# Patient Record
Sex: MALE | Race: WHITE | NOT HISPANIC OR LATINO | Employment: OTHER | URBAN - METROPOLITAN AREA
[De-identification: names, ages, dates, MRNs, and addresses within clinical notes are randomized per-mention and may not be internally consistent; named-entity substitution may affect disease eponyms.]

---

## 2017-02-20 ENCOUNTER — LAB VISIT (OUTPATIENT)
Dept: LAB | Facility: HOSPITAL | Age: 72
End: 2017-02-20
Attending: FAMILY MEDICINE
Payer: MEDICARE

## 2017-02-20 ENCOUNTER — OFFICE VISIT (OUTPATIENT)
Dept: FAMILY MEDICINE | Facility: CLINIC | Age: 72
End: 2017-02-20
Payer: MEDICARE

## 2017-02-20 VITALS
RESPIRATION RATE: 18 BRPM | HEART RATE: 55 BPM | BODY MASS INDEX: 28.19 KG/M2 | WEIGHT: 225.5 LBS | DIASTOLIC BLOOD PRESSURE: 72 MMHG | TEMPERATURE: 98 F | SYSTOLIC BLOOD PRESSURE: 164 MMHG | OXYGEN SATURATION: 98 %

## 2017-02-20 DIAGNOSIS — E11.9 DIABETES MELLITUS WITHOUT COMPLICATION: ICD-10-CM

## 2017-02-20 DIAGNOSIS — I10 BENIGN ESSENTIAL HYPERTENSION: ICD-10-CM

## 2017-02-20 DIAGNOSIS — Z01.818 PRE-OP EXAM: Primary | ICD-10-CM

## 2017-02-20 LAB
ALBUMIN SERPL BCP-MCNC: 3.8 G/DL
ALP SERPL-CCNC: 76 U/L
ALT SERPL W/O P-5'-P-CCNC: 20 U/L
ANION GAP SERPL CALC-SCNC: 11 MMOL/L
AST SERPL-CCNC: 30 U/L
BILIRUB SERPL-MCNC: 0.8 MG/DL
BUN SERPL-MCNC: 13 MG/DL
CALCIUM SERPL-MCNC: 9.3 MG/DL
CHLORIDE SERPL-SCNC: 105 MMOL/L
CHOLEST/HDLC SERPL: 4.2 {RATIO}
CO2 SERPL-SCNC: 25 MMOL/L
CREAT SERPL-MCNC: 0.8 MG/DL
EST. GFR  (AFRICAN AMERICAN): >60 ML/MIN/1.73 M^2
EST. GFR  (NON AFRICAN AMERICAN): >60 ML/MIN/1.73 M^2
GLUCOSE SERPL-MCNC: 119 MG/DL
HDL/CHOLESTEROL RATIO: 24.1 %
HDLC SERPL-MCNC: 133 MG/DL
HDLC SERPL-MCNC: 32 MG/DL
LDLC SERPL CALC-MCNC: 80.8 MG/DL
NONHDLC SERPL-MCNC: 101 MG/DL
POTASSIUM SERPL-SCNC: 4.3 MMOL/L
PROT SERPL-MCNC: 6.8 G/DL
SODIUM SERPL-SCNC: 141 MMOL/L
TRIGL SERPL-MCNC: 101 MG/DL

## 2017-02-20 PROCEDURE — 99999 PR PBB SHADOW E&M-EST. PATIENT-LVL III: CPT | Mod: PBBFAC,,, | Performed by: PHYSICIAN ASSISTANT

## 2017-02-20 PROCEDURE — 36415 COLL VENOUS BLD VENIPUNCTURE: CPT | Mod: PO

## 2017-02-20 PROCEDURE — 80053 COMPREHEN METABOLIC PANEL: CPT

## 2017-02-20 PROCEDURE — 99213 OFFICE O/P EST LOW 20 MIN: CPT | Mod: S$GLB,,, | Performed by: PHYSICIAN ASSISTANT

## 2017-02-20 PROCEDURE — 80061 LIPID PANEL: CPT

## 2017-02-20 PROCEDURE — 83036 HEMOGLOBIN GLYCOSYLATED A1C: CPT

## 2017-02-20 PROCEDURE — 99213 OFFICE O/P EST LOW 20 MIN: CPT | Mod: PBBFAC,PO | Performed by: PHYSICIAN ASSISTANT

## 2017-02-20 RX ORDER — VALSARTAN AND HYDROCHLOROTHIAZIDE 160; 12.5 MG/1; MG/1
1 TABLET, FILM COATED ORAL DAILY
Qty: 30 TABLET | Refills: 0 | Status: SHIPPED | OUTPATIENT
Start: 2017-02-20 | End: 2017-03-22 | Stop reason: SDUPTHER

## 2017-02-20 RX ORDER — METFORMIN HYDROCHLORIDE 500 MG/1
500 TABLET ORAL 2 TIMES DAILY
Qty: 180 TABLET | Refills: 0 | Status: SHIPPED | OUTPATIENT
Start: 2017-02-20 | End: 2017-06-14 | Stop reason: SDUPTHER

## 2017-02-20 NOTE — MR AVS SNAPSHOT
[facility]  [address] [place] [address]  Phone: [phone]  Fax: [phone]                  Rosendo Christina   2017 7:00 AM   Office Visit    Description:  Male : 1945   Provider:  Bita Owens PA-C   Department:  [facility]           Reason for Visit     Pre-op Exam           Diagnoses this Visit        Comments    Pre-op exam    -  Primary     Diabetes mellitus without complication         Benign essential hypertension                To Do List           Future Appointments        Provider Department Dept Phone    3/3/2017 8:20 AM Domi Lackey MD [facility] [phone]      Goals (5 Years of Data)     None      Follow-Up and Disposition     Return if symptoms worsen or fail to improve.       These Medications        Disp Refills Start End    metformin (GLUCOPHAGE) 500 MG tablet 180 tablet 0 2017     Take 1 tablet (500 mg total) by mouth 2 (two) times daily. - Oral    Pharmacy: [facility] [id number] [address] [address] AT [facility] Ph #: [phone]       valsartan-hydrochlorothiazide (DIOVAN-HCT) 160-12.5 mg per tablet 30 tablet 0 2017    Take 1 tablet by mouth once daily. - Oral    Pharmacy: [facility] [id number] [place] [address] [address] AT [facility] Ph #: [phone]         [facility]     [facility] -  Assistance  Registered nurses in the [facility] provide clinical advisement, health education, appointment booking, and other advisory services.  Call for this free service at [phone].             Medications           Message regarding Medications     Verify the changes and/or additions to your medication regime listed below are the same as discussed with your clinician today.  If any of these changes or additions are incorrect, please notify your  healthcare provider.        START taking these NEW medications        Refills    valsartan-hydrochlorothiazide (DIOVAN-HCT) 160-12.5 mg per tablet 0    Sig: Take 1 tablet by mouth once daily.    Class: Normal    Route: Oral      CHANGE how you are taking these medications     Start Taking Instead of    metformin (GLUCOPHAGE) 500 MG tablet metformin (GLUCOPHAGE) 500 MG tablet    Dosage:  Take 1 tablet (500 mg total) by mouth 2 (two) times daily. Dosage:  TAKE 1 TABLET BY MOUTH TWICE DAILY    Reason for Change:  Reorder            Verify that the below list of medications is an accurate representation of the medications you are currently taking.  If none reported, the list may be blank. If incorrect, please contact your healthcare provider. Carry this list with you in case of emergency.           Current Medications     blood sugar diagnostic (CONTOUR TEST STRIPS) Strp 1 each by Misc.(Non-Drug; Combo Route) route 2 (two) times daily.    lancets Misc USE ONE LANCET TWO TIMES DAILY    magnesium 30 mg Tab Take by mouth.    metformin (GLUCOPHAGE) 500 MG tablet Take 1 tablet (500 mg total) by mouth 2 (two) times daily.    omega 3-dha-epa-fish oil (FISH OIL) 300-1,000 mg CpDR Take 1 capsule by mouth once daily.    valsartan-hydrochlorothiazide (DIOVAN-HCT) 160-12.5 mg per tablet Take 1 tablet by mouth once daily.           Clinical Reference Information           Your Vitals Were     BP Pulse Temp Resp Weight SpO2    164/72 55 98.2 °F (36.8 °C) (Oral) 18 102.3 kg (225 lb 8.5 oz) 98%    BMI                28.19 kg/m2          Blood Pressure          Most Recent Value    BP  (!)  164/72      Allergies as of 2/20/2017     No Known Allergies      Immunizations Administered on Date of Encounter - 2/20/2017     None      Orders Placed During Today's Visit     Future Labs/Procedures Expected by Expires    Comprehensive metabolic panel  2/20/2017 2/20/2018    Hemoglobin A1c  2/20/2017 2/20/2018    Lipid panel  2/20/2017 2/20/2018       [facility] Sign-Up     Activating your [facility] account is as easy as 1-2-3!     1) Visit [email or web address], select Sign Up Now, enter this activation code and your date of birth, then select Next.  [email or web address]  Expires: 4/6/2017  7:47 AM      2) Create a username and password to use when you visit [facility] in the future and select a security question in case you lose your password and select Next.    3) Enter your e-mail address and click Sign Up!    Additional Information  If you have questions, please e-mail [email or web address] or call [phone] to talk to our [facility] staff. Remember, [facility] is NOT to be used for urgent needs. For medical emergencies, dial 911.         Smoking Cessation     If you would like to quit smoking:   You may be eligible for free services if you are a [place] resident and started smoking cigarettes before September 1, 1988.  Call the Smoking Cessation Trust ([facility]) toll free at ([phone] or (090) [phone].   Call 9-098-QUIT-NOW if you do not meet the above criteria.            Language Assistance Services     ATTENTION: Language assistance services are available, free of charge. Please call [phone].      ATENCIÓN: Si habla español, tiene a geller disposición servicios gratuitos de asistencia lingüística. Llame al [phone].     CHÚ Ý: N?u b?n nói Ti?ng Vi?t, có các d?ch v? h? tr? ngôn ng? mi?n phí dành cho b?n. G?i s? [phone].         [facility] complies with applicable Federal civil rights laws and does not discriminate on the basis of race, color, national origin, age, disability, or sex.

## 2017-02-20 NOTE — PROGRESS NOTES
Subjective:       Patient ID: Rosendo Christina is a 71 y.o. male.    Chief Complaint: Pre-op Exam (right eye catarac removal)    HPI: 72 yo male with DM II presents for pre-op. Right cataract surgery with Dr. Mithcell scheduled for 3/6/17.     Review of Systems   Respiratory: Negative for shortness of breath.    Cardiovascular: Negative for chest pain.       Objective:      Physical Exam   Constitutional: He is oriented to person, place, and time. He appears well-developed. No distress.   HENT:   Head: Normocephalic and atraumatic.   Cardiovascular: Normal rate and regular rhythm.  Exam reveals no friction rub.    No murmur heard.  Pulmonary/Chest: Effort normal and breath sounds normal. No respiratory distress.   Neurological: He is alert and oriented to person, place, and time.   Skin: Skin is warm and dry. He is not diaphoretic.   Psychiatric: He has a normal mood and affect. His behavior is normal.   Vitals reviewed.      Assessment:       1. Pre-op exam    2. Diabetes mellitus without complication    3. Benign essential hypertension        Plan:         Rosendo was seen today for pre-op exam.    Diagnoses and all orders for this visit:    Pre-op exam  -   Unable to clear today due to BP. Will start medication and have pt return on 3/3 with PCP. He is to bring paperwork for clearance.    Diabetes mellitus without complication  -     metformin (GLUCOPHAGE) 500 MG tablet; Take 1 tablet (500 mg total) by mouth 2 (two) times daily.  -     Comprehensive metabolic panel; Future  -     Lipid panel; Future  -     Hemoglobin A1c; Future  -     Labs today, low carb low sugar diet    Benign essential hypertension  -     Start valsartan-hydrochlorothiazide (DIOVAN-HCT) 160-12.5 mg per tablet; Take 1 tablet by mouth once daily.  -     Advised low salt diet and exercise

## 2017-02-21 ENCOUNTER — TELEPHONE (OUTPATIENT)
Dept: FAMILY MEDICINE | Facility: CLINIC | Age: 72
End: 2017-02-21

## 2017-02-21 LAB
ESTIMATED AVG GLUCOSE: 143 MG/DL
HBA1C MFR BLD HPLC: 6.6 %

## 2017-02-21 NOTE — TELEPHONE ENCOUNTER
----- Message from Bita Owens PA-C sent at 2/21/2017  8:22 AM CST -----  Diabetes slightly worse, can increase metformin or really needs to cut back on sugar and carbs. All other labs look good.

## 2017-02-21 NOTE — TELEPHONE ENCOUNTER
Patient notified of results as noted below, patient verbalized understanding, patient stated he will closely watch what he eat.

## 2017-03-03 ENCOUNTER — OFFICE VISIT (OUTPATIENT)
Dept: FAMILY MEDICINE | Facility: CLINIC | Age: 72
End: 2017-03-03
Payer: MEDICARE

## 2017-03-03 VITALS
TEMPERATURE: 98 F | WEIGHT: 259.5 LBS | HEIGHT: 75 IN | BODY MASS INDEX: 32.27 KG/M2 | SYSTOLIC BLOOD PRESSURE: 122 MMHG | OXYGEN SATURATION: 96 % | RESPIRATION RATE: 12 BRPM | DIASTOLIC BLOOD PRESSURE: 70 MMHG | HEART RATE: 57 BPM

## 2017-03-03 DIAGNOSIS — H26.9 CATARACT, UNSPECIFIED CATARACT TYPE, UNSPECIFIED LATERALITY: ICD-10-CM

## 2017-03-03 DIAGNOSIS — I10 ESSENTIAL HYPERTENSION, BENIGN: Primary | ICD-10-CM

## 2017-03-03 DIAGNOSIS — E11.9 DIABETES MELLITUS WITHOUT COMPLICATION: ICD-10-CM

## 2017-03-03 PROCEDURE — 99214 OFFICE O/P EST MOD 30 MIN: CPT | Mod: PBBFAC,PO | Performed by: FAMILY MEDICINE

## 2017-03-03 PROCEDURE — 99214 OFFICE O/P EST MOD 30 MIN: CPT | Mod: S$PBB,,, | Performed by: FAMILY MEDICINE

## 2017-03-03 PROCEDURE — 99999 PR PBB SHADOW E&M-EST. PATIENT-LVL IV: CPT | Mod: PBBFAC,,, | Performed by: FAMILY MEDICINE

## 2017-03-03 RX ORDER — DUREZOL 0.5 MG/ML
EMULSION OPHTHALMIC
COMMUNITY
Start: 2017-02-27 | End: 2018-05-30

## 2017-03-03 RX ORDER — CIPROFLOXACIN HYDROCHLORIDE 3 MG/ML
SOLUTION/ DROPS OPHTHALMIC
COMMUNITY
Start: 2017-02-27 | End: 2018-05-30

## 2017-03-03 RX ORDER — BROMFENAC SODIUM 0.7 MG/ML
SOLUTION/ DROPS OPHTHALMIC
COMMUNITY
Start: 2017-03-02 | End: 2018-05-30

## 2017-03-03 NOTE — PROGRESS NOTES
Subjective:       Patient ID: Rosendo Christina is a 71 y.o. male.    Chief Complaint: Pre-op Exam (cataract preop left is first., Dr Baron)    HPI:  Here for follow up uncontrolled HTN and pre-op clearance for cataract surgery.  Patient tolerating Diovan-HCT.  No cardiac complaints.    Review of Systems   Constitutional: Negative for unexpected weight change.   Eyes: Negative for visual disturbance.   Respiratory: Negative for shortness of breath.    Cardiovascular: Negative for chest pain and leg swelling.   Neurological: Negative for dizziness and headaches.       Objective:      Physical Exam   Constitutional: He is oriented to person, place, and time. He appears well-developed and well-nourished.   HENT:   Head: Normocephalic and atraumatic.   Eyes: Conjunctivae are normal.   Bilateral cataracts   Neck: Normal range of motion. Neck supple. No thyromegaly present.   Cardiovascular: Normal rate, regular rhythm and normal heart sounds.    No murmur heard.  Pulmonary/Chest: Effort normal and breath sounds normal. No respiratory distress. He has no wheezes.   Abdominal: Soft. Bowel sounds are normal. He exhibits no mass. There is no tenderness.   Musculoskeletal: He exhibits no edema.   Neurological: He is alert and oriented to person, place, and time.   Skin: Skin is warm and dry.   Psychiatric: He has a normal mood and affect.         Assessment:       1. Essential hypertension, benign    2. Cataract, unspecified cataract type, unspecified laterality    3. Diabetes mellitus without complication        Plan:       Essential hypertension, benign  BP stable    Cataract, unspecified cataract type, unspecified laterality  Patient cleared for surgery    Diabetes mellitus without complication  -     Ambulatory referral to Podiatry        No Follow-up on file.

## 2017-03-07 ENCOUNTER — TELEPHONE (OUTPATIENT)
Dept: FAMILY MEDICINE | Facility: CLINIC | Age: 72
End: 2017-03-07

## 2017-03-07 NOTE — TELEPHONE ENCOUNTER
Pt scheduled to see Dr. Terrazas on 3/23/17 @ 3:15p. The patient also wanted Dr. Lackey to know his eye surgery went great.

## 2017-03-22 DIAGNOSIS — I10 BENIGN ESSENTIAL HYPERTENSION: ICD-10-CM

## 2017-03-22 RX ORDER — VALSARTAN AND HYDROCHLOROTHIAZIDE 160; 12.5 MG/1; MG/1
1 TABLET, FILM COATED ORAL DAILY
Qty: 90 TABLET | Refills: 2 | Status: SHIPPED | OUTPATIENT
Start: 2017-03-22 | End: 2018-02-16 | Stop reason: SDUPTHER

## 2017-03-22 NOTE — TELEPHONE ENCOUNTER
Medicare generation requesting 90 days to local pharmacy for valsartan-hydrochlorothiazide (DIOVAN-HCT) 160-12.5 mg per tablet    LOV: 3/2017 122/70  CMP 2/2017

## 2017-03-23 ENCOUNTER — OFFICE VISIT (OUTPATIENT)
Dept: PODIATRY | Facility: CLINIC | Age: 72
End: 2017-03-23
Payer: MEDICARE

## 2017-03-23 VITALS
WEIGHT: 259 LBS | HEIGHT: 75 IN | SYSTOLIC BLOOD PRESSURE: 124 MMHG | DIASTOLIC BLOOD PRESSURE: 70 MMHG | BODY MASS INDEX: 32.2 KG/M2

## 2017-03-23 DIAGNOSIS — M20.5X9 HALLUX LIMITUS, UNSPECIFIED LATERALITY: ICD-10-CM

## 2017-03-23 DIAGNOSIS — E11.9 COMPREHENSIVE DIABETIC FOOT EXAMINATION, TYPE 2 DM, ENCOUNTER FOR: Primary | ICD-10-CM

## 2017-03-23 DIAGNOSIS — L90.9 PLANTAR FAT PAD ATROPHY: ICD-10-CM

## 2017-03-23 DIAGNOSIS — G47.62 NOCTURNAL LEG CRAMPS: ICD-10-CM

## 2017-03-23 DIAGNOSIS — M20.42 HAMMER TOES OF BOTH FEET: ICD-10-CM

## 2017-03-23 DIAGNOSIS — M20.41 HAMMER TOES OF BOTH FEET: ICD-10-CM

## 2017-03-23 PROCEDURE — 99214 OFFICE O/P EST MOD 30 MIN: CPT | Mod: S$PBB,,, | Performed by: PODIATRIST

## 2017-03-23 PROCEDURE — 99213 OFFICE O/P EST LOW 20 MIN: CPT | Mod: PBBFAC,PO | Performed by: PODIATRIST

## 2017-03-23 PROCEDURE — 99999 PR PBB SHADOW E&M-EST. PATIENT-LVL III: CPT | Mod: PBBFAC,,, | Performed by: PODIATRIST

## 2017-03-23 NOTE — PROGRESS NOTES
Subjective:      Patient ID: Rosendo Christina is a 71 y.o. male.    Chief Complaint: Diabetes Mellitus (pcp Dr. Lackey 03/03/2017); Diabetic Foot Exam; and Nail Care    Rosendo is a 71 y.o. male who presents to the clinic for evaluation and treatment of high risk feet. Rosendo has a past medical history of Diabetes mellitus type II (7/2010); Nephrolithiasis; Obesity; Psoriasis; Skin cancer; and Squamous cell carcinoma. The patient has no major complaints with feet. Chief concern is how to care for feet as a diabetic.    This patient has documented high risk feet requiring routine maintenance secondary to diabetes mellitis and those secondary complications of diabetes, as mentioned..    PCP: Domi Lackey MD    Date Last Seen by PCP:   Chief Complaint   Patient presents with    Diabetes Mellitus     pcp Dr. Lackey 03/03/2017    Diabetic Foot Exam    Nail Care       Current shoe gear:  Tennis shoes     Hemoglobin A1C   Date Value Ref Range Status   02/20/2017 6.6 (H) 4.5 - 6.2 % Final     Comment:     According to ADA guidelines, hemoglobin A1C <7.0% represents  optimal control in non-pregnant diabetic patients.  Different  metrics may apply to specific populations.   Standards of Medical Care in Diabetes - 2016.  For the purpose of screening for the presence of diabetes:  <5.7%     Consistent with the absence of diabetes  5.7-6.4%  Consistent with increasing risk for diabetes   (prediabetes)  >or=6.5%  Consistent with diabetes  Currently no consensus exists for use of hemoglobin A1C  for diagnosis of diabetes for children.     08/26/2016 6.4 (H) 4.5 - 6.2 % Final     Comment:     According to ADA guidelines, hemoglobin A1C <7.0% represents  optimal control in non-pregnant diabetic patients.  Different  metrics may apply to specific populations.   Standards of Medical Care in Diabetes - 2016.  For the purpose of screening for the presence of diabetes:  <5.7%     Consistent with the absence of  diabetes  5.7-6.4%  Consistent with increasing risk for diabetes   (prediabetes)  >or=6.5%  Consistent with diabetes  Currently no consensus exists for use of hemoglobin A1C  for diagnosis of diabetes for children.     12/07/2015 6.0 4.5 - 6.2 % Final     Patient Active Problem List   Diagnosis    Type 2 diabetes mellitus    Psoriasis    Obesity     Current Outpatient Prescriptions on File Prior to Visit   Medication Sig Dispense Refill    ascorbic acid, vitamin C, (VITAMIN C) 100 MG tablet Take 100 mg by mouth once daily.      blood sugar diagnostic (CONTOUR TEST STRIPS) Strp 1 each by Misc.(Non-Drug; Combo Route) route 2 (two) times daily. 50 each 11    ciprofloxacin HCl (CILOXAN) 0.3 % ophthalmic solution       DUREZOL 0.05 % Drop ophthalmic solution       lancets Misc USE ONE LANCET TWO TIMES DAILY 100 each 11    magnesium 30 mg Tab Take by mouth.      metformin (GLUCOPHAGE) 500 MG tablet Take 1 tablet (500 mg total) by mouth 2 (two) times daily. 180 tablet 0    omega 3-dha-epa-fish oil (FISH OIL) 300-1,000 mg CpDR Take 1 capsule by mouth once daily. 30 capsule     PROLENSA 0.07 % Drop       valsartan-hydrochlorothiazide (DIOVAN-HCT) 160-12.5 mg per tablet Take 1 tablet by mouth once daily. 90 tablet 2     No current facility-administered medications on file prior to visit.      Review of patient's allergies indicates:  No Known Allergies  Past Surgical History:   Procedure Laterality Date    APPENDECTOMY      SKIN CANCER EXCISION      SKIN GRAFT      VASECTOMY      VASECTOMY       Family History   Problem Relation Age of Onset    Cancer Father      prostate    Diabetes Brother      Social History     Social History    Marital status:      Spouse name: N/A    Number of children: N/A    Years of education: N/A     Occupational History     [employer]     Social History Main Topics    Smoking status: Current Some Day Smoker    Smokeless tobacco: Never Used       "Comment: cigars    Alcohol use Yes      Comment: occassional    Drug use: No    Sexual activity: Yes     Partners: Female     Other Topics Concern    Not on file     Social History Narrative    No narrative on file        Review of Systems   Constitution: Negative for chills, fever and weakness.   Cardiovascular: Negative for claudication and leg swelling.   Respiratory: Negative for cough and shortness of breath.    Skin: Positive for dry skin and nail changes. Negative for itching and rash.   Musculoskeletal: Positive for joint pain and muscle cramps (nocturnal). Negative for falls, joint swelling and muscle weakness.   Gastrointestinal: Negative for diarrhea, nausea and vomiting.   Neurological: Positive for paresthesias. Negative for numbness and tremors.   Psychiatric/Behavioral: Negative for altered mental status and hallucinations.           Objective:       Vitals:    03/23/17 1530   BP: 124/70   Weight: 117.5 kg (259 lb)   Height: 6' 3" (1.905 m)   PainSc: 0-No pain       Physical Exam   Constitutional:   General: Pt. is well-developed, well-nourished, appears stated age, in no acute distress, alert and oriented x 3. No evidence of depression, anxiety, or agitation. Calm, cooperative, and communicative. Appropriate interactions and affect.       Cardiovascular:   Pulses:       Dorsalis pedis pulses are 1+ on the right side, and 1+ on the left side.        Posterior tibial pulses are 2+ on the right side, and 2+ on the left side.   There is decreased digital hair   Musculoskeletal:        Right ankle: He exhibits normal range of motion and no swelling. No tenderness. Achilles tendon exhibits no pain and no defect.        Left ankle: He exhibits normal range of motion and no swelling. No tenderness. Achilles tendon exhibits no pain and no defect.        Right foot: There is decreased range of motion. There is no tenderness.        Left foot: There is decreased range of motion. There is no tenderness. "   muscle strength is 5/5 in all groups bilaterally.    Decreased stride, station of gait.  apropulsive toe off.  Increased angle and base of gait.    Patient has hammertoes of digits 2-5 bilateral partially reducible without symptom today.    Decreased first MPJ range of motion both weightbearing and nonweightbearing, no crepitus observed the first MP joint, + dorsal flag sign. Mild  bunion deformity is observed .    Fat pad atrophy to heels and met heads bilateral   Neurological: No sensory deficit.   Hoschton-Handy 5.07 monofilament is intact bilateral feet. Sharp/dull sensation is also intact Bilateral feet.       Skin: Skin is warm, dry and intact. No abrasion, no ecchymosis, no lesion and no rash noted. He is not diaphoretic. No cyanosis or erythema. No pallor. Nails show no clubbing.   Toenails 1-5 bilaterally are elongated by 2-3 mm, thickened by 2-3 mm, discolored/yellowed, dystrophic, brittle with subungual debris.      Interdigital Spaces clean, dry and without evidence of break in skin integrity   Psychiatric: He has a normal mood and affect. His speech is normal.   Nursing note and vitals reviewed.            Assessment:       Encounter Diagnoses   Name Primary?    Comprehensive diabetic foot examination, type 2 DM, encounter for Yes    Nocturnal leg cramps     Hammer toes of both feet     Hallux limitus, unspecified laterality     Plantar fat pad atrophy          Plan:       Rosendo was seen today for diabetes mellitus, diabetic foot exam and nail care.    Diagnoses and all orders for this visit:    Comprehensive diabetic foot examination, type 2 DM, encounter for  -     DIABETIC SHOES FOR HOME USE    Nocturnal leg cramps    Hammer toes of both feet  -     DIABETIC SHOES FOR HOME USE    Hallux limitus, unspecified laterality  -     DIABETIC SHOES FOR HOME USE    Plantar fat pad atrophy  -     DIABETIC SHOES FOR HOME USE    I counseled the patient on his conditions, their implications and medical  management. Greater than 20 minutes spent on education about the diabetic foot, neuropathy, and prevention of limb loss.      - Shoe inspection. Diabetic Foot Education. Patient reminded of the importance of good nutrition and blood sugar control to help prevent podiatric complications of diabetes. Patient instructed on proper foot hygeine. We discussed wearing proper shoe gear, daily foot inspections, never walking without protective shoe gear.    Rx diabetic shoes for protection and support      Spoke in detail about causes of muscle spasms. Verbal and written instructions given. Recommended drinking plenty of fluids during the day to prevent dehydration, stretching, dietary changes, and b vitamin supplementation.      He will continue to monitor the areas daily, inspect his feet, wear protective shoe gear when ambulatory, moisturizer to maintain skin integrity and follow in this office in approximately 12-15 months, sooner p.r.n.

## 2017-03-23 NOTE — LETTER
March 25, 2017      Domi Lackey MD  [address]  [place] [address]           [facility]  [address]  [place] [address]  Phone: [phone]          Patient: Rosendo Christina   MR Number: [id number]   YOB: 1945   Date of Visit: 3/23/2017       Dear Dr. Domi Lackey:    Thank you for referring Rosendo Christina to me for evaluation. Attached you will find relevant portions of my assessment and plan of care.    If you have questions, please do not hesitate to call me. I look forward to following Rosendo Christina along with you.    Sincerely,    Kim Terrazas DPM    Enclosure  CC:  No Recipients    If you would like to receive this communication electronically, please contact [email or web address] or [phone] to request more information on [employer] Link access.    For providers and/or their staff who would like to refer a patient to [facility], please contact us through our one-stop-shop provider referral line, Evan Rajput, at [phone].    If you feel you have received this communication in error or would no longer like to receive these types of communications, please e-mail [email or web address]

## 2017-03-23 NOTE — MR AVS SNAPSHOT
[facility]  [address] [facility]  [place] [address]  Phone: [phone]                  Rosendo Christina   3/23/2017 3:15 PM   Office Visit    Description:  Male : 1945   Provider:  Kim Terrazas DPM   Department:  [facility]           Reason for Visit     Diabetes Mellitus     Diabetic Foot Exam     Nail Care           Diagnoses this Visit        Comments    Comprehensive diabetic foot examination, type 2 DM, encounter for    -  Primary     Nocturnal leg cramps         Hammer toes of both feet         Hallux limitus, unspecified laterality         Plantar fat pad atrophy                To Do List           Goals (5 Years of Data)     None      [facility]     [facility] On Call Nurse Care Line -  Assistance  Registered nurses in the [facility] On Call Center provide clinical advisement, health education, appointment booking, and other advisory services.  Call for this free service at [phone].             Medications           Message regarding Medications     Verify the changes and/or additions to your medication regime listed below are the same as discussed with your clinician today.  If any of these changes or additions are incorrect, please notify your healthcare provider.             Verify that the below list of medications is an accurate representation of the medications you are currently taking.  If none reported, the list may be blank. If incorrect, please contact your healthcare provider. Carry this list with you in case of emergency.           Current Medications     ascorbic acid, vitamin C, (VITAMIN C) 100 MG tablet Take 100 mg by mouth once daily.    blood sugar diagnostic (CONTOUR TEST STRIPS) Strp 1 each by Misc.(Non-Drug; Combo Route) route 2 (two) times daily.    ciprofloxacin HCl (CILOXAN) 0.3 % ophthalmic solution     DUREZOL 0.05 % Drop ophthalmic solution     lancets Misc USE ONE LANCET TWO TIMES DAILY    magnesium 30 mg Tab Take by mouth.     "metformin (GLUCOPHAGE) 500 MG tablet Take 1 tablet (500 mg total) by mouth 2 (two) times daily.    omega 3-dha-epa-fish oil (FISH OIL) 300-1,000 mg CpDR Take 1 capsule by mouth once daily.    PROLENSA 0.07 % Drop     valsartan-hydrochlorothiazide (DIOVAN-HCT) 160-12.5 mg per tablet Take 1 tablet by mouth once daily.           Clinical Reference Information           Your Vitals Were     BP Height Weight BMI       124/70 6' 3" (1.905 m) 117.5 kg (259 lb) 32.37 kg/m2       Blood Pressure          Most Recent Value    BP  124/70      Allergies as of 3/23/2017     No Known Allergies      Immunizations Administered on Date of Encounter - 3/23/2017     None      Instructions      Recommend over the counter medicine for nerve keily:  - Capsaicin cream to rub on at night  -  Absorbine Veterinary Liniment Gel Topical Analgesic Sore Muscle and Joint Pain Relief (found at pet store)  - Alpha lipoic acid 600 mg Cap; Take 1 capsule by mouth once daily for neuropathy or a B complex vitamin daily      Recommend lotions: eucerin, aquaphor, A&D ointment, gold bond for diabetics, sween    Shoe recommendations: (try 6pm.Zwittle, zappos.Zwittle , [employer], or shoesAzure Power for discounted prices) you can visit DSW shoes in [place] as well    Asics (GT 1000 or gel foundations), new balance, saucony (stabil c3),  Levy (transcend), vionic, propet (tennis shoe)    soft brand, clarks, crocs, aerosoles, naturalizers, SAS, ecco, lacho, maegan guillaume, rockports (dress shoes)    Vionic, volitiles, burkenstocks, fitflops, propet (sandals)    Nike comfort thong sandals, crocs (house shoes)    Nail Home remedy:  Vicks Vapor rub OR Listerine and apple cider vinegar in a spray bottle to nails    Occasional soaks for 15-20 mins in luke warm water with 1 cup of listerine and 1 cup of apple cider vinegar are ok You may add several drops of oil of oregano or tea tree oil as well      Diabetes: Inspecting Your Feet  Diabetes increases your chances of " developing foot problems. So inspect your feet every day. This helps you find small skin irritations before they become serious infections. If you have trouble seeing the bottoms of your feet, use a mirror or ask a family member or friend to help.     Pressure spots on the bottom of the foot are common areas where problems develop.   How to check your feet  Below are tips to help you look for foot problems. Try to check your feet at the same time each day, such as when you get out of bed in the morning:  · Check the top of each foot. The tops of toes, back of the heel, and outer edge of the foot can get a lot of rubbing from poor-fitting shoes.  · Check the bottom of each foot. Daily wear and tear often leads to problems at pressure spots.  · Check the toes and nails. Fungal infections often occur between toes. Toenail problems can also be a sign of fungal infections or lead to breaks in the skin.  · Check your shoes, too. Loose objects inside a shoe can injure the foot. Use your hand to feel inside your shoes for things like alessandra, loose stitching, or rough areas that could irritate your skin.  Warning signs  Look for any color changes in the foot. Redness with streaks can signal a severe infection, which needs immediate medical attention. Tell your doctor right away if you have any of these problems:  · Swelling, sometimes with color changes, may be a sign of poor blood flow or infection. Symptoms include tenderness and an increase in the size of your foot.  · Warm or hot areas on your feet may be signs of infection. A foot that is cold may not be getting enough blood.  · Sensations such as burning, tingling, or pins and needles can be signs of a problem. Also check for areas that may be numb.  · Hot spots are caused by friction or pressure. Look for hot spots in areas that get a lot of rubbing. Hot spots can turn into blisters, calluses, or sores.  · Cracks and sores are caused by dry or irritated skin. They  are a sign that the skin is breaking down, which can lead to infection.  · Toenail problems to watch for include nails growing into the skin (ingrown toenail) and causing redness or pain. Thick, yellow, or discolored nails can signal a fungal infection.  · Drainage and odor can develop from untreated sores and ulcers. Call your doctor right away if you notice white or yellow drainage, bleeding, or unpleasant odor.   © 5881-9033 [employer]. [address] [place] [address]. All rights reserved. This information is not intended as a substitute for professional medical care. Always follow your healthcare professional's instructions.          Leg Cramps  A muscle cramp or spasm is a strong contraction of the muscle fibers. It is also called a charley horse. This may occur in the foot, calf, or thigh at night when the legs are elevated. If the spasm is prolonged, it can become very painful.  This may be caused by sleeping in an uncomfortable position, muscle fatigue, poor muscle tone from lack of exercise and stretching, dehydration, electrolyte imbalance, diabetes, alcohol use, and certain medicine.  Home care  · Drink plenty of fluids during the day to prevent dehydration.  · Stretch your legs before bedtime.  · Eat a diet high in potassium. These foods include fresh fruit, such as bananas, oranges, cantaloupe, and honeydew melon. It also includes apple, prune, orange, grape and pineapple juices. Other foods high in potassium are white, red, and abdalal beans, baked potatoes, raw spinach, cod, flounder, halibut, salmon, and scallops.  · Talk with your healthcare provider about taking mineral and vitamin supplements that contain magnesium and vitamin B-12 if you are not already taking these. Other prescription medicines may also be used.  · Avoid stimulants such as caffeine, nicotine, decongestants.  How to relieve an acute leg cramp  · For mild pain, getting out of the bed and walking may help.  Some people find relief with heat and massage. You can apply heat with a warm shower, bath, or compress. Some people feel better with a cold packs. You can make an ice pack by filling a plastic bag that seals at the top with ice cubes and then wrapping it with a thin towel. Try both and use the method that feels best for 15 to 20 minutes at a time.  · For severe pain, stretching the muscle that is in spasm may quickly relieve the pain.  · When the spasm is in your foot, your toes may curl up or down. To stretch the muscle in spasm, bend your toes in the opposite direction. If the spasm pulls your toes up, bend them down. If the spasm pulls them down, bend them up.  · When the spasm is in your calf, bend the ankle so the foot points upward toward your knee.  · When the spasm is in your thigh, bend or straighten the knee and hip until you feel relief.  Follow-up care  Follow up with your healthcare provider, or as advised.  When to seek medical advice  Call your healthcare provider right away if any of these occur:  · Walking makes your pain worse and rest makes it better  · You develop weakness in the affected leg  · Pain or frequency of spasms increases and is not controlled by the above measures  Date Last Reviewed: 11/23/2015  © 3992-5052 [employer]. [address] [place] [address], [place] [address]. All rights reserved. This information is not intended as a substitute for professional medical care. Always follow your healthcare professional's instructions.             Smoking Cessation     If you would like to quit smoking:   You may be eligible for free services if you are a [place] resident and started smoking cigarettes before September 1, 1988.  Call the Smoking Cessation Trust (SCT) toll free at (812) 008-9416 or (625) 835-0507.   Call 1-800-QUIT-NOW if you do not meet the above criteria.            Language Assistance Services     ATTENTION: Language assistance services are available, free of  charge. Please call [phone].      ATENCIÓN: Si habla español, tiene a geller disposición servicios gratuitos de asistencia lingüística. Llame al [phone].     CHÚ Ý: N?u b?n nói Ti?ng Vi?t, có các d?ch v? h? tr? ngôn ng? mi?n phí dành cho b?n. G?i s? [phone].         [facility] complies with applicable Federal civil rights laws and does not discriminate on the basis of race, color, national origin, age, disability, or sex.

## 2017-03-23 NOTE — PATIENT INSTRUCTIONS
Recommend over the counter medicine for nerve keily:  - Capsaicin cream to rub on at night  -  Absorbine Veterinary Liniment Gel Topical Analgesic Sore Muscle and Joint Pain Relief (found at pet store)  - Alpha lipoic acid 600 mg Cap; Take 1 capsule by mouth once daily for neuropathy or a B complex vitamin daily      Recommend lotions: eucerin, aquaphor, A&D ointment, gold bond for diabetics, sween    Shoe recommendations: (try 6pm.HomeSphere, zappos.HomeSphere , nordstromrack.HomeSphere, or shoes.HomeSphere for discounted prices) you can visit DSW shoes in [place] as well    Asics (GT 1000 or gel foundations), new balance, saucony (stabil c3),  Levy (transcend), vionic, propet (tennis shoe)    soft brand, clarks, crocs, aerosoles, naturalizers, SAS, ecco, lacho, maegan guillaume, rockports (dress shoes)    Vionic, volitiles, burkenstocks, fitflops, propet (sandals)    Nike comfort thong sandals, crocs (house shoes)    Nail Home remedy:  Vicks Vapor rub OR Listerine and apple cider vinegar in a spray bottle to nails    Occasional soaks for 15-20 mins in luke warm water with 1 cup of listerine and 1 cup of apple cider vinegar are ok You may add several drops of oil of oregano or tea tree oil as well      Diabetes: Inspecting Your Feet  Diabetes increases your chances of developing foot problems. So inspect your feet every day. This helps you find small skin irritations before they become serious infections. If you have trouble seeing the bottoms of your feet, use a mirror or ask a family member or friend to help.     Pressure spots on the bottom of the foot are common areas where problems develop.   How to check your feet  Below are tips to help you look for foot problems. Try to check your feet at the same time each day, such as when you get out of bed in the morning:  · Check the top of each foot. The tops of toes, back of the heel, and outer edge of the foot can get a lot of rubbing from poor-fitting shoes.  · Check the bottom of each foot.  Daily wear and tear often leads to problems at pressure spots.  · Check the toes and nails. Fungal infections often occur between toes. Toenail problems can also be a sign of fungal infections or lead to breaks in the skin.  · Check your shoes, too. Loose objects inside a shoe can injure the foot. Use your hand to feel inside your shoes for things like alessandra, loose stitching, or rough areas that could irritate your skin.  Warning signs  Look for any color changes in the foot. Redness with streaks can signal a severe infection, which needs immediate medical attention. Tell your doctor right away if you have any of these problems:  · Swelling, sometimes with color changes, may be a sign of poor blood flow or infection. Symptoms include tenderness and an increase in the size of your foot.  · Warm or hot areas on your feet may be signs of infection. A foot that is cold may not be getting enough blood.  · Sensations such as burning, tingling, or pins and needles can be signs of a problem. Also check for areas that may be numb.  · Hot spots are caused by friction or pressure. Look for hot spots in areas that get a lot of rubbing. Hot spots can turn into blisters, calluses, or sores.  · Cracks and sores are caused by dry or irritated skin. They are a sign that the skin is breaking down, which can lead to infection.  · Toenail problems to watch for include nails growing into the skin (ingrown toenail) and causing redness or pain. Thick, yellow, or discolored nails can signal a fungal infection.  · Drainage and odor can develop from untreated sores and ulcers. Call your doctor right away if you notice white or yellow drainage, bleeding, or unpleasant odor.   © 7047-9067 [employer]. [address] [place] [address], [place] [address]. All rights reserved. This information is not intended as a substitute for professional medical care. Always follow your healthcare professional's instructions.          Leg Cramps  A  muscle cramp or spasm is a strong contraction of the muscle fibers. It is also called a charley horse. This may occur in the foot, calf, or thigh at night when the legs are elevated. If the spasm is prolonged, it can become very painful.  This may be caused by sleeping in an uncomfortable position, muscle fatigue, poor muscle tone from lack of exercise and stretching, dehydration, electrolyte imbalance, diabetes, alcohol use, and certain medicine.  Home care  · Drink plenty of fluids during the day to prevent dehydration.  · Stretch your legs before bedtime.  · Eat a diet high in potassium. These foods include fresh fruit, such as bananas, oranges, cantaloupe, and honeydew melon. It also includes apple, prune, orange, grape and pineapple juices. Other foods high in potassium are white, red, and abdalla beans, baked potatoes, raw spinach, cod, flounder, halibut, salmon, and scallops.  · Talk with your healthcare provider about taking mineral and vitamin supplements that contain magnesium and vitamin B-12 if you are not already taking these. Other prescription medicines may also be used.  · Avoid stimulants such as caffeine, nicotine, decongestants.  How to relieve an acute leg cramp  · For mild pain, getting out of the bed and walking may help. Some people find relief with heat and massage. You can apply heat with a warm shower, bath, or compress. Some people feel better with a cold packs. You can make an ice pack by filling a plastic bag that seals at the top with ice cubes and then wrapping it with a thin towel. Try both and use the method that feels best for 15 to 20 minutes at a time.  · For severe pain, stretching the muscle that is in spasm may quickly relieve the pain.  · When the spasm is in your foot, your toes may curl up or down. To stretch the muscle in spasm, bend your toes in the opposite direction. If the spasm pulls your toes up, bend them down. If the spasm pulls them down, bend them up.  · When the  spasm is in your calf, bend the ankle so the foot points upward toward your knee.  · When the spasm is in your thigh, bend or straighten the knee and hip until you feel relief.  Follow-up care  Follow up with your healthcare provider, or as advised.  When to seek medical advice  Call your healthcare provider right away if any of these occur:  · Walking makes your pain worse and rest makes it better  · You develop weakness in the affected leg  · Pain or frequency of spasms increases and is not controlled by the above measures  Date Last Reviewed: 11/23/2015  © 1210-8978 [employer]. [address] [place] [address]. All rights reserved. This information is not intended as a substitute for professional medical care. Always follow your healthcare professional's instructions.

## 2017-06-14 DIAGNOSIS — E11.9 DIABETES MELLITUS WITHOUT COMPLICATION: ICD-10-CM

## 2017-06-14 RX ORDER — METFORMIN HYDROCHLORIDE 500 MG/1
500 TABLET ORAL 2 TIMES DAILY
Qty: 180 TABLET | Refills: 0 | Status: SHIPPED | OUTPATIENT
Start: 2017-06-14 | End: 2017-10-17 | Stop reason: SDUPTHER

## 2017-06-14 NOTE — TELEPHONE ENCOUNTER
----- Message from Daily Gonzalez sent at 6/14/2017 12:43 PM CDT -----  Contact: self  Refill request: metformin (GLUCOPHAGE) 500 MG tablet    Pt states he don't receive text messages.    Contact Rosendo [phone]    Thanks

## 2017-10-13 DIAGNOSIS — E11.9 TYPE 2 DIABETES MELLITUS WITHOUT COMPLICATION: ICD-10-CM

## 2017-10-17 DIAGNOSIS — E11.9 DIABETES MELLITUS WITHOUT COMPLICATION: ICD-10-CM

## 2017-10-17 RX ORDER — METFORMIN HYDROCHLORIDE 500 MG/1
500 TABLET ORAL 2 TIMES DAILY
Qty: 180 TABLET | Refills: 0 | Status: SHIPPED | OUTPATIENT
Start: 2017-10-17 | End: 2018-01-19 | Stop reason: SDUPTHER

## 2018-01-10 ENCOUNTER — OFFICE VISIT (OUTPATIENT)
Dept: FAMILY MEDICINE | Facility: CLINIC | Age: 73
End: 2018-01-10
Payer: MEDICARE

## 2018-01-10 ENCOUNTER — LAB VISIT (OUTPATIENT)
Dept: LAB | Facility: HOSPITAL | Age: 73
End: 2018-01-10
Attending: FAMILY MEDICINE
Payer: MEDICARE

## 2018-01-10 VITALS
SYSTOLIC BLOOD PRESSURE: 138 MMHG | DIASTOLIC BLOOD PRESSURE: 76 MMHG | RESPIRATION RATE: 18 BRPM | HEIGHT: 75 IN | WEIGHT: 263.44 LBS | OXYGEN SATURATION: 95 % | TEMPERATURE: 99 F | HEART RATE: 64 BPM | BODY MASS INDEX: 32.75 KG/M2

## 2018-01-10 DIAGNOSIS — E11.9 TYPE 2 DIABETES MELLITUS WITHOUT COMPLICATION: ICD-10-CM

## 2018-01-10 DIAGNOSIS — Z12.5 SCREENING PSA (PROSTATE SPECIFIC ANTIGEN): ICD-10-CM

## 2018-01-10 DIAGNOSIS — R51.9 NEW ONSET HEADACHE: ICD-10-CM

## 2018-01-10 DIAGNOSIS — R05.9 COUGH: ICD-10-CM
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
## 2018-01-10 LAB
COMPLEXED PSA SERPL-MCNC: 4.1 NG/ML
ESTIMATED AVG GLUCOSE: 137 MG/DL
HBA1C MFR BLD HPLC: 6.4 %

## 2018-01-10 PROCEDURE — 83036 HEMOGLOBIN GLYCOSYLATED A1C: CPT

## 2018-01-10 PROCEDURE — 36415 COLL VENOUS BLD VENIPUNCTURE: CPT | Mod: PO

## 2018-01-10 PROCEDURE — 99214 OFFICE O/P EST MOD 30 MIN: CPT | Mod: S$PBB,,, | Performed by: PHYSICIAN ASSISTANT

## 2018-01-10 PROCEDURE — 99215 OFFICE O/P EST HI 40 MIN: CPT | Mod: PBBFAC,PO | Performed by: PHYSICIAN ASSISTANT

## 2018-01-10 PROCEDURE — 84153 ASSAY OF PSA TOTAL: CPT

## 2018-01-10 PROCEDURE — 99999 PR PBB SHADOW E&M-EST. PATIENT-LVL V: CPT | Mod: PBBFAC,,, | Performed by: PHYSICIAN ASSISTANT

## 2018-01-10 RX ORDER — BENZONATATE 200 MG/1
200 CAPSULE ORAL 3 TIMES DAILY PRN
Qty: 30 CAPSULE | Refills: 0 | Status: SHIPPED | OUTPATIENT
Start: 2018-01-10 | End: 2018-01-20

## 2018-01-10 NOTE — PROGRESS NOTES
Subjective:       Patient ID: Rosendo Christina is a 72 y.o. male.    Chief Complaint: Headache (had a fall 12/21/17 ); Cough; and Diabetes (will do Hemoglobing A1c )    Headache    This is a new problem. The current episode started 1 to 4 weeks ago. The problem occurs daily. The problem has been unchanged. The pain is located in the frontal region. The pain quality is not similar to prior headaches. The quality of the pain is described as throbbing. The pain is mild. Associated symptoms include coughing and a visual change. Pertinent negatives include no ear pain, fever, loss of balance, numbness, phonophobia, photophobia, rhinorrhea, swollen glands, tingling or weakness. Nothing aggravates the symptoms. He has tried NSAIDs for the symptoms. The treatment provided mild relief. There is no history of migraines in the family.   Cough   This is a new problem. The current episode started 1 to 4 weeks ago. The problem has been unchanged. The cough is non-productive (minimal mucous). Associated symptoms include headaches and postnasal drip. Pertinent negatives include no ear pain, fever, nasal congestion, rhinorrhea or wheezing. He has tried nothing for the symptoms. There is no history of asthma, bronchitis or COPD.   Fall   The accident occurred more than 1 week ago (3 weeks). The fall occurred while walking. He fell from a height of 1 to 2 ft. He landed on concrete. The point of impact was the head, face and right wrist. The pain is present in the head. The pain is mild. Associated symptoms include headaches and a visual change. Pertinent negatives include no fever, loss of consciousness, numbness or tingling. He has tried NSAID for the symptoms.     Review of Systems   Constitutional: Negative for fever.   HENT: Positive for postnasal drip. Negative for ear pain and rhinorrhea.    Eyes: Negative for photophobia and visual disturbance.   Respiratory: Positive for cough. Negative for wheezing.    Neurological:  Positive for headaches. Negative for tingling, loss of consciousness, facial asymmetry, speech difficulty, weakness, light-headedness, numbness and loss of balance.       Objective:      Physical Exam   Constitutional: He is oriented to person, place, and time. He appears well-developed and well-nourished. No distress.   HENT:   Head: Normocephalic and atraumatic.   Eyes: EOM are normal. Pupils are equal, round, and reactive to light.   Cardiovascular: Normal rate and regular rhythm.    Pulmonary/Chest: Effort normal and breath sounds normal.   Neurological: He is alert and oriented to person, place, and time. He displays a negative Romberg sign.   Tongue midline, facial expressions equal bilaterally, normal rapid hand movements, normal heel to shin, normal finger to nose   Skin: Skin is warm and dry. He is not diaphoretic.   Psychiatric: He has a normal mood and affect. His behavior is normal.   Vitals reviewed.      Assessment:       1. Fall, initial encounter    2. New onset headache    3. Cough    4. Screening PSA (prostate specific antigen)        Plan:         Rosendo was seen today for headache, cough and diabetes.    Diagnoses and all orders for this visit:    Fall, initial encounter  -     CT Head Without Contrast; Future    New onset headache  -     CT Head Without Contrast; Future    Cough  -     benzonatate (TESSALON) 200 MG capsule; Take 1 capsule (200 mg total) by mouth 3 (three) times daily as needed for Cough.    Screening PSA (prostate specific antigen)  -     PSA, Screening; Future      Pt refuses influenza, he denies cigarette smoking states he has a cigar very rarely

## 2018-01-11 ENCOUNTER — TELEPHONE (OUTPATIENT)
Dept: FAMILY MEDICINE | Facility: CLINIC | Age: 73
End: 2018-01-11

## 2018-01-11 NOTE — TELEPHONE ENCOUNTER
----- Message from Bita Owens PA-C sent at 1/11/2018 12:02 PM CST -----  PSA is slightly elevated, recommend urologist

## 2018-01-16 ENCOUNTER — HOSPITAL ENCOUNTER (OUTPATIENT)
Dept: RADIOLOGY | Facility: HOSPITAL | Age: 73
Discharge: HOME | End: 2018-01-16
Attending: PHYSICIAN ASSISTANT
Payer: MEDICARE

## 2018-01-16 ENCOUNTER — TELEPHONE (OUTPATIENT)
Dept: FAMILY MEDICINE | Facility: CLINIC | Age: 73
End: 2018-01-16
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
## 2018-01-16 DIAGNOSIS — R51.9 NEW ONSET HEADACHE: ICD-10-CM

## 2018-01-16 PROCEDURE — 70450 CT HEAD/BRAIN W/O DYE: CPT | Mod: TC

## 2018-01-16 PROCEDURE — 70450 CT HEAD/BRAIN W/O DYE: CPT | Mod: 26,,, | Performed by: RADIOLOGY

## 2018-01-19 DIAGNOSIS — E11.9 DIABETES MELLITUS WITHOUT COMPLICATION: ICD-10-CM

## 2018-01-19 RX ORDER — METFORMIN HYDROCHLORIDE 500 MG/1
TABLET ORAL
Qty: 180 TABLET | Refills: 1 | Status: SHIPPED | OUTPATIENT
Start: 2018-01-19 | End: 2018-05-01 | Stop reason: SDUPTHER

## 2018-01-26 ENCOUNTER — TELEPHONE (OUTPATIENT)
Dept: FAMILY MEDICINE | Facility: CLINIC | Age: 73
End: 2018-01-26

## 2018-01-26 DIAGNOSIS — E11.9 DIABETES MELLITUS WITHOUT COMPLICATION: ICD-10-CM

## 2018-01-26 NOTE — TELEPHONE ENCOUNTER
----- Message from Reshma Perry sent at 1/26/2018 10:57 AM CST -----  Contact: Self/[phone]  Refill:  metFORMIN (GLUCOPHAGE) 500 MG tablet    Pharmacy:  [facility] [id number] [address] [address]. Thank you.

## 2018-01-31 ENCOUNTER — TELEPHONE (OUTPATIENT)
Dept: FAMILY MEDICINE | Facility: CLINIC | Age: 73
End: 2018-01-31

## 2018-01-31 NOTE — TELEPHONE ENCOUNTER
----- Message from Domi Lackey MD sent at 1/30/2018 11:06 PM CST -----  Blood sugars stable, average glucose 137, was 143

## 2018-02-16 ENCOUNTER — OFFICE VISIT (OUTPATIENT)
Dept: FAMILY MEDICINE | Facility: CLINIC | Age: 73
End: 2018-02-16
Payer: MEDICARE

## 2018-02-16 VITALS
DIASTOLIC BLOOD PRESSURE: 80 MMHG | OXYGEN SATURATION: 95 % | HEIGHT: 75 IN | RESPIRATION RATE: 20 BRPM | BODY MASS INDEX: 33.72 KG/M2 | WEIGHT: 271.19 LBS | SYSTOLIC BLOOD PRESSURE: 190 MMHG | HEART RATE: 44 BPM | TEMPERATURE: 98 F

## 2018-02-16 DIAGNOSIS — E11.8 TYPE 2 DIABETES MELLITUS WITH COMPLICATION, UNSPECIFIED LONG TERM INSULIN USE STATUS: ICD-10-CM

## 2018-02-16 DIAGNOSIS — I10 BENIGN ESSENTIAL HYPERTENSION: ICD-10-CM

## 2018-02-16 DIAGNOSIS — L40.9 PSORIASIS: Primary | ICD-10-CM

## 2018-02-16 DIAGNOSIS — E66.9 OBESITY, UNSPECIFIED CLASSIFICATION, UNSPECIFIED OBESITY TYPE, UNSPECIFIED WHETHER SERIOUS COMORBIDITY PRESENT: ICD-10-CM

## 2018-02-16 PROCEDURE — 1159F MED LIST DOCD IN RCRD: CPT | Mod: ,,, | Performed by: FAMILY MEDICINE

## 2018-02-16 PROCEDURE — 99999 PR PBB SHADOW E&M-EST. PATIENT-LVL III: CPT | Mod: PBBFAC,,, | Performed by: FAMILY MEDICINE

## 2018-02-16 PROCEDURE — 99214 OFFICE O/P EST MOD 30 MIN: CPT | Mod: S$PBB,,, | Performed by: FAMILY MEDICINE

## 2018-02-16 PROCEDURE — 1126F AMNT PAIN NOTED NONE PRSNT: CPT | Mod: ,,, | Performed by: FAMILY MEDICINE

## 2018-02-16 RX ORDER — VALSARTAN AND HYDROCHLOROTHIAZIDE 160; 12.5 MG/1; MG/1
1 TABLET, FILM COATED ORAL DAILY
Qty: 90 TABLET | Refills: 2 | Status: SHIPPED | OUTPATIENT
Start: 2018-02-16 | End: 2018-05-02 | Stop reason: DRUGHIGH

## 2018-02-16 NOTE — PROGRESS NOTES
Chief Complaint   Patient presents with    Elevated Blood Pressure Reading       HPI  Rosendo Christina is a 72 y.o. male with multiple medical diagnoses as listed in the medical history and problem list that presents for follow up.      Pt seen prior to receiving psoriasis study group and found to have elevated BP, 200/100. Completely asymptomatic at this time, although does describe GERD intermittently at night, will use baking soda and symptoms improve. No further or increasing CP recently. Also denies HA at this time.     HTN - states previously treated with diovan/HCTZ.    Pt is known to me and was last seen by me on Visit date not found.    PAST MEDICAL HISTORY:  Past Medical History:   Diagnosis Date    Diabetes mellitus type II 7/2010    diet controlled    Nephrolithiasis     Obesity     Psoriasis     Skin cancer     Squamous cell carcinoma        PAST SURGICAL HISTORY:  Past Surgical History:   Procedure Laterality Date    APPENDECTOMY      SKIN CANCER EXCISION      SKIN GRAFT      VASECTOMY      VASECTOMY         SOCIAL HISTORY:  Social History     Social History    Marital status:      Spouse name: N/A    Number of children: N/A    Years of education: N/A     Occupational History     [employer]     Social History Main Topics    Smoking status: Current Some Day Smoker    Smokeless tobacco: Never Used      Comment: cigars    Alcohol use Yes      Comment: occassional    Drug use: No    Sexual activity: Yes     Partners: Female     Other Topics Concern    Not on file     Social History Narrative    No narrative on file       FAMILY HISTORY:  Family History   Problem Relation Age of Onset    Cancer Father      prostate    Diabetes Brother        ALLERGIES AND MEDICATIONS: updated and reviewed.  Review of patient's allergies indicates:  No Known Allergies  Current Outpatient Prescriptions   Medication Sig Dispense Refill    ascorbic acid, vitamin C,  "(VITAMIN C) 100 MG tablet Take 100 mg by mouth once daily.      blood sugar diagnostic (CONTOUR TEST STRIPS) Strp 1 each by Misc.(Non-Drug; Combo Route) route 2 (two) times daily. 50 each 11    ciprofloxacin HCl (CILOXAN) 0.3 % ophthalmic solution       DUREZOL 0.05 % Drop ophthalmic solution       lancets Misc USE ONE LANCET TWO TIMES DAILY 100 each 11    magnesium 30 mg Tab Take by mouth.      metFORMIN (GLUCOPHAGE) 500 MG tablet TAKE ONE BY MOUTH TWICE DAILY 180 tablet 1    omega 3-dha-epa-fish oil (FISH OIL) 300-1,000 mg CpDR Take 1 capsule by mouth once daily. 30 capsule     PROLENSA 0.07 % Drop       valsartan-hydrochlorothiazide (DIOVAN-HCT) 160-12.5 mg per tablet Take 1 tablet by mouth once daily. 90 tablet 2     No current facility-administered medications for this visit.        ROS  Review of Systems   Constitutional: Negative for activity change, fatigue and fever.   HENT: Negative for congestion, rhinorrhea and sore throat.    Eyes: Negative for visual disturbance.   Respiratory: Negative for cough, chest tightness and shortness of breath.    Cardiovascular: Negative for chest pain.   Gastrointestinal: Negative for abdominal pain, diarrhea, nausea and vomiting.   Genitourinary: Negative for dysuria, frequency and urgency.   Musculoskeletal: Negative for back pain and myalgias.   Skin: Positive for rash.   Neurological: Negative for dizziness, syncope and numbness.   Psychiatric/Behavioral: Negative for agitation.       Physical Exam  Vitals:    02/16/18 1333   BP: (!) 190/80   Pulse: (!) 44   Resp: 20   Temp: 98 °F (36.7 °C)    Body mass index is 33.89 kg/m².  Weight: 123 kg (271 lb 2.7 oz)   Height: 6' 3" (190.5 cm)     Physical Exam   Constitutional: He is oriented to person, place, and time. He appears well-developed and well-nourished.   HENT:   Head: Normocephalic and atraumatic.   Eyes: Conjunctivae and EOM are normal. Pupils are equal, round, and reactive to light.   Neck: Normal range of " motion. Neck supple.   Cardiovascular: Normal rate, regular rhythm and normal heart sounds.    Pulmonary/Chest: Effort normal and breath sounds normal.   Abdominal: Soft. Bowel sounds are normal.   Musculoskeletal: Normal range of motion.   Neurological: He is alert and oriented to person, place, and time. He has normal reflexes.   Skin: Skin is warm and dry.   B hand erythematous patches   Psychiatric: He has a normal mood and affect. His behavior is normal. Judgment and thought content normal.       Health Maintenance       Date Due Completion Date    TETANUS VACCINE 07/31/1963 ---    Low Dose Statin 07/31/1966 ---    Zoster Vaccine 07/31/2005 ---    Pneumococcal (65+) (2 of 2 - PCV13) 08/14/2015 8/14/2014    Foot Exam 03/23/2018 3/23/2017 (Done)    Override on 3/23/2017: Done    Override on 3/23/2017: Done    Lipid Panel 02/20/2018 2/20/2017    Override on 8/14/2014: Done    Hemoglobin A1c 07/10/2018 1/10/2018    Override on 8/14/2014: Done    Eye Exam 10/03/2018 10/3/2017    Override on 1/10/2017: Done    Override on 7/16/2014: Done    Override on 4/10/2013: Done    Colonoscopy 06/10/2023 6/10/2013 (Declined)    Override on 6/10/2013: Declined          Assessment & Plan    Psoriasis  - Continue current medication regimen as prescribed    Type 2 diabetes mellitus with complication, unspecified long term insulin use status  - Continue current medication regimen as prescribed    Benign essential hypertension  -     Ambulatory referral to Cardiology  -     valsartan-hydrochlorothiazide (DIOVAN-HCT) 160-12.5 mg per tablet; Take 1 tablet by mouth once daily.  Dispense: 90 tablet; Refill: 2  -     Cardiac treadmill stress test; Future  - Will start BP medications at this time        Follow-up in about 4 weeks (around 3/16/2018).

## 2018-02-27 ENCOUNTER — HOSPITAL ENCOUNTER (OUTPATIENT)
Dept: CARDIOLOGY | Facility: HOSPITAL | Age: 73
Discharge: HOME | End: 2018-02-27
Attending: FAMILY MEDICINE
Payer: MEDICARE

## 2018-02-27 DIAGNOSIS — I10 BENIGN ESSENTIAL HYPERTENSION: ICD-10-CM

## 2018-02-27 LAB — DIASTOLIC DYSFUNCTION: NO

## 2018-02-27 PROCEDURE — 93016 CV STRESS TEST SUPVJ ONLY: CPT | Mod: ,,, | Performed by: INTERNAL MEDICINE

## 2018-02-27 PROCEDURE — 93017 CV STRESS TEST TRACING ONLY: CPT

## 2018-02-27 PROCEDURE — 93018 CV STRESS TEST I&R ONLY: CPT | Mod: ,,, | Performed by: INTERNAL MEDICINE

## 2018-03-02 DIAGNOSIS — E11.9 TYPE 2 DIABETES MELLITUS WITHOUT COMPLICATION: ICD-10-CM

## 2018-03-14 ENCOUNTER — OFFICE VISIT (OUTPATIENT)
Dept: PODIATRY | Facility: CLINIC | Age: 73
End: 2018-03-14
Payer: MEDICARE

## 2018-03-14 VITALS — WEIGHT: 271 LBS | BODY MASS INDEX: 33.69 KG/M2 | HEIGHT: 75 IN

## 2018-03-14 DIAGNOSIS — E11.9 COMPREHENSIVE DIABETIC FOOT EXAMINATION, TYPE 2 DM, ENCOUNTER FOR: Primary | ICD-10-CM

## 2018-03-14 DIAGNOSIS — M20.41 HAMMER TOES OF BOTH FEET: ICD-10-CM

## 2018-03-14 DIAGNOSIS — E11.49 TYPE II DIABETES MELLITUS WITH NEUROLOGICAL MANIFESTATIONS: ICD-10-CM

## 2018-03-14 DIAGNOSIS — M20.5X1 HALLUX LIMITUS, ACQUIRED, RIGHT: ICD-10-CM

## 2018-03-14 DIAGNOSIS — L90.9 PLANTAR FAT PAD ATROPHY: ICD-10-CM

## 2018-03-14 DIAGNOSIS — M20.5X2 HALLUX LIMITUS, ACQUIRED, LEFT: ICD-10-CM

## 2018-03-14 DIAGNOSIS — M20.42 HAMMER TOES OF BOTH FEET: ICD-10-CM

## 2018-03-14 PROCEDURE — 99999 PR PBB SHADOW E&M-EST. PATIENT-LVL III: CPT | Mod: PBBFAC,,, | Performed by: PODIATRIST

## 2018-03-14 PROCEDURE — 99214 OFFICE O/P EST MOD 30 MIN: CPT | Mod: S$PBB,,, | Performed by: PODIATRIST

## 2018-03-14 PROCEDURE — 99213 OFFICE O/P EST LOW 20 MIN: CPT | Mod: PBBFAC,PO | Performed by: PODIATRIST

## 2018-03-14 NOTE — PATIENT INSTRUCTIONS
Recommend lotions: eucerin, eucerin for diabetics, aquaphor, A&D ointment, gold bond for diabetics, sween, Layland's Bees all purpose baby ointment,  urea 40 with aloe (found on amazon.com)    Shoe recommendations: (try 6pm.com, zappos.com , nordstromrack.SecureAuth, or shoes.SecureAuth for discounted prices) you can visit DSW shoes in [place]  or [employer] [facility] in the [facility] (there are also several shoe brand outlets in the [facility])    Asics (GT 2000 or gel foundations), new balance stability type shoes, saucony (stabil c3),  Levy (GTS or Beast or transcend), vionic, propet (tennis shoe)    sofft brand, clarks, crocs, aerosoles, naturalizers, SAS, ecco, born, maegan guillaume, rockports (dress shoes)    Vionic, burkenstocks, fitflops, propet (sandals)  Nike comfort thong sandals, crocs, propet (house shoes)    Nail Home remedy:  Vicks Vapor rub to nails for easier managability    Occasional soaks for 15-20 mins in luke warm water with 1 cup of listerine and 1 cup of apple cider vinegar are ok You may add several drops of oil of oregano or tea tree oil as well        Diabetes: Inspecting Your Feet  Diabetes increases your chances of developing foot problems. So inspect your feet every day. This helps you find small skin irritations before they become serious infections. If you have trouble seeing the bottoms of your feet, use a mirror or ask a family member or friend to help.     Pressure spots on the bottom of the foot are common areas where problems develop.   How to check your feet  Below are tips to help you look for foot problems. Try to check your feet at the same time each day, such as when you get out of bed in the morning:  · Check the top of each foot. The tops of toes, back of the heel, and outer edge of the foot can get a lot of rubbing from poor-fitting shoes.  · Check the bottom of each foot. Daily wear and tear often leads to problems at pressure spots.  · Check the toes and nails. Fungal infections often occur  between toes. Toenail problems can also be a sign of fungal infections or lead to breaks in the skin.  · Check your shoes, too. Loose objects inside a shoe can injure the foot. Use your hand to feel inside your shoes for things like alessandra, loose stitching, or rough areas that could irritate your skin.  Warning signs  Look for any color changes in the foot. Redness with streaks can signal a severe infection, which needs immediate medical attention. Tell your doctor right away if you have any of these problems:  · Swelling, sometimes with color changes, may be a sign of poor blood flow or infection. Symptoms include tenderness and an increase in the size of your foot.  · Warm or hot areas on your feet may be signs of infection. A foot that is cold may not be getting enough blood.  · Sensations such as burning, tingling, or pins and needles can be signs of a problem. Also check for areas that may be numb.  · Hot spots are caused by friction or pressure. Look for hot spots in areas that get a lot of rubbing. Hot spots can turn into blisters, calluses, or sores.  · Cracks and sores are caused by dry or irritated skin. They are a sign that the skin is breaking down, which can lead to infection.  · Toenail problems to watch for include nails growing into the skin (ingrown toenail) and causing redness or pain. Thick, yellow, or discolored nails can signal a fungal infection.  · Drainage and odor can develop from untreated sores and ulcers. Call your doctor right away if you notice white or yellow drainage, bleeding, or unpleasant odor.   © 9603-1378 [employer]. [address] [place] [id number]. All rights reserved. This information is not intended as a substitute for professional medical care. Always follow your healthcare professional's instructions.        Step-by-Step:  Inspecting Your Feet (Diabetes)    Date Last Reviewed: 10/1/2016  © 8733-3771 [employer]. [address] [place] [address]  Road, [place] [address]. All rights reserved. This information is not intended as a substitute for professional medical care. Always follow your healthcare professional's instructions.

## 2018-03-14 NOTE — PROGRESS NOTES
Subjective:      Patient ID: Rosendo Christina is a 72 y.o. male.    Chief Complaint: Diabetes Mellitus (pcp dr. Lackey 2-16-18); Diabetic Foot Exam; and Nail Care    Rosendo is a 72 y.o. male who presents to the clinic for evaluation and treatment of high risk feet. Rosendo has a past medical history of Diabetes mellitus type II (7/2010); Nephrolithiasis; Obesity; Psoriasis; Skin cancer; and Squamous cell carcinoma. The patient has no major complaints with feet. Chief concern is how to care for feet as a diabetic.    This patient has documented high risk feet requiring routine maintenance secondary to diabetes mellitis and those secondary complications of diabetes, as mentioned..    PCP: Domi Lackey MD    Date Last Seen by PCP:   Chief Complaint   Patient presents with    Diabetes Mellitus     pcp dr. Lackey 2-16-18    Diabetic Foot Exam    Nail Care       Current shoe gear:  Tennis shoes     Hemoglobin A1C   Date Value Ref Range Status   01/10/2018 6.4 (H) 4.0 - 5.6 % Final     Comment:     According to ADA guidelines, hemoglobin A1c <7.0% represents  optimal control in non-pregnant diabetic patients. Different  metrics may apply to specific patient populations.   Standards of Medical Care in Diabetes-2016.  For the purpose of screening for the presence of diabetes:  <5.7%     Consistent with the absence of diabetes  5.7-6.4%  Consistent with increasing risk for diabetes   (prediabetes)  >or=6.5%  Consistent with diabetes  Currently, no consensus exists for use of hemoglobin A1c  for diagnosis of diabetes for children.  This Hemoglobin A1c assay has significant interference with fetal   hemoglobin   (HbF). The results are invalid for patients with abnormal amounts of   HbF,   including those with known Hereditary Persistence   of Fetal Hemoglobin. Heterozygous hemoglobin variants (HbAS, HbAC,   HbAD, HbAE, HbA2) do not significantly interfere with this assay;   however, presence of multiple  variants in a sample may impact the %   interference.     02/20/2017 6.6 (H) 4.5 - 6.2 % Final     Comment:     According to ADA guidelines, hemoglobin A1C <7.0% represents  optimal control in non-pregnant diabetic patients.  Different  metrics may apply to specific populations.   Standards of Medical Care in Diabetes - 2016.  For the purpose of screening for the presence of diabetes:  <5.7%     Consistent with the absence of diabetes  5.7-6.4%  Consistent with increasing risk for diabetes   (prediabetes)  >or=6.5%  Consistent with diabetes  Currently no consensus exists for use of hemoglobin A1C  for diagnosis of diabetes for children.     08/26/2016 6.4 (H) 4.5 - 6.2 % Final     Comment:     According to ADA guidelines, hemoglobin A1C <7.0% represents  optimal control in non-pregnant diabetic patients.  Different  metrics may apply to specific populations.   Standards of Medical Care in Diabetes - 2016.  For the purpose of screening for the presence of diabetes:  <5.7%     Consistent with the absence of diabetes  5.7-6.4%  Consistent with increasing risk for diabetes   (prediabetes)  >or=6.5%  Consistent with diabetes  Currently no consensus exists for use of hemoglobin A1C  for diagnosis of diabetes for children.       Patient Active Problem List   Diagnosis    Type 2 diabetes mellitus    Psoriasis    Obesity     Current Outpatient Prescriptions on File Prior to Visit   Medication Sig Dispense Refill    ascorbic acid, vitamin C, (VITAMIN C) 100 MG tablet Take 100 mg by mouth once daily.      blood sugar diagnostic (CONTOUR TEST STRIPS) Strp 1 each by Misc.(Non-Drug; Combo Route) route 2 (two) times daily. 50 each 11    ciprofloxacin HCl (CILOXAN) 0.3 % ophthalmic solution       DUREZOL 0.05 % Drop ophthalmic solution       lancets Misc USE ONE LANCET TWO TIMES DAILY 100 each 11    magnesium 30 mg Tab Take by mouth.      metFORMIN (GLUCOPHAGE) 500 MG tablet TAKE ONE BY MOUTH TWICE DAILY 180 tablet 1     "omega 3-dha-epa-fish oil (FISH OIL) 300-1,000 mg CpDR Take 1 capsule by mouth once daily. 30 capsule     PROLENSA 0.07 % Drop       valsartan-hydrochlorothiazide (DIOVAN-HCT) 160-12.5 mg per tablet Take 1 tablet by mouth once daily. 90 tablet 2     No current facility-administered medications on file prior to visit.      Review of patient's allergies indicates:  No Known Allergies  Past Surgical History:   Procedure Laterality Date    APPENDECTOMY      SKIN CANCER EXCISION      SKIN GRAFT      VASECTOMY      VASECTOMY       Family History   Problem Relation Age of Onset    Cancer Father      prostate    Diabetes Brother      Social History     Social History    Marital status:      Spouse name: N/A    Number of children: N/A    Years of education: N/A     Occupational History     [employer]     Social History Main Topics    Smoking status: Current Some Day Smoker    Smokeless tobacco: Never Used      Comment: cigars    Alcohol use Yes      Comment: occassional    Drug use: No    Sexual activity: Yes     Partners: Female     Other Topics Concern    Not on file     Social History Narrative    No narrative on file        Review of Systems   Constitution: Negative for chills, fever and weakness.   Cardiovascular: Negative for claudication and leg swelling.   Respiratory: Negative for cough and shortness of breath.    Skin: Positive for dry skin and nail changes. Negative for itching and rash.   Musculoskeletal: Positive for joint pain. Negative for falls, joint swelling and muscle weakness.   Gastrointestinal: Negative for diarrhea, nausea and vomiting.   Neurological: Positive for paresthesias. Negative for numbness and tremors.   Psychiatric/Behavioral: Negative for altered mental status and hallucinations.           Objective:       Vitals:    03/14/18 1057   Weight: 122.9 kg (271 lb)   Height: 6' 3" (1.905 m)   PainSc: 0-No pain       Physical Exam   Constitutional: "   General: Pt. is well-developed, well-nourished, appears stated age, in no acute distress, alert and oriented x 3. No evidence of depression, anxiety, or agitation. Calm, cooperative, and communicative. Appropriate interactions and affect.       Cardiovascular:   Pulses:       Dorsalis pedis pulses are 1+ on the right side, and 1+ on the left side.        Posterior tibial pulses are 2+ on the right side, and 2+ on the left side.   There is decreased digital hair   Musculoskeletal:        Right ankle: He exhibits normal range of motion and no swelling. No tenderness. Achilles tendon exhibits no pain and no defect.        Left ankle: He exhibits normal range of motion and no swelling. No tenderness. Achilles tendon exhibits no pain and no defect.        Right foot: There is decreased range of motion. There is no tenderness.        Left foot: There is decreased range of motion. There is no tenderness.   muscle strength is 5/5 in all groups bilaterally.    Decreased stride, station of gait.  apropulsive toe off.  Increased angle and base of gait.    Patient has hammertoes of digits 2-5 bilateral partially reducible without symptom today.    Decreased first MPJ range of motion both weightbearing and nonweightbearing, no crepitus observed the first MP joint, + dorsal flag sign. Mild  bunion deformity is observed .    Fat pad atrophy to heels and met heads bilateral   Neurological: He displays no atrophy and no tremor.   Cayuta-Handy 5.07 monofilament is intact bilateral feet. Sharp/dull sensation is also intact Bilateral feet.    Decreased/absent vibratory sensation bilateral feet to 128Hz tuning fork.         Skin: Skin is warm, dry and intact. No abrasion, no ecchymosis, no lesion and no rash noted. He is not diaphoretic. No cyanosis or erythema. No pallor. Nails show no clubbing.   Toenails 1-5 bilaterally are elongated by 2-3 mm, thickened by 2-3 mm, discolored/yellowed, dystrophic, brittle with subungual debris.       Interdigital Spaces clean, dry and without evidence of break in skin integrity   Psychiatric: He has a normal mood and affect. His speech is normal.   Nursing note and vitals reviewed.            Assessment:       Encounter Diagnoses   Name Primary?    Comprehensive diabetic foot examination, type 2 DM, encounter for Yes    Hammer toes of both feet     Hallux limitus, acquired, right     Hallux limitus, acquired, left     Plantar fat pad atrophy     Type II diabetes mellitus with neurological manifestations          Plan:       Rosendo was seen today for diabetes mellitus, diabetic foot exam and nail care.    Diagnoses and all orders for this visit:    Comprehensive diabetic foot examination, type 2 DM, encounter for  -     DIABETIC SHOES FOR HOME USE    Hammer toes of both feet  -     DIABETIC SHOES FOR HOME USE    Hallux limitus, acquired, right  -     DIABETIC SHOES FOR HOME USE    Hallux limitus, acquired, left  -     DIABETIC SHOES FOR HOME USE    Plantar fat pad atrophy  -     DIABETIC SHOES FOR HOME USE    Type II diabetes mellitus with neurological manifestations  -     DIABETIC SHOES FOR HOME USE      I counseled the patient on his conditions, their implications and medical management. Greater than 20 minutes spent on education about the diabetic foot, neuropathy, and prevention of limb loss.      - Shoe inspection. Diabetic Foot Education. Patient reminded of the importance of good nutrition and blood sugar control to help prevent podiatric complications of diabetes. Patient instructed on proper foot hygeine. We discussed wearing proper shoe gear, daily foot inspections, never walking without protective shoe gear.    Rx diabetic shoes for protection and support    He will continue to monitor the areas daily, inspect his feet, wear protective shoe gear when ambulatory, moisturizer to maintain skin integrity and follow in this office in approximately 12-15 months, sooner p.r.n.

## 2018-03-26 ENCOUNTER — TELEPHONE (OUTPATIENT)
Dept: FAMILY MEDICINE | Facility: CLINIC | Age: 73
End: 2018-03-26

## 2018-03-26 NOTE — TELEPHONE ENCOUNTER
----- Message from Reshma Perry sent at 3/26/2018 12:07 PM CDT -----  Contact: Self/[phone]  Patient called to request results for his Stress Test. Thank you.

## 2018-03-28 ENCOUNTER — TELEPHONE (OUTPATIENT)
Dept: ADMINISTRATIVE | Facility: HOSPITAL | Age: 73
End: 2018-03-28

## 2018-05-01 ENCOUNTER — TELEPHONE (OUTPATIENT)
Dept: FAMILY MEDICINE | Facility: CLINIC | Age: 73
End: 2018-05-01

## 2018-05-01 DIAGNOSIS — E11.9 DIABETES MELLITUS WITHOUT COMPLICATION: ICD-10-CM

## 2018-05-01 RX ORDER — METFORMIN HYDROCHLORIDE 500 MG/1
500 TABLET ORAL 2 TIMES DAILY
Qty: 180 TABLET | Refills: 0 | Status: SHIPPED | OUTPATIENT
Start: 2018-05-01 | End: 2018-05-30 | Stop reason: SDUPTHER

## 2018-05-01 NOTE — TELEPHONE ENCOUNTER
----- Message from Daily Gonzalez sent at 5/1/2018 11:45 AM CDT -----  Contact: self  Pt is requesting his results from his nuc med stress test that was done in Feb. Please contact him at [phone].    Thanks

## 2018-05-01 NOTE — TELEPHONE ENCOUNTER
Betsy spoke with pt on 3/26/18 and informed him of results:  Negative for blockage. No arrhythmias present. Low probability for future cardiovascular events

## 2018-05-02 ENCOUNTER — LAB VISIT (OUTPATIENT)
Dept: LAB | Facility: HOSPITAL | Age: 73
End: 2018-05-02
Attending: FAMILY MEDICINE
Payer: MEDICARE

## 2018-05-02 ENCOUNTER — OFFICE VISIT (OUTPATIENT)
Dept: FAMILY MEDICINE | Facility: CLINIC | Age: 73
End: 2018-05-02
Payer: MEDICARE

## 2018-05-02 VITALS
OXYGEN SATURATION: 96 % | HEART RATE: 49 BPM | BODY MASS INDEX: 33.44 KG/M2 | WEIGHT: 268.94 LBS | DIASTOLIC BLOOD PRESSURE: 72 MMHG | HEIGHT: 75 IN | SYSTOLIC BLOOD PRESSURE: 150 MMHG | TEMPERATURE: 98 F

## 2018-05-02 DIAGNOSIS — E11.22 CONTROLLED TYPE 2 DIABETES MELLITUS WITH STAGE 2 CHRONIC KIDNEY DISEASE, WITHOUT LONG-TERM CURRENT USE OF INSULIN: ICD-10-CM

## 2018-05-02 DIAGNOSIS — E78.5 HYPERLIPIDEMIA, UNSPECIFIED HYPERLIPIDEMIA TYPE: ICD-10-CM

## 2018-05-02 DIAGNOSIS — I10 UNCONTROLLED HYPERTENSION: Primary | ICD-10-CM

## 2018-05-02 DIAGNOSIS — I10 UNCONTROLLED HYPERTENSION: ICD-10-CM

## 2018-05-02 DIAGNOSIS — N18.2 CONTROLLED TYPE 2 DIABETES MELLITUS WITH STAGE 2 CHRONIC KIDNEY DISEASE, WITHOUT LONG-TERM CURRENT USE OF INSULIN: ICD-10-CM

## 2018-05-02 LAB
ALBUMIN SERPL BCP-MCNC: 3.5 G/DL
ALP SERPL-CCNC: 67 U/L
ALT SERPL W/O P-5'-P-CCNC: 16 U/L
ANION GAP SERPL CALC-SCNC: 6 MMOL/L
AST SERPL-CCNC: 15 U/L
BILIRUB SERPL-MCNC: 0.5 MG/DL
BUN SERPL-MCNC: 10 MG/DL
CALCIUM SERPL-MCNC: 9.5 MG/DL
CHLORIDE SERPL-SCNC: 104 MMOL/L
CHOLEST SERPL-MCNC: 158 MG/DL
CHOLEST/HDLC SERPL: 4.8 {RATIO}
CO2 SERPL-SCNC: 30 MMOL/L
CREAT SERPL-MCNC: 0.9 MG/DL
EST. GFR  (AFRICAN AMERICAN): >60 ML/MIN/1.73 M^2
EST. GFR  (NON AFRICAN AMERICAN): >60 ML/MIN/1.73 M^2
GLUCOSE SERPL-MCNC: 160 MG/DL
HDLC SERPL-MCNC: 33 MG/DL
HDLC SERPL: 20.9 %
LDLC SERPL CALC-MCNC: 106.2 MG/DL
NONHDLC SERPL-MCNC: 125 MG/DL
POTASSIUM SERPL-SCNC: 4.2 MMOL/L
PROT SERPL-MCNC: 6.4 G/DL
SODIUM SERPL-SCNC: 140 MMOL/L
TRIGL SERPL-MCNC: 94 MG/DL

## 2018-05-02 PROCEDURE — 99213 OFFICE O/P EST LOW 20 MIN: CPT | Mod: PBBFAC,PO | Performed by: FAMILY MEDICINE

## 2018-05-02 PROCEDURE — 99999 PR PBB SHADOW E&M-EST. PATIENT-LVL III: CPT | Mod: PBBFAC,,, | Performed by: FAMILY MEDICINE

## 2018-05-02 PROCEDURE — 99214 OFFICE O/P EST MOD 30 MIN: CPT | Mod: S$PBB,,, | Performed by: FAMILY MEDICINE

## 2018-05-02 PROCEDURE — 36415 COLL VENOUS BLD VENIPUNCTURE: CPT | Mod: PO

## 2018-05-02 PROCEDURE — 80061 LIPID PANEL: CPT

## 2018-05-02 PROCEDURE — 80053 COMPREHEN METABOLIC PANEL: CPT

## 2018-05-02 RX ORDER — VALSARTAN AND HYDROCHLOROTHIAZIDE 320; 25 MG/1; MG/1
1 TABLET, FILM COATED ORAL DAILY
Qty: 90 TABLET | Refills: 0 | Status: SHIPPED | OUTPATIENT
Start: 2018-05-02 | End: 2018-07-24

## 2018-05-02 NOTE — PROGRESS NOTES
Subjective:       Patient ID: Rosendo Christina     Chief Complaint: Annual Exam and Hypertension      HPIRosendo Christina is a 72 y.o. male. Here for follow up chronic HTN, DM and HPL.  Reports elevated BP over past several months.  No cardiac complaints.  Diabetes stable    Review of patient's allergies indicates:  No Known Allergies    Current Outpatient Prescriptions:     ascorbic acid, vitamin C, (VITAMIN C) 100 MG tablet, Take 100 mg by mouth once daily., Disp: , Rfl:     blood sugar diagnostic (CONTOUR TEST STRIPS) Strp, 1 each by Misc.(Non-Drug; Combo Route) route 2 (two) times daily., Disp: 50 each, Rfl: 11    DUREZOL 0.05 % Drop ophthalmic solution, , Disp: , Rfl:     lancets Misc, USE ONE LANCET TWO TIMES DAILY, Disp: 100 each, Rfl: 11    magnesium 30 mg Tab, Take by mouth., Disp: , Rfl:     metFORMIN (GLUCOPHAGE) 500 MG tablet, Take 1 tablet (500 mg total) by mouth 2 (two) times daily., Disp: 180 tablet, Rfl: 0    omega 3-dha-epa-fish oil (FISH OIL) 300-1,000 mg CpDR, Take 1 capsule by mouth once daily., Disp: 30 capsule, Rfl:     PROLENSA 0.07 % Drop, , Disp: , Rfl:     ciprofloxacin HCl (CILOXAN) 0.3 % ophthalmic solution, , Disp: , Rfl:     valsartan-hydrochlorothiazide (DIOVAN-HCT) 320-25 mg per tablet, Take 1 tablet by mouth once daily., Disp: 90 tablet, Rfl: 0    Past Medical History:   Diagnosis Date    Diabetes mellitus type II 7/2010    diet controlled    Nephrolithiasis     Obesity     Psoriasis     Skin cancer     Squamous cell carcinoma      Review of Systems   Constitutional: Negative for appetite change, chills, diaphoresis, fatigue, fever and unexpected weight change.   HENT: Negative for ear pain, sinus pressure, sore throat and trouble swallowing.    Eyes: Negative for visual disturbance.   Respiratory: Negative for cough, chest tightness, shortness of breath and wheezing.    Cardiovascular: Negative for chest pain, palpitations and leg swelling.    Gastrointestinal: Negative for abdominal distention, abdominal pain, constipation, diarrhea, nausea and vomiting.   Genitourinary: Negative for difficulty urinating, discharge, dysuria, frequency, hematuria and urgency.   Musculoskeletal: Negative for arthralgias, back pain and joint swelling.   Skin: Positive for rash (psoriasis).   Neurological: Negative for dizziness, light-headedness and headaches.   Hematological: Negative for adenopathy.   Psychiatric/Behavioral: Negative for dysphoric mood and sleep disturbance. The patient is not nervous/anxious.        Objective:    /80  Physical Exam   Constitutional: He is oriented to person, place, and time. He appears well-developed and well-nourished.   Neck: Normal range of motion. Neck supple. No thyromegaly present.   Cardiovascular: Normal rate, regular rhythm and normal heart sounds.    No murmur heard.  Pulmonary/Chest: Effort normal and breath sounds normal. No respiratory distress. He has no wheezes.   Abdominal: Soft. Bowel sounds are normal. He exhibits no mass. There is no tenderness.   Musculoskeletal: He exhibits no edema.   Neurological: He is alert and oriented to person, place, and time.   Skin: Skin is warm and dry.   Psychiatric: He has a normal mood and affect.       Assessment:       1. Uncontrolled hypertension    2. Controlled type 2 diabetes mellitus with stage 2 chronic kidney disease, without long-term current use of insulin    3. Hyperlipidemia, unspecified hyperlipidemia type        Plan:       Rosendo was seen today for annual exam and hypertension.    Diagnoses and all orders for this visit:    Uncontrolled hypertension  -     valsartan-hydrochlorothiazide (DIOVAN-HCT) 320-25 mg per tablet; Take 1 tablet by mouth once daily.  -     Comprehensive metabolic panel; Future    Controlled type 2 diabetes mellitus with stage 2 chronic kidney disease, without long-term current use of insulin    Hyperlipidemia, unspecified hyperlipidemia  type  -     Lipid panel; Future

## 2018-05-06 ENCOUNTER — TELEPHONE (OUTPATIENT)
Dept: FAMILY MEDICINE | Facility: CLINIC | Age: 73
End: 2018-05-06

## 2018-05-06 DIAGNOSIS — R73.09 ELEVATED GLUCOSE: Primary | ICD-10-CM

## 2018-05-07 ENCOUNTER — TELEPHONE (OUTPATIENT)
Dept: FAMILY MEDICINE | Facility: CLINIC | Age: 73
End: 2018-05-07

## 2018-05-07 NOTE — TELEPHONE ENCOUNTER
Pt returned call; informed of results and recommendations; verbalized understanding; states he will try to come in next week to get labs done

## 2018-05-07 NOTE — TELEPHONE ENCOUNTER
----- Message from Domi Lackey MD sent at 5/6/2018  8:06 PM CDT -----  Slightly elevated glucose at 166.  Will place order for hba1c.  HDL slightly low at 33, increase exercise

## 2018-05-24 ENCOUNTER — DOCUMENTATION ONLY (OUTPATIENT)
Dept: ADMINISTRATIVE | Facility: HOSPITAL | Age: 73
End: 2018-05-24

## 2018-05-24 ENCOUNTER — LAB VISIT (OUTPATIENT)
Dept: LAB | Facility: HOSPITAL | Age: 73
End: 2018-05-24
Attending: FAMILY MEDICINE
Payer: MEDICARE

## 2018-05-24 DIAGNOSIS — Z12.11 SPECIAL SCREENING FOR MALIGNANT NEOPLASMS, COLON: Primary | ICD-10-CM

## 2018-05-24 DIAGNOSIS — R73.09 ELEVATED GLUCOSE: ICD-10-CM

## 2018-05-24 LAB
ESTIMATED AVG GLUCOSE: 166 MG/DL
HBA1C MFR BLD HPLC: 7.4 %

## 2018-05-24 PROCEDURE — 36415 COLL VENOUS BLD VENIPUNCTURE: CPT | Mod: PO

## 2018-05-24 PROCEDURE — 83036 HEMOGLOBIN GLYCOSYLATED A1C: CPT

## 2018-05-30 ENCOUNTER — TELEPHONE (OUTPATIENT)
Dept: FAMILY MEDICINE | Facility: CLINIC | Age: 73
End: 2018-05-30

## 2018-05-30 ENCOUNTER — OFFICE VISIT (OUTPATIENT)
Dept: FAMILY MEDICINE | Facility: CLINIC | Age: 73
End: 2018-05-30
Payer: MEDICARE

## 2018-05-30 VITALS
BODY MASS INDEX: 32.84 KG/M2 | HEIGHT: 75 IN | RESPIRATION RATE: 16 BRPM | DIASTOLIC BLOOD PRESSURE: 80 MMHG | TEMPERATURE: 98 F | WEIGHT: 264.13 LBS | HEART RATE: 59 BPM | SYSTOLIC BLOOD PRESSURE: 130 MMHG | OXYGEN SATURATION: 96 %

## 2018-05-30 DIAGNOSIS — Z13.6 SCREENING FOR AAA (ABDOMINAL AORTIC ANEURYSM): ICD-10-CM

## 2018-05-30 DIAGNOSIS — E78.5 HYPERLIPIDEMIA, UNSPECIFIED HYPERLIPIDEMIA TYPE: ICD-10-CM

## 2018-05-30 DIAGNOSIS — I10 ESSENTIAL HYPERTENSION, BENIGN: Primary | ICD-10-CM

## 2018-05-30 PROCEDURE — 99214 OFFICE O/P EST MOD 30 MIN: CPT | Mod: S$PBB,,, | Performed by: FAMILY MEDICINE

## 2018-05-30 PROCEDURE — 99999 PR PBB SHADOW E&M-EST. PATIENT-LVL III: CPT | Mod: PBBFAC,,, | Performed by: FAMILY MEDICINE

## 2018-05-30 PROCEDURE — 99213 OFFICE O/P EST LOW 20 MIN: CPT | Mod: PBBFAC,PO | Performed by: FAMILY MEDICINE

## 2018-05-30 RX ORDER — PRAVASTATIN SODIUM 40 MG/1
40 TABLET ORAL NIGHTLY
Qty: 90 TABLET | Refills: 3 | Status: SHIPPED | OUTPATIENT
Start: 2018-05-30 | End: 2018-09-10 | Stop reason: CLARIF

## 2018-05-30 RX ORDER — METFORMIN HYDROCHLORIDE 500 MG/1
750 TABLET ORAL 2 TIMES DAILY WITH MEALS
Qty: 270 TABLET | Refills: 0 | Status: SHIPPED | OUTPATIENT
Start: 2018-05-30 | End: 2018-07-23 | Stop reason: SDUPTHER

## 2018-05-30 NOTE — TELEPHONE ENCOUNTER
Notified patient of information below. Verbalized understanding. Pt scheduled for today. Pt will consult with PCP regarding results

## 2018-05-30 NOTE — PROGRESS NOTES
Subjective:       Patient ID: Rosendo Christina     Chief Complaint: Hypertension (follow up ) and Diabetes (follow up on recent labs )      Inocente Christina is a 72 y.o. male. Here for follow up uncontrolled HTN and diabetes.  BP much improved.  Diabetes slightly worse.  Clinically asymptomatic.  Reports occasional diarrhea x 1 year.     Review of patient's allergies indicates:  No Known Allergies    Current Outpatient Prescriptions:     ascorbic acid, vitamin C, (VITAMIN C) 100 MG tablet, Take 100 mg by mouth once daily., Disp: , Rfl:     blood sugar diagnostic (CONTOUR TEST STRIPS) Strp, 1 each by Misc.(Non-Drug; Combo Route) route 2 (two) times daily., Disp: 50 each, Rfl: 11    lancets Misc, USE ONE LANCET TWO TIMES DAILY, Disp: 100 each, Rfl: 11    metFORMIN (GLUCOPHAGE) 500 MG tablet, Take 1.5 tablets (750 mg total) by mouth 2 (two) times daily with meals., Disp: 270 tablet, Rfl: 0    omega 3-dha-epa-fish oil (FISH OIL) 300-1,000 mg CpDR, Take 1 capsule by mouth once daily., Disp: 30 capsule, Rfl:     valsartan-hydrochlorothiazide (DIOVAN-HCT) 320-25 mg per tablet, Take 1 tablet by mouth once daily., Disp: 90 tablet, Rfl: 0    pravastatin (PRAVACHOL) 40 MG tablet, Take 1 tablet (40 mg total) by mouth every evening., Disp: 90 tablet, Rfl: 3    Past Medical History:   Diagnosis Date    Diabetes mellitus type II 7/2010    diet controlled    Nephrolithiasis     Obesity     Psoriasis     Skin cancer     Squamous cell carcinoma      Review of Systems   Constitutional: Positive for fatigue.   Endocrine: Negative for polydipsia, polyphagia and polyuria.   Musculoskeletal: Positive for myalgias.       Objective:    /70  Physical Exam   Constitutional: He appears well-developed and well-nourished.   Pulmonary/Chest: Effort normal.   Neurological: He is alert.       Assessment:       1. Essential hypertension, benign    2. Uncontrolled type 2 diabetes mellitus with stage 2 chronic kidney  disease, without long-term current use of insulin    3. Screening for AAA (abdominal aortic aneurysm)    4. Hyperlipidemia, unspecified hyperlipidemia type        Plan:       Rosendo was seen today for hypertension and diabetes.    Diagnoses and all orders for this visit:    Essential hypertension, benign  BP stable    Uncontrolled type 2 diabetes mellitus with stage 2 chronic kidney disease, without long-term current use of insulin  -    Increase metFORMIN (GLUCOPHAGE) 500 MG tablet; Take 1.5 tablets (750 mg total) by mouth 2 (two) times daily with meals.    Screening for AAA (abdominal aortic aneurysm)  -     US Abdominal Aorta; Future    Hyperlipidemia, unspecified hyperlipidemia type  -     pravastatin (PRAVACHOL) 40 MG tablet; Take 1 tablet (40 mg total) by mouth every evening.

## 2018-05-30 NOTE — TELEPHONE ENCOUNTER
----- Message from Domi Lackey MD sent at 5/29/2018  6:36 PM CDT -----  Diabetes not at gal, hba1c 7.4%.  Recommend we increase Metformin to 750 mg twice daily and monitor diet.

## 2018-05-31 ENCOUNTER — TELEPHONE (OUTPATIENT)
Dept: FAMILY MEDICINE | Facility: CLINIC | Age: 73
End: 2018-05-31

## 2018-05-31 ENCOUNTER — LAB VISIT (OUTPATIENT)
Dept: LAB | Facility: HOSPITAL | Age: 73
End: 2018-05-31
Attending: FAMILY MEDICINE
Payer: MEDICARE

## 2018-05-31 DIAGNOSIS — Z12.11 SCREENING FOR COLON CANCER: Primary | ICD-10-CM

## 2018-05-31 DIAGNOSIS — Z12.11 SPECIAL SCREENING FOR MALIGNANT NEOPLASMS, COLON: ICD-10-CM

## 2018-05-31 LAB — HEMOCCULT STL QL IA: POSITIVE

## 2018-05-31 PROCEDURE — 82274 ASSAY TEST FOR BLOOD FECAL: CPT

## 2018-05-31 NOTE — TELEPHONE ENCOUNTER
----- Message from Bita Owens PA-C sent at 5/31/2018  2:08 PM CDT -----  Please inform pt his fitkit was positive and it is recommended he have a colonoscopy

## 2018-06-04 NOTE — TELEPHONE ENCOUNTER
Pt returned call; informed of results and recommendations; verbalized understanding; ok with ordering colonoscopy

## 2018-06-11 ENCOUNTER — TELEPHONE (OUTPATIENT)
Dept: FAMILY MEDICINE | Facility: CLINIC | Age: 73
End: 2018-06-11

## 2018-06-11 ENCOUNTER — HOSPITAL ENCOUNTER (OUTPATIENT)
Dept: RADIOLOGY | Facility: HOSPITAL | Age: 73
Discharge: HOME | End: 2018-06-11
Attending: FAMILY MEDICINE
Payer: MEDICARE

## 2018-06-11 DIAGNOSIS — Z13.6 SCREENING FOR AAA (ABDOMINAL AORTIC ANEURYSM): ICD-10-CM

## 2018-06-11 PROCEDURE — 76775 US EXAM ABDO BACK WALL LIM: CPT | Mod: 26,,, | Performed by: RADIOLOGY

## 2018-06-11 PROCEDURE — 76775 US EXAM ABDO BACK WALL LIM: CPT | Mod: TC

## 2018-06-25 ENCOUNTER — OFFICE VISIT (OUTPATIENT)
Dept: PODIATRY | Facility: CLINIC | Age: 73
End: 2018-06-25
Payer: MEDICARE

## 2018-06-25 VITALS
WEIGHT: 264 LBS | SYSTOLIC BLOOD PRESSURE: 120 MMHG | HEIGHT: 75 IN | DIASTOLIC BLOOD PRESSURE: 74 MMHG | BODY MASS INDEX: 32.83 KG/M2

## 2018-06-25 DIAGNOSIS — E11.9 COMPREHENSIVE DIABETIC FOOT EXAMINATION, TYPE 2 DM, ENCOUNTER FOR: Primary | ICD-10-CM

## 2018-06-25 DIAGNOSIS — M20.41 HAMMER TOES OF BOTH FEET: ICD-10-CM

## 2018-06-25 DIAGNOSIS — M20.5X2 HALLUX LIMITUS, ACQUIRED, LEFT: ICD-10-CM

## 2018-06-25 DIAGNOSIS — M20.42 HAMMER TOES OF BOTH FEET: ICD-10-CM

## 2018-06-25 DIAGNOSIS — M20.5X1 HALLUX LIMITUS, ACQUIRED, RIGHT: ICD-10-CM

## 2018-06-25 DIAGNOSIS — L90.9 PLANTAR FAT PAD ATROPHY: ICD-10-CM

## 2018-06-25 PROCEDURE — 99213 OFFICE O/P EST LOW 20 MIN: CPT | Mod: PBBFAC,PO | Performed by: PODIATRIST

## 2018-06-25 PROCEDURE — 99999 PR PBB SHADOW E&M-EST. PATIENT-LVL III: CPT | Mod: PBBFAC,,, | Performed by: PODIATRIST

## 2018-06-25 PROCEDURE — 99214 OFFICE O/P EST MOD 30 MIN: CPT | Mod: S$PBB,,, | Performed by: PODIATRIST

## 2018-06-25 NOTE — PROGRESS NOTES
Subjective:      Patient ID: Rosendo Christina is a 72 y.o. male.    Chief Complaint: Diabetes Mellitus (5/30/18 Dr. Lackey  5/30/18); Diabetic Foot Exam; and Nail Care    Rosendo is a 72 y.o. male who presents to the clinic for evaluation and treatment of high risk feet. Rosendo has a past medical history of Diabetes mellitus type II (7/2010); Nephrolithiasis; Obesity; Psoriasis; Skin cancer; and Squamous cell carcinoma. The patient has no major complaints with feet. Chief concern is how to care for feet as a diabetic.    This patient has documented high risk feet requiring routine maintenance secondary to diabetes mellitis and those secondary complications of diabetes, as mentioned..    PCP: Domi Lackey MD    Date Last Seen by PCP:   Chief Complaint   Patient presents with    Diabetes Mellitus     5/30/18 Dr. Lackey  5/30/18    Diabetic Foot Exam    Nail Care       Current shoe gear:  Tennis shoes; patient was not able to get rx shoes     Hemoglobin A1C   Date Value Ref Range Status   05/24/2018 7.4 (H) 4.0 - 5.6 % Final     Comment:     According to ADA guidelines, hemoglobin A1c <7.0% represents  optimal control in non-pregnant diabetic patients. Different  metrics may apply to specific patient populations.   Standards of Medical Care in Diabetes-2016.  For the purpose of screening for the presence of diabetes:  <5.7%     Consistent with the absence of diabetes  5.7-6.4%  Consistent with increasing risk for diabetes   (prediabetes)  >or=6.5%  Consistent with diabetes  Currently, no consensus exists for use of hemoglobin A1c  for diagnosis of diabetes for children.  This Hemoglobin A1c assay has significant interference with fetal   hemoglobin   (HbF). The results are invalid for patients with abnormal amounts of   HbF,   including those with known Hereditary Persistence   of Fetal Hemoglobin. Heterozygous hemoglobin variants (HbAS, HbAC,   HbAD, HbAE, HbA2) do not significantly interfere with this  assay;   however, presence of multiple variants in a sample may impact the %   interference.     01/10/2018 6.4 (H) 4.0 - 5.6 % Final     Comment:     According to ADA guidelines, hemoglobin A1c <7.0% represents  optimal control in non-pregnant diabetic patients. Different  metrics may apply to specific patient populations.   Standards of Medical Care in Diabetes-2016.  For the purpose of screening for the presence of diabetes:  <5.7%     Consistent with the absence of diabetes  5.7-6.4%  Consistent with increasing risk for diabetes   (prediabetes)  >or=6.5%  Consistent with diabetes  Currently, no consensus exists for use of hemoglobin A1c  for diagnosis of diabetes for children.  This Hemoglobin A1c assay has significant interference with fetal   hemoglobin   (HbF). The results are invalid for patients with abnormal amounts of   HbF,   including those with known Hereditary Persistence   of Fetal Hemoglobin. Heterozygous hemoglobin variants (HbAS, HbAC,   HbAD, HbAE, HbA2) do not significantly interfere with this assay;   however, presence of multiple variants in a sample may impact the %   interference.     02/20/2017 6.6 (H) 4.5 - 6.2 % Final     Comment:     According to ADA guidelines, hemoglobin A1C <7.0% represents  optimal control in non-pregnant diabetic patients.  Different  metrics may apply to specific populations.   Standards of Medical Care in Diabetes - 2016.  For the purpose of screening for the presence of diabetes:  <5.7%     Consistent with the absence of diabetes  5.7-6.4%  Consistent with increasing risk for diabetes   (prediabetes)  >or=6.5%  Consistent with diabetes  Currently no consensus exists for use of hemoglobin A1C  for diagnosis of diabetes for children.       Patient Active Problem List   Diagnosis    Uncontrolled type 2 diabetes mellitus with stage 2 chronic kidney disease, without long-term current use of insulin    Psoriasis    Essential hypertension, benign     Current  Outpatient Prescriptions on File Prior to Visit   Medication Sig Dispense Refill    ascorbic acid, vitamin C, (VITAMIN C) 100 MG tablet Take 100 mg by mouth once daily.      blood sugar diagnostic (CONTOUR TEST STRIPS) Strp 1 each by Misc.(Non-Drug; Combo Route) route 2 (two) times daily. 50 each 11    lancets Misc USE ONE LANCET TWO TIMES DAILY 100 each 11    metFORMIN (GLUCOPHAGE) 500 MG tablet Take 1.5 tablets (750 mg total) by mouth 2 (two) times daily with meals. 270 tablet 0    omega 3-dha-epa-fish oil (FISH OIL) 300-1,000 mg CpDR Take 1 capsule by mouth once daily. 30 capsule     pravastatin (PRAVACHOL) 40 MG tablet Take 1 tablet (40 mg total) by mouth every evening. 90 tablet 3    valsartan-hydrochlorothiazide (DIOVAN-HCT) 320-25 mg per tablet Take 1 tablet by mouth once daily. 90 tablet 0     No current facility-administered medications on file prior to visit.      Review of patient's allergies indicates:  No Known Allergies  Past Surgical History:   Procedure Laterality Date    APPENDECTOMY      SKIN CANCER EXCISION      SKIN GRAFT      VASECTOMY      VASECTOMY       Family History   Problem Relation Age of Onset    Cancer Father         prostate    Diabetes Brother      Social History     Social History    Marital status:      Spouse name: N/A    Number of children: N/A    Years of education: N/A     Occupational History     [employer]     Social History Main Topics    Smoking status: Former Smoker    Smokeless tobacco: Never Used      Comment: cigars    Alcohol use Yes      Comment: occassional    Drug use: No    Sexual activity: Yes     Partners: Female     Other Topics Concern    Not on file     Social History Narrative    No narrative on file        Review of Systems   Constitution: Negative for chills, fever and weakness.   Cardiovascular: Negative for claudication and leg swelling.   Respiratory: Negative for cough and shortness of breath.    Skin:  "Positive for dry skin and nail changes. Negative for itching and rash.   Musculoskeletal: Positive for joint pain. Negative for falls, joint swelling and muscle weakness.   Gastrointestinal: Negative for diarrhea, nausea and vomiting.   Neurological: Positive for paresthesias. Negative for numbness and tremors.   Psychiatric/Behavioral: Negative for altered mental status and hallucinations.           Objective:       Vitals:    06/25/18 0941   BP: 120/74   Weight: 119.7 kg (264 lb)   Height: 6' 3" (1.905 m)   PainSc: 0-No pain       Physical Exam   Constitutional:   General: Pt. is well-developed, well-nourished, appears stated age, in no acute distress, alert and oriented x 3. No evidence of depression, anxiety, or agitation. Calm, cooperative, and communicative. Appropriate interactions and affect.       Cardiovascular:   Pulses:       Dorsalis pedis pulses are 1+ on the right side, and 1+ on the left side.        Posterior tibial pulses are 2+ on the right side, and 2+ on the left side.   There is decreased digital hair   Musculoskeletal:        Right ankle: He exhibits normal range of motion and no swelling. No tenderness. Achilles tendon exhibits no pain and no defect.        Left ankle: He exhibits normal range of motion and no swelling. No tenderness. Achilles tendon exhibits no pain and no defect.        Right foot: There is decreased range of motion. There is no tenderness.        Left foot: There is decreased range of motion. There is no tenderness.   muscle strength is 5/5 in all groups bilaterally.    Decreased stride, station of gait.  apropulsive toe off.  Increased angle and base of gait.    Patient has hammertoes of digits 2-5 bilateral partially reducible without symptom today.    Decreased first MPJ range of motion both weightbearing and nonweightbearing, no crepitus observed the first MP joint, + dorsal flag sign. Mild  bunion deformity is observed .    Fat pad atrophy to heels and met heads " bilateral   Neurological: He displays no atrophy and no tremor.   Joanna-Handy 5.07 monofilament is intact bilateral feet. Sharp/dull sensation is also intact Bilateral feet.    Decreased/absent vibratory sensation bilateral feet to 128Hz tuning fork.         Skin: Skin is warm, dry and intact. No abrasion, no ecchymosis, no lesion and no rash noted. He is not diaphoretic. No cyanosis or erythema. No pallor. Nails show no clubbing.   Toenails 1-5 bilaterally are elongated by 2-3 mm, thickened by 2-3 mm, discolored/yellowed, dystrophic, brittle with subungual debris.      Interdigital Spaces clean, dry and without evidence of break in skin integrity   Psychiatric: He has a normal mood and affect. His speech is normal.   Nursing note and vitals reviewed.            Assessment:       Encounter Diagnoses   Name Primary?    Comprehensive diabetic foot examination, type 2 DM, encounter for Yes    Hammer toes of both feet     Hallux limitus, acquired, right     Hallux limitus, acquired, left     Plantar fat pad atrophy          Plan:       Rosendo was seen today for diabetes mellitus, diabetic foot exam and nail care.    Diagnoses and all orders for this visit:    Comprehensive diabetic foot examination, type 2 DM, encounter for  -     DIABETIC SHOES FOR HOME USE    Hammer toes of both feet  -     DIABETIC SHOES FOR HOME USE    Hallux limitus, acquired, right  -     DIABETIC SHOES FOR HOME USE    Hallux limitus, acquired, left  -     DIABETIC SHOES FOR HOME USE    Plantar fat pad atrophy  -     DIABETIC SHOES FOR HOME USE      I counseled the patient on his conditions, their implications and medical management. Greater than 20 minutes spent on education about the diabetic foot, neuropathy, and prevention of limb loss.    - Shoe inspection. Diabetic Foot Education. Patient reminded of the importance of good nutrition and blood sugar control to help prevent podiatric complications of diabetes. Patient instructed on  proper foot hygeine. We discussed wearing proper shoe gear, daily foot inspections, never walking without protective shoe gear.    Rx diabetic shoes for protection and support    He will continue to monitor the areas daily, inspect his feet, wear protective shoe gear when ambulatory, moisturizer to maintain skin integrity and follow in this office in approximately 12-15 months, sooner p.r.n.

## 2018-06-25 NOTE — PATIENT INSTRUCTIONS
Recommend lotions: eucerin, eucerin for diabetics, aquaphor, A&D ointment, gold bond for diabetics, sween, Huntington's Bees all purpose baby ointment,  urea 40 with aloe (found on amazon.com)    Shoe recommendations: (try 6pm.com, zappos.com , nordstromrack.Blurb, or shoes.Blurb for discounted prices) you can visit DSW shoes in [place]  or [employer] in the [facility] (there are also several shoe brand outlets in the [facility])    Asics (GT 2000 or gel foundations), new balance stability type shoes, saucony (stabil c3),  Levy (GTS or Beast or transcend), vionic, propet (tennis shoe)    sofft brand, clarks, crocs, aerosoles, naturalizers, SAS, ecco, born, maegan guillaume, rockports (dress shoes)    Vionic, burkenstocks, fitflops, propet (sandals)  Nike comfort thong sandals, crocs, propet (house shoes)    Nail Home remedy:  Vicks Vapor rub to nails for easier managability    Occasional soaks for 15-20 mins in luke warm water with 1 cup of listerine and 1 cup of apple cider vinegar are ok You may add several drops of oil of oregano or tea tree oil as well        Diabetes: Inspecting Your Feet  Diabetes increases your chances of developing foot problems. So inspect your feet every day. This helps you find small skin irritations before they become serious infections. If you have trouble seeing the bottoms of your feet, use a mirror or ask a family member or friend to help.     Pressure spots on the bottom of the foot are common areas where problems develop.   How to check your feet  Below are tips to help you look for foot problems. Try to check your feet at the same time each day, such as when you get out of bed in the morning:  · Check the top of each foot. The tops of toes, back of the heel, and outer edge of the foot can get a lot of rubbing from poor-fitting shoes.  · Check the bottom of each foot. Daily wear and tear often leads to problems at pressure spots.  · Check the toes and nails. Fungal infections often occur  between toes. Toenail problems can also be a sign of fungal infections or lead to breaks in the skin.  · Check your shoes, too. Loose objects inside a shoe can injure the foot. Use your hand to feel inside your shoes for things like alessandra, loose stitching, or rough areas that could irritate your skin.  Warning signs  Look for any color changes in the foot. Redness with streaks can signal a severe infection, which needs immediate medical attention. Tell your doctor right away if you have any of these problems:  · Swelling, sometimes with color changes, may be a sign of poor blood flow or infection. Symptoms include tenderness and an increase in the size of your foot.  · Warm or hot areas on your feet may be signs of infection. A foot that is cold may not be getting enough blood.  · Sensations such as burning, tingling, or pins and needles can be signs of a problem. Also check for areas that may be numb.  · Hot spots are caused by friction or pressure. Look for hot spots in areas that get a lot of rubbing. Hot spots can turn into blisters, calluses, or sores.  · Cracks and sores are caused by dry or irritated skin. They are a sign that the skin is breaking down, which can lead to infection.  · Toenail problems to watch for include nails growing into the skin (ingrown toenail) and causing redness or pain. Thick, yellow, or discolored nails can signal a fungal infection.  · Drainage and odor can develop from untreated sores and ulcers. Call your doctor right away if you notice white or yellow drainage, bleeding, or unpleasant odor.   © 7202-7272 [employer]. [address] [place] [address]. All rights reserved. This information is not intended as a substitute for professional medical care. Always follow your healthcare professional's instructions.        Step-by-Step:  Inspecting Your Feet (Diabetes)    Date Last Reviewed: 10/1/2016  © 5116-9642 [employer]. [address] [place] [address]  Road, [place] [address]. All rights reserved. This information is not intended as a substitute for professional medical care. Always follow your healthcare professional's instructions.

## 2018-06-26 ENCOUNTER — TELEPHONE (OUTPATIENT)
Dept: PODIATRY | Facility: CLINIC | Age: 73
End: 2018-06-26

## 2018-07-08 ENCOUNTER — ANESTHESIA EVENT (OUTPATIENT)
Dept: ENDOSCOPY | Facility: HOSPITAL | Age: 73
End: 2018-07-08
Payer: MEDICARE

## 2018-07-09 ENCOUNTER — HOSPITAL ENCOUNTER (OUTPATIENT)
Facility: HOSPITAL | Age: 73
Discharge: HOME | End: 2018-07-09
Attending: INTERNAL MEDICINE | Admitting: INTERNAL MEDICINE
Payer: MEDICARE

## 2018-07-09 ENCOUNTER — ANESTHESIA (OUTPATIENT)
Dept: ENDOSCOPY | Facility: HOSPITAL | Age: 73
End: 2018-07-09
Payer: MEDICARE

## 2018-07-09 ENCOUNTER — SURGERY (OUTPATIENT)
Age: 73
End: 2018-07-09

## 2018-07-09 VITALS
DIASTOLIC BLOOD PRESSURE: 70 MMHG | HEIGHT: 75 IN | BODY MASS INDEX: 32.58 KG/M2 | WEIGHT: 262 LBS | RESPIRATION RATE: 18 BRPM | HEART RATE: 50 BPM | TEMPERATURE: 98 F | SYSTOLIC BLOOD PRESSURE: 159 MMHG | OXYGEN SATURATION: 98 %

## 2018-07-09 DIAGNOSIS — Z12.11 SCREEN FOR COLON CANCER: ICD-10-CM

## 2018-07-09 LAB — POCT GLUCOSE: 177 MG/DL (ref 70–110)

## 2018-07-09 PROCEDURE — 37000008 HC ANESTHESIA 1ST 15 MINUTES: Performed by: INTERNAL MEDICINE

## 2018-07-09 PROCEDURE — 27201012 HC FORCEPS, HOT/COLD, DISP: Performed by: INTERNAL MEDICINE

## 2018-07-09 PROCEDURE — 27201089 HC SNARE, DISP (ANY): Performed by: INTERNAL MEDICINE

## 2018-07-09 PROCEDURE — 63600175 PHARM REV CODE 636 W HCPCS: Performed by: NURSE ANESTHETIST, CERTIFIED REGISTERED

## 2018-07-09 PROCEDURE — D9220A PRA ANESTHESIA: Mod: PT,CRNA,, | Performed by: NURSE ANESTHETIST, CERTIFIED REGISTERED

## 2018-07-09 PROCEDURE — 25000003 PHARM REV CODE 250: Performed by: ANESTHESIOLOGY

## 2018-07-09 PROCEDURE — 37000009 HC ANESTHESIA EA ADD 15 MINS: Performed by: INTERNAL MEDICINE

## 2018-07-09 PROCEDURE — 45380 COLONOSCOPY AND BIOPSY: CPT | Performed by: INTERNAL MEDICINE

## 2018-07-09 PROCEDURE — 88305 TISSUE EXAM BY PATHOLOGIST: CPT | Mod: 59 | Performed by: PATHOLOGY

## 2018-07-09 PROCEDURE — 88305 TISSUE EXAM BY PATHOLOGIST: CPT | Mod: 26,,, | Performed by: PATHOLOGY

## 2018-07-09 PROCEDURE — 45385 COLONOSCOPY W/LESION REMOVAL: CPT | Performed by: INTERNAL MEDICINE

## 2018-07-09 PROCEDURE — D9220A PRA ANESTHESIA: Mod: PT,ANES,, | Performed by: ANESTHESIOLOGY

## 2018-07-09 RX ORDER — SODIUM CHLORIDE 9 MG/ML
INJECTION, SOLUTION INTRAVENOUS CONTINUOUS
Status: DISCONTINUED | OUTPATIENT
Start: 2018-07-09 | End: 2018-07-09 | Stop reason: HOSPADM

## 2018-07-09 RX ORDER — LIDOCAINE HYDROCHLORIDE 10 MG/ML
1 INJECTION, SOLUTION EPIDURAL; INFILTRATION; INTRACAUDAL; PERINEURAL ONCE
Status: DISCONTINUED | OUTPATIENT
Start: 2018-07-09 | End: 2018-07-09 | Stop reason: HOSPADM

## 2018-07-09 RX ORDER — LIDOCAINE HCL/PF 100 MG/5ML
SYRINGE (ML) INTRAVENOUS
Status: DISCONTINUED | OUTPATIENT
Start: 2018-07-09 | End: 2018-07-09

## 2018-07-09 RX ORDER — PROPOFOL 10 MG/ML
VIAL (ML) INTRAVENOUS
Status: DISCONTINUED | OUTPATIENT
Start: 2018-07-09 | End: 2018-07-09

## 2018-07-09 RX ORDER — PROPOFOL 10 MG/ML
VIAL (ML) INTRAVENOUS
Status: COMPLETED
Start: 2018-07-09 | End: 2018-07-09

## 2018-07-09 RX ORDER — LIDOCAINE HCL/PF 100 MG/5ML
SYRINGE (ML) INTRAVENOUS
Status: COMPLETED
Start: 2018-07-09 | End: 2018-07-09

## 2018-07-09 RX ADMIN — PROPOFOL 50 MG: 10 INJECTION, EMULSION INTRAVENOUS at 10:07

## 2018-07-09 RX ADMIN — PROPOFOL 150 MG: 10 INJECTION, EMULSION INTRAVENOUS at 10:07

## 2018-07-09 RX ADMIN — LIDOCAINE HYDROCHLORIDE 100 MG: 20 INJECTION, SOLUTION INTRAVENOUS at 10:07

## 2018-07-09 RX ADMIN — PROPOFOL 50 MG: 10 INJECTION, EMULSION INTRAVENOUS at 11:07

## 2018-07-09 RX ADMIN — SODIUM CHLORIDE: 0.9 INJECTION, SOLUTION INTRAVENOUS at 10:07

## 2018-07-09 NOTE — DISCHARGE SUMMARY
[facility]  Discharge Summary      Admit Date: 7/9/2018    Discharge Date and Time:  07/09/2018 11:11 AM    Attending Physician: Kameron Ruff MD     Reason for Admission: Screening colonoscopy    Procedures Performed: Procedure(s) (LRB):  COLONOSCOPY (N/A)    Hospital Course (synopsis of major diagnoses, care, treatment, and services provided during the course of the hospital stay): Outpatient colonoscopy     Consults: none    Significant Diagnostic Studies: Colonoscopy    Final Diagnoses:    Principal Problem: <principal problem not specified>   Secondary Diagnoses: Colonoscopy    Discharged Condition: good    Disposition: Home or Self Care    Follow Up/Patient Instructions: Follow-up with referring physician             Resume previous diet and activity.    Medications:  Reconciled Home Medications:      Medication List      ASK your doctor about these medications    blood sugar diagnostic Strp  Commonly known as:  CONTOUR TEST STRIPS  1 each by Misc.(Non-Drug; Combo Route) route 2 (two) times daily.     lancets Misc  USE ONE LANCET TWO TIMES DAILY     metFORMIN 500 MG tablet  Commonly known as:  GLUCOPHAGE  Take 1.5 tablets (750 mg total) by mouth 2 (two) times daily with meals.     omega 3-dha-epa-fish oil 300-1,000 mg Cpdr capsule  Commonly known as:  FISH OIL  Take 1 capsule by mouth once daily.     pravastatin 40 MG tablet  Commonly known as:  PRAVACHOL  Take 1 tablet (40 mg total) by mouth every evening.     valsartan-hydrochlorothiazide 320-25 mg per tablet  Commonly known as:  DIOVAN-HCT  Take 1 tablet by mouth once daily.     VITAMIN C 100 MG tablet  Generic drug:  ascorbic acid (vitamin C)  Take 100 mg by mouth once daily.          No discharge procedures on file.

## 2018-07-09 NOTE — PROVATION PATIENT INSTRUCTIONS
Discharge Summary/Instructions after an Endoscopic Procedure  Patient Name: Rosendo Christina  Patient MRN: [id number]  Patient YOB: 1945 Monday, July 09, 2018  Kameron Ruff MD  RESTRICTIONS:  During your procedure today, you received medications for sedation.  These   medications may affect your judgment, balance and coordination.  Therefore,   for 24 hours, you have the following restrictions:   - DO NOT drive a car, operate machinery, make legal/financial decisions,   sign important papers or drink alcohol.    ACTIVITY:  Today: no heavy lifting, straining or running due to procedural   sedation/anesthesia.  The following day: return to full activity including work.  DIET:  Eat and drink normally unless instructed otherwise.     TREATMENT FOR COMMON SIDE EFFECTS:  - Mild abdominal pain, nausea, belching, bloating or excessive gas:  rest,   eat lightly and use a heating pad.  - Sore Throat: treat with throat lozenges and/or gargle with warm salt   water.  - Because air was used during the procedure, expelling large amounts of air   from your rectum or belching is normal.  - If a bowel prep was taken, you may not have a bowel movement for 1-3 days.    This is normal.  SYMPTOMS TO WATCH FOR AND REPORT TO YOUR PHYSICIAN:  1. Abdominal pain or bloating, other than gas cramps.  2. Chest pain.  3. Back pain.  4. Signs of infection such as: chills or fever occurring within 24 hours   after the procedure.  5. Rectal bleeding, which would show as bright red, maroon, or black stools.   (A tablespoon of blood from the rectum is not serious, especially if   hemorrhoids are present.)  6. Vomiting.  7. Weakness or dizziness.  GO DIRECTLY TO THE NEAREST EMERGENCY ROOM IF YOU HAVE ANY OF THE FOLLOWING:      Difficulty breathing              Chills and/or fever over 101 F   Persistent vomiting and/or vomiting blood   Severe abdominal pain   Severe chest pain   Black, tarry stools   Bleeding- more than one  tablespoon   Any other symptom or condition that you feel may need urgent attention  Your doctor recommends these additional instructions:  If any biopsies were taken, your doctors clinic will contact you in 1 to 2   weeks with any results.  - Await pathology results.   - Repeat colonoscopy in 3 years for surveillance based on pathology results.     - Return to referring physician as previously scheduled.   - Discharge patient to home (via wheelchair).  For questions, problems or results please call your physician - Kameron Ruff MD at Work:  [phone].  [facility] can be reached at [phone]     IF A COMPLICATION OR EMERGENCY SITUATION ARISES AND YOU ARE UNABLE TO REACH   YOUR PHYSICIAN - GO DIRECTLY TO THE EMERGENCY ROOM.  Kameron Ruff MD  7/9/2018 11:18:30 AM  This report has been verified and signed electronically.  [employer]

## 2018-07-09 NOTE — TRANSFER OF CARE
"Anesthesia Transfer of Care Note    Patient: Rosendo Christina    Procedure(s) Performed: Procedure(s) (LRB):  COLONOSCOPY (N/A)    Patient location: PACU    Anesthesia Type: general    Transport from OR: Transported from OR on room air with adequate spontaneous ventilation    Post pain: adequate analgesia    Post assessment: no apparent anesthetic complications and tolerated procedure well    Post vital signs: stable    Level of consciousness: awake, alert and oriented    Nausea/Vomiting: no nausea/vomiting    Complications: none    Transfer of care protocol was followed      Last vitals:   Visit Vitals  /60   Pulse (!) 50   Temp 36.5 °C (97.7 °F) (Oral)   Resp 16   Ht 6' 3" (1.905 m)   Wt 118.8 kg (262 lb)   SpO2 97%   BMI 32.75 kg/m²     "

## 2018-07-09 NOTE — ANESTHESIA PREPROCEDURE EVALUATION
07/09/2018  Rosendo Christina is a 72 y.o., male.    Anesthesia Evaluation    I have reviewed the Patient Summary Reports.     I have reviewed the Medications.     Review of Systems  Anesthesia Hx:  No problems with previous Anesthesia    Social:  Former Smoker, Alcohol Use    Cardiovascular:   Hypertension    Renal/:   renal calculi    Endocrine:   Diabetes, poorly controlled, type 2        Physical Exam  General:  Well nourished    Airway/Jaw/Neck:  AIRWAY FINDINGS: Normal       Dental:  DENTAL FINDINGS: Normal   Chest/Lungs:  Chest/Lungs Clear    Heart/Vascular:  Heart Findings: Normal       Mental Status:  Mental Status Findings:  Cooperative, Alert and Oriented         Anesthesia Plan  Type of Anesthesia, risks & benefits discussed:  Anesthesia Type:  general  Patient's Preference:   Intra-op Monitoring Plan: standard ASA monitors  Intra-op Monitoring Plan Comments:   Post Op Pain Control Plan: multimodal analgesia, IV/PO Opioids PRN and per primary service following discharge from PACU  Post Op Pain Control Plan Comments:   Induction:   IV  Beta Blocker:  Patient is not currently on a Beta-Blocker (No further documentation required).       Informed Consent: Patient understands risks and agrees with Anesthesia plan.  Questions answered. Anesthesia consent signed with patient.  ASA Score: 3     Day of Surgery Review of History & Physical:    H&P update referred to the surgeon.         Ready For Surgery From Anesthesia Perspective.

## 2018-07-09 NOTE — H&P
"Chief Complaint:  "I need a colonoscopy."    HPI: The patient is a 72 year old man presenting for a screening colonoscopy.  He has never undergone a colonoscopy.  The patient denies any abdominal pain, weight loss, nausea, emesis, diarrhea, constipation, melena, or hematochezia.  The patient also denies a family history of colon cancer.    Past Medical History:   Diagnosis Date    Diabetes mellitus type II 7/2010    diet controlled    Nephrolithiasis     Obesity     Psoriasis     Skin cancer     Squamous cell carcinoma      Past Surgical History:   Procedure Laterality Date    APPENDECTOMY      SKIN CANCER EXCISION      SKIN GRAFT      VASECTOMY      VASECTOMY       Family History   Problem Relation Age of Onset    Cancer Father         prostate    Diabetes Brother      Social History     Social History    Marital status:      Spouse name: N/A    Number of children: N/A    Years of education: N/A     Occupational History     [facility] [employer]     Social History Main Topics    Smoking status: Former Smoker    Smokeless tobacco: Never Used      Comment: cigars    Alcohol use Yes      Comment: occassional    Drug use: No    Sexual activity: Yes     Partners: Female     Other Topics Concern    Not on file     Social History Narrative    No narrative on file        lidocaine (PF) 10 mg/ml (1%)  1 mL Intradermal Once     Review of patient's allergies indicates:  No Known Allergies  ROS:  No chest pain or dyspnea.  No dysuria.  No heartburn or dysphagia.  Otherwise as stated above.  Ten other systems negative.    Vitals:    07/09/18 1014   BP: (!) 155/73   BP Location: Left arm   Patient Position: Lying   Pulse: (!) 49   Resp: 18   Temp: 98.1 °F (36.7 °C)   SpO2: 95%   Weight: 118.8 kg (262 lb)   Height: 6' 3" (1.905 m)     P.E.:  GEN: A x O x 3, NAD  SKIN: No jaundice  HEENT: EOMI, PERRL, anicteric sclera  CV: RRR, no M/R/G  Chest: CTA B  Abdomen: soft, NTND, normoactive " BS  Ext: No C/C/E.  2+ dorsalis pedis pulses B  Neuro: No asterixes or tremors.  CN II-XII intact  Musculoskeletal: 5/5 strength bilaterally    Labs:  No results found for this or any previous visit (from the past 336 hour(s)).  CMP  Sodium   Date Value Ref Range Status   05/02/2018 140 136 - 145 mmol/L Final     Potassium   Date Value Ref Range Status   05/02/2018 4.2 3.5 - 5.1 mmol/L Final     Chloride   Date Value Ref Range Status   05/02/2018 104 95 - 110 mmol/L Final     CO2   Date Value Ref Range Status   05/02/2018 30 (H) 23 - 29 mmol/L Final     Glucose   Date Value Ref Range Status   05/02/2018 160 (H) 70 - 110 mg/dL Final     BUN, Bld   Date Value Ref Range Status   05/02/2018 10 8 - 23 mg/dL Final     Creatinine   Date Value Ref Range Status   05/02/2018 0.9 0.5 - 1.4 mg/dL Final     Calcium   Date Value Ref Range Status   05/02/2018 9.5 8.7 - 10.5 mg/dL Final     Total Protein   Date Value Ref Range Status   05/02/2018 6.4 6.0 - 8.4 g/dL Final     Albumin   Date Value Ref Range Status   05/02/2018 3.5 3.5 - 5.2 g/dL Final     Total Bilirubin   Date Value Ref Range Status   05/02/2018 0.5 0.1 - 1.0 mg/dL Final     Comment:     For infants and newborns, interpretation of results should be based  on gestational age, weight and in agreement with clinical  observations.  Premature Infant recommended reference ranges:  Up to 24 hours.............<8.0 mg/dL  Up to 48 hours............<12.0 mg/dL  3-5 days..................<15.0 mg/dL  6-29 days.................<15.0 mg/dL       Alkaline Phosphatase   Date Value Ref Range Status   05/02/2018 67 55 - 135 U/L Final     AST   Date Value Ref Range Status   05/02/2018 15 10 - 40 U/L Final     ALT   Date Value Ref Range Status   05/02/2018 16 10 - 44 U/L Final     Anion Gap   Date Value Ref Range Status   05/02/2018 6 (L) 8 - 16 mmol/L Final     eGFR if    Date Value Ref Range Status   05/02/2018 >60.0 >60 mL/min/1.73 m^2 Final     eGFR if non African  American   Date Value Ref Range Status   05/02/2018 >60.0 >60 mL/min/1.73 m^2 Final     Comment:     Calculation used to obtain the estimated glomerular filtration  rate (eGFR) is the CKD-EPI equation.          No results for input(s): PT, INR, APTT in the last 24 hours.    A/P:  The patient is a 72 year old man presenting for a colonoscopy.  1.  Colonoscopy - he can undergo a colonoscopy.  I have explained the risks, benefits, and alternatives of the procedure in detail.  The patient voices understanding and all questions have been answered.  The patient agrees to proceed as planned.

## 2018-07-10 NOTE — ANESTHESIA POSTPROCEDURE EVALUATION
"Anesthesia Post Evaluation    Patient: Rosendo Christina    Procedure(s) Performed: Procedure(s) (LRB):  COLONOSCOPY (N/A)    Final Anesthesia Type: general  Patient location during evaluation: GI PACU  Patient participation: Yes- Able to Participate  Level of consciousness: awake and alert, awake and oriented  Post-procedure vital signs: reviewed and stable  Pain management: adequate  Airway patency: patent  PONV status at discharge: No PONV  Anesthetic complications: no      Cardiovascular status: blood pressure returned to baseline and hemodynamically stable  Respiratory status: unassisted, spontaneous ventilation and room air  Hydration status: euvolemic  Follow-up not needed.        Visit Vitals  BP (!) 159/70 (BP Location: Left arm, Patient Position: Lying)   Pulse (!) 50   Temp 36.7 °C (98 °F) (Oral)   Resp 18   Ht 6' 3" (1.905 m)   Wt 118.8 kg (262 lb)   SpO2 98%   BMI 32.75 kg/m²       Pain/Ivory Score: Pain Assessment Performed: Yes (7/9/2018 11:45 AM)  Presence of Pain: denies (7/9/2018 11:45 AM)  Ivory Score: 10 (7/9/2018 11:45 AM)      "

## 2018-07-16 ENCOUNTER — TELEPHONE (OUTPATIENT)
Dept: FAMILY MEDICINE | Facility: CLINIC | Age: 73
End: 2018-07-16

## 2018-07-16 NOTE — TELEPHONE ENCOUNTER
----- Message from Domi Lackey MD sent at 7/15/2018  4:50 PM CDT -----  Benign colon polyps, suggest repeat c-scope in 3 years

## 2018-07-23 NOTE — TELEPHONE ENCOUNTER
----- Message from Shayy Pelaez sent at 7/23/2018  1:26 PM CDT -----  Contact: Self   Med refill: [phone]    valsartan-hydrochlorothiazide (DIOVAN-HCT) 320-25 mg per tablet  metFORMIN (GLUCOPHAGE) 500 MG tablet    [facility] [address] [address] [address]

## 2018-07-24 ENCOUNTER — TELEPHONE (OUTPATIENT)
Dept: FAMILY MEDICINE | Facility: CLINIC | Age: 73
End: 2018-07-24

## 2018-07-24 ENCOUNTER — TELEPHONE (OUTPATIENT)
Dept: PODIATRY | Facility: CLINIC | Age: 73
End: 2018-07-24

## 2018-07-24 RX ORDER — METFORMIN HYDROCHLORIDE 500 MG/1
750 TABLET ORAL 2 TIMES DAILY WITH MEALS
Qty: 270 TABLET | Refills: 0 | Status: ON HOLD | OUTPATIENT
Start: 2018-07-24 | End: 2018-10-05 | Stop reason: HOSPADM

## 2018-07-24 RX ORDER — OLMESARTAN MEDOXOMIL AND HYDROCHLOROTHIAZIDE 40/25 40; 25 MG/1; MG/1
1 TABLET ORAL DAILY
Qty: 90 TABLET | Refills: 0 | Status: SHIPPED | OUTPATIENT
Start: 2018-07-24 | End: 2018-12-31 | Stop reason: SDUPTHER

## 2018-07-24 NOTE — TELEPHONE ENCOUNTER
----- Message from Renzo Lai sent at 7/24/2018  9:03 AM CDT -----  Contact: self  Pt has additional regarding when and where to  diabetic shoes. Contact pt at [phone].    Thanks-

## 2018-07-24 NOTE — TELEPHONE ENCOUNTER
Called pt to inform him of refills sent to pharmacy and pt states he forgot to mention at LOV that he has been having problems with diarrhea for the past month, wants to know if you want to change metformin; please advise

## 2018-07-24 NOTE — TELEPHONE ENCOUNTER
Patient can reduce dose of metformin to 500 mg twice daily or increase fiber in diet.  If he decides to decrease dose of metformin, I suggest adding Januvia to his regimen.  If diarrhea does not resolve with reducing metformin, he needs to follow up.

## 2018-07-24 NOTE — TELEPHONE ENCOUNTER
Spoke with patient. Advised to contact Param at [phone] re: when and where to  diabetic shoes

## 2018-07-26 ENCOUNTER — TELEPHONE (OUTPATIENT)
Dept: FAMILY MEDICINE | Facility: CLINIC | Age: 73
End: 2018-07-26

## 2018-07-26 NOTE — TELEPHONE ENCOUNTER
----- Message from Dima Molina sent at 7/26/2018  9:46 AM CDT -----  Contact: [phone]/Pt  Calling To inform doc that  dye was inj for testing ,and wanted to address some concerns the patient also wanted to discuss results from kidneys

## 2018-07-26 NOTE — TELEPHONE ENCOUNTER
Patient had CT completed , stated it was negative. Also wanted to know if he could start taking his metformin again and also to have his labs for kidney function checked because they were not checked prior to CT scan and patient was administered contrast. Please advise, thank you.

## 2018-07-30 NOTE — TELEPHONE ENCOUNTER
----- Message from Sammie Miller sent at 7/30/2018 10:16 AM CDT -----  Contact: self  Pt returned staff's call. He asked that I leave the information that he will cut down his Metformin from 1/ 1/2 to just 1 a day b/c of the severe diarrhea he has been having. He states he also got a letter after taking MRI stating he needs blood work to check his kidneys. He states he will drop off letter to office to Dr. Lackey so she can review.     Pt states he is about to get back into the car and will not need a call back. Declined to provide a call back #.

## 2018-07-31 DIAGNOSIS — I10 ESSENTIAL HYPERTENSION, BENIGN: Primary | ICD-10-CM

## 2018-08-09 DIAGNOSIS — I10 UNCONTROLLED HYPERTENSION: ICD-10-CM

## 2018-08-10 RX ORDER — VALSARTAN AND HYDROCHLOROTHIAZIDE 320; 25 MG/1; MG/1
TABLET, FILM COATED ORAL
Qty: 90 TABLET | Refills: 0 | OUTPATIENT
Start: 2018-08-10

## 2018-08-29 NOTE — H&P
HISTORY OF PRESENT ILLNESS:  A 73-year-old man who recently underwent a   screening colonoscopy.  He has regular brown bowel movements.  He has not had   any recent change in his bowel habits.  He denies melena or hematochezia.  He   denies any recent weight changes.  He denies a family history of colorectal   cancer.  Colonoscopy revealed multiple polyps, which were resected throughout   the colon, many of which were tubular adenomas and hyperplastic polyps.  There   was a 35 mm polyp resected at approximately 40 cm and definitely within the   sigmoid colon.  Pathology report revealed invasive adenocarcinoma invades into   the submucosa.  There is no lymphovascular invasion.  I have discussed the   pathology report with the pathologist, Dr. Matos.  The patient had a repeat   endoscopy with an attempt to localize the area; however, could not be   identified, but a tattoo xavi was placed at the possible area of previous   resection.  On discussing the case with the patient's gastroenterologist, he   thinks resection should involve the sigmoid colon and go all the way back to the   distal transverse colon to make sure that it was within the specimen.  The   patient denies exertional angina or dyspnea on exertion.    PAST MEDICAL HISTORY:  Arthritis, diabetes and hypercholesterolemia.    PAST SURGICAL HISTORY:  Appendectomy as a child.    ALLERGIES:  No known drug allergies.    MEDICATIONS:  Metformin and alosetron/hydrochloride.    SOCIAL HISTORY:  The patient does not smoke.  He drinks beer, liquor or wine   about three to four times per month.    PHYSICAL EXAMINATION:  GENERAL:  Alert and oriented x4.  HEENT:  No cervical or supraclavicular masses.  LUNGS:  Clear to auscultation bilaterally.  CARDIOVASCULAR:  Regular rate and rhythm.  AXILLA:  No masses bilaterally.  ABDOMEN:  Soft.  Positive bowel sounds, obese, nontender and nondistended.  No   masses.  No hernia.  GENITOURINARY:  No inguinal hernia  bilaterally.    LABORATORY DATA:  A CT scan of the abdomen and pelvis revealed vascular   calcifications in the abdomen and pelvis and an umbilical hernia containing fat   and a small fat-containing left inguinal hernia.    ASSESSMENT AND PLAN:  Adenocarcinoma of the sigmoid colon -- incompletely   resected as it invades into the submucosa -- a long discussion with the patient   and family members.  I recommend proceeding with subtotal colectomy with   anastomosis just above the peritoneal reflection.  Resecting the patient back to   the transverse colon down to the peritoneal reflection would likely not allow   mobilization for anastomosis.  Therefore, subtotal colectomy will be pursued,   procedure and risks discussed with the patient and family members including, but   not limited to, pelvic abscess and anastomotic leak, which would likely require   further surgical intervention and possibly an ostomy -- we will bowel prep the   patient mechanically and with oral antibiotics preoperatively.      JOEL/IN  dd: 08/29/2018 12:26:49 (CDT)  td: 08/29/2018 17:21:48 (CDT)  Doc ID   #[id number]  Job ID #[id number]    CC:

## 2018-08-30 ENCOUNTER — TELEPHONE (OUTPATIENT)
Dept: ADMINISTRATIVE | Facility: HOSPITAL | Age: 73
End: 2018-08-30

## 2018-09-04 ENCOUNTER — OFFICE VISIT (OUTPATIENT)
Dept: GASTROENTEROLOGY | Facility: CLINIC | Age: 73
End: 2018-09-04
Payer: MEDICARE

## 2018-09-04 ENCOUNTER — OFFICE VISIT (OUTPATIENT)
Dept: SURGERY | Facility: CLINIC | Age: 73
End: 2018-09-04
Payer: MEDICARE

## 2018-09-04 ENCOUNTER — HOSPITAL ENCOUNTER (OUTPATIENT)
Dept: CARDIOLOGY | Facility: CLINIC | Age: 73
Discharge: HOME | End: 2018-09-04
Payer: MEDICARE

## 2018-09-04 VITALS
DIASTOLIC BLOOD PRESSURE: 73 MMHG | SYSTOLIC BLOOD PRESSURE: 130 MMHG | HEIGHT: 76 IN | WEIGHT: 270.75 LBS | BODY MASS INDEX: 32.97 KG/M2 | HEART RATE: 58 BPM

## 2018-09-04 VITALS
BODY MASS INDEX: 33.63 KG/M2 | WEIGHT: 270.5 LBS | HEART RATE: 53 BPM | SYSTOLIC BLOOD PRESSURE: 152 MMHG | HEIGHT: 75 IN | DIASTOLIC BLOOD PRESSURE: 63 MMHG

## 2018-09-04 DIAGNOSIS — C18.7 MALIGNANT NEOPLASM OF SIGMOID COLON: ICD-10-CM

## 2018-09-04 DIAGNOSIS — C18.7 MALIGNANT NEOPLASM OF SIGMOID COLON: Primary | ICD-10-CM

## 2018-09-04 PROCEDURE — 99203 OFFICE O/P NEW LOW 30 MIN: CPT | Mod: S$PBB,,, | Performed by: COLON & RECTAL SURGERY

## 2018-09-04 PROCEDURE — 93005 ELECTROCARDIOGRAM TRACING: CPT | Mod: PBBFAC | Performed by: INTERNAL MEDICINE

## 2018-09-04 PROCEDURE — 93010 ELECTROCARDIOGRAM REPORT: CPT | Mod: S$PBB,,, | Performed by: INTERNAL MEDICINE

## 2018-09-04 PROCEDURE — 99999 PR PBB SHADOW E&M-EST. PATIENT-LVL III: CPT | Mod: PBBFAC,,, | Performed by: COLON & RECTAL SURGERY

## 2018-09-04 PROCEDURE — 99213 OFFICE O/P EST LOW 20 MIN: CPT | Mod: PBBFAC | Performed by: INTERNAL MEDICINE

## 2018-09-04 PROCEDURE — 99213 OFFICE O/P EST LOW 20 MIN: CPT | Mod: PBBFAC,25,27 | Performed by: COLON & RECTAL SURGERY

## 2018-09-04 PROCEDURE — 99999 PR PBB SHADOW E&M-EST. PATIENT-LVL III: CPT | Mod: PBBFAC,,, | Performed by: INTERNAL MEDICINE

## 2018-09-04 PROCEDURE — 99203 OFFICE O/P NEW LOW 30 MIN: CPT | Mod: S$PBB,,, | Performed by: INTERNAL MEDICINE

## 2018-09-04 RX ORDER — NEOMYCIN SULFATE 500 MG/1
1000 TABLET ORAL SEE ADMIN INSTRUCTIONS
Qty: 6 TABLET | Refills: 0 | Status: SHIPPED | OUTPATIENT
Start: 2018-09-04 | End: 2018-09-20

## 2018-09-04 RX ORDER — METRONIDAZOLE 500 MG/1
500 TABLET ORAL SEE ADMIN INSTRUCTIONS
Qty: 3 TABLET | Refills: 0 | Status: SHIPPED | OUTPATIENT
Start: 2018-09-04 | End: 2018-09-20

## 2018-09-04 NOTE — LETTER
September 10, 2018      Victor Hugo Nuenz MD  [address]  [facility] [address]           [facility]  [address]  [facility] [address]  Phone: [phone]          Patient: Rosendo Christina   MR Number: [id number]   YOB: 1945   Date of Visit: 9/4/2018       Dear Dr. Victor Hugo Nunez:    Thank you for referring Rosendo Christina to me for evaluation. Attached you will find relevant portions of my assessment and plan of care.    If you have questions, please do not hesitate to call me. I look forward to following Rosendo Christina along with you.    Sincerely,    Chito Banks MD    Enclosure  CC:  Domi Lackey MD    If you would like to receive this communication electronically, please contact [email or web address] or [phone] to request more information on [employer] access.    For providers and/or their staff who would like to refer a patient to [facility], please contact us through our one-stop-shop provider referral line, [facility], at [phone].    If you feel you have received this communication in error or would no longer like to receive these types of communications, please e-mail [email or web address]

## 2018-09-04 NOTE — PROGRESS NOTES
[facility] Note    Reason for Visit:  The encounter diagnosis was Malignant neoplasm of sigmoid colon.    PCP: Domi Lackey   No address on file    HPI:  This is a 73 y.o. male here for evaluation of a sigmoid colon lesion. The patient underwent a colonoscopy for colon cancer screening on 7/9/2018 which shoed a 35 mm polyp in the sigmoid colon as well colon polyps in cecum, transverse, descending. The patient denied blood in the stool or family history of colon cancer. Stated GERD.    Colonoscopy    - Non-bleeding internal hemorrhoids.                       - Mild diverticulosis in the sigmoid colon. There                        was no evidence of diverticular bleeding.                       - Three 2 to 3 mm polyps at the ileocecal valve,                        removed with a cold biopsy forceps. Resected and                        retrieved.                       - Two 2 to 3 mm polyps in the cecum, removed with a                        cold biopsy forceps. Resected and retrieved.                       - One 5 mm polyp in the transverse colon, removed                        with a cold snare. Resected and retrieved.                       - One 2 mm polyp in the transverse colon, removed                        with a cold biopsy forceps. Resected and retrieved.                       - One 2 mm polyp in the descending colon, removed                        with a cold biopsy forceps. Resected and retrieved.                       - One 35 mm polyp in the sigmoid colon, removed with                        a hot snare. Resected and retrieved.  Pathology    Colonic mucosa (submitted as sigmoid polyp):  -Invasive adenocarcinoma arising within a tubular adenoma  -Carcinoma invades into the submucosa  -This part of the case is been seen in consultation by Dr. Mariam Andre concurs with this diagnosis      Recent CT scan negative for metastasis  ROS:  Constitutional: No fevers, chills, weight  "stable  ENT: No dysphagia  CV: No chest pain or palpitations  Pulm: No cough, No shortness of breath  GI: see HPI  Derm: Stated psoriasis  Heme: No lymphadenopathy, No bruising, No blood transfusions  : No dysuria        Medical History:  has a past medical history of Diabetes mellitus type II (7/2010), Nephrolithiasis, Obesity, Psoriasis, Skin cancer, and Squamous cell carcinoma.    Surgical History:  has a past surgical history that includes Skin graft; Appendectomy; Vasectomy; Skin cancer excision; Vasectomy; and COLONOSCOPY (N/A, 7/9/2018).    Family History: family history includes Cancer in his father; Diabetes in his brother..     Social History:  reports that he has quit smoking. he has never used smokeless tobacco. He reports that he drinks alcohol. He reports that he does not use drugs.    Review of patient's allergies indicates:  No Known Allergies        Objective Findings:    Vital Signs:  /73   Pulse (!) 58   Ht 6' 3.6" (1.92 m)   Wt 122.8 kg (270 lb 11.6 oz)   BMI 33.30 kg/m²   Body mass index is 33.3 kg/m².    Physical Exam:  General Appearance: Well appearing in no acute distress  HEENT:   Normocephalic, without obvious abnormality.  Sclera anicteric, no conjunctival pallor, Lips, mucosa, and tongue pink and dry; teeth and gums normal  NECK:  Supple, no lymphadenopathy noted  Lungs: CTA bilaterally in anterior and posterior fields, no wheezes, no crackles.  Heart:  Regular rate and rhythm, S1, S2 normal, no murmurs heard  Abdomen: Soft, non tender, non distended with positive bowel sounds in all four quadrants. No hepatosplenomegaly, ascites, or mass  Extremities: no edema  Skin: evidence of psoriasis in his hands  Neurologic: Grossly normal      Labs:  Lab Results   Component Value Date    WBC 4.75 (L) 03/16/2013    HGB 15.1 03/16/2013    HCT 47.2 03/16/2013     (L) 03/16/2013    CHOL 158 05/02/2018    TRIG 94 05/02/2018    HDL 33 (L) 05/02/2018    ALT 16 05/02/2018    AST 15 " 05/02/2018     05/02/2018    K 4.2 05/02/2018     05/02/2018    CREATININE 0.9 05/02/2018    BUN 10 05/02/2018    CO2 30 (H) 05/02/2018    TSH 2.46 08/14/2010    PSA 4.1 (H) 01/10/2018    HGBA1C 7.4 (H) 05/24/2018              Assessment:  1. Malignant neoplasm of sigmoid colon           Recommendations:  1. Referral to CRS given the lesion involved the submucosa.    No Follow-up on file.      Order summary:  No orders of the defined types were placed in this encounter.      Thank you so much for allowing me to participate in the care of Rosendo Nunez MD

## 2018-09-05 RX ORDER — OXYCODONE HYDROCHLORIDE 5 MG/1
5 TABLET ORAL EVERY 4 HOURS PRN
Status: CANCELLED | OUTPATIENT
Start: 2018-09-05 | End: 2018-09-06

## 2018-09-05 RX ORDER — HYDROMORPHONE HYDROCHLORIDE 1 MG/ML
1 INJECTION, SOLUTION INTRAMUSCULAR; INTRAVENOUS; SUBCUTANEOUS EVERY 4 HOURS PRN
Status: CANCELLED | OUTPATIENT
Start: 2018-09-05 | End: 2018-09-06

## 2018-09-05 RX ORDER — ALVIMOPAN 12 MG/1
12 CAPSULE ORAL 2 TIMES DAILY
Status: CANCELLED | OUTPATIENT
Start: 2018-09-05 | End: 2018-09-12

## 2018-09-05 RX ORDER — DEXTROSE MONOHYDRATE, SODIUM CHLORIDE, AND POTASSIUM CHLORIDE 50; 1.49; 4.5 G/1000ML; G/1000ML; G/1000ML
INJECTION, SOLUTION INTRAVENOUS CONTINUOUS
Status: CANCELLED | OUTPATIENT
Start: 2018-09-05

## 2018-09-05 RX ORDER — NALOXONE HCL 0.4 MG/ML
0.02 VIAL (ML) INJECTION
Status: CANCELLED | OUTPATIENT
Start: 2018-09-05

## 2018-09-05 RX ORDER — ACETAMINOPHEN 10 MG/ML
1000 INJECTION, SOLUTION INTRAVENOUS EVERY 8 HOURS
Status: CANCELLED | OUTPATIENT
Start: 2018-09-05 | End: 2018-09-06

## 2018-09-05 RX ORDER — ONDANSETRON 2 MG/ML
4 INJECTION INTRAMUSCULAR; INTRAVENOUS EVERY 6 HOURS PRN
Status: CANCELLED | OUTPATIENT
Start: 2018-09-05

## 2018-09-10 ENCOUNTER — ANESTHESIA EVENT (OUTPATIENT)
Dept: SURGERY | Facility: HOSPITAL | Age: 73
DRG: 336 | End: 2018-09-10
Payer: MEDICARE

## 2018-09-10 DIAGNOSIS — Z01.818 PREOP TESTING: Primary | ICD-10-CM

## 2018-09-10 NOTE — H&P (VIEW-ONLY)
"Subjective:       Patient ID: Rosendo Christina is a 73 y.o. male.    Chief Complaint: Colon Cancer    HPI  72 yo M with type 2 diabetes, hypercholesterolemia, and obesity who underwent his 1st screening colonoscopy on 07/09/2018 by Dr. Ruff at [facility] and was found to have sigmoid diverticulosis as well as a total of 8 polyps, all less than 5 mm in size, throughout the colon -  pathology from all but 1 of these revealed tubular adenoma with the remaining polyp being hyperplastic.  He also had what was described as a 3.5 cm pedunculated polyp in the sigmoid colon which was removed via snare cautery, pathology from this revealed invasive adenocarcinoma arising within a tubular adenoma with the carcinoma invading into the submucosa.  On review of the endoscopy report images it is hard to tell if the lesion was truly pedunculated or not, and the gross description from the pathology report does not describe a stalk.    CT A/P 07/25/2018:  Diffuse fatty change throughout the liver.  Vascular calcifications in the abdomen and pelvis.  Fatty density umbilical hernia.  Herniation of mesenteric fat into the left inguinal canal.    He underwent a repeat colonoscopy on 08/21/2018 to attempt to tattoo the site of the polypectomy from the cancerous polyp.  He was found to have multiple additional polyps ranging from 2-12 mm throughout the colon which were removed. I do not have a pathology report from this.  Scar tissue from the recent polypectomy was not seen.  The repeat colonoscopy report indicates that "it was unclear if a previously tattooed site was seen," though reviewing the prior colonoscopy report, it did not indicate that tattooing was performed. However during the 2nd colonoscopy it was noted that an area that looked like it was previously inked was re-tattoed at 40 cm from the anal verge.    He denies any abdominal complaints.  He denies any nausea or vomiting or change in bowel habits.  He denies any blood in his " stool.  He denies any significant weight loss, unexplained fatigue, or other constitutional symptoms.  Initially he did not wish to undergo colectomy.  He then saw another surgeon who recommended a total abdominal colectomy with ileorectal anastomosis.  He presents today for 2nd opinion.    No family hx of CRC or IBD.    Review of patient's allergies indicates:  No Known Allergies    Past Medical History:   Diagnosis Date    Diabetes mellitus type II 7/2010    diet controlled    Nephrolithiasis     Obesity     Psoriasis     Skin cancer     Squamous cell carcinoma        Past Surgical History:   Procedure Laterality Date    APPENDECTOMY      COLONOSCOPY N/A 7/9/2018    Procedure: COLONOSCOPY;  Surgeon: Kameron Ruff MD;  Location: [facility] ENDO;  Service: Endoscopy;  Laterality: N/A;  confirmed    COLONOSCOPY N/A 7/9/2018    Performed by Kameron Ruff MD at [facility] ENDO    SKIN CANCER EXCISION      SKIN GRAFT      VASECTOMY      VASECTOMY         Current Outpatient Medications   Medication Sig Dispense Refill    blood sugar diagnostic (CONTOUR TEST STRIPS) Strp 1 each by Misc.(Non-Drug; Combo Route) route 2 (two) times daily. 50 each 11    lancets Misc USE ONE LANCET TWO TIMES DAILY 100 each 11    metFORMIN (GLUCOPHAGE) 500 MG tablet Take 1.5 tablets (750 mg total) by mouth 2 (two) times daily with meals. 270 tablet 0    olmesartan-hydrochlorothiazide (BENICAR HCT) 40-25 mg per tablet Take 1 tablet by mouth once daily. 90 tablet 0    pravastatin (PRAVACHOL) 40 MG tablet Take 1 tablet (40 mg total) by mouth every evening. 90 tablet 3    metroNIDAZOLE (FLAGYL) 500 MG tablet Take 1 tablet (500 mg total) by mouth As instructed. Take 1 tablet at 1pm, 2pm, 11pm the day before surgery 3 tablet 0    neomycin (MYCIFRADIN) 500 mg Tab Take 2 tablets (1,000 mg total) by mouth As instructed. Take 2 tablets at 1pm, 2pm, 11pm the day before surgery 6 tablet 0     No current facility-administered medications for  this visit.        Family History   Problem Relation Age of Onset    Cancer Father         prostate    Diabetes Brother        Social History     Socioeconomic History    Marital status:      Spouse name: None    Number of children: None    Years of education: None    Highest education level: None   Social Needs    Financial resource strain: None    Food insecurity - worry: None    Food insecurity - inability: None    Transportation needs - medical: None    Transportation needs - non-medical: None   Occupational History     Employer: [employer]   Tobacco Use    Smoking status: Former Smoker    Smokeless tobacco: Never Used    Tobacco comment: cigars   Substance and Sexual Activity    Alcohol use: Yes     Comment: occassional    Drug use: No    Sexual activity: Yes     Partners: Female   Other Topics Concern    None   Social History Narrative    None       Review of Systems   Constitutional: Negative for chills and fever.   HENT: Negative for congestion and sinus pressure.    Eyes: Negative for visual disturbance.   Respiratory: Negative for cough and shortness of breath.    Cardiovascular: Negative for chest pain and palpitations.   Gastrointestinal: Negative for abdominal distention, abdominal pain, anal bleeding, blood in stool, constipation, diarrhea, nausea, rectal pain and vomiting.   Endocrine: Negative for cold intolerance and heat intolerance.   Genitourinary: Negative for dysuria and frequency.   Musculoskeletal: Negative for arthralgias and back pain.   Skin: Positive for rash ( Psoriasis).   Allergic/Immunologic: Negative for immunocompromised state.   Neurological: Negative for dizziness, light-headedness and headaches.   Psychiatric/Behavioral: Negative for confusion. The patient is not nervous/anxious.        Objective:      Physical Exam   Constitutional: He is oriented to person, place, and time. He appears well-developed and well-nourished.   HENT:    Head: Normocephalic and atraumatic.   Eyes: Conjunctivae are normal.   Pulmonary/Chest: Effort normal. No respiratory distress.   Abdominal: Soft. He exhibits no distension and no mass. There is no tenderness. There is no rebound and no guarding.   Neurological: He is alert and oriented to person, place, and time.   Skin: Skin is warm and dry. No erythema.         Lab Results   Component Value Date    WBC 6.63 09/04/2018    HGB 13.6 (L) 09/04/2018    HCT 41.6 09/04/2018    MCV 90 09/04/2018     09/04/2018     BMP  Lab Results   Component Value Date     09/04/2018    K 4.1 09/04/2018     09/04/2018    CO2 30 (H) 09/04/2018    BUN 15 09/04/2018    CREATININE 0.9 09/04/2018    CALCIUM 9.6 09/04/2018    ANIONGAP 9 09/04/2018    ESTGFRAFRICA >60.0 09/04/2018    EGFRNONAA >60.0 09/04/2018     CMP  Sodium   Date Value Ref Range Status   09/04/2018 142 136 - 145 mmol/L Final     Potassium   Date Value Ref Range Status   09/04/2018 4.1 3.5 - 5.1 mmol/L Final     Chloride   Date Value Ref Range Status   09/04/2018 103 95 - 110 mmol/L Final     CO2   Date Value Ref Range Status   09/04/2018 30 (H) 23 - 29 mmol/L Final     Glucose   Date Value Ref Range Status   09/04/2018 147 (H) 70 - 110 mg/dL Final     BUN, Bld   Date Value Ref Range Status   09/04/2018 15 8 - 23 mg/dL Final     Creatinine   Date Value Ref Range Status   09/04/2018 0.9 0.5 - 1.4 mg/dL Final     Calcium   Date Value Ref Range Status   09/04/2018 9.6 8.7 - 10.5 mg/dL Final     Total Protein   Date Value Ref Range Status   09/04/2018 6.8 6.0 - 8.4 g/dL Final     Albumin   Date Value Ref Range Status   09/04/2018 3.8 3.5 - 5.2 g/dL Final     Total Bilirubin   Date Value Ref Range Status   09/04/2018 0.3 0.1 - 1.0 mg/dL Final     Comment:     For infants and newborns, interpretation of results should be based  on gestational age, weight and in agreement with clinical  observations.  Premature Infant recommended reference ranges:  Up to 24  hours.............<8.0 mg/dL  Up to 48 hours............<12.0 mg/dL  3-5 days..................<15.0 mg/dL  6-29 days.................<15.0 mg/dL       Alkaline Phosphatase   Date Value Ref Range Status   09/04/2018 67 55 - 135 U/L Final     AST   Date Value Ref Range Status   09/04/2018 16 10 - 40 U/L Final     ALT   Date Value Ref Range Status   09/04/2018 20 10 - 44 U/L Final     Anion Gap   Date Value Ref Range Status   09/04/2018 9 8 - 16 mmol/L Final     eGFR if    Date Value Ref Range Status   09/04/2018 >60.0 >60 mL/min/1.73 m^2 Final     eGFR if non    Date Value Ref Range Status   09/04/2018 >60.0 >60 mL/min/1.73 m^2 Final     Comment:     Calculation used to obtain the estimated glomerular filtration  rate (eGFR) is the CKD-EPI equation.        Lab Results   Component Value Date    CEA 2.0 09/04/2018           Assessment:       1. Malignant neoplasm of sigmoid colon      Adenocarcinoma arising in the setting of a 3.5 cm polyp    Plan:   It is not clear that the polyp was truly pedunculated based on the endoscopic images and based on the gross description, and given that invasive cancer cells invade into the submucosa I would recommend resection.  The problem is that the polypectomy was performed almost 2 months ago and the only real measure of where the polyp may have been located was that it was somewhere in the sigmoid colon.  He did have a number of other polyps removed, but I do not think that this warrants a total colectomy.  He will likely need a left colectomy anastomosing the proximal descending colon to the rectum, followed by close surveillance of the remaining colon.    We will obtain pathology from his most recent colonoscopy and also discuss him at [facility], but I suspect he will need a colectomy, which has been tentatively scheduled for 09/25/2018.    I have discussed the procedure at length with Rosendo Christina.  We discussed the rationale, risks, benefits,  and alternatives in depth.  We discussed the expected outcomes and potential complications including but not limited to Need for temporary stoma, bleeding, infection, anastomotic leak, recurrence, prolonged pain, need for further procedures, altered continence, incisional hernia, post-operative sexual dysfunction, post-operative bladder dysfunction and injury to adjacent organs.  He verbalized his understanding of the procedure and wishes to proceed.  Written consent was obtained.      Chito Banks MD, FACS, FASCRS  Senior Staff Surgeon  Department of Colon & Rectal Surgery

## 2018-09-10 NOTE — PROGRESS NOTES
"Subjective:       Patient ID: Rosendo Christina is a 73 y.o. male.    Chief Complaint: Colon Cancer    HPI  72 yo M with type 2 diabetes, hypercholesterolemia, and obesity who underwent his 1st screening colonoscopy on 07/09/2018 by Dr. Ruff at [facility] and was found to have sigmoid diverticulosis as well as a total of 8 polyps, all less than 5 mm in size, throughout the colon -  pathology from all but 1 of these revealed tubular adenoma with the remaining polyp being hyperplastic.  He also had what was described as a 3.5 cm pedunculated polyp in the sigmoid colon which was removed via snare cautery, pathology from this revealed invasive adenocarcinoma arising within a tubular adenoma with the carcinoma invading into the submucosa.  On review of the endoscopy report images it is hard to tell if the lesion was truly pedunculated or not, and the gross description from the pathology report does not describe a stalk.    CT A/P 07/25/2018:  Diffuse fatty change throughout the liver.  Vascular calcifications in the abdomen and pelvis.  Fatty density umbilical hernia.  Herniation of mesenteric fat into the left inguinal canal.    He underwent a repeat colonoscopy on 08/21/2018 to attempt to tattoo the site of the polypectomy from the cancerous polyp.  He was found to have multiple additional polyps ranging from 2-12 mm throughout the colon which were removed. I do not have a pathology report from this.  Scar tissue from the recent polypectomy was not seen.  The repeat colonoscopy report indicates that "it was unclear if a previously tattooed site was seen," though reviewing the prior colonoscopy report, it did not indicate that tattooing was performed. However during the 2nd colonoscopy it was noted that an area that looked like it was previously inked was re-tattoed at 40 cm from the anal verge.    He denies any abdominal complaints.  He denies any nausea or vomiting or change in bowel habits.  He denies any blood in his " stool.  He denies any significant weight loss, unexplained fatigue, or other constitutional symptoms.  Initially he did not wish to undergo colectomy.  He then saw another surgeon who recommended a total abdominal colectomy with ileorectal anastomosis.  He presents today for 2nd opinion.    No family hx of CRC or IBD.    Review of patient's allergies indicates:  No Known Allergies    Past Medical History:   Diagnosis Date    Diabetes mellitus type II 7/2010    diet controlled    Nephrolithiasis     Obesity     Psoriasis     Skin cancer     Squamous cell carcinoma        Past Surgical History:   Procedure Laterality Date    APPENDECTOMY      COLONOSCOPY N/A 7/9/2018    Procedure: COLONOSCOPY;  Surgeon: Kameron Ruff MD;  Location: [facility] ENDO;  Service: Endoscopy;  Laterality: N/A;  confirmed    COLONOSCOPY N/A 7/9/2018    Performed by Kameron Ruff MD at [facility] ENDO    SKIN CANCER EXCISION      SKIN GRAFT      VASECTOMY      VASECTOMY         Current Outpatient Medications   Medication Sig Dispense Refill    blood sugar diagnostic (CONTOUR TEST STRIPS) Strp 1 each by Misc.(Non-Drug; Combo Route) route 2 (two) times daily. 50 each 11    lancets Misc USE ONE LANCET TWO TIMES DAILY 100 each 11    metFORMIN (GLUCOPHAGE) 500 MG tablet Take 1.5 tablets (750 mg total) by mouth 2 (two) times daily with meals. 270 tablet 0    olmesartan-hydrochlorothiazide (BENICAR HCT) 40-25 mg per tablet Take 1 tablet by mouth once daily. 90 tablet 0    pravastatin (PRAVACHOL) 40 MG tablet Take 1 tablet (40 mg total) by mouth every evening. 90 tablet 3    metroNIDAZOLE (FLAGYL) 500 MG tablet Take 1 tablet (500 mg total) by mouth As instructed. Take 1 tablet at 1pm, 2pm, 11pm the day before surgery 3 tablet 0    neomycin (MYCIFRADIN) 500 mg Tab Take 2 tablets (1,000 mg total) by mouth As instructed. Take 2 tablets at 1pm, 2pm, 11pm the day before surgery 6 tablet 0     No current facility-administered medications for  this visit.        Family History   Problem Relation Age of Onset    Cancer Father         prostate    Diabetes Brother        Social History     Socioeconomic History    Marital status:      Spouse name: None    Number of children: None    Years of education: None    Highest education level: None   Social Needs    Financial resource strain: None    Food insecurity - worry: None    Food insecurity - inability: None    Transportation needs - medical: None    Transportation needs - non-medical: None   Occupational History     Employer: [employer]   Tobacco Use    Smoking status: Former Smoker    Smokeless tobacco: Never Used    Tobacco comment: cigars   Substance and Sexual Activity    Alcohol use: Yes     Comment: occassional    Drug use: No    Sexual activity: Yes     Partners: Female   Other Topics Concern    None   Social History Narrative    None       Review of Systems   Constitutional: Negative for chills and fever.   HENT: Negative for congestion and sinus pressure.    Eyes: Negative for visual disturbance.   Respiratory: Negative for cough and shortness of breath.    Cardiovascular: Negative for chest pain and palpitations.   Gastrointestinal: Negative for abdominal distention, abdominal pain, anal bleeding, blood in stool, constipation, diarrhea, nausea, rectal pain and vomiting.   Endocrine: Negative for cold intolerance and heat intolerance.   Genitourinary: Negative for dysuria and frequency.   Musculoskeletal: Negative for arthralgias and back pain.   Skin: Positive for rash ( Psoriasis).   Allergic/Immunologic: Negative for immunocompromised state.   Neurological: Negative for dizziness, light-headedness and headaches.   Psychiatric/Behavioral: Negative for confusion. The patient is not nervous/anxious.        Objective:      Physical Exam   Constitutional: He is oriented to person, place, and time. He appears well-developed and well-nourished.   HENT:    Head: Normocephalic and atraumatic.   Eyes: Conjunctivae are normal.   Pulmonary/Chest: Effort normal. No respiratory distress.   Abdominal: Soft. He exhibits no distension and no mass. There is no tenderness. There is no rebound and no guarding.   Neurological: He is alert and oriented to person, place, and time.   Skin: Skin is warm and dry. No erythema.         Lab Results   Component Value Date    WBC 6.63 09/04/2018    HGB 13.6 (L) 09/04/2018    HCT 41.6 09/04/2018    MCV 90 09/04/2018     09/04/2018     BMP  Lab Results   Component Value Date     09/04/2018    K 4.1 09/04/2018     09/04/2018    CO2 30 (H) 09/04/2018    BUN 15 09/04/2018    CREATININE 0.9 09/04/2018    CALCIUM 9.6 09/04/2018    ANIONGAP 9 09/04/2018    ESTGFRAFRICA >60.0 09/04/2018    EGFRNONAA >60.0 09/04/2018     CMP  Sodium   Date Value Ref Range Status   09/04/2018 142 136 - 145 mmol/L Final     Potassium   Date Value Ref Range Status   09/04/2018 4.1 3.5 - 5.1 mmol/L Final     Chloride   Date Value Ref Range Status   09/04/2018 103 95 - 110 mmol/L Final     CO2   Date Value Ref Range Status   09/04/2018 30 (H) 23 - 29 mmol/L Final     Glucose   Date Value Ref Range Status   09/04/2018 147 (H) 70 - 110 mg/dL Final     BUN, Bld   Date Value Ref Range Status   09/04/2018 15 8 - 23 mg/dL Final     Creatinine   Date Value Ref Range Status   09/04/2018 0.9 0.5 - 1.4 mg/dL Final     Calcium   Date Value Ref Range Status   09/04/2018 9.6 8.7 - 10.5 mg/dL Final     Total Protein   Date Value Ref Range Status   09/04/2018 6.8 6.0 - 8.4 g/dL Final     Albumin   Date Value Ref Range Status   09/04/2018 3.8 3.5 - 5.2 g/dL Final     Total Bilirubin   Date Value Ref Range Status   09/04/2018 0.3 0.1 - 1.0 mg/dL Final     Comment:     For infants and newborns, interpretation of results should be based  on gestational age, weight and in agreement with clinical  observations.  Premature Infant recommended reference ranges:  Up to 24  hours.............<8.0 mg/dL  Up to 48 hours............<12.0 mg/dL  3-5 days..................<15.0 mg/dL  6-29 days.................<15.0 mg/dL       Alkaline Phosphatase   Date Value Ref Range Status   09/04/2018 67 55 - 135 U/L Final     AST   Date Value Ref Range Status   09/04/2018 16 10 - 40 U/L Final     ALT   Date Value Ref Range Status   09/04/2018 20 10 - 44 U/L Final     Anion Gap   Date Value Ref Range Status   09/04/2018 9 8 - 16 mmol/L Final     eGFR if    Date Value Ref Range Status   09/04/2018 >60.0 >60 mL/min/1.73 m^2 Final     eGFR if non    Date Value Ref Range Status   09/04/2018 >60.0 >60 mL/min/1.73 m^2 Final     Comment:     Calculation used to obtain the estimated glomerular filtration  rate (eGFR) is the CKD-EPI equation.        Lab Results   Component Value Date    CEA 2.0 09/04/2018           Assessment:       1. Malignant neoplasm of sigmoid colon      Adenocarcinoma arising in the setting of a 3.5 cm polyp    Plan:   It is not clear that the polyp was truly pedunculated based on the endoscopic images and based on the gross description, and given that invasive cancer cells invade into the submucosa I would recommend resection.  The problem is that the polypectomy was performed almost 2 months ago and the only real measure of where the polyp may have been located was that it was somewhere in the sigmoid colon.  He did have a number of other polyps removed, but I do not think that this warrants a total colectomy.  He will likely need a left colectomy anastomosing the proximal descending colon to the rectum, followed by close surveillance of the remaining colon.    We will obtain pathology from his most recent colonoscopy and also discuss him at [facility], but I suspect he will need a colectomy, which has been tentatively scheduled for 09/25/2018.    I have discussed the procedure at length with Rosendo Christina.  We discussed the rationale, risks, benefits,  and alternatives in depth.  We discussed the expected outcomes and potential complications including but not limited to Need for temporary stoma, bleeding, infection, anastomotic leak, recurrence, prolonged pain, need for further procedures, altered continence, incisional hernia, post-operative sexual dysfunction, post-operative bladder dysfunction and injury to adjacent organs.  He verbalized his understanding of the procedure and wishes to proceed.  Written consent was obtained.      Chito Banks MD, FACS, FASCRS  Senior Staff Surgeon  Department of Colon & Rectal Surgery

## 2018-09-10 NOTE — PRE ADMISSION SCREENING
"Anesthesia Assessment: Preoperative EQUATION    Planned Procedure: Procedure(s) (LRB):  COLECTOMY-LAPAROSCOPIC LEFT (N/A)  Requested Anesthesia Type:General  Surgeon: Chito Banks MD  Service: Colon and Rectal  Known or anticipated Date of Surgery:9/25/2018    Surgeon notes: reviewed and Malignant neoplasm of sigmoid colon    Previous anesthesia records:7/9/18-Colonoscopy-General-no apparent anesthetic complications and tolerated procedure well    Last PCP note: 3-6 months ago , within [facility] , focused visit   Subspecialty notes: Gastroenterology, Podiatry  Tests already available:  Results have been reviewed.Labs-9/4/18-CEA/CMP/CBC/ 9/4/18-EKG      Plan:    Testing:  T&S     Patient  has previously scheduled Medical Appointment:None    Navigation: Tests Scheduled. Lab-T&S on 9/18/18 @ 9:30a             Consults scheduled.POC on 9/18/18 @ 10a & [facility] to send request for "Clearance" from [facility] PCP-Dr. Bowen-PENDING             Results will be tracked by Preop Clinic.                 Kat Quiroz RN  9/10/18  "

## 2018-09-10 NOTE — ANESTHESIA PREPROCEDURE EVALUATION
"Anesthesia Assessment: Preoperative EQUATION    Planned Procedure: Procedure(s) (LRB):  COLECTOMY-LAPAROSCOPIC LEFT (N/A)  Requested Anesthesia Type:General  Surgeon: Chito Banks MD  Service: Colon and Rectal  Known or anticipated Date of Surgery:9/25/2018    Surgeon notes: reviewed and Malignant neoplasm of sigmoid colon    Previous anesthesia records:7/9/18-Colonoscopy-General-no apparent anesthetic complications and tolerated procedure well    Last PCP note: 3-6 months ago , within [facility] , focused visit   Subspecialty notes: Gastroenterology, Podiatry  Tests already available:  Results have been reviewed.Labs-9/4/18-CEA/CMP/CBC/ 9/4/18-EKG      Plan:    Testing:  T&S     Patient  has previously scheduled Medical Appointment:None    Navigation: Tests Scheduled. Lab-T&S on 9/18/18 @ 9:30a             Consults scheduled.POC on 9/18/18 @ 10a & Dajasia to send request for "Clearance" from [facility] PCP-Dr. Bowen-PENDING             Results will be tracked by Preop Clinic.                 Kat Quiroz RN  9/10/18     Addendum: Appt. On 9/13/18 @ 7a with Nicolas Owens for "Clearance".  Kat Quiroz RN 9/12/18                                [facility]  Anesthesia Pre-Operative Evaluation         Patient Name: Rosendo Thomas  YOB: 1945  MRN: [id number]    SUBJECTIVE:     Pre-operative evaluation for Procedure(s) (LRB):  COLECTOMY-LAPAROSCOPIC LEFT (N/A)     09/24/2018    Rosendo Thomas is a 73 y.o. male w/ a significant PMHx of type 2 diabetes, hypercholesterolemia, and obesity who underwent his 1st screening colonoscopy on 07/09/2018 by Dr. Ruff at [facility] and was found to have sigmoid diverticulosis as well as a total of 8 polyps, all less than 5 mm in size, throughout the colon -  pathology from all but 1 of these revealed tubular adenoma with the remaining polyp being hyperplastic.  He also had what was described as a 3.5 cm pedunculated polyp in the sigmoid colon " which was removed via snare cautery, pathology from this revealed invasive adenocarcinoma arising within a tubular adenoma with the carcinoma invading into the submucosa.  On review of the endoscopy report images it is hard to tell if the lesion was truly pedunculated or not, and the gross description from the pathology report does not describe a stalk.    Patient now presents for the above procedure(s).      LDA: None documented.    Prev airway: None documented.    Drips: None documented.    Patient Active Problem List   Diagnosis    Uncontrolled type 2 diabetes mellitus with stage 2 chronic kidney disease, without long-term current use of insulin    Psoriasis    Essential hypertension, benign    Screen for colon cancer    Malignant neoplasm of sigmoid colon       Review of patient's allergies indicates:  No Known Allergies    Current Outpatient Medications:  No current facility-administered medications for this encounter.     Current Outpatient Medications:     blood sugar diagnostic (CONTOUR TEST STRIPS) Strp, 1 each by Misc.(Non-Drug; Combo Route) route 2 (two) times daily., Disp: 50 each, Rfl: 11    lancets Misc, USE ONE LANCET TWO TIMES DAILY, Disp: 100 each, Rfl: 11    metFORMIN (GLUCOPHAGE) 500 MG tablet, Take 1.5 tablets (750 mg total) by mouth 2 (two) times daily with meals. (Patient taking differently: Take 500 mg by mouth daily with breakfast. ), Disp: 270 tablet, Rfl: 0    olmesartan-hydrochlorothiazide (BENICAR HCT) 40-25 mg per tablet, Take 1 tablet by mouth once daily. (Patient taking differently: Take 1 tablet by mouth every morning. ), Disp: 90 tablet, Rfl: 0    Past Surgical History:   Procedure Laterality Date    APPENDECTOMY      COLONOSCOPY N/A 7/9/2018    Procedure: COLONOSCOPY;  Surgeon: Kameron Ruff MD;  Location[facility];  Service: Endoscopy;  Laterality: N/A;  confirmed    COLONOSCOPY N/A 7/9/2018    Per[facility] ENDO    SKIN CANCER EXCISION       SKIN GRAFT      VASECTOMY      VASECTOMY         Social History     Socioeconomic History    Marital status:      Spouse name: Not on file    Number of children: Not on file    Years of education: Not on file    Highest education level: Not on file   Social Needs    Financial resource strain: Not on file    Food insecurity - worry: Not on file    Food insecurity - inability: Not on file    Transportation needs - medical: Not on file    Transportation needs - non-medical: Not on file   Occupational History     Employer: [employer]   Tobacco Use    Smoking status: Light Tobacco Smoker     Years: 15.00     Types: Cigars    Smokeless tobacco: Never Used    Tobacco comment: cigars-x1 yr.   Substance and Sexual Activity    Alcohol use: Yes     Comment: occassional    Drug use: No    Sexual activity: Yes     Partners: Female   Other Topics Concern    Not on file   Social History Narrative    Not on file       OBJECTIVE:     Vital Signs Range (Last 24H):         Significant Labs:  Lab Results   Component Value Date    WBC 6.14 09/21/2018    HGB 13.3 (L) 09/21/2018    HCT 40.1 09/21/2018     09/21/2018    CHOL 158 05/02/2018    TRIG 94 05/02/2018    HDL 33 (L) 05/02/2018    ALT 20 09/04/2018    AST 16 09/04/2018     09/04/2018    K 4.1 09/04/2018     09/04/2018    CREATININE 0.9 09/04/2018    BUN 15 09/04/2018    CO2 30 (H) 09/04/2018    TSH 2.46 08/14/2010    PSA 4.1 (H) 01/10/2018    INR 0.9 09/21/2018    HGBA1C 8.0 (H) 09/21/2018       Diagnostic Studies:   CARDIAC TREADMILL STRESS TEST 2/27/2018  There were no significant electrocardiographic changes throughout the protocol suggesting ischemia.   1. The EKG portion of this study is negative for ischemia at a moderate workload, and peak heart rate of 126 bpm (85% of predicted).   2. Blood pressure response to exercise was normal (Presenting BP: 143/71 Peak BP: 245/79).   3. No significant arrhythmias  were present.   4. There were no symptoms of chest discomfort or significant dyspnea throughout the protocol.   5. The Jacobsen treadmill score was 5 suggesting a low probability for future cardiovascular events.    EKG:   Vent. Rate : 052 BPM     Atrial Rate : 052 BPM     P-R Int : 226 ms          QRS Dur : 112 ms      QT Int : 412 ms       P-R-T Axes : 078 -05 061 degrees     QTc Int : 383 ms    Sinus bradycardia with 1st degree A-V block  Otherwise normal ECG  When compared with ECG of 27-FEB-2018 14:09,  Previous ECG has undetermined rhythm, needs review  Confirmed by HAFSA JONES MD ([id number]) on 9/4/2018 3:25:41 PM    2D ECHO:  No results found for this or any previous visit.      ASSESSMENT/PLAN:       Anesthesia Evaluation    I have reviewed the Patient Summary Reports.     I have reviewed the Medications.     Review of Systems  Anesthesia Hx:  No problems with previous Anesthesia  History of prior surgery of interest to airway management or planning: Previous anesthesia: General colonoscopy 7/2018 with general anesthesia.  Procedure performed at an [facility]. Denies Family Hx of Anesthesia complications.    Social:  Non-Smoker, Social Alcohol Use    Hematology/Oncology:  Hematology Normal      Current/Recent Cancer. (colon)   EENT/Dental:   Reading glasses   Cardiovascular:   Hypertension  Functional Capacity good / => 4 METS, walking in stores, climb 1 FOS; denies CP, SOB    Pulmonary:   Denies Asthma.  Denies Shortness of breath. Sleep Apnea (did not tolerate CPAP)    Renal/:   Chronic Renal Disease (CKD2)    Hepatic/GI:   GERD Colon cancer   Musculoskeletal:   Arthritis (new dx psoriatic arthritis-saw Dr Sargent; to begin Rx after surgery)     Neurological:   Denies CVA. Denies Seizures.  Pain , onset is acute , location of fingers , quality of aching/dull , severity is a 2    Endocrine:   Diabetes (A1C 8.0 9/21/18), type 2    Psych:   anxiety Cares for ill wife         Physical  Exam  General:  Well nourished Small beard    Airway/Jaw/Neck:  Airway Findings: Mouth Opening: Normal Tongue: Normal  General Airway Assessment: Adult  Jaw/Neck Findings:     Neck ROM: Normal ROM      Dental:  Dental Findings: In tact   Chest/Lungs:  Chest/Lungs Findings: Clear to auscultation, Normal Respiratory Rate     Heart/Vascular:  Heart Findings: Rate: Normal  Rhythm: Regular Rhythm  Sounds: Normal        Mental Status:  Mental Status Findings:  Cooperative, Alert and Oriented       Pt was seen in POC 9/21/18; clearance by BRANDEN Argueta -in Psychiatric/Linda Werner RN        Anesthesia Plan  Type of Anesthesia, risks & benefits discussed:  Anesthesia Type:  general  Patient's Preference:   Intra-op Monitoring Plan: standard ASA monitors  Intra-op Monitoring Plan Comments:   Post Op Pain Control Plan: multimodal analgesia, IV/PO Opioids PRN and per primary service following discharge from PACU  Post Op Pain Control Plan Comments:   Induction:   IV  Beta Blocker:  Patient is not currently on a Beta-Blocker (No further documentation required).       Informed Consent: Patient understands risks and agrees with Anesthesia plan.  Questions answered. Anesthesia consent signed with patient.  ASA Score: 3     Day of Surgery Review of History & Physical:    H&P update referred to the surgeon.         Ready For Surgery From Anesthesia Perspective.

## 2018-09-13 ENCOUNTER — HOSPITAL ENCOUNTER (OUTPATIENT)
Dept: RADIOLOGY | Facility: HOSPITAL | Age: 73
Discharge: HOME | End: 2018-09-13
Attending: PHYSICIAN ASSISTANT
Payer: MEDICARE

## 2018-09-13 ENCOUNTER — OFFICE VISIT (OUTPATIENT)
Dept: FAMILY MEDICINE | Facility: CLINIC | Age: 73
End: 2018-09-13
Payer: MEDICARE

## 2018-09-13 VITALS
WEIGHT: 269.63 LBS | BODY MASS INDEX: 33.52 KG/M2 | RESPIRATION RATE: 20 BRPM | HEIGHT: 75 IN | TEMPERATURE: 98 F | OXYGEN SATURATION: 96 % | HEART RATE: 50 BPM | DIASTOLIC BLOOD PRESSURE: 62 MMHG | SYSTOLIC BLOOD PRESSURE: 132 MMHG

## 2018-09-13 DIAGNOSIS — L40.50 PSORIATIC ARTHRITIS: Primary | ICD-10-CM

## 2018-09-13 DIAGNOSIS — C18.7 MALIGNANT NEOPLASM OF SIGMOID COLON: ICD-10-CM

## 2018-09-13 DIAGNOSIS — Z01.818 PRE-OP EXAM: ICD-10-CM

## 2018-09-13 DIAGNOSIS — Z01.818 PRE-OP EXAM: Primary | ICD-10-CM

## 2018-09-13 LAB
BILIRUB UR QL STRIP: NEGATIVE
CLARITY UR REFRACT.AUTO: CLEAR
COLOR UR AUTO: YELLOW
GLUCOSE UR QL STRIP: ABNORMAL
HGB UR QL STRIP: NEGATIVE
KETONES UR QL STRIP: NEGATIVE
LEUKOCYTE ESTERASE UR QL STRIP: NEGATIVE
NITRITE UR QL STRIP: NEGATIVE
PH UR STRIP: 5 [PH] (ref 5–8)
PROT UR QL STRIP: NEGATIVE
SP GR UR STRIP: 1.01 (ref 1–1.03)
URN SPEC COLLECT METH UR: ABNORMAL
UROBILINOGEN UR STRIP-ACNC: NEGATIVE EU/DL

## 2018-09-13 PROCEDURE — 99999 PR PBB SHADOW E&M-EST. PATIENT-LVL IV: CPT | Mod: PBBFAC,,, | Performed by: PHYSICIAN ASSISTANT

## 2018-09-13 PROCEDURE — 71046 X-RAY EXAM CHEST 2 VIEWS: CPT | Mod: 26,,, | Performed by: RADIOLOGY

## 2018-09-13 PROCEDURE — 71046 X-RAY EXAM CHEST 2 VIEWS: CPT | Mod: TC,FY,PO

## 2018-09-13 PROCEDURE — 81003 URINALYSIS AUTO W/O SCOPE: CPT

## 2018-09-13 PROCEDURE — 99214 OFFICE O/P EST MOD 30 MIN: CPT | Mod: PBBFAC,25,PO | Performed by: PHYSICIAN ASSISTANT

## 2018-09-13 PROCEDURE — 99213 OFFICE O/P EST LOW 20 MIN: CPT | Mod: S$PBB,,, | Performed by: PHYSICIAN ASSISTANT

## 2018-09-13 NOTE — PROGRESS NOTES
Subjective:       Patient ID: Rosendo Christina is a 73 y.o. male.    Chief Complaint: Pre-op Exam    HPI: 74 yo male with DMII, HTN presents for pre-op exam. Pt to have laparoscopic colectomy on 9/25 for sigmoid cancer. He denies cardiac or plumonary disease. Had stress test in Feb 2018 was negative. He denies history of bleeding problems and trouble with anesthesia. He denies fever, chest pain, SOB, cold symptoms, urinary symptoms.    Social: pt smokes 1 cigar per year    Review of Systems   Constitutional: Negative for chills and fever.   Respiratory: Negative for shortness of breath.    Cardiovascular: Negative for chest pain.   Gastrointestinal: Negative for diarrhea, nausea and vomiting.   Genitourinary: Negative for dysuria.   Neurological: Negative for headaches.   Hematological: Does not bruise/bleed easily.       Objective:      Physical Exam   Constitutional: He is oriented to person, place, and time. He appears well-developed.   obese   HENT:   Head: Normocephalic and atraumatic.   Mouth/Throat: No oropharyngeal exudate, posterior oropharyngeal edema or posterior oropharyngeal erythema.   Eyes: Pupils are equal, round, and reactive to light.   Neck: Normal range of motion.   Cardiovascular: Normal rate and regular rhythm.   Pulmonary/Chest: Effort normal and breath sounds normal.   Abdominal: Soft. Bowel sounds are normal. He exhibits no distension. There is no tenderness.   Neurological: He is alert and oriented to person, place, and time.   Skin: Skin is warm and dry. He is not diaphoretic.   Psychiatric: He has a normal mood and affect. His behavior is normal.   Vitals reviewed.      Assessment:       1. Pre-op exam    2. Malignant neoplasm of sigmoid colon    3. Uncontrolled type 2 diabetes mellitus with stage 2 chronic kidney disease, without long-term current use of insulin        Plan:         Rosendo was seen today for pre-op exam.    Diagnoses and all orders for this visit:    Pre-op exam  -      CBC auto differential; Future  -     Urinalysis  -     X-Ray Chest PA And Lateral; Future    Malignant neoplasm of sigmoid colon  -  Will assess labs next week, cxr, in order to clear    Uncontrolled type 2 diabetes mellitus with stage 2 chronic kidney disease, without long-term current use of insulin  -     Hemoglobin A1c; Future  -     Protime-INR; Future  -     APTT; Future

## 2018-09-14 ENCOUNTER — TELEPHONE (OUTPATIENT)
Dept: FAMILY MEDICINE | Facility: CLINIC | Age: 73
End: 2018-09-14

## 2018-09-14 NOTE — TELEPHONE ENCOUNTER
"----- Message from Domi Lackey MD sent at 9/14/2018  8:41 AM CDT -----  Regarding: RE: Surgery Clearance   Please scheduled pre-op visit next week, ok to override if necessary.  I would like to see him mon or wed  ----- Message -----  From: Maxine Mohamud LPN  Sent: 9/11/2018  10:17 AM  To: Domi Lackey MD  Subject: FW: Surgery Clearance                            Would you like to patient to schedule an office visit to address?  ----- Message -----  From: Mary Jane Lockett MA  Sent: 9/11/2018  10:02 AM  To: Kat Quiroz RN, Ziyad CHRISTIANSEN Staff  Subject: Surgery Clearance                                Patient is having a Laparoscopic Colectomy left with Dr Banks on 9/25/2018. Requesting a "clearance" from you, prior to surgery date. Patient was last seen by you on 5/30/2018. Await your reply. Thank You. Sincerely Mary Jane Lockett MA Pre-op/Anesthesia ext [phone]     "

## 2018-09-17 ENCOUNTER — TELEPHONE (OUTPATIENT)
Dept: FAMILY MEDICINE | Facility: CLINIC | Age: 73
End: 2018-09-17

## 2018-09-17 NOTE — TELEPHONE ENCOUNTER
Spoke with patients daughter, stated they are dealing with a very ill family member at this time. Patient scheduled tentatively. Daughter stated she will call back to R/S if need be

## 2018-09-17 NOTE — TELEPHONE ENCOUNTER
I saw pt for pre-op already last week, he does not need a new visit. He is getting labs tomorrow, I am waiting for the results before I can clear him

## 2018-09-17 NOTE — TELEPHONE ENCOUNTER
----- Message from Ashley Raines sent at 9/17/2018 12:00 PM CDT -----  Contact: Self  Pt is calling to speak with staff about pre op. Pt states he had pre op last week. Please call pt at [phone].

## 2018-09-17 NOTE — TELEPHONE ENCOUNTER
Spoke with the patient's daughter and he will call back to schedule his appointment after his surgery. Thanks.

## 2018-09-18 ENCOUNTER — HOSPITAL ENCOUNTER (OUTPATIENT)
Dept: PREADMISSION TESTING | Facility: HOSPITAL | Age: 73
Discharge: HOME | End: 2018-09-18

## 2018-09-18 ENCOUNTER — TELEPHONE (OUTPATIENT)
Dept: SURGERY | Facility: CLINIC | Age: 73
End: 2018-09-18

## 2018-09-19 ENCOUNTER — TELEPHONE (OUTPATIENT)
Dept: SURGERY | Facility: CLINIC | Age: 73
End: 2018-09-19

## 2018-09-19 NOTE — TELEPHONE ENCOUNTER
----- Message from Kayley Wesley sent at 9/19/2018  3:10 PM CDT -----  Contact: self [phone]  Pt called to reschedule his pre-op appt today. He had a death in the family yesterday 09/17    Contact: self [phone]

## 2018-09-20 ENCOUNTER — HOSPITAL ENCOUNTER (OUTPATIENT)
Dept: RADIOLOGY | Facility: HOSPITAL | Age: 73
Discharge: HOME | End: 2018-09-20
Attending: INTERNAL MEDICINE
Payer: MEDICARE

## 2018-09-20 ENCOUNTER — OFFICE VISIT (OUTPATIENT)
Dept: RHEUMATOLOGY | Facility: CLINIC | Age: 73
End: 2018-09-20
Payer: MEDICARE

## 2018-09-20 VITALS
HEART RATE: 59 BPM | BODY MASS INDEX: 33.32 KG/M2 | SYSTOLIC BLOOD PRESSURE: 87 MMHG | HEIGHT: 75 IN | DIASTOLIC BLOOD PRESSURE: 56 MMHG | WEIGHT: 268 LBS

## 2018-09-20 DIAGNOSIS — C18.7 MALIGNANT NEOPLASM OF SIGMOID COLON: ICD-10-CM

## 2018-09-20 DIAGNOSIS — L40.50 PSORIATIC ARTHROPATHY: ICD-10-CM

## 2018-09-20 DIAGNOSIS — Z11.4 ENCOUNTER FOR SCREENING FOR HIV: ICD-10-CM

## 2018-09-20 DIAGNOSIS — L40.50 PSORIATIC ARTHROPATHY: Primary | ICD-10-CM

## 2018-09-20 DIAGNOSIS — L40.9 PSORIASIS: ICD-10-CM

## 2018-09-20 DIAGNOSIS — L40.9 PSORIASIS OF NAIL: ICD-10-CM

## 2018-09-20 DIAGNOSIS — I10 ESSENTIAL HYPERTENSION, BENIGN: ICD-10-CM

## 2018-09-20 PROCEDURE — 73130 X-RAY EXAM OF HAND: CPT | Mod: 50,TC

## 2018-09-20 PROCEDURE — 73130 X-RAY EXAM OF HAND: CPT | Mod: 26,50,, | Performed by: RADIOLOGY

## 2018-09-20 PROCEDURE — 99214 OFFICE O/P EST MOD 30 MIN: CPT | Mod: PBBFAC,25 | Performed by: INTERNAL MEDICINE

## 2018-09-20 PROCEDURE — 99204 OFFICE O/P NEW MOD 45 MIN: CPT | Mod: S$PBB,GC,, | Performed by: INTERNAL MEDICINE

## 2018-09-20 PROCEDURE — 99999 PR PBB SHADOW E&M-EST. PATIENT-LVL IV: CPT | Mod: PBBFAC,GC,, | Performed by: INTERNAL MEDICINE

## 2018-09-20 PROCEDURE — 73630 X-RAY EXAM OF FOOT: CPT | Mod: 50,TC

## 2018-09-20 PROCEDURE — 73630 X-RAY EXAM OF FOOT: CPT | Mod: 26,50,, | Performed by: RADIOLOGY

## 2018-09-20 ASSESSMENT — ROUTINE ASSESSMENT OF PATIENT INDEX DATA (RAPID3)
TOTAL RAPID3 SCORE: 0
WHEN YOU AWAKENED IN THE MORNING OVER THE LAST WEEK, PLEASE INDICATE THE AMOUNT OF TIME IT TAKES UNTIL YOU ARE AS LIMBER AS YOU WILL BE FOR THE DAY: 1MIN
AM STIFFNESS SCORE: 1, YES
PATIENT GLOBAL ASSESSMENT SCORE: 0
PAIN SCORE: 0
MDHAQ FUNCTION SCORE: 0
FATIGUE SCORE: 0

## 2018-09-20 NOTE — PATIENT INSTRUCTIONS
Understanding Psoriatic Arthritis    Psoriatic arthritis is joint pain and swelling that occurs in some people who have psoriasis. Psoriasis is a skin disease that causes scaly skin patches. People who have psoriasis may develop psoriatic arthritis later.  How to say it  [id number] arthritis   What causes psoriatic arthritis?  The exact cause of psoriatic arthritis is not known, but it is linked to problems with the bodys infection-fighting system (immune system). Other factors include:  · Family history. People who have psoriatic arthritis often have relatives with either psoriasis or arthritis, or both.  · Certain infections. These include streptococcal infections and HIV.  · An injury to the skin or to a joint  What are the symptoms of psoriatic arthritis?  Symptoms may include:  · Pain, tenderness, and swelling  in any joint, including the spine  · Joint or back stiffness, especially in the morning  · Patches of rough skin that are usually red underneath and scaly and white or silver on top  · Fingernail problems such as pitted, or crumbly nails, or nails that are detached from the nail bed  · Pain and swelling where muscles attach to bones  · Swelling of fingers or toes  · Eye redness or inflammation  How is psoriatic arthritis treated?  Psoriatic arthritis does not go away. It is a long-term (chronic) condition that needs long-term treatment. Medicines are an important part of treatment. These medicines are often used:  · Prescription or over-the-counter pain medicines. These help reduce swelling and pain.  · Prescription medicines that limit the effect of the immune system. They may reduce or prevent joint damage. Methotrexate is a pill commonly used. Injectable biologic medicines and skin patches may also be used to treat psoriatic arthritis.  · Steroid injections into affected joints. This may help relieve symptoms.  · Topical medicines for rough skin patches. These may relieve discomfort and  dryness.  In addition to medicines, these treatments may be recommended:  · Regular exercise to improve flexibility and strength  · Physical therapy to help relieve pain and improve flexibility  · Heat packs to help relieve pain and swelling  · Shoe inserts to improve foot and ankle stability, and help with foot pain  What are the complications of psoriatic arthritis?  Possible complications include:  · Worsening joint damage  · Reduced ability to use affected joints  When should I call my healthcare provider?  Call your healthcare provider right away if any of these occur:  · You have a fever of 100.4°F (38°C) or higher, or as directed  · You have pain that gets worse  · You have symptoms that dont get better, or symptoms that get worse  · You develop new symptoms   Date Last Reviewed: 5/1/2016  © 2003-9281 [employer]. [address] [place] [address], [place] [address]. All rights reserved. This information is not intended as a substitute for professional medical care. Always follow your healthcare professional's instructions.        What Is Psoriasis?  Psoriasis is a chronic skin disease. Psoriasis can begin at any age. It is most common between ages 30 and 39 and also between ages 50 and 69. Psoriasis affects nearly equal numbers of men and women. In people with this disease, the skin grows too fast. Dead skin cells build up on the skins surface to form inflamed, thick, silvery scales called plaques. Sometimes people form many small lesions that can hurt or have pus in them. Psoriasis does not spread from person to person.  About your symptoms  Psoriasis plaques tend to form on the elbows, knees, scalp, navel, arms, legs, and the penis or vulva. They can be unsightly, painful, and itchy. Plaques on the joints can limit movement. On the fingernails or toenails, psoriasis can cause pitting, a change in nail color, and a change in nail shape. In some cases, psoriasis also causes symptoms that are like  arthritis. Symptoms may come and go on their own. Factors such as stress, infection, and certain medications may cause flare-ups. If symptoms bother you, know that many effective medical treatments exist  that can help relieve symptoms for most people with psoriasis. Discuss your treatment options with your health care provider.  External medical treatments  There are many types of external medical treatments that are used to treat the affected skin lesions. Your health care provider may prescribe one of many types of topical medications. These include strong topical steroids or steroid-sparing agents. You apply them to your skin on a regular basis. Coal tar (a thick black liquid) may be applied to thick plaques. In some cases, the skin may be exposed to a special light in the health care provider's office. Or, you can expose the psoriatic plaques to short periods (5 minutes) of natural sun as directed by your health care provider. This method is called phototherapy. It can be enhanced with the use of psoralen (a type of medication).  Internal medical treatments  Internal treatments are taken orally (by mouth) or given by injection. There are a number of oral medications for more severe psoriasis. Your health care provider can tell you more about these treatments.  Date Last Reviewed: 5/10/2015  © 4350-2524 [employer]. [address] [place] [address], [place] [address]. All rights reserved. This information is not intended as a substitute for professional medical care. Always follow your healthcare professional's instructions.        Managing Psoriasis     Take baths in warm water to help soften scales.   The success of your medical treatment depends on you. When your healthcare provider gives you a treatment plan, ask when you should expect to see results. Then, follow your plan. If your treatment does not work in the expected time, let your healthcare provider know. Psoriasis is a common disease, and it  can respond to many different treatments. It depends on the location, size, and symptoms each person experiences. Some treatments are simple (tar-based therapies or topical steroids). Other treatments are complex (new biologic medicines or light therapy). Your healthcare provider will need to personalize your treatment. Psoriasis will often get better with treatment. But it can get worse later if you stop treatment or if a new illness occurs. In most cases, you can get control of your psoriasis again. You will likely need to see your healthcare provider regularly about treatment options.  Psoriasis self-care  Follow these steps to help manage your symptoms:  · Take baths to help soften scales. Use warm water, not hot water. To avoid drying out your skin, limit each bath to about 15 minutes. Add bath oil or [place] salts.  · After you bathe, apply lotion right away, while your skin is damp. Dry skin can make symptoms worse.  · Use a scalp treatment as prescribed by your healthcare provider. There are different solutions and dosages based on your symptoms.   · Seek treatment right away for any illnesses or skin injuries because they can cause flare-ups.  · Manage your stress, and use relaxation techniques.  · Expose your psoriatic skin to sunlight for 5 minutes a day. But dont do this if you feel that sun exposure makes your psoriasis worse. Use sunscreen on the normal, unaffected skin, but avoid sunburns.  · Use over-the-counter hydrocortisone cream for itching to reduce scaling for active outbreaks. Ask your healthcare provider about long-term use.  · Stick with treatment that your healthcare provider has recommended for you, especially if it's controlling your psoriasis.  · Avoid abrasive cleansers, harsh detergents, and household chemicals.  Getting good results  Now that you know more about psoriasis, the next step is up to you. Follow your healthcare provider's treatment plan and self-care routine. Doing so  can help you control your symptoms. If your symptoms dont improve or they get worse, call your healthcare provider. Psoriasis cant be cured. But its symptoms can be managed.   Date Last Reviewed: 2/1/2017  © 5129-9240 [employer]. [address] [place] [address], [place] [address]. All rights reserved. This information is not intended as a substitute for professional medical care. Always follow your healthcare professional's instructions.      Plan to start Apremilast, information attached    Setup appointment with Dermatology

## 2018-09-20 NOTE — PROGRESS NOTES
Subjective:       Patient ID: Rosendo Thomas is a 73 y.o. male.    Chief Complaint: No chief complaint on file.    HPI 73YM with PMH t2DM, HTN, GERD referred by Bita OTERO for psoriatic arthritis. Pt has had psoriasis for at least 10 yrs however recently started having joint pains and swelling of hands which is getting worse. Pt c/o joint pains associated with AM stiffness for 3 hours mostly hands. + joint swelling. Difficulty making a fist, and activities of ADLs.. Has not noticed any erythema.      Pt reports being seen by Dr Mosquera ( Dermatologist) about 4 yrs ago who had placed him on Enbrel for about 1 yr with no relief of skin plaques. He reports increased infections while on the medication which he subsequently discontinued. He reports trial of multiple topical creams with minimal relief. Pt reports attempting being in a psoriasis study ~ 5 yr ago but had an elevated blood pressure and was unable to.     Recently diagnosed with sigmoid cancer which he is planned for lap colectomy on 09/25 and unsure if he is receiving radiation/chemotherapy until after the procedure.    Pt denies weight changes, fatigue, oral/nasal/genital ulcers, headaches, fever, chills, n/v, abd pain, CP, SOB,  changes in bowel/bladder habits, pleurisy, pericarditis,vision changes, thromoboses, photosensitivity, raynauds, alopecia, joint swelling or erythema, family history of autoimmune disease (SLE,RA, CTD) or IBD, enthesitis, uveitis.     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 No illicit drugs. EtOh or tobbacco use.   Father: prostate Cancer  Past Medical History:   Diagnosis Date    Diabetes mellitus type II 7/2010    diet controlled    Nephrolithiasis     Obesity     Psoriasis     Skin cancer     Squamous cell carcinoma      Past Surgical History:   Procedure Laterality Date    APPENDECTOMY      COLONOSCOPY N/A 7/9/2018    Procedure: COLONOSCOPY;  Surgeon: Kameron Ruff MD;  Location: [facility];  Service:  Endoscopy;  Laterality: N/A;  confirmed    COLONOSCOPY N/A 7/9/2018    Performed by Kameron Ruff MD at [facility] ENDO    SKIN CANCER EXCISION      SKIN GRAFT      VASECTOMY      VASECTOMY       Family History   Problem Relation Age of Onset    Cancer Father         prostate    Diabetes Brother     Anesthesia problems Neg Hx          Current Outpatient Medications on File Prior to Visit   Medication Sig Dispense Refill    blood sugar diagnostic (CONTOUR TEST STRIPS) Strp 1 each by Misc.(Non-Drug; Combo Route) route 2 (two) times daily. 50 each 11    lancets Misc USE ONE LANCET TWO TIMES DAILY 100 each 11    metFORMIN (GLUCOPHAGE) 500 MG tablet Take 1.5 tablets (750 mg total) by mouth 2 (two) times daily with meals. 270 tablet 0    olmesartan-hydrochlorothiazide (BENICAR HCT) 40-25 mg per tablet Take 1 tablet by mouth once daily. 90 tablet 0    [DISCONTINUED] metroNIDAZOLE (FLAGYL) 500 MG tablet Take 1 tablet (500 mg total) by mouth As instructed. Take 1 tablet at 1pm, 2pm, 11pm the day before surgery 3 tablet 0    [DISCONTINUED] neomycin (MYCIFRADIN) 500 mg Tab Take 2 tablets (1,000 mg total) by mouth As instructed. Take 2 tablets at 1pm, 2pm, 11pm the day before surgery 6 tablet 0     No current facility-administered medications on file prior to visit.      Review of patient's allergies indicates:  No Known Allergies      Review of Systems   Constitutional: Negative for chills and fever.   HENT: Negative for mouth sores and trouble swallowing.    Eyes: Negative for redness and visual disturbance.   Respiratory: Negative for chest tightness and shortness of breath.    Cardiovascular: Negative for chest pain, palpitations and leg swelling.   Gastrointestinal: Negative for abdominal pain, blood in stool, nausea and vomiting.   Genitourinary: Negative for flank pain, frequency, hematuria and urgency.   Musculoskeletal: Positive for arthralgias and joint swelling. Negative for back pain, gait problem and  "neck pain.   Skin: Positive for color change and rash.   Neurological: Negative for dizziness, weakness and numbness.   Psychiatric/Behavioral: Negative for dysphoric mood, sleep disturbance and suicidal ideas.         Objective:   BP (!) 87/56   Pulse (!) 59   Ht 6' 3" (1.905 m)   Wt 121.6 kg (268 lb)   BMI 33.50 kg/m²      Physical Exam   Constitutional: He is oriented to person, place, and time and well-developed, well-nourished, and in no distress.   HENT:   Head: Normocephalic and atraumatic.   Eyes: EOM are normal. Pupils are equal, round, and reactive to light.   Neck: Normal range of motion. Neck supple.   Cardiovascular: Normal rate and regular rhythm.    Pulmonary/Chest: Effort normal and breath sounds normal.   Abdominal: Soft. Bowel sounds are normal.       Right Side Rheumatological Exam     Examination finds the shoulder, elbow, wrist, knee, 1st PIP, 1st MCP, 2nd PIP, 2nd MCP, 3rd PIP, 3rd MCP, 4th PIP, 4th MCP, 5th PIP and 5th MCP normal.    The patient is tender to palpation of the 3rd DIP and 4th DIP    Shoulder Exam   Tenderness Location: no tenderness    Range of Motion   The patient has normal right shoulder ROM.    Knee Exam     Range of Motion   The patient has normal right knee ROM.  Patellofemoral Crepitus: positive    Hip Exam   Tenderness Location: no tenderness    Range of Motion   The patient has normal right hip ROM.    Elbow/Wrist Exam   Tenderness Location: no elbow tenderness and no wrist tenderness    Range of Motion   Elbow   The patient has normal right elbow ROM.    Range of Motion   Wrist   The patient has normal right wrist ROM.    Foot Exam     Range of Motion   The patient has normal right ankle ROM.    Ankle Warmth: negative  Ankle Swelling: negative    Foot Warmth: negative  Foot Swelling: negative    Muscle Strength (0-5 scale):  Neck Flexion:  5  Neck Extension: 5  Deltoid:  5  Biceps: 5/5   Triceps:  5  : 5/5   Iliopsoas: 5  Quadriceps:  5   Distal Lower " Extremity: 5    Left Side Rheumatological Exam     Examination finds the shoulder, elbow, wrist, knee, 1st PIP, 1st MCP, 2nd PIP, 2nd MCP, 3rd PIP, 3rd MCP, 4th PIP, 4th MCP, 5th PIP and 5th MCP normal.    The patient is tender to palpation of the 2nd DIP, 3rd DIP and 4th DIP.    Shoulder Exam   Tenderness Location: no tenderness    Range of Motion   The patient has normal left shoulder ROM.    Knee Exam     Range of Motion   The patient has normal left knee ROM.    Patellofemoral Crepitus: positive    Hip Exam   Tenderness Location: no tenderness    Range of Motion   The patient has normal left hip ROM.    Elbow/Wrist Exam   Tenderness Location: no wrist tenderness and no elbow tenderness    Range of Motion   Elbow   The patient has normal left elbow ROM.    Range of Motion   Wrist   The patient has normal left wrist ROM.    Foot Exam     Range of Motion   The patient has normal left ankle ROM.     Ankle Warmth: negative  Ankle Swelling: negative    Foot Warmth: negative  Foot Swelling: negative    Muscle Strength (0-5 scale):  Neck Flexion:  5  Neck Extension: 5  Deltoid:  5  Biceps: 5/5   Triceps:  5  :  5/5   Iliopsoas: 5  Quadriceps:  5   Distal Lower Extremity: 5      Neurological: He is alert and oriented to person, place, and time.   Skin: Skin is warm and dry. Rash noted.     Multiple psoriatic plaques with erythema, scaling on elbows, hands, lower legs, upper arms, back, gluteal cleft, scalp    Transverse ridging on nails  Nail discoloration, onycholysis   Psychiatric: Affect normal.   Musculoskeletal: Normal range of motion. He exhibits tenderness. He exhibits no edema or deformity.       Results for CASSY VALENTE RITU (MRN [id number]) as of 9/20/2018 13:15   Ref. Range 9/4/2018 13:24   WBC Latest Ref Range: 3.90 - 12.70 K/uL 6.63   RBC Latest Ref Range: 4.60 - 6.20 M/uL 4.65   Hemoglobin Latest Ref Range: 14.0 - 18.0 g/dL 13.6 (L)   Hematocrit Latest Ref Range: 40.0 - 54.0 % 41.6   MCV Latest Ref Range:  82 - 98 fL 90   MCH Latest Ref Range: 27.0 - 31.0 pg 29.2   MCHC Latest Ref Range: 32.0 - 36.0 g/dL 32.7   RDW Latest Ref Range: 11.5 - 14.5 % 13.3   Platelets Latest Ref Range: 150 - 350 K/uL 152   MPV Latest Ref Range: 9.2 - 12.9 fL 11.6   Gran% Latest Ref Range: 38.0 - 73.0 % 64.9   Gran # (ANC) Latest Ref Range: 1.8 - 7.7 K/uL 4.3   Immature Granulocytes Latest Ref Range: 0.0 - 0.5 % 0.2   Immature Grans (Abs) Latest Ref Range: 0.00 - 0.04 K/uL 0.01   Lymph% Latest Ref Range: 18.0 - 48.0 % 26.5   Lymph # Latest Ref Range: 1.0 - 4.8 K/uL 1.8   Mono% Latest Ref Range: 4.0 - 15.0 % 5.9   Mono # Latest Ref Range: 0.3 - 1.0 K/uL 0.4   Eosinophil% Latest Ref Range: 0.0 - 8.0 % 2.0   Eos # Latest Ref Range: 0.0 - 0.5 K/uL 0.1   Basophil% Latest Ref Range: 0.0 - 1.9 % 0.5   Baso # Latest Ref Range: 0.00 - 0.20 K/uL 0.03   nRBC Latest Ref Range: 0 /100 WBC 0    Results for CASSY VALENTE RITU (MRN [id number]) as of 9/20/2018 13:15   Ref. Range 9/4/2018 13:24   Sodium Latest Ref Range: 136 - 145 mmol/L 142   Potassium Latest Ref Range: 3.5 - 5.1 mmol/L 4.1   Chloride Latest Ref Range: 95 - 110 mmol/L 103   CO2 Latest Ref Range: 23 - 29 mmol/L 30 (H)   Anion Gap Latest Ref Range: 8 - 16 mmol/L 9   BUN, Bld Latest Ref Range: 8 - 23 mg/dL 15   Creatinine Latest Ref Range: 0.5 - 1.4 mg/dL 0.9   eGFR if non African American Latest Ref Range: >60 mL/min/1.73 m^2 >60.0   eGFR if African American Latest Ref Range: >60 mL/min/1.73 m^2 >60.0   Glucose Latest Ref Range: 70 - 110 mg/dL 147 (H)   Calcium Latest Ref Range: 8.7 - 10.5 mg/dL 9.6   Alkaline Phosphatase Latest Ref Range: 55 - 135 U/L 67   Total Protein Latest Ref Range: 6.0 - 8.4 g/dL 6.8   Albumin Latest Ref Range: 3.5 - 5.2 g/dL 3.8   Total Bilirubin Latest Ref Range: 0.1 - 1.0 mg/dL 0.3   AST Latest Ref Range: 10 - 40 U/L 16   ALT Latest Ref Range: 10 - 44 U/L 20                                                   Xray foot 05/2013  FINDINGS: Interval placement of casting  material which may obscure fine bony detail.  The fracture at the base of the medial malleolus is not well visualized.  No additional displaced fractures or dislocation identified.  Mild DJD at the metatarsals and first MTP and IP joints.   Enthesophyte at the Achilles insertion site and prominent plantar calcaneal spur.  No subcutaneous emphysema or radiodense foreign body.    Xray hands 10/2013  Fingers appear swollen.  No fracture or dislocation is seen.  Minimal degenerative change seen.            Assessment:       1. Psoriatic arthropathy    2. Psoriasis    3. Psoriasis of nail    4. Encounter for screening for HIV     5. Malignant neoplasm of sigmoid colon    6. Essential hypertension, benign            Plan:         Diagnoses and all orders for this visit:    Psoriatic arthropathy  Pt meets SCOTT criteria for Psoriatic arthritis ( evidence of current psoriasis, psoriatic nail dystrophy). Physical exam with active psoriatic lesions and tenderness to DIP (TJC 5). Finger appear dactylitic with nail changes 2/2 psoriasis. Previous Xray shows enthesophyte at the Achilles insertion site and prominent plantar calcaneal spur. No evidence of erosions on previous xrays.  Will obtain Xray hands and feet as well as labs  Pt with active malignancy limits treatment options such as TNFs and Cosentyx (has no data in patients with malignancy).However we could use Apremilast which is safer and does not need frequent lab monitoring.  Discussed side effects ( weight loss and depression) this during clinic visit, pt voiced understanding. Hand out given on Apremilast.  Will consider starting after sigmoid cancer has been addressed.   Refer to Derm for further evaluation and input.   -     X-Ray Hand Complete Bilateral; Future  -     X-Ray Foot Complete Bilateral; Future  -     HIV-1 and HIV-2 antibodies; Future  -     Hepatitis B surface antigen; Future  -     HBcAB; Future  -     Hepatitis B surface antibody; Future  -      Hepatitis C antibody; Future  -     Hepatitis A antibody, IgG; Future  -     Strongyloides IgG Antibodies; Future  -     Quantiferon Gold TB; Future  -     RPR; Future  -     Sedimentation rate; Future  -     C-reactive protein; Future  -     Cancel: CBC auto differential; Future  -     Cancel: Comprehensive metabolic panel; Future    Psoriasis  -     Ambulatory Referral to Dermatology    Psoriasis of nail    Encounter for screening for HIV   -     HIV-1 and HIV-2 antibodies; Future    Malignant neoplasm of sigmoid colon    Essential hypertension, benign

## 2018-09-20 NOTE — PROGRESS NOTES
RHEUMATOLOGY ATTENDING:  Patient presented, personally interviewed and examined, medical records reviewed.  73 yr old man with multiple morbidities (including DM II) send by his PCP for psoriatic arthritis.  He has had psoriasis x 10 yrs and recently began having bilateral hand pain w some swelling of DIPs & entire digits. He denies LBP or enthesitis. He denies FHx of SpA, IBD, uveitis.   He had been on Enbrel x 1 yr in the past for psoriasis but it was ineffective and he developed infections.  He has used a number of topicals but they were not effective either. He feels salt water helps his rashes to some degree.  Currently he has sigmoid cancer & is scheduled for surgery.   Exam is remarkable for generalized plaque psoriasis which include palms with finger & toe nail changes and dactylitis of several fingers.  He has erythematous enlarged minimally tender DIP joints.   Rx: Psoriatic arthritis  Plan: we will w/u with imaging & additional labs.  Suggest f/u with Dermatology as well.  Discussed rx with Apremilast. Info provided to patient.  We will begin after his surgery.   Findings discussed with patient and Dr. Hirsch whose note and assessment I agree with.

## 2018-09-21 ENCOUNTER — HOSPITAL ENCOUNTER (OUTPATIENT)
Dept: PREADMISSION TESTING | Facility: HOSPITAL | Age: 73
Discharge: HOME | End: 2018-09-21
Attending: ANESTHESIOLOGY
Payer: MEDICARE

## 2018-09-21 ENCOUNTER — TELEPHONE (OUTPATIENT)
Dept: FAMILY MEDICINE | Facility: CLINIC | Age: 73
End: 2018-09-21

## 2018-09-21 VITALS
RESPIRATION RATE: 16 BRPM | BODY MASS INDEX: 33.07 KG/M2 | TEMPERATURE: 98 F | SYSTOLIC BLOOD PRESSURE: 116 MMHG | WEIGHT: 266 LBS | DIASTOLIC BLOOD PRESSURE: 65 MMHG | OXYGEN SATURATION: 96 % | HEIGHT: 75 IN | HEART RATE: 62 BPM

## 2018-09-21 NOTE — DISCHARGE INSTRUCTIONS
Your surgery has been scheduled for:__________________________________________    You should report to:  ____[facility], located on the [place] side of the first floor of the           [facility] ([phone])  ____[facility], located on the [facility] side of the            Second floor of the [facility] ([phone])  ____3rd Floor [facility] located on the [facility] side of the [facility] [phone]  Please Note   - Tell your doctor if you take Aspirin, products containing Aspirin, herbal medications  or blood thinners, such as Coumadin, Ticlid, or Plavix.  (Consult your provider regarding holding or stopping before surgery).  - Arrange for someone to drive you home following surgery.  You will not be allowed to leave the surgical facility alone or drive yourself home following sedation and anesthesia.  Before Surgery  - Stop taking all herbal medications 14days prior to surgery  - No Motrin/Advil (Ibuprofen) 7 days before surgery  - No Aleve (Naproxen) 7 days before surgery  - Stop Taking Asprin, products containing Asprin _____days before surgery  - Stop taking blood thinners_______days before surgery  - No Goody's/BC  Powder 7 days before surgery  - Refrain from drinking alcoholic beverages for 24hours before and after surgery  - Stop or limit smoking _________days before surgery  - You may take Tylenol for pain    Night before Surgery  NOTHING TO EAT OR DRINK AFTER MIDNIGHT OR FOLLOW SURGEON'S INSTRUCTIONS  - Take a shower or bath (shower is recommended).  Bathe with Hibiclens soap or an antibacterial soap from the neck down.  If not supplied by your surgeon, hibiclens soap will need to be purchased over the counter in pharmacy.  Rinse soap off thoroughly.  - Shampoo your hair with your regular shampoo  The Day of Surgery  ·  If you are told to take medication on the morning of surgery, it may be taken with  a sip of water.   - Take another bath or shower with hibiclens or any antibacterial soap, to reduce the chance of infection.  - Take heart and blood pressure medications with a small sip of water, as advised by the perioperative team.  - Do not take fluid pills  - You may brush your teeth and rinse your mouth, but do not swall any additional water.   - Do not apply perfumes, powder, body lotions or deodorant on the day of surgery.  - Nail polish should be removed.  - Do not wear makeup or moisturizer  - Wear comfortable clothes, such as a button front shirt and loose fitting pants.  - Leave all jewelry, including body piercings, and valuables at home.    - Bring any devices you will neeed after surgery such as crutches or canes.  - If you have sleep apnea, please bring your CPAP machine  In the event that your physical condition changes including the onset of a cold or respiratory illness, or if you have to delay or cancel your surgery, please notify your surgeon.      Anesthesia: General Anesthesia  Youre due to have surgery. During surgery, youll be given medication called anesthesia. (It is also called anesthetic.) This will keep you comfortable and pain-free. Your anesthesia provider will use general anesthesia. This sheet tells you more about it.  What is general anesthesia?     You are watched continuously during your procedure by the anesthesia provider   General anesthesia puts you into a state like deep sleep. It goes into the bloodstream (IV anesthetics), into the lungs (gas anesthetics), or both. You feel nothing during the procedure. You will not remember it. During the procedure, the anesthesia provider monitors you continuously. He or she checks your heart rate and rhythm, blood pressure, breathing, and blood oxygen.  · IV Anesthetics. IV anesthetics are given through an IV line in your arm. Theyre often given first. This is so you are asleep before a gas anesthetic is started. Some kinds of IV  anesthetics relieve pain. Others relax you. Your doctor will decide which kind is best in your case.  · Gas Anesthetics. Gas anesthetics are breathed into the lungs. They are often used to keep you asleep. They can be given through a facemask or a tube placed in your larynx or trachea (breathing tube).  ? If you have a facemask, your anesthesia provider will most likely place it over your nose and mouth while youre still awake. Youll breathe oxygen through the mask as your IV anesthetic is started. Gas anesthetic may be added through the mask.  ? If you have a tube in the larynx or trachea, it will be inserted into your throat after youre asleep.  Anesthesia tools and medications  You will likely have:  · IV anesthetics. These are put into an IV line into your bloodstream.  · Gas anesthetics. You breathe these anesthetics into your lungs, where they pass into your bloodstream.  · Pulse oximeter. This is a small clip that is attached to the end of your finger. This measures your blood oxygen level.  · Electrocardiography leads (electrodes). These are small sticky pads that are placed on your chest. They record your heart rate and rhythm.  · Blood pressure cuff. This reads your blood pressure.  Risks and possible complications  General anesthesia has some risks. These include:  · Breathing problems  · Nausea and vomiting  · Sore throat or hoarseness (usually temporary)  · Allergic reaction to the anesthetic  · Irregular heartbeat (rare)  · Cardiac arrest (rare)   Anesthesia safety  · Follow all instructions you are given for how long not to eat or drink before your procedure.  · Be sure your doctor knows what medications and drugs you take. This includes over-the-counter medications, herbs, supplements, alcohol or other drugs. You will be asked when those were last taken.  · Have an adult family member or friend drive you home after the procedure.  · For the first 24 hours after your surgery:  ? Do not drive or use  heavy equipment.  ? Have a trusted family member or spouse make important decisions or sign documents.  ? Avoid alcohol.  ? Have a responsible adult stay with you. He or she can watch for problems and help keep you safe.  Date Last Reviewed: 10/16/2014  © 7501-7608 [employer] [address] [place] [address]. All rights reserved. This information is not intended as a substitute for professional medical care. Always follow your healthcare professional's instructions.

## 2018-09-25 ENCOUNTER — ANESTHESIA (OUTPATIENT)
Dept: SURGERY | Facility: HOSPITAL | Age: 73
DRG: 336 | End: 2018-09-25
Payer: MEDICARE

## 2018-09-25 ENCOUNTER — HOSPITAL ENCOUNTER (INPATIENT)
Facility: HOSPITAL | Age: 73
LOS: 13 days | Discharge: HOME | DRG: 336 | End: 2018-10-08
Attending: COLON & RECTAL SURGERY | Admitting: COLON & RECTAL SURGERY
Payer: MEDICARE

## 2018-09-25 DIAGNOSIS — E68 SEQUELAE OF HYPERALIMENTATION: ICD-10-CM

## 2018-09-25 DIAGNOSIS — Z00.6 RESEARCH STUDY PATIENT: ICD-10-CM

## 2018-09-25 DIAGNOSIS — R00.1 BRADYCARDIA: ICD-10-CM

## 2018-09-25 DIAGNOSIS — R94.31 ABNORMAL EKG: ICD-10-CM

## 2018-09-25 DIAGNOSIS — C18.9 COLON CANCER: Primary | ICD-10-CM

## 2018-09-25 DIAGNOSIS — C18.9 MALIGNANT NEOPLASM OF COLON, UNSPECIFIED PART OF COLON: ICD-10-CM

## 2018-09-25 DIAGNOSIS — Z78.9 ON TOTAL PARENTERAL NUTRITION (TPN): ICD-10-CM

## 2018-09-25 LAB
DRUG STUDY SPECIMEN TYPE: NORMAL
POCT GLUCOSE: 150 MG/DL (ref 70–110)
POCT GLUCOSE: 242 MG/DL (ref 70–110)
POCT GLUCOSE: 250 MG/DL (ref 70–110)

## 2018-09-25 PROCEDURE — S0030 INJECTION, METRONIDAZOLE: HCPCS | Performed by: NURSE PRACTITIONER

## 2018-09-25 PROCEDURE — D9220A PRA ANESTHESIA: Mod: ANES,,, | Performed by: ANESTHESIOLOGY

## 2018-09-25 PROCEDURE — 36000711: Performed by: COLON & RECTAL SURGERY

## 2018-09-25 PROCEDURE — 63600175 PHARM REV CODE 636 W HCPCS: Performed by: COLON & RECTAL SURGERY

## 2018-09-25 PROCEDURE — C9290 INJ, BUPIVACAINE LIPOSOME: HCPCS | Performed by: COLON & RECTAL SURGERY

## 2018-09-25 PROCEDURE — 82962 GLUCOSE BLOOD TEST: CPT | Performed by: COLON & RECTAL SURGERY

## 2018-09-25 PROCEDURE — 63600175 PHARM REV CODE 636 W HCPCS: Performed by: ANESTHESIOLOGY

## 2018-09-25 PROCEDURE — 37000008 HC ANESTHESIA 1ST 15 MINUTES: Performed by: COLON & RECTAL SURGERY

## 2018-09-25 PROCEDURE — 0DBN0ZX EXCISION OF SIGMOID COLON, OPEN APPROACH, DIAGNOSTIC: ICD-10-PCS | Performed by: COLON & RECTAL SURGERY

## 2018-09-25 PROCEDURE — 25000003 PHARM REV CODE 250: Performed by: NURSE PRACTITIONER

## 2018-09-25 PROCEDURE — 44213 LAP MOBIL SPLENIC FL ADD-ON: CPT | Mod: GC,,, | Performed by: COLON & RECTAL SURGERY

## 2018-09-25 PROCEDURE — 63600175 PHARM REV CODE 636 W HCPCS

## 2018-09-25 PROCEDURE — 63600175 PHARM REV CODE 636 W HCPCS: Performed by: NURSE PRACTITIONER

## 2018-09-25 PROCEDURE — 0DBU0ZX EXCISION OF OMENTUM, OPEN APPROACH, DIAGNOSTIC: ICD-10-PCS | Performed by: COLON & RECTAL SURGERY

## 2018-09-25 PROCEDURE — 27201423 OPTIME MED/SURG SUP & DEVICES STERILE SUPPLY: Performed by: COLON & RECTAL SURGERY

## 2018-09-25 PROCEDURE — 88305 TISSUE EXAM BY PATHOLOGIST: CPT | Mod: 26,,, | Performed by: PATHOLOGY

## 2018-09-25 PROCEDURE — 37000009 HC ANESTHESIA EA ADD 15 MINS: Performed by: COLON & RECTAL SURGERY

## 2018-09-25 PROCEDURE — 71000039 HC RECOVERY, EACH ADD'L HOUR: Performed by: COLON & RECTAL SURGERY

## 2018-09-25 PROCEDURE — 36000710: Performed by: COLON & RECTAL SURGERY

## 2018-09-25 PROCEDURE — 63600175 PHARM REV CODE 636 W HCPCS: Performed by: STUDENT IN AN ORGANIZED HEALTH CARE EDUCATION/TRAINING PROGRAM

## 2018-09-25 PROCEDURE — S0020 INJECTION, BUPIVICAINE HYDRO: HCPCS | Performed by: COLON & RECTAL SURGERY

## 2018-09-25 PROCEDURE — 88307 TISSUE EXAM BY PATHOLOGIST: CPT | Mod: 26,,, | Performed by: PATHOLOGY

## 2018-09-25 PROCEDURE — 88302 TISSUE EXAM BY PATHOLOGIST: CPT | Mod: 26,,, | Performed by: PATHOLOGY

## 2018-09-25 PROCEDURE — D9220A PRA ANESTHESIA: Mod: CRNA,,, | Performed by: NURSE ANESTHETIST, CERTIFIED REGISTERED

## 2018-09-25 PROCEDURE — 0DNU0ZZ RELEASE OMENTUM, OPEN APPROACH: ICD-10-PCS | Performed by: COLON & RECTAL SURGERY

## 2018-09-25 PROCEDURE — 25000003 PHARM REV CODE 250: Performed by: COLON & RECTAL SURGERY

## 2018-09-25 PROCEDURE — 25000003 PHARM REV CODE 250: Performed by: STUDENT IN AN ORGANIZED HEALTH CARE EDUCATION/TRAINING PROGRAM

## 2018-09-25 PROCEDURE — 94799 UNLISTED PULMONARY SVC/PX: CPT

## 2018-09-25 PROCEDURE — 20600001 HC STEP DOWN PRIVATE ROOM

## 2018-09-25 PROCEDURE — 63600175 PHARM REV CODE 636 W HCPCS: Performed by: NURSE ANESTHETIST, CERTIFIED REGISTERED

## 2018-09-25 PROCEDURE — 71000033 HC RECOVERY, INTIAL HOUR: Performed by: COLON & RECTAL SURGERY

## 2018-09-25 PROCEDURE — 44204 LAPARO PARTIAL COLECTOMY: CPT | Mod: GC,,, | Performed by: COLON & RECTAL SURGERY

## 2018-09-25 PROCEDURE — 25000003 PHARM REV CODE 250: Performed by: NURSE ANESTHETIST, CERTIFIED REGISTERED

## 2018-09-25 PROCEDURE — 88302 TISSUE EXAM BY PATHOLOGIST: CPT | Performed by: PATHOLOGY

## 2018-09-25 PROCEDURE — 0DBM0ZX EXCISION OF DESCENDING COLON, OPEN APPROACH, DIAGNOSTIC: ICD-10-PCS | Performed by: COLON & RECTAL SURGERY

## 2018-09-25 RX ORDER — MUPIROCIN 20 MG/G
1 OINTMENT TOPICAL
Status: COMPLETED | OUTPATIENT
Start: 2018-09-25 | End: 2018-09-25

## 2018-09-25 RX ORDER — HYDROMORPHONE HYDROCHLORIDE 1 MG/ML
0.2 INJECTION, SOLUTION INTRAMUSCULAR; INTRAVENOUS; SUBCUTANEOUS EVERY 5 MIN PRN
Status: DISCONTINUED | OUTPATIENT
Start: 2018-09-25 | End: 2018-09-25 | Stop reason: HOSPADM

## 2018-09-25 RX ORDER — IBUPROFEN 200 MG
16 TABLET ORAL
Status: DISCONTINUED | OUTPATIENT
Start: 2018-09-25 | End: 2018-10-03

## 2018-09-25 RX ORDER — INSULIN ASPART 100 [IU]/ML
1-10 INJECTION, SOLUTION INTRAVENOUS; SUBCUTANEOUS
Status: DISCONTINUED | OUTPATIENT
Start: 2018-09-25 | End: 2018-09-25

## 2018-09-25 RX ORDER — FENTANYL CITRATE 50 UG/ML
INJECTION, SOLUTION INTRAMUSCULAR; INTRAVENOUS
Status: DISCONTINUED | OUTPATIENT
Start: 2018-09-25 | End: 2018-09-25

## 2018-09-25 RX ORDER — GLYCOPYRROLATE 0.2 MG/ML
INJECTION INTRAMUSCULAR; INTRAVENOUS
Status: DISCONTINUED | OUTPATIENT
Start: 2018-09-25 | End: 2018-09-25

## 2018-09-25 RX ORDER — HYDROMORPHONE HYDROCHLORIDE 1 MG/ML
0.5 INJECTION, SOLUTION INTRAMUSCULAR; INTRAVENOUS; SUBCUTANEOUS EVERY 6 HOURS PRN
Status: DISCONTINUED | OUTPATIENT
Start: 2018-09-25 | End: 2018-10-07

## 2018-09-25 RX ORDER — HEPARIN SODIUM 5000 [USP'U]/ML
5000 INJECTION, SOLUTION INTRAVENOUS; SUBCUTANEOUS EVERY 8 HOURS
Status: COMPLETED | OUTPATIENT
Start: 2018-09-25 | End: 2018-09-25

## 2018-09-25 RX ORDER — GABAPENTIN 300 MG/1
300 CAPSULE ORAL
Status: DISPENSED | OUTPATIENT
Start: 2018-09-25

## 2018-09-25 RX ORDER — HYDROMORPHONE HYDROCHLORIDE 1 MG/ML
1 INJECTION, SOLUTION INTRAMUSCULAR; INTRAVENOUS; SUBCUTANEOUS EVERY 6 HOURS PRN
Status: DISCONTINUED | OUTPATIENT
Start: 2018-09-25 | End: 2018-09-26

## 2018-09-25 RX ORDER — GLUCAGON 1 MG
1 KIT INJECTION
Status: DISCONTINUED | OUTPATIENT
Start: 2018-09-25 | End: 2018-10-03

## 2018-09-25 RX ORDER — SODIUM CHLORIDE 9 MG/ML
INJECTION, SOLUTION INTRAVENOUS
Status: COMPLETED | OUTPATIENT
Start: 2018-09-25 | End: 2018-09-25

## 2018-09-25 RX ORDER — EPHEDRINE SULFATE 50 MG/ML
INJECTION, SOLUTION INTRAVENOUS
Status: DISCONTINUED | OUTPATIENT
Start: 2018-09-25 | End: 2018-09-25

## 2018-09-25 RX ORDER — BUPIVACAINE HYDROCHLORIDE 5 MG/ML
INJECTION, SOLUTION EPIDURAL; INTRACAUDAL
Status: DISCONTINUED | OUTPATIENT
Start: 2018-09-25 | End: 2018-09-25 | Stop reason: HOSPADM

## 2018-09-25 RX ORDER — INSULIN ASPART 100 [IU]/ML
INJECTION, SOLUTION INTRAVENOUS; SUBCUTANEOUS
Status: COMPLETED
Start: 2018-09-25 | End: 2018-09-25

## 2018-09-25 RX ORDER — GABAPENTIN 300 MG/1
300 CAPSULE ORAL 3 TIMES DAILY
Status: DISCONTINUED | OUTPATIENT
Start: 2018-09-25 | End: 2018-09-26

## 2018-09-25 RX ORDER — LIDOCAINE HYDROCHLORIDE 10 MG/ML
1 INJECTION, SOLUTION EPIDURAL; INFILTRATION; INTRACAUDAL; PERINEURAL
Status: COMPLETED | OUTPATIENT
Start: 2018-09-25 | End: 2018-09-25

## 2018-09-25 RX ORDER — METRONIDAZOLE 500 MG/100ML
500 INJECTION, SOLUTION INTRAVENOUS
Status: COMPLETED | OUTPATIENT
Start: 2018-09-25 | End: 2018-09-25

## 2018-09-25 RX ORDER — IBUPROFEN 200 MG
24 TABLET ORAL
Status: DISCONTINUED | OUTPATIENT
Start: 2018-09-25 | End: 2018-09-25 | Stop reason: SDUPTHER

## 2018-09-25 RX ORDER — ENOXAPARIN SODIUM 100 MG/ML
40 INJECTION SUBCUTANEOUS
Status: DISPENSED | OUTPATIENT
Start: 2018-09-25

## 2018-09-25 RX ORDER — LIDOCAINE HCL/PF 100 MG/5ML
SYRINGE (ML) INTRAVENOUS
Status: DISCONTINUED | OUTPATIENT
Start: 2018-09-25 | End: 2018-09-25

## 2018-09-25 RX ORDER — KETAMINE HYDROCHLORIDE 10 MG/ML
INJECTION, SOLUTION INTRAMUSCULAR; INTRAVENOUS
Status: DISCONTINUED | OUTPATIENT
Start: 2018-09-25 | End: 2018-09-25

## 2018-09-25 RX ORDER — PROPOFOL 10 MG/ML
VIAL (ML) INTRAVENOUS
Status: DISCONTINUED | OUTPATIENT
Start: 2018-09-25 | End: 2018-09-25

## 2018-09-25 RX ORDER — MIDAZOLAM HYDROCHLORIDE 1 MG/ML
INJECTION, SOLUTION INTRAMUSCULAR; INTRAVENOUS
Status: DISCONTINUED | OUTPATIENT
Start: 2018-09-25 | End: 2018-09-25

## 2018-09-25 RX ORDER — SODIUM CHLORIDE 0.9 % (FLUSH) 0.9 %
3 SYRINGE (ML) INJECTION
Status: DISCONTINUED | OUTPATIENT
Start: 2018-09-25 | End: 2018-10-08 | Stop reason: HOSPADM

## 2018-09-25 RX ORDER — INSULIN ASPART 100 [IU]/ML
1-10 INJECTION, SOLUTION INTRAVENOUS; SUBCUTANEOUS EVERY 6 HOURS PRN
Status: DISCONTINUED | OUTPATIENT
Start: 2018-09-25 | End: 2018-10-02

## 2018-09-25 RX ORDER — ROCURONIUM BROMIDE 10 MG/ML
INJECTION, SOLUTION INTRAVENOUS
Status: DISCONTINUED | OUTPATIENT
Start: 2018-09-25 | End: 2018-09-25

## 2018-09-25 RX ORDER — ONDANSETRON 2 MG/ML
INJECTION INTRAMUSCULAR; INTRAVENOUS
Status: DISCONTINUED | OUTPATIENT
Start: 2018-09-25 | End: 2018-09-25

## 2018-09-25 RX ORDER — OLMESARTAN MEDOXOMIL 20 MG/1
40 TABLET ORAL DAILY
Status: DISCONTINUED | OUTPATIENT
Start: 2018-09-26 | End: 2018-09-29

## 2018-09-25 RX ORDER — SODIUM CHLORIDE 0.9 % (FLUSH) 0.9 %
3 SYRINGE (ML) INJECTION
Status: CANCELLED | OUTPATIENT
Start: 2018-09-25

## 2018-09-25 RX ORDER — SODIUM CHLORIDE 9 MG/ML
INJECTION, SOLUTION INTRAVENOUS CONTINUOUS PRN
Status: DISCONTINUED | OUTPATIENT
Start: 2018-09-25 | End: 2018-09-25

## 2018-09-25 RX ORDER — IBUPROFEN 200 MG
16 TABLET ORAL
Status: DISCONTINUED | OUTPATIENT
Start: 2018-09-25 | End: 2018-09-25 | Stop reason: SDUPTHER

## 2018-09-25 RX ORDER — GLUCAGON 1 MG
1 KIT INJECTION CONTINUOUS PRN
Status: DISCONTINUED | OUTPATIENT
Start: 2018-09-25 | End: 2018-10-03

## 2018-09-25 RX ORDER — NEOSTIGMINE METHYLSULFATE 1 MG/ML
INJECTION, SOLUTION INTRAVENOUS
Status: DISCONTINUED | OUTPATIENT
Start: 2018-09-25 | End: 2018-09-25

## 2018-09-25 RX ORDER — SODIUM CHLORIDE 9 MG/ML
INJECTION, SOLUTION INTRAVENOUS CONTINUOUS
Status: DISCONTINUED | OUTPATIENT
Start: 2018-09-25 | End: 2018-09-30

## 2018-09-25 RX ORDER — ACETAMINOPHEN 10 MG/ML
1000 INJECTION, SOLUTION INTRAVENOUS
Status: COMPLETED | OUTPATIENT
Start: 2018-09-25 | End: 2018-09-25

## 2018-09-25 RX ORDER — ALVIMOPAN 12 MG/1
12 CAPSULE ORAL ONCE
Status: COMPLETED | OUTPATIENT
Start: 2018-09-25 | End: 2018-09-25

## 2018-09-25 RX ORDER — IBUPROFEN 200 MG
24 TABLET ORAL
Status: DISCONTINUED | OUTPATIENT
Start: 2018-09-25 | End: 2018-10-03

## 2018-09-25 RX ORDER — HYDROMORPHONE HYDROCHLORIDE 1 MG/ML
INJECTION, SOLUTION INTRAMUSCULAR; INTRAVENOUS; SUBCUTANEOUS
Status: COMPLETED
Start: 2018-09-25 | End: 2018-09-25

## 2018-09-25 RX ORDER — GLUCAGON 1 MG
1 KIT INJECTION
Status: DISCONTINUED | OUTPATIENT
Start: 2018-09-25 | End: 2018-09-25 | Stop reason: SDUPTHER

## 2018-09-25 RX ADMIN — KETAMINE HYDROCHLORIDE 20 MG: 10 INJECTION, SOLUTION INTRAMUSCULAR; INTRAVENOUS at 02:09

## 2018-09-25 RX ADMIN — ROCURONIUM BROMIDE 10 MG: 10 INJECTION, SOLUTION INTRAVENOUS at 05:09

## 2018-09-25 RX ADMIN — CEFTRIAXONE 2 G: 2 INJECTION, SOLUTION INTRAVENOUS at 02:09

## 2018-09-25 RX ADMIN — KETAMINE HYDROCHLORIDE 10 MG: 10 INJECTION, SOLUTION INTRAMUSCULAR; INTRAVENOUS at 04:09

## 2018-09-25 RX ADMIN — SODIUM CHLORIDE: 0.9 INJECTION, SOLUTION INTRAVENOUS at 07:09

## 2018-09-25 RX ADMIN — ONDANSETRON 4 MG: 2 INJECTION INTRAMUSCULAR; INTRAVENOUS at 05:09

## 2018-09-25 RX ADMIN — PROPOFOL 170 MG: 10 INJECTION, EMULSION INTRAVENOUS at 02:09

## 2018-09-25 RX ADMIN — EPHEDRINE SULFATE 5 MG: 50 INJECTION, SOLUTION INTRAMUSCULAR; INTRAVENOUS; SUBCUTANEOUS at 05:09

## 2018-09-25 RX ADMIN — GLYCOPYRROLATE 0.2 MG: 0.2 INJECTION, SOLUTION INTRAMUSCULAR; INTRAVENOUS at 02:09

## 2018-09-25 RX ADMIN — SODIUM CHLORIDE: 0.9 INJECTION, SOLUTION INTRAVENOUS at 01:09

## 2018-09-25 RX ADMIN — MUPIROCIN 1 G: 20 OINTMENT TOPICAL at 01:09

## 2018-09-25 RX ADMIN — ROCURONIUM BROMIDE 50 MG: 10 INJECTION, SOLUTION INTRAVENOUS at 02:09

## 2018-09-25 RX ADMIN — ALVIMOPAN 12 MG: 12 CAPSULE ORAL at 01:09

## 2018-09-25 RX ADMIN — LIDOCAINE HYDROCHLORIDE 0.1 MG: 10 INJECTION, SOLUTION EPIDURAL; INFILTRATION; INTRACAUDAL; PERINEURAL at 01:09

## 2018-09-25 RX ADMIN — METRONIDAZOLE 500 MG: 500 SOLUTION INTRAVENOUS at 02:09

## 2018-09-25 RX ADMIN — EPHEDRINE SULFATE 10 MG: 50 INJECTION, SOLUTION INTRAMUSCULAR; INTRAVENOUS; SUBCUTANEOUS at 03:09

## 2018-09-25 RX ADMIN — ROCURONIUM BROMIDE 20 MG: 10 INJECTION, SOLUTION INTRAVENOUS at 03:09

## 2018-09-25 RX ADMIN — Medication 0.2 MG: at 08:09

## 2018-09-25 RX ADMIN — LIDOCAINE HYDROCHLORIDE 100 MG: 20 INJECTION, SOLUTION INTRAVENOUS at 02:09

## 2018-09-25 RX ADMIN — Medication 0.2 MG: at 09:09

## 2018-09-25 RX ADMIN — IBUPROFEN 800 MG: 800 INJECTION INTRAVENOUS at 01:09

## 2018-09-25 RX ADMIN — ENOXAPARIN SODIUM 40 MG: 100 INJECTION SUBCUTANEOUS at 08:09

## 2018-09-25 RX ADMIN — SUGAMMADEX 250 MG: 100 INJECTION, SOLUTION INTRAVENOUS at 06:09

## 2018-09-25 RX ADMIN — MIDAZOLAM HYDROCHLORIDE 2 MG: 1 INJECTION, SOLUTION INTRAMUSCULAR; INTRAVENOUS at 02:09

## 2018-09-25 RX ADMIN — GLYCOPYRROLATE 0.6 MG: 0.2 INJECTION, SOLUTION INTRAMUSCULAR; INTRAVENOUS at 06:09

## 2018-09-25 RX ADMIN — KETAMINE HYDROCHLORIDE 20 MG: 10 INJECTION, SOLUTION INTRAMUSCULAR; INTRAVENOUS at 03:09

## 2018-09-25 RX ADMIN — GABAPENTIN 300 MG: 300 CAPSULE ORAL at 01:09

## 2018-09-25 RX ADMIN — ROCURONIUM BROMIDE 10 MG: 10 INJECTION, SOLUTION INTRAVENOUS at 04:09

## 2018-09-25 RX ADMIN — GABAPENTIN 300 MG: 300 CAPSULE ORAL at 10:09

## 2018-09-25 RX ADMIN — FENTANYL CITRATE 100 MCG: 50 INJECTION, SOLUTION INTRAMUSCULAR; INTRAVENOUS at 02:09

## 2018-09-25 RX ADMIN — INSULIN ASPART 4 UNITS: 100 INJECTION, SOLUTION INTRAVENOUS; SUBCUTANEOUS at 08:09

## 2018-09-25 RX ADMIN — SODIUM CHLORIDE, SODIUM GLUCONATE, SODIUM ACETATE, POTASSIUM CHLORIDE, MAGNESIUM CHLORIDE, SODIUM PHOSPHATE, DIBASIC, AND POTASSIUM PHOSPHATE: .53; .5; .37; .037; .03; .012; .00082 INJECTION, SOLUTION INTRAVENOUS at 02:09

## 2018-09-25 RX ADMIN — ACETAMINOPHEN 1000 MG: 10 INJECTION, SOLUTION INTRAVENOUS at 01:09

## 2018-09-25 RX ADMIN — NEOSTIGMINE METHYLSULFATE 5 MG: 1 INJECTION INTRAVENOUS at 06:09

## 2018-09-25 RX ADMIN — HEPARIN SODIUM 5000 UNITS: 5000 INJECTION, SOLUTION INTRAVENOUS; SUBCUTANEOUS at 01:09

## 2018-09-25 NOTE — BRIEF OP NOTE
[facility]  Surgery Department  Operative Note    SUMMARY     Date of Procedure: 9/25/2018     Procedure: Procedure(s) (LRB):  COLECTOMY-LAPAROSCOPIC LEFT (N/A)     Surgeon(s) and Role:     * Chito Banks MD - Primary     * Devyn Chandler MD - Fellow    Assisting Surgeon: None    Pre-Operative Diagnosis: Malignant neoplasm of sigmoid colon [C18.7]    Post-Operative Diagnosis: Post-Op Diagnosis Codes:     * Malignant neoplasm of sigmoid colon [C18.7]    Anesthesia: General    Technical Procedures Used: lap assisted extended left colectomy    Description of the Findings of the Procedure: tattoo adjacent to sigmoid colon, no air leak on endoscopic leak test    Complications: No    Estimated Blood Loss (EBL): 150 mL           Implants: * No implants in log *    Specimens:   Specimen (12h ago, onward)    Start     Ordered    09/25/18 1815  Specimen to Pathology - Surgery  Once     Comments:  1. Separate descending mesocolon - fresh to pathology2. Descending & sigmoid colon & omentum, suture on distal margin - fresh to pathology3. Proximal margin - permanent4. Proximal donut - permanent5. Distal donut - permanent     Start Status   09/25/18 1815 Collected (09/25/18 1815)       09/25/18 1815                  Condition: Good    Disposition: PACU - hemodynamically stable.    Attestation: I was present and scrubbed for the entire procedure.

## 2018-09-25 NOTE — INTERVAL H&P NOTE
The patient has been examined and the H&P has been reviewed:    I concur with the findings and no changes have occurred since H&P was written.    Anesthesia/Surgery risks, benefits and alternative options discussed and understood by patient/family.          Active Hospital Problems    Diagnosis  POA    Colon cancer [C18.9]  Yes      Resolved Hospital Problems   No resolved problems to display.

## 2018-09-26 ENCOUNTER — TELEPHONE (OUTPATIENT)
Dept: RHEUMATOLOGY | Facility: CLINIC | Age: 73
End: 2018-09-26

## 2018-09-26 LAB
ANION GAP SERPL CALC-SCNC: 9 MMOL/L
BASOPHILS # BLD AUTO: 0.01 K/UL
BASOPHILS NFR BLD: 0.1 %
BUN SERPL-MCNC: 10 MG/DL
CALCIUM SERPL-MCNC: 8.1 MG/DL
CHLORIDE SERPL-SCNC: 108 MMOL/L
CO2 SERPL-SCNC: 23 MMOL/L
CREAT SERPL-MCNC: 0.8 MG/DL
DIFFERENTIAL METHOD: ABNORMAL
EOSINOPHIL # BLD AUTO: 0 K/UL
EOSINOPHIL NFR BLD: 0.1 %
ERYTHROCYTE [DISTWIDTH] IN BLOOD BY AUTOMATED COUNT: 13.2 %
EST. GFR  (AFRICAN AMERICAN): >60 ML/MIN/1.73 M^2
EST. GFR  (NON AFRICAN AMERICAN): >60 ML/MIN/1.73 M^2
GLUCOSE SERPL-MCNC: 149 MG/DL
HCT VFR BLD AUTO: 40.3 %
HGB BLD-MCNC: 12.8 G/DL
IMM GRANULOCYTES # BLD AUTO: 0.03 K/UL
IMM GRANULOCYTES NFR BLD AUTO: 0.3 %
LYMPHOCYTES # BLD AUTO: 1.1 K/UL
LYMPHOCYTES NFR BLD: 10.6 %
MAGNESIUM SERPL-MCNC: 2.4 MG/DL
MCH RBC QN AUTO: 28.7 PG
MCHC RBC AUTO-ENTMCNC: 31.8 G/DL
MCV RBC AUTO: 90 FL
MONOCYTES # BLD AUTO: 0.7 K/UL
MONOCYTES NFR BLD: 6.6 %
NEUTROPHILS # BLD AUTO: 8.8 K/UL
NEUTROPHILS NFR BLD: 82.3 %
NRBC BLD-RTO: 0 /100 WBC
PHOSPHATE SERPL-MCNC: 3.2 MG/DL
PLATELET # BLD AUTO: 143 K/UL
PMV BLD AUTO: 11.7 FL
POCT GLUCOSE: 151 MG/DL (ref 70–110)
POCT GLUCOSE: 167 MG/DL (ref 70–110)
POCT GLUCOSE: 167 MG/DL (ref 70–110)
POCT GLUCOSE: 177 MG/DL (ref 70–110)
POTASSIUM SERPL-SCNC: 4.5 MMOL/L
RBC # BLD AUTO: 4.46 M/UL
SODIUM SERPL-SCNC: 140 MMOL/L
WBC # BLD AUTO: 10.72 K/UL

## 2018-09-26 PROCEDURE — 63600175 PHARM REV CODE 636 W HCPCS: Performed by: NURSE PRACTITIONER

## 2018-09-26 PROCEDURE — 94761 N-INVAS EAR/PLS OXIMETRY MLT: CPT

## 2018-09-26 PROCEDURE — 63600175 PHARM REV CODE 636 W HCPCS: Performed by: STUDENT IN AN ORGANIZED HEALTH CARE EDUCATION/TRAINING PROGRAM

## 2018-09-26 PROCEDURE — 83735 ASSAY OF MAGNESIUM: CPT

## 2018-09-26 PROCEDURE — 80048 BASIC METABOLIC PNL TOTAL CA: CPT

## 2018-09-26 PROCEDURE — 25000003 PHARM REV CODE 250: Performed by: STUDENT IN AN ORGANIZED HEALTH CARE EDUCATION/TRAINING PROGRAM

## 2018-09-26 PROCEDURE — 85025 COMPLETE CBC W/AUTO DIFF WBC: CPT

## 2018-09-26 PROCEDURE — 20600001 HC STEP DOWN PRIVATE ROOM

## 2018-09-26 PROCEDURE — 84100 ASSAY OF PHOSPHORUS: CPT

## 2018-09-26 PROCEDURE — 36415 COLL VENOUS BLD VENIPUNCTURE: CPT

## 2018-09-26 PROCEDURE — 27000221 HC OXYGEN, UP TO 24 HOURS

## 2018-09-26 PROCEDURE — 25000003 PHARM REV CODE 250: Performed by: NURSE PRACTITIONER

## 2018-09-26 RX ORDER — TAMSULOSIN HYDROCHLORIDE 0.4 MG/1
0.4 CAPSULE ORAL DAILY
Status: DISCONTINUED | OUTPATIENT
Start: 2018-09-26 | End: 2018-10-08 | Stop reason: HOSPADM

## 2018-09-26 RX ORDER — HYDROMORPHONE HYDROCHLORIDE 1 MG/ML
1 INJECTION, SOLUTION INTRAMUSCULAR; INTRAVENOUS; SUBCUTANEOUS
Status: DISCONTINUED | OUTPATIENT
Start: 2018-09-26 | End: 2018-10-07

## 2018-09-26 RX ORDER — ONDANSETRON 2 MG/ML
8 INJECTION INTRAMUSCULAR; INTRAVENOUS EVERY 6 HOURS PRN
Status: DISPENSED | OUTPATIENT
Start: 2018-09-26 | End: 2018-09-27

## 2018-09-26 RX ORDER — ACETAMINOPHEN 10 MG/ML
1000 INJECTION, SOLUTION INTRAVENOUS EVERY 6 HOURS
Status: COMPLETED | OUTPATIENT
Start: 2018-09-26 | End: 2018-09-27

## 2018-09-26 RX ADMIN — HYDROMORPHONE HYDROCHLORIDE 1 MG: 1 INJECTION, SOLUTION INTRAMUSCULAR; INTRAVENOUS; SUBCUTANEOUS at 04:09

## 2018-09-26 RX ADMIN — Medication 1 MG: at 09:09

## 2018-09-26 RX ADMIN — ONDANSETRON HYDROCHLORIDE 8 MG: 2 INJECTION, SOLUTION INTRAMUSCULAR; INTRAVENOUS at 04:09

## 2018-09-26 RX ADMIN — INSULIN ASPART 1 UNITS: 100 INJECTION, SOLUTION INTRAVENOUS; SUBCUTANEOUS at 01:09

## 2018-09-26 RX ADMIN — TAMSULOSIN HYDROCHLORIDE 0.4 MG: 0.4 CAPSULE ORAL at 02:09

## 2018-09-26 RX ADMIN — GABAPENTIN 300 MG: 300 CAPSULE ORAL at 08:09

## 2018-09-26 RX ADMIN — OLMESARTAN MEDOXOMIL 40 MG: 20 TABLET, COATED ORAL at 08:09

## 2018-09-26 RX ADMIN — ACETAMINOPHEN 1000 MG: 10 INJECTION, SOLUTION INTRAVENOUS at 04:09

## 2018-09-26 RX ADMIN — Medication 1 MG: at 03:09

## 2018-09-26 RX ADMIN — HYDROMORPHONE HYDROCHLORIDE 1 MG: 1 INJECTION, SOLUTION INTRAMUSCULAR; INTRAVENOUS; SUBCUTANEOUS at 08:09

## 2018-09-26 RX ADMIN — INSULIN ASPART 2 UNITS: 100 INJECTION, SOLUTION INTRAVENOUS; SUBCUTANEOUS at 07:09

## 2018-09-26 RX ADMIN — GABAPENTIN 300 MG: 300 CAPSULE ORAL at 02:09

## 2018-09-26 RX ADMIN — INSULIN ASPART 2 UNITS: 100 INJECTION, SOLUTION INTRAVENOUS; SUBCUTANEOUS at 06:09

## 2018-09-26 RX ADMIN — INSULIN ASPART 2 UNITS: 100 INJECTION, SOLUTION INTRAVENOUS; SUBCUTANEOUS at 12:09

## 2018-09-26 RX ADMIN — ENOXAPARIN SODIUM 40 MG: 100 INJECTION SUBCUTANEOUS at 06:09

## 2018-09-26 NOTE — ASSESSMENT & PLAN NOTE
Rosendo Thomas is a 73 y.o. male s/p COLECTOMY-LAPAROSCOPIC LEFT (N/A) on 9/25/2018 recovering in stable condition on the floor.     - prn pain and nausea control - PO w/ IVBT  - maintain room air as tolerated  - HDS   - CLD + MIVF, HLIV once tolerating diet  - davison d/c today, f/u voids   - SSI  - Hct stable 40 from 40   - OOB, ICS, SCDs  Dispo: pending ROBF, PO pain control

## 2018-09-26 NOTE — NURSING TRANSFER
Nursing Transfer Note      9/25/2018     Transfer To: 1059    Transfer via stretcher    Transfer with 2l to O2    Transported by tranport    Medicines sent: yes, insulin    Chart send with patient: Yes    Notified: daughter

## 2018-09-26 NOTE — PROGRESS NOTES
[facility]  Colorectal Surgery  Progress Note    Patient Name: Rosendo Thomas  MRN: [id number]  Admission Date: 9/25/2018  Hospital Length of Stay: 1 days  Attending Physician: Chito Banks MD    Subjective:     Interval History: No acute events overnight, s/p lap L colectomy, pain controlled with PO and IVBT medications notes increase with coughing, no nausea/vomiting, tolerated sips last night.     Post-Op Info:  Procedure(s) (LRB):  COLECTOMY-LAPAROSCOPIC LEFT (N/A)   1 Day Post-Op      Medications:  Continuous Infusions:   sodium chloride 0.9% 100 mL/hr at 09/25/18 1921    glucagon (human recombinant)       Scheduled Meds:   enoxparin  40 mg Subcutaneous Q24H    gabapentin  300 mg Oral TID    olmesartan  40 mg Oral Daily     PRN Meds:   dextrose 50%    dextrose 50%    gabapentin    glucagon (human recombinant)    glucagon (human recombinant)    glucose    glucose    glucose    HYDROmorphone    HYDROmorphone    insulin aspart U-100    sodium chloride 0.9%        Objective:     Vital Signs (Most Recent):  Temp: 98.8 °F (37.1 °C) (09/26/18 0801)  Pulse: 65 (09/26/18 0801)  Resp: 18 (09/26/18 0801)  BP: (!) 126/59 (09/26/18 0801)  SpO2: (!) 94 % (09/26/18 0801) Vital Signs (24h Range):  Temp:  [96.4 °F (35.8 °C)-98.8 °F (37.1 °C)] 98.8 °F (37.1 °C)  Pulse:  [56-84] 65  Resp:  [12-20] 18  SpO2:  [91 %-100 %] 94 %  BP: (126-173)/(59-78) 126/59     Intake/Output - Last 3 Shifts       09/24 0700 - 09/25 0659 09/25 0700 - 09/26 0659 09/26 0700 - 09/27 0659    I.V. (mL/kg)  2900 (24)     Total Intake(mL/kg)  2900 (24)     Urine (mL/kg/hr)  940 250 (1.4)    Blood  150     Total Output  1090 250    Net  +1810 -250                 Physical Exam   Constitutional: He appears well-developed and well-nourished.   HENT:   Head: Normocephalic and atraumatic.   Cardiovascular: Normal rate.   Pulmonary/Chest: Effort normal.   On room air with no increased work of breathing    Abdominal: Soft.  He exhibits no distension.   Appropriately TTP around the lower midline incision site sealed with dermabond, lap port incision sites also c/d/i    Neurological: He is alert.   Skin: Skin is warm and dry.   Nursing note and vitals reviewed.          Significant Labs:  BMP (Last 3 Results):   Recent Labs   Lab  09/26/18   0421   GLU  149*   NA  140   K  4.5   CL  108   CO2  23   BUN  10   CREATININE  0.8   CALCIUM  8.1*     CBC (Last 3 Results):   Recent Labs   Lab  09/21/18   1215  09/26/18   0421   WBC  6.14  10.72   RBC  4.54*  4.46*   HGB  13.3*  12.8*   HCT  40.1  40.3   PLT  160  143*   MCV  88  90   MCH  29.3  28.7   MCHC  33.2  31.8*       Significant Diagnostics:  None    Assessment/Plan:     Colon cancer    Rosendo Thomas is a 73 y.o. male s/p COLECTOMY-LAPAROSCOPIC LEFT (N/A) on 9/25/2018 recovering in stable condition on the floor.     - prn pain and nausea control - PO w/ IVBT  - maintain room air as tolerated  - HDS   - CLD + MIVF, HLIV once tolerating diet  - davison d/c today, f/u voids   - SSI  - Hct stable 40 from 40   - OOB, ICS, SCDs  Dispo: pending ROBF, PO pain control                 Pippa Pascual MD  Colorectal Surgery  [facility]

## 2018-09-26 NOTE — PROGRESS NOTES
Unable to complete/ do the Pacu pathway as it wasn't completed the OR pathway, PT vital signs stable , Pt comfortable

## 2018-09-26 NOTE — PLAN OF CARE
Problem: Patient Care Overview  Goal: Plan of Care Review  Outcome: Ongoing (interventions implemented as appropriate)  POC reviewed w/ pt; pt verbalized understanding.  AAOx4, VSS on 2L N/C.  NPO except for ice chips.  [place] intact w/ adequate urine output.  Pain controlled w/ prn Dilaudid.  No acute distress noted.  WCTM.

## 2018-09-26 NOTE — NURSING
Notified MD sebastian. K, pt vomitting and requesting adjustment of pain management. Will continue to monitor pt.

## 2018-09-26 NOTE — TRANSFER OF CARE
"Anesthesia Transfer of Care Note    Patient: Rosendo Thomas    Procedure(s) Performed: Procedure(s) (LRB):  COLECTOMY-LAPAROSCOPIC LEFT (N/A)    Patient location: PACU    Anesthesia Type: general    Transport from OR: Transported from OR on 6-10 L/min O2 by face mask with adequate spontaneous ventilation    Post pain: adequate analgesia    Post assessment: no apparent anesthetic complications and tolerated procedure well    Post vital signs: stable    Level of consciousness: sedated    Nausea/Vomiting: no nausea/vomiting    Complications: none    Transfer of care protocol was followed      Last vitals:   Visit Vitals  BP (!) 169/72 (BP Location: Left arm, Patient Position: Lying)   Pulse 84   Temp 36.5 °C (97.7 °F) (Temporal)   Resp 17   Ht 6' 3" (1.905 m)   Wt 120.7 kg (266 lb)   SpO2 97%   BMI 33.25 kg/m²     "

## 2018-09-26 NOTE — PLAN OF CARE
SW following for d/c needs.  SW following for d/c needs. No needs identified at this time. SW will continue to follow.         Aida Heard, MSW, LCSW  [facility]  [id number]

## 2018-09-27 LAB
ANION GAP SERPL CALC-SCNC: 7 MMOL/L
BASOPHILS # BLD AUTO: 0.02 K/UL
BASOPHILS NFR BLD: 0.2 %
BUN SERPL-MCNC: 10 MG/DL
CALCIUM SERPL-MCNC: 8.5 MG/DL
CHLORIDE SERPL-SCNC: 105 MMOL/L
CO2 SERPL-SCNC: 27 MMOL/L
CREAT SERPL-MCNC: 0.9 MG/DL
DIFFERENTIAL METHOD: ABNORMAL
EOSINOPHIL # BLD AUTO: 0 K/UL
EOSINOPHIL NFR BLD: 0.1 %
ERYTHROCYTE [DISTWIDTH] IN BLOOD BY AUTOMATED COUNT: 13.5 %
EST. GFR  (AFRICAN AMERICAN): >60 ML/MIN/1.73 M^2
EST. GFR  (NON AFRICAN AMERICAN): >60 ML/MIN/1.73 M^2
GLUCOSE SERPL-MCNC: 154 MG/DL
HCT VFR BLD AUTO: 39.3 %
HGB BLD-MCNC: 12.5 G/DL
IMM GRANULOCYTES # BLD AUTO: 0.05 K/UL
IMM GRANULOCYTES NFR BLD AUTO: 0.5 %
LYMPHOCYTES # BLD AUTO: 1.2 K/UL
LYMPHOCYTES NFR BLD: 11.5 %
MAGNESIUM SERPL-MCNC: 1.8 MG/DL
MCH RBC QN AUTO: 29.3 PG
MCHC RBC AUTO-ENTMCNC: 31.8 G/DL
MCV RBC AUTO: 92 FL
MONOCYTES # BLD AUTO: 0.9 K/UL
MONOCYTES NFR BLD: 8.5 %
NEUTROPHILS # BLD AUTO: 8.3 K/UL
NEUTROPHILS NFR BLD: 79.2 %
NRBC BLD-RTO: 0 /100 WBC
PHOSPHATE SERPL-MCNC: 1.5 MG/DL
PLATELET # BLD AUTO: 145 K/UL
PMV BLD AUTO: 11.3 FL
POCT GLUCOSE: 128 MG/DL (ref 70–110)
POCT GLUCOSE: 132 MG/DL (ref 70–110)
POCT GLUCOSE: 146 MG/DL (ref 70–110)
POCT GLUCOSE: 155 MG/DL (ref 70–110)
POCT GLUCOSE: 167 MG/DL (ref 70–110)
POCT GLUCOSE: 189 MG/DL (ref 70–110)
POTASSIUM SERPL-SCNC: 3.9 MMOL/L
RBC # BLD AUTO: 4.26 M/UL
SODIUM SERPL-SCNC: 139 MMOL/L
WBC # BLD AUTO: 10.44 K/UL

## 2018-09-27 PROCEDURE — 36415 COLL VENOUS BLD VENIPUNCTURE: CPT

## 2018-09-27 PROCEDURE — 84100 ASSAY OF PHOSPHORUS: CPT

## 2018-09-27 PROCEDURE — 63600175 PHARM REV CODE 636 W HCPCS: Performed by: NURSE PRACTITIONER

## 2018-09-27 PROCEDURE — 25000003 PHARM REV CODE 250: Performed by: STUDENT IN AN ORGANIZED HEALTH CARE EDUCATION/TRAINING PROGRAM

## 2018-09-27 PROCEDURE — 80048 BASIC METABOLIC PNL TOTAL CA: CPT

## 2018-09-27 PROCEDURE — 85025 COMPLETE CBC W/AUTO DIFF WBC: CPT

## 2018-09-27 PROCEDURE — 83735 ASSAY OF MAGNESIUM: CPT

## 2018-09-27 PROCEDURE — 20600001 HC STEP DOWN PRIVATE ROOM

## 2018-09-27 PROCEDURE — 63600175 PHARM REV CODE 636 W HCPCS: Performed by: STUDENT IN AN ORGANIZED HEALTH CARE EDUCATION/TRAINING PROGRAM

## 2018-09-27 RX ADMIN — INSULIN ASPART 2 UNITS: 100 INJECTION, SOLUTION INTRAVENOUS; SUBCUTANEOUS at 04:09

## 2018-09-27 RX ADMIN — HYDROMORPHONE HYDROCHLORIDE 1 MG: 1 INJECTION, SOLUTION INTRAMUSCULAR; INTRAVENOUS; SUBCUTANEOUS at 08:09

## 2018-09-27 RX ADMIN — INSULIN ASPART 1 UNITS: 100 INJECTION, SOLUTION INTRAVENOUS; SUBCUTANEOUS at 12:09

## 2018-09-27 RX ADMIN — ENOXAPARIN SODIUM 40 MG: 100 INJECTION SUBCUTANEOUS at 05:09

## 2018-09-27 RX ADMIN — SODIUM CHLORIDE: 0.9 INJECTION, SOLUTION INTRAVENOUS at 05:09

## 2018-09-27 RX ADMIN — ACETAMINOPHEN 1000 MG: 10 INJECTION, SOLUTION INTRAVENOUS at 06:09

## 2018-09-27 RX ADMIN — ACETAMINOPHEN 1000 MG: 10 INJECTION, SOLUTION INTRAVENOUS at 12:09

## 2018-09-27 RX ADMIN — OLMESARTAN MEDOXOMIL 40 MG: 20 TABLET, COATED ORAL at 08:09

## 2018-09-27 RX ADMIN — SODIUM PHOSPHATE, MONOBASIC, MONOHYDRATE 39.99 MMOL: 276; 142 INJECTION, SOLUTION INTRAVENOUS at 09:09

## 2018-09-27 RX ADMIN — TAMSULOSIN HYDROCHLORIDE 0.4 MG: 0.4 CAPSULE ORAL at 09:09

## 2018-09-27 RX ADMIN — SODIUM CHLORIDE 1000 ML: 0.9 INJECTION, SOLUTION INTRAVENOUS at 08:09

## 2018-09-27 RX ADMIN — HYDROMORPHONE HYDROCHLORIDE 1 MG: 1 INJECTION, SOLUTION INTRAMUSCULAR; INTRAVENOUS; SUBCUTANEOUS at 02:09

## 2018-09-27 NOTE — PLAN OF CARE
Problem: Patient Care Overview  Goal: Plan of Care Review  Pt AAOX4, complaints of pain given PRN pain meds. Pt had nausea and vomiting today PRN antiemetics given. NG tube to suction 1300ml output. Blood sugar stayed in 100's today pt given. Pt void 200 mls post cath. Pt got up to bedside chair today

## 2018-09-27 NOTE — PROGRESS NOTES
Pt unable to void. Bladder scan showed 515 ml.  Pt eventually voided 275 ml.  On call MD notified.  No new orders at this time.  WCTM.

## 2018-09-27 NOTE — ASSESSMENT & PLAN NOTE
Rosendo Thomas is a 73 y.o. male s/p COLECTOMY-LAPAROSCOPIC LEFT (N/A) on 9/25/2018 recovering in stable condition on the floor.     - prn pain and nausea control - PO w/ IVBT  - maintain room air as tolerated  - HDS   - NPO, NGT in place, IVF   - continue following voids, minimally adequate UOP   - SSI  - Hct stable 39.3 from 40 from 40   - OOB, ICS, SCDs  Dispo: pending ROBF, PO pain control

## 2018-09-27 NOTE — PLAN OF CARE
CM met with patient to complete discharge assessment and planning. Patient currently lives with wife. Pt's wife cannot assist patient with care at home. Patient's adult children will provide transportation home. INDER and SW will continue to follow for any additional needs.    Domi Lackey MD    [facility] [employer] [address] [facility] [address] [address] [address]  [facility] [address]  Phone: [phone] Fax: [phone]    Payor: MEDICARE / Plan: MEDICARE PART A & B / Product Type: [facility] /      09/27/18 1344   Discharge Assessment   Assessment Type Discharge Planning Assessment   Confirmed/corrected address and phone number on facesheet? Yes   Assessment information obtained from? Patient   Communicated expected length of stay with patient/caregiver yes   Prior to hospitilization cognitive status: Alert/Oriented   Prior to hospitalization functional status: Independent   Current cognitive status: Alert/Oriented   Current Functional Status: Independent   Lives With spouse   Able to Return to Prior Arrangements yes   Is patient able to care for self after discharge? Yes   Who are your caregiver(s) and their phone number(s)? n/a   Patient's perception of discharge disposition home or selfcare   Readmission Within The Last 30 Days no previous admission in last 30 days   Patient currently being followed by outpatient case management? No   Patient currently receives any other outside agency services? No   Equipment Currently Used at Home none   Do you have any problems affording any of your prescribed medications? No   Is the patient taking medications as prescribed? yes   Does the patient have transportation home? Yes   Transportation Available family or friend will provide   Dialysis Name and Scheduled days n/a   Does the patient receive services at the [facility]? Yes   Discharge Plan A Home   Discharge Plan B Home;Home with family;Home  Health   Patient/Family In Agreement With Plan yes

## 2018-09-27 NOTE — PLAN OF CARE
Problem: Patient Care Overview  Goal: Plan of Care Review  Outcome: Ongoing (interventions implemented as appropriate)  AAOx4, VSS, 2L N/C.  No c/o nausea or vomiting.  Pain controlled w/ prn meds.  NG tube to LIWS w/ green drainage; 700 ml output total this shift.  Ambulates w/ standby assist.   No acute events this shift.  WCTM.

## 2018-09-27 NOTE — SUBJECTIVE & OBJECTIVE
Subjective:     Interval History: Required NGT replacement yesterday evening due to bilious emesis and increasing abdominal distension, experienced significant relief with > 1 L output after insertion. Tolerating abdominal pain better, no flatus/BM.     Post-Op Info:  Procedure(s) (LRB):  COLECTOMY-LAPAROSCOPIC LEFT (N/A)   2 Days Post-Op      Medications:  Continuous Infusions:   sodium chloride 0.9% 150 mL/hr at 09/27/18 0010    glucagon (human recombinant)       Scheduled Meds:   enoxparin  40 mg Subcutaneous Q24H    olmesartan  40 mg Oral Daily    tamsulosin  0.4 mg Oral Daily     PRN Meds:   dextrose 50%    dextrose 50%    gabapentin    glucagon (human recombinant)    glucagon (human recombinant)    glucose    glucose    glucose    HYDROmorphone    HYDROmorphone    insulin aspart U-100    ondansetron    promethazine (PHENERGAN) IVPB    sodium chloride 0.9%        Objective:     Vital Signs (Most Recent):  Temp: 97.6 °F (36.4 °C) (09/27/18 0334)  Pulse: 66 (09/27/18 0334)  Resp: 18 (09/27/18 0334)  BP: (!) 148/72 (09/27/18 0334)  SpO2: 97 % (09/27/18 0334) Vital Signs (24h Range):  Temp:  [97.6 °F (36.4 °C)-100.2 °F (37.9 °C)] 97.6 °F (36.4 °C)  Pulse:  [65-98] 66  Resp:  [12-18] 18  SpO2:  [94 %-98 %] 97 %  BP: (126-148)/(59-73) 148/72     Intake/Output - Last 3 Shifts       09/25 0700 - 09/26 0659 09/26 0700 - 09/27 0659 09/27 0700 - 09/28 0659    I.V. (mL/kg) 2900 (24)      Total Intake(mL/kg) 2900 (24)      Urine (mL/kg/hr) 940 900 (0.3)     Emesis/NG output  200     Drains  1600     Blood 150      Total Output 1090 2700     Net +1810 -2700                  Physical Exam   Constitutional: He appears well-developed and well-nourished.   HENT:   Head: Normocephalic and atraumatic.   Cardiovascular: Normal rate.   Pulmonary/Chest: Effort normal.   On room air with no increased work of breathing    Abdominal: Soft. He exhibits no distension.   Less tympanic, somewhat protuberant, appropriate  tenderness around the lower midline incision site sealed with dermabond, lap port incision sites also c/d/i    Neurological: He is alert.   Skin: Skin is warm and dry.   Nursing note and vitals reviewed.          Significant Labs:  BMP (Last 3 Results):   Recent Labs   Lab  09/26/18   0421  09/27/18   0641   GLU  149*  154*   NA  140  139   K  4.5  3.9   CL  108  105   CO2  23  27   BUN  10  10   CREATININE  0.8  0.9   CALCIUM  8.1*  8.5*   MG  2.4  1.8     CBC (Last 3 Results):   Recent Labs   Lab  09/21/18   1215  09/26/18   0421  09/27/18   0641   WBC  6.14  10.72  10.44   RBC  4.54*  4.46*  4.26*   HGB  13.3*  12.8*  12.5*   HCT  40.1  40.3  39.3*   PLT  160  143*  145*   MCV  88  90  92   MCH  29.3  28.7  29.3   MCHC  33.2  31.8*  31.8*       Significant Diagnostics:  None

## 2018-09-27 NOTE — PROGRESS NOTES
[facility]  Colorectal Surgery  Progress Note    Patient Name: Rosendo Thomas  MRN: [id number]  Admission Date: 9/25/2018  Hospital Length of Stay: 2 days  Attending Physician: Chito Banks MD    Subjective:     Interval History: Required NGT replacement yesterday evening due to bilious emesis and increasing abdominal distension, experienced significant relief with > 1 L output after insertion. Tolerating abdominal pain better, no flatus/BM.     Post-Op Info:  Procedure(s) (LRB):  COLECTOMY-LAPAROSCOPIC LEFT (N/A)   2 Days Post-Op      Medications:  Continuous Infusions:   sodium chloride 0.9% 150 mL/hr at 09/27/18 0010    glucagon (human recombinant)       Scheduled Meds:   enoxparin  40 mg Subcutaneous Q24H    olmesartan  40 mg Oral Daily    tamsulosin  0.4 mg Oral Daily     PRN Meds:   dextrose 50%    dextrose 50%    gabapentin    glucagon (human recombinant)    glucagon (human recombinant)    glucose    glucose    glucose    HYDROmorphone    HYDROmorphone    insulin aspart U-100    ondansetron    promethazine (PHENERGAN) IVPB    sodium chloride 0.9%        Objective:     Vital Signs (Most Recent):  Temp: 97.6 °F (36.4 °C) (09/27/18 0334)  Pulse: 66 (09/27/18 0334)  Resp: 18 (09/27/18 0334)  BP: (!) 148/72 (09/27/18 0334)  SpO2: 97 % (09/27/18 0334) Vital Signs (24h Range):  Temp:  [97.6 °F (36.4 °C)-100.2 °F (37.9 °C)] 97.6 °F (36.4 °C)  Pulse:  [65-98] 66  Resp:  [12-18] 18  SpO2:  [94 %-98 %] 97 %  BP: (126-148)/(59-73) 148/72     Intake/Output - Last 3 Shifts       09/25 0700 - 09/26 0659 09/26 0700 - 09/27 0659 09/27 0700 - 09/28 0659    I.V. (mL/kg) 2900 (24)      Total Intake(mL/kg) 2900 (24)      Urine (mL/kg/hr) 940 900 (0.3)     Emesis/NG output  200     Drains  1600     Blood 150      Total Output 1090 2700     Net +1810 -2700                  Physical Exam   Constitutional: He appears well-developed and well-nourished.   HENT:   Head: Normocephalic and  atraumatic.   Cardiovascular: Normal rate.   Pulmonary/Chest: Effort normal.   On room air with no increased work of breathing    Abdominal: Soft. He exhibits no distension.   Less tympanic, somewhat protuberant, appropriate tenderness around the lower midline incision site sealed with dermabond, lap port incision sites also c/d/i    Neurological: He is alert.   Skin: Skin is warm and dry.   Nursing note and vitals reviewed.          Significant Labs:  BMP (Last 3 Results):   Recent Labs   Lab  09/26/18   0421  09/27/18   0641   GLU  149*  154*   NA  140  139   K  4.5  3.9   CL  108  105   CO2  23  27   BUN  10  10   CREATININE  0.8  0.9   CALCIUM  8.1*  8.5*   MG  2.4  1.8     CBC (Last 3 Results):   Recent Labs   Lab  09/21/18   1215  09/26/18   0421  09/27/18   0641   WBC  6.14  10.72  10.44   RBC  4.54*  4.46*  4.26*   HGB  13.3*  12.8*  12.5*   HCT  40.1  40.3  39.3*   PLT  160  143*  145*   MCV  88  90  92   MCH  29.3  28.7  29.3   MCHC  33.2  31.8*  31.8*       Significant Diagnostics:  None    Assessment/Plan:     Colon cancer    Rosendo Thomas is a 73 y.o. male s/p COLECTOMY-LAPAROSCOPIC LEFT (N/A) on 9/25/2018 recovering in stable condition on the floor.     - prn pain and nausea control - PO w/ IVBT  - maintain room air as tolerated  - HDS   - NPO, NGT in place, IVF   - continue following voids, minimally adequate UOP   - SSI  - Hct stable 39.3 from 40 from 40   - OOB, ICS, SCDs  Dispo: pending ROBF, PO pain control                 Pippa Pascual MD  Colorectal Surgery  [facility]

## 2018-09-27 NOTE — HOSPITAL COURSE
Rosendo Thomas is a 73 y.o. male s/p COLECTOMY-LAPAROSCOPIC LEFT (N/A) on 9/25/2018 who tolerated the procedure well and was downgraded to the floor post-operatively. His course was complicated by an ileus requiring NGT decompression on POD 1 before attaining full return of bowel function. TPN along witgh insulin gtt was given due to prolonged NPO status.  Eventually he was advanced to low fiber low residue diet without issue, was ambulating with assistance, with pain controlled with oral medications after conversion from IV medications, and nausea controlled with oral medications with return of bowel function. TPN and Insulin gtt weaned off and  He was discharged home in stable condition with follow-up in colorectal outpatient clinic in 2 weeks.

## 2018-09-27 NOTE — PROGRESS NOTES
Pt voided 175 ml.  Bladder scan after showed 260 ml.  On call MD notified and ordered to increase NS infusion to 150 ml/hr.  WCTM.

## 2018-09-27 NOTE — HPI
"72 yo M with type 2 diabetes, hypercholesterolemia, and obesity who underwent his 1st screening colonoscopy on 07/09/2018 by Dr. Ruff at [facility] and was found to have sigmoid diverticulosis as well as a total of 8 polyps, all less than 5 mm in size, throughout the colon -  pathology from all but 1 of these revealed tubular adenoma with the remaining polyp being hyperplastic.  He also had what was described as a 3.5 cm pedunculated polyp in the sigmoid colon which was removed via snare cautery, pathology from this revealed invasive adenocarcinoma arising within a tubular adenoma with the carcinoma invading into the submucosa.  On review of the endoscopy report images it is hard to tell if the lesion was truly pedunculated or not, and the gross description from the pathology report does not describe a stalk.     CT A/P 07/25/2018:  Diffuse fatty change throughout the liver.  Vascular calcifications in the abdomen and pelvis.  Fatty density umbilical hernia.  Herniation of mesenteric fat into the left inguinal canal.     He underwent a repeat colonoscopy on 08/21/2018 to attempt to tattoo the site of the polypectomy from the cancerous polyp.  He was found to have multiple additional polyps ranging from 2-12 mm throughout the colon which were removed. I do not have a pathology report from this.  Scar tissue from the recent polypectomy was not seen.  The repeat colonoscopy report indicates that "it was unclear if a previously tattooed site was seen," though reviewing the prior colonoscopy report, it did not indicate that tattooing was performed. However during the 2nd colonoscopy it was noted that an area that looked like it was previously inked was re-tattoed at 40 cm from the anal verge.     He denies any abdominal complaints.  He denies any nausea or vomiting or change in bowel habits.  He denies any blood in his stool.  He denies any significant weight loss, unexplained fatigue, or other constitutional symptoms.  " Initially he did not wish to undergo colectomy.  He then saw another surgeon who recommended a total abdominal colectomy with ileorectal anastomosis.  He presents today for 2nd opinion.     No family hx of CRC or IBD.

## 2018-09-27 NOTE — OP NOTE
DATE OF PROCEDURE:  09/25/2018.    PREOPERATIVE DIAGNOSIS:  Malignant colon polyp.    POSTOPERATIVE DIAGNOSIS:  Malignant colon polyp.    PROCEDURES PERFORMED:  1.  Laparoscopic-assisted extended left colectomy.  2.  Laparoscopic-assisted splenic flexure mobilization.    SURGEON:  Chito Banks M.D.    ASSISTANT:  Devyn Chandler M.D. (RES).    ANESTHESIA:  General endotracheal.    IV FLUIDS:  2500 mL of crystalloid.    ESTIMATED BLOOD LOSS:  150 mL.    URINE OUTPUT:  240 mL.    DRAINS:  Montgomery catheter.    SPECIMENS:  1.  Descending and sigmoid colon with associated omentum.  2.  Separate descending mesocolon.  3.  Proximal anastomotic ring.  4.  Distal anastomotic ring.  5.  Proximal margin.    COMPLICATIONS:  None.    OPERATIVE FINDINGS:  Dense intra-abdominal adhesions with the omentum densely   fused to the splenic flexure and the descending colon with loss of normal   anatomic planes, tattooing at the level of the mid sigmoid colon, extensive   intra-abdominal tattoo material, intact colorectal anastomosis with no evidence   of extravasation on air leak testing.    INDICATIONS FOR PROCEDURE:  The patient is a 73-year-old male who on recent   colonoscopy was found to have a number of polyps including what was described as   a 3.5 cm pedunculated polyp in the sigmoid colon which was removed via a snare   and found to contain invasive adenocarcinoma with concern for there being tumor   microscopically at the resection margin.  The patient underwent repeat   colonoscopy by the same endoscopist for the purpose of tattooing the lesion   several weeks later - at that time, the report indicated that the prior polypectomy site   could not clearly be seen, though there was an area that appeared to have been   previously tattooed, which was re-tattooed at that point, even though the colonoscopy  report from the initial polypectomy did not indicate that the area was tattooed at that time.    Given the uncertainty of the  location of the polyp, I discussed performing an an extended   left colectomy with the patient, removing the sigmoid and most of the descending colon,   which he agreed to.  He presents today for elective resection.    DESCRIPTION OF PROCEDURE:  The patient was identified, brought to the Operating   Room, placed on the table in a supine position after obtaining informed consent.    Venous sequential stockings were placed.  He was given preoperative   intravenous antibiotics and subcutaneous heparin.  After induction of general   endotracheal anesthesia, a Montgomery catheter was placed using sterile technique and   he was repositioned in a modified lithotomy position in [facility].    Rectal irrigation was performed.  Both arms were tucked to the side and the   abdomen and perineum were prepped and draped in usual sterile fashion.  A 1 cm   supraumbilical incision was made through skin, subcutaneous tissue and fascia to   enter the abdominal cavity.  A 12 mm balloon tip trocar was inserted and used   to insufflate the abdomen with carbon dioxide.  Once adequate pneumoperitoneum   had been achieved, the laparoscope was inserted to confirm intraperitoneal   placement.  There was a large amount of tattooing throughout the abdominal   cavity, which likely the result of full-thickness insertion of the tattoo needle when the   patient underwent tattooing of the site of the lesion.  Additionally, there were dense   omental adhesions along the splenic flexure and the colon extending down into the pelvis.    We performed a TAP block using a combination of Exparel and 0.5% Marcaine.  A   7.5 cm low midline incision was then made through skin, subcutaneous tissue, fascia to   enter the abdomen and a Gelport was placed.  The abdomen was reinsufflated and a   5 mm trocar was placed in the left lower quadrant under direct visualization.    An additional 5 mm trocar was placed in the right upper quadrant under direct    visualization.    We then began mobilizing the colon.  The omental adhesions along the left colon   and descending colon were incised utilizing the LigaSure device.  Again, they   were dense adhesions obliterating most of the normal fat planes.  As we were   mobilizing the omentum, it was essentially falling apart, so portions of omentum   that had become devitalized were removed through the Gelport to be sent off   with the final specimen.  Once we had freed up the omental adhesions along the   left colon, the lateral peritoneal attachments were incised and the colon and   mesentery were mobilized away from the mesentery, identifying and protecting the   left ureter and gonadal vessels.  Tattooing could be seen in the wall of the   colon at the level of the mid sigmoid colon.  Full mobilization was carried up   to the level of the splenic flexure.    We then turned our attention towards mobilizing the transverse colon and splenic   flexure.  Again, because of the degree of inflammatory adhesions of the omentum   to the transverse colon, we had trouble entering the lesser sac through the   gastrocolic ligament.  We ultimately divided the omentum distal to the   gastroepiploic vessels to enter the lesser sac through that approach.  The   omental adhesions to the level of the splenic flexure were incised and splenic   flexure was fully mobilized.  Once we had fully mobilized the transverse colon   and splenic flexure, we then divided the mesentery at the distal transverse   colon, splenic flexure, and descending colon with the LigaSure device down to   the level of the pedicle of the inferior mesenteric artery.  Once we had divided the   mesentery, there appeared to be an area of mesentery in the left upper quadrant   that had not been divided close enough to the root of the mesentery - this additional   mesentery was transected with the LigaSure device and removed via the Gelport to   be sent as an additional  specimen.    At this point, we desufflated the abdomen.  The mobilized colon was delivered   through the Gelport.  Because of how bulky the colon and associated mesentery   were, we could not continue to work just through the small Gelport incision, so   the Gelport was removed and the incision was extended cephalad to connect with   the supraumbilical trocar incision.  An Laz wound protector was placed and a   Adelia retractor was used for retraction.  We mobilized the distal most aspect   of the sigmoid colon down to the pelvic brim.  We divided the proximal   descending colon with a MISAEL stapler and divided the intervening mesentery with   LigaSure device. Once we had divided the colon proximally, we began to mobilize   distally.  The pedicle of  the anterior mesenteric artery was identified, isolated and   divided between Sylvia clamps, securing the pedicles with 0-Vicryl suture ligatures.    I then scored the peritoneum down to the level of the sacral promontory.  The upper   portion of the mesorectum was divided with LigaSure device and the junction of   the sigmoid colon and rectum was then stapled with a TA-45 stapler and divided   proximal to the staple line sharply.  The resected colon was passed from the   field.  We then inspected the proximal aspect of the colon.  The distal aspect   appeared somewhat dusky, so we chose a point several centimeters proximal to   this, where it appeared better vascularized, as the point where we would place the   pursestring suture.  The mesocolon was cleared away and a Furness clamp was   placed across the bowel at this point.  A pursestring suture was placed through the   Furness clamp and the bowel distal to the Furness clamp was divided sharply and   passed from the field and sent to Pathology as proximal margin.  We then fashioned   a standard 29 mm EEA stapled anastomosis between the proximal descending   colon and the rectum.   Anastomotic rings were inspected  and noted to be intact   circumferentially.  We performed visual inspection of the anastomosis endoscopically   and it appeared to be intact circumferentially and hemostatic.  There was no extravasation   of air from the staple line during air leak testing.    We then irrigated the pelvis with sterile saline.  Hemostasis was noted to be   adequate.  There was some redundant omentum, which was brought down around the   left paracolic gutter and placed into the pelvis adjacent to the anastomosis.  The   wound protector and all of the ports were removed.  Additional local anesthetic   was infiltrated at all the incision sites.  We then changed gowns and gloves   and brought the sterile closing tray to the field.  The midline fascia was   closed with a running looped #1 PDS suture.  Subcutaneous tissues were irrigated   with sterile saline.  Hemostasis was achieved with electrocautery as needed.    All of the skin incisions were then closed with 4-0 Monocryl subcuticular closure.    Dermabond was placed as dressing.    The patient tolerated the procedure well with no complications.  He was   extubated in the Operating Room and taken to Recovery in satisfactory condition.    All needle, instrument and sponge counts were correct at the end of the case.    I was present throughout the entire procedure.      GEORGE  dd: 09/27/2018 06:57:06 (CDT)  td: 09/27/2018 10:28:56 (CDT)  Doc ID   #[id number]  Job ID #[id number]    CC:

## 2018-09-28 LAB
ANION GAP SERPL CALC-SCNC: 9 MMOL/L
BASOPHILS # BLD AUTO: 0.03 K/UL
BASOPHILS NFR BLD: 0.4 %
BUN SERPL-MCNC: 10 MG/DL
CALCIUM SERPL-MCNC: 8.4 MG/DL
CHLORIDE SERPL-SCNC: 106 MMOL/L
CO2 SERPL-SCNC: 30 MMOL/L
CREAT SERPL-MCNC: 0.8 MG/DL
DIFFERENTIAL METHOD: ABNORMAL
EOSINOPHIL # BLD AUTO: 0 K/UL
EOSINOPHIL NFR BLD: 0.4 %
ERYTHROCYTE [DISTWIDTH] IN BLOOD BY AUTOMATED COUNT: 13.7 %
EST. GFR  (AFRICAN AMERICAN): >60 ML/MIN/1.73 M^2
EST. GFR  (NON AFRICAN AMERICAN): >60 ML/MIN/1.73 M^2
GLUCOSE SERPL-MCNC: 148 MG/DL
HCT VFR BLD AUTO: 36.5 %
HGB BLD-MCNC: 11.4 G/DL
IMM GRANULOCYTES # BLD AUTO: 0.04 K/UL
IMM GRANULOCYTES NFR BLD AUTO: 0.5 %
LYMPHOCYTES # BLD AUTO: 1 K/UL
LYMPHOCYTES NFR BLD: 13.3 %
MAGNESIUM SERPL-MCNC: 1.9 MG/DL
MCH RBC QN AUTO: 29.3 PG
MCHC RBC AUTO-ENTMCNC: 31.2 G/DL
MCV RBC AUTO: 94 FL
MONOCYTES # BLD AUTO: 0.6 K/UL
MONOCYTES NFR BLD: 7.6 %
NEUTROPHILS # BLD AUTO: 5.7 K/UL
NEUTROPHILS NFR BLD: 77.8 %
NRBC BLD-RTO: 0 /100 WBC
PHOSPHATE SERPL-MCNC: 1.6 MG/DL
PLATELET # BLD AUTO: 120 K/UL
PMV BLD AUTO: 11.3 FL
POCT GLUCOSE: 152 MG/DL (ref 70–110)
POCT GLUCOSE: 168 MG/DL (ref 70–110)
POCT GLUCOSE: 172 MG/DL (ref 70–110)
POCT GLUCOSE: 201 MG/DL (ref 70–110)
POTASSIUM SERPL-SCNC: 3.3 MMOL/L
RBC # BLD AUTO: 3.89 M/UL
SODIUM SERPL-SCNC: 145 MMOL/L
WBC # BLD AUTO: 7.35 K/UL

## 2018-09-28 PROCEDURE — 80048 BASIC METABOLIC PNL TOTAL CA: CPT

## 2018-09-28 PROCEDURE — 83735 ASSAY OF MAGNESIUM: CPT

## 2018-09-28 PROCEDURE — 63600175 PHARM REV CODE 636 W HCPCS: Performed by: STUDENT IN AN ORGANIZED HEALTH CARE EDUCATION/TRAINING PROGRAM

## 2018-09-28 PROCEDURE — 63600175 PHARM REV CODE 636 W HCPCS: Performed by: NURSE PRACTITIONER

## 2018-09-28 PROCEDURE — 20600001 HC STEP DOWN PRIVATE ROOM

## 2018-09-28 PROCEDURE — 84100 ASSAY OF PHOSPHORUS: CPT

## 2018-09-28 PROCEDURE — 25000003 PHARM REV CODE 250: Performed by: STUDENT IN AN ORGANIZED HEALTH CARE EDUCATION/TRAINING PROGRAM

## 2018-09-28 PROCEDURE — 85025 COMPLETE CBC W/AUTO DIFF WBC: CPT

## 2018-09-28 PROCEDURE — 36415 COLL VENOUS BLD VENIPUNCTURE: CPT

## 2018-09-28 RX ADMIN — INSULIN ASPART 2 UNITS: 100 INJECTION, SOLUTION INTRAVENOUS; SUBCUTANEOUS at 09:09

## 2018-09-28 RX ADMIN — Medication 0.5 MG: at 02:09

## 2018-09-28 RX ADMIN — OLMESARTAN MEDOXOMIL 40 MG: 20 TABLET, COATED ORAL at 09:09

## 2018-09-28 RX ADMIN — INSULIN ASPART 2 UNITS: 100 INJECTION, SOLUTION INTRAVENOUS; SUBCUTANEOUS at 05:09

## 2018-09-28 RX ADMIN — POTASSIUM PHOSPHATE, MONOBASIC AND POTASSIUM PHOSPHATE, DIBASIC 30 MMOL: 224; 236 INJECTION, SOLUTION, CONCENTRATE INTRAVENOUS at 09:09

## 2018-09-28 RX ADMIN — Medication 0.5 MG: at 08:09

## 2018-09-28 RX ADMIN — HYDROMORPHONE HYDROCHLORIDE 1 MG: 1 INJECTION, SOLUTION INTRAMUSCULAR; INTRAVENOUS; SUBCUTANEOUS at 10:09

## 2018-09-28 RX ADMIN — TAMSULOSIN HYDROCHLORIDE 0.4 MG: 0.4 CAPSULE ORAL at 09:09

## 2018-09-28 RX ADMIN — POTASSIUM PHOSPHATE, MONOBASIC AND POTASSIUM PHOSPHATE, DIBASIC 30 MMOL: 224; 236 INJECTION, SOLUTION, CONCENTRATE INTRAVENOUS at 11:09

## 2018-09-28 RX ADMIN — ENOXAPARIN SODIUM 40 MG: 100 INJECTION SUBCUTANEOUS at 09:09

## 2018-09-28 RX ADMIN — SODIUM CHLORIDE 1000 ML: 0.9 INJECTION, SOLUTION INTRAVENOUS at 05:09

## 2018-09-28 NOTE — ASSESSMENT & PLAN NOTE
Rosendo Thomas is a 73 y.o. male s/p COLECTOMY-LAPAROSCOPIC LEFT (N/A) on 9/25/2018 recovering in stable condition on the floor.     - prn pain and nausea control - w/ IVBT  - maintain room air as tolerated  - HDS   - NPO, NGT in place, IVF, fluid replacements, additionally limiting ice chiips as it may be distorting volume    - continue following voids, adequate UOP   - SSI  - Hct stable  - OOB, ICS, SCDs  Dispo: pending ROBF, PO pain control

## 2018-09-28 NOTE — PLAN OF CARE
Problem: Patient Care Overview  Goal: Plan of Care Review  Outcome: Ongoing (interventions implemented as appropriate)  POC reviewed with patient and family at bedside, son at bedside for the night. Patient A/O, VSS on 2-3L NC. Pain well controlled with prn dilaudid, admin x2. NGT to LIWS, output greenish-brown. Pt aware of NPO diet however family providing water to patient, patient and family reeducated on NPO status. Abd incisions CDI. Pt resting at this time. Safety maintained. Will continue to monitor.

## 2018-09-28 NOTE — SUBJECTIVE & OBJECTIVE
Subjective:     Interval History: NGT remains in place with high volume output (1850 recorded though thinner in appearance, no nausea or vomiting, continued improved abdominal pain, reporting mostly throat pain, continued distension.     Post-Op Info:  Procedure(s) (LRB):  COLECTOMY-LAPAROSCOPIC LEFT (N/A)   3 Days Post-Op      Medications:  Continuous Infusions:   sodium chloride 0.9% 150 mL/hr at 09/27/18 1755    glucagon (human recombinant)       Scheduled Meds:   enoxparin  40 mg Subcutaneous Q24H    olmesartan  40 mg Oral Daily    tamsulosin  0.4 mg Oral Daily     PRN Meds:   dextrose 50%    dextrose 50%    gabapentin    glucagon (human recombinant)    glucagon (human recombinant)    glucose    glucose    glucose    HYDROmorphone    HYDROmorphone    insulin aspart U-100    promethazine (PHENERGAN) IVPB    sodium chloride 0.9%        Objective:     Vital Signs (Most Recent):  Temp: 96.4 °F (35.8 °C) (09/28/18 0413)  Pulse: 71 (09/28/18 0413)  Resp: 17 (09/28/18 0413)  BP: (!) 147/65 (09/28/18 0413)  SpO2: 97 % (09/28/18 0413) Vital Signs (24h Range):  Temp:  [96.4 °F (35.8 °C)-98.8 °F (37.1 °C)] 96.4 °F (35.8 °C)  Pulse:  [] 71  Resp:  [16-18] 17  SpO2:  [96 %-99 %] 97 %  BP: (108-147)/(58-67) 147/65     Intake/Output - Last 3 Shifts       09/26 0700 - 09/27 0659 09/27 0700 - 09/28 0659 09/28 0700 - 09/29 0659    P.O.  150     I.V. (mL/kg)  1500 (12.4)     NG/GT  60     IV Piggyback  1000     Total Intake(mL/kg)  2710 (22.5)     Urine (mL/kg/hr) 900 (0.3) 650 (0.2)     Emesis/NG output 200      Drains 1600 2000     Stool  0     Blood       Total Output 2700 2650     Net -2700 +60            Stool Occurrence  0 x           Physical Exam   Constitutional: He appears well-developed and well-nourished.   HENT:   Head: Normocephalic and atraumatic.   Cardiovascular: Normal rate.   Pulmonary/Chest: Effort normal.   Remains on room air with no increased work of breathing   Abdominal: Soft. He  exhibits no distension.   Continued distension, appropriate tenderness around the lower midline incision site sealed with dermabond, lap port incision sites also c/d/i    Neurological: He is alert.   Skin: Skin is warm and dry.   Nursing note and vitals reviewed.          Significant Labs:  BMP (Last 3 Results):   Recent Labs   Lab  09/26/18 0421 09/27/18   0641  09/28/18   0619   GLU  149*  154*  148*   NA  140  139  145   K  4.5  3.9  3.3*   CL  108  105  106   CO2  23  27  30*   BUN  10  10  10   CREATININE  0.8  0.9  0.8   CALCIUM  8.1*  8.5*  8.4*   MG  2.4  1.8  1.9     CBC (Last 3 Results):   Recent Labs   Lab  09/26/18 0421 09/27/18   0641  09/28/18   0619   WBC  10.72  10.44  7.35   RBC  4.46*  4.26*  3.89*   HGB  12.8*  12.5*  11.4*   HCT  40.3  39.3*  36.5*   PLT  143*  145*  120*   MCV  90  92  94   MCH  28.7  29.3  29.3   MCHC  31.8*  31.8*  31.2*       Significant Diagnostics:  None

## 2018-09-28 NOTE — PROGRESS NOTES
[facility]  Colorectal Surgery  Progress Note    Patient Name: Rosendo Thomas  MRN: [id number]  Admission Date: 9/25/2018  Hospital Length of Stay: 3 days  Attending Physician: Chito Banks MD    Subjective:     Interval History: NGT remains in place with high volume output (1850 recorded though thinner in appearance, no nausea or vomiting, continued improved abdominal pain, reporting mostly throat pain, continued distension.     Post-Op Info:  Procedure(s) (LRB):  COLECTOMY-LAPAROSCOPIC LEFT (N/A)   3 Days Post-Op      Medications:  Continuous Infusions:   sodium chloride 0.9% 150 mL/hr at 09/27/18 1755    glucagon (human recombinant)       Scheduled Meds:   enoxparin  40 mg Subcutaneous Q24H    olmesartan  40 mg Oral Daily    tamsulosin  0.4 mg Oral Daily     PRN Meds:   dextrose 50%    dextrose 50%    gabapentin    glucagon (human recombinant)    glucagon (human recombinant)    glucose    glucose    glucose    HYDROmorphone    HYDROmorphone    insulin aspart U-100    promethazine (PHENERGAN) IVPB    sodium chloride 0.9%        Objective:     Vital Signs (Most Recent):  Temp: 96.4 °F (35.8 °C) (09/28/18 0413)  Pulse: 71 (09/28/18 0413)  Resp: 17 (09/28/18 0413)  BP: (!) 147/65 (09/28/18 0413)  SpO2: 97 % (09/28/18 0413) Vital Signs (24h Range):  Temp:  [96.4 °F (35.8 °C)-98.8 °F (37.1 °C)] 96.4 °F (35.8 °C)  Pulse:  [] 71  Resp:  [16-18] 17  SpO2:  [96 %-99 %] 97 %  BP: (108-147)/(58-67) 147/65     Intake/Output - Last 3 Shifts       09/26 0700 - 09/27 0659 09/27 0700 - 09/28 0659 09/28 0700 - 09/29 0659    P.O.  150     I.V. (mL/kg)  1500 (12.4)     NG/GT  60     IV Piggyback  1000     Total Intake(mL/kg)  2710 (22.5)     Urine (mL/kg/hr) 900 (0.3) 650 (0.2)     Emesis/NG output 200      Drains 1600 2000     Stool  0     Blood       Total Output 2700 2650     Net -2700 +60            Stool Occurrence  0 x           Physical Exam   Constitutional: He appears  well-developed and well-nourished.   HENT:   Head: Normocephalic and atraumatic.   Cardiovascular: Normal rate.   Pulmonary/Chest: Effort normal.   Remains on room air with no increased work of breathing   Abdominal: Soft. He exhibits no distension.   Continued distension, appropriate tenderness around the lower midline incision site sealed with dermabond, lap port incision sites also c/d/i    Neurological: He is alert.   Skin: Skin is warm and dry.   Nursing note and vitals reviewed.          Significant Labs:  BMP (Last 3 Results):   Recent Labs   Lab  09/26/18   0421 09/27/18   0641  09/28/18   0619   GLU  149*  154*  148*   NA  140  139  145   K  4.5  3.9  3.3*   CL  108  105  106   CO2  23  27  30*   BUN  10  10  10   CREATININE  0.8  0.9  0.8   CALCIUM  8.1*  8.5*  8.4*   MG  2.4  1.8  1.9     CBC (Last 3 Results):   Recent Labs   Lab  09/26/18 0421 09/27/18   0641  09/28/18   0619   WBC  10.72  10.44  7.35   RBC  4.46*  4.26*  3.89*   HGB  12.8*  12.5*  11.4*   HCT  40.3  39.3*  36.5*   PLT  143*  145*  120*   MCV  90  92  94   MCH  28.7  29.3  29.3   MCHC  31.8*  31.8*  31.2*       Significant Diagnostics:  None    Assessment/Plan:     Colon cancer    Rosendo Thomas is a 73 y.o. male s/p COLECTOMY-LAPAROSCOPIC LEFT (N/A) on 9/25/2018 recovering in stable condition on the floor.     - prn pain and nausea control - w/ IVBT  - maintain room air as tolerated  - HDS   - NPO, NGT in place, IVF, fluid replacements, additionally limiting ice chiips as it may be distorting volume    - continue following voids, adequate UOP   - SSI  - Hct stable  - OOB, ICS, SCDs  Dispo: pending ROBF, PO pain control                 Pippa Pascual MD  Colorectal Surgery  [facility]

## 2018-09-29 LAB
ANION GAP SERPL CALC-SCNC: 12 MMOL/L
BASOPHILS # BLD AUTO: 0.02 K/UL
BASOPHILS NFR BLD: 0.4 %
BUN SERPL-MCNC: 10 MG/DL
CALCIUM SERPL-MCNC: 8.7 MG/DL
CHLORIDE SERPL-SCNC: 105 MMOL/L
CO2 SERPL-SCNC: 31 MMOL/L
CREAT SERPL-MCNC: 0.8 MG/DL
DIFFERENTIAL METHOD: ABNORMAL
EOSINOPHIL # BLD AUTO: 0.1 K/UL
EOSINOPHIL NFR BLD: 1.9 %
ERYTHROCYTE [DISTWIDTH] IN BLOOD BY AUTOMATED COUNT: 13.6 %
EST. GFR  (AFRICAN AMERICAN): >60 ML/MIN/1.73 M^2
EST. GFR  (NON AFRICAN AMERICAN): >60 ML/MIN/1.73 M^2
GLUCOSE SERPL-MCNC: 168 MG/DL
HCT VFR BLD AUTO: 40.7 %
HGB BLD-MCNC: 13 G/DL
IMM GRANULOCYTES # BLD AUTO: 0.02 K/UL
IMM GRANULOCYTES NFR BLD AUTO: 0.4 %
LYMPHOCYTES # BLD AUTO: 0.9 K/UL
LYMPHOCYTES NFR BLD: 16.1 %
MAGNESIUM SERPL-MCNC: 2 MG/DL
MCH RBC QN AUTO: 29.6 PG
MCHC RBC AUTO-ENTMCNC: 31.9 G/DL
MCV RBC AUTO: 93 FL
MONOCYTES # BLD AUTO: 0.4 K/UL
MONOCYTES NFR BLD: 7.3 %
NEUTROPHILS # BLD AUTO: 3.9 K/UL
NEUTROPHILS NFR BLD: 73.9 %
NRBC BLD-RTO: 0 /100 WBC
PHOSPHATE SERPL-MCNC: 2.5 MG/DL
PLATELET # BLD AUTO: 149 K/UL
PMV BLD AUTO: 11.6 FL
POCT GLUCOSE: 140 MG/DL (ref 70–110)
POCT GLUCOSE: 158 MG/DL (ref 70–110)
POCT GLUCOSE: 181 MG/DL (ref 70–110)
POTASSIUM SERPL-SCNC: 3.3 MMOL/L
RBC # BLD AUTO: 4.39 M/UL
SODIUM SERPL-SCNC: 148 MMOL/L
WBC # BLD AUTO: 5.33 K/UL

## 2018-09-29 PROCEDURE — 83735 ASSAY OF MAGNESIUM: CPT

## 2018-09-29 PROCEDURE — 36415 COLL VENOUS BLD VENIPUNCTURE: CPT

## 2018-09-29 PROCEDURE — 80048 BASIC METABOLIC PNL TOTAL CA: CPT

## 2018-09-29 PROCEDURE — 63600175 PHARM REV CODE 636 W HCPCS: Performed by: NURSE PRACTITIONER

## 2018-09-29 PROCEDURE — 63600175 PHARM REV CODE 636 W HCPCS: Performed by: STUDENT IN AN ORGANIZED HEALTH CARE EDUCATION/TRAINING PROGRAM

## 2018-09-29 PROCEDURE — 25000003 PHARM REV CODE 250: Performed by: STUDENT IN AN ORGANIZED HEALTH CARE EDUCATION/TRAINING PROGRAM

## 2018-09-29 PROCEDURE — 20600001 HC STEP DOWN PRIVATE ROOM

## 2018-09-29 PROCEDURE — 85025 COMPLETE CBC W/AUTO DIFF WBC: CPT

## 2018-09-29 PROCEDURE — 84100 ASSAY OF PHOSPHORUS: CPT

## 2018-09-29 RX ORDER — HYDRALAZINE HYDROCHLORIDE 20 MG/ML
10 INJECTION INTRAMUSCULAR; INTRAVENOUS EVERY 4 HOURS PRN
Status: DISCONTINUED | OUTPATIENT
Start: 2018-09-29 | End: 2018-09-29

## 2018-09-29 RX ORDER — HYDRALAZINE HYDROCHLORIDE 20 MG/ML
10 INJECTION INTRAMUSCULAR; INTRAVENOUS EVERY 4 HOURS PRN
Status: DISCONTINUED | OUTPATIENT
Start: 2018-09-29 | End: 2018-09-30

## 2018-09-29 RX ORDER — LABETALOL HYDROCHLORIDE 5 MG/ML
10 INJECTION, SOLUTION INTRAVENOUS EVERY 4 HOURS PRN
Status: DISCONTINUED | OUTPATIENT
Start: 2018-09-29 | End: 2018-10-08 | Stop reason: HOSPADM

## 2018-09-29 RX ADMIN — INSULIN ASPART 1 UNITS: 100 INJECTION, SOLUTION INTRAVENOUS; SUBCUTANEOUS at 01:09

## 2018-09-29 RX ADMIN — HYDROMORPHONE HYDROCHLORIDE 1 MG: 1 INJECTION, SOLUTION INTRAMUSCULAR; INTRAVENOUS; SUBCUTANEOUS at 11:09

## 2018-09-29 RX ADMIN — HYDRALAZINE HYDROCHLORIDE 10 MG: 20 INJECTION INTRAMUSCULAR; INTRAVENOUS at 10:09

## 2018-09-29 RX ADMIN — HYDROMORPHONE HYDROCHLORIDE 1 MG: 1 INJECTION, SOLUTION INTRAMUSCULAR; INTRAVENOUS; SUBCUTANEOUS at 01:09

## 2018-09-29 RX ADMIN — INSULIN ASPART 2 UNITS: 100 INJECTION, SOLUTION INTRAVENOUS; SUBCUTANEOUS at 07:09

## 2018-09-29 RX ADMIN — ENOXAPARIN SODIUM 40 MG: 100 INJECTION SUBCUTANEOUS at 06:09

## 2018-09-29 RX ADMIN — INSULIN ASPART 2 UNITS: 100 INJECTION, SOLUTION INTRAVENOUS; SUBCUTANEOUS at 12:09

## 2018-09-29 RX ADMIN — POTASSIUM PHOSPHATE, MONOBASIC AND POTASSIUM PHOSPHATE, DIBASIC 30 MMOL: 224; 236 INJECTION, SOLUTION, CONCENTRATE INTRAVENOUS at 11:09

## 2018-09-29 RX ADMIN — TAMSULOSIN HYDROCHLORIDE 0.4 MG: 0.4 CAPSULE ORAL at 09:09

## 2018-09-29 NOTE — PROGRESS NOTES
[facility]  Colorectal Surgery  Progress Note    Patient Name: Rosendo Thomas  MRN: [id number]  Admission Date: 9/25/2018  Hospital Length of Stay: 4 days  Attending Physician: Chito Banks MD    Subjective:     Interval History: Hypertensive to the 180s overnight requiring prn IV anti-hypertensives (holding home olmesartan due to presumed inability to absorb, otherwise no nausea/vomiting/belching; with pain controlled, believes he might had have passed some air yesterday during the day but unsure, no BM.     Post-Op Info:  Procedure(s) (LRB):  COLECTOMY-LAPAROSCOPIC LEFT (N/A)   4 Days Post-Op      Medications:  Continuous Infusions:   sodium chloride 0.9% 100 mL/hr at 09/28/18 2213    glucagon (human recombinant)       Scheduled Meds:   enoxparin  40 mg Subcutaneous Q24H    potassium phosphate IVPB  30 mmol Intravenous BID    tamsulosin  0.4 mg Oral Daily     PRN Meds:   dextrose 50%    dextrose 50%    gabapentin    glucagon (human recombinant)    glucagon (human recombinant)    glucose    glucose    glucose    hydrALAZINE    HYDROmorphone    HYDROmorphone    insulin aspart U-100    labetalol    promethazine (PHENERGAN) IVPB    sodium chloride 0.9%        Objective:     Vital Signs (Most Recent):  Temp: 96.9 °F (36.1 °C) (09/29/18 0053)  Pulse: (!) 59 (09/29/18 0227)  Resp: 19 (09/29/18 0053)  BP: (!) 164/69 (09/29/18 0227)  SpO2: (!) 94 % (09/29/18 0227) Vital Signs (24h Range):  Temp:  [96.1 °F (35.6 °C)-97 °F (36.1 °C)] 96.9 °F (36.1 °C)  Pulse:  [59-71] 59  Resp:  [17-19] 19  SpO2:  [93 %-99 %] 94 %  BP: (134-198)/(64-85) 164/69     Intake/Output - Last 3 Shifts       09/27 0700 - 09/28 0659 09/28 0700 - 09/29 0659    P.O. 150     I.V. (mL/kg) 1500 (12.4) 2750 (22.8)    NG/GT 60     IV Piggyback 1000 500    Total Intake(mL/kg) 2710 (22.5) 3250 (26.9)    Urine (mL/kg/hr) 650 (0.2) 575 (0.2)    Drains 2000 1800    Stool 0 0    Total Output 2650 2375    Net +60 +875           Urine Occurrence  1 x    Stool Occurrence 0 x 500 x          Physical Exam   Constitutional: He appears well-developed and well-nourished.   HENT:   Head: Normocephalic and atraumatic.   Cardiovascular: Normal rate.   Pulmonary/Chest: Effort normal.   On 2 L NC, pulling < 2000 on ICS   Abdominal: Soft. He exhibits no distension.   Continued distension without rebound, remains appropriately TTP around the midline incision site   Neurological: He is alert.   Skin: Skin is warm and dry.   Nursing note and vitals reviewed.          Significant Labs:  BMP (Last 3 Results):   Recent Labs   Lab  09/26/18   0421  09/27/18   0641  09/28/18   0619   GLU  149*  154*  148*   NA  140  139  145   K  4.5  3.9  3.3*   CL  108  105  106   CO2  23  27  30*   BUN  10  10  10   CREATININE  0.8  0.9  0.8   CALCIUM  8.1*  8.5*  8.4*   MG  2.4  1.8  1.9     CBC (Last 3 Results):   Recent Labs   Lab  09/26/18 0421 09/27/18   0641  09/28/18   0619   WBC  10.72  10.44  7.35   RBC  4.46*  4.26*  3.89*   HGB  12.8*  12.5*  11.4*   HCT  40.3  39.3*  36.5*   PLT  143*  145*  120*   MCV  90  92  94   MCH  28.7  29.3  29.3   MCHC  31.8*  31.8*  31.2*       Significant Diagnostics:  None    Assessment/Plan:     * Colon cancer    Rosendo Thomas is a 73 y.o. male s/p COLECTOMY-LAPAROSCOPIC LEFT (N/A) on 9/25/2018 recovering in stable condition on the floor.     - prn pain and nausea control - w/ IVBT  - maintain room air as tolerated  - hypertensive - requiring IVBT   - NPO, NGT in place, IVF, fluid replacements, additionally limiting ice chiips  - continue following voids, minimally adequate UOP   - SSI  - Hct stable  - OOB, ICS, SCDs  Dispo: pending ROBF, PO pain control                 Pippa Pascual MD  Colorectal Surgery  [facility]

## 2018-09-29 NOTE — PLAN OF CARE
Problem: Patient Care Overview  Goal: Plan of Care Review  Outcome: Ongoing (interventions implemented as appropriate)  Blood sugars checked and treated if needed, Lost IV and attempted to start Xs 5 sticks. Waiting on Anesthesia to start. Treated for pain as needed. Ambulatory in room and had 2 small bowel movements. Midline intact, No redness noted. Telemetry place today. Continue with plan of care.

## 2018-09-29 NOTE — PLAN OF CARE
Problem: Patient Care Overview  Goal: Plan of Care Review  Outcome: Ongoing (interventions implemented as appropriate)  Pt is A&Ox4 and was injury free during night shift.  NG tube to LIWS while in bed or sitting in chair.  Pt did use the chair twice during night shift. Pt walked the halls twice during night shift. Breathing is regular equal and unlabored bilaterally on room air or 1 liter of oxygen when needed.  Son spent the night. Pain was controlled with IV medication. NG tube put out a dark brown thick liquid.

## 2018-09-29 NOTE — PROGRESS NOTES
On call  for surgery notified of pt's hi b/p.  It has been trending up since end of day shift and pain medication has not brought it down.

## 2018-09-29 NOTE — PLAN OF CARE
Problem: Patient Care Overview  Goal: Plan of Care Review  Outcome: Ongoing (interventions implemented as appropriate)  Patient in bed this am. Patient up to hallway x 2 today. Patient medicated x pain x 1 today. Patient remains on glucose monitoring, remains in the 200 range most of the day. Patient remains NPO, but does not have active bowel sounds as of yet. Continues to have copious amounts of NG drainage. Will continue to observe as needed. Remains alert and oriented. Fluids replaced today-patient remains edematous, IV infiltrated x 2 today.

## 2018-09-29 NOTE — ASSESSMENT & PLAN NOTE
Rosendo Thomas is a 73 y.o. male s/p COLECTOMY-LAPAROSCOPIC LEFT (N/A) on 9/25/2018 recovering in stable condition on the floor.     - prn pain and nausea control - w/ IVBT  - maintain room air as tolerated  - hypertensive - requiring IVBT   - NPO, NGT in place, IVF, fluid replacements, additionally limiting ice chiips  - continue following voids, minimally adequate UOP   - SSI  - Hct stable  - OOB, ICS, SCDs  Dispo: pending ROBF, PO pain control

## 2018-09-29 NOTE — SUBJECTIVE & OBJECTIVE
Subjective:     Interval History: Hypertensive to the 180s overnight requiring prn IV anti-hypertensives (holding home olmesartan due to presumed inability to absorb, otherwise no nausea/vomiting/belching; with pain controlled, believes he might had have passed some air yesterday during the day but unsure, no BM.     Post-Op Info:  Procedure(s) (LRB):  COLECTOMY-LAPAROSCOPIC LEFT (N/A)   4 Days Post-Op      Medications:  Continuous Infusions:   sodium chloride 0.9% 100 mL/hr at 09/28/18 2213    glucagon (human recombinant)       Scheduled Meds:   enoxparin  40 mg Subcutaneous Q24H    potassium phosphate IVPB  30 mmol Intravenous BID    tamsulosin  0.4 mg Oral Daily     PRN Meds:   dextrose 50%    dextrose 50%    gabapentin    glucagon (human recombinant)    glucagon (human recombinant)    glucose    glucose    glucose    hydrALAZINE    HYDROmorphone    HYDROmorphone    insulin aspart U-100    labetalol    promethazine (PHENERGAN) IVPB    sodium chloride 0.9%        Objective:     Vital Signs (Most Recent):  Temp: 96.9 °F (36.1 °C) (09/29/18 0053)  Pulse: (!) 59 (09/29/18 0227)  Resp: 19 (09/29/18 0053)  BP: (!) 164/69 (09/29/18 0227)  SpO2: (!) 94 % (09/29/18 0227) Vital Signs (24h Range):  Temp:  [96.1 °F (35.6 °C)-97 °F (36.1 °C)] 96.9 °F (36.1 °C)  Pulse:  [59-71] 59  Resp:  [17-19] 19  SpO2:  [93 %-99 %] 94 %  BP: (134-198)/(64-85) 164/69     Intake/Output - Last 3 Shifts       09/27 0700 - 09/28 0659 09/28 0700 - 09/29 0659    P.O. 150     I.V. (mL/kg) 1500 (12.4) 2750 (22.8)    NG/GT 60     IV Piggyback 1000 500    Total Intake(mL/kg) 2710 (22.5) 3250 (26.9)    Urine (mL/kg/hr) 650 (0.2) 575 (0.2)    Drains 2000 1800    Stool 0 0    Total Output 2650 2375    Net +60 +875          Urine Occurrence  1 x    Stool Occurrence 0 x 500 x          Physical Exam   Constitutional: He appears well-developed and well-nourished.   HENT:   Head: Normocephalic and atraumatic.   Cardiovascular: Normal  rate.   Pulmonary/Chest: Effort normal.   On 2 L NC, pulling < 2000 on ICS   Abdominal: Soft. He exhibits no distension.   Continued distension without rebound, remains appropriately TTP around the midline incision site   Neurological: He is alert.   Skin: Skin is warm and dry.   Nursing note and vitals reviewed.          Significant Labs:  BMP (Last 3 Results):   Recent Labs   Lab  09/26/18 0421 09/27/18   0641  09/28/18   0619   GLU  149*  154*  148*   NA  140  139  145   K  4.5  3.9  3.3*   CL  108  105  106   CO2  23  27  30*   BUN  10  10  10   CREATININE  0.8  0.9  0.8   CALCIUM  8.1*  8.5*  8.4*   MG  2.4  1.8  1.9     CBC (Last 3 Results):   Recent Labs   Lab  09/26/18 0421 09/27/18   0641  09/28/18   0619   WBC  10.72  10.44  7.35   RBC  4.46*  4.26*  3.89*   HGB  12.8*  12.5*  11.4*   HCT  40.3  39.3*  36.5*   PLT  143*  145*  120*   MCV  90  92  94   MCH  28.7  29.3  29.3   MCHC  31.8*  31.8*  31.2*       Significant Diagnostics:  None

## 2018-09-30 PROBLEM — R00.1 SINUS BRADYCARDIA: Status: ACTIVE | Noted: 2018-09-30

## 2018-09-30 LAB
ALBUMIN SERPL BCP-MCNC: 2.6 G/DL
ALP SERPL-CCNC: 45 U/L
ALT SERPL W/O P-5'-P-CCNC: 16 U/L
ANION GAP SERPL CALC-SCNC: 11 MMOL/L
ANION GAP SERPL CALC-SCNC: 12 MMOL/L
AST SERPL-CCNC: 16 U/L
BASOPHILS # BLD AUTO: 0.02 K/UL
BASOPHILS NFR BLD: 0.3 %
BILIRUB SERPL-MCNC: 0.4 MG/DL
BUN SERPL-MCNC: 10 MG/DL
BUN SERPL-MCNC: 10 MG/DL
CALCIUM SERPL-MCNC: 8.3 MG/DL
CALCIUM SERPL-MCNC: 8.8 MG/DL
CHLORIDE SERPL-SCNC: 102 MMOL/L
CHLORIDE SERPL-SCNC: 104 MMOL/L
CO2 SERPL-SCNC: 33 MMOL/L
CO2 SERPL-SCNC: 34 MMOL/L
CREAT SERPL-MCNC: 0.7 MG/DL
CREAT SERPL-MCNC: 0.8 MG/DL
DIFFERENTIAL METHOD: ABNORMAL
EOSINOPHIL # BLD AUTO: 0.1 K/UL
EOSINOPHIL NFR BLD: 1.3 %
ERYTHROCYTE [DISTWIDTH] IN BLOOD BY AUTOMATED COUNT: 13.6 %
EST. GFR  (AFRICAN AMERICAN): >60 ML/MIN/1.73 M^2
EST. GFR  (AFRICAN AMERICAN): >60 ML/MIN/1.73 M^2
EST. GFR  (NON AFRICAN AMERICAN): >60 ML/MIN/1.73 M^2
EST. GFR  (NON AFRICAN AMERICAN): >60 ML/MIN/1.73 M^2
GLUCOSE SERPL-MCNC: 146 MG/DL
GLUCOSE SERPL-MCNC: 160 MG/DL
HCT VFR BLD AUTO: 36.1 %
HGB BLD-MCNC: 11 G/DL
IMM GRANULOCYTES # BLD AUTO: 0.02 K/UL
IMM GRANULOCYTES NFR BLD AUTO: 0.3 %
LYMPHOCYTES # BLD AUTO: 0.9 K/UL
LYMPHOCYTES NFR BLD: 14.5 %
MAGNESIUM SERPL-MCNC: 2.2 MG/DL
MCH RBC QN AUTO: 28.4 PG
MCHC RBC AUTO-ENTMCNC: 30.5 G/DL
MCV RBC AUTO: 93 FL
MONOCYTES # BLD AUTO: 0.6 K/UL
MONOCYTES NFR BLD: 9.4 %
NEUTROPHILS # BLD AUTO: 4.4 K/UL
NEUTROPHILS NFR BLD: 74.2 %
NRBC BLD-RTO: 0 /100 WBC
PHOSPHATE SERPL-MCNC: 2.2 MG/DL
PLATELET # BLD AUTO: 164 K/UL
PMV BLD AUTO: 10.8 FL
POCT GLUCOSE: 156 MG/DL (ref 70–110)
POCT GLUCOSE: 162 MG/DL (ref 70–110)
POTASSIUM SERPL-SCNC: 2.9 MMOL/L
POTASSIUM SERPL-SCNC: 2.9 MMOL/L
PROT SERPL-MCNC: 5.5 G/DL
RBC # BLD AUTO: 3.88 M/UL
SODIUM SERPL-SCNC: 148 MMOL/L
SODIUM SERPL-SCNC: 148 MMOL/L
TROPONIN I SERPL DL<=0.01 NG/ML-MCNC: 0.06 NG/ML
WBC # BLD AUTO: 5.93 K/UL

## 2018-09-30 PROCEDURE — 83735 ASSAY OF MAGNESIUM: CPT

## 2018-09-30 PROCEDURE — 84484 ASSAY OF TROPONIN QUANT: CPT

## 2018-09-30 PROCEDURE — 63600175 PHARM REV CODE 636 W HCPCS: Performed by: NURSE PRACTITIONER

## 2018-09-30 PROCEDURE — 93041 RHYTHM ECG TRACING: CPT

## 2018-09-30 PROCEDURE — 63600175 PHARM REV CODE 636 W HCPCS: Performed by: STUDENT IN AN ORGANIZED HEALTH CARE EDUCATION/TRAINING PROGRAM

## 2018-09-30 PROCEDURE — 80048 BASIC METABOLIC PNL TOTAL CA: CPT

## 2018-09-30 PROCEDURE — 20600001 HC STEP DOWN PRIVATE ROOM

## 2018-09-30 PROCEDURE — 36415 COLL VENOUS BLD VENIPUNCTURE: CPT

## 2018-09-30 PROCEDURE — 85025 COMPLETE CBC W/AUTO DIFF WBC: CPT

## 2018-09-30 PROCEDURE — 63600175 PHARM REV CODE 636 W HCPCS: Performed by: SURGERY

## 2018-09-30 PROCEDURE — 93010 ELECTROCARDIOGRAM REPORT: CPT | Mod: ,,, | Performed by: INTERNAL MEDICINE

## 2018-09-30 PROCEDURE — 80053 COMPREHEN METABOLIC PANEL: CPT

## 2018-09-30 PROCEDURE — 84100 ASSAY OF PHOSPHORUS: CPT

## 2018-09-30 PROCEDURE — 25000003 PHARM REV CODE 250: Performed by: STUDENT IN AN ORGANIZED HEALTH CARE EDUCATION/TRAINING PROGRAM

## 2018-09-30 PROCEDURE — 51798 US URINE CAPACITY MEASURE: CPT

## 2018-09-30 RX ORDER — ONDANSETRON 2 MG/ML
8 INJECTION INTRAMUSCULAR; INTRAVENOUS EVERY 8 HOURS PRN
Status: DISCONTINUED | OUTPATIENT
Start: 2018-09-30 | End: 2018-10-08 | Stop reason: HOSPADM

## 2018-09-30 RX ORDER — POTASSIUM CHLORIDE 7.45 MG/ML
10 INJECTION INTRAVENOUS
Status: COMPLETED | OUTPATIENT
Start: 2018-09-30 | End: 2018-09-30

## 2018-09-30 RX ORDER — HYDRALAZINE HYDROCHLORIDE 25 MG/1
25 TABLET, FILM COATED ORAL EVERY 6 HOURS PRN
Status: DISCONTINUED | OUTPATIENT
Start: 2018-09-30 | End: 2018-10-01

## 2018-09-30 RX ORDER — OLMESARTAN MEDOXOMIL 20 MG/1
40 TABLET ORAL DAILY
Status: DISCONTINUED | OUTPATIENT
Start: 2018-09-30 | End: 2018-10-08 | Stop reason: HOSPADM

## 2018-09-30 RX ORDER — FUROSEMIDE 10 MG/ML
40 INJECTION INTRAMUSCULAR; INTRAVENOUS ONCE
Status: COMPLETED | OUTPATIENT
Start: 2018-09-30 | End: 2018-09-30

## 2018-09-30 RX ORDER — SODIUM CHLORIDE 450 MG/100ML
INJECTION, SOLUTION INTRAVENOUS CONTINUOUS
Status: DISCONTINUED | OUTPATIENT
Start: 2018-09-30 | End: 2018-09-30

## 2018-09-30 RX ADMIN — INSULIN ASPART 2 UNITS: 100 INJECTION, SOLUTION INTRAVENOUS; SUBCUTANEOUS at 12:09

## 2018-09-30 RX ADMIN — ONDANSETRON HYDROCHLORIDE 8 MG: 2 INJECTION, SOLUTION INTRAMUSCULAR; INTRAVENOUS at 04:09

## 2018-09-30 RX ADMIN — POTASSIUM CHLORIDE 10 MEQ: 10 INJECTION, SOLUTION INTRAVENOUS at 10:09

## 2018-09-30 RX ADMIN — FUROSEMIDE 40 MG: 10 INJECTION, SOLUTION INTRAMUSCULAR; INTRAVENOUS at 07:09

## 2018-09-30 RX ADMIN — POTASSIUM CHLORIDE 10 MEQ: 10 INJECTION, SOLUTION INTRAVENOUS at 06:09

## 2018-09-30 RX ADMIN — POTASSIUM CHLORIDE 10 MEQ: 10 INJECTION, SOLUTION INTRAVENOUS at 11:09

## 2018-09-30 RX ADMIN — TAMSULOSIN HYDROCHLORIDE 0.4 MG: 0.4 CAPSULE ORAL at 08:09

## 2018-09-30 RX ADMIN — SODIUM CHLORIDE: 0.45 INJECTION, SOLUTION INTRAVENOUS at 06:09

## 2018-09-30 RX ADMIN — POTASSIUM CHLORIDE 10 MEQ: 10 INJECTION, SOLUTION INTRAVENOUS at 08:09

## 2018-09-30 RX ADMIN — POTASSIUM PHOSPHATE, MONOBASIC AND POTASSIUM PHOSPHATE, DIBASIC 30 MMOL: 224; 236 INJECTION, SOLUTION, CONCENTRATE INTRAVENOUS at 05:09

## 2018-09-30 RX ADMIN — ONDANSETRON HYDROCHLORIDE 8 MG: 2 INJECTION, SOLUTION INTRAMUSCULAR; INTRAVENOUS at 11:09

## 2018-09-30 RX ADMIN — POTASSIUM CHLORIDE 10 MEQ: 10 INJECTION, SOLUTION INTRAVENOUS at 12:09

## 2018-09-30 RX ADMIN — ENOXAPARIN SODIUM 40 MG: 100 INJECTION SUBCUTANEOUS at 07:09

## 2018-09-30 RX ADMIN — OLMESARTAN MEDOXOMIL 40 MG: 20 TABLET, COATED ORAL at 11:09

## 2018-09-30 RX ADMIN — INSULIN ASPART 1 UNITS: 100 INJECTION, SOLUTION INTRAVENOUS; SUBCUTANEOUS at 11:09

## 2018-09-30 NOTE — HPI
73 M with PMH colon cancer s/p left colectomy 9/25, DM2 consulted to cardiology for EKG changes. The patient is noted to be bradycardic on telemetry and has 1st degree AV block and ST abnormalities on post-op EKG. The patient reports not feeling well currently but that he is improving post-operatively. He reports increasing energy and exercise capacity and his pain is well controlled. He denies chest pain, palpitations or peripheral edema. He reported also being asymptomatic prior to hospital admission; denies symptoms of chest pain/tightness/pressure/discomfort, orthopnea, PND, peripheral edema or BROOKS. He denies palpitations or any irregular heart rhythm, has never experienced syncope in his lifetime and there is no family history of CAD or SCD.

## 2018-09-30 NOTE — PROGRESS NOTES
Since the start of the night shift the pt has had a elevated b/p.  Dr Oh was notified and wanted the Hydralazine given for the b/p due to the pt's rosita heart rate.  The pt's b/p did not respond to the Hydralazine and remained in the low 190's.  Dr Oh was contemplating giving the pt another 10 mg of Hydralazine, the nurse suggested giving pain med's because the pt was complaining of severe ABD pain.  The nurse informed the Dr that last night the pt had shown similar symptoms and prior to giving the Hydralazine last night pain med's had been given and the pt's b/p responded by coming down.       Dr Oh felt it would be ok to try the pain medication before doing another dose of Hydralazine.  The nurse administered 1 mg of Hydromorphone.  The pt's heart rate started to slow more and was below 50 and maintaining mid 40's.  Dr Oh was informed of the pt's changes and the new b/p after the Hydromorphone was in the mid 180's. Nurse continuing to monitor.

## 2018-09-30 NOTE — SUBJECTIVE & OBJECTIVE
Past Medical History:   Diagnosis Date    Diabetes mellitus type II 7/2010    diet controlled    Nephrolithiasis     Obesity     Psoriasis     Skin cancer     Squamous cell carcinoma        Past Surgical History:   Procedure Laterality Date    APPENDECTOMY      COLECTOMY-LAPAROSCOPIC LEFT N/A 9/25/2018    Performed by Chito Banks MD at [facility] OR 2ND FLR    COLONOSCOPY N/A 7/9/2018    Procedure: COLONOSCOPY;  Surgeon: Kameron Ruff MD;  Location: [facility] ENDO;  Service: Endoscopy;  Laterality: N/A;  confirmed    COLONOSCOPY N/A 7/9/2018    Performed by Kameron Ruff MD at [facility] ENDO    LAPAROSCOPIC LEFT COLECTOMY N/A 9/25/2018    Procedure: COLECTOMY-LAPAROSCOPIC LEFT;  Surgeon: Chito Banks MD;  [facility];  Service: Colon and Rectal;  Laterality: N/A;    SKIN CANCER EXCISION      SKIN GRAFT      VASECTOMY      VASECTOMY         Review of patient's allergies indicates:  No Known Allergies    No current facility-administered medications on file prior to encounter.      Current Outpatient Medications on File Prior to Encounter   Medication Sig    blood sugar diagnostic (CONTOUR TEST STRIPS) Strp 1 each by Misc.(Non-Drug; Combo Route) route 2 (two) times daily.    lancets Misc USE ONE LANCET TWO TIMES DAILY    metFORMIN (GLUCOPHAGE) 500 MG tablet Take 1.5 tablets (750 mg total) by mouth 2 (two) times daily with meals. (Patient taking differently: Take 500 mg by mouth daily with breakfast. )    olmesartan-hydrochlorothiazide (BENICAR HCT) 40-25 mg per tablet Take 1 tablet by mouth once daily. (Patient taking differently: Take 1 tablet by mouth every morning. )     Family History     Problem Relation (Age of Onset)    Cancer Father    Diabetes Brother        Tobacco Use    Smoking status: Light Tobacco Smoker     Years: 15.00     Types: Cigars    Smokeless tobacco: Never Used    Tobacco comment: cigars-x1 yr.   Substance and Sexual Activity    Alcohol use: Yes     Comment:  occassional    Drug use: No    Sexual activity: Yes     Partners: Female     Review of Systems   Constitution: Negative for chills, diaphoresis, fever and night sweats.   HENT: Negative.    Eyes: Negative for blurred vision and photophobia.   Cardiovascular: Negative for chest pain, claudication, cyanosis, dyspnea on exertion, irregular heartbeat, leg swelling, near-syncope, orthopnea, palpitations, paroxysmal nocturnal dyspnea and syncope.   Respiratory: Negative for cough, hemoptysis, shortness of breath and wheezing.    Skin: Negative for color change and rash.   Musculoskeletal: Negative for back pain and falls.   Gastrointestinal: Positive for abdominal pain. Negative for nausea and vomiting.   Genitourinary: Negative for dysuria and hematuria.   Neurological: Negative for focal weakness, numbness and seizures.   Psychiatric/Behavioral: Negative for altered mental status. The patient is not nervous/anxious.      Objective:     Vital Signs (Most Recent):  Temp: 97.5 °F (36.4 °C) (09/30/18 0730)  Pulse: (!) 51 (09/30/18 0805)  Resp: 20 (09/30/18 0730)  BP: (!) 182/78 (09/30/18 0730)  SpO2: (!) 94 % (09/30/18 0730) Vital Signs (24h Range):  Temp:  [97.2 °F (36.2 °C)-98.3 °F (36.8 °C)] 97.5 °F (36.4 °C)  Pulse:  [40-60] 51  Resp:  [20] 20  SpO2:  [92 %-97 %] 94 %  BP: (163-193)/(72-82) 182/78     Weight: 120.7 kg (266 lb)  Body mass index is 33.25 kg/m².    SpO2: (!) 94 %  O2 Device (Oxygen Therapy): room air      Intake/Output Summary (Last 24 hours) at 9/30/2018 0851  Last data filed at 9/30/2018 0745  Gross per 24 hour   Intake --   Output 4220 ml   Net -4220 ml       Lines/Drains/Airways     Drain                 NG/OG Tube 09/26/18 1658 16 Fr. Left nostril 3 days          Peripheral Intravenous Line                 Peripheral IV - Single Lumen Anterior;Right Antecubital -- days                Physical Exam   Constitutional: He is oriented to person, place, and time. He appears well-developed and  well-nourished. No distress.   HENT:   Head: Normocephalic and atraumatic.   NG tube   Eyes: EOM are normal. Pupils are equal, round, and reactive to light.   Neck: Normal range of motion. No JVD present.   Cardiovascular: Normal rate, regular rhythm, normal heart sounds and intact distal pulses. Exam reveals no gallop and no friction rub.   No murmur heard.  Pulmonary/Chest: Effort normal and breath sounds normal. No respiratory distress. He has no wheezes. He has no rales.   Abdominal: Soft. Bowel sounds are normal. He exhibits no distension. There is no tenderness.   Abdominal incision c/d/i   Musculoskeletal: Normal range of motion. He exhibits no edema.   Neurological: He is alert and oriented to person, place, and time.   Skin: Skin is warm. No rash noted. He is not diaphoretic.   Psychiatric: He has a normal mood and affect. His behavior is normal.       Significant Labs:     Recent Results (from the past 24 hour(s))   POCT glucose    Collection Time: 09/29/18 12:00 PM   Result Value Ref Range    POCT Glucose 158 (H) 70 - 110 mg/dL   POCT glucose    Collection Time: 09/29/18  6:06 PM   Result Value Ref Range    POCT Glucose 140 (H) 70 - 110 mg/dL   Comprehensive metabolic panel    Collection Time: 09/30/18  1:50 AM   Result Value Ref Range    Sodium 148 (H) 136 - 145 mmol/L    Potassium 2.9 (L) 3.5 - 5.1 mmol/L    Chloride 104 95 - 110 mmol/L    CO2 33 (H) 23 - 29 mmol/L    Glucose 160 (H) 70 - 110 mg/dL    BUN, Bld 10 8 - 23 mg/dL    Creatinine 0.7 0.5 - 1.4 mg/dL    Calcium 8.3 (L) 8.7 - 10.5 mg/dL    Total Protein 5.5 (L) 6.0 - 8.4 g/dL    Albumin 2.6 (L) 3.5 - 5.2 g/dL    Total Bilirubin 0.4 0.1 - 1.0 mg/dL    Alkaline Phosphatase 45 (L) 55 - 135 U/L    AST 16 10 - 40 U/L    ALT 16 10 - 44 U/L    Anion Gap 11 8 - 16 mmol/L    eGFR if African American >60.0 >60 mL/min/1.73 m^2    eGFR if non African American >60.0 >60 mL/min/1.73 m^2   Magnesium    Collection Time: 09/30/18  4:48 AM   Result Value Ref  Range    Magnesium 2.2 1.6 - 2.6 mg/dL   Phosphorus    Collection Time: 09/30/18  4:48 AM   Result Value Ref Range    Phosphorus 2.2 (L) 2.7 - 4.5 mg/dL   CBC auto differential    Collection Time: 09/30/18  4:48 AM   Result Value Ref Range    WBC 5.93 3.90 - 12.70 K/uL    RBC 3.88 (L) 4.60 - 6.20 M/uL    Hemoglobin 11.0 (L) 14.0 - 18.0 g/dL    Hematocrit 36.1 (L) 40.0 - 54.0 %    MCV 93 82 - 98 fL    MCH 28.4 27.0 - 31.0 pg    MCHC 30.5 (L) 32.0 - 36.0 g/dL    RDW 13.6 11.5 - 14.5 %    Platelets 164 150 - 350 K/uL    MPV 10.8 9.2 - 12.9 fL    Immature Granulocytes 0.3 0.0 - 0.5 %    Gran # (ANC) 4.4 1.8 - 7.7 K/uL    Immature Grans (Abs) 0.02 0.00 - 0.04 K/uL    Lymph # 0.9 (L) 1.0 - 4.8 K/uL    Mono # 0.6 0.3 - 1.0 K/uL    Eos # 0.1 0.0 - 0.5 K/uL    Baso # 0.02 0.00 - 0.20 K/uL    nRBC 0 0 /100 WBC    Gran% 74.2 (H) 38.0 - 73.0 %    Lymph% 14.5 (L) 18.0 - 48.0 %    Mono% 9.4 4.0 - 15.0 %    Eosinophil% 1.3 0.0 - 8.0 %    Basophil% 0.3 0.0 - 1.9 %    Differential Method Automated    Basic metabolic panel    Collection Time: 09/30/18  4:48 AM   Result Value Ref Range    Sodium 148 (H) 136 - 145 mmol/L    Potassium 2.9 (L) 3.5 - 5.1 mmol/L    Chloride 102 95 - 110 mmol/L    CO2 34 (H) 23 - 29 mmol/L    Glucose 146 (H) 70 - 110 mg/dL    BUN, Bld 10 8 - 23 mg/dL    Creatinine 0.8 0.5 - 1.4 mg/dL    Calcium 8.8 8.7 - 10.5 mg/dL    Anion Gap 12 8 - 16 mmol/L    eGFR if African American >60.0 >60 mL/min/1.73 m^2    eGFR if non African American >60.0 >60 mL/min/1.73 m^2   Troponin I    Collection Time: 09/30/18  4:48 AM   Result Value Ref Range    Troponin I 0.064 (H) 0.000 - 0.026 ng/mL     Imaging   have reviewed all pertinent imaging studies

## 2018-09-30 NOTE — SUBJECTIVE & OBJECTIVE
"  Subjective:     Interval History: Reported small soft BM overnight but >2L NGT output and dark brown- yellow thick output with continued dilated loops of bowel on KUB this morning, rosita down to the 30s overnight, placed on telemetry, reporting no chest pain, shortness of breath, HR now in the 40, feeling as though he is "drowning in fluid" sating 93% while on 2L NC     Post-Op Info:  Procedure(s) (LRB):  COLECTOMY-LAPAROSCOPIC LEFT (N/A)   5 Days Post-Op      Medications:  Continuous Infusions:   sodium chloride 0.45% 125 mL/hr at 09/30/18 0655    glucagon (human recombinant)       Scheduled Meds:   enoxparin  40 mg Subcutaneous Q24H    potassium chloride  10 mEq Intravenous Q1H    potassium phosphate IVPB  30 mmol Intravenous BID    potassium phosphate IVPB  30 mmol Intravenous BID    tamsulosin  0.4 mg Oral Daily     PRN Meds:   dextrose 50%    dextrose 50%    gabapentin    glucagon (human recombinant)    glucagon (human recombinant)    glucose    glucose    glucose    hydrALAZINE    HYDROmorphone    HYDROmorphone    insulin aspart U-100    labetalol    promethazine (PHENERGAN) IVPB    sodium chloride 0.9%        Objective:     Vital Signs (Most Recent):  Temp: 97.2 °F (36.2 °C) (09/29/18 2038)  Pulse: (!) 40 (09/30/18 0558)  Resp: 20 (09/29/18 2038)  BP: (!) 163/72 (09/30/18 0524)  SpO2: 95 % (09/30/18 0524) Vital Signs (24h Range):  Temp:  [97.2 °F (36.2 °C)-98.3 °F (36.8 °C)] 97.2 °F (36.2 °C)  Pulse:  [40-60] 40  Resp:  [20] 20  SpO2:  [92 %-97 %] 95 %  BP: (163-193)/(72-82) 163/72     Intake/Output - Last 3 Shifts       09/28 0700 - 09/29 0659 09/29 0700 - 09/30 0659 09/30 0700 - 10/01 0659    P.O.       I.V. (mL/kg) 2750 (22.8) 750 (6.2)     NG/GT       IV Piggyback 500      Total Intake(mL/kg) 3250 (26.9) 750 (6.2)     Urine (mL/kg/hr) 575 (0.2) 720 (0.2)     Drains 2300 2450     Stool 0 0     Total Output 2875 3170     Net +375 -2420            Urine Occurrence 1 x      Stool " Occurrence 500 x 1 x           Physical Exam   Constitutional: He appears well-developed and well-nourished.   HENT:   Head: Normocephalic and atraumatic.   Cardiovascular: Normal rate.   Pulmonary/Chest: Effort normal.   On 2 L NC, pulling < 2000 on ICS, no overt accessory muscle use    Abdominal: Soft. He exhibits no distension.   Remains distended without rebound, remains appropriately TTP around the midline incision site   Neurological: He is alert.   Skin: Skin is warm and dry.   Nursing note and vitals reviewed.          Significant Labs:  BMP (Last 3 Results):   Recent Labs   Lab  09/28/18   0619  09/29/18   0347  09/30/18   0150  09/30/18   0448   GLU  148*  168*  160*  146*   NA  145  148*  148*  148*   K  3.3*  3.3*  2.9*  2.9*   CL  106  105  104  102   CO2  30*  31*  33*  34*   BUN  10  10  10  10   CREATININE  0.8  0.8  0.7  0.8   CALCIUM  8.4*  8.7  8.3*  8.8   MG  1.9  2.0   --   2.2     CBC (Last 3 Results):   Recent Labs   Lab  09/28/18   0619 09/29/18   0347  09/30/18   0448   WBC  7.35  5.33  5.93   RBC  3.89*  4.39*  3.88*   HGB  11.4*  13.0*  11.0*   HCT  36.5*  40.7  36.1*   PLT  120*  149*  164   MCV  94  93  93   MCH  29.3  29.6  28.4   MCHC  31.2*  31.9*  30.5*       Significant Diagnostics:  None

## 2018-09-30 NOTE — PLAN OF CARE
Problem: Patient Care Overview  Goal: Plan of Care Review  Outcome: Ongoing (interventions implemented as appropriate)  Pt is A&Ox4 and injury free during night shift. Son spent the night. Pt is a difficult IV stick and anesthesiology came to the floor twice to start a IV access. The on call Dr Oh was called to the pt's room twice during night shift to see the pt and talk with the son. Pt is able to urinate on his own but it is not adequate and his urine is concentrated.  The pt did get a bladder scan once during night shift.

## 2018-09-30 NOTE — ASSESSMENT & PLAN NOTE
Sinus bradycardia with first degree AV block noted on EKGs, telemetry  Patient has asymptomatic bradycardia as he has normal functional capacity, denies syncope, dizziness or light headedness and has had a treadmill stress test in February of this year for which he reached 6 METs and a peak heart rate of 126 which is 85% of his maximum heart rate  EKG with TWI of several leads, no symptoms of angina or HF and no family history of heart disease, troponin 0.064  This ischemia is likely due to very poorly controlled blood pressure with SBP as high as 198 for the past 3 days  Patient needs better BP control, findings not consistent with ACS

## 2018-09-30 NOTE — PROGRESS NOTES
"[facility]  Colorectal Surgery  Progress Note    Patient Name: Rosendo Thomas  MRN: [id number]  Admission Date: 9/25/2018  Hospital Length of Stay: 5 days  Attending Physician: Chito Banks MD    Subjective:     Interval History: Reported small soft BM overnight but >2L NGT output and dark brown- yellow thick output with continued dilated loops of bowel on KUB this morning, rosita down to the 30s overnight, placed on telemetry, reporting no chest pain, shortness of breath, HR now in the 40, feeling as though he is "drowning in fluid" sating 93% while on 2L NC     Post-Op Info:  Procedure(s) (LRB):  COLECTOMY-LAPAROSCOPIC LEFT (N/A)   5 Days Post-Op      Medications:  Continuous Infusions:   sodium chloride 0.45% 125 mL/hr at 09/30/18 0655    glucagon (human recombinant)       Scheduled Meds:   enoxparin  40 mg Subcutaneous Q24H    potassium chloride  10 mEq Intravenous Q1H    potassium phosphate IVPB  30 mmol Intravenous BID    potassium phosphate IVPB  30 mmol Intravenous BID    tamsulosin  0.4 mg Oral Daily     PRN Meds:   dextrose 50%    dextrose 50%    gabapentin    glucagon (human recombinant)    glucagon (human recombinant)    glucose    glucose    glucose    hydrALAZINE    HYDROmorphone    HYDROmorphone    insulin aspart U-100    labetalol    promethazine (PHENERGAN) IVPB    sodium chloride 0.9%        Objective:     Vital Signs (Most Recent):  Temp: 97.2 °F (36.2 °C) (09/29/18 2038)  Pulse: (!) 40 (09/30/18 0558)  Resp: 20 (09/29/18 2038)  BP: (!) 163/72 (09/30/18 0524)  SpO2: 95 % (09/30/18 0524) Vital Signs (24h Range):  Temp:  [97.2 °F (36.2 °C)-98.3 °F (36.8 °C)] 97.2 °F (36.2 °C)  Pulse:  [40-60] 40  Resp:  [20] 20  SpO2:  [92 %-97 %] 95 %  BP: (163-193)/(72-82) 163/72     Intake/Output - Last 3 Shifts       09/28 0700 - 09/29 0659 09/29 0700 - 09/30 0659 09/30 0700 - 10/01 0659    P.O.       I.V. (mL/kg) 2750 (22.8) 750 (6.2)     NG/GT       IV Piggyback 500   "    Total Intake(mL/kg) 3250 (26.9) 750 (6.2)     Urine (mL/kg/hr) 575 (0.2) 720 (0.2)     Drains 2300 2450     Stool 0 0     Total Output 2875 3170     Net +375 -2420            Urine Occurrence 1 x      Stool Occurrence 500 x 1 x           Physical Exam   Constitutional: He appears well-developed and well-nourished.   HENT:   Head: Normocephalic and atraumatic.   Cardiovascular: Normal rate.   Pulmonary/Chest: Effort normal.   On 2 L NC, pulling < 2000 on ICS, no overt accessory muscle use    Abdominal: Soft. He exhibits no distension.   Remains distended without rebound, remains appropriately TTP around the midline incision site   Neurological: He is alert.   Skin: Skin is warm and dry.   Nursing note and vitals reviewed.          Significant Labs:  BMP (Last 3 Results):   Recent Labs   Lab  09/28/18   0619  09/29/18   0347  09/30/18   0150  09/30/18   0448   GLU  148*  168*  160*  146*   NA  145  148*  148*  148*   K  3.3*  3.3*  2.9*  2.9*   CL  106  105  104  102   CO2  30*  31*  33*  34*   BUN  10  10  10  10   CREATININE  0.8  0.8  0.7  0.8   CALCIUM  8.4*  8.7  8.3*  8.8   MG  1.9  2.0   --   2.2     CBC (Last 3 Results):   Recent Labs   Lab  09/28/18   0619  09/29/18   0347  09/30/18   0448   WBC  7.35  5.33  5.93   RBC  3.89*  4.39*  3.88*   HGB  11.4*  13.0*  11.0*   HCT  36.5*  40.7  36.1*   PLT  120*  149*  164   MCV  94  93  93   MCH  29.3  29.6  28.4   MCHC  31.2*  31.9*  30.5*       Significant Diagnostics:  None    Assessment/Plan:     * Colon cancer    Rosendo Thomas is a 73 y.o. male s/p COLECTOMY-LAPAROSCOPIC LEFT (N/A) on 9/25/2018 recovering in stable condition on the floor.     - prn pain and nausea control - w/ IVBT  - maintain room air as tolerated  - hypertensive - requiring IVBT, cards consulted, appreciate recs   - NPO, NGT in place, IVF, fluid replacements due to high output, additionally limiting ice chiips  - continue following voids, minimally adequate UOP   - SSI  - Hct stable  -  OOB, ICS, SCDs  Dispo: pending full ROBF and decreased NGT output, PO pain control                 Pippa Pascual MD  Colorectal Surgery  [facility]

## 2018-09-30 NOTE — CONSULTS
[facility]  Cardiology  Consult Note    Patient Name: Rosendo Thomas  MRN: [id number]  Admission Date: 9/25/2018  Hospital Length of Stay: 5 days  Code Status: No Order   Attending Provider: Chito Banks MD   Consulting Provider: Matias Bone MD  Primary Care Physician: Domi Lackey MD  Principal Problem:Colon cancer    Patient information was obtained from patient and ER records.     Inpatient consult to Cardiology  Consult performed by: Matias Bone MD  Consult ordered by: Magdalene Oh MD        Subjective:     Chief Complaint:  Sinus bradycardia    HPI:   73 M with PMH colon cancer s/p left colectomy 9/25, DM2 consulted to cardiology for EKG changes. The patient is noted to be bradycardic on telemetry and has 1st degree AV block and ST abnormalities on post-op EKG. The patient reports not feeling well currently but that he is improving post-operatively. He reports increasing energy and exercise capacity and his pain is well controlled. He denies chest pain, palpitations or peripheral edema. He reported also being asymptomatic prior to hospital admission; denies symptoms of chest pain/tightness/pressure/discomfort, orthopnea, PND, peripheral edema or BROOKS. He denies palpitations or any irregular heart rhythm, has never experienced syncope in his lifetime and there is no family history of CAD or SCD.    Past Medical History:   Diagnosis Date    Diabetes mellitus type II 7/2010    diet controlled    Nephrolithiasis     Obesity     Psoriasis     Skin cancer     Squamous cell carcinoma        Past Surgical History:   Procedure Laterality Date    APPENDECTOMY      COLECTOMY-LAPAROSCOPIC LEFT N/A 9/25/2018    Performed by Chito Banks MD at [facility] OR [address] [place] [address]    COLONOSCOPY N/A 7/9/2018    Procedure: COLONOSCOPY;  Surgeon: Kameron Ruff MD;  Location: [facility];  Service: Endoscopy;  Laterality: N/A;  confirmed    COLONOSCOPY N/A 7/9/2018    Performed by Kameron  MD Speedy at [facility]O    LAPAROSCOPIC LEFT COLECTOMY N/A 9/25/2018    Procedure: COLECTOMY-LAPAROSCOPIC LEFT;  Surgeon: Chito Banks MD;  Location: [facility] OR [address] [place] [address];  Service: Colon and Rectal;  Laterality: N/A;    SKIN CANCER EXCISION      SKIN GRAFT      VASECTOMY      VASECTOMY         Review of patient's allergies indicates:  No Known Allergies    No current facility-administered medications on file prior to encounter.      Current Outpatient Medications on File Prior to Encounter   Medication Sig    blood sugar diagnostic (CONTOUR TEST STRIPS) Strp 1 each by Misc.(Non-Drug; Combo Route) route 2 (two) times daily.    lancets Misc USE ONE LANCET TWO TIMES DAILY    metFORMIN (GLUCOPHAGE) 500 MG tablet Take 1.5 tablets (750 mg total) by mouth 2 (two) times daily with meals. (Patient taking differently: Take 500 mg by mouth daily with breakfast. )    olmesartan-hydrochlorothiazide (BENICAR HCT) 40-25 mg per tablet Take 1 tablet by mouth once daily. (Patient taking differently: Take 1 tablet by mouth every morning. )     Family History     Problem Relation (Age of Onset)    Cancer Father    Diabetes Brother        Tobacco Use    Smoking status: Light Tobacco Smoker     Years: 15.00     Types: Cigars    Smokeless tobacco: Never Used    Tobacco comment: cigars-x1 yr.   Substance and Sexual Activity    Alcohol use: Yes     Comment: occassional    Drug use: No    Sexual activity: Yes     Partners: Female     Review of Systems   Constitution: Negative for chills, diaphoresis, fever and night sweats.   HENT: Negative.    Eyes: Negative for blurred vision and photophobia.   Cardiovascular: Negative for chest pain, claudication, cyanosis, dyspnea on exertion, irregular heartbeat, leg swelling, near-syncope, orthopnea, palpitations, paroxysmal nocturnal dyspnea and syncope.   Respiratory: Negative for cough, hemoptysis, shortness of breath and wheezing.    Skin: Negative for color change and rash.    Musculoskeletal: Negative for back pain and falls.   Gastrointestinal: Positive for abdominal pain. Negative for nausea and vomiting.   Genitourinary: Negative for dysuria and hematuria.   Neurological: Negative for focal weakness, numbness and seizures.   Psychiatric/Behavioral: Negative for altered mental status. The patient is not nervous/anxious.      Objective:     Vital Signs (Most Recent):  Temp: 97.5 °F (36.4 °C) (09/30/18 0730)  Pulse: (!) 51 (09/30/18 0805)  Resp: 20 (09/30/18 0730)  BP: (!) 182/78 (09/30/18 0730)  SpO2: (!) 94 % (09/30/18 0730) Vital Signs (24h Range):  Temp:  [97.2 °F (36.2 °C)-98.3 °F (36.8 °C)] 97.5 °F (36.4 °C)  Pulse:  [40-60] 51  Resp:  [20] 20  SpO2:  [92 %-97 %] 94 %  BP: (163-193)/(72-82) 182/78     Weight: 120.7 kg (266 lb)  Body mass index is 33.25 kg/m².    SpO2: (!) 94 %  O2 Device (Oxygen Therapy): room air      Intake/Output Summary (Last 24 hours) at 9/30/2018 0851  Last data filed at 9/30/2018 0745  Gross per 24 hour   Intake --   Output 4220 ml   Net -4220 ml       Lines/Drains/Airways     Drain                 NG/OG Tube 09/26/18 1658 16 Fr. Left nostril 3 days          Peripheral Intravenous Line                 Peripheral IV - Single Lumen Anterior;Right Antecubital -- days                Physical Exam   Constitutional: He is oriented to person, place, and time. He appears well-developed and well-nourished. No distress.   HENT:   Head: Normocephalic and atraumatic.   NG tube   Eyes: EOM are normal. Pupils are equal, round, and reactive to light.   Neck: Normal range of motion. No JVD present.   Cardiovascular: Normal rate, regular rhythm, normal heart sounds and intact distal pulses. Exam reveals no gallop and no friction rub.   No murmur heard.  Pulmonary/Chest: Effort normal and breath sounds normal. No respiratory distress. He has no wheezes. He has no rales.   Abdominal: Soft. Bowel sounds are normal. He exhibits no distension. There is no tenderness.    Abdominal incision c/d/i   Musculoskeletal: Normal range of motion. He exhibits no edema.   Neurological: He is alert and oriented to person, place, and time.   Skin: Skin is warm. No rash noted. He is not diaphoretic.   Psychiatric: He has a normal mood and affect. His behavior is normal.       Significant Labs:     Recent Results (from the past 24 hour(s))   POCT glucose    Collection Time: 09/29/18 12:00 PM   Result Value Ref Range    POCT Glucose 158 (H) 70 - 110 mg/dL   POCT glucose    Collection Time: 09/29/18  6:06 PM   Result Value Ref Range    POCT Glucose 140 (H) 70 - 110 mg/dL   Comprehensive metabolic panel    Collection Time: 09/30/18  1:50 AM   Result Value Ref Range    Sodium 148 (H) 136 - 145 mmol/L    Potassium 2.9 (L) 3.5 - 5.1 mmol/L    Chloride 104 95 - 110 mmol/L    CO2 33 (H) 23 - 29 mmol/L    Glucose 160 (H) 70 - 110 mg/dL    BUN, Bld 10 8 - 23 mg/dL    Creatinine 0.7 0.5 - 1.4 mg/dL    Calcium 8.3 (L) 8.7 - 10.5 mg/dL    Total Protein 5.5 (L) 6.0 - 8.4 g/dL    Albumin 2.6 (L) 3.5 - 5.2 g/dL    Total Bilirubin 0.4 0.1 - 1.0 mg/dL    Alkaline Phosphatase 45 (L) 55 - 135 U/L    AST 16 10 - 40 U/L    ALT 16 10 - 44 U/L    Anion Gap 11 8 - 16 mmol/L    eGFR if African American >60.0 >60 mL/min/1.73 m^2    eGFR if non African American >60.0 >60 mL/min/1.73 m^2   Magnesium    Collection Time: 09/30/18  4:48 AM   Result Value Ref Range    Magnesium 2.2 1.6 - 2.6 mg/dL   Phosphorus    Collection Time: 09/30/18  4:48 AM   Result Value Ref Range    Phosphorus 2.2 (L) 2.7 - 4.5 mg/dL   CBC auto differential    Collection Time: 09/30/18  4:48 AM   Result Value Ref Range    WBC 5.93 3.90 - 12.70 K/uL    RBC 3.88 (L) 4.60 - 6.20 M/uL    Hemoglobin 11.0 (L) 14.0 - 18.0 g/dL    Hematocrit 36.1 (L) 40.0 - 54.0 %    MCV 93 82 - 98 fL    MCH 28.4 27.0 - 31.0 pg    MCHC 30.5 (L) 32.0 - 36.0 g/dL    RDW 13.6 11.5 - 14.5 %    Platelets 164 150 - 350 K/uL    MPV 10.8 9.2 - 12.9 fL    Immature Granulocytes 0.3 0.0  - 0.5 %    Gran # (ANC) 4.4 1.8 - 7.7 K/uL    Immature Grans (Abs) 0.02 0.00 - 0.04 K/uL    Lymph # 0.9 (L) 1.0 - 4.8 K/uL    Mono # 0.6 0.3 - 1.0 K/uL    Eos # 0.1 0.0 - 0.5 K/uL    Baso # 0.02 0.00 - 0.20 K/uL    nRBC 0 0 /100 WBC    Gran% 74.2 (H) 38.0 - 73.0 %    Lymph% 14.5 (L) 18.0 - 48.0 %    Mono% 9.4 4.0 - 15.0 %    Eosinophil% 1.3 0.0 - 8.0 %    Basophil% 0.3 0.0 - 1.9 %    Differential Method Automated    Basic metabolic panel    Collection Time: 09/30/18  4:48 AM   Result Value Ref Range    Sodium 148 (H) 136 - 145 mmol/L    Potassium 2.9 (L) 3.5 - 5.1 mmol/L    Chloride 102 95 - 110 mmol/L    CO2 34 (H) 23 - 29 mmol/L    Glucose 146 (H) 70 - 110 mg/dL    BUN, Bld 10 8 - 23 mg/dL    Creatinine 0.8 0.5 - 1.4 mg/dL    Calcium 8.8 8.7 - 10.5 mg/dL    Anion Gap 12 8 - 16 mmol/L    eGFR if African American >60.0 >60 mL/min/1.73 m^2    eGFR if non African American >60.0 >60 mL/min/1.73 m^2   Troponin I    Collection Time: 09/30/18  4:48 AM   Result Value Ref Range    Troponin I 0.064 (H) 0.000 - 0.026 ng/mL     Imaging   have reviewed all pertinent imaging studies    Assessment and Plan:     Sinus bradycardia    Sinus bradycardia with first degree AV block noted on EKGs, telemetry  Patient has asymptomatic bradycardia as he has normal functional capacity, denies syncope, dizziness or light headedness and has had a treadmill stress test in February of this year for which he reached 6 METs and a peak heart rate of 126 which is 85% of his maximum heart rate  EKG with TWI of several leads, no symptoms of angina or HF and no family history of heart disease, troponin 0.064  This ischemia is likely due to very poorly controlled blood pressure with SBP as high as 198 for the past 3 days  Patient needs better BP control, findings not consistent with ACS            VTE Risk Mitigation (From admission, onward)        Ordered     Place TAZ hose  Once      09/25/18 2632     enoxaparin injection 40 mg  Every 24 hours  (non-standard times)      09/25/18 7653          Thank you for your consult.    Matias Bone MD  Cardiology   [facility]

## 2018-09-30 NOTE — ASSESSMENT & PLAN NOTE
Rosendo Thomas is a 73 y.o. male s/p COLECTOMY-LAPAROSCOPIC LEFT (N/A) on 9/25/2018 recovering in stable condition on the floor.     - prn pain and nausea control - w/ IVBT  - maintain room air as tolerated  - hypertensive - requiring IVBT, cards consulted, appreciate recs   - NPO, NGT in place, IVF, fluid replacements due to high output, additionally limiting ice chiips  - continue following voids, minimally adequate UOP   - SSI  - Hct stable  - OOB, ICS, SCDs  Dispo: pending full ROBF and decreased NGT output, PO pain control

## 2018-10-01 LAB
ANION GAP SERPL CALC-SCNC: 11 MMOL/L
BASOPHILS # BLD AUTO: 0.02 K/UL
BASOPHILS NFR BLD: 0.3 %
BUN SERPL-MCNC: 12 MG/DL
CALCIUM SERPL-MCNC: 8.5 MG/DL
CHLORIDE SERPL-SCNC: 100 MMOL/L
CO2 SERPL-SCNC: 35 MMOL/L
CREAT SERPL-MCNC: 0.8 MG/DL
DIFFERENTIAL METHOD: ABNORMAL
EOSINOPHIL # BLD AUTO: 0.1 K/UL
EOSINOPHIL NFR BLD: 1.5 %
ERYTHROCYTE [DISTWIDTH] IN BLOOD BY AUTOMATED COUNT: 13.7 %
EST. GFR  (AFRICAN AMERICAN): >60 ML/MIN/1.73 M^2
EST. GFR  (NON AFRICAN AMERICAN): >60 ML/MIN/1.73 M^2
GLUCOSE SERPL-MCNC: 161 MG/DL
HCT VFR BLD AUTO: 35.9 %
HGB BLD-MCNC: 11.4 G/DL
IMM GRANULOCYTES # BLD AUTO: 0.03 K/UL
IMM GRANULOCYTES NFR BLD AUTO: 0.5 %
LYMPHOCYTES # BLD AUTO: 1 K/UL
LYMPHOCYTES NFR BLD: 14.4 %
MAGNESIUM SERPL-MCNC: 2 MG/DL
MCH RBC QN AUTO: 28.6 PG
MCHC RBC AUTO-ENTMCNC: 31.8 G/DL
MCV RBC AUTO: 90 FL
MONOCYTES # BLD AUTO: 0.5 K/UL
MONOCYTES NFR BLD: 8 %
NEUTROPHILS # BLD AUTO: 5 K/UL
NEUTROPHILS NFR BLD: 75.3 %
NRBC BLD-RTO: 0 /100 WBC
PHOSPHATE SERPL-MCNC: 3.6 MG/DL
PLATELET # BLD AUTO: 172 K/UL
PMV BLD AUTO: 10.3 FL
POTASSIUM SERPL-SCNC: 3 MMOL/L
RBC # BLD AUTO: 3.99 M/UL
SODIUM SERPL-SCNC: 146 MMOL/L
TROPONIN I SERPL DL<=0.01 NG/ML-MCNC: 0.04 NG/ML
WBC # BLD AUTO: 6.62 K/UL

## 2018-10-01 PROCEDURE — B4185 PARENTERAL SOL 10 GM LIPIDS: HCPCS | Performed by: STUDENT IN AN ORGANIZED HEALTH CARE EDUCATION/TRAINING PROGRAM

## 2018-10-01 PROCEDURE — 25000003 PHARM REV CODE 250: Performed by: STUDENT IN AN ORGANIZED HEALTH CARE EDUCATION/TRAINING PROGRAM

## 2018-10-01 PROCEDURE — 36415 COLL VENOUS BLD VENIPUNCTURE: CPT

## 2018-10-01 PROCEDURE — 76937 US GUIDE VASCULAR ACCESS: CPT

## 2018-10-01 PROCEDURE — 63600175 PHARM REV CODE 636 W HCPCS: Performed by: NURSE PRACTITIONER

## 2018-10-01 PROCEDURE — 83735 ASSAY OF MAGNESIUM: CPT

## 2018-10-01 PROCEDURE — 02HV33Z INSERTION OF INFUSION DEVICE INTO SUPERIOR VENA CAVA, PERCUTANEOUS APPROACH: ICD-10-PCS | Performed by: COLON & RECTAL SURGERY

## 2018-10-01 PROCEDURE — 20600001 HC STEP DOWN PRIVATE ROOM

## 2018-10-01 PROCEDURE — 25500020 PHARM REV CODE 255: Performed by: STUDENT IN AN ORGANIZED HEALTH CARE EDUCATION/TRAINING PROGRAM

## 2018-10-01 PROCEDURE — 36569 INSJ PICC 5 YR+ W/O IMAGING: CPT

## 2018-10-01 PROCEDURE — 84100 ASSAY OF PHOSPHORUS: CPT

## 2018-10-01 PROCEDURE — 43752 NASAL/OROGASTRIC W/TUBE PLMT: CPT

## 2018-10-01 PROCEDURE — 25500020 PHARM REV CODE 255: Performed by: COLON & RECTAL SURGERY

## 2018-10-01 PROCEDURE — 63600175 PHARM REV CODE 636 W HCPCS: Performed by: STUDENT IN AN ORGANIZED HEALTH CARE EDUCATION/TRAINING PROGRAM

## 2018-10-01 PROCEDURE — C1751 CATH, INF, PER/CENT/MIDLINE: HCPCS

## 2018-10-01 PROCEDURE — 80048 BASIC METABOLIC PNL TOTAL CA: CPT

## 2018-10-01 PROCEDURE — 85025 COMPLETE CBC W/AUTO DIFF WBC: CPT

## 2018-10-01 PROCEDURE — 25000003 PHARM REV CODE 250: Performed by: COLON & RECTAL SURGERY

## 2018-10-01 PROCEDURE — 63600175 PHARM REV CODE 636 W HCPCS: Performed by: SURGERY

## 2018-10-01 PROCEDURE — 84484 ASSAY OF TROPONIN QUANT: CPT

## 2018-10-01 RX ORDER — SODIUM CHLORIDE 0.9 % (FLUSH) 0.9 %
10 SYRINGE (ML) INJECTION EVERY 6 HOURS
Status: DISCONTINUED | OUTPATIENT
Start: 2018-10-01 | End: 2018-10-08 | Stop reason: HOSPADM

## 2018-10-01 RX ORDER — SODIUM CHLORIDE 0.9 % (FLUSH) 0.9 %
10 SYRINGE (ML) INJECTION
Status: DISCONTINUED | OUTPATIENT
Start: 2018-10-01 | End: 2018-10-08 | Stop reason: HOSPADM

## 2018-10-01 RX ORDER — HYDRALAZINE HYDROCHLORIDE 20 MG/ML
10 INJECTION INTRAMUSCULAR; INTRAVENOUS EVERY 6 HOURS PRN
Status: DISCONTINUED | OUTPATIENT
Start: 2018-10-01 | End: 2018-10-08 | Stop reason: HOSPADM

## 2018-10-01 RX ADMIN — ASCORBIC ACID, VITAMIN A PALMITATE, CHOLECALCIFEROL, THIAMINE HYDROCHLORIDE, RIBOFLAVIN-5 PHOSPHATE SODIUM, PYRIDOXINE HYDROCHLORIDE, NIACINAMIDE, DEXPANTHENOL, ALPHA-TOCOPHEROL ACETATE, VITAMIN K1, FOLIC ACID, BIOTIN, CYANOCOBALAMIN: 200; 3300; 200; 6; 3.6; 6; 40; 15; 10; 150; 600; 60; 5 INJECTION, SOLUTION INTRAVENOUS at 06:10

## 2018-10-01 RX ADMIN — IOHEXOL 100 ML: 350 INJECTION, SOLUTION INTRAVENOUS at 12:10

## 2018-10-01 RX ADMIN — IOHEXOL 30 ML: 350 INJECTION, SOLUTION INTRAVENOUS at 12:10

## 2018-10-01 RX ADMIN — IOHEXOL 15 ML: 350 INJECTION, SOLUTION INTRAVENOUS at 09:10

## 2018-10-01 RX ADMIN — INSULIN ASPART 3 UNITS: 100 INJECTION, SOLUTION INTRAVENOUS; SUBCUTANEOUS at 11:10

## 2018-10-01 RX ADMIN — PROMETHAZINE HYDROCHLORIDE 12.5 MG: 25 INJECTION INTRAMUSCULAR; INTRAVENOUS at 02:10

## 2018-10-01 RX ADMIN — ONDANSETRON HYDROCHLORIDE 8 MG: 2 INJECTION, SOLUTION INTRAMUSCULAR; INTRAVENOUS at 09:10

## 2018-10-01 RX ADMIN — POTASSIUM PHOSPHATE, MONOBASIC AND POTASSIUM PHOSPHATE, DIBASIC 30 MMOL: 224; 236 INJECTION, SOLUTION, CONCENTRATE INTRAVENOUS at 05:10

## 2018-10-01 RX ADMIN — ENOXAPARIN SODIUM 40 MG: 100 INJECTION SUBCUTANEOUS at 06:10

## 2018-10-01 RX ADMIN — OLMESARTAN MEDOXOMIL 40 MG: 20 TABLET, COATED ORAL at 09:10

## 2018-10-01 RX ADMIN — SOYBEAN OIL 250 ML: 20 INJECTION, SOLUTION INTRAVENOUS at 09:10

## 2018-10-01 RX ADMIN — IOHEXOL 15 ML: 350 INJECTION, SOLUTION INTRAVENOUS at 10:10

## 2018-10-01 RX ADMIN — TAMSULOSIN HYDROCHLORIDE 0.4 MG: 0.4 CAPSULE ORAL at 09:10

## 2018-10-01 RX ADMIN — HYDRALAZINE HYDROCHLORIDE 10 MG: 20 INJECTION INTRAMUSCULAR; INTRAVENOUS at 06:10

## 2018-10-01 NOTE — ANESTHESIA POSTPROCEDURE EVALUATION
"Anesthesia Post Evaluation    Patient: Rosendo Thomas    Procedure(s) Performed: Procedure(s) (LRB):  COLECTOMY-LAPAROSCOPIC LEFT (N/A)    Final Anesthesia Type: general  Patient location during evaluation: PACU  Patient participation: Yes- Able to Participate  Level of consciousness: awake and alert  Post-procedure vital signs: reviewed and stable  Pain management: adequate  Airway patency: patent  PONV status at discharge: No PONV  Anesthetic complications: no      Cardiovascular status: hemodynamically stable  Respiratory status: room air, spontaneous ventilation and unassisted  Hydration status: euvolemic  Follow-up not needed.        Visit Vitals  BP (!) 194/84 (BP Location: Left arm, Patient Position: Lying)   Pulse 77   Temp 37.4 °C (99.3 °F) (Oral)   Resp 16   Ht 6' 3" (1.905 m)   Wt 120.7 kg (266 lb)   SpO2 95%   BMI 33.25 kg/m²       Pain/Ivory Score: Pain Assessment Performed: Yes (10/1/2018 12:00 PM)  Presence of Pain: denies (10/1/2018 12:00 PM)  Pain Rating Prior to Med Admin: 3 (9/30/2018  2:18 PM)        "

## 2018-10-01 NOTE — CONSULTS
[facility]  Cardiology  Consult Note    Patient Name: Rosendo Thomas  MRN: [id number]  Admission Date: 9/25/2018  Hospital Length of Stay: 6 days  Code Status: No Order   Attending Provider: Chito Banks MD   Consulting Provider: Vishnu Rosenbaum MD  Primary Care Physician: Domi Lackey MD  Principal Problem:Colon cancer    Patient information was obtained from patient and ER records.     Consults  Subjective:     Chief Complaint:  Bradycardia    HPI:   73 M with [facility] colon cancer s/p left colectomy 9/25, DM2 consulted to cardiology for EKG changes. The patient is noted to be bradycardic on telemetry and has 1st degree AV block and ST abnormalities on post-op EKG. The patient reports not feeling well currently but that he is improving post-operatively. He reports increasing energy and exercise capacity and his pain is well controlled. He denies chest pain, palpitations or peripheral edema. He reported also being asymptomatic prior to hospital admission; denies symptoms of chest pain/tightness/pressure/discomfort, orthopnea, PND, peripheral edema or BROOKS. He denies palpitations or any irregular heart rhythm, has never experienced syncope in his lifetime and there is no family history of CAD or SCD.    Past Medical History:   Diagnosis Date    Diabetes mellitus type II 7/2010    diet controlled    Nephrolithiasis     Obesity     Psoriasis     Skin cancer     Squamous cell carcinoma        Past Surgical History:   Procedure Laterality Date    APPENDECTOMY      COLECTOMY-LAPAROSCOPIC LEFT N/A 9/25/2018    [facility]    COLONOSCOPY N/A 7/9/2018    Procedure: COLONOSCOPY;  Surgeon: Kameron Ruff MD;  Location: [facility];  Service: Endoscopy;  Laterality: N/A;  confirmed    COLONOSCOPY N/A 7/9/2018    Performed by Kameron Ruff MD at [facility] ENDO    LAPAROSCOPIC LEFT COLECTOMY N/A 9/25/2018    Procedure: COLECTOMY-LAPAROSCOPIC LEFT;  Surgeon:  Chito Banks MD;  Location: [facility] OR [address] [place] [address];  Service: Colon and Rectal;  Laterality: N/A;    SKIN CANCER EXCISION      SKIN GRAFT      VASECTOMY      VASECTOMY         Review of patient's allergies indicates:  No Known Allergies    No current facility-administered medications on file prior to encounter.      Current Outpatient Medications on File Prior to Encounter   Medication Sig    blood sugar diagnostic (CONTOUR TEST STRIPS) Strp 1 each by Misc.(Non-Drug; Combo Route) route 2 (two) times daily.    lancets Misc USE ONE LANCET TWO TIMES DAILY    metFORMIN (GLUCOPHAGE) 500 MG tablet Take 1.5 tablets (750 mg total) by mouth 2 (two) times daily with meals. (Patient taking differently: Take 500 mg by mouth daily with breakfast. )    olmesartan-hydrochlorothiazide (BENICAR HCT) 40-25 mg per tablet Take 1 tablet by mouth once daily. (Patient taking differently: Take 1 tablet by mouth every morning. )     Family History     Problem Relation (Age of Onset)    Cancer Father    Diabetes Brother        Tobacco Use    Smoking status: Light Tobacco Smoker     Years: 15.00     Types: Cigars    Smokeless tobacco: Never Used    Tobacco comment: cigars-x1 yr.   Substance and Sexual Activity    Alcohol use: Yes     Comment: occassional    Drug use: No    Sexual activity: Yes     Partners: Female     Review of Systems   Constitution: Negative for chills, diaphoresis, fever and night sweats.   HENT: Negative.    Eyes: Negative for blurred vision and photophobia.   Cardiovascular: Negative for chest pain, claudication, cyanosis, dyspnea on exertion, irregular heartbeat, leg swelling, near-syncope, orthopnea, palpitations, paroxysmal nocturnal dyspnea and syncope.   Respiratory: Negative for cough, hemoptysis, shortness of breath and wheezing.    Skin: Negative for color change and rash.   Musculoskeletal: Negative for back pain and falls.   Gastrointestinal: Positive for abdominal pain. Negative for nausea and  vomiting.   Genitourinary: Negative for dysuria and hematuria.   Neurological: Negative for focal weakness, numbness and seizures.   Psychiatric/Behavioral: Negative for altered mental status. The patient is not nervous/anxious.      Objective:     Vital Signs (Most Recent):  Temp: 99.3 °F (37.4 °C) (10/01/18 0730)  Pulse: 77 (10/01/18 1114)  Resp: 16 (10/01/18 0730)  BP: (!) 194/84 (10/01/18 0730)  SpO2: 95 % (10/01/18 0730) Vital Signs (24h Range):  Temp:  [98 °F (36.7 °C)-99.3 °F (37.4 °C)] 99.3 °F (37.4 °C)  Pulse:  [49-77] 77  Resp:  [16-20] 16  SpO2:  [94 %-95 %] 95 %  BP: (166-194)/(72-84) 194/84     Weight: 120.7 kg (266 lb)  Body mass index is 33.25 kg/m².    SpO2: 95 %  O2 Device (Oxygen Therapy): nasal cannula      Intake/Output Summary (Last 24 hours) at 10/1/2018 1254  Last data filed at 10/1/2018 0900  Gross per 24 hour   Intake 500 ml   Output 2050 ml   Net -1550 ml       Lines/Drains/Airways     Drain                 NG/OG Tube 10/01/18 0250 16 Fr. Left nostril less than 1 day          Peripheral Intravenous Line                 Peripheral IV - Single Lumen Anterior;Right Antecubital -- days                Physical Exam   Constitutional: He is oriented to person, place, and time. He appears well-developed and well-nourished. No distress.   HENT:   Head: Normocephalic and atraumatic.   NG tube   Eyes: EOM are normal. Pupils are equal, round, and reactive to light.   Neck: Normal range of motion. No JVD present.   Cardiovascular: Normal rate, regular rhythm, normal heart sounds and intact distal pulses. Exam reveals no gallop and no friction rub.   No murmur heard.  Pulmonary/Chest: Effort normal and breath sounds normal. No respiratory distress. He has no wheezes. He has no rales.   Abdominal: Soft. Bowel sounds are normal. He exhibits no distension. There is no tenderness.   Abdominal incision c/d/i   Musculoskeletal: Normal range of motion. He exhibits no edema.   Neurological: He is alert and  oriented to person, place, and time.   Skin: Skin is warm. No rash noted. He is not diaphoretic.   Psychiatric: He has a normal mood and affect. His behavior is normal.       Assessment and Plan:     Asymptomatic Sinus bradycardia    -Sinus bradycardia with first degree AV block noted on EKGs, telemetry  -No evidence of chronotropic incompetence.  -EKG with TWI of anterior leads, with no active chest pain.    Recommendations:  -Obtain TTE for evaluation of LV function and wall motion abnormalities.   -While NPO, can give Enalaprilat 1.25mgs Q6 hours for HTN control. And restart Olmesartan PO when able.   -No need to trend troponins..             VTE Risk Mitigation (From admission, onward)        Ordered     Place TAZ hose  Once      09/25/18 1845     enoxaparin injection 40 mg  Every 24 hours (non-standard times)      09/25/18 1845          Thank you for your consult. I will sign off. Please contact us if you have any additional questions.    Vishnu Rosenbaum MD  Cardiology   [facility]

## 2018-10-01 NOTE — RESIDENT HANDOFF
Was notified by RN of nausea/vomiting. Instructed to place NG tube and ordered x-ray to confirm placement. X-ray noted NG tube at GE junction with distended stomach. I went to bedside at 03:35 and with assistance of RN, advanced NG tube ~7 cm to achieve more ideal positioning within stomach. NG was hooked up to LIWS and gastric contents were emptied. Patient's abdomen was distended but soft and nontender.    TIARA Lazo MD  General Surgery - PGY 1

## 2018-10-01 NOTE — SUBJECTIVE & OBJECTIVE
Subjective:     Interval History: c/o nausea overnight and vomiting x1. Unrelieved with zofran or phenergan. NGT was reinserted, set to LIWS and pt felt much better.     Post-Op Info:  Procedure(s) (LRB):  COLECTOMY-LAPAROSCOPIC LEFT (N/A)   6 Days Post-Op      Medications:  Continuous Infusions:   glucagon (human recombinant)       Scheduled Meds:   enoxparin  40 mg Subcutaneous Q24H    olmesartan  40 mg Oral Daily    potassium phosphate IVPB  30 mmol Intravenous BID    tamsulosin  0.4 mg Oral Daily     PRN Meds:   dextrose 50%    dextrose 50%    gabapentin    glucagon (human recombinant)    glucagon (human recombinant)    glucose    glucose    glucose    hydrALAZINE    HYDROmorphone    HYDROmorphone    insulin aspart U-100    labetalol    ondansetron    promethazine (PHENERGAN) IVPB    sodium chloride 0.9%        Objective:     Vital Signs (Most Recent):  Temp: 99.3 °F (37.4 °C) (10/01/18 0730)  Pulse: 61 (10/01/18 0730)  Resp: 16 (10/01/18 0730)  BP: (!) 194/84 (10/01/18 0730)  SpO2: 95 % (10/01/18 0730) Vital Signs (24h Range):  Temp:  [97 °F (36.1 °C)-99.3 °F (37.4 °C)] 99.3 °F (37.4 °C)  Pulse:  [42-65] 61  Resp:  [16-20] 16  SpO2:  [94 %-95 %] 95 %  BP: (166-194)/(72-84) 194/84     Intake/Output - Last 3 Shifts       09/29 0700 - 09/30 0659 09/30 0700 - 10/01 0659 10/01 0700 - 10/02 0659    I.V. (mL/kg) 750 (6.2)      IV Piggyback  500     Total Intake(mL/kg) 750 (6.2) 500 (4.1)     Urine (mL/kg/hr) 720 (0.2) 250 (0.1)     Drains 2450 2100     Stool 0 0     Total Output 3170 2350     Net -2420 -1850            Urine Occurrence  3 x     Stool Occurrence 1 x 1 x           Physical Exam   Constitutional: He appears well-developed and well-nourished.   HENT:   Head: Normocephalic and atraumatic.   NGT in place    Cardiovascular: Normal rate.   Pulmonary/Chest: Effort normal.   On 2 L NC, pulling < 2000 on ICS, no overt accessory muscle use    Abdominal: Soft. He exhibits no distension.    Remains distended but soft without rebound, remains appropriately TTP around the midline incision site   Neurological: He is alert.   Skin: Skin is warm and dry.   Nursing note and vitals reviewed.        Significant Labs:  BMP (Last 3 Results):   Recent Labs   Lab  09/29/18   0347  09/30/18   0150  09/30/18   0448  10/01/18   0130   GLU  168*  160*  146*  161*   NA  148*  148*  148*  146*   K  3.3*  2.9*  2.9*  3.0*   CL  105  104  102  100   CO2  31*  33*  34*  35*   BUN  10  10  10  12   CREATININE  0.8  0.7  0.8  0.8   CALCIUM  8.7  8.3*  8.8  8.5*   MG  2.0   --   2.2  2.0     CBC (Last 3 Results):   Recent Labs   Lab  09/29/18   0347 09/30/18   0448  10/01/18   0130   WBC  5.33  5.93  6.62   RBC  4.39*  3.88*  3.99*   HGB  13.0*  11.0*  11.4*   HCT  40.7  36.1*  35.9*   PLT  149*  164  172   MCV  93  93  90   MCH  29.6  28.4  28.6   MCHC  31.9*  30.5*  31.8*       Significant Diagnostics:  None

## 2018-10-01 NOTE — PROGRESS NOTES
[facility]  Colorectal Surgery  Progress Note    Patient Name: Rosendo Thomas  MRN: [id number]  Admission Date: 9/25/2018  Hospital Length of Stay: 6 days  Attending Physician: Chito Banks MD    Subjective:     Interval History: c/o nausea overnight and vomiting x1. Unrelieved with zofran or phenergan. NGT was reinserted, set to LIWS and pt felt much better.     Post-Op Info:  Procedure(s) (LRB):  COLECTOMY-LAPAROSCOPIC LEFT (N/A)   6 Days Post-Op      Medications:  Continuous Infusions:   glucagon (human recombinant)       Scheduled Meds:   enoxparin  40 mg Subcutaneous Q24H    olmesartan  40 mg Oral Daily    potassium phosphate IVPB  30 mmol Intravenous BID    tamsulosin  0.4 mg Oral Daily     PRN Meds:   dextrose 50%    dextrose 50%    gabapentin    glucagon (human recombinant)    glucagon (human recombinant)    glucose    glucose    glucose    hydrALAZINE    HYDROmorphone    HYDROmorphone    insulin aspart U-100    labetalol    ondansetron    promethazine (PHENERGAN) IVPB    sodium chloride 0.9%        Objective:     Vital Signs (Most Recent):  Temp: 99.3 °F (37.4 °C) (10/01/18 0730)  Pulse: 61 (10/01/18 0730)  Resp: 16 (10/01/18 0730)  BP: (!) 194/84 (10/01/18 0730)  SpO2: 95 % (10/01/18 0730) Vital Signs (24h Range):  Temp:  [97 °F (36.1 °C)-99.3 °F (37.4 °C)] 99.3 °F (37.4 °C)  Pulse:  [42-65] 61  Resp:  [16-20] 16  SpO2:  [94 %-95 %] 95 %  BP: (166-194)/(72-84) 194/84     Intake/Output - Last 3 Shifts       09/29 0700 - 09/30 0659 09/30 0700 - 10/01 0659 10/01 0700 - 10/02 0659    I.V. (mL/kg) 750 (6.2)      IV Piggyback  500     Total Intake(mL/kg) 750 (6.2) 500 (4.1)     Urine (mL/kg/hr) 720 (0.2) 250 (0.1)     Drains 2450 2100     Stool 0 0     Total Output 3170 2350     Net -2420 -1850            Urine Occurrence  3 x     Stool Occurrence 1 x 1 x           Physical Exam   Constitutional: He appears well-developed and well-nourished.   HENT:   Head:  Normocephalic and atraumatic.   NGT in place    Cardiovascular: Normal rate.   Pulmonary/Chest: Effort normal.   On 2 L NC, pulling < 2000 on ICS, no overt accessory muscle use    Abdominal: Soft. He exhibits no distension.   Remains distended but soft without rebound, remains appropriately TTP around the midline incision site   Neurological: He is alert.   Skin: Skin is warm and dry.   Nursing note and vitals reviewed.        Significant Labs:  BMP (Last 3 Results):   Recent Labs   Lab  09/29/18 0347  09/30/18   0150  09/30/18   0448  10/01/18   0130   GLU  168*  160*  146*  161*   NA  148*  148*  148*  146*   K  3.3*  2.9*  2.9*  3.0*   CL  105  104  102  100   CO2  31*  33*  34*  35*   BUN  10  10  10  12   CREATININE  0.8  0.7  0.8  0.8   CALCIUM  8.7  8.3*  8.8  8.5*   MG  2.0   --   2.2  2.0     CBC (Last 3 Results):   Recent Labs   Lab  09/29/18 0347 09/30/18   0448  10/01/18   0130   WBC  5.33  5.93  6.62   RBC  4.39*  3.88*  3.99*   HGB  13.0*  11.0*  11.4*   HCT  40.7  36.1*  35.9*   PLT  149*  164  172   MCV  93  93  90   MCH  29.6  28.4  28.6   MCHC  31.9*  30.5*  31.8*       Significant Diagnostics:  None    Assessment/Plan:     * Colon cancer    Rosendo Thomas is a 73 y.o. male s/p COLECTOMY-LAPAROSCOPIC LEFT (N/A) on 9/25/2018 recovering in stable condition on the floor.     - prn pain and nausea control  - maintain room air as tolerated  - hypertensive - requiring IVBT, cards consulted, appreciate recs   - NPO, replaced NGT, IVF, fluid replacements due to high output, additionally limiting ice chips   - he is approaching need for nutrition, will place PICC consult for TPN   - continue following voids, minimally adequate UOP   - SSI  - BID PPI   - Hct stable  - OOB, ICS, SCDs    Dispo: Due to persistent N/V required replacement of NGT and decreased bowel function, we will order CTAP with oral, IV, and rectal contrast               Yenni MD Yves  Colorectal Surgery  [facility]  [place]

## 2018-10-01 NOTE — CONSULTS
Double lumen PICC placed in right basilic vein of RUE, 40cm in length with 0cm exposed and 35cm arm circumference. Lot#[id number].

## 2018-10-01 NOTE — ASSESSMENT & PLAN NOTE
Rosendo Thomas is a 73 y.o. male s/p COLECTOMY-LAPAROSCOPIC LEFT (N/A) on 9/25/2018 recovering in stable condition on the floor.     - prn pain and nausea control  - maintain room air as tolerated  - hypertensive - requiring IVBT, cards consulted, appreciate recs   - NPO, replaced NGT, IVF, fluid replacements due to high output, additionally limiting ice chips   - he is approaching need for nutrition, will place PICC consult for TPN   - continue following voids, minimally adequate UOP   - SSI  - BID PPI   - Hct stable  - OOB, ICS, SCDs    Dispo: Due to persistent N/V required replacement of NGT and decreased bowel function, we will order CTAP with oral, IV, and rectal contrast

## 2018-10-01 NOTE — PROCEDURES
"Rosendo Thomas is a 73 y.o. male patient.    Temp: 98.3 °F (36.8 °C) (10/01/18 1518)  Pulse: (!) 55 (10/01/18 1518)  Resp: 16 (10/01/18 1518)  BP: (!) 190/78 (10/01/18 1518)  SpO2: (!) 90 % (10/01/18 1518)  Weight: 120.7 kg (266 lb) (09/26/18 2023)  Height: 6' 3" (190.5 cm) (09/26/18 2023)    PICC  Date/Time: 10/1/2018 4:21 PM  Performed by: Andreea Gonzalez RN  Assisting provider: Shelly Tate LPN  Consent Done: Yes  Time out: Immediately prior to procedure a time out was called to verify the correct patient, procedure, equipment, support staff and site/side marked as required  Indications: med administration and vascular access  Anesthesia: local infiltration  Local anesthetic: lidocaine 1% without epinephrine  Anesthetic Total (mL): 3  Preparation: skin prepped with ChloraPrep  Skin prep agent dried: skin prep agent completely dried prior to procedure  Sterile barriers: all five maximum sterile barriers used - cap, mask, sterile gown, sterile gloves, and large sterile sheet  Hand hygiene: hand hygiene performed prior to central venous catheter insertion  Location details: right basilic  Catheter type: double lumen  Catheter size: 5 Fr  Catheter Length: 40cm    Ultrasound guidance: yes  Vessel Caliber: medium and patent, compressibility normal  Vascular Doppler: not done  Needle advanced into vessel with real time Ultrasound guidance.  Guidewire confirmed in vessel.  Image recorded and saved.  Sterile sheath used.  Number of attempts: 1  Post-procedure: blood return through all ports, chlorhexidine patch and sterile dressing applied  Technical procedures used: 3cg  Specimens: No  Implants: No  Assessment: placement verified by x-ray  Complications: none          Shelly Tate  10/1/2018  "

## 2018-10-01 NOTE — PLAN OF CARE
Problem: Patient Care Overview  Goal: Plan of Care Review  Outcome: Ongoing (interventions implemented as appropriate)  POC reviewed w/ pt, verbalized understanding. Pt AAOx4. VSS on 2 L NC.  Patient on telemetry, rosita. Patient denied pain overnight. Pt voids per urinal, with adequate UOP overnight. Patient NPO. Patient complaining of nausea and vomiting unrelieved eith IV zofran and phenergan. MD made aware and ordered for NG tube insertion. NG tube intact and patent, LIWS. Pt slept between care. Frequent rounds made for pt safety. Call light in reach and bed in lowest position. WCTM

## 2018-10-02 LAB
ANION GAP SERPL CALC-SCNC: 11 MMOL/L
ANION GAP SERPL CALC-SCNC: 7 MMOL/L
BASOPHILS # BLD AUTO: 0.01 K/UL
BASOPHILS NFR BLD: 0.1 %
BUN SERPL-MCNC: 10 MG/DL
BUN SERPL-MCNC: 12 MG/DL
CALCIUM SERPL-MCNC: 9.2 MG/DL
CALCIUM SERPL-MCNC: 9.6 MG/DL
CHLORIDE SERPL-SCNC: 94 MMOL/L
CHLORIDE SERPL-SCNC: 94 MMOL/L
CO2 SERPL-SCNC: 39 MMOL/L
CO2 SERPL-SCNC: 43 MMOL/L
CREAT SERPL-MCNC: 0.8 MG/DL
CREAT SERPL-MCNC: 0.8 MG/DL
DIASTOLIC DYSFUNCTION: NO
DIFFERENTIAL METHOD: ABNORMAL
EOSINOPHIL # BLD AUTO: 0.2 K/UL
EOSINOPHIL NFR BLD: 2.5 %
ERYTHROCYTE [DISTWIDTH] IN BLOOD BY AUTOMATED COUNT: 13.5 %
EST. GFR  (AFRICAN AMERICAN): >60 ML/MIN/1.73 M^2
EST. GFR  (AFRICAN AMERICAN): >60 ML/MIN/1.73 M^2
EST. GFR  (NON AFRICAN AMERICAN): >60 ML/MIN/1.73 M^2
EST. GFR  (NON AFRICAN AMERICAN): >60 ML/MIN/1.73 M^2
GLUCOSE SERPL-MCNC: 223 MG/DL
GLUCOSE SERPL-MCNC: 232 MG/DL
HCT VFR BLD AUTO: 36.7 %
HGB BLD-MCNC: 11.8 G/DL
IMM GRANULOCYTES # BLD AUTO: 0.02 K/UL
IMM GRANULOCYTES NFR BLD AUTO: 0.3 %
LYMPHOCYTES # BLD AUTO: 0.9 K/UL
LYMPHOCYTES NFR BLD: 12.8 %
MAGNESIUM SERPL-MCNC: 2.1 MG/DL
MCH RBC QN AUTO: 29.2 PG
MCHC RBC AUTO-ENTMCNC: 32.2 G/DL
MCV RBC AUTO: 91 FL
MONOCYTES # BLD AUTO: 0.5 K/UL
MONOCYTES NFR BLD: 7.9 %
NEUTROPHILS # BLD AUTO: 5.2 K/UL
NEUTROPHILS NFR BLD: 76.4 %
NRBC BLD-RTO: 0 /100 WBC
PHOSPHATE SERPL-MCNC: 2.7 MG/DL
PLATELET # BLD AUTO: 167 K/UL
PMV BLD AUTO: 11 FL
POCT GLUCOSE: 194 MG/DL (ref 70–110)
POCT GLUCOSE: 216 MG/DL (ref 70–110)
POCT GLUCOSE: 230 MG/DL (ref 70–110)
POCT GLUCOSE: 235 MG/DL (ref 70–110)
POCT GLUCOSE: 254 MG/DL (ref 70–110)
POCT GLUCOSE: 256 MG/DL (ref 70–110)
POTASSIUM SERPL-SCNC: 2.6 MMOL/L
POTASSIUM SERPL-SCNC: 2.8 MMOL/L
RBC # BLD AUTO: 4.04 M/UL
RETIRED EF AND QEF - SEE NOTES: 60 (ref 55–65)
SODIUM SERPL-SCNC: 144 MMOL/L
SODIUM SERPL-SCNC: 144 MMOL/L
WBC # BLD AUTO: 6.82 K/UL

## 2018-10-02 PROCEDURE — 80048 BASIC METABOLIC PNL TOTAL CA: CPT

## 2018-10-02 PROCEDURE — A4217 STERILE WATER/SALINE, 500 ML: HCPCS | Performed by: NURSE PRACTITIONER

## 2018-10-02 PROCEDURE — 25000003 PHARM REV CODE 250: Performed by: NURSE PRACTITIONER

## 2018-10-02 PROCEDURE — 25000003 PHARM REV CODE 250: Performed by: STUDENT IN AN ORGANIZED HEALTH CARE EDUCATION/TRAINING PROGRAM

## 2018-10-02 PROCEDURE — 93306 TTE W/DOPPLER COMPLETE: CPT | Mod: 26,,, | Performed by: INTERNAL MEDICINE

## 2018-10-02 PROCEDURE — 20600001 HC STEP DOWN PRIVATE ROOM

## 2018-10-02 PROCEDURE — 84100 ASSAY OF PHOSPHORUS: CPT

## 2018-10-02 PROCEDURE — 63600175 PHARM REV CODE 636 W HCPCS: Performed by: STUDENT IN AN ORGANIZED HEALTH CARE EDUCATION/TRAINING PROGRAM

## 2018-10-02 PROCEDURE — C8929 TTE W OR WO FOL WCON,DOPPLER: HCPCS

## 2018-10-02 PROCEDURE — 99900035 HC TECH TIME PER 15 MIN (STAT)

## 2018-10-02 PROCEDURE — B4185 PARENTERAL SOL 10 GM LIPIDS: HCPCS | Performed by: NURSE PRACTITIONER

## 2018-10-02 PROCEDURE — 25000003 PHARM REV CODE 250: Performed by: COLON & RECTAL SURGERY

## 2018-10-02 PROCEDURE — 94664 DEMO&/EVAL PT USE INHALER: CPT

## 2018-10-02 PROCEDURE — 63600175 PHARM REV CODE 636 W HCPCS: Performed by: NURSE PRACTITIONER

## 2018-10-02 PROCEDURE — 85025 COMPLETE CBC W/AUTO DIFF WBC: CPT

## 2018-10-02 PROCEDURE — A4216 STERILE WATER/SALINE, 10 ML: HCPCS | Performed by: COLON & RECTAL SURGERY

## 2018-10-02 PROCEDURE — 83735 ASSAY OF MAGNESIUM: CPT

## 2018-10-02 PROCEDURE — 63600175 PHARM REV CODE 636 W HCPCS: Performed by: SURGERY

## 2018-10-02 PROCEDURE — 80048 BASIC METABOLIC PNL TOTAL CA: CPT | Mod: 91

## 2018-10-02 RX ORDER — IPRATROPIUM BROMIDE AND ALBUTEROL SULFATE 2.5; .5 MG/3ML; MG/3ML
3 SOLUTION RESPIRATORY (INHALATION) EVERY 4 HOURS PRN
Status: DISCONTINUED | OUTPATIENT
Start: 2018-10-02 | End: 2018-10-08 | Stop reason: HOSPADM

## 2018-10-02 RX ORDER — POTASSIUM CHLORIDE 29.8 MG/ML
20 INJECTION INTRAVENOUS ONCE
Status: DISCONTINUED | OUTPATIENT
Start: 2018-10-02 | End: 2018-10-02

## 2018-10-02 RX ORDER — SODIUM CHLORIDE 9 MG/ML
INJECTION, SOLUTION INTRAVENOUS CONTINUOUS
Status: DISCONTINUED | OUTPATIENT
Start: 2018-10-02 | End: 2018-10-06

## 2018-10-02 RX ORDER — POTASSIUM CHLORIDE 29.8 MG/ML
40 INJECTION INTRAVENOUS ONCE
Status: DISCONTINUED | OUTPATIENT
Start: 2018-10-02 | End: 2018-10-02

## 2018-10-02 RX ORDER — POTASSIUM CHLORIDE 14.9 MG/ML
20 INJECTION INTRAVENOUS
Status: COMPLETED | OUTPATIENT
Start: 2018-10-03 | End: 2018-10-03

## 2018-10-02 RX ORDER — INSULIN ASPART 100 [IU]/ML
1-10 INJECTION, SOLUTION INTRAVENOUS; SUBCUTANEOUS EVERY 4 HOURS PRN
Status: DISCONTINUED | OUTPATIENT
Start: 2018-10-02 | End: 2018-10-04

## 2018-10-02 RX ORDER — POTASSIUM CHLORIDE 29.8 MG/ML
40 INJECTION INTRAVENOUS ONCE
Status: COMPLETED | OUTPATIENT
Start: 2018-10-02 | End: 2018-10-02

## 2018-10-02 RX ADMIN — INSULIN ASPART 2 UNITS: 100 INJECTION, SOLUTION INTRAVENOUS; SUBCUTANEOUS at 10:10

## 2018-10-02 RX ADMIN — Medication 10 ML: at 05:10

## 2018-10-02 RX ADMIN — TAMSULOSIN HYDROCHLORIDE 0.4 MG: 0.4 CAPSULE ORAL at 10:10

## 2018-10-02 RX ADMIN — INSULIN ASPART 4 UNITS: 100 INJECTION, SOLUTION INTRAVENOUS; SUBCUTANEOUS at 02:10

## 2018-10-02 RX ADMIN — INSULIN ASPART 6 UNITS: 100 INJECTION, SOLUTION INTRAVENOUS; SUBCUTANEOUS at 11:10

## 2018-10-02 RX ADMIN — SODIUM CHLORIDE 1000 ML: 0.9 INJECTION, SOLUTION INTRAVENOUS at 10:10

## 2018-10-02 RX ADMIN — Medication 10 ML: at 11:10

## 2018-10-02 RX ADMIN — Medication 10 ML: at 12:10

## 2018-10-02 RX ADMIN — POTASSIUM CHLORIDE 20 MEQ: 200 INJECTION, SOLUTION INTRAVENOUS at 09:10

## 2018-10-02 RX ADMIN — INSULIN ASPART 4 UNITS: 100 INJECTION, SOLUTION INTRAVENOUS; SUBCUTANEOUS at 05:10

## 2018-10-02 RX ADMIN — ONDANSETRON HYDROCHLORIDE 8 MG: 2 INJECTION, SOLUTION INTRAMUSCULAR; INTRAVENOUS at 09:10

## 2018-10-02 RX ADMIN — CALCIUM GLUCONATE: 94 INJECTION, SOLUTION INTRAVENOUS at 10:10

## 2018-10-02 RX ADMIN — POTASSIUM CHLORIDE 40 MEQ: 29.8 INJECTION, SOLUTION INTRAVENOUS at 06:10

## 2018-10-02 RX ADMIN — SOYBEAN OIL 250 ML: 20 INJECTION, SOLUTION INTRAVENOUS at 10:10

## 2018-10-02 RX ADMIN — OLMESARTAN MEDOXOMIL 40 MG: 20 TABLET, COATED ORAL at 10:10

## 2018-10-02 RX ADMIN — ENOXAPARIN SODIUM 40 MG: 100 INJECTION SUBCUTANEOUS at 06:10

## 2018-10-02 RX ADMIN — ONDANSETRON HYDROCHLORIDE 8 MG: 2 INJECTION, SOLUTION INTRAMUSCULAR; INTRAVENOUS at 05:10

## 2018-10-02 RX ADMIN — INSULIN ASPART 2 UNITS: 100 INJECTION, SOLUTION INTRAVENOUS; SUBCUTANEOUS at 06:10

## 2018-10-02 RX ADMIN — Medication 10 ML: at 06:10

## 2018-10-02 NOTE — PHYSICIAN QUERY
PT Name: Rosendo Thomas  MR #: [id number]    Physician Query Form - Relationship to Procedure Clarification     CDS/: Sammie Olsen RN, CCDS               Contact information:  [email or web address]    This form is a permanent document in the medical record.     Query Date: October 2, 2018      By submitting this query, we are merely seeking further clarification of documentation. Please utilize your independent clinical judgment when addressing the question(s) below.    The Medical record contains the following:  Supporting Clinical Findings Location in Medical Record   Date of procedure:  09/25/2018.  Malignant colon polyp.  1.  Laparoscopic-assisted extended left colectomy.  2.  Laparoscopic-assisted splenic flexure mobilization.    NGT re-inserted overnight for N/V.   CT A/P today - no clear point of transition, looks more c/w ileus, no anast leak  NGT output more thin, now passing some gas and liquid stool.   Continue NGT/NPO for now.   PICC placed, to start TPN tonight.  Await clear signs of resumption of bowel function.     Op note          CRS progress note 10/01       Please clarify if ileus is    [  ] Inherent/Integral to procedure  [  ] Routine outcome  [  ] Complication of procedure  [x  ] Clinically undetermined

## 2018-10-02 NOTE — PHYSICIAN QUERY
PT Name: Rosendo Thomas  MR #: [id number]     Physician Query Form - Documentation Clarification      CDS/: Sammie Olsen RN, CCDS               Contact information:  [email or web address]    This form is a permanent document in the medical record.     Query Date: October 2, 2018    By submitting this query, we are merely seeking further clarification of documentation. Please utilize your independent clinical judgment when addressing the question(s) below.    The Medical record reflects the following:    Supporting Clinical Findings Location in Medical Record   Potassium  4.5  3.9  3.3  2.9  3.0  2.6    Potassium chloride IVPB Lab  09/26 09/27 09/28, 09/29 09/30  0150 & 0448  10/01  10/02    MAR  09/30, 10/02   Phosphorous  3.2  1.5 - 2.5 [range]  3.6  2.7    Potassium phosphate infusion intravenous    Sodium phosphate IVPB Lab  09/26 09/27 - 09/30  10/01  10/02    MAR 09/28, 09/30, 10/01    MAR  09/27                                                                            Doctor, Please specify diagnosis or diagnoses associated with above clinical findings.    Provider Use Only        [  x] Hypokalemia    [   ]x Hypophosphatemia    [   ] Other (specify) ________________                                                                                                             [  ] Clinically undetermined

## 2018-10-02 NOTE — PLAN OF CARE
Problem: Patient Care Overview  Goal: Plan of Care Review      Recommendations    Recommendation/Intervention:     1. Current TPN not meeting EEN/EPN. Recommend increase TPN rate to 95 mL/hr to provide 2506 kcal (98% EEN), 114 gm protein (95% EPN), and 2280 mL water.    - Monitor glucose levels.     2. When medically able, ADAT to Diabetic diet.     3. RD following.     Goals: meet >85% EEN/EPN  Nutrition Goal Status: new

## 2018-10-02 NOTE — SUBJECTIVE & OBJECTIVE
Subjective:     Interval History: NGT with high output 3900cc, no nv, pos for flatus with small bm    Post-Op Info:  Procedure(s) (LRB):  COLECTOMY-LAPAROSCOPIC LEFT (N/A)   7 Days Post-Op      Medications:  Continuous Infusions:   Amino acid 5% - dextrose 20% (CLINIMIX-E) solution with additives (1L provides 50 gm AA, 200 gm CHO (680 kcal/L dextrose), Na 35, K 30, Mg 5, Ca 4.5, Acetate 80, Cl 39, Phos 15) 50 mL/hr at 10/01/18 1851    glucagon (human recombinant)      TPN ADULT CENTRAL LINE CUSTOM       Scheduled Meds:   enoxparin  40 mg Subcutaneous Q24H    fat emulsion 20%  250 mL Intravenous Daily    olmesartan  40 mg Oral Daily    sodium chloride 0.9%  10 mL Intravenous Q6H    tamsulosin  0.4 mg Oral Daily     PRN Meds:   albuterol-ipratropium    dextrose 50%    dextrose 50%    gabapentin    glucagon (human recombinant)    glucagon (human recombinant)    glucose    glucose    glucose    hydrALAZINE    HYDROmorphone    HYDROmorphone    insulin aspart U-100    labetalol    ondansetron    promethazine (PHENERGAN) IVPB    sodium chloride 0.9%    sodium chloride 0.9%        Objective:     Vital Signs (Most Recent):  Temp: 98.2 °F (36.8 °C) (10/02/18 1134)  Pulse: 68 (10/02/18 1221)  Resp: 18 (10/02/18 1134)  BP: 126/73 (10/02/18 1134)  SpO2: 96 % (10/02/18 1134) Vital Signs (24h Range):  Temp:  [96.7 °F (35.9 °C)-98.3 °F (36.8 °C)] 98.2 °F (36.8 °C)  Pulse:  [49-72] 68  Resp:  [14-18] 18  SpO2:  [90 %-97 %] 96 %  BP: (126-195)/(67-86) 126/73     Intake/Output - Last 3 Shifts       09/30 0700 - 10/01 0659 10/01 0700 - 10/02 0659 10/02 0700 - 10/03 0659    P.O.  60     I.V. (mL/kg)       NG/GT   60    IV Piggyback 500      TPN  597.6     Total Intake(mL/kg) 500 (4.1) 657.6 (5.4) 60 (0.5)    Urine (mL/kg/hr) 250 (0.1) 1775 (0.6)     Drains 2100 3900     Stool 0      Total Output 2350 5675     Net -1850 -5017.5 +60           Urine Occurrence 3 x      Stool Occurrence 1 x            Physical  Exam   Constitutional: He is oriented to person, place, and time. He appears well-developed and well-nourished. No distress.   Cardiovascular: Normal rate, regular rhythm and normal heart sounds.   Pulmonary/Chest: Effort normal and breath sounds normal. No respiratory distress. He has no wheezes. He has no rales.   Abdominal: Soft. He exhibits no distension and no mass. There is no tenderness. There is no guarding.   NGT intact with stomach contents  abd distended, soft  Inc line healing well   Musculoskeletal: Normal range of motion.   Neurological: He is alert and oriented to person, place, and time.   Skin: Skin is warm and dry.   Psychiatric: He has a normal mood and affect. His behavior is normal. Judgment and thought content normal.   Nursing note and vitals reviewed.        Significant Labs:  BMP (Last 3 Results):   Recent Labs   Lab  09/30/18   0448  10/01/18   0130  10/02/18   0430   GLU  146*  161*  232*   NA  148*  146*  144   K  2.9*  3.0*  2.6*   CL  102  100  94*   CO2  34*  35*  39*   BUN  10  12  10   CREATININE  0.8  0.8  0.8   CALCIUM  8.8  8.5*  9.2   MG  2.2  2.0  2.1     CBC (Last 3 Results):   Recent Labs   Lab  09/30/18   0448  10/01/18   0130  10/02/18   0430   WBC  5.93  6.62  6.82   RBC  3.88*  3.99*  4.04*   HGB  11.0*  11.4*  11.8*   HCT  36.1*  35.9*  36.7*   PLT  164  172  167   MCV  93  90  91   MCH  28.4  28.6  29.2   MCHC  30.5*  31.8*  32.2       Significant Diagnostics:  None

## 2018-10-02 NOTE — PLAN OF CARE
Problem: Patient Care Overview  Goal: Plan of Care Review  Plan of care reviewed, all questions and concerns addressed. Patient vital signs remain normal and stable for patient, with no complaints of SOB, headaches, or dizziness. Patient urine output remains adequate, Patient is tolerating NGT therapy well with no complaints of nausea or vomiting. Patient pain well controlled with ordered pain medications. Patient is resting quietly with side rails up and call light with in reach. Will continue to monitor patient status.

## 2018-10-02 NOTE — PROGRESS NOTES
" [facility]  Adult Nutrition  Progress Note    SUMMARY       Recommendations    Recommendation/Intervention:     1. Current TPN not meeting EEN/EPN. Recommend increase TPN rate to 95 mL/hr to provide 2506 kcal (98% EEN), 114 gm protein (95% EPN), and 2280 mL water.    - Monitor glucose levels.     2. When medically able, ADAT to Diabetic diet.     3. RD following.     Goals: meet >85% EEN/EPN  Nutrition Goal Status: new  Communication of RD Recs: (POC)    Reason for Assessment    Reason for Assessment: new TPN  Diagnosis: cancer diagnosis/related complications(colon cancer s/p colectomy)  Relevant Medical History: T2DM, diverticulosis, hypercholesterolemia  Interdisciplinary Rounds: attended  General Information Comments: POD7 s/p colectomy. Pt NPO on TPN + IV lipids. Pt denies N/V/D. C/o constipation. Reports appetite PTA was wonderful. Per chart review, wt stable. Pt appears nourished. RD does not feel that pt meets malnutrition criteria at this time.   Nutrition Discharge Planning: unable to determine at this time    Nutrition/Diet History    Do you have any cultural, spiritual, Buddhism conflicts, given your current situation?: none  Factors Affecting Nutritional Intake: altered gastrointestinal function, NPO    Anthropometrics    Temp: 98.3 °F (36.8 °C)  Height Method: Stated  Height: 6' 3" (190.5 cm)  Height (inches): 75 in  Weight Method: Bed Scale  Weight: 120.7 kg (266 lb)  Weight (lb): 266 lb  Ideal Body Weight (IBW), Male: 196 lb  % Ideal Body Weight, Male (lb): 135.71 lb  BMI (Calculated): 33.3  BMI Grade: 30 - 34.9- obesity - grade I     Lab/Procedures/Meds    Pertinent Labs Reviewed: reviewed  Pertinent Labs Comments: K 2.6, glucose 232  Pertinent Medications Reviewed: reviewed  Pertinent Medications Comments: noted    Physical Findings/Assessment    Overall Physical Appearance: nourished, overweight  Tubes: nasogastric tube  Skin: incision(s)    Estimated/Assessed Needs    Weight " Used For Calorie Calculations: 120.7 kg (266 lb 1.5 oz)  Energy Calorie Requirements (kcal): 2546 kcal/day  Energy Need Method: [place]-St Jeor(x 1.25)  Protein Requirements: 120-157 gm/day(1.0-1.3 gm/kg)  Weight Used For Protein Calculations: 120.7 kg (266 lb 1.5 oz)  Fluid Requirements (mL): 1 mL/kcal or per MD  Fluid Need Method: RDA Method  RDA Method (mL): 2546  CHO Requirement: 50% kcals from CHO    Nutrition Prescription Ordered    Current Diet Order: NPO  Current Nutrition Support Formula Ordered: Clinimix E 5/20(+ IV lipids)  Current Nutrition Support Rate Ordered: 50 (ml)  Current Nutrition Support Frequency Ordered: mL/hr    Evaluation of Received Nutrient/Fluid Intake    Parenteral Calories (kcal): 1056  Parenteral Protein (gm): 60  Parenteral Fluid (mL): 1200  Lipid Calories (kcals): 500 kcals  GIR (Glucose Infusion Rate) (mg/kg/min): 1.79 mg/kg/min  Total Calories (kcal/kg): 1556  % Kcal Needs: 61%  % Protein Needs: 50%  I/O: -4.66L x 24 hrs, -9.74L since admit  Energy Calories Required: not meeting needs  Protein Required: not meeting needs  Comments: LBM 9/30  Tolerance: tolerating  % Intake of Estimated Energy Needs: 50 - 75 %  % Meal Intake: NPO    Nutrition Risk    Level of Risk/Frequency of Follow-up: high(f/u 2 x wk)     Assessment and Plan    Nutrition Problem  Altered GI Function    Related to (etiology):   Colon cancer    Signs and Symptoms (as evidenced by):   S/p colectomy 9/25 and need for TPN to meet EEN/EPN.      Nutrition Diagnosis Status:   New    Monitor and Evaluation    Food and Nutrient Intake: energy intake, parenteral nutrition intake  Food and Nutrient Adminstration: enteral and parenteral nutrition administration  Anthropometric Measurements: weight, weight change, body mass index  Biochemical Data, Medical Tests and Procedures: electrolyte and renal panel, gastrointestinal profile, lipid profile, inflammatory profile, glucose/endocrine profile  Nutrition-Focused Physical  Findings: overall appearance     Nutrition Follow-Up    RD Follow-up?: Yes

## 2018-10-02 NOTE — NURSING
Report given to Renetta JUSTICE to resume care. VSS on 2 L per NC. Pt up independently ambulating halls free from falls and injury. Left NGT to LIWS with approx; 1200 mls of green thin drainge noted. Mild nausea at end of shift treated with IV Zofran. Denies SOB, CP, and dizziness. Denies pain at this time. NPO with TPN infusing as ordered. Potassium replacement fluids administered early in shift. Blood glucose monitored Q4 hrs with SSI coverage, see MAR. WCTM

## 2018-10-02 NOTE — ASSESSMENT & PLAN NOTE
Rosendo Thomas is a 73 y.o. male s/p COLECTOMY-LAPAROSCOPIC LEFT (N/A) on 9/25/2018 recovering in stable condition on the floor.     - prn pain and nausea control  - maintain room air as tolerated  - hypertensive - requiring IVBT, cards consulted, appreciate recs   - NPO, replaced NGT, IVF, fluid replacements due to high output, additionally limiting ice chips   - he is approaching need for nutrition, will place PICC consult for TPN   - continue following voids, minimally adequate UOP   - SSI  - BID PPI   - Hct stable  - OOB, ICS, SCDs    Dispo: Due to persistent N/V required replacement of NGT and decreased bowel function,

## 2018-10-02 NOTE — PROGRESS NOTES
[facility]  Colorectal Surgery  Progress Note    Patient Name: Rosendo Thomas  MRN: [id number]  Admission Date: 9/25/2018  Hospital Length of Stay: 7 days  Attending Physician: Chito Banks MD    Subjective:     Interval History: NGT with high output 3900cc, no nv, pos for flatus with small bm    Post-Op Info:  Procedure(s) (LRB):  COLECTOMY-LAPAROSCOPIC LEFT (N/A)   7 Days Post-Op      Medications:  Continuous Infusions:   Amino acid 5% - dextrose 20% (CLINIMIX-E) solution with additives (1L provides 50 gm AA, 200 gm CHO (680 kcal/L dextrose), Na 35, K 30, Mg 5, Ca 4.5, Acetate 80, Cl 39, Phos 15) 50 mL/hr at 10/01/18 1851    glucagon (human recombinant)      TPN ADULT CENTRAL LINE CUSTOM       Scheduled Meds:   enoxparin  40 mg Subcutaneous Q24H    fat emulsion 20%  250 mL Intravenous Daily    olmesartan  40 mg Oral Daily    sodium chloride 0.9%  10 mL Intravenous Q6H    tamsulosin  0.4 mg Oral Daily     PRN Meds:   albuterol-ipratropium    dextrose 50%    dextrose 50%    gabapentin    glucagon (human recombinant)    glucagon (human recombinant)    glucose    glucose    glucose    hydrALAZINE    HYDROmorphone    HYDROmorphone    insulin aspart U-100    labetalol    ondansetron    promethazine (PHENERGAN) IVPB    sodium chloride 0.9%    sodium chloride 0.9%        Objective:     Vital Signs (Most Recent):  Temp: 98.2 °F (36.8 °C) (10/02/18 1134)  Pulse: 68 (10/02/18 1221)  Resp: 18 (10/02/18 1134)  BP: 126/73 (10/02/18 1134)  SpO2: 96 % (10/02/18 1134) Vital Signs (24h Range):  Temp:  [96.7 °F (35.9 °C)-98.3 °F (36.8 °C)] 98.2 °F (36.8 °C)  Pulse:  [49-72] 68  Resp:  [14-18] 18  SpO2:  [90 %-97 %] 96 %  BP: (126-195)/(67-86) 126/73     Intake/Output - Last 3 Shifts       09/30 0700 - 10/01 0659 10/01 0700 - 10/02 0659 10/02 0700 - 10/03 0659    P.O.  60     I.V. (mL/kg)       NG/GT   60    IV Piggyback 500      TPN  597.6     Total Intake(mL/kg) 500 (4.1) 657.6 (5.4)  60 (0.5)    Urine (mL/kg/hr) 250 (0.1) 1775 (0.6)     Drains 2100 3900     Stool 0      Total Output 2350 5675     Net -1850 -5017.5 +60           Urine Occurrence 3 x      Stool Occurrence 1 x            Physical Exam   Constitutional: He is oriented to person, place, and time. He appears well-developed and well-nourished. No distress.   Cardiovascular: Normal rate, regular rhythm and normal heart sounds.   Pulmonary/Chest: Effort normal and breath sounds normal. No respiratory distress. He has no wheezes. He has no rales.   Abdominal: Soft. He exhibits no distension and no mass. There is no tenderness. There is no guarding.   NGT intact with stomach contents  abd distended, soft  Inc line healing well   Musculoskeletal: Normal range of motion.   Neurological: He is alert and oriented to person, place, and time.   Skin: Skin is warm and dry.   Psychiatric: He has a normal mood and affect. His behavior is normal. Judgment and thought content normal.   Nursing note and vitals reviewed.        Significant Labs:  BMP (Last 3 Results):   Recent Labs   Lab  09/30/18   0448  10/01/18   0130  10/02/18   0430   GLU  146*  161*  232*   NA  148*  146*  144   K  2.9*  3.0*  2.6*   CL  102  100  94*   CO2  34*  35*  39*   BUN  10  12  10   CREATININE  0.8  0.8  0.8   CALCIUM  8.8  8.5*  9.2   MG  2.2  2.0  2.1     CBC (Last 3 Results):   Recent Labs   Lab  09/30/18   0448  10/01/18   0130  10/02/18   0430   WBC  5.93  6.62  6.82   RBC  3.88*  3.99*  4.04*   HGB  11.0*  11.4*  11.8*   HCT  36.1*  35.9*  36.7*   PLT  164  172  167   MCV  93  90  91   MCH  28.4  28.6  29.2   MCHC  30.5*  31.8*  32.2       Significant Diagnostics:  None    Assessment/Plan:     * Colon cancer    Rosendo Thomas is a 73 y.o. male s/p COLECTOMY-LAPAROSCOPIC LEFT (N/A) on 9/25/2018 recovering in stable condition on the floor.     - prn pain and nausea control  - maintain room air as tolerated  - hypertensive - requiring IVBT, cards consulted,  appreciate recs   - NPO, replaced NGT, IVF, fluid replacements due to high output, additionally limiting ice chips   - he is approaching need for nutrition, will place PICC consult for TPN   - continue following voids, minimally adequate UOP   - SSI  - BID PPI   - Hct stable  - OOB, ICS, SCDs    Dispo: Due to persistent N/V required replacement of NGT and decreased bowel function,          Sinus bradycardia    Echo today  Tele  Monitor vs              Annalisa Armenta NP  Colorectal Surgery  [facility]

## 2018-10-02 NOTE — PLAN OF CARE
SW following for d/c needs. Needs to be determined.        Aida Heard, MSW, LCSW  [facility]  [id number]

## 2018-10-03 PROBLEM — Z78.9 ON TOTAL PARENTERAL NUTRITION (TPN): Status: ACTIVE | Noted: 2018-10-03

## 2018-10-03 LAB
ANION GAP SERPL CALC-SCNC: 8 MMOL/L
ANION GAP SERPL CALC-SCNC: 9 MMOL/L
BASOPHILS # BLD AUTO: 0.02 K/UL
BASOPHILS NFR BLD: 0.3 %
BUN SERPL-MCNC: 12 MG/DL
BUN SERPL-MCNC: 13 MG/DL
CALCIUM SERPL-MCNC: 8.6 MG/DL
CALCIUM SERPL-MCNC: 8.9 MG/DL
CHLORIDE SERPL-SCNC: 96 MMOL/L
CHLORIDE SERPL-SCNC: 96 MMOL/L
CO2 SERPL-SCNC: 39 MMOL/L
CO2 SERPL-SCNC: 40 MMOL/L
CREAT SERPL-MCNC: 0.8 MG/DL
CREAT SERPL-MCNC: 0.8 MG/DL
DIFFERENTIAL METHOD: ABNORMAL
EOSINOPHIL # BLD AUTO: 0.2 K/UL
EOSINOPHIL NFR BLD: 3.5 %
ERYTHROCYTE [DISTWIDTH] IN BLOOD BY AUTOMATED COUNT: 13.5 %
EST. GFR  (AFRICAN AMERICAN): >60 ML/MIN/1.73 M^2
EST. GFR  (AFRICAN AMERICAN): >60 ML/MIN/1.73 M^2
EST. GFR  (NON AFRICAN AMERICAN): >60 ML/MIN/1.73 M^2
EST. GFR  (NON AFRICAN AMERICAN): >60 ML/MIN/1.73 M^2
GLUCOSE SERPL-MCNC: 283 MG/DL
GLUCOSE SERPL-MCNC: 287 MG/DL
HCT VFR BLD AUTO: 37.3 %
HGB BLD-MCNC: 11.7 G/DL
IMM GRANULOCYTES # BLD AUTO: 0.02 K/UL
IMM GRANULOCYTES NFR BLD AUTO: 0.3 %
LYMPHOCYTES # BLD AUTO: 0.9 K/UL
LYMPHOCYTES NFR BLD: 13.2 %
MAGNESIUM SERPL-MCNC: 2 MG/DL
MCH RBC QN AUTO: 29 PG
MCHC RBC AUTO-ENTMCNC: 31.4 G/DL
MCV RBC AUTO: 93 FL
MONOCYTES # BLD AUTO: 0.5 K/UL
MONOCYTES NFR BLD: 6.8 %
NEUTROPHILS # BLD AUTO: 5 K/UL
NEUTROPHILS NFR BLD: 75.9 %
NRBC BLD-RTO: 0 /100 WBC
PHOSPHATE SERPL-MCNC: 2.9 MG/DL
PLATELET # BLD AUTO: 155 K/UL
PMV BLD AUTO: 11 FL
POCT GLUCOSE: 235 MG/DL (ref 70–110)
POCT GLUCOSE: 238 MG/DL (ref 70–110)
POCT GLUCOSE: 245 MG/DL (ref 70–110)
POCT GLUCOSE: 273 MG/DL (ref 70–110)
POCT GLUCOSE: 286 MG/DL (ref 70–110)
POCT GLUCOSE: 287 MG/DL (ref 70–110)
POTASSIUM SERPL-SCNC: 3.1 MMOL/L
POTASSIUM SERPL-SCNC: 3.2 MMOL/L
PREALB SERPL-MCNC: 10 MG/DL
RBC # BLD AUTO: 4.03 M/UL
SODIUM SERPL-SCNC: 144 MMOL/L
SODIUM SERPL-SCNC: 144 MMOL/L
TRIGL SERPL-MCNC: 182 MG/DL
WBC # BLD AUTO: 6.58 K/UL

## 2018-10-03 PROCEDURE — 20600001 HC STEP DOWN PRIVATE ROOM

## 2018-10-03 PROCEDURE — 63600175 PHARM REV CODE 636 W HCPCS: Performed by: NURSE PRACTITIONER

## 2018-10-03 PROCEDURE — 94664 DEMO&/EVAL PT USE INHALER: CPT

## 2018-10-03 PROCEDURE — 25000003 PHARM REV CODE 250: Performed by: STUDENT IN AN ORGANIZED HEALTH CARE EDUCATION/TRAINING PROGRAM

## 2018-10-03 PROCEDURE — 27000221 HC OXYGEN, UP TO 24 HOURS

## 2018-10-03 PROCEDURE — B4185 PARENTERAL SOL 10 GM LIPIDS: HCPCS | Performed by: NURSE PRACTITIONER

## 2018-10-03 PROCEDURE — 84134 ASSAY OF PREALBUMIN: CPT

## 2018-10-03 PROCEDURE — A4217 STERILE WATER/SALINE, 500 ML: HCPCS | Performed by: NURSE PRACTITIONER

## 2018-10-03 PROCEDURE — 80048 BASIC METABOLIC PNL TOTAL CA: CPT | Mod: 91

## 2018-10-03 PROCEDURE — 80048 BASIC METABOLIC PNL TOTAL CA: CPT

## 2018-10-03 PROCEDURE — 85025 COMPLETE CBC W/AUTO DIFF WBC: CPT

## 2018-10-03 PROCEDURE — 84478 ASSAY OF TRIGLYCERIDES: CPT

## 2018-10-03 PROCEDURE — 94761 N-INVAS EAR/PLS OXIMETRY MLT: CPT

## 2018-10-03 PROCEDURE — 25000003 PHARM REV CODE 250: Performed by: COLON & RECTAL SURGERY

## 2018-10-03 PROCEDURE — 99222 1ST HOSP IP/OBS MODERATE 55: CPT | Mod: GC,,, | Performed by: INTERNAL MEDICINE

## 2018-10-03 PROCEDURE — 25000003 PHARM REV CODE 250: Performed by: INTERNAL MEDICINE

## 2018-10-03 PROCEDURE — 99900035 HC TECH TIME PER 15 MIN (STAT)

## 2018-10-03 PROCEDURE — A4216 STERILE WATER/SALINE, 10 ML: HCPCS | Performed by: COLON & RECTAL SURGERY

## 2018-10-03 PROCEDURE — 63600175 PHARM REV CODE 636 W HCPCS: Performed by: STUDENT IN AN ORGANIZED HEALTH CARE EDUCATION/TRAINING PROGRAM

## 2018-10-03 PROCEDURE — 25000003 PHARM REV CODE 250: Performed by: NURSE PRACTITIONER

## 2018-10-03 PROCEDURE — 84100 ASSAY OF PHOSPHORUS: CPT

## 2018-10-03 PROCEDURE — 63600175 PHARM REV CODE 636 W HCPCS: Performed by: INTERNAL MEDICINE

## 2018-10-03 PROCEDURE — 83735 ASSAY OF MAGNESIUM: CPT

## 2018-10-03 PROCEDURE — C9113 INJ PANTOPRAZOLE SODIUM, VIA: HCPCS | Performed by: NURSE PRACTITIONER

## 2018-10-03 RX ORDER — PANTOPRAZOLE SODIUM 40 MG/10ML
40 INJECTION, POWDER, LYOPHILIZED, FOR SOLUTION INTRAVENOUS 2 TIMES DAILY
Status: DISCONTINUED | OUTPATIENT
Start: 2018-10-03 | End: 2018-10-08 | Stop reason: HOSPADM

## 2018-10-03 RX ORDER — IBUPROFEN 200 MG
24 TABLET ORAL
Status: DISCONTINUED | OUTPATIENT
Start: 2018-10-03 | End: 2018-10-04

## 2018-10-03 RX ORDER — IBUPROFEN 200 MG
16 TABLET ORAL
Status: DISCONTINUED | OUTPATIENT
Start: 2018-10-03 | End: 2018-10-04

## 2018-10-03 RX ORDER — FUROSEMIDE 10 MG/ML
20 INJECTION INTRAMUSCULAR; INTRAVENOUS ONCE
Status: COMPLETED | OUTPATIENT
Start: 2018-10-03 | End: 2018-10-03

## 2018-10-03 RX ORDER — GLUCAGON 1 MG
1 KIT INJECTION
Status: DISCONTINUED | OUTPATIENT
Start: 2018-10-03 | End: 2018-10-04

## 2018-10-03 RX ORDER — POTASSIUM CHLORIDE 7.45 MG/ML
10 INJECTION INTRAVENOUS
Status: COMPLETED | OUTPATIENT
Start: 2018-10-03 | End: 2018-10-03

## 2018-10-03 RX ADMIN — TAMSULOSIN HYDROCHLORIDE 0.4 MG: 0.4 CAPSULE ORAL at 09:10

## 2018-10-03 RX ADMIN — POTASSIUM CHLORIDE 20 MEQ: 200 INJECTION, SOLUTION INTRAVENOUS at 12:10

## 2018-10-03 RX ADMIN — PANTOPRAZOLE SODIUM 40 MG: 40 INJECTION, POWDER, FOR SOLUTION INTRAVENOUS at 09:10

## 2018-10-03 RX ADMIN — POTASSIUM CHLORIDE 10 MEQ: 7.46 INJECTION, SOLUTION INTRAVENOUS at 12:10

## 2018-10-03 RX ADMIN — SODIUM CHLORIDE: 0.9 INJECTION, SOLUTION INTRAVENOUS at 12:10

## 2018-10-03 RX ADMIN — FUROSEMIDE 20 MG: 10 INJECTION, SOLUTION INTRAMUSCULAR; INTRAVENOUS at 07:10

## 2018-10-03 RX ADMIN — INSULIN ASPART 2 UNITS: 100 INJECTION, SOLUTION INTRAVENOUS; SUBCUTANEOUS at 07:10

## 2018-10-03 RX ADMIN — Medication 10 ML: at 06:10

## 2018-10-03 RX ADMIN — POTASSIUM CHLORIDE 20 MEQ: 200 INJECTION, SOLUTION INTRAVENOUS at 02:10

## 2018-10-03 RX ADMIN — SODIUM CHLORIDE: 0.9 INJECTION, SOLUTION INTRAVENOUS at 11:10

## 2018-10-03 RX ADMIN — OLMESARTAN MEDOXOMIL 40 MG: 20 TABLET, COATED ORAL at 09:10

## 2018-10-03 RX ADMIN — Medication 10 ML: at 12:10

## 2018-10-03 RX ADMIN — POTASSIUM CHLORIDE 20 MEQ: 200 INJECTION, SOLUTION INTRAVENOUS at 04:10

## 2018-10-03 RX ADMIN — INSULIN ASPART 4 UNITS: 100 INJECTION, SOLUTION INTRAVENOUS; SUBCUTANEOUS at 03:10

## 2018-10-03 RX ADMIN — SODIUM CHLORIDE: 0.9 INJECTION, SOLUTION INTRAVENOUS at 01:10

## 2018-10-03 RX ADMIN — POTASSIUM CHLORIDE 10 MEQ: 7.46 INJECTION, SOLUTION INTRAVENOUS at 09:10

## 2018-10-03 RX ADMIN — POTASSIUM CHLORIDE 10 MEQ: 7.46 INJECTION, SOLUTION INTRAVENOUS at 01:10

## 2018-10-03 RX ADMIN — SODIUM CHLORIDE 1.2 UNITS/HR: 9 INJECTION, SOLUTION INTRAVENOUS at 11:10

## 2018-10-03 RX ADMIN — ENOXAPARIN SODIUM 40 MG: 100 INJECTION SUBCUTANEOUS at 06:10

## 2018-10-03 RX ADMIN — INSULIN ASPART 4 UNITS: 100 INJECTION, SOLUTION INTRAVENOUS; SUBCUTANEOUS at 10:10

## 2018-10-03 RX ADMIN — CALCIUM GLUCONATE: 94 INJECTION, SOLUTION INTRAVENOUS at 10:10

## 2018-10-03 RX ADMIN — INSULIN ASPART 6 UNITS: 100 INJECTION, SOLUTION INTRAVENOUS; SUBCUTANEOUS at 05:10

## 2018-10-03 RX ADMIN — SOYBEAN OIL 250 ML: 20 INJECTION, SOLUTION INTRAVENOUS at 10:10

## 2018-10-03 RX ADMIN — INSULIN ASPART 3 UNITS: 100 INJECTION, SOLUTION INTRAVENOUS; SUBCUTANEOUS at 01:10

## 2018-10-03 NOTE — SUBJECTIVE & OBJECTIVE
Interval HPI:   Overnight events:  Pain controlled.  +Flatus.  Family at bedside and involved in patient care.  Questions asked and addressed at bedside.    Patient is NPO but notes that he is hungry.    BG above goal in mid to upper 200s.  On sliding scale q4h.    Eating:   NPO  Nausea: No  Hypoglycemia and intervention: No  Fever: No  TPN and/or TF: Yes  If yes, type of TF/TPN and rate: 75cc/h    PMH, PSH, FH, SH updated and reviewed     ROS:  Review of Systems   Constitutional: Negative for chills, fever and unexpected weight change.   Eyes: Negative for visual disturbance.   Respiratory: Negative for shortness of breath.    Cardiovascular: Negative for chest pain.   Gastrointestinal: Positive for abdominal pain (some discomfort, but controlled). Negative for constipation, diarrhea, nausea and vomiting.   Genitourinary: Negative for urgency.   Musculoskeletal: Negative for arthralgias.   Skin: Negative for wound.   Neurological: Negative for headaches.   Hematological: Does not bruise/bleed easily.   Psychiatric/Behavioral: Negative for sleep disturbance.       Current Medications and/or Treatments Impacting Glycemic Control  Immunotherapy:    Immunosuppressants     None        Steroids:   Hormones (From admission, onward)    None        Pressors:    Autonomic Drugs (From admission, onward)    None        Hyperglycemia/Diabetes Medications:   Antihyperglycemics (From admission, onward)    Start     Stop Route Frequency Ordered    10/03/18 1130  insulin regular (Humulin R) 100 Units in sodium chloride 0.9% 100 mL infusion      -- IV Continuous 10/03/18 1022    10/02/18 0730  insulin aspart U-100 pen 1-10 Units      -- SubQ Every 4 hours PRN 10/02/18 0621        PHYSICAL EXAMINATION:  Vitals:    10/03/18 1252   BP:    Pulse: 66   Resp:    Temp:      Body mass index is 33.25 kg/m².    Physical Exam   Constitutional: He appears well-developed. No distress.   Pleasant gentleman   HENT:   Right Ear: External ear  normal.   Left Ear: External ear normal.   Nose: Nose normal.   Hearing normal  Dentition good   Neck: No tracheal deviation present. No thyromegaly present.   Cardiovascular: Normal rate.   No murmur heard.  Pulmonary/Chest: Effort normal and breath sounds normal.   Abdominal: Soft. Bowel sounds are normal. He exhibits no distension. There is no tenderness. There is no guarding. No hernia.   Vertical incision in lower abdomen  C/D/I - no s/sx of infection   Musculoskeletal: He exhibits no edema.   Neurological: He has normal reflexes. No cranial nerve deficit.   Alert and oriented   Skin: No rash noted. He is not diaphoretic.   Psychiatric: He has a normal mood and affect. Judgment normal.   Vitals reviewed.

## 2018-10-03 NOTE — ASSESSMENT & PLAN NOTE
S/p laparscopic colectomy  Now with ileus on TPN    Management per primary  Avoid hypoglycemia

## 2018-10-03 NOTE — PLAN OF CARE
Problem: Fall Risk (Adult)  Goal: Absence of Falls  Patient will demonstrate the desired outcomes by discharge/transition of care.  Outcome: Ongoing (interventions implemented as appropriate)  POC reviewed and understood by patient. Patient's AAOx4. Patient didn't c/o any pain throughout shift. PICC remained intact and flushed frequently during shift. Patient urinated per urinal. NG tube remained intact putting out brown greenish thin fluid. BS checked q4h. Pt tolerated TPN and lipids. Nausea managed by prn Zofran per md order. Pt's VSS. Family at bedside. SCDS in use. Call light WNR. No acute events at this time. WC patient.

## 2018-10-03 NOTE — PROGRESS NOTES
[facility]  Colorectal Surgery  Progress Note    Patient Name: Rosendo Thomas  MRN: [id number]  Admission Date: 9/25/2018  Hospital Length of Stay: 8 days  Attending Physician: Chito Banks MD    Subjective:     Interval History: no acute events. Continues to have bowel function but NGT still putting out. Pain controlled. Denies n/v this AM.    Post-Op Info:  Procedure(s) (LRB):  COLECTOMY-LAPAROSCOPIC LEFT (N/A)   8 Days Post-Op      Medications:  Continuous Infusions:   sodium chloride 0.9% 100 mL/hr at 10/03/18 0143    glucagon (human recombinant)      TPN ADULT CENTRAL LINE CUSTOM 75 mL/hr at 10/02/18 2245     Scheduled Meds:   enoxparin  40 mg Subcutaneous Q24H    fat emulsion 20%  250 mL Intravenous Daily    olmesartan  40 mg Oral Daily    potassium chloride in water  10 mEq Intravenous Q1H    sodium chloride 0.9%  10 mL Intravenous Q6H    tamsulosin  0.4 mg Oral Daily     PRN Meds:   albuterol-ipratropium    dextrose 50%    dextrose 50%    gabapentin    glucagon (human recombinant)    glucagon (human recombinant)    glucose    glucose    glucose    hydrALAZINE    HYDROmorphone    HYDROmorphone    insulin aspart U-100    labetalol    ondansetron    promethazine (PHENERGAN) IVPB    sodium chloride 0.9%    sodium chloride 0.9%        Objective:     Vital Signs (Most Recent):  Temp: 98 °F (36.7 °C) (10/03/18 0830)  Pulse: 76 (10/03/18 0830)  Resp: 18 (10/03/18 0830)  BP: (!) 141/74 (10/03/18 0830)  SpO2: 98 % (10/03/18 0830) Vital Signs (24h Range):  Temp:  [97.8 °F (36.6 °C)-98.6 °F (37 °C)] 98 °F (36.7 °C)  Pulse:  [55-76] 76  Resp:  [16-18] 18  SpO2:  [92 %-98 %] 98 %  BP: (126-156)/(67-75) 141/74     Intake/Output - Last 3 Shifts       10/01 0700 - 10/02 0659 10/02 0700 - 10/03 0659 10/03 0700 - 10/04 0659    P.O. 60 50     I.V. (mL/kg)  40 (0.3)     NG/GT  60     IV Piggyback  1300     .6 706.5     Total Intake(mL/kg) 657.6 (5.4) 2156.5 (17.9)     Urine  (mL/kg/hr) 1775 (0.6) 300 (0.1)     Drains 3900 1750 300    Stool       Total Output 5675 2050 300    Net -5017.5 +106.5 -300                 Physical Exam   Constitutional: He is oriented to person, place, and time. He appears well-developed and well-nourished. No distress.   Cardiovascular: Normal rate, regular rhythm and normal heart sounds.   Pulmonary/Chest: Effort normal and breath sounds normal. No respiratory distress. He has no wheezes. He has no rales.   Abdominal: Soft. He exhibits no distension and no mass. There is no tenderness. There is no guarding.   NGT intact with stomach contents  abd distended, soft  Inc line healing well   Musculoskeletal: Normal range of motion.   Neurological: He is alert and oriented to person, place, and time.   Skin: Skin is warm and dry.   Psychiatric: He has a normal mood and affect. His behavior is normal. Judgment and thought content normal.   Nursing note and vitals reviewed.    Significant Labs:  BMP (Last 3 Results):   Recent Labs   Lab  10/01/18   0130  10/02/18   0430  10/02/18   1933  10/03/18   0100  10/03/18   0400   GLU  161*  232*  223*  287*  283*   NA  146*  144  144  144  144   K  3.0*  2.6*  2.8*  3.2*  3.1*   CL  100  94*  94*  96  96   CO2  35*  39*  43*  39*  40*   BUN  12  10  12  12  13   CREATININE  0.8  0.8  0.8  0.8  0.8   CALCIUM  8.5*  9.2  9.6  8.9  8.6*   MG  2.0  2.1   --    --   2.0     CBC (Last 3 Results):   Recent Labs   Lab  10/01/18   0130  10/02/18   0430  10/03/18   0400   WBC  6.62  6.82  6.58   RBC  3.99*  4.04*  4.03*   HGB  11.4*  11.8*  11.7*   HCT  35.9*  36.7*  37.3*   PLT  172  167  155   MCV  90  91  93   MCH  28.6  29.2  29.0   MCHC  31.8*  32.2  31.4*       Significant Diagnostics:  Echo: I have reviewed all pertinent results/findings within the past 24 hours:    1 - Normal left ventricular systolic function (EF 60-65%).     Assessment/Plan:     * Colon cancer    Rosendo RITU Thomas is a 73 y.o. male s/p COLECTOMY-LAPAROSCOPIC  LEFT (N/A) on 9/25/2018 recovering in stable condition on the floor.     - prn pain and nausea control  - NGT clamp trial  - TPN, increase K+  - SSI, endocrine consulted for hyperglycemia  - BID PPI   - OOB, ICS, SCDs,          Sinus bradycardia    Echo today  Tele  Monitor vs        Uncontrolled type 2 diabetes mellitus with stage 2 chronic kidney disease, without long-term current use of insulin    Endocrine consulted              Devyn Chandler MD  Colorectal Surgery  [facility]

## 2018-10-03 NOTE — ASSESSMENT & PLAN NOTE
T2DM on orals outpatient  A1c 8.0%    On TPN     BG goal 140-180  Currently above goal, with moderate correction insulin.      Recommend:  IV transition insulin at 1.2u/h (basal weight based 0.5u/kg)  Continue moderate correction insulin  POC q4h    Discharge:  TBD.

## 2018-10-03 NOTE — ASSESSMENT & PLAN NOTE
Rosendo Thomas is a 73 y.o. male s/p COLECTOMY-LAPAROSCOPIC LEFT (N/A) on 9/25/2018 recovering in stable condition on the floor.     - prn pain and nausea control  - NGT clamp trial  - TPN, increase K+  - SSI, endocrine consulted for hyperglycemia  - BID PPI   - OOB, ICS, SCDs,

## 2018-10-03 NOTE — SUBJECTIVE & OBJECTIVE
Subjective:     Interval History: no acute events. Continues to have bowel function but NGT still putting out. Pain controlled. Denies n/v this AM.    Post-Op Info:  Procedure(s) (LRB):  COLECTOMY-LAPAROSCOPIC LEFT (N/A)   8 Days Post-Op      Medications:  Continuous Infusions:   sodium chloride 0.9% 100 mL/hr at 10/03/18 0143    glucagon (human recombinant)      TPN ADULT CENTRAL LINE CUSTOM 75 mL/hr at 10/02/18 2245     Scheduled Meds:   enoxparin  40 mg Subcutaneous Q24H    fat emulsion 20%  250 mL Intravenous Daily    olmesartan  40 mg Oral Daily    potassium chloride in water  10 mEq Intravenous Q1H    sodium chloride 0.9%  10 mL Intravenous Q6H    tamsulosin  0.4 mg Oral Daily     PRN Meds:   albuterol-ipratropium    dextrose 50%    dextrose 50%    gabapentin    glucagon (human recombinant)    glucagon (human recombinant)    glucose    glucose    glucose    hydrALAZINE    HYDROmorphone    HYDROmorphone    insulin aspart U-100    labetalol    ondansetron    promethazine (PHENERGAN) IVPB    sodium chloride 0.9%    sodium chloride 0.9%        Objective:     Vital Signs (Most Recent):  Temp: 98 °F (36.7 °C) (10/03/18 0830)  Pulse: 76 (10/03/18 0830)  Resp: 18 (10/03/18 0830)  BP: (!) 141/74 (10/03/18 0830)  SpO2: 98 % (10/03/18 0830) Vital Signs (24h Range):  Temp:  [97.8 °F (36.6 °C)-98.6 °F (37 °C)] 98 °F (36.7 °C)  Pulse:  [55-76] 76  Resp:  [16-18] 18  SpO2:  [92 %-98 %] 98 %  BP: (126-156)/(67-75) 141/74     Intake/Output - Last 3 Shifts       10/01 0700 - 10/02 0659 10/02 0700 - 10/03 0659 10/03 0700 - 10/04 0659    P.O. 60 50     I.V. (mL/kg)  40 (0.3)     NG/GT  60     IV Piggyback  1300     .6 706.5     Total Intake(mL/kg) 657.6 (5.4) 2156.5 (17.9)     Urine (mL/kg/hr) 1775 (0.6) 300 (0.1)     Drains 3900 1750 300    Stool       Total Output 5675 2050 300    Net -5017.5 +106.5 -300                 Physical Exam   Constitutional: He is oriented to person, place, and  time. He appears well-developed and well-nourished. No distress.   Cardiovascular: Normal rate, regular rhythm and normal heart sounds.   Pulmonary/Chest: Effort normal and breath sounds normal. No respiratory distress. He has no wheezes. He has no rales.   Abdominal: Soft. He exhibits no distension and no mass. There is no tenderness. There is no guarding.   NGT intact with stomach contents  abd distended, soft  Inc line healing well   Musculoskeletal: Normal range of motion.   Neurological: He is alert and oriented to person, place, and time.   Skin: Skin is warm and dry.   Psychiatric: He has a normal mood and affect. His behavior is normal. Judgment and thought content normal.   Nursing note and vitals reviewed.    Significant Labs:  BMP (Last 3 Results):   Recent Labs   Lab  10/01/18   0130  10/02/18   0430  10/02/18   1933  10/03/18   0100  10/03/18   0400   GLU  161*  232*  223*  287*  283*   NA  146*  144  144  144  144   K  3.0*  2.6*  2.8*  3.2*  3.1*   CL  100  94*  94*  96  96   CO2  35*  39*  43*  39*  40*   BUN  12  10  12  12  13   CREATININE  0.8  0.8  0.8  0.8  0.8   CALCIUM  8.5*  9.2  9.6  8.9  8.6*   MG  2.0  2.1   --    --   2.0     CBC (Last 3 Results):   Recent Labs   Lab  10/01/18   0130  10/02/18   0430  10/03/18   0400   WBC  6.62  6.82  6.58   RBC  3.99*  4.04*  4.03*   HGB  11.4*  11.8*  11.7*   HCT  35.9*  36.7*  37.3*   PLT  172  167  155   MCV  90  91  93   MCH  28.6  29.2  29.0   MCHC  31.8*  32.2  31.4*       Significant Diagnostics:  Echo: I have reviewed all pertinent results/findings within the past 24 hours:    1 - Normal left ventricular systolic function (EF 60-65%).

## 2018-10-03 NOTE — CONSULTS
[facility]  Endocrinology  Diabetes Consult Note    Consult Requested by: Chito Banks MD   Reason for admit: Colon cancer    HISTORY OF PRESENT ILLNESS:  Reason for Consult: Management of T2DM, Hyperglycemia     Surgical Procedure and Date:   Laporoscopic colectomy 9/25/18    Diabetes diagnosis year:   ~2014    Home Diabetes Medications:    Metformin 500mg BID  * tried 750mg BID, but experienced significant diarrhea.      How often checking glucose at home? One   BG readings on regimen: 130s  Hypoglycemia on the regimen?  No  Missed doses on regimen?  No    Diabetes Complications include:     Hyperglycemia    Complicating diabetes co morbidities:   Active Cancer s/p resection      HPI:   Patient is a 73 y.o. male with a diagnosis of T2DM, HLD, and BMI 33 who is post colectomy secondary to invasive adenocarcinoma found on colonoscopy.  Patient underwent colectomy and tolerated procedure well. Now with ileus, TPN started with subsequent BG excursions.    Endocrinology consulted to asssit in DM management while on TPN.  Patient notes he was well controlled on metformin, with a1c 8.0%.  Notes that he previously lost ~40lbs to assist with diet control.  Since that time, he gain some of the weight back but has not returned to previous weight ~300lbs. Notes that he follows diet similar to atkins, but occasionally eats outside of diet.      Interval HPI:   Overnight events:  Pain controlled.  +Flatus.  Family at bedside and involved in patient care.  Questions asked and addressed at bedside.    Patient is NPO but notes that he is hungry.    BG above goal in mid to upper 200s.  On sliding scale q4h.    Eating:   NPO  Nausea: No  Hypoglycemia and intervention: No  Fever: No  TPN and/or TF: Yes  If yes, type of TF/TPN and rate: 75cc/h    PMH, PSH, FH, SH updated and reviewed     ROS:  Review of Systems   Constitutional: Negative for chills, fever and unexpected weight change.   Eyes: Negative for visual  disturbance.   Respiratory: Negative for shortness of breath.    Cardiovascular: Negative for chest pain.   Gastrointestinal: Positive for abdominal pain (some discomfort, but controlled). Negative for constipation, diarrhea, nausea and vomiting.   Genitourinary: Negative for urgency.   Musculoskeletal: Negative for arthralgias.   Skin: Negative for wound.   Neurological: Negative for headaches.   Hematological: Does not bruise/bleed easily.   Psychiatric/Behavioral: Negative for sleep disturbance.       Current Medications and/or Treatments Impacting Glycemic Control  Immunotherapy:    Immunosuppressants     None        Steroids:   Hormones (From admission, onward)    None        Pressors:    Autonomic Drugs (From admission, onward)    None        Hyperglycemia/Diabetes Medications:   Antihyperglycemics (From admission, onward)    Start     Stop Route Frequency Ordered    10/03/18 1130  insulin regular (Humulin R) 100 Units in sodium chloride 0.9% 100 mL infusion      -- IV Continuous 10/03/18 1022    10/02/18 0730  insulin aspart U-100 pen 1-10 Units      -- SubQ Every 4 hours PRN 10/02/18 0621        PHYSICAL EXAMINATION:  Vitals:    10/03/18 1252   BP:    Pulse: 66   Resp:    Temp:      Body mass index is 33.25 kg/m².    Physical Exam   Constitutional: He appears well-developed. No distress.   Pleasant gentleman   HENT:   Right Ear: External ear normal.   Left Ear: External ear normal.   Nose: Nose normal.   Hearing normal  Dentition good   Neck: No tracheal deviation present. No thyromegaly present.   Cardiovascular: Normal rate.   No murmur heard.  Pulmonary/Chest: Effort normal and breath sounds normal.   Abdominal: Soft. Bowel sounds are normal. He exhibits no distension. There is no tenderness. There is no guarding. No hernia.   Vertical incision in lower abdomen  C/D/I - no s/sx of infection   Musculoskeletal: He exhibits no edema.   Neurological: He has normal reflexes. No cranial nerve deficit.   Alert  and oriented   Skin: No rash noted. He is not diaphoretic.   Psychiatric: He has a normal mood and affect. Judgment normal.   Vitals reviewed.        Labs Reviewed and Include   Recent Labs   Lab  10/03/18   0400   GLU  283*   CALCIUM  8.6*   NA  144   K  3.1*   CO2  40*   CL  96   BUN  13   CREATININE  0.8     Lab Results   Component Value Date    WBC 6.58 10/03/2018    HGB 11.7 (L) 10/03/2018    HCT 37.3 (L) 10/03/2018    MCV 93 10/03/2018     10/03/2018     No results for input(s): TSH, FREET4 in the last 168 hours.  Lab Results   Component Value Date    HGBA1C 8.0 (H) 09/21/2018       Nutritional status:   Body mass index is 33.25 kg/m².  Lab Results   Component Value Date    ALBUMIN 2.6 (L) 09/30/2018    ALBUMIN 3.8 09/04/2018    ALBUMIN 3.5 05/02/2018     Lab Results   Component Value Date    PREALBUMIN 10 (L) 10/03/2018       Estimated Creatinine Clearance: 115.2 mL/min (based on SCr of 0.8 mg/dL).    Accu-Checks  Recent Labs      10/01/18   2339  10/02/18   0509  10/02/18   1110  10/02/18   1453  10/02/18   1725  10/02/18   2207  10/03/18   0144  10/03/18   0553  10/03/18   0742  10/03/18   1137   POCTGLUCOSE  256*  230*  254*  216*  194*  235*  273*  287*  235*  286*        ASSESSMENT and PLAN    * Colon cancer    S/p laparscopic colectomy  Now with ileus on TPN    Management per primary  Avoid hypoglycemia            On total parenteral nutrition (TPN)    Can lead to significant BG excursions        Uncontrolled type 2 diabetes mellitus with stage 2 chronic kidney disease, without long-term current use of insulin    T2DM on orals outpatient  A1c 8.0%    On TPN     BG goal 140-180  Currently above goal, with moderate correction insulin.      Recommend:  IV transition insulin at 1.2u/h (basal weight based 0.5u/kg)  Continue moderate correction insulin  POC q4h    Discharge:  TBD.              Plan discussed with patient, family, and RN at bedside.     Reny Gonzalez,  MD  Endocrinology  [facility]

## 2018-10-03 NOTE — HPI
Reason for Consult: Management of T2DM, Hyperglycemia     Surgical Procedure and Date:   Laporoscopic colectomy 9/25/18    Diabetes diagnosis year:   ~2014    Home Diabetes Medications:    Metformin 500mg BID  * tried 750mg BID, but experienced significant diarrhea.      How often checking glucose at home? One   BG readings on regimen: 130s  Hypoglycemia on the regimen?  No  Missed doses on regimen?  No    Diabetes Complications include:     Hyperglycemia    Complicating diabetes co morbidities:   Active Cancer s/p resection      HPI:   Patient is a 73 y.o. male with a diagnosis of T2DM, HLD, and BMI 33 who is post colectomy secondary to invasive adenocarcinoma found on colonoscopy.  Patient underwent colectomy and tolerated procedure well. Now with ileus, TPN started with subsequent BG excursions.    Endocrinology consulted to asssit in DM management while on TPN.  Patient notes he was well controlled on metformin, with a1c 8.0%.  Managed by PCP. Notes that he previously lost ~40lbs to assist with diet control.  Since that time, he gain some of the weight back but has not returned to previous weight ~300lbs. Notes that he follows diet similar to atkins, but occasionally eats outside of diet.

## 2018-10-04 ENCOUNTER — DOCUMENTATION ONLY (OUTPATIENT)
Dept: SURGERY | Facility: HOSPITAL | Age: 73
End: 2018-10-04

## 2018-10-04 PROBLEM — E68 SEQUELAE OF HYPERALIMENTATION: Status: ACTIVE | Noted: 2018-10-04

## 2018-10-04 LAB
ANION GAP SERPL CALC-SCNC: 8 MMOL/L
BASOPHILS # BLD AUTO: 0.02 K/UL
BASOPHILS NFR BLD: 0.3 %
BUN SERPL-MCNC: 17 MG/DL
CALCIUM SERPL-MCNC: 8.5 MG/DL
CHLORIDE SERPL-SCNC: 100 MMOL/L
CO2 SERPL-SCNC: 35 MMOL/L
CREAT SERPL-MCNC: 0.8 MG/DL
DIFFERENTIAL METHOD: ABNORMAL
EOSINOPHIL # BLD AUTO: 0.3 K/UL
EOSINOPHIL NFR BLD: 4.5 %
ERYTHROCYTE [DISTWIDTH] IN BLOOD BY AUTOMATED COUNT: 13.3 %
EST. GFR  (AFRICAN AMERICAN): >60 ML/MIN/1.73 M^2
EST. GFR  (NON AFRICAN AMERICAN): >60 ML/MIN/1.73 M^2
GLUCOSE SERPL-MCNC: 315 MG/DL
HCT VFR BLD AUTO: 36 %
HGB BLD-MCNC: 11.4 G/DL
IMM GRANULOCYTES # BLD AUTO: 0.02 K/UL
IMM GRANULOCYTES NFR BLD AUTO: 0.3 %
LYMPHOCYTES # BLD AUTO: 0.8 K/UL
LYMPHOCYTES NFR BLD: 13.9 %
MAGNESIUM SERPL-MCNC: 2.3 MG/DL
MCH RBC QN AUTO: 29 PG
MCHC RBC AUTO-ENTMCNC: 31.7 G/DL
MCV RBC AUTO: 92 FL
MONOCYTES # BLD AUTO: 0.4 K/UL
MONOCYTES NFR BLD: 6.6 %
NEUTROPHILS # BLD AUTO: 4.5 K/UL
NEUTROPHILS NFR BLD: 74.4 %
NRBC BLD-RTO: 0 /100 WBC
PHOSPHATE SERPL-MCNC: 2.6 MG/DL
PLATELET # BLD AUTO: 138 K/UL
PMV BLD AUTO: 11 FL
POCT GLUCOSE: 237 MG/DL (ref 70–110)
POCT GLUCOSE: 253 MG/DL (ref 70–110)
POCT GLUCOSE: 257 MG/DL (ref 70–110)
POCT GLUCOSE: 281 MG/DL (ref 70–110)
POCT GLUCOSE: 281 MG/DL (ref 70–110)
POCT GLUCOSE: 293 MG/DL (ref 70–110)
POTASSIUM SERPL-SCNC: 3.2 MMOL/L
RBC # BLD AUTO: 3.93 M/UL
SODIUM SERPL-SCNC: 143 MMOL/L
WBC # BLD AUTO: 6.03 K/UL

## 2018-10-04 PROCEDURE — 25000003 PHARM REV CODE 250: Performed by: STUDENT IN AN ORGANIZED HEALTH CARE EDUCATION/TRAINING PROGRAM

## 2018-10-04 PROCEDURE — 99900035 HC TECH TIME PER 15 MIN (STAT)

## 2018-10-04 PROCEDURE — 85025 COMPLETE CBC W/AUTO DIFF WBC: CPT

## 2018-10-04 PROCEDURE — 63600175 PHARM REV CODE 636 W HCPCS: Performed by: NURSE PRACTITIONER

## 2018-10-04 PROCEDURE — C9113 INJ PANTOPRAZOLE SODIUM, VIA: HCPCS | Performed by: NURSE PRACTITIONER

## 2018-10-04 PROCEDURE — 20600001 HC STEP DOWN PRIVATE ROOM

## 2018-10-04 PROCEDURE — 63600175 PHARM REV CODE 636 W HCPCS: Performed by: STUDENT IN AN ORGANIZED HEALTH CARE EDUCATION/TRAINING PROGRAM

## 2018-10-04 PROCEDURE — A4217 STERILE WATER/SALINE, 500 ML: HCPCS | Performed by: NURSE PRACTITIONER

## 2018-10-04 PROCEDURE — 25000003 PHARM REV CODE 250: Performed by: NURSE PRACTITIONER

## 2018-10-04 PROCEDURE — 97165 OT EVAL LOW COMPLEX 30 MIN: CPT

## 2018-10-04 PROCEDURE — 25000003 PHARM REV CODE 250: Performed by: COLON & RECTAL SURGERY

## 2018-10-04 PROCEDURE — 27000646 HC AEROBIKA DEVICE

## 2018-10-04 PROCEDURE — 83735 ASSAY OF MAGNESIUM: CPT

## 2018-10-04 PROCEDURE — 94761 N-INVAS EAR/PLS OXIMETRY MLT: CPT

## 2018-10-04 PROCEDURE — 99232 SBSQ HOSP IP/OBS MODERATE 35: CPT | Mod: ,,, | Performed by: NURSE PRACTITIONER

## 2018-10-04 PROCEDURE — A4216 STERILE WATER/SALINE, 10 ML: HCPCS | Performed by: COLON & RECTAL SURGERY

## 2018-10-04 PROCEDURE — 27000221 HC OXYGEN, UP TO 24 HOURS

## 2018-10-04 PROCEDURE — 97161 PT EVAL LOW COMPLEX 20 MIN: CPT

## 2018-10-04 PROCEDURE — 80048 BASIC METABOLIC PNL TOTAL CA: CPT

## 2018-10-04 PROCEDURE — B4185 PARENTERAL SOL 10 GM LIPIDS: HCPCS | Performed by: NURSE PRACTITIONER

## 2018-10-04 PROCEDURE — 63600175 PHARM REV CODE 636 W HCPCS: Performed by: SURGERY

## 2018-10-04 PROCEDURE — 94664 DEMO&/EVAL PT USE INHALER: CPT

## 2018-10-04 PROCEDURE — 84100 ASSAY OF PHOSPHORUS: CPT

## 2018-10-04 RX ORDER — DIPHENHYDRAMINE HYDROCHLORIDE 50 MG/ML
12.5 INJECTION INTRAMUSCULAR; INTRAVENOUS NIGHTLY PRN
Status: DISCONTINUED | OUTPATIENT
Start: 2018-10-05 | End: 2018-10-08 | Stop reason: HOSPADM

## 2018-10-04 RX ORDER — GLUCAGON 1 MG
1 KIT INJECTION
Status: DISCONTINUED | OUTPATIENT
Start: 2018-10-04 | End: 2018-10-08 | Stop reason: HOSPADM

## 2018-10-04 RX ORDER — IBUPROFEN 200 MG
16 TABLET ORAL
Status: DISCONTINUED | OUTPATIENT
Start: 2018-10-04 | End: 2018-10-08 | Stop reason: HOSPADM

## 2018-10-04 RX ORDER — POTASSIUM CHLORIDE 7.45 MG/ML
10 INJECTION INTRAVENOUS
Status: COMPLETED | OUTPATIENT
Start: 2018-10-04 | End: 2018-10-04

## 2018-10-04 RX ORDER — IBUPROFEN 200 MG
24 TABLET ORAL
Status: DISCONTINUED | OUTPATIENT
Start: 2018-10-04 | End: 2018-10-08 | Stop reason: HOSPADM

## 2018-10-04 RX ORDER — INSULIN ASPART 100 [IU]/ML
0-10 INJECTION, SOLUTION INTRAVENOUS; SUBCUTANEOUS
Status: DISCONTINUED | OUTPATIENT
Start: 2018-10-04 | End: 2018-10-08 | Stop reason: HOSPADM

## 2018-10-04 RX ORDER — DIPHENHYDRAMINE HCL 25 MG
25 CAPSULE ORAL NIGHTLY PRN
Status: DISCONTINUED | OUTPATIENT
Start: 2018-10-04 | End: 2018-10-04

## 2018-10-04 RX ORDER — DIPHENHYDRAMINE HYDROCHLORIDE 50 MG/ML
12.5 INJECTION INTRAMUSCULAR; INTRAVENOUS ONCE
Status: COMPLETED | OUTPATIENT
Start: 2018-10-04 | End: 2018-10-04

## 2018-10-04 RX ADMIN — HYDROMORPHONE HYDROCHLORIDE 1 MG: 1 INJECTION, SOLUTION INTRAMUSCULAR; INTRAVENOUS; SUBCUTANEOUS at 11:10

## 2018-10-04 RX ADMIN — INSULIN ASPART 3 UNITS: 100 INJECTION, SOLUTION INTRAVENOUS; SUBCUTANEOUS at 03:10

## 2018-10-04 RX ADMIN — POTASSIUM CHLORIDE 10 MEQ: 7.46 INJECTION, SOLUTION INTRAVENOUS at 10:10

## 2018-10-04 RX ADMIN — CALCIUM GLUCONATE: 94 INJECTION, SOLUTION INTRAVENOUS at 10:10

## 2018-10-04 RX ADMIN — INSULIN ASPART 4 UNITS: 100 INJECTION, SOLUTION INTRAVENOUS; SUBCUTANEOUS at 05:10

## 2018-10-04 RX ADMIN — Medication 10 ML: at 12:10

## 2018-10-04 RX ADMIN — TAMSULOSIN HYDROCHLORIDE 0.4 MG: 0.4 CAPSULE ORAL at 10:10

## 2018-10-04 RX ADMIN — DIPHENHYDRAMINE HYDROCHLORIDE 12.5 MG: 50 INJECTION, SOLUTION INTRAMUSCULAR; INTRAVENOUS at 11:10

## 2018-10-04 RX ADMIN — POTASSIUM CHLORIDE 10 MEQ: 7.46 INJECTION, SOLUTION INTRAVENOUS at 11:10

## 2018-10-04 RX ADMIN — INSULIN ASPART 3 UNITS: 100 INJECTION, SOLUTION INTRAVENOUS; SUBCUTANEOUS at 12:10

## 2018-10-04 RX ADMIN — SODIUM CHLORIDE 2 UNITS/HR: 9 INJECTION, SOLUTION INTRAVENOUS at 10:10

## 2018-10-04 RX ADMIN — SODIUM CHLORIDE: 0.9 INJECTION, SOLUTION INTRAVENOUS at 10:10

## 2018-10-04 RX ADMIN — SOYBEAN OIL 250 ML: 20 INJECTION, SOLUTION INTRAVENOUS at 10:10

## 2018-10-04 RX ADMIN — INSULIN ASPART 6 UNITS: 100 INJECTION, SOLUTION INTRAVENOUS; SUBCUTANEOUS at 08:10

## 2018-10-04 RX ADMIN — OLMESARTAN MEDOXOMIL 40 MG: 20 TABLET, COATED ORAL at 09:10

## 2018-10-04 RX ADMIN — Medication 10 ML: at 06:10

## 2018-10-04 RX ADMIN — INSULIN ASPART 4 UNITS: 100 INJECTION, SOLUTION INTRAVENOUS; SUBCUTANEOUS at 09:10

## 2018-10-04 RX ADMIN — ONDANSETRON HYDROCHLORIDE 8 MG: 2 INJECTION, SOLUTION INTRAMUSCULAR; INTRAVENOUS at 01:10

## 2018-10-04 RX ADMIN — Medication 10 ML: at 11:10

## 2018-10-04 RX ADMIN — DIPHENHYDRAMINE HYDROCHLORIDE 12.5 MG: 50 INJECTION, SOLUTION INTRAMUSCULAR; INTRAVENOUS at 12:10

## 2018-10-04 RX ADMIN — PANTOPRAZOLE SODIUM 40 MG: 40 INJECTION, POWDER, FOR SOLUTION INTRAVENOUS at 10:10

## 2018-10-04 RX ADMIN — INSULIN ASPART 4 UNITS: 100 INJECTION, SOLUTION INTRAVENOUS; SUBCUTANEOUS at 12:10

## 2018-10-04 RX ADMIN — ENOXAPARIN SODIUM 40 MG: 100 INJECTION SUBCUTANEOUS at 10:10

## 2018-10-04 RX ADMIN — POTASSIUM PHOSPHATE, MONOBASIC AND POTASSIUM PHOSPHATE, DIBASIC 20 MMOL: 224; 236 INJECTION, SOLUTION, CONCENTRATE INTRAVENOUS at 10:10

## 2018-10-04 NOTE — PT/OT/SLP EVAL
Physical Therapy Evaluation    Patient Name:  Rosendo Thomas   MRN:  [id number]    Recommendations:     Discharge Recommendations:  home   Discharge Equipment Recommendations: none   Barriers to discharge: None    Assessment:     Rosendo Thomas is a 73 y.o. male admitted with a medical diagnosis of Colon cancer.  He presents with the following impairments/functional limitations:  impaired functional mobilty, impaired skin, impaired self care skills, impaired endurance limiting overall functional mobility. No loss of balance or instability noted with sit-stand transfer or short distance ambulation to stretcher without device. Supervision-SBA provided for safety. Evaluation limited secondary to transport being present to take patient off floor for X-ray. To benefit from continued skilled PT intervention to address deficits with emphasis on endurance training to facilitate return to prior level of function.    Rehab Prognosis:  Good; patient would benefit from acute skilled PT services to address these deficits and reach maximum level of function.      Recent Surgery: Procedure(s) (LRB):  COLECTOMY-LAPAROSCOPIC LEFT (N/A) 9 Days Post-Op    Plan:     During this hospitalization, patient to be seen 3 x/week to address the above listed problems via gait training, therapeutic activities  · Plan of Care Expires:  11/04/18   Plan of Care Reviewed with: patient, son    Subjective     Communicated with Rn prior to session.  Patient found seated in bedside chair with son present upon PT entry to room, agreeable to evaluation.      Chief Complaint: none stated  Patient comments/goals: denies pain  Pain/Comfort:  · Pain Rating 1: 0/10  · Pain Rating Post-Intervention 1: 0/10    Patients cultural, spiritual, Advent conflicts given the current situation: none stated    Living Environment:  Patient lives with wife in 1-story home without KRISTINE. Claw foot tub and regular tub/shower present. No environmental concerns  identified.  Prior to admission, patients level of function was Indep without equipment for all mobility and ADL tasks. Driving and assisting wife as needed PTA. Patient has the following equipment: none.  DME owned (not currently used): none.  Upon discharge, patient will have assistance from children as needed. Good family support.    Objective:     Patient found with: telemetry, peripheral IV, PICC line, NG tube     General Precautions: Standard, fall   Orthopedic Precautions:N/A   Braces: N/A     Exams:  · Cognitive Exam:  Patient is oriented to Person, Place, Time and Situation  · Gross Motor Coordination:  WFL  · Postural Exam:  Patient presented with the following abnormalities: -       Forward head  · Skin Integrity/Edema:  -       Skin integrity: Visible skin intact  · RLE ROM: WNL  · RLE Strength: WNL  · LLE ROM: WNL  · LLE Strength: WNL    Functional Mobility:  · Bed Mobility:     · Sit to Supine: supervision  · Transfers:  Sit to Stand:  stand by assistance with no AD  · Gait: 10ftx1 from bedside chair to stretcher. Able to manage IV pole. SBA provided for safety. No marked gait deviations noted. No loss of balance or instability noted.  · Balance: good static sitting and dynamic standing balance    AM-PAC 6 CLICK MOBILITY  Total Score:18       Therapeutic Activities and Exercises:  Co-evaluation with OT.     Patient/son educated on:   - role of PT/POC    - safety with all functional mobility   - slow position change   - lifting precautions   - importance of participation with therapy services   - safes to ambulate with 1 person assist at this time.    Patient/son verbalized understanding of all education provided.      Session limited secondary to transport being present to take patient off floor for X-ray.      Patient left supine with all lines intact, call button in reach and transport/OT/son present.    GOALS:   Multidisciplinary Problems     Physical Therapy Goals        Problem: Physical Therapy  Goal    Goal Priority Disciplines Outcome Goal Variances Interventions   Physical Therapy Goal     PT, PT/OT Ongoing (interventions implemented as appropriate)     Description:  Goals to be met by: 10/14/18    Patient will increase functional independence with mobility by performin. Sit to stand transfer with [place]  2. Bed to chair transfer with [place]  3. Gait  x 140 feet with [place].                      History:     Past Medical History:   Diagnosis Date    Diabetes mellitus type II 2010    diet controlled    Nephrolithiasis     Obesity     Psoriasis     Skin cancer     Squamous cell carcinoma        Past Surgical History:   Procedure Laterality Date    APPENDECTOMY      COLECTOMY-LAPAROSCOPIC LEFT N/A 2018    Performed by Chito [facility] at [facility] OR [address] [place] [address]    COLONOSCOPY N/A 2018    Procedure: COLONOSCOPY;  Surgeon: Kameron Ruff MD;  Location: [facility];  Service: Endoscopy;  Laterality: N/A;  confirmed    COLONOSCOPY N/A 2018    Per[facility] by Kameron Ruff MD at [facility]    LAPAROSCOPIC LEFT COLECTOMY N/A 2018    Procedure: COLECTOMY-LAPAROSCOPIC LEFT;  Surgeon: Chito Banks MD;  Location: [facility] OR [address] [place] [address];  Service: Colon and Rectal;  Laterality: N/A;    SKIN CANCER EXCISION      SKIN GRAFT      VASECTOMY      VASECTOMY         Clinical Decision Making:     History  Co-morbidities and personal factors that may impact the plan of care Examination  Body Structures and Functions, activity limitations and participation restrictions that may impact the plan of care Clinical Presentation   Decision Making/ Complexity Score   Co-morbidities:   [x] Time since onset of injury / illness / exacerbation  [] Status of current condition  []Patient's cognitive status and safety concerns    [] Multiple Medical Problems (see med hx)  Personal Factors:   [] Patient's age  [] Prior Level of function   [] Patient's home situation (environment and family  support)  [] Patient's level of motivation  [] Expected progression of patient      HISTORY:(criteria)    [] 53831 - no personal factors/history    [x] 81205 - has 1-2 personal factor/comorbidity     [] 10525 - has >3 personal factor/comorbidity     Body Regions:  [] Objective examination findings  [] Head     []  Neck  [] Trunk   [] Upper Extremity  [] Lower Extremity    Body Systems:  [] For communication ability, affect, cognition, language, and learning style: the assessment of the ability to make needs known, consciousness, orientation (person, place, and time), expected emotional /behavioral responses, and learning preferences (eg, learning barriers, education  needs)  [] For the neuromuscular system: a general assessment of gross coordinated movement (eg, balance, gait, locomotion, transfers, and transitions) and motor function  (motor control and motor learning)  [] For the musculoskeletal system: the assessment of gross symmetry, gross range of motion, gross strength, height, and weight  [x] For the integumentary system: the assessment of pliability(texture), presence of scar formation, skin color, and skin integrity  [x] For cardiovascular/pulmonary system: the assessment of heart rate, respiratory rate, blood pressure, and edema     Activity limitations:    [] Patient's cognitive status and saf ety concerns          [] Status of current condition      [] Weight bearing restriction  [] Cardiopulmunary Restriction    Participation Restrictions:   [] Goals and goal agreement with the patient     [] Rehab potential (prognosis) and probable outcome      Examination of Body System: (criteria)    [x] 78172 - addressing 1-2 elements    [] 66119 - addressing a total of 3 or more elements     [] 39253 -  Addressing a total of 4 or more elements         Clinical Presentation: (criteria)  Stable - 45086     On examination of body system using standardized tests and measures patient presents with 1-2 elements from any  of the following: body structures and functions, activity limitations, and/or participation restrictions.  Leading to a clinical presentation that is considered stable and/or uncomplicated                              Clinical Decision Making  (Eval Complexity):  Low- 41677     Time Tracking:     PT Received On: 10/04/18  PT Start Time: 0846     PT Stop Time: 0855  PT Total Time (min): 9 min     Billable Minutes: Evaluation 9      Cheo Sheffield III, PT  10/04/2018

## 2018-10-04 NOTE — SUBJECTIVE & OBJECTIVE
"Interval HPI:   Overnight events: Remains on GISSU, receiving TPN.  BG elevated overnight.  Eating:   NPO  Nausea: No  Hypoglycemia and intervention: No  Fever: No  TPN and/or TF: Yes  TPN and rate: 75 cc/hr    BP (!) 155/70   Pulse (!) 56   Temp 97.8 °F (36.6 °C)   Resp 16   Ht 6' 3" (1.905 m)   Wt 120.7 kg (266 lb)   SpO2 (!) 94%   BMI 33.25 kg/m²     Labs Reviewed and Include    Recent Labs   Lab  10/04/18   0400   GLU  315*   CALCIUM  8.5*   NA  143   K  3.2*   CO2  35*   CL  100   BUN  17   CREATININE  0.8     Lab Results   Component Value Date    WBC 6.03 10/04/2018    HGB 11.4 (L) 10/04/2018    HCT 36.0 (L) 10/04/2018    MCV 92 10/04/2018     (L) 10/04/2018     No results for input(s): TSH, FREET4 in the last 168 hours.  Lab Results   Component Value Date    HGBA1C 8.0 (H) 09/21/2018       Nutritional status:   Body mass index is 33.25 kg/m².  Lab Results   Component Value Date    ALBUMIN 2.6 (L) 09/30/2018    ALBUMIN 3.8 09/04/2018    ALBUMIN 3.5 05/02/2018     Lab Results   Component Value Date    PREALBUMIN 10 (L) 10/03/2018       Estimated Creatinine Clearance: 115.2 mL/min (based on SCr of 0.8 mg/dL).    Accu-Checks  Recent Labs      10/02/18   1725  10/02/18   2207  10/03/18   0144  10/03/18   0553  10/03/18   0742  10/03/18   1137  10/03/18   1552  10/03/18   1944  10/04/18   0009  10/04/18   0352   POCTGLUCOSE  194*  235*  273*  287*  235*  286*  245*  238*  281*  281*       Current Medications and/or Treatments Impacting Glycemic Control  Immunotherapy:        Hyperglycemia/Diabetes Medications:   Antihyperglycemics (From admission, onward)    Start     Stop Route Frequency Ordered    10/03/18 1130  insulin regular (Humulin R) 100 Units in sodium chloride 0.9% 100 mL infusion      -- IV Continuous 10/03/18 1022    10/02/18 0730  insulin aspart U-100 pen 1-10 Units      -- SubQ Every 4 hours PRN 10/02/18 0621        "

## 2018-10-04 NOTE — PT/OT/SLP EVAL
Occupational Therapy   Evaluation    Name: Rosendo Thomas  MRN: [id number]  Admitting Diagnosis:  Colon cancer 9 Days Post-Op    Recommendations:     Discharge Recommendations:    Discharge Equipment Recommendations:  none  Barriers to discharge:  None    History:     Occupational Profile:  Living Environment: Pt. Lives with spouse in a [facility] with 0STE. Pt.'s spouse requires assistance with ADLs on an as needed basis.   Previous level of function: Pt. Was mod I before his admission. Pt.'s son reported that the patient has been presenting at home with decreased endurance upon admission.  Roles and Routines: Pt. Is a retired   Equipment Used at Home:  none  Assistance upon Discharge: 24 hr assistance available if needed     Past Medical History:   Diagnosis Date    Diabetes mellitus type II 7/2010    diet controlled    Nephrolithiasis     Obesity     Psoriasis     Skin cancer     Squamous cell carcinoma        Past Surgical History:   Procedure Laterality Date    APPENDECTOMY      COLECTOMY-LAPAROSCOPIC LEFT N/A 9/25/2018    Performed by [facility] MD at [facility] OR [address] [place] [address]    COLONOSCOPY N/A 7/9/2018    Procedure: COLONOSCOPY;  Surgeon: Kameron Ruff MD;  Location: [facility];  Service: Endoscopy;  Laterality: N/A;  confirmed    COLONOSCOPY N/A 7/9/2018    Performed by K[facility]on Ruff MD at [facility]    LAPAROSCOPIC LEFT COLECTOMY N/A 9/25/2018    Procedure: COLECTOMY-LAPAROSCOPIC LEFT;  Surgeon: Chito Banks MD;  Location: [facility] OR [address] [place] [address];  Service: Colon and Rectal;  Laterality: N/A;    SKIN CANCER EXCISION      SKIN GRAFT      VASECTOMY      VASECTOMY         Subjective     Chief Complaint: None  Patient/Family Comments/goals: Pt. Agreeable to OT/PT evaluation. Pt. Agreeable to all goals set in therapy.    Pain/Comfort:  · Pain Rating 1: 0/10  · Pain Rating Post-Intervention 1: 0/10    Patients cultural, spiritual, Catholic conflicts given the current situation: none  stated    Objective:     Communicated with: MARTHA Hidalgo prior to session.  Patient found all lines intact, call button in reach and son present and telemetry, NG tube, peripheral IV, PICC line upon OT entry to room.    General Precautions: Standard, fall   Orthopedic Precautions:N/A   Braces: N/A     Occupational Performance:    Bed Mobility:    · Not assessed 2/2 patient sitting EOB upon OT arrival.    Functional Mobility/Transfers:  · Patient completed Sit <> Stand Transfer with stand by assistance  with  no assistive device   · Functional Mobility: Pt. Ambulated from bed to the stretcher with SBA for line management and no AD. No LOB noted, No rest breaks required    Activities of Daily Living:  · No ADLs completed this date 2/2 patient getting prepared for medical procedure    Cognitive/Visual Perceptual:  Cognitive/Psychosocial Skills:     -       Oriented to: Person, Place, Time and Situation   -       Follows Commands/attention:Follows multistep  commands  -       Communication: clear/fluent  -       Memory: No Deficits noted  -       Safety awareness/insight to disability: intact     Physical Exam:  Balance:    -       Fair  Postural examination/scapula alignment:    -       No postural abnormalities identified  Skin integrity: Visible skin intact  Edema:  None noted  Upper Extremity Range of Motion:     -       Right Upper Extremity: WFL   -       Left Upper Extremity: WFL   Upper Extremity Strength:    -       Right Upper Extremity: WFL  -       Left Upper Extremity: WFL   Fine Motor Coordination:    -       Intact  Sensation  - No deficits noted    AMPAC 6 Click ADL:  AMPAC Total Score: 21    Treatment & Education:  Educated the patient on the following:  - OT POC  - Role of OT  - Functional mobility safety  Education:    Patient left supine with all lines intact and transport team present    Assessment:     Rosendo Thomas is a 73 y.o. male with a medical diagnosis of Colon cancer. Pt. Presents to OT  "with overall weakness affecting his quality of movements. Pt. Motivated to reach all goals set in therapy. He presents with the following performance deficits affecting function: weakness, impaired self care skills, impaired balance, impaired functional mobilty, impaired endurance, gait instability, decreased lower extremity function, impaired cardiopulmonary response to activity. Pt. Will continue to benefit from skilled OT to increase his functional independence.     Rehab Prognosis: Good; patient would benefit from acute skilled OT services to address these deficits and reach maximum level of function.         Clinical Decision Makin.  OT Low:  "Pt evaluation falls under low complexity for evaluation coding due to performance deficits noted in 1-3 areas as stated above and 0 co-morbities affecting current functional status. Data obtained from problem focused assessments. No modifications or assistance was required for completion of evaluation. Only brief occupational profile and history review completed."     Plan:     Patient to be seen 3 x/week to address the above listed problems via self-care/home management, therapeutic activities, therapeutic exercises  · Plan of Care Expires: 18  · Plan of Care Reviewed with: patient, son    This Plan of care has been discussed with the patient who was involved in its development and understands and is in agreement with the identified goals and treatment plan    GOALS:   Multidisciplinary Problems     Occupational Therapy Goals        Problem: Occupational Therapy Goal    Goal Priority Disciplines Outcome Interventions   Occupational Therapy Goal     OT, PT/OT Ongoing (interventions implemented as appropriate)    Description:  Goals to be met by: 10/11/2018    Patient will increase functional independence with ADLs by performing:    UE Dressing with [place].  LE Dressing with [place].  Grooming while standing with [place].  Toileting from " toilet with Modified [place] for hygiene and clothing management.   Step transfer with Modified [place]  Toilet transfer to toilet with Modified [place].                      Time Tracking:     OT Date of Treatment: 10/04/18  OT Start Time: 0845  OT Stop Time: 0855  OT Total Time (min): 10 min    Billable Minutes:Evaluation 10    Tanya Vázquez OT  10/4/2018

## 2018-10-04 NOTE — PLAN OF CARE
Problem: Patient Care Overview  Goal: Plan of Care Review  Outcome: Ongoing (interventions implemented as appropriate)  POC reviewed and understood by patient. Patient's AAOx4. Patient didn't c/o any pain throughout shift. PICC remained intact and flushed frequently during shift. Patient urinated per urinal. NG tube remained intact putting out brown greenish thin fluid. BS checked q4h. Insulin drip remained at 1.2 units. Pt tolerated TPN and lipids. Pt c/o insomnia notified MD White and he ordered one time dose of Benadryl.  Pt's VSS. Family at bedside. SCDS in use. Call light WNR. No acute events at this time. [facility] patient.

## 2018-10-04 NOTE — ASSESSMENT & PLAN NOTE
Rosendo Thomas is a 73 y.o. male s/p COLECTOMY-LAPAROSCOPIC LEFT (N/A) on 9/25/2018 recovering in stable condition on the floor.     - prn pain and nausea control  - NGT clamp trial again  - TPN, increase K+  - SSI, endocrine consulted for hyperglycemia  - BID PPI   - OOB, ICS, SCDs,

## 2018-10-04 NOTE — ASSESSMENT & PLAN NOTE
Increase IV transition insulin to 1.6 u/h  Continue moderate correction insulin  BG monitoring q4h    ADDENDUM: Increase transition IV insulin to 2 u/hr    Discharge: TBD

## 2018-10-04 NOTE — PLAN OF CARE
Problem: Patient Care Overview  Goal: Plan of Care Review  Clamped NG tube for 4 hours, 100cc was drained, but pt complained of nausea before put back to suction.  Entire shift, output was 300. Adjusted insulin drip to 2 u/h per ordered. Covered with 4u with each accu check q4h. Chest and abdomen xray done. Pt ambulated in hallway and sat up in chair most of the day. No nausea for the rest of the shift.

## 2018-10-04 NOTE — PLAN OF CARE
SW following for d/c needs. Needs to be determined      Aida Heard, MSW, LCSW  [facility]  [id number]

## 2018-10-04 NOTE — PROGRESS NOTES
"[facility]  Endocrinology  Progress Note    Admit Date: 9/25/2018     Reason for Consult: Management of T2DM, Hyperglycemia     Surgical Procedure and Date:   Laporoscopic colectomy 9/25/18    Diabetes diagnosis year:   ~2014    Home Diabetes Medications:    Metformin 500mg BID  * tried 750mg BID, but experienced significant diarrhea.      How often checking glucose at home? One   BG readings on regimen: 130s  Hypoglycemia on the regimen?  No  Missed doses on regimen?  No    Diabetes Complications include:     Hyperglycemia    Complicating diabetes co morbidities:   Active Cancer s/p resection      HPI:   Patient is a 73 y.o. male with a diagnosis of T2DM, HLD, and BMI 33 who is post colectomy secondary to invasive adenocarcinoma found on colonoscopy.  Patient underwent colectomy and tolerated procedure well. Now with ileus, TPN started with subsequent BG excursions.    Endocrinology consulted to asssit in DM management while on TPN.  Patient notes he was well controlled on metformin, with a1c 8.0%.  Managed by PCP. Notes that he previously lost ~40lbs to assist with diet control.  Since that time, he gain some of the weight back but has not returned to previous weight ~300lbs. Notes that he follows diet similar to atkins, but occasionally eats outside of diet.      Interval HPI:   Overnight events: Remains on GISSU, receiving TPN.  BG elevated overnight.  Eating:   NPO  Nausea: No  Hypoglycemia and intervention: No  Fever: No  TPN and/or TF: Yes  TPN and rate: 75 cc/hr    BP (!) 155/70   Pulse (!) 56   Temp 97.8 °F (36.6 °C)   Resp 16   Ht 6' 3" (1.905 m)   Wt 120.7 kg (266 lb)   SpO2 (!) 94%   BMI 33.25 kg/m²      Labs Reviewed and Include    Recent Labs   Lab  10/04/18   0400   GLU  315*   CALCIUM  8.5*   NA  143   K  3.2*   CO2  35*   CL  100   BUN  17   CREATININE  0.8     Lab Results   Component Value Date    WBC 6.03 10/04/2018    HGB 11.4 (L) 10/04/2018    HCT 36.0 (L) 10/04/2018 "    MCV 92 10/04/2018     (L) 10/04/2018     No results for input(s): TSH, FREET4 in the last 168 hours.  Lab Results   Component Value Date    HGBA1C 8.0 (H) 09/21/2018       Nutritional status:   Body mass index is 33.25 kg/m².  Lab Results   Component Value Date    ALBUMIN 2.6 (L) 09/30/2018    ALBUMIN 3.8 09/04/2018    ALBUMIN 3.5 05/02/2018     Lab Results   Component Value Date    PREALBUMIN 10 (L) 10/03/2018       Estimated Creatinine Clearance: 115.2 mL/min (based on SCr of 0.8 mg/dL).    Accu-Checks  Recent Labs      10/02/18   1725  10/02/18   2207  10/03/18   0144  10/03/18   0553  10/03/18   0742  10/03/18   1137  10/03/18   1552  10/03/18   1944  10/04/18   0009  10/04/18   0352   POCTGLUCOSE  194*  235*  273*  287*  235*  286*  245*  238*  281*  281*       Current Medications and/or Treatments Impacting Glycemic Control  Immunotherapy:        Hyperglycemia/Diabetes Medications:   Antihyperglycemics (From admission, onward)    Start     Stop Route Frequency Ordered    10/03/18 1130  insulin regular (Humulin R) 100 Units in sodium chloride 0.9% 100 mL infusion      -- IV Continuous 10/03/18 1022    10/02/18 0730  insulin aspart U-100 pen 1-10 Units      -- SubQ Every 4 hours PRN 10/02/18 0621          ASSESSMENT and PLAN    * Colon cancer    S/p laparscopic colectomy  Now with ileus on TPN    Management per primary  Avoid hypoglycemia            Uncontrolled type 2 diabetes mellitus with stage 2 chronic kidney disease, without long-term current use of insulin    Increase IV transition insulin to 1.6 u/h  Continue moderate correction insulin  BG monitoring q4h    ADDENDUM: Increase transition IV insulin to 2 u/hr    Discharge: TBD          On total parenteral nutrition (TPN)    Can lead to significant BG excursions        Sequelae of hyperalimentation    May elevate BG readings            Cristi Jefferson NP  Endocrinology  [facility]

## 2018-10-04 NOTE — PLAN OF CARE
Problem: Physical Therapy Goal  Goal: Physical Therapy Goal  Goals to be met by: 10/14/18    Patient will increase functional independence with mobility by performin. Sit to stand transfer with [place]  2. Bed to chair transfer with [place]  3. Gait  x 140 feet with [place].    Outcome: Ongoing (interventions implemented as appropriate)  Evaluation complete.  Goals established to improve functional mobility.    DC rec's for home no PT needs.    Cheo Sheffield III, DPT, PT  10/4/2018

## 2018-10-04 NOTE — SUBJECTIVE & OBJECTIVE
Subjective:     Interval History: failed clamping trial yesterday, no nausea, pos for flatus    Post-Op Info:  Procedure(s) (LRB):  COLECTOMY-LAPAROSCOPIC LEFT (N/A)   9 Days Post-Op      Medications:  Continuous Infusions:   sodium chloride 0.9% 100 mL/hr at 10/04/18 1042    insulin (HUMAN R) infusion (adults) 1.6 Units/hr (10/04/18 0837)    TPN ADULT CENTRAL LINE CUSTOM 75 mL/hr at 10/03/18 2223     Scheduled Meds:   enoxparin  40 mg Subcutaneous Q24H    olmesartan  40 mg Oral Daily    pantoprazole  40 mg Intravenous BID    potassium phosphate IVPB  20 mmol Intravenous Once    sodium chloride 0.9%  10 mL Intravenous Q6H    tamsulosin  0.4 mg Oral Daily     PRN Meds:   albuterol-ipratropium    dextrose 50%    dextrose 50%    gabapentin    glucagon (human recombinant)    glucose    glucose    hydrALAZINE    HYDROmorphone    HYDROmorphone    insulin aspart U-100    labetalol    ondansetron    promethazine (PHENERGAN) IVPB    sodium chloride 0.9%    sodium chloride 0.9%        Objective:     Vital Signs (Most Recent):  Temp: 98.1 °F (36.7 °C) (10/04/18 1111)  Pulse: (!) 57 (10/04/18 1111)  Resp: 20 (10/04/18 1111)  BP: (!) 161/70 (10/04/18 1111)  SpO2: 95 % (10/04/18 1111) Vital Signs (24h Range):  Temp:  [97.2 °F (36.2 °C)-98.3 °F (36.8 °C)] 98.1 °F (36.7 °C)  Pulse:  [53-66] 57  Resp:  [16-20] 20  SpO2:  [94 %-97 %] 95 %  BP: (143-161)/(60-72) 161/70     Intake/Output - Last 3 Shifts       10/02 0700 - 10/03 0659 10/03 0700 - 10/04 0659 10/04 0700 - 10/05 0659    P.O. 50 80     I.V. (mL/kg) 40 (0.3) 2870.4 (23.8)     NG/GT 60      IV Piggyback 1300      .5 2133.4     Total Intake(mL/kg) 2156.5 (17.9) 5083.8 (42.1)     Urine (mL/kg/hr) 300 (0.1) 800 (0.3)     Drains 1750 1900     Stool  0     Total Output 2050 2700     Net +106.5 +2383.8            Urine Occurrence  7 x     Stool Occurrence  4 x           Physical Exam   Constitutional: He is oriented to person, place, and time. He  appears well-developed and well-nourished. No distress.   Cardiovascular: Normal rate, regular rhythm and normal heart sounds.   Pulmonary/Chest: Effort normal and breath sounds normal. No respiratory distress. He has no wheezes. He has no rales.   O2 1l   Abdominal: Soft. He exhibits no distension and no mass. There is no tenderness. There is no guarding.   abd inc line healing well  NGT with light brown output   Musculoskeletal: Normal range of motion.   Neurological: He is alert and oriented to person, place, and time.   Skin: Skin is warm and dry.   Psychiatric: He has a normal mood and affect. His behavior is normal. Judgment and thought content normal.   Nursing note and vitals reviewed.        Significant Labs:  BMP (Last 3 Results):   Recent Labs   Lab  10/02/18   0430   10/03/18   0100  10/03/18   0400  10/04/18   0400   GLU  232*   < >  287*  283*  315*   NA  144   < >  144  144  143   K  2.6*   < >  3.2*  3.1*  3.2*   CL  94*   < >  96  96  100   CO2  39*   < >  39*  40*  35*   BUN  10   < >  12  13  17   CREATININE  0.8   < >  0.8  0.8  0.8   CALCIUM  9.2   < >  8.9  8.6*  8.5*   MG  2.1   --    --   2.0  2.3    < > = values in this interval not displayed.     CBC (Last 3 Results):   Recent Labs   Lab  10/02/18   0430  10/03/18   0400  10/04/18   0400   WBC  6.82  6.58  6.03   RBC  4.04*  4.03*  3.93*   HGB  11.8*  11.7*  11.4*   HCT  36.7*  37.3*  36.0*   PLT  167  155  138*   MCV  91  93  92   MCH  29.2  29.0  29.0   MCHC  32.2  31.4*  31.7*       Significant Diagnostics:  None

## 2018-10-04 NOTE — PLAN OF CARE
Problem: Occupational Therapy Goal  Goal: Occupational Therapy Goal  Goals to be met by: 10/11/2018    Patient will increase functional independence with ADLs by performing:    UE Dressing with [place].  LE Dressing with [place].  Grooming while standing with [place].  Toileting from toilet with Modified [place] for hygiene and clothing management.   Step transfer with Modified [place]  Toilet transfer to toilet with Modified [place].    Outcome: Ongoing (interventions implemented as appropriate)  POC Implemented     Comments: Tanya Vázquez OTR/L

## 2018-10-04 NOTE — PROGRESS NOTES
[facility]  Colorectal Surgery  Progress Note    Patient Name: Rosendo Thomas  MRN: [id number]  Admission Date: 9/25/2018  Hospital Length of Stay: 9 days  Attending Physician: Chito Banks MD    Subjective:     Interval History: failed clamping trial yesterday, no nausea, pos for flatus    Post-Op Info:  Procedure(s) (LRB):  COLECTOMY-LAPAROSCOPIC LEFT (N/A)   9 Days Post-Op      Medications:  Continuous Infusions:   sodium chloride 0.9% 100 mL/hr at 10/04/18 1042    insulin (HUMAN R) infusion (adults) 1.6 Units/hr (10/04/18 0837)    TPN ADULT CENTRAL LINE CUSTOM 75 mL/hr at 10/03/18 2223     Scheduled Meds:   enoxparin  40 mg Subcutaneous Q24H    olmesartan  40 mg Oral Daily    pantoprazole  40 mg Intravenous BID    potassium phosphate IVPB  20 mmol Intravenous Once    sodium chloride 0.9%  10 mL Intravenous Q6H    tamsulosin  0.4 mg Oral Daily     PRN Meds:   albuterol-ipratropium    dextrose 50%    dextrose 50%    gabapentin    glucagon (human recombinant)    glucose    glucose    hydrALAZINE    HYDROmorphone    HYDROmorphone    insulin aspart U-100    labetalol    ondansetron    promethazine (PHENERGAN) IVPB    sodium chloride 0.9%    sodium chloride 0.9%        Objective:     Vital Signs (Most Recent):  Temp: 98.1 °F (36.7 °C) (10/04/18 1111)  Pulse: (!) 57 (10/04/18 1111)  Resp: 20 (10/04/18 1111)  BP: (!) 161/70 (10/04/18 1111)  SpO2: 95 % (10/04/18 1111) Vital Signs (24h Range):  Temp:  [97.2 °F (36.2 °C)-98.3 °F (36.8 °C)] 98.1 °F (36.7 °C)  Pulse:  [53-66] 57  Resp:  [16-20] 20  SpO2:  [94 %-97 %] 95 %  BP: (143-161)/(60-72) 161/70     Intake/Output - Last 3 Shifts       10/02 0700 - 10/03 0659 10/03 0700 - 10/04 0659 10/04 0700 - 10/05 0659    P.O. 50 80     I.V. (mL/kg) 40 (0.3) 2870.4 (23.8)     NG/GT 60      IV Piggyback 1300      .5 2133.4     Total Intake(mL/kg) 2156.5 (17.9) 5083.8 (42.1)     Urine (mL/kg/hr) 300 (0.1) 800 (0.3)     Drains 1750  1900     Stool  0     Total Output 2050 2700     Net +106.5 +2383.8            Urine Occurrence  7 x     Stool Occurrence  4 x           Physical Exam   Constitutional: He is oriented to person, place, and time. He appears well-developed and well-nourished. No distress.   Cardiovascular: Normal rate, regular rhythm and normal heart sounds.   Pulmonary/Chest: Effort normal and breath sounds normal. No respiratory distress. He has no wheezes. He has no rales.   O2 1l   Abdominal: Soft. He exhibits no distension and no mass. There is no tenderness. There is no guarding.   abd inc line healing well  NGT with light brown output   Musculoskeletal: Normal range of motion.   Neurological: He is alert and oriented to person, place, and time.   Skin: Skin is warm and dry.   Psychiatric: He has a normal mood and affect. His behavior is normal. Judgment and thought content normal.   Nursing note and vitals reviewed.        Significant Labs:  BMP (Last 3 Results):   Recent Labs   Lab  10/02/18   0430   10/03/18   0100  10/03/18   0400  10/04/18   0400   GLU  232*   < >  287*  283*  315*   NA  144   < >  144  144  143   K  2.6*   < >  3.2*  3.1*  3.2*   CL  94*   < >  96  96  100   CO2  39*   < >  39*  40*  35*   BUN  10   < >  12  13  17   CREATININE  0.8   < >  0.8  0.8  0.8   CALCIUM  9.2   < >  8.9  8.6*  8.5*   MG  2.1   --    --   2.0  2.3    < > = values in this interval not displayed.     CBC (Last 3 Results):   Recent Labs   Lab  10/02/18   0430  10/03/18   0400  10/04/18   0400   WBC  6.82  6.58  6.03   RBC  4.04*  4.03*  3.93*   HGB  11.8*  11.7*  11.4*   HCT  36.7*  37.3*  36.0*   PLT  167  155  138*   MCV  91  93  92   MCH  29.2  29.0  29.0   MCHC  32.2  31.4*  31.7*       Significant Diagnostics:  None    Assessment/Plan:     * Colon cancer    Rosendo Thomas is a 73 y.o. male s/p COLECTOMY-LAPAROSCOPIC LEFT (N/A) on 9/25/2018 recovering in stable condition on the floor.     - prn pain and nausea control  - NGT  clamp trial again  - TPN, increase K+  - SSI, endocrine consulted for hyperglycemia  - BID PPI   - OOB, ICS, SCDs,          Sinus bradycardia    Echo done  Tele  Monitor vs        Uncontrolled type 2 diabetes mellitus with stage 2 chronic kidney disease, without long-term current use of insulin    Endocrine consulted              Annalisa Armenta NP  Colorectal Surgery  [facility]

## 2018-10-04 NOTE — PATIENT CARE CONFERENCE
Multidisciplinary Rectal Cancer Conference - Evaluation and Recommendation Summary  10/4/2018  Rosendo RITU Thomas  [id number]  73 y.o. male    1. Evaluation    Tumor Location: sigmoid colon      CEA level:   Lab Results   Component Value Date    CEA 2.0 09/04/2018     2. Treatment Recommendation    Neoadjuvant Therapy Recommendation: None    Type and Duration of Neoadjuvant Therapy Recommended: N/A    Anticipated date and type of surgical procedure: 9/4/18 - left colectomy     Clinical research study eligibility and/or enrollment: Not eligible

## 2018-10-05 LAB
ANION GAP SERPL CALC-SCNC: 5 MMOL/L
BASOPHILS # BLD AUTO: 0.03 K/UL
BASOPHILS NFR BLD: 0.4 %
BUN SERPL-MCNC: 18 MG/DL
CALCIUM SERPL-MCNC: 8.1 MG/DL
CHLORIDE SERPL-SCNC: 105 MMOL/L
CO2 SERPL-SCNC: 33 MMOL/L
CREAT SERPL-MCNC: 0.8 MG/DL
DIFFERENTIAL METHOD: ABNORMAL
EOSINOPHIL # BLD AUTO: 0.4 K/UL
EOSINOPHIL NFR BLD: 5.3 %
ERYTHROCYTE [DISTWIDTH] IN BLOOD BY AUTOMATED COUNT: 13.4 %
EST. GFR  (AFRICAN AMERICAN): >60 ML/MIN/1.73 M^2
EST. GFR  (NON AFRICAN AMERICAN): >60 ML/MIN/1.73 M^2
GLUCOSE SERPL-MCNC: 216 MG/DL
HCT VFR BLD AUTO: 33.6 %
HGB BLD-MCNC: 10.9 G/DL
IMM GRANULOCYTES # BLD AUTO: 0.03 K/UL
IMM GRANULOCYTES NFR BLD AUTO: 0.4 %
LYMPHOCYTES # BLD AUTO: 1.3 K/UL
LYMPHOCYTES NFR BLD: 18.7 %
MAGNESIUM SERPL-MCNC: 2.2 MG/DL
MCH RBC QN AUTO: 29.6 PG
MCHC RBC AUTO-ENTMCNC: 32.4 G/DL
MCV RBC AUTO: 91 FL
MONOCYTES # BLD AUTO: 0.4 K/UL
MONOCYTES NFR BLD: 5.6 %
NEUTROPHILS # BLD AUTO: 4.8 K/UL
NEUTROPHILS NFR BLD: 69.6 %
NRBC BLD-RTO: 0 /100 WBC
PHOSPHATE SERPL-MCNC: 2.9 MG/DL
PLATELET # BLD AUTO: 126 K/UL
PMV BLD AUTO: 11.4 FL
POCT GLUCOSE: 170 MG/DL (ref 70–110)
POCT GLUCOSE: 194 MG/DL (ref 70–110)
POCT GLUCOSE: 205 MG/DL (ref 70–110)
POCT GLUCOSE: 214 MG/DL (ref 70–110)
POCT GLUCOSE: 233 MG/DL (ref 70–110)
POCT GLUCOSE: 243 MG/DL (ref 70–110)
POTASSIUM SERPL-SCNC: 3.6 MMOL/L
RBC # BLD AUTO: 3.68 M/UL
SODIUM SERPL-SCNC: 143 MMOL/L
WBC # BLD AUTO: 6.83 K/UL

## 2018-10-05 PROCEDURE — 97535 SELF CARE MNGMENT TRAINING: CPT

## 2018-10-05 PROCEDURE — 83735 ASSAY OF MAGNESIUM: CPT

## 2018-10-05 PROCEDURE — B4185 PARENTERAL SOL 10 GM LIPIDS: HCPCS | Performed by: NURSE PRACTITIONER

## 2018-10-05 PROCEDURE — 94761 N-INVAS EAR/PLS OXIMETRY MLT: CPT

## 2018-10-05 PROCEDURE — A4216 STERILE WATER/SALINE, 10 ML: HCPCS | Performed by: COLON & RECTAL SURGERY

## 2018-10-05 PROCEDURE — 84100 ASSAY OF PHOSPHORUS: CPT

## 2018-10-05 PROCEDURE — 20600001 HC STEP DOWN PRIVATE ROOM

## 2018-10-05 PROCEDURE — 97530 THERAPEUTIC ACTIVITIES: CPT

## 2018-10-05 PROCEDURE — 94664 DEMO&/EVAL PT USE INHALER: CPT

## 2018-10-05 PROCEDURE — A4217 STERILE WATER/SALINE, 500 ML: HCPCS | Performed by: NURSE PRACTITIONER

## 2018-10-05 PROCEDURE — 25000003 PHARM REV CODE 250: Performed by: STUDENT IN AN ORGANIZED HEALTH CARE EDUCATION/TRAINING PROGRAM

## 2018-10-05 PROCEDURE — 85025 COMPLETE CBC W/AUTO DIFF WBC: CPT

## 2018-10-05 PROCEDURE — 80048 BASIC METABOLIC PNL TOTAL CA: CPT

## 2018-10-05 PROCEDURE — 99232 SBSQ HOSP IP/OBS MODERATE 35: CPT | Mod: ,,, | Performed by: NURSE PRACTITIONER

## 2018-10-05 PROCEDURE — 63600175 PHARM REV CODE 636 W HCPCS: Performed by: STUDENT IN AN ORGANIZED HEALTH CARE EDUCATION/TRAINING PROGRAM

## 2018-10-05 PROCEDURE — C9113 INJ PANTOPRAZOLE SODIUM, VIA: HCPCS | Performed by: NURSE PRACTITIONER

## 2018-10-05 PROCEDURE — 63600175 PHARM REV CODE 636 W HCPCS: Performed by: NURSE PRACTITIONER

## 2018-10-05 PROCEDURE — 25000003 PHARM REV CODE 250: Performed by: NURSE PRACTITIONER

## 2018-10-05 PROCEDURE — 27000646 HC AEROBIKA DEVICE

## 2018-10-05 PROCEDURE — 25000003 PHARM REV CODE 250: Performed by: COLON & RECTAL SURGERY

## 2018-10-05 PROCEDURE — 99900035 HC TECH TIME PER 15 MIN (STAT)

## 2018-10-05 RX ORDER — METFORMIN HYDROCHLORIDE 500 MG/1
1000 TABLET, EXTENDED RELEASE ORAL 2 TIMES DAILY WITH MEALS
Qty: 120 TABLET | Refills: 1 | Status: SHIPPED | OUTPATIENT
Start: 2018-10-05 | End: 2018-12-31 | Stop reason: SDUPTHER

## 2018-10-05 RX ADMIN — INSULIN ASPART 2 UNITS: 100 INJECTION, SOLUTION INTRAVENOUS; SUBCUTANEOUS at 01:10

## 2018-10-05 RX ADMIN — INSULIN ASPART 2 UNITS: 100 INJECTION, SOLUTION INTRAVENOUS; SUBCUTANEOUS at 06:10

## 2018-10-05 RX ADMIN — SOYBEAN OIL 250 ML: 20 INJECTION, SOLUTION INTRAVENOUS at 10:10

## 2018-10-05 RX ADMIN — INSULIN ASPART 2 UNITS: 100 INJECTION, SOLUTION INTRAVENOUS; SUBCUTANEOUS at 08:10

## 2018-10-05 RX ADMIN — INSULIN ASPART 4 UNITS: 100 INJECTION, SOLUTION INTRAVENOUS; SUBCUTANEOUS at 05:10

## 2018-10-05 RX ADMIN — PANTOPRAZOLE SODIUM 40 MG: 40 INJECTION, POWDER, FOR SOLUTION INTRAVENOUS at 10:10

## 2018-10-05 RX ADMIN — Medication 10 ML: at 12:10

## 2018-10-05 RX ADMIN — Medication 10 ML: at 06:10

## 2018-10-05 RX ADMIN — PANTOPRAZOLE SODIUM 40 MG: 40 INJECTION, POWDER, FOR SOLUTION INTRAVENOUS at 08:10

## 2018-10-05 RX ADMIN — OLMESARTAN MEDOXOMIL 40 MG: 20 TABLET, COATED ORAL at 08:10

## 2018-10-05 RX ADMIN — SODIUM CHLORIDE 2.8 UNITS/HR: 9 INJECTION, SOLUTION INTRAVENOUS at 10:10

## 2018-10-05 RX ADMIN — ENOXAPARIN SODIUM 40 MG: 100 INJECTION SUBCUTANEOUS at 10:10

## 2018-10-05 RX ADMIN — INSULIN ASPART 4 UNITS: 100 INJECTION, SOLUTION INTRAVENOUS; SUBCUTANEOUS at 12:10

## 2018-10-05 RX ADMIN — Medication 10 ML: at 05:10

## 2018-10-05 RX ADMIN — CALCIUM GLUCONATE: 94 INJECTION, SOLUTION INTRAVENOUS at 10:10

## 2018-10-05 RX ADMIN — DIPHENHYDRAMINE HYDROCHLORIDE 12.5 MG: 50 INJECTION, SOLUTION INTRAMUSCULAR; INTRAVENOUS at 10:10

## 2018-10-05 RX ADMIN — TAMSULOSIN HYDROCHLORIDE 0.4 MG: 0.4 CAPSULE ORAL at 08:10

## 2018-10-05 RX ADMIN — SODIUM CHLORIDE: 0.9 INJECTION, SOLUTION INTRAVENOUS at 10:10

## 2018-10-05 NOTE — ASSESSMENT & PLAN NOTE
Increase IV transition insulin to 2.4 u/h  Continue moderate correction insulin  BG monitoring q4h    ADDENDUM: Increase transition insulin to 2.8 u/hr    Discharge: TBD

## 2018-10-05 NOTE — PROGRESS NOTES
"[facility]  Endocrinology  Progress Note    Admit Date: 9/25/2018     Reason for Consult: Management of T2DM, Hyperglycemia     Surgical Procedure and Date:   Laporoscopic colectomy 9/25/18    Diabetes diagnosis year:   ~2014    Home Diabetes Medications:    Metformin 500mg BID  * tried 750mg BID, but experienced significant diarrhea.      How often checking glucose at home? One   BG readings on regimen: 130s  Hypoglycemia on the regimen?  No  Missed doses on regimen?  No    Diabetes Complications include:     Hyperglycemia    Complicating diabetes co morbidities:   Active Cancer s/p resection      HPI:   Patient is a 73 y.o. male with a diagnosis of T2DM, HLD, and BMI 33 who is post colectomy secondary to invasive adenocarcinoma found on colonoscopy.  Patient underwent colectomy and tolerated procedure well. Now with ileus, TPN started with subsequent BG excursions.    Endocrinology consulted to asssit in DM management while on TPN.  Patient notes he was well controlled on metformin, with a1c 8.0%.  Managed by PCP. Notes that he previously lost ~40lbs to assist with diet control.  Since that time, he gain some of the weight back but has not returned to previous weight ~300lbs. Notes that he follows diet similar to atkins, but occasionally eats outside of diet.      Interval HPI:   Overnight events: Remains on GISSU, receiving TPN.  BG elevated overnight on transition IV insulin drip.  Eating:   NPO  Nausea: No  Hypoglycemia and intervention: No  Fever: No  TPN and/or TF: Yes  If yes, type of TF/TPN and rate: 75 cc/hr    BP (!) 117/56 (BP Location: Left arm, Patient Position: Lying)   Pulse (!) 58   Temp 98.1 °F (36.7 °C) (Oral)   Resp 16   Ht 6' 3" (1.905 m)   Wt 120.7 kg (266 lb)   SpO2 96%   BMI 33.25 kg/m²      Labs Reviewed and Include    Recent Labs   Lab  10/05/18   0506   GLU  216*   CALCIUM  8.1*   NA  143   K  3.6   CO2  33*   CL  105   BUN  18   CREATININE  0.8     Lab Results "   Component Value Date    WBC 6.83 10/05/2018    HGB 10.9 (L) 10/05/2018    HCT 33.6 (L) 10/05/2018    MCV 91 10/05/2018     (L) 10/05/2018     No results for input(s): TSH, FREET4 in the last 168 hours.  Lab Results   Component Value Date    HGBA1C 8.0 (H) 09/21/2018       Nutritional status:   Body mass index is 33.25 kg/m².  Lab Results   Component Value Date    ALBUMIN 2.6 (L) 09/30/2018    ALBUMIN 3.8 09/04/2018    ALBUMIN 3.5 05/02/2018     Lab Results   Component Value Date    PREALBUMIN 10 (L) 10/03/2018       Estimated Creatinine Clearance: 115.2 mL/min (based on SCr of 0.8 mg/dL).    Accu-Checks  Recent Labs      10/03/18   1552  10/03/18   1944  10/04/18   0009  10/04/18   0352  10/04/18   0829  10/04/18   1219  10/04/18   1718  10/04/18   2101  10/05/18   0055  10/05/18   0513   POCTGLUCOSE  245*  238*  281*  281*  293*  253*  237*  257*  243*  233*       Current Medications and/or Treatments Impacting Glycemic Control  Immunotherapy:    Immunosuppressants     None        Steroids:   Hormones (From admission, onward)    None        Pressors:    Autonomic Drugs (From admission, onward)    None        Hyperglycemia/Diabetes Medications:   Antihyperglycemics (From admission, onward)    Start     Stop Route Frequency Ordered    10/04/18 0923  insulin aspart U-100 pen 0-10 Units      -- SubQ As needed (PRN) 10/04/18 0826    10/03/18 1130  insulin regular (Humulin R) 100 Units in sodium chloride 0.9% 100 mL infusion      -- IV Continuous 10/03/18 1022          ASSESSMENT and PLAN    * Colon cancer    S/p laparscopic colectomy  Management per primary  Avoid hypoglycemia            Uncontrolled type 2 diabetes mellitus with stage 2 chronic kidney disease, without long-term current use of insulin    Increase IV transition insulin to 2.4 u/h  Continue moderate correction insulin  BG monitoring q4h    ADDENDUM: Increase transition insulin to 2.8 u/hr    Discharge: TBD          On total parenteral nutrition  (TPN)    Can lead to significant BG excursions        Sequelae of hyperalimentation    May elevate BG readings            Cristi Jefferson NP  Endocrinology  [facility]

## 2018-10-05 NOTE — PLAN OF CARE
Problem: Occupational Therapy Goal  Goal: Occupational Therapy Goal  Goals to be met by: 10/11/2018    Patient will increase functional independence with ADLs by performing:    UE Dressing with [place].  LE Dressing with [place].  Grooming while standing with [place].  Toileting from toilet with Modified [place] for hygiene and clothing management.- Met  Step transfer with Modified [place]- Met  Toilet transfer to toilet with Modified [place].     Outcome: Outcome(s) achieved Date Met: 10/05/18  Patient no longer requires skilled acute OT services at this time. Patient is being discharged from OT this date 2/2 no functional deficits noted that would affect functional independence.      Comments: Tanya Vázquez OTR/L

## 2018-10-05 NOTE — SUBJECTIVE & OBJECTIVE
Subjective:     Interval History: no acute events. Pain controlled. +bm and flatus. NGT output improved.    Post-Op Info:  Procedure(s) (LRB):  COLECTOMY-LAPAROSCOPIC LEFT (N/A)   10 Days Post-Op      Medications:  Continuous Infusions:   sodium chloride 0.9% 100 mL/hr at 10/04/18 2238    insulin (HUMAN R) infusion (adults) 2.4 Units/hr (10/05/18 0836)    TPN ADULT CENTRAL LINE CUSTOM 75 mL/hr at 10/04/18 2228     Scheduled Meds:   enoxparin  40 mg Subcutaneous Q24H    olmesartan  40 mg Oral Daily    pantoprazole  40 mg Intravenous BID    sodium chloride 0.9%  10 mL Intravenous Q6H    tamsulosin  0.4 mg Oral Daily     PRN Meds:   albuterol-ipratropium    dextrose 50%    dextrose 50%    diphenhydrAMINE    gabapentin    glucagon (human recombinant)    glucose    glucose    hydrALAZINE    HYDROmorphone    HYDROmorphone    insulin aspart U-100    labetalol    ondansetron    promethazine (PHENERGAN) IVPB    sodium chloride 0.9%    sodium chloride 0.9%        Objective:     Vital Signs (Most Recent):  Temp: 97.8 °F (36.6 °C) (10/05/18 0838)  Pulse: (!) 58 (10/05/18 1135)  Resp: 15 (10/05/18 0838)  BP: (!) 140/64 (10/05/18 0838)  SpO2: 97 % (10/05/18 1135) Vital Signs (24h Range):  Temp:  [97.8 °F (36.6 °C)-98.7 °F (37.1 °C)] 97.8 °F (36.6 °C)  Pulse:  [51-64] 58  Resp:  [15-16] 15  SpO2:  [92 %-98 %] 97 %  BP: (117-168)/(56-95) 140/64     Intake/Output - Last 3 Shifts       10/03 0700 - 10/04 0659 10/04 0700 - 10/05 0659 10/05 0700 - 10/06 0659    P.O. 80 0     I.V. (mL/kg) 2870.4 (23.8) 2369.9 (19.6)     NG/GT       IV Piggyback  500     TPN 2133.4 1478.4     Total Intake(mL/kg) 5083.8 (42.1) 4348.3 (36)     Urine (mL/kg/hr) 800 (0.3) 200 (0.1) 300 (0.5)    Emesis/NG output  0     Drains 1900 750     Stool 0 0     Total Output 2700 950 300    Net +2383.8 +3398.3 -300           Urine Occurrence 7 x 5 x     Stool Occurrence 4 x 1 x     Emesis Occurrence  0 x           Physical Exam    Constitutional: He is oriented to person, place, and time. He appears well-developed and well-nourished. No distress.   Cardiovascular: Normal rate, regular rhythm and normal heart sounds.   Pulmonary/Chest: Effort normal and breath sounds normal. No respiratory distress. He has no wheezes. He has no rales.   O2 1l   Abdominal: Soft. He exhibits no distension and no mass. There is no tenderness. There is no guarding.   abd inc line healing well  NGT with light brown output   Musculoskeletal: Normal range of motion.   Neurological: He is alert and oriented to person, place, and time.   Skin: Skin is warm and dry.   Psychiatric: He has a normal mood and affect. His behavior is normal. Judgment and thought content normal.   Nursing note and vitals reviewed.    Significant Labs:  BMP (Last 3 Results):   Recent Labs   Lab  10/03/18   0400  10/04/18   0400  10/05/18   0506   GLU  283*  315*  216*   NA  144  143  143   K  3.1*  3.2*  3.6   CL  96  100  105   CO2  40*  35*  33*   BUN  13  17  18   CREATININE  0.8  0.8  0.8   CALCIUM  8.6*  8.5*  8.1*   MG  2.0  2.3  2.2     CBC (Last 3 Results):   Recent Labs   Lab  10/03/18   0400  10/04/18   0400  10/05/18   0506   WBC  6.58  6.03  6.83   RBC  4.03*  3.93*  3.68*   HGB  11.7*  11.4*  10.9*   HCT  37.3*  36.0*  33.6*   PLT  155  138*  126*   MCV  93  92  91   MCH  29.0  29.0  29.6   MCHC  31.4*  31.7*  32.4       Significant Diagnostics:  I have reviewed all pertinent imaging results/findings within the past 24 hours.

## 2018-10-05 NOTE — PROGRESS NOTES
" [facility]  Adult Nutrition  Progress Note    SUMMARY       Recommendations    Recommendation/Intervention:     1. Continue current Custom TPN - meeting EEN/EPN. Monitor glucose levels.   2. When medically able, ADAT to Diabetic diet.   3. RD following.     Goals: meet >85% EEN/EPN  Nutrition Goal Status: goal met  Communication of RD Recs: (POC)    Reason for Assessment    Reason for Assessment: RD follow-up  Diagnosis: cancer diagnosis/related complications(colon cancer s/p colectomy)  Relevant Medical History: T2DM, diverticulosis, hypercholesterolemia  Interdisciplinary Rounds: attended  General Information Comments: POD10 s/p colectomy. Pt NPO on TPN + IV lipids. Per NP, possible diet advancement tomorrow. NFPE completed 10/2. Pt appears nourished. RD does not feel that pt meets malnutrition criteria at this time.   Nutrition Discharge Planning: unable to determine at this time    Nutrition/Diet History    Do you have any cultural, spiritual, Evangelical conflicts, given your current situation?: none stated  Factors Affecting Nutritional Intake: altered gastrointestinal function, NPO    Anthropometrics    Temp: 97.8 °F (36.6 °C)  Height Method: Stated  Height: 6' 3" (190.5 cm)  Height (inches): 75 in  Weight Method: Bed Scale  Weight: 120.7 kg (266 lb)  Weight (lb): 266 lb  Ideal Body Weight (IBW), Male: 196 lb  % Ideal Body Weight, Male (lb): 135.71 lb  BMI (Calculated): 33.3  BMI Grade: 30 - 34.9- obesity - grade I     Lab/Procedures/Meds    Pertinent Labs Reviewed: reviewed  Pertinent Labs Comments: glucose 216  Pertinent Medications Reviewed: reviewed  Pertinent Medications Comments: IVF, insulin    Physical Findings/Assessment    Overall Physical Appearance: nourished, overweight, on oxygen therapy  Tubes: nasogastric tube  Skin: incision(s)    Estimated/Assessed Needs    Weight Used For Calorie Calculations: 120.7 kg (266 lb 1.5 oz)  Energy Calorie Requirements (kcal): 2546 " kcal/day  Energy Need Method: [place](x 1.25)  Protein Requirements: 120-157 gm/day(1.0-1.3 gm/kg)  Weight Used For Protein Calculations: 120.7 kg (266 lb 1.5 oz)  Fluid Requirements (mL): 1 mL/kcal or per MD  Fluid Need Method: RDA Method  RDA Method (mL): 2546  CHO Requirement: 50% kcals from CHO    Nutrition Prescription Ordered    Current Diet Order: NPO  Current Nutrition Support Formula Ordered: Other (Comment)(Custom  gm AA/ 450 gm Dxtrose + IV lipids )  Current Nutrition Support Rate Ordered: 75 (ml)  Current Nutrition Support Frequency Ordered: mL/hr    Evaluation of Received Nutrient/Fluid Intake    Parenteral Calories (kcal): 2070  Parenteral Protein (gm): 135  Parenteral Fluid (mL): 7500  Lipid Calories (kcals): 500 kcals  GIR (Glucose Infusion Rate) (mg/kg/min): 2.59 mg/kg/min  Total Calories (kcal/kg): 2570  % Kcal Needs: 100%  % Protein Needs: 100%  Energy Calories Required: meeting needs  Protein Required: meeting needs  Comments: LBM 10/4  Tolerance: tolerating  % Intake of Estimated Energy Needs: 75 - 100 %  % Meal Intake: NPO    Nutrition Risk    Level of Risk/Frequency of Follow-up: low(f/u 1 x wk)     Assessment and Plan    Nutrition Problem  Altered GI Function     Related to (etiology):   Colon cancer     Signs and Symptoms (as evidenced by):   S/p colectomy 9/25 and need for TPN to meet EEN/EPN.       Nutrition Diagnosis Status:   Continues      Monitor and Evaluation    Food and Nutrient Intake: energy intake, parenteral nutrition intake  Food and Nutrient Adminstration: enteral and parenteral nutrition administration  Anthropometric Measurements: weight, weight change, body mass index  Biochemical Data, Medical Tests and Procedures: electrolyte and renal panel, gastrointestinal profile, lipid profile, inflammatory profile, glucose/endocrine profile  Nutrition-Focused Physical Findings: overall appearance     Nutrition Follow-Up    RD Follow-up?: Yes

## 2018-10-05 NOTE — PLAN OF CARE
Problem: Patient Care Overview  Goal: Plan of Care Review  Outcome: Ongoing (interventions implemented as appropriate)  POC reviewed with patient and son who both verbalized understanding. AAOx4. VSS on 1L NC. Abdominal midline incision noted and lap sites x3 all noted with derma bond. Left nare NGT intact to LIWS with brown output. IVF, TPN, and lipids infusing. Blood glucose checked Q4 with PRN coverage given and insulin gtt infusing per orders. Up with standby assistance to bathroom, voiding and had one BM overnight. Ambulated in hallways x1. Kept NPO with ice chips, denies any nausea. Pain controlled with PRN medications per MAR. Telemetry being monitored running SB with continuous pulse oximetry in place. BLE TEDS/SCDS in place. Safety precautions maintained, call light within reach. Remains free from falls and injury. No acute events. No distress noted. Will continue to monitor. Son at bedside.

## 2018-10-05 NOTE — PLAN OF CARE
10/05/18 1607   Discharge Reassessment   Assessment Type Discharge Planning Reassessment   Do you have any problems affording any of your prescribed medications? No   Discharge Plan A Home;Home with family   Discharge Plan B Home with family;Home Health   Can the patient answer the patient profile reliably? Yes, cognitively intact   How does the patient rate their overall health at the present time? Good   Describe the patient's ability to walk at the present time. No restrictions   How often would a person be available to care for the patient? Occasionally   Number of comorbid conditions (as recorded on the chart) Two   During the past month, has the patient often been bothered by feeling down, depressed or hopeless? No   During the past month, has the patient often been bothered by little interest or pleasure in doing things? No

## 2018-10-05 NOTE — PROGRESS NOTES
[facility]  Colorectal Surgery  Progress Note    Patient Name: Rosendo Thomas  MRN: [id number]  Admission Date: 9/25/2018  Hospital Length of Stay: 10 days  Attending Physician: Chito Banks MD    Subjective:     Interval History: no acute events. Pain controlled. +bm and flatus. NGT output improved.    Post-Op Info:  Procedure(s) (LRB):  COLECTOMY-LAPAROSCOPIC LEFT (N/A)   10 Days Post-Op      Medications:  Continuous Infusions:   sodium chloride 0.9% 100 mL/hr at 10/04/18 2238    insulin (HUMAN R) infusion (adults) 2.4 Units/hr (10/05/18 0836)    TPN ADULT CENTRAL LINE CUSTOM 75 mL/hr at 10/04/18 2228     Scheduled Meds:   enoxparin  40 mg Subcutaneous Q24H    olmesartan  40 mg Oral Daily    pantoprazole  40 mg Intravenous BID    sodium chloride 0.9%  10 mL Intravenous Q6H    tamsulosin  0.4 mg Oral Daily     PRN Meds:   albuterol-ipratropium    dextrose 50%    dextrose 50%    diphenhydrAMINE    gabapentin    glucagon (human recombinant)    glucose    glucose    hydrALAZINE    HYDROmorphone    HYDROmorphone    insulin aspart U-100    labetalol    ondansetron    promethazine (PHENERGAN) IVPB    sodium chloride 0.9%    sodium chloride 0.9%        Objective:     Vital Signs (Most Recent):  Temp: 97.8 °F (36.6 °C) (10/05/18 0838)  Pulse: (!) 58 (10/05/18 1135)  Resp: 15 (10/05/18 0838)  BP: (!) 140/64 (10/05/18 0838)  SpO2: 97 % (10/05/18 1135) Vital Signs (24h Range):  Temp:  [97.8 °F (36.6 °C)-98.7 °F (37.1 °C)] 97.8 °F (36.6 °C)  Pulse:  [51-64] 58  Resp:  [15-16] 15  SpO2:  [92 %-98 %] 97 %  BP: (117-168)/(56-95) 140/64     Intake/Output - Last 3 Shifts       10/03 0700 - 10/04 0659 10/04 0700 - 10/05 0659 10/05 0700 - 10/06 0659    P.O. 80 0     I.V. (mL/kg) 2870.4 (23.8) 2369.9 (19.6)     NG/GT       IV Piggyback  500     TPN 2133.4 1478.4     Total Intake(mL/kg) 5083.8 (42.1) 4348.3 (36)     Urine (mL/kg/hr) 800 (0.3) 200 (0.1) 300 (0.5)    Emesis/NG output  0      Drains 1900 750     Stool 0 0     Total Output 2700 950 300    Net +2383.8 +3398.3 -300           Urine Occurrence 7 x 5 x     Stool Occurrence 4 x 1 x     Emesis Occurrence  0 x           Physical Exam   Constitutional: He is oriented to person, place, and time. He appears well-developed and well-nourished. No distress.   Cardiovascular: Normal rate, regular rhythm and normal heart sounds.   Pulmonary/Chest: Effort normal and breath sounds normal. No respiratory distress. He has no wheezes. He has no rales.   O2 1l   Abdominal: Soft. He exhibits no distension and no mass. There is no tenderness. There is no guarding.   abd inc line healing well  NGT with light brown output   Musculoskeletal: Normal range of motion.   Neurological: He is alert and oriented to person, place, and time.   Skin: Skin is warm and dry.   Psychiatric: He has a normal mood and affect. His behavior is normal. Judgment and thought content normal.   Nursing note and vitals reviewed.    Significant Labs:  BMP (Last 3 Results):   Recent Labs   Lab  10/03/18   0400  10/04/18   0400  10/05/18   0506   GLU  283*  315*  216*   NA  144  143  143   K  3.1*  3.2*  3.6   CL  96  100  105   CO2  40*  35*  33*   BUN  13  17  18   CREATININE  0.8  0.8  0.8   CALCIUM  8.6*  8.5*  8.1*   MG  2.0  2.3  2.2     CBC (Last 3 Results):   Recent Labs   Lab  10/03/18   0400  10/04/18   0400  10/05/18   0506   WBC  6.58  6.03  6.83   RBC  4.03*  3.93*  3.68*   HGB  11.7*  11.4*  10.9*   HCT  37.3*  36.0*  33.6*   PLT  155  138*  126*   MCV  93  92  91   MCH  29.0  29.0  29.6   MCHC  31.4*  31.7*  32.4       Significant Diagnostics:  I have reviewed all pertinent imaging results/findings within the past 24 hours.    Assessment/Plan:     * Colon cancer    Rosendo Thomas is a 73 y.o. male s/p COLECTOMY-LAPAROSCOPIC LEFT (N/A) on 9/25/2018 recovering in stable condition on the floor.     - prn pain and nausea control  - NGT clamp trial 7-11am  - TPN, increase K+  -  SSI, endocrine consulted for hyperglycemia  - BID PPI   - OOB, ICS, SCDs,          Sinus bradycardia    Echo done  Tele  Monitor vs        Uncontrolled type 2 diabetes mellitus with stage 2 chronic kidney disease, without long-term current use of insulin    Endocrine consulted              Devyn Chandler MD  Colorectal Surgery  [facility]

## 2018-10-05 NOTE — PT/OT/SLP PROGRESS
"Occupational Therapy   Treatment/ Discharge    Name: Rosendo Thomas  MRN: [id number]  Admitting Diagnosis:  Colon cancer  10 Days Post-Op    Recommendations:     Discharge Recommendations: home  Discharge Equipment Recommendations:  none  Barriers to discharge:  None    Subjective     Communicated with: MARTHA Muñoz prior to session. Pt agreeable to OT session. "I've been walking the halls all morning."  Pain/Comfort:  · Pain Rating 1: 0/10  · Pain Rating Post-Intervention 1: 0/10    Patients cultural, spiritual, Catholic conflicts given the current situation: none stated    Objective:     Patient found with: telemetry, pulse ox (continuous), blood pressure cuff, peripheral IV, NG tube    General Precautions: Standard, fall   Orthopedic Precautions:N/A   Braces: N/A     Occupational Performance:    Bed Mobility:    · Not assessed 2/2 patient sitting [facility] upon OT arrival.    Functional Mobility/Transfers:  · Patient completed Sit <> Stand Transfer with supervision  with  no assistive device   · Patient completed Toilet Transfer Step Transfer technique with supervision with  no AD and grab bars  · Functional Mobility: Pt ambulated over 300 ft with no AD while managing IV pole. No LOB noted; Upright posture maintained; Monitored O2 throughout functional mobility while encouraging pursed lip breathing and rest breaks    Activities of Daily Living:  · Upper Body Dressing: modified independence to don gown as robe in standing  · Lower Body Dressing: maximal assistance to don/doff socks; pt reported that donning socks isn't a huge concern right now  · Toileting: supervision for wiping and clothing management     Patient left up in chair with all lines intact, call button in reach and family present    [facility] 6 Click:  [facility] Total Score: 22    Treatment & Education:  Educated the patient on the following:  - OT POC  - Safety during functional activities  - Transfer safety  - Mobility safety  - Pursed lip " breathing  Education:    Assessment:     Rosendo Thomas is a 73 y.o. male with a medical diagnosis of Colon cancer.  He presents with motivation to participate in therapy. Pt made significant progress this date demonstrating improved endurance during functional ADLs and mobility. Patient voiced no concerns this date in regards with therapy. Based on this session, patient has no functional deficits that would affect his safety in participating in ADLs upon returning home.     Plan:   - Patient no longer requires skilled acute OT services at this time. Patient is being discharged from OT this date 2/2 no functional deficits noted that would affect functional independence.  · Plan of Care Reviewed with: patient, family    This Plan of care has been discussed with the patient who was involved in its development and understands and is in agreement with the identified goals and treatment plan    GOALS:   Multidisciplinary Problems     Occupational Therapy Goals     Not on file          Multidisciplinary Problems (Resolved)        Problem: Occupational Therapy Goal    Goal Priority Disciplines Outcome Interventions   Occupational Therapy Goal   (Resolved)     OT, PT/OT Outcome(s) achieved    Description:  Goals to be met by: 10/11/2018    Patient will increase functional independence with ADLs by performing:    UE Dressing with [place].  LE Dressing with [place].  Grooming while standing with [place].  Toileting from toilet with Modified [place] for hygiene and clothing management.- Met  Step transfer with Modified [place]- Met  Toilet transfer to toilet with Modified [place].                       Time Tracking:     OT Date of Treatment: 10/05/18  OT Start Time: 1023  OT Stop Time: 1047  OT Total Time (min): 24 min    Billable Minutes:Self Care/Home Management 12  Therapeutic Activity 12    Tanya Vázquez, OT  10/5/2018

## 2018-10-05 NOTE — ASSESSMENT & PLAN NOTE
Rosendo Thomas is a 73 y.o. male s/p COLECTOMY-LAPAROSCOPIC LEFT (N/A) on 9/25/2018 recovering in stable condition on the floor.     - prn pain and nausea control  - NGT clamp trial 7-11am  - TPN, increase K+  - SSI, endocrine consulted for hyperglycemia  - BID PPI   - OOB, ICS, SCDs,

## 2018-10-05 NOTE — SUBJECTIVE & OBJECTIVE
"Interval HPI:   Overnight events: Remains on GISSU, receiving TPN.  BG elevated overnight on transition IV insulin drip.  Eating:   NPO  Nausea: No  Hypoglycemia and intervention: No  Fever: No  TPN and/or TF: Yes  If yes, type of TF/TPN and rate: 75 cc/hr    BP (!) 117/56 (BP Location: Left arm, Patient Position: Lying)   Pulse (!) 58   Temp 98.1 °F (36.7 °C) (Oral)   Resp 16   Ht 6' 3" (1.905 m)   Wt 120.7 kg (266 lb)   SpO2 96%   BMI 33.25 kg/m²     Labs Reviewed and Include    Recent Labs   Lab  10/05/18   0506   GLU  216*   CALCIUM  8.1*   NA  143   K  3.6   CO2  33*   CL  105   BUN  18   CREATININE  0.8     Lab Results   Component Value Date    WBC 6.83 10/05/2018    HGB 10.9 (L) 10/05/2018    HCT 33.6 (L) 10/05/2018    MCV 91 10/05/2018     (L) 10/05/2018     No results for input(s): TSH, FREET4 in the last 168 hours.  Lab Results   Component Value Date    HGBA1C 8.0 (H) 09/21/2018       Nutritional status:   Body mass index is 33.25 kg/m².  Lab Results   Component Value Date    ALBUMIN 2.6 (L) 09/30/2018    ALBUMIN 3.8 09/04/2018    ALBUMIN 3.5 05/02/2018     Lab Results   Component Value Date    PREALBUMIN 10 (L) 10/03/2018       Estimated Creatinine Clearance: 115.2 mL/min (based on SCr of 0.8 mg/dL).    Accu-Checks  Recent Labs      10/03/18   1552  10/03/18   1944  10/04/18   0009  10/04/18   0352  10/04/18   0829  10/04/18   1219  10/04/18   1718  10/04/18   2101  10/05/18   0055  10/05/18   0513   POCTGLUCOSE  245*  238*  281*  281*  293*  253*  237*  257*  243*  233*       Current Medications and/or Treatments Impacting Glycemic Control  Immunotherapy:    Immunosuppressants     None        Steroids:   Hormones (From admission, onward)    None        Pressors:    Autonomic Drugs (From admission, onward)    None        Hyperglycemia/Diabetes Medications:   Antihyperglycemics (From admission, onward)    Start     Stop Route Frequency Ordered    10/04/18 0923  insulin aspart U-100 pen 0-10 " Units      -- SubQ As needed (PRN) 10/04/18 0826    10/03/18 1130  insulin regular (Humulin R) 100 Units in sodium chloride 0.9% 100 mL infusion      -- IV Continuous 10/03/18 1022

## 2018-10-05 NOTE — PLAN OF CARE
Problem: Patient Care Overview  Goal: Plan of Care Review    Recommendations    Recommendation/Intervention:     1. Continue current Custom TPN - meeting EEN/EPN. Monitor glucose levels.   2. When medically able, ADAT to Diabetic diet.   3. RD following.     Goals: meet >85% EEN/EPN  Nutrition Goal Status: goal met

## 2018-10-06 LAB
ANION GAP SERPL CALC-SCNC: 6 MMOL/L
BASOPHILS # BLD AUTO: 0.04 K/UL
BASOPHILS NFR BLD: 0.6 %
BUN SERPL-MCNC: 17 MG/DL
CALCIUM SERPL-MCNC: 8.4 MG/DL
CHLORIDE SERPL-SCNC: 105 MMOL/L
CO2 SERPL-SCNC: 30 MMOL/L
CREAT SERPL-MCNC: 0.7 MG/DL
DIFFERENTIAL METHOD: ABNORMAL
EOSINOPHIL # BLD AUTO: 0.3 K/UL
EOSINOPHIL NFR BLD: 4.4 %
ERYTHROCYTE [DISTWIDTH] IN BLOOD BY AUTOMATED COUNT: 13.6 %
EST. GFR  (AFRICAN AMERICAN): >60 ML/MIN/1.73 M^2
EST. GFR  (NON AFRICAN AMERICAN): >60 ML/MIN/1.73 M^2
GLUCOSE SERPL-MCNC: 183 MG/DL
HCT VFR BLD AUTO: 34.4 %
HGB BLD-MCNC: 11.1 G/DL
IMM GRANULOCYTES # BLD AUTO: 0.04 K/UL
IMM GRANULOCYTES NFR BLD AUTO: 0.6 %
LYMPHOCYTES # BLD AUTO: 1.4 K/UL
LYMPHOCYTES NFR BLD: 21.3 %
MAGNESIUM SERPL-MCNC: 2.2 MG/DL
MCH RBC QN AUTO: 29.1 PG
MCHC RBC AUTO-ENTMCNC: 32.3 G/DL
MCV RBC AUTO: 90 FL
MONOCYTES # BLD AUTO: 0.5 K/UL
MONOCYTES NFR BLD: 7 %
NEUTROPHILS # BLD AUTO: 4.3 K/UL
NEUTROPHILS NFR BLD: 66.1 %
NRBC BLD-RTO: 0 /100 WBC
PHOSPHATE SERPL-MCNC: 2.7 MG/DL
PLATELET # BLD AUTO: 126 K/UL
PMV BLD AUTO: 11.9 FL
POCT GLUCOSE: 174 MG/DL (ref 70–110)
POCT GLUCOSE: 176 MG/DL (ref 70–110)
POCT GLUCOSE: 196 MG/DL (ref 70–110)
POCT GLUCOSE: 197 MG/DL (ref 70–110)
POCT GLUCOSE: 201 MG/DL (ref 70–110)
POCT GLUCOSE: 238 MG/DL (ref 70–110)
POTASSIUM SERPL-SCNC: 3.4 MMOL/L
RBC # BLD AUTO: 3.81 M/UL
SODIUM SERPL-SCNC: 141 MMOL/L
WBC # BLD AUTO: 6.57 K/UL

## 2018-10-06 PROCEDURE — 63600175 PHARM REV CODE 636 W HCPCS: Performed by: NURSE PRACTITIONER

## 2018-10-06 PROCEDURE — 85025 COMPLETE CBC W/AUTO DIFF WBC: CPT

## 2018-10-06 PROCEDURE — C9113 INJ PANTOPRAZOLE SODIUM, VIA: HCPCS | Performed by: NURSE PRACTITIONER

## 2018-10-06 PROCEDURE — 99232 SBSQ HOSP IP/OBS MODERATE 35: CPT | Mod: ,,, | Performed by: NURSE PRACTITIONER

## 2018-10-06 PROCEDURE — 94664 DEMO&/EVAL PT USE INHALER: CPT

## 2018-10-06 PROCEDURE — 25000003 PHARM REV CODE 250: Performed by: STUDENT IN AN ORGANIZED HEALTH CARE EDUCATION/TRAINING PROGRAM

## 2018-10-06 PROCEDURE — 94761 N-INVAS EAR/PLS OXIMETRY MLT: CPT

## 2018-10-06 PROCEDURE — 63600175 PHARM REV CODE 636 W HCPCS: Performed by: STUDENT IN AN ORGANIZED HEALTH CARE EDUCATION/TRAINING PROGRAM

## 2018-10-06 PROCEDURE — 84100 ASSAY OF PHOSPHORUS: CPT

## 2018-10-06 PROCEDURE — A4217 STERILE WATER/SALINE, 500 ML: HCPCS | Performed by: STUDENT IN AN ORGANIZED HEALTH CARE EDUCATION/TRAINING PROGRAM

## 2018-10-06 PROCEDURE — 20600001 HC STEP DOWN PRIVATE ROOM

## 2018-10-06 PROCEDURE — 25000003 PHARM REV CODE 250: Performed by: COLON & RECTAL SURGERY

## 2018-10-06 PROCEDURE — 80048 BASIC METABOLIC PNL TOTAL CA: CPT

## 2018-10-06 PROCEDURE — 94799 UNLISTED PULMONARY SVC/PX: CPT

## 2018-10-06 PROCEDURE — 83735 ASSAY OF MAGNESIUM: CPT

## 2018-10-06 PROCEDURE — A4216 STERILE WATER/SALINE, 10 ML: HCPCS | Performed by: COLON & RECTAL SURGERY

## 2018-10-06 RX ADMIN — ENOXAPARIN SODIUM 40 MG: 100 INJECTION SUBCUTANEOUS at 10:10

## 2018-10-06 RX ADMIN — INSULIN ASPART 2 UNITS: 100 INJECTION, SOLUTION INTRAVENOUS; SUBCUTANEOUS at 02:10

## 2018-10-06 RX ADMIN — OLMESARTAN MEDOXOMIL 40 MG: 20 TABLET, COATED ORAL at 08:10

## 2018-10-06 RX ADMIN — TAMSULOSIN HYDROCHLORIDE 0.4 MG: 0.4 CAPSULE ORAL at 08:10

## 2018-10-06 RX ADMIN — PANTOPRAZOLE SODIUM 40 MG: 40 INJECTION, POWDER, FOR SOLUTION INTRAVENOUS at 08:10

## 2018-10-06 RX ADMIN — Medication 10 ML: at 12:10

## 2018-10-06 RX ADMIN — INSULIN ASPART 2 UNITS: 100 INJECTION, SOLUTION INTRAVENOUS; SUBCUTANEOUS at 06:10

## 2018-10-06 RX ADMIN — CALCIUM GLUCONATE: 94 INJECTION, SOLUTION INTRAVENOUS at 10:10

## 2018-10-06 RX ADMIN — Medication 10 ML: at 06:10

## 2018-10-06 RX ADMIN — PANTOPRAZOLE SODIUM 40 MG: 40 INJECTION, POWDER, FOR SOLUTION INTRAVENOUS at 10:10

## 2018-10-06 RX ADMIN — HYDROMORPHONE HYDROCHLORIDE 1 MG: 1 INJECTION, SOLUTION INTRAMUSCULAR; INTRAVENOUS; SUBCUTANEOUS at 12:10

## 2018-10-06 RX ADMIN — INSULIN ASPART 4 UNITS: 100 INJECTION, SOLUTION INTRAVENOUS; SUBCUTANEOUS at 02:10

## 2018-10-06 RX ADMIN — INSULIN ASPART 2 UNITS: 100 INJECTION, SOLUTION INTRAVENOUS; SUBCUTANEOUS at 11:10

## 2018-10-06 RX ADMIN — DIPHENHYDRAMINE HYDROCHLORIDE 12.5 MG: 50 INJECTION, SOLUTION INTRAMUSCULAR; INTRAVENOUS at 10:10

## 2018-10-06 NOTE — ASSESSMENT & PLAN NOTE
Rosendo Thomas is a 73 y.o. male s/p COLECTOMY-LAPAROSCOPIC LEFT (N/A) on 9/25/2018 recovering in stable condition on the floor.     - prn pain and nausea control  - NGT out, will progress diet to clears  - TPN, increase K+  - SSI, endocrine consulted for hyperglycemia  - BID PPI   - OOB, ICS, SCDs,

## 2018-10-06 NOTE — ASSESSMENT & PLAN NOTE
Increase IV transition insulin to 3.1 u/h  Continue moderate correction insulin  Change BG monitoring to AC/HS/0200    Discharge: Switch metformin to metformin XR, increase incrementally to 1000 mg BID.  Discussed with patient and patient's PCP.  Prescription completed.

## 2018-10-06 NOTE — PROGRESS NOTES
[facility]  Colorectal Surgery  Progress Note    Patient Name: Rosendo Thomas  MRN: [id number]  Admission Date: 9/25/2018  Hospital Length of Stay: 11 days  Attending Physician: Chito Banks MD    Subjective:     Interval History: no acute events. Pain controlled. +bm and flatus. NGT removed yesterday     Post-Op Info:  Procedure(s) (LRB):  COLECTOMY-LAPAROSCOPIC LEFT (N/A)   11 Days Post-Op      Medications:  Continuous Infusions:   insulin (HUMAN R) infusion (adults) 3.1 Units/hr (10/06/18 0713)    TPN ADULT CENTRAL LINE CUSTOM 75 mL/hr at 10/05/18 2228    TPN ADULT CENTRAL LINE CUSTOM       Scheduled Meds:   enoxparin  40 mg Subcutaneous Q24H    fat emulsion 20%  250 mL Intravenous Daily    olmesartan  40 mg Oral Daily    pantoprazole  40 mg Intravenous BID    sodium chloride 0.9%  10 mL Intravenous Q6H    tamsulosin  0.4 mg Oral Daily     PRN Meds:   albuterol-ipratropium    dextrose 50%    dextrose 50%    diphenhydrAMINE    gabapentin    glucagon (human recombinant)    glucose    glucose    hydrALAZINE    HYDROmorphone    HYDROmorphone    insulin aspart U-100    labetalol    ondansetron    promethazine (PHENERGAN) IVPB    sodium chloride 0.9%    sodium chloride 0.9%        Objective:     Vital Signs (Most Recent):  Temp: 98.6 °F (37 °C) (10/06/18 0736)  Pulse: 76 (10/06/18 0750)  Resp: 16 (10/06/18 0736)  BP: 135/67 (10/06/18 0736)  SpO2: (!) 94 % (10/06/18 0736) Vital Signs (24h Range):  Temp:  [98.2 °F (36.8 °C)-98.6 °F (37 °C)] 98.6 °F (37 °C)  Pulse:  [50-76] 76  Resp:  [14-17] 16  SpO2:  [94 %-98 %] 94 %  BP: (135-172)/(63-77) 135/67     Intake/Output - Last 3 Shifts       10/04 0700 - 10/05 0659 10/05 0700 - 10/06 0659 10/06 0700 - 10/07 0659    P.O. 0 0     I.V. (mL/kg) 2369.9 (19.6) 2421.1 (20.1)     IV Piggyback 500      TPN 1478.4 1668.5     Total Intake(mL/kg) 4348.3 (36) 4089.6 (33.9)     Urine (mL/kg/hr) 200 (0.1) 1150 (0.4)     Emesis/NG output 0 0      Drains 750 50     Other  0     Stool 0 0     Total Output 950 1200     Net +3398.3 +2889.6            Urine Occurrence 5 x 3 x     Stool Occurrence 1 x 2 x 1 x    Emesis Occurrence 0 x 0 x           Physical Exam   Constitutional: He is oriented to person, place, and time. He appears well-developed and well-nourished. No distress.   Cardiovascular: Normal rate, regular rhythm and normal heart sounds.   Pulmonary/Chest: Effort normal and breath sounds normal. No respiratory distress. He has no wheezes. He has no rales.   O2 1l   Abdominal: Soft. He exhibits no distension and no mass. There is no tenderness. There is no guarding.   abd inc line healing well   Musculoskeletal: Normal range of motion.   Neurological: He is alert and oriented to person, place, and time.   Skin: Skin is warm and dry.   Psychiatric: He has a normal mood and affect. His behavior is normal. Judgment and thought content normal.   Nursing note and vitals reviewed.    Significant Labs:  BMP (Last 3 Results):   Recent Labs   Lab  10/04/18   0400  10/05/18   0506  10/06/18   0500   GLU  315*  216*  183*   NA  143  143  141   K  3.2*  3.6  3.4*   CL  100  105  105   CO2  35*  33*  30*   BUN  17  18  17   CREATININE  0.8  0.8  0.7   CALCIUM  8.5*  8.1*  8.4*   MG  2.3  2.2  2.2     CBC (Last 3 Results):   Recent Labs   Lab  10/04/18   0400  10/05/18   0506  10/06/18   0500   WBC  6.03  6.83  6.57   RBC  3.93*  3.68*  3.81*   HGB  11.4*  10.9*  11.1*   HCT  36.0*  33.6*  34.4*   PLT  138*  126*  126*   MCV  92  91  90   MCH  29.0  29.6  29.1   MCHC  31.7*  32.4  32.3       Significant Diagnostics:  I have reviewed all pertinent imaging results/findings within the past 24 hours.    Assessment/Plan:     * Colon cancer    Rosendo Thomas is a 73 y.o. male s/p COLECTOMY-LAPAROSCOPIC LEFT (N/A) on 9/25/2018 recovering in stable condition on the floor.     - prn pain and nausea control  - NGT out, will progress diet to clears  - TPN, increase K+  -  SSI, endocrine consulted for hyperglycemia  - BID PPI   - OOB, ICS, SCDs,          Sinus bradycardia    Echo done  Tele  Monitor vs        Uncontrolled type 2 diabetes mellitus with stage 2 chronic kidney disease, without long-term current use of insulin    Endocrine consulted              Yenni Marinelli MD  Colorectal Surgery  [facility]

## 2018-10-06 NOTE — PLAN OF CARE
Problem: Patient Care Overview  Goal: Plan of Care Review  Outcome: Ongoing (interventions implemented as appropriate)  POC reviewed with patient and daughter who both verbalized understanding. AAOx4. VSS on room air. Abdominal midline incision noted and lap sites x3 all noted with derma bond. IVF, TPN, and lipids infusing. Blood glucose checked Q4 with PRN coverage given and insulin gtt infusing per orders. Up with standby assistance to bathroom, voiding and had one BM overnight. Ambulated in hallways x1. Kept NPO with ice chips, denies any nausea. Pain controlled with PRN medications per MAR. Telemetry being monitored running SB with continuous pulse oximetry in place. BLE TEDS in place, refusing SCDS. Safety precautions maintained, call light within reach. Remains free from falls and injury. No acute events. No distress noted. Will continue to monitor. Daughter at bedside.

## 2018-10-06 NOTE — NURSING
On call surgery resident paged and notified of pt bradycardic event reported by telemetry. Unable to retrieve event on monitor. Upon assessment of patient, pt asymptomatic. Pt was awakened from sleep after even. No acute distress noted. Will continue to monitor closely.

## 2018-10-06 NOTE — SUBJECTIVE & OBJECTIVE
"Interval HPI:   Overnight events: Remains on GISSU, receiving TPN.  Diet advanced to sugar free clears.  BG improved overnight on transition IV insulin drip, at or slightly above goal.  Eatin% - SFCL  Nausea: No  Hypoglycemia and intervention: No  Fever: No  TPN and/or TF: Yes  TPN and rate: 75 cc/hr    /63 (BP Location: Left arm, Patient Position: Lying)   Pulse (!) 53   Temp 98.2 °F (36.8 °C) (Oral)   Resp 14   Ht 6' 3" (1.905 m)   Wt 120.7 kg (266 lb)   SpO2 95%   BMI 33.25 kg/m²     Labs Reviewed and Include    Recent Labs   Lab  10/06/18   0500   GLU  183*   CALCIUM  8.4*   NA  141   K  3.4*   CO2  30*   CL  105   BUN  17   CREATININE  0.7     Lab Results   Component Value Date    WBC 6.57 10/06/2018    HGB 11.1 (L) 10/06/2018    HCT 34.4 (L) 10/06/2018    MCV 90 10/06/2018     (L) 10/06/2018     No results for input(s): TSH, FREET4 in the last 168 hours.  Lab Results   Component Value Date    HGBA1C 8.0 (H) 2018       Nutritional status:   Body mass index is 33.25 kg/m².  Lab Results   Component Value Date    ALBUMIN 2.6 (L) 2018    ALBUMIN 3.8 2018    ALBUMIN 3.5 2018     Lab Results   Component Value Date    PREALBUMIN 10 (L) 10/03/2018       Estimated Creatinine Clearance: 131.6 mL/min (based on SCr of 0.7 mg/dL).    Accu-Checks  Recent Labs      10/04/18   1718  10/04/18   2101  10/05/18   0055  10/05/18   0513  10/05/18   0831  10/05/18   1318  10/05/18   1842  10/05/18   2223  10/06/18   0235  10/06/18   0634   POCTGLUCOSE  237*  257*  243*  233*  194*  214*  205*  170*  196*  197*       Current Medications and/or Treatments Impacting Glycemic Control    Hyperglycemia/Diabetes Medications:   Antihyperglycemics (From admission, onward)    Start     Stop Route Frequency Ordered    10/04/18 0923  insulin aspart U-100 pen 0-10 Units      -- SubQ As needed (PRN) 10/04/18 0826    10/03/18 1130  insulin regular (Humulin R) 100 Units in sodium chloride 0.9% 100 " mL infusion      -- IV Continuous 10/03/18 1024

## 2018-10-06 NOTE — SUBJECTIVE & OBJECTIVE
Subjective:     Interval History: no acute events. Pain controlled. +bm and flatus. NGT removed yesterday     Post-Op Info:  Procedure(s) (LRB):  COLECTOMY-LAPAROSCOPIC LEFT (N/A)   11 Days Post-Op      Medications:  Continuous Infusions:   insulin (HUMAN R) infusion (adults) 3.1 Units/hr (10/06/18 0713)    TPN ADULT CENTRAL LINE CUSTOM 75 mL/hr at 10/05/18 2228    TPN ADULT CENTRAL LINE CUSTOM       Scheduled Meds:   enoxparin  40 mg Subcutaneous Q24H    fat emulsion 20%  250 mL Intravenous Daily    olmesartan  40 mg Oral Daily    pantoprazole  40 mg Intravenous BID    sodium chloride 0.9%  10 mL Intravenous Q6H    tamsulosin  0.4 mg Oral Daily     PRN Meds:   albuterol-ipratropium    dextrose 50%    dextrose 50%    diphenhydrAMINE    gabapentin    glucagon (human recombinant)    glucose    glucose    hydrALAZINE    HYDROmorphone    HYDROmorphone    insulin aspart U-100    labetalol    ondansetron    promethazine (PHENERGAN) IVPB    sodium chloride 0.9%    sodium chloride 0.9%        Objective:     Vital Signs (Most Recent):  Temp: 98.6 °F (37 °C) (10/06/18 0736)  Pulse: 76 (10/06/18 0750)  Resp: 16 (10/06/18 0736)  BP: 135/67 (10/06/18 0736)  SpO2: (!) 94 % (10/06/18 0736) Vital Signs (24h Range):  Temp:  [98.2 °F (36.8 °C)-98.6 °F (37 °C)] 98.6 °F (37 °C)  Pulse:  [50-76] 76  Resp:  [14-17] 16  SpO2:  [94 %-98 %] 94 %  BP: (135-172)/(63-77) 135/67     Intake/Output - Last 3 Shifts       10/04 0700 - 10/05 0659 10/05 0700 - 10/06 0659 10/06 0700 - 10/07 0659    P.O. 0 0     I.V. (mL/kg) 2369.9 (19.6) 2421.1 (20.1)     IV Piggyback 500      TPN 1478.4 1668.5     Total Intake(mL/kg) 4348.3 (36) 4089.6 (33.9)     Urine (mL/kg/hr) 200 (0.1) 1150 (0.4)     Emesis/NG output 0 0     Drains 750 50     Other  0     Stool 0 0     Total Output 950 1200     Net +3398.3 +2889.6            Urine Occurrence 5 x 3 x     Stool Occurrence 1 x 2 x 1 x    Emesis Occurrence 0 x 0 x           Physical Exam    Constitutional: He is oriented to person, place, and time. He appears well-developed and well-nourished. No distress.   Cardiovascular: Normal rate, regular rhythm and normal heart sounds.   Pulmonary/Chest: Effort normal and breath sounds normal. No respiratory distress. He has no wheezes. He has no rales.   O2 1l   Abdominal: Soft. He exhibits no distension and no mass. There is no tenderness. There is no guarding.   abd inc line healing well   Musculoskeletal: Normal range of motion.   Neurological: He is alert and oriented to person, place, and time.   Skin: Skin is warm and dry.   Psychiatric: He has a normal mood and affect. His behavior is normal. Judgment and thought content normal.   Nursing note and vitals reviewed.    Significant Labs:  BMP (Last 3 Results):   Recent Labs   Lab  10/04/18   0400  10/05/18   0506  10/06/18   0500   GLU  315*  216*  183*   NA  143  143  141   K  3.2*  3.6  3.4*   CL  100  105  105   CO2  35*  33*  30*   BUN  17  18  17   CREATININE  0.8  0.8  0.7   CALCIUM  8.5*  8.1*  8.4*   MG  2.3  2.2  2.2     CBC (Last 3 Results):   Recent Labs   Lab  10/04/18   0400  10/05/18   0506  10/06/18   0500   WBC  6.03  6.83  6.57   RBC  3.93*  3.68*  3.81*   HGB  11.4*  10.9*  11.1*   HCT  36.0*  33.6*  34.4*   PLT  138*  126*  126*   MCV  92  91  90   MCH  29.0  29.6  29.1   MCHC  31.7*  32.4  32.3       Significant Diagnostics:  I have reviewed all pertinent imaging results/findings within the past 24 hours.

## 2018-10-06 NOTE — PROGRESS NOTES
"[facility]  Endocrinology  Progress Note    Admit Date: 2018     Reason for Consult: Management of T2DM, Hyperglycemia     Surgical Procedure and Date:   Laporoscopic colectomy 18    Diabetes diagnosis year:   ~    Home Diabetes Medications:    Metformin 500mg BID  * tried 750mg BID, but experienced significant diarrhea.      How often checking glucose at home? One   BG readings on regimen: 130s  Hypoglycemia on the regimen?  No  Missed doses on regimen?  No    Diabetes Complications include:     Hyperglycemia    Complicating diabetes co morbidities:   Active Cancer s/p resection      HPI:   Patient is a 73 y.o. male with a diagnosis of T2DM, HLD, and BMI 33 who is post colectomy secondary to invasive adenocarcinoma found on colonoscopy.  Patient underwent colectomy and tolerated procedure well. Now with ileus, TPN started with subsequent BG excursions.    Endocrinology consulted to asssit in DM management while on TPN.  Patient notes he was well controlled on metformin, with a1c 8.0%.  Managed by PCP. Notes that he previously lost ~40lbs to assist with diet control.  Since that time, he gain some of the weight back but has not returned to previous weight ~300lbs. Notes that he follows diet similar to atkins, but occasionally eats outside of diet.      Interval HPI:   Overnight events: Remains on GISSU, receiving TPN.  Diet advanced to sugar free clears.  BG improved overnight on transition IV insulin drip, at or slightly above goal.  Eatin% - SFCL  Nausea: No  Hypoglycemia and intervention: No  Fever: No  TPN and/or TF: Yes  TPN and rate: 75 cc/hr    /63 (BP Location: Left arm, Patient Position: Lying)   Pulse (!) 53   Temp 98.2 °F (36.8 °C) (Oral)   Resp 14   Ht 6' 3" (1.905 m)   Wt 120.7 kg (266 lb)   SpO2 95%   BMI 33.25 kg/m²      Labs Reviewed and Include    Recent Labs   Lab  10/06/18   0500   GLU  183*   CALCIUM  8.4*   NA  141   K  3.4*   CO2  30*   CL  " 105   BUN  17   CREATININE  0.7     Lab Results   Component Value Date    WBC 6.57 10/06/2018    HGB 11.1 (L) 10/06/2018    HCT 34.4 (L) 10/06/2018    MCV 90 10/06/2018     (L) 10/06/2018     No results for input(s): TSH, FREET4 in the last 168 hours.  Lab Results   Component Value Date    HGBA1C 8.0 (H) 09/21/2018       Nutritional status:   Body mass index is 33.25 kg/m².  Lab Results   Component Value Date    ALBUMIN 2.6 (L) 09/30/2018    ALBUMIN 3.8 09/04/2018    ALBUMIN 3.5 05/02/2018     Lab Results   Component Value Date    PREALBUMIN 10 (L) 10/03/2018       Estimated Creatinine Clearance: 131.6 mL/min (based on SCr of 0.7 mg/dL).    Accu-Checks  Recent Labs      10/04/18   1718  10/04/18   2101  10/05/18   0055  10/05/18   0513  10/05/18   0831  10/05/18   1318  10/05/18   1842  10/05/18   2223  10/06/18   0235  10/06/18   0634   POCTGLUCOSE  237*  257*  243*  233*  194*  214*  205*  170*  196*  197*       Current Medications and/or Treatments Impacting Glycemic Control    Hyperglycemia/Diabetes Medications:   Antihyperglycemics (From admission, onward)    Start     Stop Route Frequency Ordered    10/04/18 0923  insulin aspart U-100 pen 0-10 Units      -- SubQ As needed (PRN) 10/04/18 0826    10/03/18 1130  insulin regular (Humulin R) 100 Units in sodium chloride 0.9% 100 mL infusion      -- IV Continuous 10/03/18 1022          ASSESSMENT and PLAN    * Colon cancer    S/p laparscopic colectomy  Management per primary  Avoid hypoglycemia            Uncontrolled type 2 diabetes mellitus with stage 2 chronic kidney disease, without long-term current use of insulin    Increase IV transition insulin to 3.1 u/h  Continue moderate correction insulin  Change BG monitoring to AC/HS/0200    Discharge: Switch metformin to metformin XR, increase incrementally to 1000 mg BID.  Discussed with patient and patient's PCP.  Prescription completed.          On total parenteral nutrition (TPN)    Can lead to significant  BG excursions        Sequelae of hyperalimentation    May elevate BG readings            Cristi Jefferson NP  Endocrinology  [facility]

## 2018-10-07 LAB
ANION GAP SERPL CALC-SCNC: 7 MMOL/L
BASOPHILS # BLD AUTO: 0.02 K/UL
BASOPHILS NFR BLD: 0.3 %
BUN SERPL-MCNC: 15 MG/DL
CALCIUM SERPL-MCNC: 8.3 MG/DL
CHLORIDE SERPL-SCNC: 105 MMOL/L
CO2 SERPL-SCNC: 27 MMOL/L
CREAT SERPL-MCNC: 0.8 MG/DL
DIFFERENTIAL METHOD: ABNORMAL
EOSINOPHIL # BLD AUTO: 0.2 K/UL
EOSINOPHIL NFR BLD: 3.5 %
ERYTHROCYTE [DISTWIDTH] IN BLOOD BY AUTOMATED COUNT: 13.5 %
EST. GFR  (AFRICAN AMERICAN): >60 ML/MIN/1.73 M^2
EST. GFR  (NON AFRICAN AMERICAN): >60 ML/MIN/1.73 M^2
GLUCOSE SERPL-MCNC: 213 MG/DL
HCT VFR BLD AUTO: 34.1 %
HGB BLD-MCNC: 11.1 G/DL
IMM GRANULOCYTES # BLD AUTO: 0.04 K/UL
IMM GRANULOCYTES NFR BLD AUTO: 0.7 %
LYMPHOCYTES # BLD AUTO: 1 K/UL
LYMPHOCYTES NFR BLD: 17.4 %
MAGNESIUM SERPL-MCNC: 2.1 MG/DL
MCH RBC QN AUTO: 29.4 PG
MCHC RBC AUTO-ENTMCNC: 32.6 G/DL
MCV RBC AUTO: 90 FL
MONOCYTES # BLD AUTO: 0.5 K/UL
MONOCYTES NFR BLD: 7.6 %
NEUTROPHILS # BLD AUTO: 4.2 K/UL
NEUTROPHILS NFR BLD: 70.5 %
NRBC BLD-RTO: 0 /100 WBC
PHOSPHATE SERPL-MCNC: 2.5 MG/DL
PLATELET # BLD AUTO: 129 K/UL
PMV BLD AUTO: 12.1 FL
POCT GLUCOSE: 154 MG/DL (ref 70–110)
POCT GLUCOSE: 220 MG/DL (ref 70–110)
POCT GLUCOSE: 221 MG/DL (ref 70–110)
POCT GLUCOSE: 231 MG/DL (ref 70–110)
POCT GLUCOSE: 232 MG/DL (ref 70–110)
POTASSIUM SERPL-SCNC: 3.5 MMOL/L
RBC # BLD AUTO: 3.78 M/UL
SODIUM SERPL-SCNC: 139 MMOL/L
WBC # BLD AUTO: 5.93 K/UL

## 2018-10-07 PROCEDURE — 25000003 PHARM REV CODE 250: Performed by: STUDENT IN AN ORGANIZED HEALTH CARE EDUCATION/TRAINING PROGRAM

## 2018-10-07 PROCEDURE — A4216 STERILE WATER/SALINE, 10 ML: HCPCS | Performed by: COLON & RECTAL SURGERY

## 2018-10-07 PROCEDURE — 84100 ASSAY OF PHOSPHORUS: CPT

## 2018-10-07 PROCEDURE — 94761 N-INVAS EAR/PLS OXIMETRY MLT: CPT

## 2018-10-07 PROCEDURE — 83735 ASSAY OF MAGNESIUM: CPT

## 2018-10-07 PROCEDURE — 94664 DEMO&/EVAL PT USE INHALER: CPT

## 2018-10-07 PROCEDURE — 63600175 PHARM REV CODE 636 W HCPCS: Performed by: NURSE PRACTITIONER

## 2018-10-07 PROCEDURE — 25000003 PHARM REV CODE 250: Performed by: COLON & RECTAL SURGERY

## 2018-10-07 PROCEDURE — C9113 INJ PANTOPRAZOLE SODIUM, VIA: HCPCS | Performed by: NURSE PRACTITIONER

## 2018-10-07 PROCEDURE — 25000003 PHARM REV CODE 250: Performed by: NURSE PRACTITIONER

## 2018-10-07 PROCEDURE — 85025 COMPLETE CBC W/AUTO DIFF WBC: CPT

## 2018-10-07 PROCEDURE — A4217 STERILE WATER/SALINE, 500 ML: HCPCS | Performed by: STUDENT IN AN ORGANIZED HEALTH CARE EDUCATION/TRAINING PROGRAM

## 2018-10-07 PROCEDURE — 80048 BASIC METABOLIC PNL TOTAL CA: CPT

## 2018-10-07 PROCEDURE — 99900035 HC TECH TIME PER 15 MIN (STAT)

## 2018-10-07 PROCEDURE — 63600175 PHARM REV CODE 636 W HCPCS: Performed by: STUDENT IN AN ORGANIZED HEALTH CARE EDUCATION/TRAINING PROGRAM

## 2018-10-07 PROCEDURE — 20600001 HC STEP DOWN PRIVATE ROOM

## 2018-10-07 PROCEDURE — 99232 SBSQ HOSP IP/OBS MODERATE 35: CPT | Mod: ,,, | Performed by: NURSE PRACTITIONER

## 2018-10-07 RX ORDER — OXYCODONE HYDROCHLORIDE 10 MG/1
10 TABLET ORAL EVERY 6 HOURS PRN
Status: DISCONTINUED | OUTPATIENT
Start: 2018-10-07 | End: 2018-10-08 | Stop reason: HOSPADM

## 2018-10-07 RX ORDER — HYDROMORPHONE HYDROCHLORIDE 1 MG/ML
1 INJECTION, SOLUTION INTRAMUSCULAR; INTRAVENOUS; SUBCUTANEOUS EVERY 6 HOURS PRN
Status: DISCONTINUED | OUTPATIENT
Start: 2018-10-07 | End: 2018-10-08 | Stop reason: HOSPADM

## 2018-10-07 RX ADMIN — INSULIN ASPART 4 UNITS: 100 INJECTION, SOLUTION INTRAVENOUS; SUBCUTANEOUS at 02:10

## 2018-10-07 RX ADMIN — SODIUM CHLORIDE 3.1 UNITS/HR: 9 INJECTION, SOLUTION INTRAVENOUS at 04:10

## 2018-10-07 RX ADMIN — PANTOPRAZOLE SODIUM 40 MG: 40 INJECTION, POWDER, FOR SOLUTION INTRAVENOUS at 10:10

## 2018-10-07 RX ADMIN — ENOXAPARIN SODIUM 40 MG: 100 INJECTION SUBCUTANEOUS at 10:10

## 2018-10-07 RX ADMIN — OLMESARTAN MEDOXOMIL 40 MG: 20 TABLET, COATED ORAL at 10:10

## 2018-10-07 RX ADMIN — INSULIN ASPART 2 UNITS: 100 INJECTION, SOLUTION INTRAVENOUS; SUBCUTANEOUS at 10:10

## 2018-10-07 RX ADMIN — Medication 10 ML: at 12:10

## 2018-10-07 RX ADMIN — Medication 10 ML: at 06:10

## 2018-10-07 RX ADMIN — TAMSULOSIN HYDROCHLORIDE 0.4 MG: 0.4 CAPSULE ORAL at 10:10

## 2018-10-07 RX ADMIN — DIPHENHYDRAMINE HYDROCHLORIDE 12.5 MG: 50 INJECTION, SOLUTION INTRAMUSCULAR; INTRAVENOUS at 09:10

## 2018-10-07 RX ADMIN — INSULIN ASPART 4 UNITS: 100 INJECTION, SOLUTION INTRAVENOUS; SUBCUTANEOUS at 08:10

## 2018-10-07 RX ADMIN — INSULIN ASPART 2 UNITS: 100 INJECTION, SOLUTION INTRAVENOUS; SUBCUTANEOUS at 06:10

## 2018-10-07 RX ADMIN — CALCIUM GLUCONATE: 94 INJECTION, SOLUTION INTRAVENOUS at 10:10

## 2018-10-07 NOTE — PLAN OF CARE
Problem: Patient Care Overview  Goal: Plan of Care Review  Outcome: Ongoing (interventions implemented as appropriate)  Plan of care reviewed, patient verbalizes understanding. AAOx4. VSS throughout shift. Patient bradycardic on telemetry. Patinet denies pain. Blood sugar checked AC/HS, 0200. TPN and insulin infusing per MAR. On Clear liquid idet, no complaints of N/V. Remains free of falls/injuries. No acute distress at this Time. Will continue to monitor.

## 2018-10-07 NOTE — PROGRESS NOTES
[facility]  Colorectal Surgery  Progress Note    Patient Name: Rosendo Thomas  MRN: [id number]  Admission Date: 9/25/2018  Hospital Length of Stay: 12 days  Attending Physician: Chito Banks MD    Subjective:     Interval History: no acute events. Tolerating clears. Having bowel movements. Abdomen less distended. No n/v    Post-Op Info:  Procedure(s) (LRB):  COLECTOMY-LAPAROSCOPIC LEFT (N/A)   12 Days Post-Op      Medications:  Continuous Infusions:   insulin (HUMAN R) infusion (adults) 3.1 Units/hr (10/07/18 0442)    TPN ADULT CENTRAL LINE CUSTOM      TPN ADULT CENTRAL LINE CUSTOM       Scheduled Meds:   enoxparin  40 mg Subcutaneous Q24H    olmesartan  40 mg Oral Daily    pantoprazole  40 mg Intravenous BID    sodium chloride 0.9%  10 mL Intravenous Q6H    tamsulosin  0.4 mg Oral Daily     PRN Meds:   albuterol-ipratropium    dextrose 50%    dextrose 50%    diphenhydrAMINE    gabapentin    glucagon (human recombinant)    glucose    glucose    hydrALAZINE    HYDROmorphone    insulin aspart U-100    labetalol    ondansetron    oxyCODONE    oxyCODONE    promethazine (PHENERGAN) IVPB    sodium chloride 0.9%    sodium chloride 0.9%        Objective:     Vital Signs (Most Recent):  Temp: 97.4 °F (36.3 °C) (10/07/18 0747)  Pulse: (!) 57 (10/07/18 0747)  Resp: 16 (10/07/18 0747)  BP: (!) 144/66 (10/07/18 0747)  SpO2: 95 % (10/07/18 0747) Vital Signs (24h Range):  Temp:  [96.6 °F (35.9 °C)-99.1 °F (37.3 °C)] 97.4 °F (36.3 °C)  Pulse:  [] 57  Resp:  [16-19] 16  SpO2:  [93 %-97 %] 95 %  BP: (137-159)/(62-77) 144/66     Intake/Output - Last 3 Shifts       10/05 0700 - 10/06 0659 10/06 0700 - 10/07 0659 10/07 0700 - 10/08 0659    P.O. 0 1000     I.V. (mL/kg) 2421.1 (20.1) 40 (0.3)     IV Piggyback       TPN 1668.5 983.8 723.8    Total Intake(mL/kg) 4089.6 (33.9) 2023.7 (16.8) 723.8 (6)    Urine (mL/kg/hr) 1150 (0.4)      Emesis/NG output 0      Drains 50      Other 0       Stool 0      Total Output 1200      Net +2889.6 +2023.7 +723.8           Urine Occurrence 3 x 5 x 5 x    Stool Occurrence 2 x 4 x 2 x    Emesis Occurrence 0 x 0 x           Physical Exam   Constitutional: He is oriented to person, place, and time. He appears well-developed and well-nourished. No distress.   Cardiovascular: Normal rate, regular rhythm and normal heart sounds.   Pulmonary/Chest: Effort normal and breath sounds normal. No respiratory distress. He has no wheezes. He has no rales.   O2 1l   Abdominal: Soft. He exhibits distension (improved). He exhibits no mass. There is no tenderness. There is no guarding.   abd inc line healing well   Musculoskeletal: Normal range of motion.   Neurological: He is alert and oriented to person, place, and time.   Skin: Skin is warm and dry.   Psychiatric: He has a normal mood and affect. His behavior is normal. Judgment and thought content normal.   Nursing note and vitals reviewed.    Significant Labs:  BMP (Last 3 Results):   Recent Labs   Lab  10/05/18   0506  10/06/18   0500  10/07/18   0439   GLU  216*  183*  213*   NA  143  141  139   K  3.6  3.4*  3.5   CL  105  105  105   CO2  33*  30*  27   BUN  18  17  15   CREATININE  0.8  0.7  0.8   CALCIUM  8.1*  8.4*  8.3*   MG  2.2  2.2  2.1     CBC (Last 3 Results):   Recent Labs   Lab  10/05/18   0506  10/06/18   0500  10/07/18   0439   WBC  6.83  6.57  5.93   RBC  3.68*  3.81*  3.78*   HGB  10.9*  11.1*  11.1*   HCT  33.6*  34.4*  34.1*   PLT  126*  126*  129*   MCV  91  90  90   MCH  29.6  29.1  29.4   MCHC  32.4  32.3  32.6       Significant Diagnostics:  I have reviewed all pertinent imaging results/findings within the past 24 hours.    Assessment/Plan:     * Colon cancer    Rosendo Thomas is a 73 y.o. male s/p COLECTOMY-LAPAROSCOPIC LEFT (N/A) on 9/25/2018 recovering in stable condition on the floor.     - prn pain and nausea control, transition to PO meds  - low residue diet  - 1/2 rate TPN  - SSI, endocrine  consulted for hyperglycemia  - BID PPI   - OOB, ICS, SCDs,          Sinus bradycardia    Echo done  Tele  Monitor vs        Uncontrolled type 2 diabetes mellitus with stage 2 chronic kidney disease, without long-term current use of insulin    Endocrine consulted              Devyn Chandler MD  Colorectal Surgery  [facility]

## 2018-10-07 NOTE — ASSESSMENT & PLAN NOTE
Rosendo Thomas is a 73 y.o. male s/p COLECTOMY-LAPAROSCOPIC LEFT (N/A) on 9/25/2018 recovering in stable condition on the floor.     - prn pain and nausea control, transition to PO meds  - low residue diet  - 1/2 rate TPN  - SSI, endocrine consulted for hyperglycemia  - BID PPI   - OOB, ICS, SCDs,

## 2018-10-07 NOTE — ASSESSMENT & PLAN NOTE
Increase IV transition insulin to 3.6 u/h  Continue moderate correction insulin  BG monitoring AC/HS/0200  Decrease insulin drip by 50% tonight when new TPN bag and rate started    ADDENDUM: Decrease transition insulin drip to 1.8 u/hr then 1.4 u/hr due to decreased TPN rate    Discharge: Switch metformin to metformin XR, increase incrementally to 1000 mg BID.  Discussed with patient and patient's PCP.  Prescription completed.

## 2018-10-07 NOTE — SUBJECTIVE & OBJECTIVE
Subjective:     Interval History: no acute events. Tolerating clears. Having bowel movements. Abdomen less distended. No n/v    Post-Op Info:  Procedure(s) (LRB):  COLECTOMY-LAPAROSCOPIC LEFT (N/A)   12 Days Post-Op      Medications:  Continuous Infusions:   insulin (HUMAN R) infusion (adults) 3.1 Units/hr (10/07/18 0442)    TPN ADULT CENTRAL LINE CUSTOM      TPN ADULT CENTRAL LINE CUSTOM       Scheduled Meds:   enoxparin  40 mg Subcutaneous Q24H    olmesartan  40 mg Oral Daily    pantoprazole  40 mg Intravenous BID    sodium chloride 0.9%  10 mL Intravenous Q6H    tamsulosin  0.4 mg Oral Daily     PRN Meds:   albuterol-ipratropium    dextrose 50%    dextrose 50%    diphenhydrAMINE    gabapentin    glucagon (human recombinant)    glucose    glucose    hydrALAZINE    HYDROmorphone    insulin aspart U-100    labetalol    ondansetron    oxyCODONE    oxyCODONE    promethazine (PHENERGAN) IVPB    sodium chloride 0.9%    sodium chloride 0.9%        Objective:     Vital Signs (Most Recent):  Temp: 97.4 °F (36.3 °C) (10/07/18 0747)  Pulse: (!) 57 (10/07/18 0747)  Resp: 16 (10/07/18 0747)  BP: (!) 144/66 (10/07/18 0747)  SpO2: 95 % (10/07/18 0747) Vital Signs (24h Range):  Temp:  [96.6 °F (35.9 °C)-99.1 °F (37.3 °C)] 97.4 °F (36.3 °C)  Pulse:  [] 57  Resp:  [16-19] 16  SpO2:  [93 %-97 %] 95 %  BP: (137-159)/(62-77) 144/66     Intake/Output - Last 3 Shifts       10/05 0700 - 10/06 0659 10/06 0700 - 10/07 0659 10/07 0700 - 10/08 0659    P.O. 0 1000     I.V. (mL/kg) 2421.1 (20.1) 40 (0.3)     IV Piggyback       TPN 1668.5 983.8 723.8    Total Intake(mL/kg) 4089.6 (33.9) 2023.7 (16.8) 723.8 (6)    Urine (mL/kg/hr) 1150 (0.4)      Emesis/NG output 0      Drains 50      Other 0      Stool 0      Total Output 1200      Net +2889.6 +2023.7 +723.8           Urine Occurrence 3 x 5 x 5 x    Stool Occurrence 2 x 4 x 2 x    Emesis Occurrence 0 x 0 x           Physical Exam   Constitutional: He is  oriented to person, place, and time. He appears well-developed and well-nourished. No distress.   Cardiovascular: Normal rate, regular rhythm and normal heart sounds.   Pulmonary/Chest: Effort normal and breath sounds normal. No respiratory distress. He has no wheezes. He has no rales.   O2 1l   Abdominal: Soft. He exhibits distension (improved). He exhibits no mass. There is no tenderness. There is no guarding.   abd inc line healing well   Musculoskeletal: Normal range of motion.   Neurological: He is alert and oriented to person, place, and time.   Skin: Skin is warm and dry.   Psychiatric: He has a normal mood and affect. His behavior is normal. Judgment and thought content normal.   Nursing note and vitals reviewed.    Significant Labs:  BMP (Last 3 Results):   Recent Labs   Lab  10/05/18   0506  10/06/18   0500  10/07/18   0439   GLU  216*  183*  213*   NA  143  141  139   K  3.6  3.4*  3.5   CL  105  105  105   CO2  33*  30*  27   BUN  18  17  15   CREATININE  0.8  0.7  0.8   CALCIUM  8.1*  8.4*  8.3*   MG  2.2  2.2  2.1     CBC (Last 3 Results):   Recent Labs   Lab  10/05/18   0506  10/06/18   0500  10/07/18   0439   WBC  6.83  6.57  5.93   RBC  3.68*  3.81*  3.78*   HGB  10.9*  11.1*  11.1*   HCT  33.6*  34.4*  34.1*   PLT  126*  126*  129*   MCV  91  90  90   MCH  29.6  29.1  29.4   MCHC  32.4  32.3  32.6       Significant Diagnostics:  I have reviewed all pertinent imaging results/findings within the past 24 hours.

## 2018-10-07 NOTE — PROGRESS NOTES
"[facility]  Endocrinology  Progress Note    Admit Date: 2018     Reason for Consult: Management of T2DM, Hyperglycemia     Surgical Procedure and Date:   Laporoscopic colectomy 18    Diabetes diagnosis year:   ~    Home Diabetes Medications:    Metformin 500mg BID  * tried 750mg BID, but experienced significant diarrhea.      How often checking glucose at home? One   BG readings on regimen: 130s  Hypoglycemia on the regimen?  No  Missed doses on regimen?  No    Diabetes Complications include:     Hyperglycemia    Complicating diabetes co morbidities:   Active Cancer s/p resection      HPI:   Patient is a 73 y.o. male with a diagnosis of T2DM, HLD, and BMI 33 who is post colectomy secondary to invasive adenocarcinoma found on colonoscopy.  Patient underwent colectomy and tolerated procedure well. Now with ileus, TPN started with subsequent BG excursions.    Endocrinology consulted to asssit in DM management while on TPN.  Patient notes he was well controlled on metformin, with a1c 8.0%.  Managed by PCP. Notes that he previously lost ~40lbs to assist with diet control.  Since that time, he gain some of the weight back but has not returned to previous weight ~300lbs. Notes that he follows diet similar to atkins, but occasionally eats outside of diet.      Interval HPI:   Overnight events: Remains on GISSU, receiving TPN.  BG above goal overnight on transition IV insulin drip.  Eatin% - clear liquid tray, diet advancing today  Nausea: No  Hypoglycemia and intervention: No  Fever: No  TPN and/or TF: Yes  TPN and rate: 75 cc/hr    BP (!) 144/66 (BP Location: Left arm, Patient Position: Sitting)   Pulse (!) 57   Temp 97.4 °F (36.3 °C) (Oral)   Resp 16   Ht 6' 3" (1.905 m)   Wt 120.7 kg (266 lb)   SpO2 95%   BMI 33.25 kg/m²      Labs Reviewed and Include    Recent Labs   Lab  10/07/18   0439   GLU  213*   CALCIUM  8.3*   NA  139   K  3.5   CO2  27   CL  105   BUN  15 "   CREATININE  0.8     Lab Results   Component Value Date    WBC 5.93 10/07/2018    HGB 11.1 (L) 10/07/2018    HCT 34.1 (L) 10/07/2018    MCV 90 10/07/2018     (L) 10/07/2018     No results for input(s): TSH, FREET4 in the last 168 hours.  Lab Results   Component Value Date    HGBA1C 8.0 (H) 09/21/2018       Nutritional status:   Body mass index is 33.25 kg/m².  Lab Results   Component Value Date    ALBUMIN 2.6 (L) 09/30/2018    ALBUMIN 3.8 09/04/2018    ALBUMIN 3.5 05/02/2018     Lab Results   Component Value Date    PREALBUMIN 10 (L) 10/03/2018       Estimated Creatinine Clearance: 115.2 mL/min (based on SCr of 0.8 mg/dL).    Accu-Checks  Recent Labs      10/05/18   1842  10/05/18   2223  10/06/18   0235  10/06/18   0634  10/06/18   1126  10/06/18   1446  10/06/18   1802  10/06/18   2218  10/07/18   0226  10/07/18   0800   POCTGLUCOSE  205*  170*  196*  197*  201*  238*  176*  174*  232*  231*       Current Medications and/or Treatments Impacting Glycemic Control  Immunotherapy:    Immunosuppressants     None        Steroids:   Hormones (From admission, onward)    None        Pressors:    Autonomic Drugs (From admission, onward)    None        Hyperglycemia/Diabetes Medications:   Antihyperglycemics (From admission, onward)    Start     Stop Route Frequency Ordered    10/04/18 0923  insulin aspart U-100 pen 0-10 Units      -- SubQ As needed (PRN) 10/04/18 0826    10/03/18 1130  insulin regular (Humulin R) 100 Units in sodium chloride 0.9% 100 mL infusion      -- IV Continuous 10/03/18 1022          ASSESSMENT and PLAN    * Colon cancer    S/p laparscopic colectomy  Management per primary  Avoid hypoglycemia            Uncontrolled type 2 diabetes mellitus with stage 2 chronic kidney disease, without long-term current use of insulin    Increase IV transition insulin to 3.6 u/h  Continue moderate correction insulin  BG monitoring AC/HS/0200  Decrease insulin drip by 50% tonight when new TPN bag and rate  started    ADDENDUM: Decrease transition insulin drip to 1.8 u/hr then 1.4 u/hr due to decreased TPN rate.  Monitor for prandial excursions since diet advanced.    Discharge: Switch metformin to metformin XR, increase incrementally to 1000 mg BID.  Discussed with patient and patient's PCP.  Prescription completed.          On total parenteral nutrition (TPN)    Can lead to significant BG excursions        Sequelae of hyperalimentation    May elevate BG readings            Cristi Jefferson NP  Endocrinology  [facility]

## 2018-10-07 NOTE — SUBJECTIVE & OBJECTIVE
"Interval HPI:   Overnight events: Remains on GISSU, receiving TPN.  BG above goal overnight on transition IV insulin drip.  Eatin% - clear liquid tray, diet advancing today  Nausea: No  Hypoglycemia and intervention: No  Fever: No  TPN and/or TF: Yes  TPN and rate: 75 cc/hr    BP (!) 144/66 (BP Location: Left arm, Patient Position: Sitting)   Pulse (!) 57   Temp 97.4 °F (36.3 °C) (Oral)   Resp 16   Ht 6' 3" (1.905 m)   Wt 120.7 kg (266 lb)   SpO2 95%   BMI 33.25 kg/m²     Labs Reviewed and Include    Recent Labs   Lab  10/07/18   0439   GLU  213*   CALCIUM  8.3*   NA  139   K  3.5   CO2  27   CL  105   BUN  15   CREATININE  0.8     Lab Results   Component Value Date    WBC 5.93 10/07/2018    HGB 11.1 (L) 10/07/2018    HCT 34.1 (L) 10/07/2018    MCV 90 10/07/2018     (L) 10/07/2018     No results for input(s): TSH, FREET4 in the last 168 hours.  Lab Results   Component Value Date    HGBA1C 8.0 (H) 2018       Nutritional status:   Body mass index is 33.25 kg/m².  Lab Results   Component Value Date    ALBUMIN 2.6 (L) 2018    ALBUMIN 3.8 2018    ALBUMIN 3.5 2018     Lab Results   Component Value Date    PREALBUMIN 10 (L) 10/03/2018       Estimated Creatinine Clearance: 115.2 mL/min (based on SCr of 0.8 mg/dL).    Accu-Checks  Recent Labs      10/05/18   1842  10/05/18   2223  10/06/18   0235  10/06/18   0634  10/06/18   1126  10/06/18   1446  10/06/18   1802  10/06/18   2218  10/07/18   0226  10/07/18   0800   POCTGLUCOSE  205*  170*  196*  197*  201*  238*  176*  174*  232*  231*       Current Medications and/or Treatments Impacting Glycemic Control  Immunotherapy:    Immunosuppressants     None        Steroids:   Hormones (From admission, onward)    None        Pressors:    Autonomic Drugs (From admission, onward)    None        Hyperglycemia/Diabetes Medications:   Antihyperglycemics (From admission, onward)    Start     Stop Route Frequency Ordered    10/04/18 0923  " insulin aspart U-100 pen 0-10 Units      -- SubQ As needed (PRN) 10/04/18 0826    10/03/18 1130  insulin regular (Humulin R) 100 Units in sodium chloride 0.9% 100 mL infusion      -- IV Continuous 10/03/18 1022

## 2018-10-08 VITALS
SYSTOLIC BLOOD PRESSURE: 123 MMHG | RESPIRATION RATE: 18 BRPM | HEIGHT: 75 IN | TEMPERATURE: 98 F | BODY MASS INDEX: 33.07 KG/M2 | HEART RATE: 70 BPM | WEIGHT: 266 LBS | OXYGEN SATURATION: 94 % | DIASTOLIC BLOOD PRESSURE: 58 MMHG

## 2018-10-08 PROBLEM — E68 SEQUELAE OF HYPERALIMENTATION: Status: RESOLVED | Noted: 2018-10-04 | Resolved: 2018-10-08

## 2018-10-08 PROBLEM — Z78.9 ON TOTAL PARENTERAL NUTRITION (TPN): Status: RESOLVED | Noted: 2018-10-03 | Resolved: 2018-10-08

## 2018-10-08 LAB
ANION GAP SERPL CALC-SCNC: 7 MMOL/L
BASOPHILS # BLD AUTO: 0.03 K/UL
BASOPHILS NFR BLD: 0.5 %
BUN SERPL-MCNC: 15 MG/DL
CALCIUM SERPL-MCNC: 8.1 MG/DL
CHLORIDE SERPL-SCNC: 104 MMOL/L
CO2 SERPL-SCNC: 26 MMOL/L
CREAT SERPL-MCNC: 0.8 MG/DL
DIFFERENTIAL METHOD: ABNORMAL
EOSINOPHIL # BLD AUTO: 0.3 K/UL
EOSINOPHIL NFR BLD: 3.8 %
ERYTHROCYTE [DISTWIDTH] IN BLOOD BY AUTOMATED COUNT: 13.8 %
EST. GFR  (AFRICAN AMERICAN): >60 ML/MIN/1.73 M^2
EST. GFR  (NON AFRICAN AMERICAN): >60 ML/MIN/1.73 M^2
GLUCOSE SERPL-MCNC: 223 MG/DL
HCT VFR BLD AUTO: 33.8 %
HGB BLD-MCNC: 10.4 G/DL
IMM GRANULOCYTES # BLD AUTO: 0.04 K/UL
IMM GRANULOCYTES NFR BLD AUTO: 0.6 %
LYMPHOCYTES # BLD AUTO: 1.1 K/UL
LYMPHOCYTES NFR BLD: 16 %
MAGNESIUM SERPL-MCNC: 2.1 MG/DL
MCH RBC QN AUTO: 28.2 PG
MCHC RBC AUTO-ENTMCNC: 30.8 G/DL
MCV RBC AUTO: 92 FL
MONOCYTES # BLD AUTO: 0.5 K/UL
MONOCYTES NFR BLD: 7.5 %
NEUTROPHILS # BLD AUTO: 4.7 K/UL
NEUTROPHILS NFR BLD: 71.6 %
NRBC BLD-RTO: 0 /100 WBC
PHOSPHATE SERPL-MCNC: 3.3 MG/DL
PLATELET # BLD AUTO: 132 K/UL
PMV BLD AUTO: 12.3 FL
POCT GLUCOSE: 214 MG/DL (ref 70–110)
POCT GLUCOSE: 233 MG/DL (ref 70–110)
POCT GLUCOSE: 253 MG/DL (ref 70–110)
POTASSIUM SERPL-SCNC: 3.6 MMOL/L
RBC # BLD AUTO: 3.69 M/UL
SODIUM SERPL-SCNC: 137 MMOL/L
WBC # BLD AUTO: 6.55 K/UL

## 2018-10-08 PROCEDURE — 80048 BASIC METABOLIC PNL TOTAL CA: CPT

## 2018-10-08 PROCEDURE — 63600175 PHARM REV CODE 636 W HCPCS: Performed by: NURSE PRACTITIONER

## 2018-10-08 PROCEDURE — 99232 SBSQ HOSP IP/OBS MODERATE 35: CPT | Mod: ,,, | Performed by: NURSE PRACTITIONER

## 2018-10-08 PROCEDURE — C9113 INJ PANTOPRAZOLE SODIUM, VIA: HCPCS | Performed by: NURSE PRACTITIONER

## 2018-10-08 PROCEDURE — 25000003 PHARM REV CODE 250: Performed by: COLON & RECTAL SURGERY

## 2018-10-08 PROCEDURE — A4216 STERILE WATER/SALINE, 10 ML: HCPCS | Performed by: COLON & RECTAL SURGERY

## 2018-10-08 PROCEDURE — 63600175 PHARM REV CODE 636 W HCPCS: Performed by: SURGERY

## 2018-10-08 PROCEDURE — 85025 COMPLETE CBC W/AUTO DIFF WBC: CPT

## 2018-10-08 PROCEDURE — 84100 ASSAY OF PHOSPHORUS: CPT

## 2018-10-08 PROCEDURE — 25000003 PHARM REV CODE 250: Performed by: STUDENT IN AN ORGANIZED HEALTH CARE EDUCATION/TRAINING PROGRAM

## 2018-10-08 PROCEDURE — 83735 ASSAY OF MAGNESIUM: CPT

## 2018-10-08 RX ORDER — ONDANSETRON 4 MG/1
8 TABLET, ORALLY DISINTEGRATING ORAL EVERY 6 HOURS PRN
Qty: 31 TABLET | Refills: 2 | Status: SHIPPED | OUTPATIENT
Start: 2018-10-08 | End: 2018-10-15

## 2018-10-08 RX ORDER — OXYCODONE HYDROCHLORIDE 10 MG/1
10 TABLET ORAL EVERY 6 HOURS PRN
Qty: 30 TABLET | Refills: 0 | Status: SHIPPED | OUTPATIENT
Start: 2018-10-08 | End: 2018-10-15

## 2018-10-08 RX ADMIN — Medication 10 ML: at 12:10

## 2018-10-08 RX ADMIN — PANTOPRAZOLE SODIUM 40 MG: 40 INJECTION, POWDER, FOR SOLUTION INTRAVENOUS at 09:10

## 2018-10-08 RX ADMIN — INSULIN ASPART 4 UNITS: 100 INJECTION, SOLUTION INTRAVENOUS; SUBCUTANEOUS at 02:10

## 2018-10-08 RX ADMIN — INSULIN ASPART 2 UNITS: 100 INJECTION, SOLUTION INTRAVENOUS; SUBCUTANEOUS at 08:10

## 2018-10-08 RX ADMIN — TAMSULOSIN HYDROCHLORIDE 0.4 MG: 0.4 CAPSULE ORAL at 09:10

## 2018-10-08 RX ADMIN — OLMESARTAN MEDOXOMIL 40 MG: 20 TABLET, COATED ORAL at 09:10

## 2018-10-08 RX ADMIN — ONDANSETRON HYDROCHLORIDE 8 MG: 2 INJECTION, SOLUTION INTRAMUSCULAR; INTRAVENOUS at 09:10

## 2018-10-08 RX ADMIN — INSULIN ASPART 4 UNITS: 100 INJECTION, SOLUTION INTRAVENOUS; SUBCUTANEOUS at 12:10

## 2018-10-08 RX ADMIN — OXYCODONE HYDROCHLORIDE 15 MG: 10 TABLET ORAL at 02:10

## 2018-10-08 NOTE — DISCHARGE SUMMARY
"[facility]  Colorectal Surgery  Discharge Summary      Patient Name: Rosendo Thomas  MRN: [id number]  Admission Date: 9/25/2018  Hospital Length of Stay: 13 days  Discharge Date and Time: 10/8/2018  2:49 PM  Attending Physician: Laura att. providers found   Discharging Provider: Annalisa Armenta NP  Primary Care Provider: Domi Lackey MD     HPI:  72 yo M with type 2 diabetes, hypercholesterolemia, and obesity who underwent his 1st screening colonoscopy on 07/09/2018 by Dr. Ruff at [facility] and was found to have sigmoid diverticulosis as well as a total of 8 polyps, all less than 5 mm in size, throughout the colon -  pathology from all but 1 of these revealed tubular adenoma with the remaining polyp being hyperplastic.  He also had what was described as a 3.5 cm pedunculated polyp in the sigmoid colon which was removed via snare cautery, pathology from this revealed invasive adenocarcinoma arising within a tubular adenoma with the carcinoma invading into the submucosa.  On review of the endoscopy report images it is hard to tell if the lesion was truly pedunculated or not, and the gross description from the pathology report does not describe a stalk.     CT A/P 07/25/2018:  Diffuse fatty change throughout the liver.  Vascular calcifications in the abdomen and pelvis.  Fatty density umbilical hernia.  Herniation of mesenteric fat into the left inguinal canal.     He underwent a repeat colonoscopy on 08/21/2018 to attempt to tattoo the site of the polypectomy from the cancerous polyp.  He was found to have multiple additional polyps ranging from 2-12 mm throughout the colon which were removed. I do not have a pathology report from this.  Scar tissue from the recent polypectomy was not seen.  The repeat colonoscopy report indicates that "it was unclear if a previously tattooed site was seen," though reviewing the prior colonoscopy report, it did not indicate that tattooing was performed. However " during the 2nd colonoscopy it was noted that an area that looked like it was previously inked was re-tattoed at 40 cm from the anal verge.     He denies any abdominal complaints.  He denies any nausea or vomiting or change in bowel habits.  He denies any blood in his stool.  He denies any significant weight loss, unexplained fatigue, or other constitutional symptoms.  Initially he did not wish to undergo colectomy.  He then saw another surgeon who recommended a total abdominal colectomy with ileorectal anastomosis.  He presents today for 2nd opinion.     No family hx of CRC or IBD.        Procedure(s) (LRB):  COLECTOMY-LAPAROSCOPIC LEFT (N/A)     Hospital Course:  Rosendo Thomas is a 73 y.o. male s/p COLECTOMY-LAPAROSCOPIC LEFT (N/A) on 9/25/2018 who tolerated the procedure well and was downgraded to the floor post-operatively. His course was complicated by an ileus requiring NGT decompression on POD 1 before attaining full return of bowel function. TPN along witgh insulin gtt was given due to prolonged NPO status.  Eventually he was advanced to low fiber low residue diet without issue, was ambulating with assistance, with pain controlled with oral medications after conversion from IV medications, and nausea controlled with oral medications with return of bowel function. TPN and Insulin gtt weaned off and  He was discharged home in stable condition with follow-up in colorectal outpatient clinic in 2 weeks.             Consults (From admission, onward)        Status Ordering Provider     Inpatient consult to Cardiology  Once     Provider:  (Not yet assigned)    Completed ADITHYA VOGEL     Inpatient consult to Endocrinology  Once     Provider:  (Not yet assigned)    Completed GUILLERMO MALDONADO     Inpatient consult to PICC team ([facility])  Once     Provider:  (Not yet assigned)    Completed TORY OQUENDO JR.          Significant Diagnostic Studies: Labs:   BMP:   Recent Labs   Lab  10/07/18   0439   10/08/18   0439   GLU  213*  223*   NA  139  137   K  3.5  3.6   CL  105  104   CO2  27  26   BUN  15  15   CREATININE  0.8  0.8   CALCIUM  8.3*  8.1*   MG  2.1  2.1    and CBC   Recent Labs   Lab  10/07/18   0439  10/08/18   0439   WBC  5.93  6.55   HGB  11.1*  10.4*   HCT  34.1*  33.8*   PLT  129*  132*       Pending Diagnostic Studies:     None        Final Active Diagnoses:    Diagnosis Date Noted POA    PRINCIPAL PROBLEM:  Colon cancer [C18.9] 09/25/2018 Yes    Sinus bradycardia [R00.1] 09/30/2018 No    Uncontrolled type 2 diabetes mellitus with stage 2 chronic kidney disease, without long-term current use of insulin [E11.22, E11.65, N18.2] 07/01/2010 Yes      Problems Resolved During this Admission:    Diagnosis Date Noted Date Resolved POA    Sequelae of hyperalimentation [E68] 10/04/2018 10/08/2018 No    On total parenteral nutrition (TPN) [Z78.9] 10/03/2018 10/08/2018 No      Discharged Condition: good    Disposition: Home or Self Care    Follow Up:  Follow-up Information     Chito Banks MD.    Specialty:  Colon and Rectal Surgery  Contact information:  [address] [place] [address] [id number] [phone]                 Patient Instructions:      Diet Adult Regular   Order Comments: Low fiber diet, no fresh fruit or fresh vegetables. No nuts, popcorn, grapes or raisins for approx 6 weeks     Diet Adult Regular   Order Comments: Low fiber diet, no fresh fruit or fresh vegetables. No nuts, popcorn, grapes or raisins for approx 6 weeks     Lifting restrictions   Order Comments: No lifting anything greater than 5 pounds     Call MD for:  persistent nausea and vomiting or diarrhea     Call MD for:  severe uncontrolled pain     Call MD for:  redness, tenderness, or signs of infection (pain, swelling, redness, odor or green/yellow discharge around incision site)     Call MD for:  difficulty breathing or increased cough     Call MD for:  severe persistent headache     Call MD for:  worsening rash     Call  MD for:  persistent dizziness, light-headedness, or visual disturbances     Call MD for:  increased confusion or weakness     Call MD for:   Order Comments: Call for temp above 101     Lifting restrictions   Order Comments: No lifting anything greater than 5 pounds     Call MD for:  persistent nausea and vomiting or diarrhea     Call MD for:  severe uncontrolled pain     Call MD for:  redness, tenderness, or signs of infection (pain, swelling, redness, odor or green/yellow discharge around incision site)     Call MD for:  difficulty breathing or increased cough     Call MD for:  severe persistent headache     Call MD for:  worsening rash     Call MD for:  persistent dizziness, light-headedness, or visual disturbances     Call MD for:  increased confusion or weakness     Call MD for:   Order Comments: Call for temp above 101     Medications:  Reconciled Home Medications:      Medication List      START taking these medications    metFORMIN 500 MG 24 hr tablet  Commonly known as:  GLUCOPHAGE-XR  Take 2 tablets (1,000 mg total) by mouth 2 (two) times daily with meals.  Replaces:  metFORMIN 500 MG tablet     ondansetron 4 MG Tbdl  Commonly known as:  ZOFRAN-ODT  Take 2 tablets (8 mg total) by mouth every 6 (six) hours as needed.     oxyCODONE 10 mg Tab immediate release tablet  Commonly known as:  ROXICODONE  Take 1 tablet (10 mg total) by mouth every 6 (six) hours as needed.        CHANGE how you take these medications    olmesartan-hydrochlorothiazide 40-25 mg per tablet  Commonly known as:  BENICAR HCT  Take 1 tablet by mouth once daily.  What changed:  when to take this        CONTINUE taking these medications    blood sugar diagnostic Strp  Commonly known as:  CONTOUR TEST STRIPS  1 each by Misc.(Non-Drug; Combo Route) route 2 (two) times daily.     lancets Misc  USE ONE LANCET TWO TIMES DAILY        STOP taking these medications    metFORMIN 500 MG tablet  Commonly known as:  GLUCOPHAGE  Replaced by:  metFORMIN  500 MG 24 hr tablet            Annalisa Armenta NP  Colorectal Surgery  [facility]

## 2018-10-08 NOTE — PLAN OF CARE
Problem: Patient Care Overview  Goal: Plan of Care Review  Outcome: Revised  Patient discharged to home with follow up appointment set up. Discharge instructions verbally given and hard copy provided to patient and daughter. Dr Banks, residents and Endocrine came to speak to patient. Hard copy prescriptions given to patient/daughter and the other medications called in to appropriate pharmacy.     Removed RT UA PICC per MD orders. Patient flat during and post 30 minutes removal. Patient held breathe while PICC removal. Applied petroleum, gauze and tegaderm on site and educated to leave on until tonight or tomorrow morning. Patient tolerated PICC removal well with bleeding controlled. Patient remains free of falls with no acute pain or distress noted.     Patient left floor via wheelchair with family.

## 2018-10-08 NOTE — SUBJECTIVE & OBJECTIVE
"Interval HPI:   Overnight events: Remains on GISSU, receiving TPN.  BG above goal overnight on transition IV insulin drip.  Eatin%  Nausea: Yes - mild, intermittent  Hypoglycemia and intervention: No  Fever: No  TPN and/or TF: Yes  TPN and rate: 45 cc/hr    BP (!) 143/64 (BP Location: Left arm, Patient Position: Lying)   Pulse 65   Temp 98 °F (36.7 °C) (Oral)   Resp 19   Ht 6' 3" (1.905 m)   Wt 120.7 kg (266 lb)   SpO2 95%   BMI 33.25 kg/m²     Labs Reviewed and Include    Recent Labs   Lab  10/08/18   0439   GLU  223*   CALCIUM  8.1*   NA  137   K  3.6   CO2  26   CL  104   BUN  15   CREATININE  0.8     Lab Results   Component Value Date    WBC 6.55 10/08/2018    HGB 10.4 (L) 10/08/2018    HCT 33.8 (L) 10/08/2018    MCV 92 10/08/2018     (L) 10/08/2018     No results for input(s): TSH, FREET4 in the last 168 hours.  Lab Results   Component Value Date    HGBA1C 8.0 (H) 2018       Nutritional status:   Body mass index is 33.25 kg/m².  Lab Results   Component Value Date    ALBUMIN 2.6 (L) 2018    ALBUMIN 3.8 2018    ALBUMIN 3.5 2018     Lab Results   Component Value Date    PREALBUMIN 10 (L) 10/03/2018       Estimated Creatinine Clearance: 115.2 mL/min (based on SCr of 0.8 mg/dL).    Accu-Checks  Recent Labs      10/06/18   1126  10/06/18   1446  10/06/18   1802  10/06/18   2218  10/07/18   0226  10/07/18   0800  10/07/18   1224  10/07/18   1837  10/07/18   2217  10/08/18   0225   POCTGLUCOSE  201*  238*  176*  174*  232*  231*  154*  221*  220*  233*       Current Medications and/or Treatments Impacting Glycemic Control  Immunotherapy:    Immunosuppressants     None        Steroids:   Hormones (From admission, onward)    None        Pressors:    Autonomic Drugs (From admission, onward)    None        Hyperglycemia/Diabetes Medications:   Antihyperglycemics (From admission, onward)    Start     Stop Route Frequency Ordered    10/04/18 0923  insulin aspart U-100 pen 0-10 " Units      -- SubQ As needed (PRN) 10/04/18 0826    10/03/18 1130  insulin regular (Humulin R) 100 Units in sodium chloride 0.9% 100 mL infusion      -- IV Continuous 10/03/18 1022

## 2018-10-08 NOTE — PLAN OF CARE
CM met with patient to discuss today's discharge to home. Patient in agreement with discharge plan. Patient denies need for additional assistance at home. Patients daughter will provide transportation home. CM informed patient of follow up appt with CRS clinic, pt verbalized understanding.    Future Appointments   Date Time Provider Department Center   10/16/2018 10:45 AM Chito Banks MD [facility] COLON [facility]   11/6/2018  9:00 AM Ijeoma Rosado NP [facility] DERM [facility]   11/29/2018  9:00 AM Emma Hirsch MD [facility] RHEUM [facility]        10/08/18 1227   Final Note   Assessment Type Final Discharge Note   Discharge Disposition Home   Hospital Follow Up  Appt(s) scheduled? Yes   Discharge plans and expectations educations in teach back method with documentation complete? Yes

## 2018-10-08 NOTE — PROGRESS NOTES
"[facility]  Endocrinology  Progress Note    Admit Date: 2018     Reason for Consult: Management of T2DM, Hyperglycemia     Surgical Procedure and Date:   Laporoscopic colectomy 18    Diabetes diagnosis year:   ~    Home Diabetes Medications:    Metformin 500mg BID  * tried 750mg BID, but experienced significant diarrhea.      How often checking glucose at home? One   BG readings on regimen: 130s  Hypoglycemia on the regimen?  No  Missed doses on regimen?  No    Diabetes Complications include:     Hyperglycemia    Complicating diabetes co morbidities:   Active Cancer s/p resection      HPI:   Patient is a 73 y.o. male with a diagnosis of T2DM, HLD, and BMI 33 who is post colectomy secondary to invasive adenocarcinoma found on colonoscopy.  Patient underwent colectomy and tolerated procedure well. Now with ileus, TPN started with subsequent BG excursions.    Endocrinology consulted to asssit in DM management while on TPN.  Patient notes he was well controlled on metformin, with a1c 8.0%.  Managed by PCP. Notes that he previously lost ~40lbs to assist with diet control.  Since that time, he gain some of the weight back but has not returned to previous weight ~300lbs. Notes that he follows diet similar to atkins, but occasionally eats outside of diet.      Interval HPI:   Overnight events: Remains on GISSU, receiving TPN.  BG above goal overnight on transition IV insulin drip.  Eatin%  Nausea: Yes - mild, intermittent  Hypoglycemia and intervention: No  Fever: No  TPN and/or TF: Yes  TPN and rate: 45 cc/hr    BP (!) 143/64 (BP Location: Left arm, Patient Position: Lying)   Pulse 65   Temp 98 °F (36.7 °C) (Oral)   Resp 19   Ht 6' 3" (1.905 m)   Wt 120.7 kg (266 lb)   SpO2 95%   BMI 33.25 kg/m²      Labs Reviewed and Include    Recent Labs   Lab  10/08/18   0439   GLU  223*   CALCIUM  8.1*   NA  137   K  3.6   CO2  26   CL  104   BUN  15   CREATININE  0.8     Lab Results "   Component Value Date    WBC 6.55 10/08/2018    HGB 10.4 (L) 10/08/2018    HCT 33.8 (L) 10/08/2018    MCV 92 10/08/2018     (L) 10/08/2018     No results for input(s): TSH, FREET4 in the last 168 hours.  Lab Results   Component Value Date    HGBA1C 8.0 (H) 09/21/2018       Nutritional status:   Body mass index is 33.25 kg/m².  Lab Results   Component Value Date    ALBUMIN 2.6 (L) 09/30/2018    ALBUMIN 3.8 09/04/2018    ALBUMIN 3.5 05/02/2018     Lab Results   Component Value Date    PREALBUMIN 10 (L) 10/03/2018       Estimated Creatinine Clearance: 115.2 mL/min (based on SCr of 0.8 mg/dL).    Accu-Checks  Recent Labs      10/06/18   1126  10/06/18   1446  10/06/18   1802  10/06/18   2218  10/07/18   0226  10/07/18   0800  10/07/18   1224  10/07/18   1837  10/07/18   2217  10/08/18   0225   POCTGLUCOSE  201*  238*  176*  174*  232*  231*  154*  221*  220*  233*       Current Medications and/or Treatments Impacting Glycemic Control  Immunotherapy:    Immunosuppressants     None        Steroids:   Hormones (From admission, onward)    None        Pressors:    Autonomic Drugs (From admission, onward)    None        Hyperglycemia/Diabetes Medications:   Antihyperglycemics (From admission, onward)    Start     Stop Route Frequency Ordered    10/04/18 0923  insulin aspart U-100 pen 0-10 Units      -- SubQ As needed (PRN) 10/04/18 0826    10/03/18 1130  insulin regular (Humulin R) 100 Units in sodium chloride 0.9% 100 mL infusion      -- IV Continuous 10/03/18 1022          ASSESSMENT and PLAN    * Colon cancer    S/p laparscopic colectomy  Management per primary  Avoid hypoglycemia            Uncontrolled type 2 diabetes mellitus with stage 2 chronic kidney disease, without long-term current use of insulin    Increase transition insulin drip to 1.8 u/hr  Continue moderate correction insulin  BG monitoring AC/HS/0200    ADDENDUM:  D/C insulin drip when TPN taken down    Discharge: Switch metformin to metformin XR,  increase incrementally to 1000 mg BID.  Discussed with patient and patient's PCP.  Prescription completed.          On total parenteral nutrition (TPN)    Can lead to significant BG excursions        Sequelae of hyperalimentation    May elevate BG readings            Cristi Jefferson NP  Endocrinology  [facility]

## 2018-10-08 NOTE — PLAN OF CARE
Problem: Patient Care Overview  Goal: Plan of Care Review  Outcome: Ongoing (interventions implemented as appropriate)  Plan of care reviewed, patient verbalizes understanding. AAOx4. VSS throughout shift. Patient bradycardic on telemetry.  Blood sugar checked AC/HS, 0200. TPN and insulin infusing per MAR. On low fiber/residue liquid idet, no complaints of N/V. Remains free of falls/injuries. No acute distress at this Time. Will continue to monitor.

## 2018-10-08 NOTE — PLAN OF CARE
CM placed call to CRS clinic to schedule pt's f/u appt.    Future Appointments   Date Time Provider Department Center   10/16/2018 10:45 AM Chito Banks MD [facility] COLON [facility]   11/6/2018  9:00 AM Ijeoma Rosado NP [facility] DERM [facility]   11/29/2018  9:00 AM Emma Hirsch MD [facility] RHEUM [facility]

## 2018-10-08 NOTE — PLAN OF CARE
SW following for d/c needs.  Pt expected to d/c home with no needs.          Aida Heard, MSW, LCSW  [facility]  [id number]

## 2018-10-08 NOTE — ASSESSMENT & PLAN NOTE
Increase transition insulin drip to 1.8 u/hr  Continue moderate correction insulin  BG monitoring AC/HS/0200    ADDENDUM:  D/C insulin drip when TPN taken down    Discharge: Switch metformin to metformin XR, increase incrementally to 1000 mg BID.  Discussed with patient and patient's PCP.  Prescription completed.

## 2018-10-09 ENCOUNTER — TELEPHONE (OUTPATIENT)
Dept: SURGERY | Facility: CLINIC | Age: 73
End: 2018-10-09

## 2018-10-09 NOTE — TELEPHONE ENCOUNTER
----- Message from Mateo Davis sent at 10/9/2018  3:31 PM CDT -----  Contact: Pt daughter Estella:[phone]  .Patient Requesting Sooner Appointment.     Reason for sooner appt.:Pt daughter states the pt does not have transportation to come on 10/16.  When is the first available appointment?10/16  Communication Preference:Pt daughter Estella:[phone]  Additional Information:Pt daughter states she is able to come on 10/15 if appt could be changed.

## 2018-10-09 NOTE — PT/OT/SLP DISCHARGE
Physical Therapy Discharge Summary    Name: Rosendo Thomas  MRN: [id number]   Principal Problem: Colon cancer     Patient Discharged from acute Physical Therapy on 10/8/18.       Assessment:     Patient appropriate for care in another setting.    Objective:     GOALS:   Multidisciplinary Problems     Physical Therapy Goals        Problem: Physical Therapy Goal    Goal Priority Disciplines Outcome Goal Variances Interventions   Physical Therapy Goal     PT, PT/OT Ongoing (interventions implemented as appropriate)     Description:  Goals to be met by: 10/14/18    Patient will increase functional independence with mobility by performin. Sit to stand transfer with [place]  2. Bed to chair transfer with [place]  3. Gait  x 140 feet with [place].                      Reasons for Discontinuation of Therapy Services  Transfer to alternate level of care.      Plan:     Patient Discharged to: Home no PT services needed.    Cheo Sheffield III, PT  10/9/2018

## 2018-10-15 ENCOUNTER — OFFICE VISIT (OUTPATIENT)
Dept: SURGERY | Facility: CLINIC | Age: 73
End: 2018-10-15
Payer: MEDICARE

## 2018-10-15 VITALS
DIASTOLIC BLOOD PRESSURE: 53 MMHG | WEIGHT: 246.88 LBS | HEART RATE: 63 BPM | HEIGHT: 75 IN | SYSTOLIC BLOOD PRESSURE: 104 MMHG | BODY MASS INDEX: 30.7 KG/M2

## 2018-10-15 DIAGNOSIS — C18.7 MALIGNANT NEOPLASM OF SIGMOID COLON: Primary | ICD-10-CM

## 2018-10-15 PROCEDURE — 99213 OFFICE O/P EST LOW 20 MIN: CPT | Mod: PBBFAC | Performed by: COLON & RECTAL SURGERY

## 2018-10-15 PROCEDURE — 99999 PR PBB SHADOW E&M-EST. PATIENT-LVL III: CPT | Mod: PBBFAC,,, | Performed by: COLON & RECTAL SURGERY

## 2018-10-15 PROCEDURE — 99024 POSTOP FOLLOW-UP VISIT: CPT | Mod: POP,,, | Performed by: COLON & RECTAL SURGERY

## 2018-10-15 NOTE — LETTER
October 24, 2018      Domi Lackey MD  [address] [facility] [place] [address]           [facility]  [address]  [facility] [address]  Phone: [phone]          Patient: Rosendo Thomas   MR Number: [id number]   YOB: 1945   Date of Visit: 10/15/2018       Dear Dr. Domi Lackey:    Thank you for referring Rosendo Thomas to me for evaluation. Attached you will find relevant portions of my assessment and plan of care.    If you have questions, please do not hesitate to call me. I look forward to following Rosendo Thomas along with you.    Sincerely,    Chito Banks MD    Enclosure  CC:  No Recipients    If you would like to receive this communication electronically, please contact [email or web address] or [phone] to request more information on [email or web address] Link access.    For providers and/or their staff who would like to refer a patient to [facility], please contact us through our one-stop-shop provider referral line, [facility], at [phone].    If you feel you have received this communication in error or would no longer like to receive these types of communications, please e-mail [email or web address]

## 2018-10-24 NOTE — PROGRESS NOTES
"CRS Post-operative visit    Visit Info:     Procedure:   1.  Laparoscopic-assisted extended left colectomy.  2.  Laparoscopic-assisted splenic flexure mobilization.    Date of Procedure: September 25, 2018    Indication: 72 yo M with type 2 diabetes, hypercholesterolemia, and obesity who underwent his 1st screening colonoscopy on 07/09/2018 by Dr. Ruff at [facility] and was found to have sigmoid diverticulosis as well as a total of 8 polyps, all less than 5 mm in size, throughout the colon -  pathology from all but 1 of these revealed tubular adenoma with the remaining polyp being hyperplastic.  He also had what was described as a 3.5 cm pedunculated polyp in the sigmoid colon which was removed via snare cautery, pathology from this revealed invasive adenocarcinoma arising within a tubular adenoma with the carcinoma invading into the submucosa.  On review of the endoscopy report images it is hard to tell if the lesion was truly pedunculated or not, and the gross description from the pathology report does not describe a stalk.     CT A/P 07/25/2018:  Diffuse fatty change throughout the liver.  Vascular calcifications in the abdomen and pelvis.  Fatty density umbilical hernia.  Herniation of mesenteric fat into the left inguinal canal.     He underwent a repeat colonoscopy on 08/21/2018 to attempt to tattoo the site of the polypectomy from the cancerous polyp.  He was found to have multiple additional polyps ranging from 2-12 mm throughout the colon which were removed. I do not have a pathology report from this.  Scar tissue from the recent polypectomy was not seen.  The repeat colonoscopy report indicates that "it was unclear if a previously tattooed site was seen," though reviewing the prior colonoscopy report, it did not indicate that tattooing was performed. However during the 2nd colonoscopy it was noted that an area that looked like it was previously inked was re-tattoed at 40 cm from the anal verge.     He " denies any abdominal complaints.  He denies any nausea or vomiting or change in bowel habits.  He denies any blood in his stool.  He denies any significant weight loss, unexplained fatigue, or other constitutional symptoms.  Initially he did not wish to undergo colectomy.  He then saw another surgeon who recommended a total abdominal colectomy with ileorectal anastomosis.  He saw me 9/4/18 for 2nd opinion.  I recommended an extended left colectomy.  His case was discussed at [facility] and the consensus also was to proceed with an extended left colectomy.    He underwent surgery 9/25/18 - had a prolonged ileus requiring NGT/TPN, but ultimately regained bowel function and was discharged.    Date of Discharge: October 8, 2018    Current Status: Doing reasonably well postoperatively.  Still weak & deconditioned. Eating small meals with mild occasional nausea but no vomiting.  BM's getting more formed. No significant pain, no F/C, does c/o trouble sleeping.     Pathology:   1. FRAGMENTS OF BENIGN ADIPOSE TISSUE  2. RESECTION OF THE DESCENDING AND SIGMOID COLON WITH NO EVIDENCE OF RESIDUAL  ADENOCARCINOMA OR POLYP FORMATION IDENTIFIED. THE AREA OF PRIOR TATTOO IS IDENTIFIED  AND SERIALLY SECTIONED. SEE SYNOPTIC REPORT:  PROCEDURE: RESECTION OF DESCENDING AND SIGMOID COLON  TUMOR SITE: SIGMOID POLYP ([id number])  TUMOR SIZE: NO RESIDUAL INVASIVE ADENOCARCINOMA IDENTIFIED AFTER POLYPECTOMY  MACROSCOPIC TUMOR PERFORATION: NOT IDENTIFIED  HISTOLOGIC TYPE: ADENOCARCINOMA PRESENT ON PRIOR POLYPECTOMY  HISTOLOGIC GRADE: NOT APPLICABLE  TUMOR EXTENSION: NO RESIDUAL TUMOR IDENTIFIED AFTER POLYPECTOMY (ORIGINAL TUMOR  INVADED THE SUBMUCOSA)  MARGINS: ALL MARGINS OF RESECTION ARE NEGATIVE (PROXIMAL, DISTAL AND  CIRCUMFERENTIAL/RADIAL). NO RESIDUAL CARCINOMA PRESENT AFTER POLYPECTOMY TREATMENT  EFFECT: NO KNOWN PRE-SURGICAL THERAPY. TATTOOED AREA IDENTIFIED  LYMPH-VASCULAR INVASION: NOT IDENTIFIED  PERINEURAL INVASION: NOT  IDENTIFIED  TYPE OF POLYP IN WHICH INVASIVE CARCINOMA AROSE: TUBULAR ADENOMA ([id number])  REGIONAL LYMPH NODES: 5 SMALL BENIGN LYMPH NODES IDENTIFIED AFTER CAREFUL DISSECTION.  MULTIPLE ADDITIONAL SECTIONS OF FAT PENDING  TUMOR DEPOSITS: NOT IDENTIFIED  TUMOR STAGE: pT0N0 (pT1 BASED ON POLYPECTOMY SPECIMEN)  3. SPECIMEN LABELED PROXIMAL MARGIN SHOWS BENIGN COLONIC MUCOSA  4. SPECIMEN LABELED PROXIMAL DONUT SHOWS BENIGN COLONIC MUCOSA  5. SPECIMEN LABELED DISTAL DONUT SHOWS BENIGN COLONIC MUCOSA  Supplemental Diagnosis  2. MULTIPLE ADDITIONAL SECTIONS OF FAT WERE PROCESSED AND 4 MORE SMALL BENIGN LYMPH  NODES ARE IDENTIFIED FOR A TOTAL OF 9 BENIGN LYMPH NODES (0/9).    Physical Exam:  General: White male in NAD   Neuro: aaox4   Respiratory: resps even unlabored  Abdomen: soft, appropriately tender, mild distention, midline incision C/D/I  Extremities: Warm dry and intact    Assessment:  Malignant sigmoid colon polyp, s/p endoscopic polypectomy, now s/p extended left colectomy.    Plan:  Diet & activity as tolerated.  RTO 3 weeks.  Will need scope 6 months post-op b/c of uncertainty of exact location of the polyp prior to surgery.    Chito Banks MD, FACS, FASCRS  Senior Staff Surgeon  Department of Colon & Rectal Surgery

## 2018-10-31 ENCOUNTER — TELEPHONE (OUTPATIENT)
Dept: ADMINISTRATIVE | Facility: HOSPITAL | Age: 73
End: 2018-10-31

## 2018-11-05 ENCOUNTER — OFFICE VISIT (OUTPATIENT)
Dept: SURGERY | Facility: CLINIC | Age: 73
End: 2018-11-05
Payer: MEDICARE

## 2018-11-05 VITALS
WEIGHT: 259.25 LBS | SYSTOLIC BLOOD PRESSURE: 162 MMHG | HEART RATE: 68 BPM | BODY MASS INDEX: 32.41 KG/M2 | DIASTOLIC BLOOD PRESSURE: 81 MMHG

## 2018-11-05 DIAGNOSIS — C18.7 MALIGNANT NEOPLASM OF SIGMOID COLON: Primary | ICD-10-CM

## 2018-11-05 PROCEDURE — 99213 OFFICE O/P EST LOW 20 MIN: CPT | Mod: PBBFAC | Performed by: COLON & RECTAL SURGERY

## 2018-11-05 PROCEDURE — 99999 PR PBB SHADOW E&M-EST. PATIENT-LVL III: CPT | Mod: PBBFAC,,, | Performed by: COLON & RECTAL SURGERY

## 2018-11-05 PROCEDURE — 99024 POSTOP FOLLOW-UP VISIT: CPT | Mod: POP,,, | Performed by: COLON & RECTAL SURGERY

## 2018-11-05 NOTE — LETTER
November 13, 2018      Domi Lackey MD  [address] [facility] [place] [address]           [facility]  [address]  [facility] [address]  Phone: [phone]          Patient: Rosendo Thomas   MR Number: [id number]   YOB: 1945   Date of Visit: 11/5/2018       Dear Dr. Lackey:    Thank you for referring Rosendo Thomas to me for evaluation. Attached you will find relevant portions of my assessment and plan of care.    If you have questions, please do not hesitate to call me. I look forward to following Rosendo Thomas along with you.    Sincerely,    Chito Banks MD    Enclosure  CC:  No Recipients    If you would like to receive this communication electronically, please contact [email or web address] or [phone] to request more information on [employer] Link access.    For providers and/or their staff who would like to refer a patient to [facility], please contact us through our one-stop-shop provider referral line, [facility], at [phone].    If you feel you have received this communication in error or would no longer like to receive these types of communications, please e-mail [email or web address]

## 2018-11-06 ENCOUNTER — INITIAL CONSULT (OUTPATIENT)
Dept: DERMATOLOGY | Facility: CLINIC | Age: 73
End: 2018-11-06
Payer: MEDICARE

## 2018-11-06 ENCOUNTER — TELEPHONE (OUTPATIENT)
Dept: PHARMACY | Facility: CLINIC | Age: 73
End: 2018-11-06

## 2018-11-06 DIAGNOSIS — L40.9 PSORIASIS: Primary | ICD-10-CM

## 2018-11-06 PROCEDURE — 99212 OFFICE O/P EST SF 10 MIN: CPT | Mod: PBBFAC | Performed by: DERMATOLOGY

## 2018-11-06 PROCEDURE — 99999 PR PBB SHADOW E&M-EST. PATIENT-LVL II: CPT | Mod: PBBFAC,,, | Performed by: DERMATOLOGY

## 2018-11-06 PROCEDURE — 99203 OFFICE O/P NEW LOW 30 MIN: CPT | Mod: S$PBB,,, | Performed by: DERMATOLOGY

## 2018-11-06 RX ORDER — TRIAMCINOLONE ACETONIDE 1 MG/G
OINTMENT TOPICAL 2 TIMES DAILY
Qty: 454 G | Refills: 2 | Status: SHIPPED | OUTPATIENT
Start: 2018-11-06 | End: 2022-01-28

## 2018-11-06 RX ORDER — APREMILAST 30 MG/1
TABLET, FILM COATED ORAL
Qty: 60 TABLET | Refills: 2 | Status: SHIPPED | OUTPATIENT
Start: 2018-11-06 | End: 2019-08-02 | Stop reason: SDUPTHER

## 2018-11-06 RX ORDER — FLUOCINONIDE TOPICAL SOLUTION USP, 0.05% 0.5 MG/ML
SOLUTION TOPICAL 2 TIMES DAILY
Qty: 60 ML | Refills: 3 | Status: SHIPPED | OUTPATIENT
Start: 2018-11-06 | End: 2020-08-17

## 2018-11-06 NOTE — PROGRESS NOTES
Subjective:       Patient ID:  Rosendo Thomas is a 73 y.o. male who presents for   Chief Complaint   Patient presents with    Psoriasis     body     Pt presents today for rash/psoriasis all over the body for 15+ years, itchy, painful in the hands, OTC goldbond and psoriasis cream, did take Enbrel about 5 years ago.    Psoriasis patient presented to establish care. 15+ year hx of symptoms involving the knees, elbows, buttocks, back and scalp.   Report having joint pain in the distal fingertips which he attributes to osteoarthritis. Saw rheum 9/2018 who thought he may have psoriatic arthritis and advised Otelza following his colon cancer surgery. The patient is currently using OTC goldbond and psoriasis cream.   Previous treatment:  - 12/2017: patient reports being enrolled in a psoriasis study but had to stop the study 2/2 hypertension (?)  - Reports taking Enbrel over 5 years ago. Stopped 2/2 increased URI.   Co-morbidities:   - hx of diabetes  - hx of colon cancer s/p left colectomy in remission (treated 07/2018).   - patient does report drinking alcohol occasionally     Review of Systems   Gastrointestinal: Positive for diarrhea (still with loose stools on occasion).   Skin: Negative for daily sunscreen use, activity-related sunscreen use, recent sunburn and wears hat.   Psychiatric/Behavioral: Negative for depressed mood.   Hematologic/Lymphatic: Bruises/bleeds easily.        Objective:    Physical Exam   Constitutional: He appears well-developed and well-nourished. No distress.   Neurological: He is alert and oriented to person, place, and time. He is not disoriented.   Psychiatric: He has a normal mood and affect.   Skin:   Areas Examined (abnormalities noted in diagram):   Scalp / Hair Palpated and Inspected  Head / Face Inspection Performed  Neck Inspection Performed  Chest / Axilla Inspection Performed  Abdomen Inspection Performed  Genitals / Buttocks / Groin Inspection Performed  Back Inspection  Performed  RUE Inspected  LUE Inspection Performed  RLE Inspected  LLE Inspection Performed  Nails and Digits Inspection Performed              Diagram Legend     Erythematous scaling macule/papule c/w actinic keratosis       Vascular papule c/w angioma      Pigmented verrucoid papule/plaque c/w seborrheic keratosis      Yellow umbilicated papule c/w sebaceous hyperplasia      Irregularly shaped tan macule c/w lentigo     1-2 mm smooth white papules consistent with Milia      Movable subcutaneous cyst with punctum c/w epidermal inclusion cyst      Subcutaneous movable cyst c/w pilar cyst      Firm pink to brown papule c/w dermatofibroma      Pedunculated fleshy papule(s) c/w skin tag(s)      Evenly pigmented macule c/w junctional nevus     Mildly variegated pigmented, slightly irregular-bordered macule c/w mildly atypical nevus      Flesh colored to evenly pigmented papule c/w intradermal nevus       Pink pearly papule/plaque c/w basal cell carcinoma      Erythematous hyperkeratotic cursted plaque c/w SCC      Surgical scar with no sign of skin cancer recurrence      Open and closed comedones      Inflammatory papules and pustules      Verrucoid papule consistent consistent with wart     Erythematous eczematous patches and plaques     Dystrophic onycholytic nail with subungual debris c/w onychomycosis     Umbilicated papule    Erythematous-base heme-crusted tan verrucoid plaque consistent with inflamed seborrheic keratosis     Erythematous Silvery Scaling Plaque c/w Psoriasis     See annotation    Lab Results   Component Value Date    WBC 6.55 10/08/2018    HGB 10.4 (L) 10/08/2018    HCT 33.8 (L) 10/08/2018    MCV 92 10/08/2018     (L) 10/08/2018     CMP  Sodium   Date Value Ref Range Status   10/08/2018 137 136 - 145 mmol/L Final     Potassium   Date Value Ref Range Status   10/08/2018 3.6 3.5 - 5.1 mmol/L Final     Chloride   Date Value Ref Range Status   10/08/2018 104 95 - 110 mmol/L Final     CO2   Date  Value Ref Range Status   10/08/2018 26 23 - 29 mmol/L Final     Glucose   Date Value Ref Range Status   10/08/2018 223 (H) 70 - 110 mg/dL Final     BUN, Bld   Date Value Ref Range Status   10/08/2018 15 8 - 23 mg/dL Final     Creatinine   Date Value Ref Range Status   10/08/2018 0.8 0.5 - 1.4 mg/dL Final     Calcium   Date Value Ref Range Status   10/08/2018 8.1 (L) 8.7 - 10.5 mg/dL Final     Total Protein   Date Value Ref Range Status   09/30/2018 5.5 (L) 6.0 - 8.4 g/dL Final     Albumin   Date Value Ref Range Status   09/30/2018 2.6 (L) 3.5 - 5.2 g/dL Final     Total Bilirubin   Date Value Ref Range Status   09/30/2018 0.4 0.1 - 1.0 mg/dL Final     Comment:     For infants and newborns, interpretation of results should be based  on gestational age, weight and in agreement with clinical  observations.  Premature Infant recommended reference ranges:  Up to 24 hours.............<8.0 mg/dL  Up to 48 hours............<12.0 mg/dL  3-5 days..................<15.0 mg/dL  6-29 days.................<15.0 mg/dL       Alkaline Phosphatase   Date Value Ref Range Status   09/30/2018 45 (L) 55 - 135 U/L Final     AST   Date Value Ref Range Status   09/30/2018 16 10 - 40 U/L Final     ALT   Date Value Ref Range Status   09/30/2018 16 10 - 44 U/L Final     Anion Gap   Date Value Ref Range Status   10/08/2018 7 (L) 8 - 16 mmol/L Final     eGFR if    Date Value Ref Range Status   10/08/2018 >60.0 >60 mL/min/1.73 m^2 Final     eGFR if non    Date Value Ref Range Status   10/08/2018 >60.0 >60 mL/min/1.73 m^2 Final     Comment:     Calculation used to obtain the estimated glomerular filtration  rate (eGFR) is the CKD-EPI equation.            Assessment / Plan:        Psoriasis - 10%TBSA - in past did not improve on TNF blocker x 1 yr and developed frequent infections  Given recent history of colon cancer prefer no immunosuppressive medications  Agree with rheumatology trial of Otelza is reasonable;    Given recent colon surgery advise slow titration of medication to goal dose 30 mg po bid - given 2 starter packs - will wait to start until we obtain confirmation of coverage from Medicare  In meantime topicals for body and scalp -     -     triamcinolone acetonide 0.1% (KENALOG) 0.1 % ointment; Apply topically 2 (two) times daily. To rash  Dispense: 454 g; Refill: 2  -     OTEZLA 30 mg Tab; Take 1 po bid  Dispense: 60 tablet; Refill: 2  -     fluocinonide (LIDEX) 0.05 % external solution; Apply topically 2 (two) times daily. For itching in scalp and ears  Dispense: 60 mL; Refill: 3             No Follow-up on file.

## 2018-11-06 NOTE — LETTER
November 6, 2018      Emma Hirsch MD  [address] [facility]  [employer] [facility]  [place] [address]           [facility]  [address]  [place] [address]  Phone: [phone]  Fax: [phone]          Patient: Rosendo Thomas   MR Number: [id number]   YOB: 1945   Date of Visit: 11/6/2018       Dear Dr. Emma Hirsch:    Thank you for referring Rosendo Thomas to me for evaluation. Attached you will find relevant portions of my assessment and plan of care.    If you have questions, please do not hesitate to call me. I look forward to following Rosendo Thomas along with you.    Sincerely,    Liane Wright MD    Enclosure  CC:  No Recipients    If you would like to receive this communication electronically, please contact [email or web address] or [phone] to request more information on [employer] Link access.    For providers and/or their staff who would like to refer a patient to [facility], please contact us through our one-stop-shop provider referral line, [facility], at [phone].    If you feel you have received this communication in error or would no longer like to receive these types of communications, please e-mail [email or web address]

## 2018-11-13 NOTE — PROGRESS NOTES
"CRS Post-operative visit    Visit Info:     Procedure:   1.  Laparoscopic-assisted extended left colectomy.  2.  Laparoscopic-assisted splenic flexure mobilization.    Date of Procedure: September 25, 2018    Indication: 72 yo M with type 2 diabetes, hypercholesterolemia, and obesity who underwent his 1st screening colonoscopy on 07/09/2018 by Dr. Ruff at [facility] and was found to have sigmoid diverticulosis as well as a total of 8 polyps, all less than 5 mm in size, throughout the colon -  pathology from all but 1 of these revealed tubular adenoma with the remaining polyp being hyperplastic.  He also had what was described as a 3.5 cm pedunculated polyp in the sigmoid colon which was removed via snare cautery, pathology from this revealed invasive adenocarcinoma arising within a tubular adenoma with the carcinoma invading into the submucosa.  On review of the endoscopy report images it is hard to tell if the lesion was truly pedunculated or not, and the gross description from the pathology report does not describe a stalk.     CT A/P 07/25/2018:  Diffuse fatty change throughout the liver.  Vascular calcifications in the abdomen and pelvis.  Fatty density umbilical hernia.  Herniation of mesenteric fat into the left inguinal canal.     He underwent a repeat colonoscopy on 08/21/2018 to attempt to tattoo the site of the polypectomy from the cancerous polyp.  He was found to have multiple additional polyps ranging from 2-12 mm throughout the colon which were removed. I do not have a pathology report from this.  Scar tissue from the recent polypectomy was not seen.  The repeat colonoscopy report indicates that "it was unclear if a previously tattooed site was seen," though reviewing the prior colonoscopy report, it did not indicate that tattooing was performed. However during the 2nd colonoscopy it was noted that an area that looked like it was previously inked was re-tattoed at 40 cm from the anal verge.     He " denies any abdominal complaints.  He denies any nausea or vomiting or change in bowel habits.  He denies any blood in his stool.  He denies any significant weight loss, unexplained fatigue, or other constitutional symptoms.  Initially he did not wish to undergo colectomy.  He then saw another surgeon who recommended a total abdominal colectomy with ileorectal anastomosis.  He saw me 9/4/18 for 2nd opinion.  I recommended an extended left colectomy.  His case was discussed at [facility] and the consensus also was to proceed with an extended left colectomy.    He underwent surgery 9/25/18 - had a prolonged ileus requiring NGT/TPN, but ultimately regained bowel function and was discharged.    Date of Discharge: October 8, 2018    Current Status: Doing much better postoperatively.  He is getting his strength back.  He is eating with no nausea or vomiting.  He is having bowel movements regularly and they are getting more formed. No significant pain, no F/C.    Pathology:   1. FRAGMENTS OF BENIGN ADIPOSE TISSUE  2. RESECTION OF THE DESCENDING AND SIGMOID COLON WITH NO EVIDENCE OF RESIDUAL  ADENOCARCINOMA OR POLYP FORMATION IDENTIFIED. THE AREA OF PRIOR TATTOO IS IDENTIFIED  AND SERIALLY SECTIONED. SEE SYNOPTIC REPORT:  PROCEDURE: RESECTION OF DESCENDING AND SIGMOID COLON  TUMOR SITE: SIGMOID POLYP ([id number])  TUMOR SIZE: NO RESIDUAL INVASIVE ADENOCARCINOMA IDENTIFIED AFTER POLYPECTOMY  MACROSCOPIC TUMOR PERFORATION: NOT IDENTIFIED  HISTOLOGIC TYPE: ADENOCARCINOMA PRESENT ON PRIOR POLYPECTOMY  HISTOLOGIC GRADE: NOT APPLICABLE  TUMOR EXTENSION: NO RESIDUAL TUMOR IDENTIFIED AFTER POLYPECTOMY (ORIGINAL TUMOR  INVADED THE SUBMUCOSA)  MARGINS: ALL MARGINS OF RESECTION ARE NEGATIVE (PROXIMAL, DISTAL AND  CIRCUMFERENTIAL/RADIAL). NO RESIDUAL CARCINOMA PRESENT AFTER POLYPECTOMY TREATMENT  EFFECT: NO KNOWN PRE-SURGICAL THERAPY. TATTOOED AREA IDENTIFIED  LYMPH-VASCULAR INVASION: NOT IDENTIFIED  PERINEURAL INVASION: NOT  IDENTIFIED  TYPE OF POLYP IN WHICH INVASIVE CARCINOMA AROSE: TUBULAR ADENOMA ([id number])  REGIONAL LYMPH NODES: 5 SMALL BENIGN LYMPH NODES IDENTIFIED AFTER CAREFUL DISSECTION.  MULTIPLE ADDITIONAL SECTIONS OF FAT PENDING  TUMOR DEPOSITS: NOT IDENTIFIED  TUMOR STAGE: pT0N0 (pT1 BASED ON POLYPECTOMY SPECIMEN)  3. SPECIMEN LABELED PROXIMAL MARGIN SHOWS BENIGN COLONIC MUCOSA  4. SPECIMEN LABELED PROXIMAL DONUT SHOWS BENIGN COLONIC MUCOSA  5. SPECIMEN LABELED DISTAL DONUT SHOWS BENIGN COLONIC MUCOSA  Supplemental Diagnosis  2. MULTIPLE ADDITIONAL SECTIONS OF FAT WERE PROCESSED AND 4 MORE SMALL BENIGN LYMPH  NODES ARE IDENTIFIED FOR A TOTAL OF 9 BENIGN LYMPH NODES (0/9).    Physical Exam:  General: White male in NAD   Neuro: aaox4   Respiratory: resps even unlabored  Abdomen: soft, appropriately tender, mild distention, midline incision C/D/I  Extremities: Warm dry and intact    Assessment:  Malignant sigmoid colon polyp, s/p endoscopic polypectomy, now s/p extended left colectomy.    Plan:  Diet & activity as tolerated.  RTO prn  Will need c'scope 6 months post-op b/c of uncertainty of exact location of the polyp prior to surgery.    Chito Banks MD, FACS, FASCRS  Senior Staff Surgeon  Department of Colon & Rectal Surgery

## 2018-11-19 NOTE — TELEPHONE ENCOUNTER
DOCUMENTATION ONLY:  Appeal for Otezla approved from 11/19/18 to 4/18/19    Case Id: [id number]    Co-pay: $77.41    Patient Assistance is required and is being researched.

## 2018-11-29 ENCOUNTER — OFFICE VISIT (OUTPATIENT)
Dept: RHEUMATOLOGY | Facility: CLINIC | Age: 73
End: 2018-11-29
Payer: MEDICARE

## 2018-11-29 VITALS — HEART RATE: 69 BPM | DIASTOLIC BLOOD PRESSURE: 77 MMHG | SYSTOLIC BLOOD PRESSURE: 151 MMHG

## 2018-11-29 DIAGNOSIS — C18.7 MALIGNANT NEOPLASM OF SIGMOID COLON: ICD-10-CM

## 2018-11-29 DIAGNOSIS — L40.9 PSORIASIS: ICD-10-CM

## 2018-11-29 DIAGNOSIS — I10 ELEVATED BLOOD PRESSURE READING WITH DIAGNOSIS OF HYPERTENSION: ICD-10-CM

## 2018-11-29 DIAGNOSIS — L40.9 PSORIASIS OF NAIL: ICD-10-CM

## 2018-11-29 DIAGNOSIS — L40.50 PSORIATIC ARTHRITIS: Primary | ICD-10-CM

## 2018-11-29 PROCEDURE — 99214 OFFICE O/P EST MOD 30 MIN: CPT | Mod: S$PBB,GC,, | Performed by: INTERNAL MEDICINE

## 2018-11-29 PROCEDURE — 99213 OFFICE O/P EST LOW 20 MIN: CPT | Mod: PBBFAC | Performed by: INTERNAL MEDICINE

## 2018-11-29 PROCEDURE — 99999 PR PBB SHADOW E&M-EST. PATIENT-LVL III: CPT | Mod: PBBFAC,GC,, | Performed by: INTERNAL MEDICINE

## 2018-11-29 ASSESSMENT — ROUTINE ASSESSMENT OF PATIENT INDEX DATA (RAPID3)
MDHAQ FUNCTION SCORE: 1.1
PAIN SCORE: 0
FATIGUE SCORE: 1
PSYCHOLOGICAL DISTRESS SCORE: 0
TOTAL RAPID3 SCORE: 1.22
AM STIFFNESS SCORE: 0, NO
PATIENT GLOBAL ASSESSMENT SCORE: 0

## 2018-11-29 NOTE — PROGRESS NOTES
RHEUMATOLOGY ATTENDING:  Patient presented, personally interviewed and examined, medical records reviewed.  73 yr old man with multiple morbidities (including DM II) initially send by his PCP for psoriatic arthritis.  He has had psoriasis x >10 yrs with more recent bilateral hand pain w swelling of DIPs & entire digits. No LBP or enthesitis. No FHx of SpA, IBD, uveitis.   He had been on Enbrel x 1 yr in the past for psoriasis but it was ineffective and he developed infections.  He has used a number of topicals but they were not effective either.  He underwent surgery for sigmoid cancer & was seen by Derm who agreed with us with the use of apremilast & is trying to get it for him.  In the meanwhile he has been using Bee soap which is helping his psoriasis.  Exam is remarkable psoriatic skin lesions & finger & toe nail changes and dactylitis of several fingers.  He still has erythematous enlarged minimally tender DIP joints.   Imaging personally reviewed read as OA of hands but very c/w PsA.  Labs with anemia, mild thromobocytopenia & elevated CRP  Rx: Psoriatic arthritis  Plan: Ok to begin with apremilast;  Findings discussed with patient and Dr. Hirsch whose note and assessment I agree with.

## 2018-11-29 NOTE — PROGRESS NOTES
Subjective:       Patient ID: Rosendo Thomas is a 73 y.o. male.    Chief Complaint: Disease Management    HPI   73YM with PMH t2DM, HTN, GERD here for f/u for psoriatic arthritis. Since last visit 9/2018, pt is s/p L colectomy for Malignant sigmoid colon polyp 09/25/18 which he tolerated well and has no residual disease after surgery. Pt c/o AM stiffness that last for a couple of minutes and improves with movement. Pt was seen by Dermatology who started pt on Otezla 30mg BID which was approved ut pt cannot afford the co-pay so currently working with patient assistance. Pt reports occasional sw      Pt denies weight changes, fatigue, oral/nasal/genital ulcers, headaches, fever, chills, n/v, abd pain, CP, SOB,  changes in bowel/bladder habits, pleurisy, pericarditis,vision changes, thromoboses, photosensitivity, raynauds, alopecia, joint swelling or erythema, family history of autoimmune disease (SLE,RA, CTD) or IBD, enthesitis, uveitis.       Initial history   73YM with PMH t2DM, HTN, GERD referred by Bita OTERO for psoriatic arthritis. Pt has had psoriasis for at least 10 yrs however recently started having joint pains and swelling of hands which is getting worse. Pt c/o joint pains associated with AM stiffness for 3 hours mostly hands. + joint swelling. Difficulty making a fist, and activities of ADLs.. Has not noticed any erythema.      Pt reports being seen by Dr Mosquera ( Dermatologist) about 4 yrs ago who had placed him on Enbrel for about 1 yr with no relief of skin plaques. He reports increased infections while on the medication which he subsequently discontinued. He reports trial of multiple topical creams with minimal relief. Pt reports attempting being in a psoriasis study ~ 5 yr ago but had an elevated blood pressure and was unable to.     Recently diagnosed with sigmoid cancer which he is planned for lap colectomy on 09/25 and unsure if he is receiving radiation/chemotherapy until after the  procedure.    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 No illicit drugs. EtOh or tobbacco use.   Father: prostate Cancer     Past Medical History:   Diagnosis Date    Diabetes mellitus type II 7/2010    diet controlled    Nephrolithiasis     Obesity     Psoriasis     Skin cancer     Squamous cell carcinoma      Past Surgical History:   Procedure Laterality Date    APPENDECTOMY      COLECTOMY-LAPAROSCOPIC LEFT N/A 9/25/2018    Performed by Chito [facility] at [facility] OR 2ND FLR    COLONOSCOPY N/A 7/9/2018    Procedure: COLONOSCOPY;  Surgeon: Kameron Ruff MD;  Location: [facility];  Service: Endoscopy;  Laterality: N/A;  confirmed    COLONOSCOPY N/A 7/9/2018    [facility] at [facility] ENDO    LAPAROSCOPIC LEFT COLECTOMY N/A 9/25/2018    Procedure: COLECTOMY-LAPAROSCOPIC LEFT;  Surgeon: Chito Banks MD;  [facility];  Service: Colon and Rectal;  Laterality: N/A;    SKIN CANCER EXCISION      SKIN GRAFT      VASECTOMY      VASECTOMY       Family History   Problem Relation Age of Onset    Cancer Father         prostate    Diabetes Brother     Anesthesia problems Neg Hx          Current Outpatient Medications on File Prior to Visit   Medication Sig Dispense Refill    blood sugar diagnostic (CONTOUR TEST STRIPS) Strp 1 each by Misc.(Non-Drug; Combo Route) route 2 (two) times daily. 50 each 11    lancets Misc USE ONE LANCET TWO TIMES DAILY 100 each 11    metFORMIN (GLUCOPHAGE-XR) 500 MG 24 hr tablet Take 2 tablets (1,000 mg total) by mouth 2 (two) times daily with meals. 120 tablet 1    olmesartan-hydrochlorothiazide (BENICAR HCT) 40-25 mg per tablet Take 1 tablet by mouth once daily. (Patient taking differently: Take 1 tablet by mouth every morning. ) 90 tablet 0    fluocinonide (LIDEX) 0.05 % external solution Apply topically 2 (two) times daily. For itching in scalp and ears 60 mL 3    OTEZLA 30 mg Tab Take 1 tablet by mouth twice daily 60 tablet 2     triamcinolone acetonide 0.1% (KENALOG) 0.1 % ointment Apply topically 2 (two) times daily. To rash 454 g 2     Current Facility-Administered Medications on File Prior to Visit   Medication Dose Route Frequency Provider Last Rate Last Dose    enoxaparin injection 40 mg  40 mg Subcutaneous Q24H Florence Trevino NP   40 mg at 10/07/18 2214    gabapentin capsule 300 mg  300 mg Oral On Call Procedure Florence Trevino NP   300 mg at 09/25/18 1347     Review of patient's allergies indicates:  No Known Allergies      Review of Systems   Constitutional: Negative for chills and fever.   HENT: Negative for mouth sores and trouble swallowing.    Eyes: Negative for redness and visual disturbance.   Respiratory: Negative for chest tightness and shortness of breath.    Cardiovascular: Negative for chest pain, palpitations and leg swelling.   Gastrointestinal: Negative for abdominal pain, blood in stool, nausea and vomiting.   Genitourinary: Negative for flank pain, frequency, hematuria and urgency.   Musculoskeletal: Positive for arthralgias and joint swelling. Negative for back pain, gait problem and neck pain.   Skin: Positive for color change and rash.   Neurological: Negative for dizziness, weakness and numbness.   Psychiatric/Behavioral: Negative for dysphoric mood, sleep disturbance and suicidal ideas.         Objective:   BP (!) 151/77   Pulse 69      Physical Exam   Constitutional: He is oriented to person, place, and time and well-developed, well-nourished, and in no distress.   HENT:   Head: Normocephalic and atraumatic.   Eyes: EOM are normal. Pupils are equal, round, and reactive to light.   Neck: Normal range of motion. Neck supple.   Cardiovascular: Normal rate and regular rhythm.    Pulmonary/Chest: Effort normal and breath sounds normal.   Abdominal: Soft. Bowel sounds are normal.       Right Side Rheumatological Exam     Examination finds the shoulder, elbow, wrist, knee, 1st PIP, 1st MCP, 2nd PIP, 2nd  MCP, 3rd PIP, 3rd MCP, 4th PIP, 4th MCP, 5th MCP, 1st CMC, 2nd DIP, 4th DIP and 5th DIP normal.    The patient is tender to palpation of the 5th PIP and 3rd DIP    Shoulder Exam   Tenderness Location: no tenderness    Range of Motion   The patient has normal right shoulder ROM.    Knee Exam     Range of Motion   The patient has normal right knee ROM.  Patellofemoral Crepitus: positive    Hip Exam   Tenderness Location: no tenderness    Range of Motion   The patient has normal right hip ROM.    Elbow/Wrist Exam   Tenderness Location: no elbow tenderness and no wrist tenderness    Range of Motion   Elbow   The patient has normal right elbow ROM.    Range of Motion   Wrist   The patient has normal right wrist ROM.    Foot Exam     Range of Motion   The patient has normal right ankle ROM.    Ankle Warmth: negative  Ankle Swelling: negative    Foot Warmth: negative  Foot Swelling: negative    Muscle Strength (0-5 scale):  Neck Flexion:  5  Neck Extension: 5  Deltoid:  5  Biceps: 5/5   Triceps:  5  : 5/5   Iliopsoas: 5  Quadriceps:  5   Distal Lower Extremity: 5    Left Side Rheumatological Exam     Examination finds the shoulder, elbow, wrist, knee, 1st PIP, 1st MCP, 2nd MCP, 3rd PIP, 3rd MCP, 4th MCP, 5th PIP, 5th MCP, 1st CMC, 2nd DIP, 4th DIP and 5th DIP normal.    The patient is tender to palpation of the 2nd PIP, 4th PIP and 3rd DIP.    Shoulder Exam   Tenderness Location: no tenderness    Range of Motion   The patient has normal left shoulder ROM.    Knee Exam     Range of Motion   The patient has normal left knee ROM.    Patellofemoral Crepitus: positive    Hip Exam   Tenderness Location: no tenderness    Range of Motion   The patient has normal left hip ROM.    Elbow/Wrist Exam   Tenderness Location: no wrist tenderness and no elbow tenderness    Range of Motion   Elbow   The patient has normal left elbow ROM.    Range of Motion   Wrist   The patient has normal left wrist ROM.    Foot Exam     Range of Motion    The patient has normal left ankle ROM.     Ankle Warmth: negative  Ankle Swelling: negative    Foot Warmth: negative  Foot Swelling: negative    Muscle Strength (0-5 scale):  Neck Flexion:  5  Neck Extension: 5  Deltoid:  5  Biceps: 5/5   Triceps:  5  :  5/5   Iliopsoas: 5  Quadriceps:  5   Distal Lower Extremity: 5      Neurological: He is alert and oriented to person, place, and time.   Skin: Skin is warm and dry. Rash noted.     Multiple psoriatic plaques with erythema, scaling on elbows, hands, lower legs, upper arms, back, gluteal cleft, scalp    Transverse ridging on nails  Nail discoloration, onycholysis   Psychiatric: Affect normal.   Musculoskeletal: Normal range of motion. He exhibits tenderness. He exhibits no edema or deformity.         Results for CASSY VALENTE (MRN [id number]) as of 11/29/2018 09:09   Ref. Range 10/8/2018 04:39   WBC Latest Ref Range: 3.90 - 12.70 K/uL 6.55   RBC Latest Ref Range: 4.60 - 6.20 M/uL 3.69 (L)   Hemoglobin Latest Ref Range: 14.0 - 18.0 g/dL 10.4 (L)   Hematocrit Latest Ref Range: 40.0 - 54.0 % 33.8 (L)   MCV Latest Ref Range: 82 - 98 fL 92   MCH Latest Ref Range: 27.0 - 31.0 pg 28.2   MCHC Latest Ref Range: 32.0 - 36.0 g/dL 30.8 (L)   RDW Latest Ref Range: 11.5 - 14.5 % 13.8   Platelets Latest Ref Range: 150 - 350 K/uL 132 (L)   MPV Latest Ref Range: 9.2 - 12.9 fL 12.3   Gran% Latest Ref Range: 38.0 - 73.0 % 71.6   Gran # (ANC) Latest Ref Range: 1.8 - 7.7 K/uL 4.7   Immature Granulocytes Latest Ref Range: 0.0 - 0.5 % 0.6 (H)   Immature Grans (Abs) Latest Ref Range: 0.00 - 0.04 K/uL 0.04   Lymph% Latest Ref Range: 18.0 - 48.0 % 16.0 (L)   Lymph # Latest Ref Range: 1.0 - 4.8 K/uL 1.1   Mono% Latest Ref Range: 4.0 - 15.0 % 7.5   Mono # Latest Ref Range: 0.3 - 1.0 K/uL 0.5   Eosinophil% Latest Ref Range: 0.0 - 8.0 % 3.8   Eos # Latest Ref Range: 0.0 - 0.5 K/uL 0.3   Basophil% Latest Ref Range: 0.0 - 1.9 % 0.5   Baso # Latest Ref Range: 0.00 - 0.20 K/uL 0.03   nRBC  Latest Ref Range: 0 /100 WBC 0   Differential Method Unknown Automated   Results for CASSY VALENTE (MRN [id number]) as of 11/29/2018 09:09   Ref. Range 10/8/2018 04:39   Sodium Latest Ref Range: 136 - 145 mmol/L 137   Potassium Latest Ref Range: 3.5 - 5.1 mmol/L 3.6   Chloride Latest Ref Range: 95 - 110 mmol/L 104   CO2 Latest Ref Range: 23 - 29 mmol/L 26   Anion Gap Latest Ref Range: 8 - 16 mmol/L 7 (L)   BUN, Bld Latest Ref Range: 8 - 23 mg/dL 15   Creatinine Latest Ref Range: 0.5 - 1.4 mg/dL 0.8   eGFR if non African American Latest Ref Range: >60 mL/min/1.73 m^2 >60.0   eGFR if African American Latest Ref Range: >60 mL/min/1.73 m^2 >60.0   Glucose Latest Ref Range: 70 - 110 mg/dL 223 (H)   Calcium Latest Ref Range: 8.7 - 10.5 mg/dL 8.1 (L)   Phosphorus Latest Ref Range: 2.7 - 4.5 mg/dL 3.3   Magnesium Latest Ref Range: 1.6 - 2.6 mg/dL 2.1   Results for CASSY VALENTE (MRN [id number]) as of 11/29/2018 09:09   Ref. Range 9/20/2018 10:12   CCP Antibodies Latest Ref Range: <5.0 U/mL <0.5   Rheumatoid Factor Latest Ref Range: 0.0 - 15.0 IU/mL <10.0   Results for CASSY VALENTE (MRN [id number]) as of 11/29/2018 09:09   Ref. Range 9/20/2018 10:12   Sed Rate Latest Ref Range: 0 - 23 mm/Hr 6   Results for CASSY VALENTE (MRN [id number]) as of 11/29/2018 09:09   Ref. Range 9/20/2018 10:12   CRP Latest Ref Range: 0.0 - 8.2 mg/L 11.8 (H)                                                   Xray foot 05/2013  FINDINGS: Interval placement of casting material which may obscure fine bony detail.  The fracture at the base of the medial malleolus is not well visualized.  No additional displaced fractures or dislocation identified.  Mild DJD at the metatarsals and first MTP and IP joints.   Enthesophyte at the Achilles insertion site and prominent plantar calcaneal spur.  No subcutaneous emphysema or radiodense foreign body.    Xray hands 10/2013  Fingers appear swollen.  No fracture or dislocation is seen.  Minimal degenerative  change seen.            Assessment:       1. Psoriatic arthritis    2. Psoriasis    3. Psoriasis of nail    4. Malignant neoplasm of sigmoid colon    5. Elevated blood pressure reading with diagnosis of hypertension            Plan:             Rosendo was seen today for disease management.    Diagnoses and all orders for this visit:    Psoriatic arthritis  Pt meets SCOTT criteria for Psoriatic arthritis ( evidence of current psoriasis, psoriatic nail dystrophy). Physical exam with active psoriatic lesions and tenderness to DIP and PIPs (TJC 5). Finger appear dactylitic with nail changes 2/2 psoriasis. Xray hands with joint space narrowing, osteolysis and changes c/w psoriatic arthritis. Xray shows enthesophyte at the Achilles insertion site and prominent plantar calcaneal spur.   Start Apremilast with starter pack and then 30mg BID when available as prescribed by Dermatology  Pt with history of malignancy limits treatment options such as TNFs for now and Cosentyx (has no data in patients with malignancy), may consider MTX, Sulfasalazine for arthritis as an option  F/u Derm     Psoriasis    Psoriasis of nail    Malignant neoplasm of sigmoid colon  S/p L colectomy 09/25 with no residual disease    Elevated blood pressure reading with diagnosis of hypertension  Pt take BP meds when he wants to.  Advised to keep BP logs and f/u PCP

## 2018-12-03 ENCOUNTER — PES CALL (OUTPATIENT)
Dept: ADMINISTRATIVE | Facility: CLINIC | Age: 73
End: 2018-12-03

## 2018-12-21 NOTE — TELEPHONE ENCOUNTER
FOR DOCUMENTATION ONLY:  Financial Assistance for Otezla approved from 12/21/18 to 12/31/18  Source: [employer]  Phone: [phone]  Fax: [phone]

## 2018-12-31 DIAGNOSIS — I10 ESSENTIAL HYPERTENSION, BENIGN: Primary | ICD-10-CM

## 2018-12-31 RX ORDER — METFORMIN HYDROCHLORIDE 500 MG/1
1000 TABLET, EXTENDED RELEASE ORAL 2 TIMES DAILY WITH MEALS
Qty: 120 TABLET | Refills: 0 | Status: SHIPPED | OUTPATIENT
Start: 2018-12-31 | End: 2019-02-27 | Stop reason: SDUPTHER

## 2018-12-31 RX ORDER — OLMESARTAN MEDOXOMIL AND HYDROCHLOROTHIAZIDE 40/25 40; 25 MG/1; MG/1
1 TABLET ORAL DAILY
Qty: 90 TABLET | Refills: 0 | Status: SHIPPED | OUTPATIENT
Start: 2018-12-31 | End: 2019-01-14

## 2018-12-31 NOTE — TELEPHONE ENCOUNTER
----- Message from Kayley Garcia sent at 2018  9:09 AM CST -----  Contact: Self/ [phone]  Refills:    [metFORMIN (GLUCOPHAGE-XR) 500 MG 24 hr tablet ()]  [olmesartan-hydrochlorothiazide (BENICAR HCT) 40-25 mg per tablet]    Pt also wanted to let Dr. Lackey know his recent surgery went well. No cancer. Thank you.    [facility] [address] [place] [address] [place] [address] [address] [facility] [address] [place] [address] [address]  [facility] [place] [address]  Phone: [phone] Fax: [phone]

## 2019-01-14 ENCOUNTER — TELEPHONE (OUTPATIENT)
Dept: FAMILY MEDICINE | Facility: CLINIC | Age: 74
End: 2019-01-14

## 2019-01-14 RX ORDER — TELMISARTAN AND HYDROCHLORTHIAZIDE 80; 25 MG/1; MG/1
1 TABLET ORAL DAILY
Qty: 90 TABLET | Refills: 1 | Status: SHIPPED | OUTPATIENT
Start: 2019-01-14 | End: 2019-02-26

## 2019-01-14 NOTE — TELEPHONE ENCOUNTER
Patient called because the pharmacy does not have listed medication and is asking for a substitution.

## 2019-01-21 ENCOUNTER — PES CALL (OUTPATIENT)
Dept: ADMINISTRATIVE | Facility: CLINIC | Age: 74
End: 2019-01-21

## 2019-02-26 ENCOUNTER — OFFICE VISIT (OUTPATIENT)
Dept: FAMILY MEDICINE | Facility: CLINIC | Age: 74
End: 2019-02-26
Payer: MEDICARE

## 2019-02-26 VITALS
SYSTOLIC BLOOD PRESSURE: 128 MMHG | DIASTOLIC BLOOD PRESSURE: 74 MMHG | OXYGEN SATURATION: 96 % | TEMPERATURE: 98 F | HEIGHT: 75 IN | RESPIRATION RATE: 16 BRPM | WEIGHT: 270.31 LBS | HEART RATE: 50 BPM | BODY MASS INDEX: 33.61 KG/M2

## 2019-02-26 DIAGNOSIS — L40.50 PSORIATIC ARTHRITIS: ICD-10-CM

## 2019-02-26 DIAGNOSIS — I70.0 AORTIC ATHEROSCLEROSIS: ICD-10-CM

## 2019-02-26 DIAGNOSIS — Z00.00 ENCOUNTER FOR PREVENTIVE HEALTH EXAMINATION: Primary | ICD-10-CM

## 2019-02-26 DIAGNOSIS — Z85.038 HISTORY OF COLON CANCER: ICD-10-CM

## 2019-02-26 DIAGNOSIS — I10 ESSENTIAL HYPERTENSION, BENIGN: ICD-10-CM

## 2019-02-26 PROBLEM — Z12.11 SCREEN FOR COLON CANCER: Status: RESOLVED | Noted: 2018-07-09 | Resolved: 2019-02-26

## 2019-02-26 PROBLEM — C18.9 COLON CANCER: Status: RESOLVED | Noted: 2018-09-25 | Resolved: 2019-02-26

## 2019-02-26 PROBLEM — C18.7 MALIGNANT NEOPLASM OF SIGMOID COLON: Status: RESOLVED | Noted: 2018-09-13 | Resolved: 2019-02-26

## 2019-02-26 PROCEDURE — G0439 PPPS, SUBSEQ VISIT: HCPCS | Mod: S$GLB,,, | Performed by: PHYSICIAN ASSISTANT

## 2019-02-26 PROCEDURE — G0439 PR MEDICARE ANNUAL WELLNESS SUBSEQUENT VISIT: ICD-10-PCS | Mod: S$GLB,,, | Performed by: PHYSICIAN ASSISTANT

## 2019-02-26 PROCEDURE — 99999 PR PBB SHADOW E&M-EST. PATIENT-LVL V: ICD-10-PCS | Mod: PBBFAC,,, | Performed by: PHYSICIAN ASSISTANT

## 2019-02-26 PROCEDURE — 99215 OFFICE O/P EST HI 40 MIN: CPT | Mod: PBBFAC,PO | Performed by: PHYSICIAN ASSISTANT

## 2019-02-26 PROCEDURE — 99999 PR PBB SHADOW E&M-EST. PATIENT-LVL V: CPT | Mod: PBBFAC,,, | Performed by: PHYSICIAN ASSISTANT

## 2019-02-26 RX ORDER — ATORVASTATIN CALCIUM 20 MG/1
20 TABLET, FILM COATED ORAL DAILY
Qty: 90 TABLET | Refills: 0 | Status: SHIPPED | OUTPATIENT
Start: 2019-02-26 | End: 2019-05-15 | Stop reason: SDUPTHER

## 2019-02-26 RX ORDER — HYDROCHLOROTHIAZIDE 25 MG/1
25 TABLET ORAL DAILY
Qty: 90 TABLET | Refills: 0 | Status: SHIPPED | OUTPATIENT
Start: 2019-02-26 | End: 2019-05-15 | Stop reason: SDUPTHER

## 2019-02-26 RX ORDER — TELMISARTAN 80 MG/1
80 TABLET ORAL DAILY
Qty: 90 TABLET | Refills: 0 | Status: SHIPPED | OUTPATIENT
Start: 2019-02-26 | End: 2019-07-23 | Stop reason: ALTCHOICE

## 2019-02-26 NOTE — PROGRESS NOTES
"Rosendo Thomas presented for a  Medicare AWV and comprehensive Health Risk Assessment today. The following components were reviewed and updated:    · Medical history  · Family History  · Social history  · Allergies and Current Medications  · Health Risk Assessment  · Health Maintenance  · Care Team     ** See Completed Assessments for Annual Wellness Visit within the encounter summary.**       The following assessments were completed:  · Living Situation  · CAGE  · Depression Screening  · Timed Get Up and Go  · Whisper Test  · Cognitive Function Screening  · Nutrition Screening  · ADL Screening  · PAQ Screening    Vitals:    02/26/19 0835   BP: 128/74   Pulse: (!) 50   Resp: 16   Temp: 97.9 °F (36.6 °C)   TempSrc: Oral   SpO2: 96%   Weight: 122.6 kg (270 lb 4.5 oz)   Height: 6' 3" (1.905 m)     Body mass index is 33.78 kg/m².  Physical Exam      Diagnoses and health risks identified today and associated recommendations/orders:    1. Encounter for preventive health examination  Provided Rosendo with a 5-10 year written screening schedule and personal prevention plan. Recommendations were developed using the USPSTF age appropriate recommendations. Education, counseling, and referrals were provided as needed. After Visit Summary printed and given to patient which includes a list of additional screenings\tests needed.      2. Aortic atherosclerosis  Start statin    3. Uncontrolled type 2 diabetes mellitus with stage 2 chronic kidney disease, without long-term current use of insulin  Start statin, lab due  - atorvastatin (LIPITOR) 20 MG tablet; Take 1 tablet (20 mg total) by mouth once daily.  Dispense: 90 tablet; Refill: 0    4. Psoriatic arthritis  Followed by rheumatology    5. Essential hypertension, benign  controlled  - telmisartan (MICARDIS) 80 MG Tab; Take 1 tablet (80 mg total) by mouth once daily.  Dispense: 90 tablet; Refill: 0  - hydroCHLOROthiazide (HYDRODIURIL) 25 MG tablet; Take 1 tablet (25 mg total) by " mouth once daily.  Dispense: 90 tablet; Refill: 0    6. History of colon cancer  Advised pt children will need to be screened at age 40       No Follow-up on file.    Bita Galvin PA-C

## 2019-02-26 NOTE — PATIENT INSTRUCTIONS
Counseling and Referral of Other Preventative  (Italic type indicates deductible and co-insurance are waived)    Patient Name: Rosendo Thomas  Today's Date: 2/26/2019    Health Maintenance       Date Due Completion Date    TETANUS VACCINE 07/31/1963 ---    Low Dose Statin 07/31/1966 ---    Zoster Vaccine 07/31/2005 ---    Pneumococcal Vaccine (65+ High/Highest Risk) (2 of 2 - PCV13) 08/14/2015 8/14/2014    Influenza Vaccine 08/01/2018 ---    Foot Exam 03/14/2019 3/14/2018 (Done)    Override on 3/14/2018: Done    Override on 3/23/2017: Done    Override on 3/23/2017: Done    Hemoglobin A1c 03/21/2019 9/21/2018    Override on 8/14/2014: Done    Eye Exam 06/19/2019 6/19/2018    Override on 1/10/2017: Done    Override on 7/16/2014: Done    Override on 4/10/2013: Done    Lipid Panel 10/03/2019 10/3/2018    Override on 8/14/2014: Done    Colonoscopy 08/21/2021 8/21/2018        No orders of the defined types were placed in this encounter.    The following information is provided to all patients.  This information is to help you find resources for any of the problems found today that may be affecting your health:                Living healthy guide: www.[facility].[place].gov      Understanding Diabetes: www.diabetes.org      Eating healthy: www.cdc.gov/healthyweight      CDC home safety checklist: www.cdc.gov/steadi/patient.html      Agency on Aging: www.goea.[place].gov      Alcoholics anonymous (AA): www.aa.org      Physical Activity: www.harry.nih.gov/em4wbmb      Tobacco use: www.quitwithusla.org

## 2019-02-27 RX ORDER — METFORMIN HYDROCHLORIDE 500 MG/1
1000 TABLET, EXTENDED RELEASE ORAL 2 TIMES DAILY WITH MEALS
Qty: 120 TABLET | Refills: 0 | Status: SHIPPED | OUTPATIENT
Start: 2019-02-27 | End: 2019-04-29 | Stop reason: SDUPTHER

## 2019-02-27 NOTE — TELEPHONE ENCOUNTER
----- Message from Mindy Marquez sent at 2/27/2019  9:55 AM CST -----  Contact: Self   Refill : metFORMIN (GLUCOPHAGE-XR) 500 MG 24 hr tablet        [facility] [employer] [id number] [facility] [place] [address]  [address] [place] [address]  [place] [address]  Phone: [phone] Fax: [phone]

## 2019-02-28 ENCOUNTER — OFFICE VISIT (OUTPATIENT)
Dept: RHEUMATOLOGY | Facility: CLINIC | Age: 74
End: 2019-02-28
Payer: MEDICARE

## 2019-02-28 VITALS
BODY MASS INDEX: 34.1 KG/M2 | HEIGHT: 75 IN | HEART RATE: 58 BPM | SYSTOLIC BLOOD PRESSURE: 142 MMHG | WEIGHT: 274.25 LBS | DIASTOLIC BLOOD PRESSURE: 71 MMHG

## 2019-02-28 DIAGNOSIS — L40.9 PSORIASIS: ICD-10-CM

## 2019-02-28 DIAGNOSIS — I10 ELEVATED BLOOD PRESSURE READING IN OFFICE WITH DIAGNOSIS OF HYPERTENSION: ICD-10-CM

## 2019-02-28 DIAGNOSIS — L40.50 PSORIATIC ARTHRITIS: Primary | ICD-10-CM

## 2019-02-28 DIAGNOSIS — Z85.038 HISTORY OF COLON CANCER: ICD-10-CM

## 2019-02-28 PROCEDURE — 99999 PR PBB SHADOW E&M-EST. PATIENT-LVL III: CPT | Mod: PBBFAC,GC,, | Performed by: INTERNAL MEDICINE

## 2019-02-28 PROCEDURE — 99214 OFFICE O/P EST MOD 30 MIN: CPT | Mod: S$PBB,GC,, | Performed by: INTERNAL MEDICINE

## 2019-02-28 PROCEDURE — 99999 PR PBB SHADOW E&M-EST. PATIENT-LVL III: ICD-10-PCS | Mod: PBBFAC,GC,, | Performed by: INTERNAL MEDICINE

## 2019-02-28 PROCEDURE — 99214 PR OFFICE/OUTPT VISIT, EST, LEVL IV, 30-39 MIN: ICD-10-PCS | Mod: S$PBB,GC,, | Performed by: INTERNAL MEDICINE

## 2019-02-28 PROCEDURE — 99213 OFFICE O/P EST LOW 20 MIN: CPT | Mod: PBBFAC | Performed by: INTERNAL MEDICINE

## 2019-02-28 ASSESSMENT — ROUTINE ASSESSMENT OF PATIENT INDEX DATA (RAPID3)
TOTAL RAPID3 SCORE: 0
AM STIFFNESS SCORE: 1, YES
PAIN SCORE: 0
PATIENT GLOBAL ASSESSMENT SCORE: 0
PSYCHOLOGICAL DISTRESS SCORE: 1.1
MDHAQ FUNCTION SCORE: 0
FATIGUE SCORE: 0
WHEN YOU AWAKENED IN THE MORNING OVER THE LAST WEEK, PLEASE INDICATE THE AMOUNT OF TIME IT TAKES UNTIL YOU ARE AS LIMBER AS YOU WILL BE FOR THE DAY: 2MIN

## 2019-02-28 NOTE — PATIENT INSTRUCTIONS
Source: [employer] Patient Assistance [place] to get the Otezla ( medication for the psoriasis arthritis) delivered  Phone: [phone]      Get fluocinonide (LIDEX) 0.05 % external solution and the triamcinolone acetonide 0.1% (KENALOG) 0.1 % ointment at [facility] Dermatologist sent those medications to [facility]  Phone no: [phone]    Reschedule appointment with Dermatology

## 2019-02-28 NOTE — PROGRESS NOTES
RHEUMATOLOGY ATTENDING:  Patient presented, personally interviewed and examined, medical records reviewed.  73 yr old man with multiple morbidities (including DM II) initially send by his PCP for psoriatic arthritis.  He has had psoriasis x >10 yrs with more recent bilateral hand pain w swelling of DIPs & entire digits. No LBP or enthesitis. No FHx of SpA, IBD, uveitis.   He had been on Enbrel x 1 yr in the past for psoriasis but it was ineffective and he developed infections.  He has used a number of topicals but they were not effective either.  He underwent surgery for sigmoid cancer & was seen by Derm who agreed with us with the use of apremilast & is trying to get it for him.  He has been using Bee soap which is helping his psoriasis.  We had prescribed apremilast but he has not started it yet. He needs to pick it up.  Exam is remarkable psoriatic skin lesions & finger & toe nail changes and mild dactylitis of several fingers.  He still has erythematous enlarged minimally tender DIP joints.   Imaging previously reviewed read as OA of hands but very c/w PsA.  Labs with anemia, mild thromobocytopenia & elevated CRP  Rx: Psoriatic arthritis  Plan: Ok to begin with apremilast;  Findings discussed with patient and Dr. Hirsch whose note and assessment I agree with.

## 2019-02-28 NOTE — PROGRESS NOTES
Subjective:       Patient ID: Rosendo Thomas is a 73 y.o. male.    Chief Complaint: Follow-up    HPI   73YM with PMH t2DM, HTN, GERD, hx of Colon cancer s/p L colectomy for Malignant sigmoid colon polyp 09/25/18 ( no residual disease) here for f/u for psoriatic arthritis. Since last visit 11/2018 pt reports that he is doing well. Pt c/o AM stiffness that last for a couple of minutes and improves with movement. Pt was seen by Dermatology who started pt on Otezla 30mg BID which was approved but pt could not afford the co-pay so was offered patient assistance. Pt has been approved for Otezla 12/21/18-12/31/19 but did not know he was supposed to call [employer] to schedule a delivery for the medication. Pt voiced understanding.     Pt continues to use bee soap which he states has been helpful with his psoriasis, although did not know to pick the topicals that dermatology had prescribed.     Pt denies weight changes, fatigue, oral/nasal/genital ulcers, headaches, fever, chills, n/v, abd pain, CP, SOB,  changes in bowel/bladder habits, pleurisy, pericarditis,vision changes, thromoboses, photosensitivity, raynauds, alopecia, joint swelling or erythema, family history of autoimmune disease (SLE,RA, CTD) or IBD, enthesitis, uveitis.       Initial history   73YM with PMH t2DM, HTN, GERD referred by Bita OTERO for psoriatic arthritis. Pt has had psoriasis for at least 10 yrs however recently started having joint pains and swelling of hands which is getting worse. Pt c/o joint pains associated with AM stiffness for 3 hours mostly hands. + joint swelling. Difficulty making a fist, and activities of ADLs. Has not noticed any erythema.      Pt reports being seen by Dr Mosquera ( Dermatologist) about 4 yrs ago who had placed him on Enbrel for about 1 yr with no relief of skin plaques. He reports increased infections while on the medication which he subsequently discontinued. He reports trial of  multiple topical creams with minimal relief. Pt reports attempting being in a psoriasis study ~ 5 yr ago but had an elevated blood pressure and was unable to.     Recently diagnosed with sigmoid cancer which he is planned for lap colectomy on 09/25 and unsure if he is receiving radiation/chemotherapy until after the procedure.    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 No illicit drugs. EtOh or tobbacco use. 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  Father: prostate Cancer     Past Medical History:   Diagnosis Date    Colon cancer     Diabetes mellitus type II 7/2010    diet controlled    Nephrolithiasis     Obesity     Psoriasis     Skin cancer     Squamous cell carcinoma      Past Surgical History:   Procedure Laterality Date    APPENDECTOMY      COLECTOMY-LAPAROSCOPIC LEFT N/A 9/25/2018    Per[facility]ito [facility]    COLONOSCOPY N/A 7/9/2018    [facility]uff [facility] at [facility] END[facility]    SKIN CANCER EXCISION      SKIN GRAFT      VASECTOMY      VASECTOMY       Family History   Problem Relation Age of Onset    Cancer Father         prostate    Diabetes Brother     Anesthesia problems Neg Hx          Current Outpatient Medications on File Prior to Visit   Medication Sig Dispense Refill    atorvastatin (LIPITOR) 20 MG tablet Take 1 tablet (20 mg total) by mouth once daily. 90 tablet 0    blood sugar diagnostic (CONTOUR TEST STRIPS) Strp 1 each by Misc.(Non-Drug; Combo Route) route 2 (two) times daily. 50 each 11    hydroCHLOROthiazide (HYDRODIURIL) 25 MG tablet Take 1 tablet (25 mg total) by mouth once daily. 90 tablet 0    lancets Misc USE ONE LANCET TWO TIMES DAILY 100 each 11    metFORMIN (GLUCOPHAGE-XR) 500 MG 24 hr tablet Take 2 tablets (1,000 mg total) by mouth 2 (two) times daily with meals. 120 tablet 0    OTEZLA 30 mg Tab Take 1 tablet by mouth twice daily 60 tablet 2    telmisartan (MICARDIS) 80 MG Tab Take 1 tablet (80 mg total) by mouth once  "daily. 90 tablet 0    fluocinonide (LIDEX) 0.05 % external solution Apply topically 2 (two) times daily. For itching in scalp and ears 60 mL 3    triamcinolone acetonide 0.1% (KENALOG) 0.1 % ointment Apply topically 2 (two) times daily. To rash 454 g 2     Current Facility-Administered Medications on File Prior to Visit   Medication Dose Route Frequency Provider Last Rate Last Dose    enoxaparin injection 40 mg  40 mg Subcutaneous Q24H Florence Trevino NP   40 mg at 10/07/18 2214    gabapentin capsule 300 mg  300 mg Oral On Call Procedure Florence Trevino NP   300 mg at 09/25/18 1347     Review of patient's allergies indicates:  No Known Allergies      Review of Systems   Constitutional: Negative for chills and fever.   HENT: Negative for mouth sores and trouble swallowing.    Eyes: Negative for redness and visual disturbance.   Respiratory: Negative for chest tightness and shortness of breath.    Cardiovascular: Negative for chest pain, palpitations and leg swelling.   Gastrointestinal: Negative for abdominal pain, blood in stool, nausea and vomiting.   Genitourinary: Negative for flank pain, frequency, hematuria and urgency.   Musculoskeletal: Positive for arthralgias and joint swelling. Negative for back pain, gait problem and neck pain.   Skin: Positive for color change and rash.   Neurological: Negative for dizziness, weakness and numbness.   Psychiatric/Behavioral: Negative for dysphoric mood, sleep disturbance and suicidal ideas.         Objective:   BP (!) 142/71   Pulse (!) 58   Ht 6' 3" (1.905 m)   Wt 124.4 kg (274 lb 4 oz)   BMI 34.28 kg/m²      Physical Exam   Constitutional: He is oriented to person, place, and time and well-developed, well-nourished, and in no distress.   HENT:   Head: Normocephalic and atraumatic.   Eyes: EOM are normal. Pupils are equal, round, and reactive to light.   Neck: Normal range of motion. Neck supple.   Cardiovascular: Normal rate and regular rhythm.  "   Pulmonary/Chest: Effort normal and breath sounds normal.   Abdominal: Soft. Bowel sounds are normal.       Right Side Rheumatological Exam     Examination finds the shoulder, elbow, wrist, knee, 1st PIP, 1st MCP, 2nd PIP, 2nd MCP, 3rd PIP, 3rd MCP, 4th PIP, 4th MCP, 5th PIP, 5th MCP, 1st CMC, 3rd DIP, 4th DIP and 5th DIP normal.    The patient is tender to palpation of the 2nd DIP    Shoulder Exam   Tenderness Location: no tenderness    Range of Motion   The patient has normal right shoulder ROM.    Knee Exam     Range of Motion   The patient has normal right knee ROM.  Patellofemoral Crepitus: positive    Hip Exam   Tenderness Location: no tenderness    Range of Motion   The patient has normal right hip ROM.    Elbow/Wrist Exam   Tenderness Location: no elbow tenderness and no wrist tenderness    Range of Motion   Elbow   The patient has normal right elbow ROM.    Range of Motion   Wrist   The patient has normal right wrist ROM.    Foot Exam     Range of Motion   The patient has normal right ankle ROM.    Ankle Warmth: negative  Ankle Swelling: negative    Foot Warmth: negative  Foot Swelling: negative    Muscle Strength (0-5 scale):  Neck Flexion:  5  Neck Extension: 5  Deltoid:  5  Biceps: 5/5   Triceps:  5  : 5/5   Iliopsoas: 5  Quadriceps:  5   Distal Lower Extremity: 5    Left Side Rheumatological Exam     Examination finds the shoulder, elbow, wrist, knee, 1st PIP, 1st MCP, 2nd PIP, 2nd MCP, 3rd PIP, 3rd MCP, 4th PIP, 4th MCP, 5th PIP, 5th MCP, 1st CMC, 2nd DIP, 3rd DIP, 4th DIP and 5th DIP normal.    Shoulder Exam   Tenderness Location: no tenderness    Range of Motion   The patient has normal left shoulder ROM.    Knee Exam     Range of Motion   The patient has normal left knee ROM.    Patellofemoral Crepitus: positive    Hip Exam   Tenderness Location: no tenderness    Range of Motion   The patient has normal left hip ROM.    Elbow/Wrist Exam   Tenderness Location: no wrist tenderness and no elbow  tenderness    Range of Motion   Elbow   The patient has normal left elbow ROM.    Range of Motion   Wrist   The patient has normal left wrist ROM.    Foot Exam     Range of Motion   The patient has normal left ankle ROM.     Ankle Warmth: negative  Ankle Swelling: negative    Foot Warmth: negative  Foot Swelling: negative    Muscle Strength (0-5 scale):  Neck Flexion:  5  Neck Extension: 5  Deltoid:  5  Biceps: 5/5   Triceps:  5  :  5/5   Iliopsoas: 5  Quadriceps:  5   Distal Lower Extremity: 5      Neurological: He is alert and oriented to person, place, and time.   Skin: Skin is warm and dry. Rash noted.     Multiple psoriatic plaques with erythema, scaling on elbows, hands, lower legs, upper arms, back, gluteal cleft, scalp    Transverse ridging on nails  Nail discoloration, onycholysis   Psychiatric: Affect normal.   Musculoskeletal: Normal range of motion. He exhibits tenderness. He exhibits no edema or deformity.         Results for CASSY VALENTE (MRN [id number]) as of 11/29/2018 09:09   Ref. Range 10/8/2018 04:39   WBC Latest Ref Range: 3.90 - 12.70 K/uL 6.55   RBC Latest Ref Range: 4.60 - 6.20 M/uL 3.69 (L)   Hemoglobin Latest Ref Range: 14.0 - 18.0 g/dL 10.4 (L)   Hematocrit Latest Ref Range: 40.0 - 54.0 % 33.8 (L)   MCV Latest Ref Range: 82 - 98 fL 92   MCH Latest Ref Range: 27.0 - 31.0 pg 28.2   MCHC Latest Ref Range: 32.0 - 36.0 g/dL 30.8 (L)   RDW Latest Ref Range: 11.5 - 14.5 % 13.8   Platelets Latest Ref Range: 150 - 350 K/uL 132 (L)   MPV Latest Ref Range: 9.2 - 12.9 fL 12.3   Gran% Latest Ref Range: 38.0 - 73.0 % 71.6   Gran # (ANC) Latest Ref Range: 1.8 - 7.7 K/uL 4.7   Immature Granulocytes Latest Ref Range: 0.0 - 0.5 % 0.6 (H)   Immature Grans (Abs) Latest Ref Range: 0.00 - 0.04 K/uL 0.04   Lymph% Latest Ref Range: 18.0 - 48.0 % 16.0 (L)   Lymph # Latest Ref Range: 1.0 - 4.8 K/uL 1.1   Mono% Latest Ref Range: 4.0 - 15.0 % 7.5   Mono # Latest Ref Range: 0.3 - 1.0 K/uL 0.5   Eosinophil%  Latest Ref Range: 0.0 - 8.0 % 3.8   Eos # Latest Ref Range: 0.0 - 0.5 K/uL 0.3   Basophil% Latest Ref Range: 0.0 - 1.9 % 0.5   Baso # Latest Ref Range: 0.00 - 0.20 K/uL 0.03   nRBC Latest Ref Range: 0 /100 WBC 0   Differential Method Unknown Automated   Results for CASSY VALENTE (MRN [id number]) as of 11/29/2018 09:09   Ref. Range 10/8/2018 04:39   Sodium Latest Ref Range: 136 - 145 mmol/L 137   Potassium Latest Ref Range: 3.5 - 5.1 mmol/L 3.6   Chloride Latest Ref Range: 95 - 110 mmol/L 104   CO2 Latest Ref Range: 23 - 29 mmol/L 26   Anion Gap Latest Ref Range: 8 - 16 mmol/L 7 (L)   BUN, Bld Latest Ref Range: 8 - 23 mg/dL 15   Creatinine Latest Ref Range: 0.5 - 1.4 mg/dL 0.8   eGFR if non African American Latest Ref Range: >60 mL/min/1.73 m^2 >60.0   eGFR if African American Latest Ref Range: >60 mL/min/1.73 m^2 >60.0   Glucose Latest Ref Range: 70 - 110 mg/dL 223 (H)   Calcium Latest Ref Range: 8.7 - 10.5 mg/dL 8.1 (L)   Phosphorus Latest Ref Range: 2.7 - 4.5 mg/dL 3.3   Magnesium Latest Ref Range: 1.6 - 2.6 mg/dL 2.1   Results for CASSY VALENTE (MRN [id number]) as of 11/29/2018 09:09   Ref. Range 9/20/2018 10:12   CCP Antibodies Latest Ref Range: <5.0 U/mL <0.5   Rheumatoid Factor Latest Ref Range: 0.0 - 15.0 IU/mL <10.0   Results for CASSY VALENTE (MRN [id number]) as of 11/29/2018 09:09   Ref. Range 9/20/2018 10:12   Sed Rate Latest Ref Range: 0 - 23 mm/Hr 6   Results for CASSY VALENTE (MRN [id number]) as of 11/29/2018 09:09   Ref. Range 9/20/2018 10:12   CRP Latest Ref Range: 0.0 - 8.2 mg/L 11.8 (H)                                                   Xray hand 09/2018  FINDINGS:  Phalanges metacarpals and carpal bones are intact.  Minimal degenerative changes seen.  No significant erosion or destruction is noted.      Xray foot 09/2018  FINDINGS:  Bony structures of both feet are intact.  Degenerative changes seen at the interphalangeal joint of the right 1st toe.  Phalanges, metatarsals and tarsal bones  are intact.  Calcaneal spurs are present bilaterally.      Xray foot 05/2013  FINDINGS: Interval placement of casting material which may obscure fine bony detail.  The fracture at the base of the medial malleolus is not well visualized.  No additional displaced fractures or dislocation identified.  Mild DJD at the metatarsals and first MTP and IP joints.   Enthesophyte at the Achilles insertion site and prominent plantar calcaneal spur.  No subcutaneous emphysema or radiodense foreign body.    Xray hands 10/2013  Fingers appear swollen.  No fracture or dislocation is seen.  Minimal degenerative change seen.            Assessment:       1. Psoriatic arthritis    2. Psoriasis    3. History of colon cancer    4. Elevated blood pressure reading in office with diagnosis of hypertension            Plan:             Rosendo was seen today for disease management.    Diagnoses and all orders for this visit:    Psoriatic arthritis  Pt meets SCOTT criteria for Psoriatic arthritis ( evidence of current psoriasis, psoriatic nail dystrophy). Physical exam with active psoriatic lesions and tenderness to DIP (TJC 1). Finger appear dactylitic with nail changes 2/2 psoriasis. Xray hands with joint space narrowing, osteolysis and changes c/w psoriatic arthritis. Xray shows enthesophyte at the Achilles insertion site and prominent plantar calcaneal spur.   Start Apremilast with starter pack and then 30mg BID when available as prescribed by Dermatology. Pt given phone number to call for [employer] to schedule delivery of Otezla.   Pt with history of malignancy limits treatment options such as TNFs for now and Cosentyx (has no data in patients with malignancy), may consider MTX, Sulfasalazine for arthritis as an alternative option   F/u Derm     Psoriasis  Reschedule  appt with Dermatology    Psoriasis of nail    Malignant neoplasm of sigmoid colon  S/p L colectomy 09/25 with no residual disease    Elevated blood pressure reading with  diagnosis of hypertension  Advised to keep BP logs and f/u PCP

## 2019-03-01 ENCOUNTER — LAB VISIT (OUTPATIENT)
Dept: LAB | Facility: HOSPITAL | Age: 74
End: 2019-03-01
Attending: PHYSICIAN ASSISTANT
Payer: MEDICARE

## 2019-03-01 LAB
ESTIMATED AVG GLUCOSE: 200 MG/DL
HBA1C MFR BLD HPLC: 8.6 %

## 2019-03-01 PROCEDURE — 83036 HEMOGLOBIN GLYCOSYLATED A1C: CPT

## 2019-03-01 PROCEDURE — 36415 COLL VENOUS BLD VENIPUNCTURE: CPT | Mod: PO

## 2019-03-04 ENCOUNTER — TELEPHONE (OUTPATIENT)
Dept: FAMILY MEDICINE | Facility: CLINIC | Age: 74
End: 2019-03-04

## 2019-03-04 RX ORDER — GLIMEPIRIDE 2 MG/1
2 TABLET ORAL
Qty: 90 TABLET | Refills: 0 | Status: SHIPPED | OUTPATIENT
Start: 2019-03-04 | End: 2019-05-15 | Stop reason: SDUPTHER

## 2019-03-04 NOTE — TELEPHONE ENCOUNTER
Notified patient of information below. Verbalized understanding. Patient ok with going on medication to help with DM.

## 2019-03-04 NOTE — TELEPHONE ENCOUNTER
----- Message from Bita Galvin PA-C sent at 3/1/2019  1:33 PM CST -----  Diabetes is worsening and uncontrolled, recommend adding medication; following low carb/sugar diet

## 2019-03-18 ENCOUNTER — TELEPHONE (OUTPATIENT)
Dept: ENDOSCOPY | Facility: HOSPITAL | Age: 74
End: 2019-03-18

## 2019-03-18 ENCOUNTER — OFFICE VISIT (OUTPATIENT)
Dept: SURGERY | Facility: CLINIC | Age: 74
End: 2019-03-18
Payer: MEDICARE

## 2019-03-18 VITALS
DIASTOLIC BLOOD PRESSURE: 64 MMHG | HEART RATE: 56 BPM | HEIGHT: 75 IN | WEIGHT: 261 LBS | BODY MASS INDEX: 32.45 KG/M2 | SYSTOLIC BLOOD PRESSURE: 135 MMHG

## 2019-03-18 DIAGNOSIS — Z85.038 HISTORY OF COLON CANCER, STAGE I: Primary | ICD-10-CM

## 2019-03-18 DIAGNOSIS — Z85.038 HISTORY OF COLON CANCER: ICD-10-CM

## 2019-03-18 PROCEDURE — 99213 PR OFFICE/OUTPT VISIT, EST, LEVL III, 20-29 MIN: ICD-10-PCS | Mod: S$PBB,,, | Performed by: COLON & RECTAL SURGERY

## 2019-03-18 PROCEDURE — 99213 OFFICE O/P EST LOW 20 MIN: CPT | Mod: S$PBB,,, | Performed by: COLON & RECTAL SURGERY

## 2019-03-18 PROCEDURE — 99999 PR PBB SHADOW E&M-EST. PATIENT-LVL III: ICD-10-PCS | Mod: PBBFAC,,, | Performed by: COLON & RECTAL SURGERY

## 2019-03-18 PROCEDURE — 99213 OFFICE O/P EST LOW 20 MIN: CPT | Mod: PBBFAC | Performed by: COLON & RECTAL SURGERY

## 2019-03-18 PROCEDURE — 99999 PR PBB SHADOW E&M-EST. PATIENT-LVL III: CPT | Mod: PBBFAC,,, | Performed by: COLON & RECTAL SURGERY

## 2019-03-18 NOTE — LETTER
March 25, 2019      Domi Lackey MD  [address] [facility] [place] [address]           [facility]  [address]  [facility] [address]  Phone: [phone]          Patient: Rosendo Thomas   MR Number: [id number]   YOB: 1945   Date of Visit: 3/18/2019       Dear Dr. Lackey:    Thank you for referring Rosendo Thomas to me for evaluation. Attached you will find relevant portions of my assessment and plan of care.    If you have questions, please do not hesitate to call me. I look forward to following Rosendo Thomas along with you.    Sincerely,    Chito Banks MD    Enclosure  CC:  No Recipients    If you would like to receive this communication electronically, please contact [email or web address] or [phone] to request more information on [employer] Link access.    For providers and/or their staff who would like to refer a patient to [facility], please contact us through our one-stop-shop provider referral line, [facility], at [phone].    If you feel you have received this communication in error or would no longer like to receive these types of communications, please e-mail [email or web address]

## 2019-03-22 ENCOUNTER — TELEPHONE (OUTPATIENT)
Dept: ENDOSCOPY | Facility: HOSPITAL | Age: 74
End: 2019-03-22

## 2019-03-26 NOTE — PROGRESS NOTES
"Subjective:       Patient ID: Rosendo Thomas is a 73 y.o. male.    Chief Complaint: Malignant Neoplasm of Sigmoid Colon and Follow-up    HPI  72 yo M with type 2 diabetes, hypercholesterolemia, and obesity who underwent his 1st screening colonoscopy on 07/09/2018 by Dr. Ruff at [facility] and was found to have sigmoid diverticulosis as well as a total of 8 polyps, all less than 5 mm in size, throughout the colon -  pathology from all but 1 of these revealed tubular adenoma with the remaining polyp being hyperplastic.  He also had what was described as a 3.5 cm pedunculated polyp in the sigmoid colon which was removed via snare cautery, pathology from this revealed invasive adenocarcinoma arising within a tubular adenoma with the carcinoma invading into the submucosa.  On review of the endoscopy report images it is hard to tell if the lesion was truly pedunculated or not, and the gross description from the pathology report does not describe a stalk.     CT A/P 07/25/2018:  Diffuse fatty change throughout the liver.  Vascular calcifications in the abdomen and pelvis.  Fatty density umbilical hernia.  Herniation of mesenteric fat into the left inguinal canal.     He underwent a repeat colonoscopy on 08/21/2018 to attempt to tattoo the site of the polypectomy from the cancerous polyp.  He was found to have multiple additional polyps ranging from 2-12 mm throughout the colon which were removed. I do not have a pathology report from this.  Scar tissue from the recent polypectomy was not seen.  The repeat colonoscopy report indicates that "it was unclear if a previously tattooed site was seen," though reviewing the prior colonoscopy report, it did not indicate that tattooing was performed. However during the 2nd colonoscopy it was noted that an area that looked like it was previously inked was re-tattoed at 40 cm from the anal verge.     Initially he did not wish to undergo colectomy.  He then saw a surgeon who " recommended a total abdominal colectomy with ileorectal anastomosis.  He then saw me 9/4/18 for 2nd opinion.  I recommended an extended left colectomy.  His case was discussed at [facility] and the consensus also was to proceed with an extended left colectomy.     He underwent a laparoscopic-assisted left colectomy (proximal descending colon to rectal anstomosis 9/25/18) - had a prolonged ileus requiring NGT/TPN, but ultimately regained bowel function and was discharged.     Pathology:   1. FRAGMENTS OF BENIGN ADIPOSE TISSUE  2. RESECTION OF THE DESCENDING AND SIGMOID COLON WITH NO EVIDENCE OF RESIDUAL  ADENOCARCINOMA OR POLYP FORMATION IDENTIFIED. THE AREA OF PRIOR TATTOO IS IDENTIFIED  AND SERIALLY SECTIONED. SEE SYNOPTIC REPORT:  PROCEDURE: RESECTION OF DESCENDING AND SIGMOID COLON  TUMOR SITE: SIGMOID POLYP ([id number])  TUMOR SIZE: NO RESIDUAL INVASIVE ADENOCARCINOMA IDENTIFIED AFTER POLYPECTOMY  MACROSCOPIC TUMOR PERFORATION: NOT IDENTIFIED  HISTOLOGIC TYPE: ADENOCARCINOMA PRESENT ON PRIOR POLYPECTOMY  HISTOLOGIC GRADE: NOT APPLICABLE  TUMOR EXTENSION: NO RESIDUAL TUMOR IDENTIFIED AFTER POLYPECTOMY (ORIGINAL TUMOR  INVADED THE SUBMUCOSA)  MARGINS: ALL MARGINS OF RESECTION ARE NEGATIVE (PROXIMAL, DISTAL AND  CIRCUMFERENTIAL/RADIAL). NO RESIDUAL CARCINOMA PRESENT AFTER POLYPECTOMY TREATMENT  EFFECT: NO KNOWN PRE-SURGICAL THERAPY. TATTOOED AREA IDENTIFIED  LYMPH-VASCULAR INVASION: NOT IDENTIFIED  PERINEURAL INVASION: NOT IDENTIFIED  TYPE OF POLYP IN WHICH INVASIVE CARCINOMA AROSE: TUBULAR ADENOMA ([id number])  REGIONAL LYMPH NODES: 5 SMALL BENIGN LYMPH NODES IDENTIFIED AFTER CAREFUL DISSECTION.  MULTIPLE ADDITIONAL SECTIONS OF FAT PENDING  TUMOR DEPOSITS: NOT IDENTIFIED  TUMOR STAGE: pT0N0 (pT1 BASED ON POLYPECTOMY SPECIMEN)  3. SPECIMEN LABELED PROXIMAL MARGIN SHOWS BENIGN COLONIC MUCOSA  4. SPECIMEN LABELED PROXIMAL DONUT SHOWS BENIGN COLONIC MUCOSA  5. SPECIMEN LABELED DISTAL DONUT SHOWS BENIGN COLONIC  MUCOSA  Supplemental Diagnosis  2. MULTIPLE ADDITIONAL SECTIONS OF FAT WERE PROCESSED AND 4 MORE SMALL BENIGN LYMPH  NODES ARE IDENTIFIED FOR A TOTAL OF 9 BENIGN LYMPH NODES (0/9).    He comes in today for follow-up.  He is eating well with no nausea or vomiting.  He is having regular bowel movements.  Last week he had an episode of left flank pain that migrated to the right side after a few days but then dissipated after he had a large bowel movement.  No fevers or chills.  No significant abdominal pain.      Review of patient's allergies indicates:  No Known Allergies    Past Medical History:   Diagnosis Date    Colon cancer     Diabetes mellitus type II 7/2010    diet controlled    Nephrolithiasis     Obesity     Psoriasis     Skin cancer     Squamous cell carcinoma        Past Surgical History:   Procedure Laterality Date    APPENDECTOMY      COLECTOMY-LAPAROSCOPIC LEFT N/A 9/25/2018    [facility]formed [facility] at [facility] 2ND FLR    COLONOSCOPY N/A 7/9/2018    [facility]O    SKIN CANCER EXCISION      SKIN GRAFT      VASECTOMY      VASECTOMY         Current Outpatient Medications   Medication Sig Dispense Refill    atorvastatin (LIPITOR) 20 MG tablet Take 1 tablet (20 mg total) by mouth once daily. 90 tablet 0    blood sugar diagnostic (CONTOUR TEST STRIPS) Strp 1 each by Misc.(Non-Drug; Combo Route) route 2 (two) times daily. 50 each 11    fluocinonide (LIDEX) 0.05 % external solution Apply topically 2 (two) times daily. For itching in scalp and ears 60 mL 3    glimepiride (AMARYL) 2 MG tablet Take 1 tablet (2 mg total) by mouth before breakfast. 90 tablet 0    hydroCHLOROthiazide (HYDRODIURIL) 25 MG tablet Take 1 tablet (25 mg total) by mouth once daily. 90 tablet 0    lancets Misc USE ONE LANCET TWO TIMES DAILY 100 each 11    metFORMIN (GLUCOPHAGE-XR) 500 MG 24 hr tablet Take 2 tablets (1,000 mg total) by mouth 2 (two) times daily with meals. 120  tablet 0    OTEZLA 30 mg Tab Take 1 tablet by mouth twice daily 60 tablet 2    telmisartan (MICARDIS) 80 MG Tab Take 1 tablet (80 mg total) by mouth once daily. 90 tablet 0    triamcinolone acetonide 0.1% (KENALOG) 0.1 % ointment Apply topically 2 (two) times daily. To rash 454 g 2     No current facility-administered medications for this visit.      Facility-Administered Medications Ordered in Other Visits   Medication Dose Route Frequency Provider Last Rate Last Dose    enoxaparin injection 40 mg  40 mg Subcutaneous Q24H Florence Trevino NP   40 mg at 10/07/18 2214    gabapentin capsule 300 mg  300 mg Oral On Call Procedure Florence Trevino NP   300 mg at 09/25/18 1347       Family History   Problem Relation Age of Onset    Cancer Father         prostate    Diabetes Brother     Anesthesia problems Neg Hx        Social History     Socioeconomic History    Marital status:      Spouse name: None    Number of children: None    Years of education: None    Highest education level: None   Occupational History     Employer: [employer]   Social Needs    Financial resource strain: None    Food insecurity:     Worry: None     Inability: None    Transportation needs:     Medical: None     Non-medical: None   Tobacco Use    Smoking status: Former Smoker     Years: 15.00     Types: Cigars    Smokeless tobacco: Never Used    Tobacco comment: cigars-x1 yr.   Substance and Sexual Activity    Alcohol use: Yes     Comment: occassional    Drug use: No    Sexual activity: Yes     Partners: Female   Lifestyle    Physical activity:     Days per week: None     Minutes per session: None    Stress: None   Relationships    Social connections:     Talks on phone: None     Gets together: None     Attends Sabianism service: None     Active member of club or organization: None     Attends meetings of clubs or organizations: None     Relationship status: None    Intimate partner  violence:     Fear of current or ex partner: None     Emotionally abused: None     Physically abused: None     Forced sexual activity: None   Other Topics Concern    None   Social History Narrative    None       Review of Systems   Constitutional: Negative for chills and fever.   HENT: Negative for congestion and sinus pressure.    Eyes: Negative for visual disturbance.   Respiratory: Negative for cough and shortness of breath.    Cardiovascular: Negative for chest pain and palpitations.   Gastrointestinal: Negative for abdominal distention, abdominal pain, anal bleeding, blood in stool, constipation, diarrhea, nausea, rectal pain and vomiting.   Endocrine: Negative for cold intolerance and heat intolerance.   Genitourinary: Negative for dysuria and frequency.   Musculoskeletal: Negative for arthralgias and back pain.   Skin: Negative for rash.   Allergic/Immunologic: Negative for immunocompromised state.   Neurological: Negative for dizziness, light-headedness and headaches.   Psychiatric/Behavioral: Negative for confusion. The patient is not nervous/anxious.        Objective:      Physical Exam   Constitutional: He is oriented to person, place, and time. He appears well-developed and well-nourished.   HENT:   Head: Normocephalic and atraumatic.   Eyes: Conjunctivae are normal.   Pulmonary/Chest: Effort normal. No respiratory distress.   Abdominal: Soft. He exhibits no distension and no mass. There is no tenderness. There is no rebound and no guarding. No hernia.   Soft, nontender, nondistended, well-healed surgical scars   Neurological: He is alert and oriented to person, place, and time.   Skin: Skin is warm and dry. No erythema.         Lab Results   Component Value Date    WBC 6.55 10/08/2018    HGB 10.4 (L) 10/08/2018    HCT 33.8 (L) 10/08/2018    MCV 92 10/08/2018     (L) 10/08/2018     BMP  Lab Results   Component Value Date     10/08/2018    K 3.6 10/08/2018     10/08/2018    CO2 26  10/08/2018    BUN 15 10/08/2018    CREATININE 0.8 10/08/2018    CALCIUM 8.1 (L) 10/08/2018    ANIONGAP 7 (L) 10/08/2018    ESTGFRAFRICA >60.0 10/08/2018    EGFRNONAA >60.0 10/08/2018     CMP  Sodium   Date Value Ref Range Status   10/08/2018 137 136 - 145 mmol/L Final     Potassium   Date Value Ref Range Status   10/08/2018 3.6 3.5 - 5.1 mmol/L Final     Chloride   Date Value Ref Range Status   10/08/2018 104 95 - 110 mmol/L Final     CO2   Date Value Ref Range Status   10/08/2018 26 23 - 29 mmol/L Final     Glucose   Date Value Ref Range Status   10/08/2018 223 (H) 70 - 110 mg/dL Final     BUN, Bld   Date Value Ref Range Status   10/08/2018 15 8 - 23 mg/dL Final     Creatinine   Date Value Ref Range Status   10/08/2018 0.8 0.5 - 1.4 mg/dL Final     Calcium   Date Value Ref Range Status   10/08/2018 8.1 (L) 8.7 - 10.5 mg/dL Final     Total Protein   Date Value Ref Range Status   09/30/2018 5.5 (L) 6.0 - 8.4 g/dL Final     Albumin   Date Value Ref Range Status   09/30/2018 2.6 (L) 3.5 - 5.2 g/dL Final     Total Bilirubin   Date Value Ref Range Status   09/30/2018 0.4 0.1 - 1.0 mg/dL Final     Comment:     For infants and newborns, interpretation of results should be based  on gestational age, weight and in agreement with clinical  observations.  Premature Infant recommended reference ranges:  Up to 24 hours.............<8.0 mg/dL  Up to 48 hours............<12.0 mg/dL  3-5 days..................<15.0 mg/dL  6-29 days.................<15.0 mg/dL       Alkaline Phosphatase   Date Value Ref Range Status   09/30/2018 45 (L) 55 - 135 U/L Final     AST   Date Value Ref Range Status   09/30/2018 16 10 - 40 U/L Final     ALT   Date Value Ref Range Status   09/30/2018 16 10 - 44 U/L Final     Anion Gap   Date Value Ref Range Status   10/08/2018 7 (L) 8 - 16 mmol/L Final     eGFR if    Date Value Ref Range Status   10/08/2018 >60.0 >60 mL/min/1.73 m^2 Final     eGFR if non    Date Value Ref  Range Status   10/08/2018 >60.0 >60 mL/min/1.73 m^2 Final     Comment:     Calculation used to obtain the estimated glomerular filtration  rate (eGFR) is the CKD-EPI equation.        Lab Results   Component Value Date    CEA 2.0 09/04/2018           Assessment:       1. History of colon cancer, stage I    2. History of colon cancer        Plan:   Given the multitude of polyps seen on his prior two colonoscopies, and the uncertainty as to whether the area tattooed at his 2nd colonoscopy was the area where the polyp was removed at his 1st colonoscopy, I would like to repeat a colonoscopy to evaluate his remaining colon to be sure that there are no untreated lesions.      Diet as tolerated.  Activity as tolerated.  Colonoscopy will be scheduled in the coming weeks.    Chito Banks MD, FACS, FASCRS  Senior Staff Surgeon  Department of Colon & Rectal Surgery

## 2019-04-12 ENCOUNTER — OFFICE VISIT (OUTPATIENT)
Dept: FAMILY MEDICINE | Facility: CLINIC | Age: 74
End: 2019-04-12
Payer: MEDICARE

## 2019-04-12 VITALS
OXYGEN SATURATION: 96 % | BODY MASS INDEX: 32.27 KG/M2 | DIASTOLIC BLOOD PRESSURE: 70 MMHG | WEIGHT: 259.5 LBS | HEIGHT: 75 IN | HEART RATE: 54 BPM | SYSTOLIC BLOOD PRESSURE: 110 MMHG | TEMPERATURE: 98 F | RESPIRATION RATE: 16 BRPM

## 2019-04-12 DIAGNOSIS — G44.219 EPISODIC TENSION-TYPE HEADACHE, NOT INTRACTABLE: ICD-10-CM

## 2019-04-12 PROCEDURE — 99214 PR OFFICE/OUTPT VISIT, EST, LEVL IV, 30-39 MIN: ICD-10-PCS | Mod: S$PBB,,, | Performed by: FAMILY MEDICINE

## 2019-04-12 PROCEDURE — 99214 OFFICE O/P EST MOD 30 MIN: CPT | Mod: PBBFAC,PO | Performed by: FAMILY MEDICINE

## 2019-04-12 PROCEDURE — 99999 PR PBB SHADOW E&M-EST. PATIENT-LVL IV: CPT | Mod: PBBFAC,,, | Performed by: FAMILY MEDICINE

## 2019-04-12 PROCEDURE — 99999 PR PBB SHADOW E&M-EST. PATIENT-LVL IV: ICD-10-PCS | Mod: PBBFAC,,, | Performed by: FAMILY MEDICINE

## 2019-04-12 PROCEDURE — 99214 OFFICE O/P EST MOD 30 MIN: CPT | Mod: S$PBB,,, | Performed by: FAMILY MEDICINE

## 2019-04-12 NOTE — PROGRESS NOTES
Subjective:       Patient ID: Rosendo Thomas     Chief Complaint: Migraine (2-3 weeks )      Inocente Thomas is a 73 y.o. male. Here for follow up uncontrolled diabetes. Started Amaryl 1 month ago.   in am.  Chronic frontal HA's related to not wearing glasses.  Patient not given prescription or 1 of his lens.  Pain 8/10.    Review of patient's allergies indicates:  No Known Allergies    Current Outpatient Medications:     atorvastatin (LIPITOR) 20 MG tablet, Take 1 tablet (20 mg total) by mouth once daily., Disp: 90 tablet, Rfl: 0    blood sugar diagnostic (CONTOUR TEST STRIPS) Strp, 1 each by Misc.(Non-Drug; Combo Route) route 2 (two) times daily., Disp: 50 each, Rfl: 11    fluocinonide (LIDEX) 0.05 % external solution, Apply topically 2 (two) times daily. For itching in scalp and ears, Disp: 60 mL, Rfl: 3    glimepiride (AMARYL) 2 MG tablet, Take 1 tablet (2 mg total) by mouth before breakfast., Disp: 90 tablet, Rfl: 0    hydroCHLOROthiazide (HYDRODIURIL) 25 MG tablet, Take 1 tablet (25 mg total) by mouth once daily., Disp: 90 tablet, Rfl: 0    lancets Misc, USE ONE LANCET TWO TIMES DAILY, Disp: 100 each, Rfl: 11    metFORMIN (GLUCOPHAGE-XR) 500 MG 24 hr tablet, Take 2 tablets (1,000 mg total) by mouth 2 (two) times daily with meals., Disp: 120 tablet, Rfl: 0    OTEZLA 30 mg Tab, Take 1 tablet by mouth twice daily, Disp: 60 tablet, Rfl: 2    telmisartan (MICARDIS) 80 MG Tab, Take 1 tablet (80 mg total) by mouth once daily., Disp: 90 tablet, Rfl: 0    triamcinolone acetonide 0.1% (KENALOG) 0.1 % ointment, Apply topically 2 (two) times daily. To rash, Disp: 454 g, Rfl: 2  No current facility-administered medications for this visit.     Facility-Administered Medications Ordered in Other Visits:     enoxaparin injection 40 mg, 40 mg, Subcutaneous, Q24H, Florence Trevino NP, 40 mg at 10/07/18 4194    gabapentin capsule 300 mg, 300 mg, Oral, On Call Procedure, Florence Trevino, LYUBOV, 300  mg at 09/25/18 1347    Past Medical History:   Diagnosis Date    Colon cancer     Diabetes mellitus type II 7/2010    diet controlled    Nephrolithiasis     Obesity     Psoriasis     Skin cancer     Squamous cell carcinoma      Review of Systems   Eyes: Positive for visual disturbance.   Gastrointestinal: Negative for nausea and vomiting.       Objective:      Physical Exam   Constitutional: He appears well-developed and well-nourished.   Cardiovascular:   Pulses:       Dorsalis pedis pulses are 2+ on the right side, and 2+ on the left side.   Musculoskeletal: He exhibits no edema.   Feet:   Right Foot:   Skin Integrity: Negative for ulcer or dry skin.   Left Foot:   Skin Integrity: Negative for ulcer or dry skin.   Neurological: He is alert.       Assessment:       1. Uncontrolled type 2 diabetes mellitus with stage 2 chronic kidney disease, without long-term current use of insulin        Plan:       Rosendo was seen today for migraine.    Diagnoses and all orders for this visit:    Uncontrolled type 2 diabetes mellitus with stage 2 chronic kidney disease, without long-term current use of insulin  -     Ambulatory Referral to Diabetes Education  -     Hemoglobin A1c; Standing  Continue Metformin and Amaryl    Tension Headache  Suspect related to not wearing glasses.  Recommend Tylenol as needed.  Follow up if HA's persist

## 2019-04-29 ENCOUNTER — CLINICAL SUPPORT (OUTPATIENT)
Dept: DIABETES | Facility: CLINIC | Age: 74
End: 2019-04-29
Payer: MEDICARE

## 2019-04-29 VITALS — BODY MASS INDEX: 32.71 KG/M2 | WEIGHT: 261.69 LBS

## 2019-04-29 PROCEDURE — G0108 DIAB MANAGE TRN  PER INDIV: HCPCS | Mod: PBBFAC,PN | Performed by: DIETITIAN, REGISTERED

## 2019-04-29 NOTE — LETTER
April 30, 2019    Rosendo RITU William  [address] [facility] [place] [address]             [facility] [address] [place] [address]  Phone: [phone]  Fax: [phone] Dear Mr. Thomas:    This letter is a reminder that your Hgb A1C test is due. Please call our office to schedule an appointment.    If you've already underwent or scheduled this procedure, please disregard this notice.    Sincerely,        [facility]

## 2019-04-29 NOTE — TELEPHONE ENCOUNTER
----- Message from Minnie Davis sent at 2019  8:30 AM CDT -----  Contact: Self: [phone]  Type: RX Refill Request    Who Called: Self    Refill or New Rx:Refill    RX Name and Strength:metFORMIN (GLUCOPHAGE-XR) 500 MG 24 hr tablet ()    How is the patient currently taking it? (2XDay)    Is this a 30 day or 90 day RX: 90 Day    Preferred Pharmacy with phone number:[facility] - [place], [place] - [address]  [address] [facility] [address]  Phone: [phone] Fax: [phone]        Local or Mail Order:Local    Ordering Provider:Dr. Lackey  Would the patient rather a call back or a response via My [facility]? Callback    Best Call Back Number [phone]    Additional Information: N/A

## 2019-04-30 RX ORDER — METFORMIN HYDROCHLORIDE 500 MG/1
1000 TABLET, EXTENDED RELEASE ORAL 2 TIMES DAILY WITH MEALS
Qty: 120 TABLET | Refills: 0 | Status: SHIPPED | OUTPATIENT
Start: 2019-04-30 | End: 2019-07-11 | Stop reason: SDUPTHER

## 2019-05-01 ENCOUNTER — OFFICE VISIT (OUTPATIENT)
Dept: DERMATOLOGY | Facility: CLINIC | Age: 74
End: 2019-05-01
Payer: MEDICARE

## 2019-05-01 DIAGNOSIS — Z12.83 SCREENING EXAM FOR SKIN CANCER: Primary | ICD-10-CM

## 2019-05-01 DIAGNOSIS — B07.9 VIRAL WARTS, UNSPECIFIED TYPE: ICD-10-CM

## 2019-05-01 DIAGNOSIS — L57.0 ACTINIC KERATOSIS: ICD-10-CM

## 2019-05-01 DIAGNOSIS — L40.9 PSORIASIS: ICD-10-CM

## 2019-05-01 DIAGNOSIS — L82.0 INFLAMED SEBORRHEIC KERATOSIS: ICD-10-CM

## 2019-05-01 PROCEDURE — 17000 DESTRUCT PREMALG LESION: CPT | Mod: 59,PBBFAC | Performed by: DERMATOLOGY

## 2019-05-01 PROCEDURE — 99213 OFFICE O/P EST LOW 20 MIN: CPT | Mod: 25,S$PBB,, | Performed by: DERMATOLOGY

## 2019-05-01 PROCEDURE — 17003 DESTRUCT PREMALG LES 2-14: CPT | Mod: 59,PBBFAC | Performed by: DERMATOLOGY

## 2019-05-01 PROCEDURE — 17110 PR DESTRUCTION BENIGN LESIONS UP TO 14: ICD-10-PCS | Mod: S$PBB,,, | Performed by: DERMATOLOGY

## 2019-05-01 PROCEDURE — 99999 PR PBB SHADOW E&M-EST. PATIENT-LVL II: CPT | Mod: PBBFAC,,, | Performed by: DERMATOLOGY

## 2019-05-01 PROCEDURE — 17003 DESTRUCT PREMALG LES 2-14: CPT | Mod: 59,S$PBB,, | Performed by: DERMATOLOGY

## 2019-05-01 PROCEDURE — 99213 PR OFFICE/OUTPT VISIT, EST, LEVL III, 20-29 MIN: ICD-10-PCS | Mod: 25,S$PBB,, | Performed by: DERMATOLOGY

## 2019-05-01 PROCEDURE — 17000 PR DESTRUCTION(LASER SURGERY,CRYOSURGERY,CHEMOSURGERY),PREMALIGNANT LESIONS,FIRST LESION: ICD-10-PCS | Mod: 59,S$PBB,, | Performed by: DERMATOLOGY

## 2019-05-01 PROCEDURE — 99999 PR PBB SHADOW E&M-EST. PATIENT-LVL II: ICD-10-PCS | Mod: PBBFAC,,, | Performed by: DERMATOLOGY

## 2019-05-01 PROCEDURE — 17110 DESTRUCTION B9 LES UP TO 14: CPT | Mod: S$PBB,,, | Performed by: DERMATOLOGY

## 2019-05-01 PROCEDURE — 99212 OFFICE O/P EST SF 10 MIN: CPT | Mod: PBBFAC | Performed by: DERMATOLOGY

## 2019-05-01 PROCEDURE — 17110 DESTRUCTION B9 LES UP TO 14: CPT | Mod: PBBFAC | Performed by: DERMATOLOGY

## 2019-05-01 PROCEDURE — 17003 DESTRUCTION, PREMALIGNANT LESIONS; SECOND THROUGH 14 LESIONS: ICD-10-PCS | Mod: 59,S$PBB,, | Performed by: DERMATOLOGY

## 2019-05-01 PROCEDURE — 17000 DESTRUCT PREMALG LESION: CPT | Mod: 59,S$PBB,, | Performed by: DERMATOLOGY

## 2019-05-01 NOTE — PROGRESS NOTES
Subjective:       Patient ID:  Rosendo Thomas is a 73 y.o. male who presents for   Chief Complaint   Patient presents with    Psoriasis     f/u    Warts     r hand     73 year old with hx of colon cancer present for follow up for his psoriasis. Patient was started on Otezla 30 mg bid at his last visit. Per previous hx,  Pt has rash all over the body for 15+ years, itchy, painful in the hands, OTC goldbond and psoriasis cream, did take Enbrel about 5 years ago. Patient is currently using triamcinolone cream topically. He notes that he thinks that he is 90% better. He notes that his joint pains are relatively unchanged. Morning stiffness lasts about 30 minutes. Also follows with rheumatology.    Patient also notes warts on his distal right 3rd and 4th digit.      Psoriasis patient presented to establish care at the last visit. 15+ year hx of symptoms involving the knees, elbows, buttocks, back and scalp.   Report having joint pain in the distal fingertips which he attributes to osteoarthritis. Saw rheum 9/2018 who thought he may have psoriatic arthritis and advised Otelza following his colon cancer surgery.     Previous treatment:  - 12/2017: patient reports being enrolled in a psoriasis study but had to stop the study 2/2 hypertension (?)  - Reports taking Enbrel over 5 years ago. Stopped 2/2 increased URI.   Co-morbidities:   - hx of diabetes  - hx of colon cancer s/p left colectomy in remission (treated 07/2018).   - patient does report drinking alcohol occasionally       Psoriasis  - Follow-up  Symptom course: improving  Currently using: otezla.    Warts  - Initial  Affected locations: right hand  Duration: 2 months  Signs / symptoms: asymptomatic  Aggravated by: nothing  Relieving factors/Treatments tried: nothing        Review of Systems   Constitutional: Negative for fever, chills and malaise.   HENT: Negative for sore throat and mouth sores.    Respiratory: Negative for shortness of breath.     Gastrointestinal: Positive for diarrhea. Negative for nausea and vomiting.   Genitourinary: Negative for dysuria.   Musculoskeletal: Negative for joint swelling and arthralgias.   Skin: Positive for rash. Negative for dry skin.   Hematologic/Lymphatic: Bruises/bleeds easily.        Objective:    Physical Exam   Constitutional: He appears well-developed and well-nourished. No distress.   Neurological: He is alert and oriented to person, place, and time. He is not disoriented.   Psychiatric: He has a normal mood and affect.   Skin:   Areas Examined (abnormalities noted in diagram):   Scalp / Hair Palpated and Inspected  Head / Face Inspection Performed  Neck Inspection Performed  Chest / Axilla Inspection Performed  Abdomen Inspection Performed  Back Inspection Performed  RUE Inspected  LUE Inspection Performed  Nails and Digits Inspection Performed                       Diagram Legend     Erythematous scaling macule/papule c/w actinic keratosis       Vascular papule c/w angioma      Pigmented verrucoid papule/plaque c/w seborrheic keratosis      Yellow umbilicated papule c/w sebaceous hyperplasia      Irregularly shaped tan macule c/w lentigo     1-2 mm smooth white papules consistent with Milia      Movable subcutaneous cyst with punctum c/w epidermal inclusion cyst      Subcutaneous movable cyst c/w pilar cyst      Firm pink to brown papule c/w dermatofibroma      Pedunculated fleshy papule(s) c/w skin tag(s)      Evenly pigmented macule c/w junctional nevus     Mildly variegated pigmented, slightly irregular-bordered macule c/w mildly atypical nevus      Flesh colored to evenly pigmented papule c/w intradermal nevus       Pink pearly papule/plaque c/w basal cell carcinoma      Erythematous hyperkeratotic cursted plaque c/w SCC      Surgical scar with no sign of skin cancer recurrence      Open and closed comedones      Inflammatory papules and pustules      Verrucoid papule consistent consistent with wart      Erythematous eczematous patches and plaques     Dystrophic onycholytic nail with subungual debris c/w onychomycosis     Umbilicated papule    Erythematous-base heme-crusted tan verrucoid plaque consistent with inflamed seborrheic keratosis     Erythematous Silvery Scaling Plaque c/w Psoriasis     See annotation      Assessment / Plan:        Screening exam for skin cancer    Upper body skin examination performed today including at least 6 points as noted in physical examination. No lesions suspicious for malignancy noted.    Inflamed seborrheic keratosis - mid back, left temple  Cryosurgery procedure note:    Verbal consent from the patient is obtained including, but not limited to, risk of hypopigmentation/hyperpigmentation, scar, recurrence of lesion. Liquid nitrogen cryosurgery is applied to 2 lesions to produce a freeze injury. The patient is aware that blisters may form and is instructed on wound care with gentle cleansing and use of vaseline ointment to keep moist until healed. The patient is supplied a handout on cryosurgery and is instructed to call if lesions do not completely resolve.    Actinic keratosis  Cryosurgery Procedure Note    Verbal consent from the patient is obtained including, but not limited to, risk of hypopigmentation/hyperpigmentation, scar, recurrence of lesion. The patient is aware of the precancerous quality and need for treatment of these lesions. Liquid nitrogen cryosurgery is applied to the 3 actinic keratoses, as detailed in the physical exam, to produce a freeze injury. The patient is aware that blisters may form and is instructed on wound care with gentle cleansing and use of vaseline ointment to keep moist until healed. The patient is supplied a handout on cryosurgery and is instructed to call if lesions do not completely resolve.    Psoriasis  Improving with otelza 30 mg po bid; continue  Tolerating well    Viral warts, unspecified type  Cryosurgery procedure note:    Verbal  consent from the patient is obtained including, but not limited to, risk of hypopigmentation/hyperpigmentation, scar, recurrence of lesion. Liquid nitrogen cryosurgery is applied to 5 lesions to produce a freeze injury. The patient is aware that blisters may form and is instructed on wound care with gentle cleansing and use of vaseline ointment to keep moist until healed. The patient is supplied a handout on cryosurgery and is instructed to call if lesions do not completely resolve.             Follow up in about 3 months (around 8/1/2019).

## 2019-05-15 DIAGNOSIS — I10 ESSENTIAL HYPERTENSION, BENIGN: ICD-10-CM

## 2019-05-15 RX ORDER — HYDROCHLOROTHIAZIDE 25 MG/1
25 TABLET ORAL DAILY
Qty: 90 TABLET | Refills: 0 | Status: SHIPPED | OUTPATIENT
Start: 2019-05-15 | End: 2019-07-23

## 2019-05-15 RX ORDER — GLIMEPIRIDE 2 MG/1
2 TABLET ORAL
Qty: 90 TABLET | Refills: 0 | Status: SHIPPED | OUTPATIENT
Start: 2019-05-15 | End: 2019-09-06 | Stop reason: SDUPTHER

## 2019-05-15 RX ORDER — ATORVASTATIN CALCIUM 20 MG/1
20 TABLET, FILM COATED ORAL DAILY
Qty: 90 TABLET | Refills: 0 | Status: SHIPPED | OUTPATIENT
Start: 2019-05-15 | End: 2019-09-06 | Stop reason: SDUPTHER

## 2019-06-10 ENCOUNTER — OFFICE VISIT (OUTPATIENT)
Dept: PODIATRY | Facility: CLINIC | Age: 74
End: 2019-06-10
Payer: MEDICARE

## 2019-06-10 VITALS — WEIGHT: 261 LBS | BODY MASS INDEX: 32.45 KG/M2 | HEIGHT: 75 IN

## 2019-06-10 DIAGNOSIS — L90.9 PLANTAR FAT PAD ATROPHY: ICD-10-CM

## 2019-06-10 DIAGNOSIS — M20.41 HAMMER TOES OF BOTH FEET: ICD-10-CM

## 2019-06-10 DIAGNOSIS — M20.5X1 HALLUX LIMITUS, ACQUIRED, RIGHT: ICD-10-CM

## 2019-06-10 DIAGNOSIS — M20.5X2 HALLUX LIMITUS, ACQUIRED, LEFT: ICD-10-CM

## 2019-06-10 DIAGNOSIS — E11.49 TYPE II DIABETES MELLITUS WITH NEUROLOGICAL MANIFESTATIONS: ICD-10-CM

## 2019-06-10 DIAGNOSIS — M20.42 HAMMER TOES OF BOTH FEET: ICD-10-CM

## 2019-06-10 DIAGNOSIS — E11.9 COMPREHENSIVE DIABETIC FOOT EXAMINATION, TYPE 2 DM, ENCOUNTER FOR: Primary | ICD-10-CM

## 2019-06-10 PROCEDURE — 99212 OFFICE O/P EST SF 10 MIN: CPT | Mod: PBBFAC,PO | Performed by: PODIATRIST

## 2019-06-10 PROCEDURE — 99214 OFFICE O/P EST MOD 30 MIN: CPT | Mod: S$PBB,,, | Performed by: PODIATRIST

## 2019-06-10 PROCEDURE — 99214 PR OFFICE/OUTPT VISIT, EST, LEVL IV, 30-39 MIN: ICD-10-PCS | Mod: S$PBB,,, | Performed by: PODIATRIST

## 2019-06-10 PROCEDURE — 99999 PR PBB SHADOW E&M-EST. PATIENT-LVL II: ICD-10-PCS | Mod: PBBFAC,,, | Performed by: PODIATRIST

## 2019-06-10 PROCEDURE — 99999 PR PBB SHADOW E&M-EST. PATIENT-LVL II: CPT | Mod: PBBFAC,,, | Performed by: PODIATRIST

## 2019-06-10 NOTE — PROGRESS NOTES
Subjective:      Patient ID: Rosendo Thomas is a 73 y.o. male.    Chief Complaint: Diabetes Mellitus (Pcp Dr. Lackey 4/12/19); Diabetic Foot Exam; and Nail Care    Rosendo is a 73 y.o. male who presents to the clinic for evaluation and treatment of high risk feet. Rosendo has a past medical history of Colon cancer, Diabetes mellitus type II (7/2010), Nephrolithiasis, Obesity, Psoriasis, Skin cancer, and Squamous cell carcinoma. The patient has no major complaints with feet. Chief concern is how to care for feet as a diabetic.    This patient has documented high risk feet requiring routine maintenance secondary to diabetes mellitis and those secondary complications of diabetes, as mentioned..    PCP: Domi Lackey MD    Date Last Seen by PCP:   Chief Complaint   Patient presents with    Diabetes Mellitus     Pcp Dr. Lackey 4/12/19    Diabetic Foot Exam    Nail Care       Current shoe gear:  Tennis shoes     Hemoglobin A1C   Date Value Ref Range Status   03/01/2019 8.6 (H) 4.0 - 5.6 % Final     Comment:     ADA Screening Guidelines:  5.7-6.4%  Consistent with prediabetes  >or=6.5%  Consistent with diabetes  High levels of fetal hemoglobin interfere with the HbA1C  assay. Heterozygous hemoglobin variants (HbS, HgC, etc)do  not significantly interfere with this assay.   However, presence of multiple variants may affect accuracy.     09/21/2018 8.0 (H) 4.0 - 5.6 % Final     Comment:     ADA Screening Guidelines:  5.7-6.4%  Consistent with prediabetes  >or=6.5%  Consistent with diabetes  High levels of fetal hemoglobin interfere with the HbA1C  assay. Heterozygous hemoglobin variants (HbS, HgC, etc)do  not significantly interfere with this assay.   However, presence of multiple variants may affect accuracy.     05/24/2018 7.4 (H) 4.0 - 5.6 % Final     Comment:     According to ADA guidelines, hemoglobin A1c <7.0% represents  optimal control in non-pregnant diabetic patients. Different  metrics may apply to  specific patient populations.   Standards of Medical Care in Diabetes-2016.  For the purpose of screening for the presence of diabetes:  <5.7%     Consistent with the absence of diabetes  5.7-6.4%  Consistent with increasing risk for diabetes   (prediabetes)  >or=6.5%  Consistent with diabetes  Currently, no consensus exists for use of hemoglobin A1c  for diagnosis of diabetes for children.  This Hemoglobin A1c assay has significant interference with fetal   hemoglobin   (HbF). The results are invalid for patients with abnormal amounts of   HbF,   including those with known Hereditary Persistence   of Fetal Hemoglobin. Heterozygous hemoglobin variants (HbAS, HbAC,   HbAD, HbAE, HbA2) do not significantly interfere with this assay;   however, presence of multiple variants in a sample may impact the %   interference.       Patient Active Problem List   Diagnosis    Uncontrolled type 2 diabetes mellitus with stage 2 chronic kidney disease, without long-term current use of insulin    Psoriasis    Essential hypertension, benign    Sinus bradycardia    Psoriatic arthritis    History of colon cancer    Aortic atherosclerosis     Current Outpatient Medications on File Prior to Visit   Medication Sig Dispense Refill    atorvastatin (LIPITOR) 20 MG tablet Take 1 tablet (20 mg total) by mouth once daily. 90 tablet 0    blood sugar diagnostic (CONTOUR TEST STRIPS) Strp 1 each by Misc.(Non-Drug; Combo Route) route 2 (two) times daily. 50 each 11    fluocinonide (LIDEX) 0.05 % external solution Apply topically 2 (two) times daily. For itching in scalp and ears 60 mL 3    glimepiride (AMARYL) 2 MG tablet Take 1 tablet (2 mg total) by mouth before breakfast. 90 tablet 0    hydroCHLOROthiazide (HYDRODIURIL) 25 MG tablet Take 1 tablet (25 mg total) by mouth once daily. 90 tablet 0    lancets Misc USE ONE LANCET TWO TIMES DAILY 100 each 11    metFORMIN (GLUCOPHAGE-XR) 500 MG 24 hr tablet Take 2 tablets (1,000 mg total)  by mouth 2 (two) times daily with meals. 120 tablet 0    OTEZLA 30 mg Tab Take 1 tablet by mouth twice daily 60 tablet 2    telmisartan (MICARDIS) 80 MG Tab Take 1 tablet (80 mg total) by mouth once daily. 90 tablet 0    triamcinolone acetonide 0.1% (KENALOG) 0.1 % ointment Apply topically 2 (two) times daily. To rash 454 g 2     Current Facility-Administered Medications on File Prior to Visit   Medication Dose Route Frequency Provider Last Rate Last Dose    enoxaparin injection 40 mg  40 mg Subcutaneous Q24H Florence Trevino NP   40 mg at 10/07/18 2214    gabapentin capsule 300 mg  300 mg Oral On Call Procedure Florence Trevino NP   300 mg at 09/25/18 1347     Review of patient's allergies indicates:  No Known Allergies  Past Surgical History:   Procedure Laterality Date    APPENDECTOMY      COLECTOMY-LAPAROSCOPIC LEFT N/A 9/25/2018    [facility]    COLONOSCOPY N/A 7/9/2018    [facility] at [facility] END[facility]    SKIN CANCER EXCISION      SKIN GRAFT      VASECTOMY      VASECTOMY       Family History   Problem Relation Age of Onset    Cancer Father         prostate    Diabetes Brother     Anesthesia problems Neg Hx      Social History     Socioeconomic History    Marital status:      Spouse name: Not on file    Number of children: Not on file    Years of education: Not on file    Highest education level: Not on file   Occupational History     Employer: [employer]   Social Needs    Financial resource strain: Not on file    Food insecurity:     Worry: Not on file     Inability: Not on file    Transportation needs:     Medical: Not on file     Non-medical: Not on file   Tobacco Use    Smoking status: Former Smoker     Years: 15.00     Types: Cigars    Smokeless tobacco: Never Used    Tobacco comment: cigars-x1 yr.   Substance and Sexual Activity    Alcohol use: Yes     Comment: occassional    Drug use: No  "   Sexual activity: Yes     Partners: Female   Lifestyle    Physical activity:     Days per week: Not on file     Minutes per session: Not on file    Stress: Not at all   Relationships    Social connections:     Talks on phone: Not on file     Gets together: Not on file     Attends Christianity service: Not on file     Active member of club or organization: Not on file     Attends meetings of clubs or organizations: Not on file     Relationship status: Not on file   Other Topics Concern    Not on file   Social History Narrative    Not on file        Review of Systems   Constitution: Negative for chills and fever.   Cardiovascular: Negative for claudication and leg swelling.   Respiratory: Negative for cough and shortness of breath.    Skin: Positive for dry skin and nail changes. Negative for itching and rash.   Musculoskeletal: Positive for joint pain. Negative for falls, joint swelling and muscle weakness.   Gastrointestinal: Negative for diarrhea, nausea and vomiting.   Neurological: Positive for paresthesias. Negative for numbness, tremors and weakness.   Psychiatric/Behavioral: Negative for altered mental status and hallucinations.           Objective:       Vitals:    06/10/19 1410   Weight: 118.4 kg (261 lb)   Height: 6' 3" (1.905 m)   PainSc: 0-No pain       Physical Exam   Constitutional:   General: Pt. is well-developed, well-nourished, appears stated age, in no acute distress, alert and oriented x 3. No evidence of depression, anxiety, or agitation. Calm, cooperative, and communicative. Appropriate interactions and affect.       Cardiovascular:   Pulses:       Dorsalis pedis pulses are 1+ on the right side, and 1+ on the left side.        Posterior tibial pulses are 2+ on the right side, and 2+ on the left side.   There is decreased digital hair   Musculoskeletal:        Right ankle: He exhibits normal range of motion and no swelling. No tenderness. Achilles tendon exhibits no pain and no defect.       "  Left ankle: He exhibits normal range of motion and no swelling. No tenderness. Achilles tendon exhibits no pain and no defect.        Right foot: There is decreased range of motion. There is no tenderness.        Left foot: There is decreased range of motion. There is no tenderness.   muscle strength is 5/5 in all groups bilaterally.    Decreased stride, station of gait.  apropulsive toe off.  Increased angle and base of gait.    Patient has hammertoes of digits 2-5 bilateral partially reducible without symptom today.    Decreased first MPJ range of motion both weightbearing and nonweightbearing, no crepitus observed the first MP joint, + dorsal flag sign. Mild  bunion deformity is observed .    Fat pad atrophy to heels and met heads bilateral   Neurological: He displays no atrophy and no tremor.   [place] 5.07 monofilament is intact bilateral feet. Sharp/dull sensation is also intact Bilateral feet.    Decreased/absent vibratory sensation bilateral feet to 128Hz tuning fork.         Skin: Skin is warm, dry and intact. No abrasion, no ecchymosis, no lesion and no rash noted. He is not diaphoretic. No cyanosis or erythema. No pallor. Nails show no clubbing.   Toenails 1-5 bilaterally are elongated by 2-3 mm, thickened by 2-3 mm, discolored/yellowed, dystrophic, brittle with subungual debris.      Interdigital Spaces clean, dry and without evidence of break in skin integrity   Psychiatric: He has a normal mood and affect. His speech is normal.   Nursing note and vitals reviewed.            Assessment:       Encounter Diagnoses   Name Primary?    Comprehensive diabetic foot examination, type 2 DM, encounter for Yes    Hammer toes of both feet     Hallux limitus, acquired, right     Hallux limitus, acquired, left     Plantar fat pad atrophy     Type II diabetes mellitus with neurological manifestations          Plan:       Rosendo was seen today for diabetes mellitus, diabetic foot exam and nail  care.    Diagnoses and all orders for this visit:    Comprehensive diabetic foot examination, type 2 DM, encounter for    Hammer toes of both feet  -     DIABETIC SHOES FOR HOME USE    Hallux limitus, acquired, right  -     DIABETIC SHOES FOR HOME USE    Hallux limitus, acquired, left  -     DIABETIC SHOES FOR HOME USE    Plantar fat pad atrophy  -     DIABETIC SHOES FOR HOME USE    Type II diabetes mellitus with neurological manifestations  -     DIABETIC SHOES FOR HOME USE      I counseled the patient on his conditions, their implications and medical management. Greater than 20 minutes spent on education about the diabetic foot, neuropathy, and prevention of limb loss.    - Shoe inspection. Diabetic Foot Education. Patient reminded of the importance of good nutrition and blood sugar control to help prevent podiatric complications of diabetes. Patient instructed on proper foot hygeine. We discussed wearing proper shoe gear, daily foot inspections, never walking without protective shoe gear.    Rx diabetic shoes for protection and support    He will continue to monitor the areas daily, inspect his feet, wear protective shoe gear when ambulatory, moisturizer to maintain skin integrity and follow in this office in approximately 12-15 months, sooner p.r.n.

## 2019-06-27 ENCOUNTER — OFFICE VISIT (OUTPATIENT)
Dept: RHEUMATOLOGY | Facility: CLINIC | Age: 74
End: 2019-06-27
Payer: MEDICARE

## 2019-06-27 VITALS
SYSTOLIC BLOOD PRESSURE: 117 MMHG | HEIGHT: 75 IN | HEART RATE: 74 BPM | DIASTOLIC BLOOD PRESSURE: 71 MMHG | BODY MASS INDEX: 32.43 KG/M2 | WEIGHT: 260.81 LBS

## 2019-06-27 DIAGNOSIS — L40.9 PSORIASIS: ICD-10-CM

## 2019-06-27 DIAGNOSIS — L40.50 PSORIATIC ARTHRITIS: Primary | ICD-10-CM

## 2019-06-27 DIAGNOSIS — Z85.038 HISTORY OF COLON CANCER: ICD-10-CM

## 2019-06-27 PROCEDURE — 99999 PR PBB SHADOW E&M-EST. PATIENT-LVL III: CPT | Mod: PBBFAC,GC,, | Performed by: INTERNAL MEDICINE

## 2019-06-27 PROCEDURE — 99214 PR OFFICE/OUTPT VISIT, EST, LEVL IV, 30-39 MIN: ICD-10-PCS | Mod: S$PBB,GC,, | Performed by: INTERNAL MEDICINE

## 2019-06-27 PROCEDURE — 99214 OFFICE O/P EST MOD 30 MIN: CPT | Mod: S$PBB,GC,, | Performed by: INTERNAL MEDICINE

## 2019-06-27 PROCEDURE — 99999 PR PBB SHADOW E&M-EST. PATIENT-LVL III: ICD-10-PCS | Mod: PBBFAC,GC,, | Performed by: INTERNAL MEDICINE

## 2019-06-27 PROCEDURE — 99213 OFFICE O/P EST LOW 20 MIN: CPT | Mod: PBBFAC | Performed by: INTERNAL MEDICINE

## 2019-06-27 ASSESSMENT — ROUTINE ASSESSMENT OF PATIENT INDEX DATA (RAPID3)
PAIN SCORE: 0
FATIGUE SCORE: .5
PATIENT GLOBAL ASSESSMENT SCORE: 0
TOTAL RAPID3 SCORE: 0
MDHAQ FUNCTION SCORE: 0
PSYCHOLOGICAL DISTRESS SCORE: 3.3
AM STIFFNESS SCORE: 0, NO

## 2019-06-27 NOTE — PROGRESS NOTES
Subjective:       Patient ID: Rosendo Thomas is a 73 y.o. male.    Chief Complaint: Follow-up    HPI   73YM with PMH t2DM, HTN, GERD, hx of Colon cancer s/p L colectomy for Malignant sigmoid colon polyp 09/25/18 ( with no residual disease) here for f/u for psoriatic arthritis. Since last clinic visit 02/2019, pt lost his wife ~ 1 month ago. Pt is currently on apremilast 30mg BID which he started after last clinic visit 02/2019. Pt reports that he is not depressed or suicidal.     AM stiffness for about 10-20mins which improves with movement. Pt recently had warts removed on his fingers by dermatology, he has no other complaints. Pt continues to use bee soap which he states has been helpful with his psoriasis in addition to using the topical ointments given by dermatology.    Pt denies weight changes, fatigue, oral/nasal/genital ulcers, headaches, fever, chills, n/v, abd pain, CP, SOB, vision changes, changes in bowel/bladder habits, joint swelling or erythema, family history of autoimmune disease (SLE,RA, CTD) or IBD, enthesitis, uveitis.     Clinic visit  Since last visit 11/2018 pt reports that he is doing well. Pt c/o AM stiffness that last for a couple of minutes and improves with movement. Pt was seen by Dermatology who started pt on Otezla 30mg BID which was approved but pt could not afford the co-pay so was offered patient assistance. Pt has been approved for Otezla 12/21/18-12/31/19 but did not know he was supposed to call [facility] to schedule a delivery for the medication. Pt voiced understanding.     Pt continues to use bee soap which he states has been helpful with his psoriasis, although did not know to pick the topicals that dermatology had prescribed.     Initial history   73YM with PMH t2DM, HTN, GERD referred by [facility] for psoriatic arthritis. Pt has had psoriasis for at least 10 yrs however recently started having joint pains and swelling of hands which is  getting worse. Pt c/o joint pains associated with AM stiffness for 3 hours mostly hands. + joint swelling. Difficulty making a fist, and activities of ADLs. Has not noticed any erythema.      Pt reports being seen by Dr Mosquera ( Dermatologist) about 4 yrs ago who had placed him on Enbrel for about 1 yr with no relief of skin plaques. He reports increased infections while on the medication which he subsequently discontinued. He reports trial of multiple topical creams with minimal relief. Pt reports attempting being in a psoriasis study ~ 5 yr ago but had an elevated blood pressure and was unable to.     Recently diagnosed with sigmoid cancer which he is planned for lap colectomy on 09/25 and unsure if he is receiving radiation/chemotherapy until after the procedure.    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 No illicit drugs. EtOh or tobbacco use. 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  Father: prostate Cancer     Past Medical History:   Diagnosis Date    Colon cancer     Diabetes mellitus type II 7/2010    diet controlled    Nephrolithiasis     Obesity     Psoriasis     Skin cancer     Squamous cell carcinoma      Past Surgical History:   Procedure Laterality Date    APPENDECTOMY      COLECTOMY-LAPAROSCOPIC LEFT N/A 9/25/2018    [facility] 2ND FLR    COLONOSCOPY N/A 7/9/2018    [facility]on Ruff [facility] ENDO    SKIN CANCER EXCISION      SKIN GRAFT      VASECTOMY      VASECTOMY       Family History   Problem Relation Age of Onset    Cancer Father         prostate    Diabetes Brother     Anesthesia problems Neg Hx          Current Outpatient Medications on File Prior to Visit   Medication Sig Dispense Refill    atorvastatin (LIPITOR) 20 MG tablet Take 1 tablet (20 mg total) by mouth once daily. 90 tablet 0    blood sugar diagnostic (CONTOUR TEST STRIPS) Strp 1 each by Misc.(Non-Drug; Combo Route) route 2 (two) times daily. 50 each 11     fluocinonide (LIDEX) 0.05 % external solution Apply topically 2 (two) times daily. For itching in scalp and ears 60 mL 3    glimepiride (AMARYL) 2 MG tablet Take 1 tablet (2 mg total) by mouth before breakfast. 90 tablet 0    hydroCHLOROthiazide (HYDRODIURIL) 25 MG tablet Take 1 tablet (25 mg total) by mouth once daily. 90 tablet 0    lancets Misc USE ONE LANCET TWO TIMES DAILY 100 each 11    metFORMIN (GLUCOPHAGE-XR) 500 MG 24 hr tablet Take 2 tablets (1,000 mg total) by mouth 2 (two) times daily with meals. 120 tablet 0    OTEZLA 30 mg Tab Take 1 tablet by mouth twice daily 60 tablet 2    telmisartan (MICARDIS) 80 MG Tab Take 1 tablet (80 mg total) by mouth once daily. 90 tablet 0    triamcinolone acetonide 0.1% (KENALOG) 0.1 % ointment Apply topically 2 (two) times daily. To rash 454 g 2     Current Facility-Administered Medications on File Prior to Visit   Medication Dose Route Frequency Provider Last Rate Last Dose    enoxaparin injection 40 mg  40 mg Subcutaneous Q24H Florence Trevino NP   40 mg at 10/07/18 2214    gabapentin capsule 300 mg  300 mg Oral On Call Procedure Florence Trevino NP   300 mg at 09/25/18 1347     Review of patient's allergies indicates:  No Known Allergies      Review of Systems   Constitutional: Negative for chills and fever.   HENT: Negative for mouth sores and trouble swallowing.    Eyes: Negative for redness and visual disturbance.   Respiratory: Negative for chest tightness and shortness of breath.    Cardiovascular: Negative for chest pain, palpitations and leg swelling.   Gastrointestinal: Negative for abdominal pain, blood in stool, nausea and vomiting.   Genitourinary: Negative for flank pain, frequency, hematuria and urgency.   Musculoskeletal: Positive for arthralgias. Negative for back pain, gait problem, joint swelling and neck pain.   Skin: Positive for color change and rash.   Neurological: Negative for dizziness, weakness and numbness.  "  Psychiatric/Behavioral: Negative for dysphoric mood, sleep disturbance and suicidal ideas.         Objective:   /71   Pulse 74   Ht 6' 3" (1.905 m)   Wt 118.3 kg (260 lb 12.9 oz)   BMI 32.60 kg/m²      Physical Exam   Constitutional: He is oriented to person, place, and time and well-developed, well-nourished, and in no distress.   HENT:   Head: Normocephalic and atraumatic.   Eyes: EOM are normal. Pupils are equal, round, and reactive to light.   Neck: Normal range of motion. Neck supple.   Cardiovascular: Normal rate and regular rhythm.    Pulmonary/Chest: Effort normal and breath sounds normal.   Abdominal: Soft. Bowel sounds are normal.       Right Side Rheumatological Exam     Examination finds the shoulder, elbow, wrist, knee, 1st PIP, 1st MCP, 2nd MCP, 3rd PIP, 3rd MCP, 4th PIP, 4th MCP, 5th PIP, 5th MCP, 1st CMC, 3rd DIP, 4th DIP and 5th DIP normal.    The patient is tender to palpation of the 2nd PIP and 2nd DIP    Shoulder Exam   Tenderness Location: no tenderness    Range of Motion   The patient has normal right shoulder ROM.    Knee Exam     Range of Motion   The patient has normal right knee ROM.  Patellofemoral Crepitus: positive    Hip Exam   Tenderness Location: no tenderness    Range of Motion   The patient has normal right hip ROM.    Elbow/Wrist Exam   Tenderness Location: no elbow tenderness and no wrist tenderness    Range of Motion   Elbow   The patient has normal right elbow ROM.    Range of Motion   Wrist   The patient has normal right wrist ROM.    Foot Exam     Range of Motion   The patient has normal right ankle ROM.    Ankle Warmth: negative  Ankle Swelling: negative    Foot Warmth: negative  Foot Swelling: negative    Muscle Strength (0-5 scale):  Neck Flexion:  5  Neck Extension: 5  Deltoid:  5  Biceps: 5/5   Triceps:  5  : 5/5   Iliopsoas: 5  Quadriceps:  5   Distal Lower Extremity: 5    Left Side Rheumatological Exam     Examination finds the shoulder, elbow, wrist, " knee, 1st PIP, 1st MCP, 2nd PIP, 2nd MCP, 3rd PIP, 3rd MCP, 4th PIP, 4th MCP, 5th PIP, 5th MCP, 1st CMC, 2nd DIP, 3rd DIP, 4th DIP and 5th DIP normal.    Shoulder Exam   Tenderness Location: no tenderness    Range of Motion   The patient has normal left shoulder ROM.    Knee Exam     Range of Motion   The patient has normal left knee ROM.    Patellofemoral Crepitus: positive    Hip Exam   Tenderness Location: no tenderness    Range of Motion   The patient has normal left hip ROM.    Elbow/Wrist Exam   Tenderness Location: no wrist tenderness and no elbow tenderness    Range of Motion   Elbow   The patient has normal left elbow ROM.    Range of Motion   Wrist   The patient has normal left wrist ROM.    Foot Exam     Range of Motion   The patient has normal left ankle ROM.     Ankle Warmth: negative  Ankle Swelling: negative    Foot Warmth: negative  Foot Swelling: negative    Muscle Strength (0-5 scale):  Neck Flexion:  5  Neck Extension: 5  Deltoid:  5  Biceps: 5/5   Triceps:  5  :  5/5   Iliopsoas: 5  Quadriceps:  5   Distal Lower Extremity: 5      Neurological: He is alert and oriented to person, place, and time.   Skin: Skin is warm and dry. Rash noted.     Multiple psoriatic plaques with erythema, scaling on elbows, hands, lower legs, upper arms, back, gluteal cleft, scalp    Transverse ridging on nails  Nail discoloration, onycholysis   Psychiatric: Affect normal.   Musculoskeletal: Normal range of motion. He exhibits tenderness. He exhibits no edema or deformity.         Results for CASSY VALENTE (MRN [id number]) as of 11/29/2018 09:09   Ref. Range 10/8/2018 04:39   WBC Latest Ref Range: 3.90 - 12.70 K/uL 6.55   RBC Latest Ref Range: 4.60 - 6.20 M/uL 3.69 (L)   Hemoglobin Latest Ref Range: 14.0 - 18.0 g/dL 10.4 (L)   Hematocrit Latest Ref Range: 40.0 - 54.0 % 33.8 (L)   MCV Latest Ref Range: 82 - 98 fL 92   MCH Latest Ref Range: 27.0 - 31.0 pg 28.2   MCHC Latest Ref Range: 32.0 - 36.0 g/dL 30.8 (L)   RDW  Latest Ref Range: 11.5 - 14.5 % 13.8   Platelets Latest Ref Range: 150 - 350 K/uL 132 (L)   MPV Latest Ref Range: 9.2 - 12.9 fL 12.3   Gran% Latest Ref Range: 38.0 - 73.0 % 71.6   Gran # (ANC) Latest Ref Range: 1.8 - 7.7 K/uL 4.7   Immature Granulocytes Latest Ref Range: 0.0 - 0.5 % 0.6 (H)   Immature Grans (Abs) Latest Ref Range: 0.00 - 0.04 K/uL 0.04   Lymph% Latest Ref Range: 18.0 - 48.0 % 16.0 (L)   Lymph # Latest Ref Range: 1.0 - 4.8 K/uL 1.1   Mono% Latest Ref Range: 4.0 - 15.0 % 7.5   Mono # Latest Ref Range: 0.3 - 1.0 K/uL 0.5   Eosinophil% Latest Ref Range: 0.0 - 8.0 % 3.8   Eos # Latest Ref Range: 0.0 - 0.5 K/uL 0.3   Basophil% Latest Ref Range: 0.0 - 1.9 % 0.5   Baso # Latest Ref Range: 0.00 - 0.20 K/uL 0.03   nRBC Latest Ref Range: 0 /100 WBC 0   Differential Method Unknown Automated   Results for CASSY VALENTE (MRN [id number]) as of 11/29/2018 09:09   Ref. Range 10/8/2018 04:39   Sodium Latest Ref Range: 136 - 145 mmol/L 137   Potassium Latest Ref Range: 3.5 - 5.1 mmol/L 3.6   Chloride Latest Ref Range: 95 - 110 mmol/L 104   CO2 Latest Ref Range: 23 - 29 mmol/L 26   Anion Gap Latest Ref Range: 8 - 16 mmol/L 7 (L)   BUN, Bld Latest Ref Range: 8 - 23 mg/dL 15   Creatinine Latest Ref Range: 0.5 - 1.4 mg/dL 0.8   eGFR if non African American Latest Ref Range: >60 mL/min/1.73 m^2 >60.0   eGFR if African American Latest Ref Range: >60 mL/min/1.73 m^2 >60.0   Glucose Latest Ref Range: 70 - 110 mg/dL 223 (H)   Calcium Latest Ref Range: 8.7 - 10.5 mg/dL 8.1 (L)   Phosphorus Latest Ref Range: 2.7 - 4.5 mg/dL 3.3   Magnesium Latest Ref Range: 1.6 - 2.6 mg/dL 2.1   Results for CASSY VALENTE (MRN [id number]) as of 11/29/2018 09:09   Ref. Range 9/20/2018 10:12   CCP Antibodies Latest Ref Range: <5.0 U/mL <0.5   Rheumatoid Factor Latest Ref Range: 0.0 - 15.0 IU/mL <10.0   Results for CASSY VALENTE (MRN [id number]) as of 11/29/2018 09:09   Ref. Range 9/20/2018 10:12   Sed Rate Latest Ref Range: 0 - 23 mm/Hr 6    Results for ROSENDO VALENTE (MRN [id number]) as of 11/29/2018 09:09   Ref. Range 9/20/2018 10:12   CRP Latest Ref Range: 0.0 - 8.2 mg/L 11.8 (H)                                                                                     Xray hand 09/2018  FINDINGS:  Phalanges metacarpals and carpal bones are intact.  Minimal degenerative changes seen.  No significant erosion or destruction is noted.    Xray foot 09/2018  FINDINGS:  Bony structures of both feet are intact.  Degenerative changes seen at the interphalangeal joint of the right 1st toe.  Phalanges, metatarsals and tarsal bones are intact.  Calcaneal spurs are present bilaterally.      Xray foot 05/2013  FINDINGS: Interval placement of casting material which may obscure fine bony detail.  The fracture at the base of the medial malleolus is not well visualized.  No additional displaced fractures or dislocation identified.  Mild DJD at the metatarsals and first MTP and IP joints.   Enthesophyte at the Achilles insertion site and prominent plantar calcaneal spur.  No subcutaneous emphysema or radiodense foreign body.    Xray hands 10/2013  Fingers appear swollen.  No fracture or dislocation is seen.  Minimal degenerative change seen.            Assessment:       1. Psoriatic arthritis    2. Psoriasis    3. History of colon cancer            Plan:             Rosendo was seen today for disease management.    Diagnoses and all orders for this visit:    Psoriatic arthritis  Pt meets SCOTT criteria for Psoriatic arthritis ( evidence of current psoriasis, psoriatic nail dystrophy). Physical exam with active psoriatic lesions and tenderness to DIP (TJC 2). DAPSA 3.18 c/w remission. Finger appear dactylitic with nail changes 2/2 psoriasis. Xray hands with joint space narrowing, osteolysis and changes c/w psoriatic arthritis. Xray shows enthesophyte at the Achilles insertion site and prominent plantar calcaneal spur.   Continue Apremilast 30mg BID as prescribed by  Dermatology. Apremilast has improved the skin significantly compared to prior and also seems to be helping with the arthritis.  Pt with history of malignancy limits additional treatment options such as TNFs for now and Cosentyx (has no data in patients with malignancy), may consider MTX, Sulfasalazine, IL17i, Xeljanz for arthritis as an alternative option if needed.   Labs prior to next clinic visit  F/u Derm     Psoriasis  Continue to f/u Dermatology  Continue topicals      Malignant neoplasm of sigmoid colon  S/p L colectomy 09/25/18 with no residual disease  Plans for repeat C-scope

## 2019-06-27 NOTE — PROGRESS NOTES
RHEUMATOLOGY ATTENDING:  Patient presented, personally interviewed and examined, medical records reviewed.  73 yr old man with multiple morbidities (including DM II) initially send by his PCP for psoriatic arthritis.  He has had psoriasis x >10 yrs with more recent bilateral hand pain w swelling of DIPs & entire digits. No LBP or enthesitis. No FHx of SpA, IBD, uveitis.   He had been on Enbrel x 1 yr in the past for psoriasis but it was ineffective and he developed infections.  He has used a number of topicals but they were not effective either. Bee soap however helps his psoriasis.  He underwent surgery for sigmoid cancer & was seen by Derm who agreed with us with the use of apremilast & he was started on it around May and has had a dramatic improvement in his skin lesions (see Dr. Hirsch's photos).  He denies significant depression, even though he recently lost his wife.   Exam is remarkable for marked improvement in his psoriatic skin lesions and mild dactylitis seems improved as well.    Has minimal PIP tenderness; no MCP tenderness.   Imaging previously reviewed read as OA of hands but very c/w PsA.  Previous labs with anemia, mild thromobocytopenia & elevated CRP  Rx: Psoriatic arthritis--improvement on apremilast.   Plan: Check labs and continue apremilast.  If needed in future for PsA options include tofa &, 1L7a inhibitors.  Findings discussed with patient and Dr. Hirsch whose note and assessment I agree with.

## 2019-07-11 NOTE — LETTER
July 12, 2019    Rosendo Thomas  [address] [place] [address]             [facility]  [address]   [place] [address]  Phone: [phone]  Fax: [phone] Dear Marlo Thomas:    This letter is a reminder that your Cholesterol and Hgb A1C tests are due. Please call our office to schedule an appointment.    If you've already underwent or scheduled these procedures, please disregard this notice.    Sincerely,        [facility]      normal...

## 2019-07-12 RX ORDER — METFORMIN HYDROCHLORIDE 500 MG/1
1000 TABLET, EXTENDED RELEASE ORAL 2 TIMES DAILY WITH MEALS
Qty: 120 TABLET | Refills: 0 | Status: SHIPPED | OUTPATIENT
Start: 2019-07-12 | End: 2019-09-06 | Stop reason: SDUPTHER

## 2019-07-23 ENCOUNTER — LAB VISIT (OUTPATIENT)
Dept: LAB | Facility: HOSPITAL | Age: 74
End: 2019-07-23
Attending: PHYSICIAN ASSISTANT
Payer: MEDICARE

## 2019-07-23 ENCOUNTER — OFFICE VISIT (OUTPATIENT)
Dept: FAMILY MEDICINE | Facility: CLINIC | Age: 74
End: 2019-07-23
Payer: MEDICARE

## 2019-07-23 VITALS
DIASTOLIC BLOOD PRESSURE: 80 MMHG | TEMPERATURE: 98 F | WEIGHT: 259.94 LBS | SYSTOLIC BLOOD PRESSURE: 136 MMHG | BODY MASS INDEX: 32.32 KG/M2 | HEART RATE: 61 BPM | OXYGEN SATURATION: 95 % | HEIGHT: 75 IN | RESPIRATION RATE: 20 BRPM

## 2019-07-23 DIAGNOSIS — J01.90 ACUTE BACTERIAL SINUSITIS: Primary | ICD-10-CM

## 2019-07-23 DIAGNOSIS — I10 ESSENTIAL HYPERTENSION, BENIGN: ICD-10-CM

## 2019-07-23 DIAGNOSIS — B96.89 ACUTE BACTERIAL SINUSITIS: Primary | ICD-10-CM

## 2019-07-23 DIAGNOSIS — Z23 NEED FOR PNEUMOCOCCAL VACCINATION: ICD-10-CM

## 2019-07-23 LAB
ALBUMIN SERPL BCP-MCNC: 3.7 G/DL (ref 3.5–5.2)
ALP SERPL-CCNC: 76 U/L (ref 55–135)
ALT SERPL W/O P-5'-P-CCNC: 24 U/L (ref 10–44)
ANION GAP SERPL CALC-SCNC: 8 MMOL/L (ref 8–16)
AST SERPL-CCNC: 20 U/L (ref 10–40)
BILIRUB SERPL-MCNC: 0.4 MG/DL (ref 0.1–1)
BUN SERPL-MCNC: 15 MG/DL (ref 8–23)
CALCIUM SERPL-MCNC: 10.1 MG/DL (ref 8.7–10.5)
CHLORIDE SERPL-SCNC: 100 MMOL/L (ref 95–110)
CHOLEST SERPL-MCNC: 97 MG/DL (ref 120–199)
CHOLEST/HDLC SERPL: 2.9 {RATIO} (ref 2–5)
CO2 SERPL-SCNC: 32 MMOL/L (ref 23–29)
CREAT SERPL-MCNC: 0.9 MG/DL (ref 0.5–1.4)
EST. GFR  (AFRICAN AMERICAN): >60 ML/MIN/1.73 M^2
EST. GFR  (NON AFRICAN AMERICAN): >60 ML/MIN/1.73 M^2
ESTIMATED AVG GLUCOSE: 143 MG/DL (ref 68–131)
GLUCOSE SERPL-MCNC: 140 MG/DL (ref 70–110)
HBA1C MFR BLD HPLC: 6.6 % (ref 4–5.6)
HDLC SERPL-MCNC: 34 MG/DL (ref 40–75)
HDLC SERPL: 35.1 % (ref 20–50)
LDLC SERPL CALC-MCNC: 43.4 MG/DL (ref 63–159)
NONHDLC SERPL-MCNC: 63 MG/DL
POTASSIUM SERPL-SCNC: 4.2 MMOL/L (ref 3.5–5.1)
PROT SERPL-MCNC: 7 G/DL (ref 6–8.4)
SODIUM SERPL-SCNC: 140 MMOL/L (ref 136–145)
TRIGL SERPL-MCNC: 98 MG/DL (ref 30–150)

## 2019-07-23 PROCEDURE — 99214 OFFICE O/P EST MOD 30 MIN: CPT | Mod: S$PBB,,, | Performed by: PHYSICIAN ASSISTANT

## 2019-07-23 PROCEDURE — 99999 PR PBB SHADOW E&M-EST. PATIENT-LVL IV: CPT | Mod: PBBFAC,,, | Performed by: PHYSICIAN ASSISTANT

## 2019-07-23 PROCEDURE — 80061 LIPID PANEL: CPT

## 2019-07-23 PROCEDURE — 99999 PR PBB SHADOW E&M-EST. PATIENT-LVL IV: ICD-10-PCS | Mod: PBBFAC,,, | Performed by: PHYSICIAN ASSISTANT

## 2019-07-23 PROCEDURE — 36415 COLL VENOUS BLD VENIPUNCTURE: CPT | Mod: PO

## 2019-07-23 PROCEDURE — 99214 PR OFFICE/OUTPT VISIT, EST, LEVL IV, 30-39 MIN: ICD-10-PCS | Mod: S$PBB,,, | Performed by: PHYSICIAN ASSISTANT

## 2019-07-23 PROCEDURE — 80053 COMPREHEN METABOLIC PANEL: CPT

## 2019-07-23 PROCEDURE — 83036 HEMOGLOBIN GLYCOSYLATED A1C: CPT

## 2019-07-23 PROCEDURE — G0009 ADMIN PNEUMOCOCCAL VACCINE: HCPCS | Mod: PBBFAC,PO

## 2019-07-23 PROCEDURE — 99214 OFFICE O/P EST MOD 30 MIN: CPT | Mod: PBBFAC,PO,25 | Performed by: PHYSICIAN ASSISTANT

## 2019-07-23 RX ORDER — AMOXICILLIN AND CLAVULANATE POTASSIUM 875; 125 MG/1; MG/1
1 TABLET, FILM COATED ORAL 2 TIMES DAILY
Qty: 14 TABLET | Refills: 0 | Status: SHIPPED | OUTPATIENT
Start: 2019-07-23 | End: 2019-07-30

## 2019-07-23 RX ORDER — TELMISARTAN AND HYDROCHLORTHIAZIDE 80; 25 MG/1; MG/1
1 TABLET ORAL DAILY
Qty: 90 TABLET | Refills: 1 | Status: SHIPPED | OUTPATIENT
Start: 2019-07-23 | End: 2019-09-06 | Stop reason: SDUPTHER

## 2019-07-23 RX ORDER — TELMISARTAN AND HYDROCHLORTHIAZIDE 80; 25 MG/1; MG/1
1 TABLET ORAL DAILY
Refills: 0 | COMMUNITY
Start: 2019-05-15 | End: 2019-07-23 | Stop reason: SDUPTHER

## 2019-07-23 RX ORDER — AZELASTINE 1 MG/ML
SPRAY, METERED NASAL
Refills: 0 | COMMUNITY
Start: 2019-06-21 | End: 2022-08-09

## 2019-07-23 NOTE — PROGRESS NOTES
Health Maintenance Due   Topic     Pneumococcal Vaccine (65+ High/Highest Risk) (2 of 2 - PCV13) Pt decline    Hemoglobin A1c  Scheduled today

## 2019-07-23 NOTE — PROGRESS NOTES
Pt tolerated pneumonia vaccine to left deltoid without difficulty; no adverse reaction noted; VIS given

## 2019-07-23 NOTE — PROGRESS NOTES
Subjective:       Patient ID: Rosendo Thomas is a 73 y.o. male.    Chief Complaint: Sinus Problem (past month with cough, headache, and congestion )    Sinus Problem   This is a new problem. The current episode started more than 1 month ago. The problem has been gradually worsening since onset. There has been no fever. Associated symptoms include congestion, coughing (mucous), headaches and sinus pressure. Pertinent negatives include no ear pain or sore throat. Treatments tried: claritin, steroid injection, flonase.     Social History     Socioeconomic History    Marital status:      Spouse name: Not on file    Number of children: Not on file    Years of education: Not on file    Highest education level: Not on file   Occupational History     Employer: [employer]   Social Needs    Financial resource strain: Not on file    Food insecurity:     Worry: Not on file     Inability: Not on file    Transportation needs:     Medical: Not on file     Non-medical: Not on file   Tobacco Use    Smoking status: Former Smoker     Years: 15.00     Types: Cigars    Smokeless tobacco: Never Used    Tobacco comment: cigars-x1 yr.   Substance and Sexual Activity    Alcohol use: Yes     Comment: occassional    Drug use: No    Sexual activity: Yes     Partners: Female   Lifestyle    Physical activity:     Days per week: Not on file     Minutes per session: Not on file    Stress: Not at all   Relationships    Social connections:     Talks on phone: Not on file     Gets together: Not on file     Attends Tenriism service: Not on file     Active member of club or organization: Not on file     Attends meetings of clubs or organizations: Not on file     Relationship status: Not on file   Other Topics Concern    Not on file   Social History Narrative    Not on file       Review of Systems   Constitutional: Negative for fever.   HENT: Positive for congestion, rhinorrhea (green) and sinus pressure.  Negative for ear pain and sore throat.    Respiratory: Positive for cough (mucous).    Neurological: Positive for headaches.       Objective:      Physical Exam   Constitutional: He appears well-developed and well-nourished.   HENT:   Head: Normocephalic and atraumatic.   Right Ear: Tympanic membrane normal.   Left Ear: Tympanic membrane normal.   Nose: Right sinus exhibits maxillary sinus tenderness and frontal sinus tenderness. Left sinus exhibits maxillary sinus tenderness and frontal sinus tenderness.   Cardiovascular: Normal rate and regular rhythm.   Pulmonary/Chest: Effort normal and breath sounds normal.   Vitals reviewed.      Assessment:       1. Acute bacterial sinusitis    2. Essential hypertension, benign    3. Need for pneumococcal vaccination        Plan:         Rosendo was seen today for sinus problem.    Diagnoses and all orders for this visit:    Acute bacterial sinusitis  -     amoxicillin-clavulanate 875-125mg (AUGMENTIN) 875-125 mg per tablet; Take 1 tablet by mouth 2 (two) times daily. for 7 days    Essential hypertension, benign  -     telmisartan-hydrochlorothiazide (MICARDIS HCT) 80-25 mg per tablet; Take 1 tablet by mouth once daily.  -     Comprehensive metabolic panel; Future    Need for pneumococcal vaccination  -     Pneumococcal Conjugate Vaccine (13 Valent) (IM)

## 2019-07-24 ENCOUNTER — TELEPHONE (OUTPATIENT)
Dept: FAMILY MEDICINE | Facility: CLINIC | Age: 74
End: 2019-07-24

## 2019-07-24 NOTE — TELEPHONE ENCOUNTER
----- Message from Bita Galvin PA-C sent at 7/24/2019  9:30 AM CDT -----  Please inform pt all labs doing great, cholesterol much improved. Diabetes much improved at 6.6. Repeat in 6 months

## 2019-07-24 NOTE — TELEPHONE ENCOUNTER
----- Message from Minnie Davis sent at 7/24/2019 10:24 AM CDT -----  Contact: Self: [phone]  Type: Patient Call Back    Who called:Self    What is the request in detail:Returning missed call to office    Can the clinic reply by [facility]? nO    Would the patient rather a call back or a response via [facility]? Callback    Best call back number:[phone]    Additional Information:N/A

## 2019-08-02 ENCOUNTER — OFFICE VISIT (OUTPATIENT)
Dept: DERMATOLOGY | Facility: CLINIC | Age: 74
End: 2019-08-02
Payer: MEDICARE

## 2019-08-02 ENCOUNTER — TELEPHONE (OUTPATIENT)
Dept: PHARMACY | Facility: CLINIC | Age: 74
End: 2019-08-02

## 2019-08-02 DIAGNOSIS — B07.9 VIRAL WARTS, UNSPECIFIED TYPE: Primary | ICD-10-CM

## 2019-08-02 DIAGNOSIS — L40.9 PSORIASIS: ICD-10-CM

## 2019-08-02 PROCEDURE — 17110 PR DESTRUCTION BENIGN LESIONS UP TO 14: ICD-10-PCS | Mod: S$PBB,,, | Performed by: DERMATOLOGY

## 2019-08-02 PROCEDURE — 99212 OFFICE O/P EST SF 10 MIN: CPT | Mod: PBBFAC | Performed by: DERMATOLOGY

## 2019-08-02 PROCEDURE — 99999 PR PBB SHADOW E&M-EST. PATIENT-LVL II: ICD-10-PCS | Mod: PBBFAC,,, | Performed by: DERMATOLOGY

## 2019-08-02 PROCEDURE — 99999 PR PBB SHADOW E&M-EST. PATIENT-LVL II: CPT | Mod: PBBFAC,,, | Performed by: DERMATOLOGY

## 2019-08-02 PROCEDURE — 17110 DESTRUCTION B9 LES UP TO 14: CPT | Mod: PBBFAC | Performed by: DERMATOLOGY

## 2019-08-02 PROCEDURE — 99213 PR OFFICE/OUTPT VISIT, EST, LEVL III, 20-29 MIN: ICD-10-PCS | Mod: 25,S$PBB,, | Performed by: DERMATOLOGY

## 2019-08-02 PROCEDURE — 99213 OFFICE O/P EST LOW 20 MIN: CPT | Mod: 25,S$PBB,, | Performed by: DERMATOLOGY

## 2019-08-02 PROCEDURE — 17110 DESTRUCTION B9 LES UP TO 14: CPT | Mod: S$PBB,,, | Performed by: DERMATOLOGY

## 2019-08-02 RX ORDER — CLOBETASOL PROPIONATE 0.5 MG/G
CREAM TOPICAL 2 TIMES DAILY
Qty: 60 G | Refills: 3 | Status: SHIPPED | OUTPATIENT
Start: 2019-08-02

## 2019-08-02 RX ORDER — APREMILAST 30 MG/1
TABLET, FILM COATED ORAL
Qty: 60 TABLET | Refills: 2 | Status: SHIPPED | OUTPATIENT
Start: 2019-08-02 | End: 2020-07-30 | Stop reason: HOSPADM

## 2019-08-02 NOTE — TELEPHONE ENCOUNTER
Informed Patient  that [facility] received prescription for Otezla and prior authorization is required.  [facility] will be back in touch once insurance determination is received.

## 2019-08-02 NOTE — PROGRESS NOTES
Subjective:       Patient ID:  Rosendo Thomas is a 74 y.o. male who presents for   Chief Complaint   Patient presents with    Psoriasis     Follow up    Warts     Follow up     Last seen in clinic 2 months ago for diffuse psoriasis and PsA, reports condition relatively stable in the interim. Currently on Otezla 30mg BID and TAC 0.1% cream. Persistent lesions involving ears, elbows, dorsal hands, gluteal cleft, trunk, and shins. Reports joint issues as stable; occasional stiffness and pain in index fingers that improves with activity.  Is following with rheumatology for psoriatic arthritis who advised continuation of Otelza with possibility of changing agents in the future if needed.    Patient also underwent LN2 for warts at last visit on right hand. Reports initial resolution with subsequent recurrence weeks later. Asymptomatic. No new lesions or areas of involvement.    Psoriasis  - Follow-up  Symptom course: improving  Affected locations: scalp, right elbow, left elbow, left knee, right knee, back, left buttock and right buttock  Signs / symptoms: itching and scaling  Severity: mild    Warts  - Follow-up  Symptom course: unchanged  Affected locations: right fingers  Signs / symptoms: asymptomatic  Severity: mild      Hx of colon cancer 2018.    Review of Systems   Musculoskeletal: Positive for joint swelling and arthralgias (resolved).   Skin: Positive for itching, rash and dry skin.   Hematologic/Lymphatic: Bruises/bleeds easily.        Objective:    Physical Exam   Constitutional: He appears well-developed and well-nourished. No distress.   Neurological: He is alert and oriented to person, place, and time. He is not disoriented.   Psychiatric: He has a normal mood and affect.   Skin:   Areas Examined (abnormalities noted in diagram):   Scalp / Hair Palpated and Inspected  Head / Face Inspection Performed  Neck Inspection Performed  Chest / Axilla Inspection Performed  Abdomen Inspection  Performed  Genitals / Buttocks / Groin Inspection Performed  Back Inspection Performed  RUE Inspected  LUE Inspection Performed  RLE Inspected  LLE Inspection Performed  Nails and Digits Inspection Performed                  Diagram Legend     Erythematous scaling macule/papule c/w actinic keratosis       Vascular papule c/w angioma      Pigmented verrucoid papule/plaque c/w seborrheic keratosis      Yellow umbilicated papule c/w sebaceous hyperplasia      Irregularly shaped tan macule c/w lentigo     1-2 mm smooth white papules consistent with Milia      Movable subcutaneous cyst with punctum c/w epidermal inclusion cyst      Subcutaneous movable cyst c/w pilar cyst      Firm pink to brown papule c/w dermatofibroma      Pedunculated fleshy papule(s) c/w skin tag(s)      Evenly pigmented macule c/w junctional nevus     Mildly variegated pigmented, slightly irregular-bordered macule c/w mildly atypical nevus      Flesh colored to evenly pigmented papule c/w intradermal nevus       Pink pearly papule/plaque c/w basal cell carcinoma      Erythematous hyperkeratotic cursted plaque c/w SCC      Surgical scar with no sign of skin cancer recurrence      Open and closed comedones      Inflammatory papules and pustules      Verrucoid papule consistent consistent with wart     Erythematous eczematous patches and plaques     Dystrophic onycholytic nail with subungual debris c/w onychomycosis     Umbilicated papule    Erythematous-base heme-crusted tan verrucoid plaque consistent with inflamed seborrheic keratosis     Erythematous Silvery Scaling Plaque c/w Psoriasis     See annotation      Assessment / Plan:        Viral warts, unspecified type  Cryosurgery procedure note:    Verbal consent from the patient is obtained including, but not limited to, risk of hypopigmentation/hyperpigmentation, scar, recurrence of lesion. Liquid nitrogen cryosurgery is applied to 4 lesions to produce a freeze injury. The patient is aware that  blisters may form and is instructed on wound care with gentle cleansing and use of vaseline ointment to keep moist until healed. The patient is supplied a handout on cryosurgery and is instructed to call if lesions do not completely resolve.    Psoriasis - skin responding well to Otelza although not entirely clear  Would advise another 3 mo and stronger topical cortisone to active areas (hands, elbows, few guttate papules on legs)  Scalp, genitals clear pt pleased  Joints appear to be stable as well  Given hx of recent malignancy will exercise caution with systemic agents    -     OTEZLA 30 mg Tab; Take 1 tablet by mouth twice daily  Dispense: 60 tablet; Refill: 2  -     clobetasol (TEMOVATE) 0.05 % cream; Apply topically 2 (two) times daily. To psoriasis PRN  Dispense: 60 g; Refill: 3             Follow up in about 3 months (around 11/2/2019).

## 2019-08-07 NOTE — TELEPHONE ENCOUNTER
DOCUMENTATION ONLY:     Prior Authorization for Otezla approved from 8/2/19 to 8/1/20.    Case ID# [id number]    Co-Pay: $77.41    Patient Assistance IS required.     Forward to patient assistance for review.  .    SOFIA

## 2019-09-06 DIAGNOSIS — I10 ESSENTIAL HYPERTENSION, BENIGN: ICD-10-CM

## 2019-09-06 RX ORDER — GLIMEPIRIDE 2 MG/1
2 TABLET ORAL
Qty: 90 TABLET | Refills: 0 | Status: SHIPPED | OUTPATIENT
Start: 2019-09-06 | End: 2020-01-02 | Stop reason: SDUPTHER

## 2019-09-06 RX ORDER — METFORMIN HYDROCHLORIDE 500 MG/1
1000 TABLET, EXTENDED RELEASE ORAL 2 TIMES DAILY WITH MEALS
Qty: 120 TABLET | Refills: 0 | Status: SHIPPED | OUTPATIENT
Start: 2019-09-06 | End: 2019-10-10 | Stop reason: SDUPTHER

## 2019-09-06 RX ORDER — ATORVASTATIN CALCIUM 20 MG/1
20 TABLET, FILM COATED ORAL DAILY
Qty: 90 TABLET | Refills: 0 | Status: SHIPPED | OUTPATIENT
Start: 2019-09-06 | End: 2020-01-02 | Stop reason: SDUPTHER

## 2019-09-06 RX ORDER — TELMISARTAN AND HYDROCHLORTHIAZIDE 80; 25 MG/1; MG/1
1 TABLET ORAL DAILY
Qty: 90 TABLET | Refills: 1 | Status: SHIPPED | OUTPATIENT
Start: 2019-09-06 | End: 2020-01-21 | Stop reason: SDUPTHER

## 2019-09-06 NOTE — TELEPHONE ENCOUNTER
----- Message from Emerita Finesse sent at 9/6/2019 10:37 AM CDT -----  Contact: self  [phone]  Type: RX Refill Request    Who Called:  Self     Refill or New Rx: refill    RX Name and Strength:metFORMIN (GLUCOPHAGE-XR) 500 MG 24 hr tablet, HCTZ 25 mgglimepiride (AMARYL) 2 MG tablet and atorvastatin (LIPITOR) 20 MG tablet      Is this a 30 day or 90 day RX: 90 day     Preferred Pharmacy with phone number: .  [facility] - [place], [place] - [address]  [address]  [facility] [place] [facility] [address]  Phone: [phone] Fax: [phone]    Local or Mail Order: local     Would the patient rather a call back or a response via [facility]?  Call back     Best Call Back Number: [phone]

## 2019-09-24 ENCOUNTER — LAB VISIT (OUTPATIENT)
Dept: LAB | Facility: HOSPITAL | Age: 74
End: 2019-09-24
Payer: MEDICARE

## 2019-09-24 DIAGNOSIS — L40.50 PSORIATIC ARTHRITIS: ICD-10-CM

## 2019-09-24 LAB
ALBUMIN SERPL BCP-MCNC: 4 G/DL (ref 3.5–5.2)
ALP SERPL-CCNC: 68 U/L (ref 55–135)
ALT SERPL W/O P-5'-P-CCNC: 22 U/L (ref 10–44)
ANION GAP SERPL CALC-SCNC: 8 MMOL/L (ref 8–16)
AST SERPL-CCNC: 16 U/L (ref 10–40)
BASOPHILS # BLD AUTO: 0.03 K/UL (ref 0–0.2)
BASOPHILS NFR BLD: 0.4 % (ref 0–1.9)
BILIRUB SERPL-MCNC: 0.6 MG/DL (ref 0.1–1)
BUN SERPL-MCNC: 17 MG/DL (ref 8–23)
CALCIUM SERPL-MCNC: 9.2 MG/DL (ref 8.7–10.5)
CHLORIDE SERPL-SCNC: 102 MMOL/L (ref 95–110)
CO2 SERPL-SCNC: 28 MMOL/L (ref 23–29)
CREAT SERPL-MCNC: 1 MG/DL (ref 0.5–1.4)
CRP SERPL-MCNC: 5.8 MG/L (ref 0–8.2)
DIFFERENTIAL METHOD: ABNORMAL
EOSINOPHIL # BLD AUTO: 0.1 K/UL (ref 0–0.5)
EOSINOPHIL NFR BLD: 2 % (ref 0–8)
ERYTHROCYTE [DISTWIDTH] IN BLOOD BY AUTOMATED COUNT: 13.7 % (ref 11.5–14.5)
ERYTHROCYTE [SEDIMENTATION RATE] IN BLOOD BY WESTERGREN METHOD: 5 MM/HR (ref 0–23)
EST. GFR  (AFRICAN AMERICAN): >60 ML/MIN/1.73 M^2
EST. GFR  (NON AFRICAN AMERICAN): >60 ML/MIN/1.73 M^2
GLUCOSE SERPL-MCNC: 185 MG/DL (ref 70–110)
HCT VFR BLD AUTO: 43.5 % (ref 40–54)
HGB BLD-MCNC: 13.5 G/DL (ref 14–18)
IMM GRANULOCYTES # BLD AUTO: 0.02 K/UL (ref 0–0.04)
IMM GRANULOCYTES NFR BLD AUTO: 0.3 % (ref 0–0.5)
LYMPHOCYTES # BLD AUTO: 1.1 K/UL (ref 1–4.8)
LYMPHOCYTES NFR BLD: 15.2 % (ref 18–48)
MCH RBC QN AUTO: 28.4 PG (ref 27–31)
MCHC RBC AUTO-ENTMCNC: 31 G/DL (ref 32–36)
MCV RBC AUTO: 92 FL (ref 82–98)
MONOCYTES # BLD AUTO: 0.6 K/UL (ref 0.3–1)
MONOCYTES NFR BLD: 9 % (ref 4–15)
NEUTROPHILS # BLD AUTO: 5 K/UL (ref 1.8–7.7)
NEUTROPHILS NFR BLD: 73.1 % (ref 38–73)
NRBC BLD-RTO: 0 /100 WBC
PLATELET # BLD AUTO: 132 K/UL (ref 150–350)
PMV BLD AUTO: 11.1 FL (ref 9.2–12.9)
POTASSIUM SERPL-SCNC: 4.3 MMOL/L (ref 3.5–5.1)
PROT SERPL-MCNC: 6.7 G/DL (ref 6–8.4)
RBC # BLD AUTO: 4.75 M/UL (ref 4.6–6.2)
SODIUM SERPL-SCNC: 138 MMOL/L (ref 136–145)
WBC # BLD AUTO: 6.89 K/UL (ref 3.9–12.7)

## 2019-09-24 PROCEDURE — 80053 COMPREHEN METABOLIC PANEL: CPT

## 2019-09-24 PROCEDURE — 85025 COMPLETE CBC W/AUTO DIFF WBC: CPT

## 2019-09-24 PROCEDURE — 36415 COLL VENOUS BLD VENIPUNCTURE: CPT

## 2019-09-24 PROCEDURE — 86140 C-REACTIVE PROTEIN: CPT

## 2019-09-24 PROCEDURE — 85652 RBC SED RATE AUTOMATED: CPT

## 2019-09-27 ENCOUNTER — PATIENT OUTREACH (OUTPATIENT)
Dept: ADMINISTRATIVE | Facility: OTHER | Age: 74
End: 2019-09-27

## 2019-09-30 NOTE — PROGRESS NOTES
"Subjective:       Patient ID: Rosendo Thomas is a 74 y.o. male.    Chief Complaint: Follow-up and Disease Management    HPI     The patient is a 74 year old male with past medical history of T2DM, osteoarthritis of the hands, colon cancer s/p left colectomy, HTN, and psoriatic arthritis who was last seen in 2019. He is on Otezla 30mg BID, bee soap, and clobetasol cream. Currently, he has occasional stiffness in his fingers and takes some time to loosen up. He denies joint pain and swelling, back pain, and red eyes/painful eyes. He states that his rashes have improved significantly since he started Otezla.    His wife  in May and he has felt sad about that, but otherwise denies symptoms of depression. He is aware that depression may be a side effect of Otezla.    Treatment History:   Enbrel: ineffective and caused infections     Review of Systems   Constitutional: Negative for activity change, appetite change, fatigue and fever.   HENT: Negative for mouth sores and nosebleeds.    Eyes: Negative for pain, redness and itching.   Respiratory: Negative for cough, chest tightness, shortness of breath and wheezing.    Cardiovascular: Negative for chest pain, palpitations and leg swelling.   Gastrointestinal: Negative for abdominal pain, blood in stool, constipation, diarrhea, nausea and vomiting.   Genitourinary: Negative for dysuria and hematuria.   Musculoskeletal: Negative for arthralgias, back pain and joint swelling.   Neurological: Negative for weakness and headaches.         Objective:   /68   Pulse 60   Ht 6' 3" (1.905 m)   Wt 125.5 kg (276 lb 10.8 oz)   BMI 34.58 kg/m²      Physical Exam   Constitutional: He is well-developed, well-nourished, and in no distress. No distress.   HENT:   Head: Normocephalic and atraumatic.   Mouth/Throat: No oropharyngeal exudate.   Eyes: Pupils are equal, round, and reactive to light. Right eye exhibits no discharge. Left eye exhibits no discharge. No scleral " icterus.   Neck: Normal range of motion. No thyromegaly present.   Cardiovascular: Normal rate, regular rhythm and normal heart sounds.  Exam reveals no gallop and no friction rub.    No murmur heard.  Abdominal: Soft. Bowel sounds are normal. He exhibits no distension and no mass. There is no tenderness. There is no rebound and no guarding.   Lymphadenopathy:     He has no cervical adenopathy.   Skin: Skin is warm and dry. Rash (Psoriatic rashes over both hands, elbows, and knees (pictures in Media)) noted. He is not diaphoretic.     Musculoskeletal:   No small joint synovitis or large joint effusions  Non-painful dactylitis in 3rd digits, bilateral hands  No enthesitis       CBC: WBC 6.89, Hgb 13.5, Plt 132  ESR: 5, CRP 5.8  CMP: GFR >60, Cr 1, LFTs WNL  CCP and RF negative  PreDMARDs done September 2018    Assessment:       1. Psoriatic arthritis        The patient is a 74 year old male with past medical history of T2DM, osteoarthritis of the hands, colon cancer s/p left colectomy, HTN, and psoriatic arthritis on Otezla 30mg BID, bee soap, and clobetasol cream. He still has rashes over his hands, elbows, and knees, but the rashes have improved and the ones over his gluteal cleft and lower abdomen have cleared completely. He does have bilateral 3rd digit dactylitis but is not bothered by it and does not have any signs of synovitis or enthesitis. He has no destructive changes on Xrays. He is sad because is wife passed away in May but does not think he is depressed. Will continue Otezla for now.      Plan:       Problem List Items Addressed This Visit        Orthopedic    Psoriatic arthritis - Primary    Relevant Orders    CBC W/ AUTO DIFFERENTIAL    Comprehensive metabolic panel    Sedimentation rate    C-REACTIVE PROTEIN        Psoriatic arthritis  - Continue Otezla 30mg BID  - Patient with history of malignancy limits additional treatment options such as TNFs for now and Cosentyx (has no data in patients with  malignancy), may consider MTX, Sulfasalazine, IL17i, Xeljanz for arthritis as an alternative option if needed.   - ESR, CRP, CBC, and CMP before follow up     Psoriasis  - Continue to f/u Dermatology  - Continue topicals      Patient seen and discussed with Dr. Sargent    RTC in 9 months    Holley Slaughter M.D.  Rheumatology, PGY 4

## 2019-10-01 ENCOUNTER — OFFICE VISIT (OUTPATIENT)
Dept: RHEUMATOLOGY | Facility: CLINIC | Age: 74
End: 2019-10-01
Payer: MEDICARE

## 2019-10-01 VITALS
HEIGHT: 75 IN | DIASTOLIC BLOOD PRESSURE: 68 MMHG | SYSTOLIC BLOOD PRESSURE: 131 MMHG | BODY MASS INDEX: 34.4 KG/M2 | WEIGHT: 276.69 LBS | HEART RATE: 60 BPM

## 2019-10-01 DIAGNOSIS — L40.50 PSORIATIC ARTHRITIS: Primary | ICD-10-CM

## 2019-10-01 PROCEDURE — 99214 OFFICE O/P EST MOD 30 MIN: CPT | Mod: S$PBB,GC,, | Performed by: STUDENT IN AN ORGANIZED HEALTH CARE EDUCATION/TRAINING PROGRAM

## 2019-10-01 PROCEDURE — 99214 OFFICE O/P EST MOD 30 MIN: CPT | Mod: PBBFAC | Performed by: STUDENT IN AN ORGANIZED HEALTH CARE EDUCATION/TRAINING PROGRAM

## 2019-10-01 PROCEDURE — 99214 PR OFFICE/OUTPT VISIT, EST, LEVL IV, 30-39 MIN: ICD-10-PCS | Mod: S$PBB,GC,, | Performed by: STUDENT IN AN ORGANIZED HEALTH CARE EDUCATION/TRAINING PROGRAM

## 2019-10-01 PROCEDURE — 99999 PR PBB SHADOW E&M-EST. PATIENT-LVL IV: ICD-10-PCS | Mod: PBBFAC,GC,, | Performed by: STUDENT IN AN ORGANIZED HEALTH CARE EDUCATION/TRAINING PROGRAM

## 2019-10-01 PROCEDURE — 99999 PR PBB SHADOW E&M-EST. PATIENT-LVL IV: CPT | Mod: PBBFAC,GC,, | Performed by: STUDENT IN AN ORGANIZED HEALTH CARE EDUCATION/TRAINING PROGRAM

## 2019-10-01 ASSESSMENT — ROUTINE ASSESSMENT OF PATIENT INDEX DATA (RAPID3)
MDHAQ FUNCTION SCORE: 0
PSYCHOLOGICAL DISTRESS SCORE: 0
AM STIFFNESS SCORE: 0, NO
PAIN SCORE: 0
TOTAL RAPID3 SCORE: 0
FATIGUE SCORE: 0
PATIENT GLOBAL ASSESSMENT SCORE: 0

## 2019-10-01 NOTE — PROGRESS NOTES
RHEUMATOLOGY ATTENDING:  Patient presented, personally interviewed and examined, medical records reviewed.  74 yr old man with multiple morbidities (including DM II) initially send by his PCP for psoriatic arthritis.  He has had psoriasis x >10 yrs with more recent bilateral hand pain w swelling of DIPs & entire digits. No LBP or enthesitis. No FHx of SpA, IBD, uveitis.   He had been on Enbrel x 1 yr in the past for psoriasis but it was ineffective and he developed infections.  He has used a number of topicals but they were not effective either. Bee soap however helps his psoriasis.  He underwent surgery for sigmoid cancer & was seen by Derm who agreed with us with the use of apremilast & he was started on it around May, 2019 and has had an improvement in his skin lesions (see Dr. Hirsch's previous photos & current photos by Dr. Slaughter). He is using a topical prescribed by his dermatologist that is helping him. An initial topical did not.  He denies depression, even though he recently lost his wife in May.   Exam is remarkable for psoriatic skin lesions.  No synovitis; No Joint tenderness.  Imaging previously reviewed read as OA of hands but very c/w PsA. Feet with OA bilateral calcaneal spurs inferiorly & superiorly.  Previous labs with anemia, mild thromobocytopenia & elevated CRP  Rx: Psoriatic arthritis   Arthritis under good control on apremilast prescribed by Dermatologist.    Skin still with some activity  OA of hands & feet  Obesity     Plan: RTC 9 months or prn..  If needed in future for PsA options include tofa &, 1L7a inhibitors.  Findings discussed with patient and  whose note and assessment I agree with.

## 2019-10-08 ENCOUNTER — TELEPHONE (OUTPATIENT)
Dept: ENDOSCOPY | Facility: HOSPITAL | Age: 74
End: 2019-10-08

## 2019-10-08 ENCOUNTER — TELEPHONE (OUTPATIENT)
Dept: SURGERY | Facility: CLINIC | Age: 74
End: 2019-10-08

## 2019-10-08 NOTE — TELEPHONE ENCOUNTER
----- Message from Nanci Rausch sent at 10/8/2019 11:17 AM CDT -----  Contact: Lucian WylieMarlo Thomas would like to know if he can schedule a colonoscopy on 10/17. She can be reached at [phone]

## 2019-10-10 RX ORDER — METFORMIN HYDROCHLORIDE 500 MG/1
TABLET, EXTENDED RELEASE ORAL
Qty: 120 TABLET | Refills: 0 | Status: SHIPPED | OUTPATIENT
Start: 2019-10-10 | End: 2020-01-21 | Stop reason: SDUPTHER

## 2019-10-15 DIAGNOSIS — Z12.11 SPECIAL SCREENING FOR MALIGNANT NEOPLASMS, COLON: Primary | ICD-10-CM

## 2019-10-15 RX ORDER — SODIUM, POTASSIUM,MAG SULFATES 17.5-3.13G
SOLUTION, RECONSTITUTED, ORAL ORAL
Qty: 1 BOTTLE | Refills: 0 | Status: SHIPPED | OUTPATIENT
Start: 2019-10-15 | End: 2021-05-12

## 2019-11-02 ENCOUNTER — PATIENT OUTREACH (OUTPATIENT)
Dept: ADMINISTRATIVE | Facility: OTHER | Age: 74
End: 2019-11-02

## 2019-11-04 ENCOUNTER — OFFICE VISIT (OUTPATIENT)
Dept: DERMATOLOGY | Facility: CLINIC | Age: 74
End: 2019-11-04
Payer: MEDICARE

## 2019-11-04 DIAGNOSIS — L40.50 PSORIASIS WITH ARTHROPATHY: ICD-10-CM

## 2019-11-04 DIAGNOSIS — B07.9 VIRAL WARTS, UNSPECIFIED TYPE: ICD-10-CM

## 2019-11-04 DIAGNOSIS — Z12.83 SCREENING EXAM FOR SKIN CANCER: Primary | ICD-10-CM

## 2019-11-04 PROCEDURE — 99214 OFFICE O/P EST MOD 30 MIN: CPT | Mod: S$PBB,,, | Performed by: DERMATOLOGY

## 2019-11-04 PROCEDURE — 99212 OFFICE O/P EST SF 10 MIN: CPT | Mod: PBBFAC | Performed by: DERMATOLOGY

## 2019-11-04 PROCEDURE — 99999 PR PBB SHADOW E&M-EST. PATIENT-LVL II: CPT | Mod: PBBFAC,,, | Performed by: DERMATOLOGY

## 2019-11-04 PROCEDURE — 99214 PR OFFICE/OUTPT VISIT, EST, LEVL IV, 30-39 MIN: ICD-10-PCS | Mod: S$PBB,,, | Performed by: DERMATOLOGY

## 2019-11-04 PROCEDURE — 99999 PR PBB SHADOW E&M-EST. PATIENT-LVL II: ICD-10-PCS | Mod: PBBFAC,,, | Performed by: DERMATOLOGY

## 2019-11-04 RX ORDER — CLOBETASOL PROPIONATE 0.5 MG/G
OINTMENT TOPICAL 2 TIMES DAILY
Qty: 60 G | Refills: 2 | Status: SHIPPED | OUTPATIENT
Start: 2019-11-04 | End: 2022-01-28

## 2019-11-04 NOTE — PROGRESS NOTES
"  Subjective:       Patient ID:  Rosendo Thomas is a 74 y.o. male who presents for   Chief Complaint   Patient presents with    Skin Check     tbse     Patient is a 74M here for follow-up, last seen 8/2/19 when he was continued on otezla and triamcinolone ointment for psoraisis + psoriatic arthritis. Today he says that he is doing "the same". Skin is relatively clear. He uses triamcinolone ointment BID to active lesions, mostly over arms, buttocks and legs. He has stiffness in MCPs throughout the day. No joint swelling or tenderness. Tolerating medication well. Follows w rheumatology who agrees with his treatment course.    He has also been treated for warts on his fingers w LN2. Verrucae are recurrent. He has recently tried a course of apple cider vinegar and thinks that it is killing the warts.    Also would like TBSE today. The patient denies any moles or growths of the skin that are rapidly growing, hurting, itching, bleeding, or changing colors.    Review of Systems   Constitutional: Negative for fever, chills, weight loss, weight gain, fatigue, night sweats and malaise.   HENT: Negative for mouth sores.    Respiratory: Negative for cough and shortness of breath.    Gastrointestinal: Negative for nausea, vomiting, diarrhea and constipation.   Skin: Positive for itching, rash, dry skin and wears hat. Negative for daily sunscreen use, activity-related sunscreen use and recent sunburn.   Hematologic/Lymphatic: Bruises/bleeds easily.        Objective:    Physical Exam   Constitutional: He appears well-developed and well-nourished. No distress.   Neurological: He is alert and oriented to person, place, and time. He is not disoriented.   Psychiatric: He has a normal mood and affect.   Skin:   Areas Examined (abnormalities noted in diagram):   Scalp / Hair Palpated and Inspected  Head / Face Inspection Performed  Neck Inspection Performed  Chest / Axilla Inspection Performed  Abdomen Inspection Performed  Genitals " / Buttocks / Groin Inspection Performed  Back Inspection Performed  RUE Inspected  LUE Inspection Performed  RLE Inspected  LLE Inspection Performed  Nails and Digits Inspection Performed                  Diagram Legend     Erythematous scaling macule/papule c/w actinic keratosis       Vascular papule c/w angioma      Pigmented verrucoid papule/plaque c/w seborrheic keratosis      Yellow umbilicated papule c/w sebaceous hyperplasia      Irregularly shaped tan macule c/w lentigo     1-2 mm smooth white papules consistent with Milia      Movable subcutaneous cyst with punctum c/w epidermal inclusion cyst      Subcutaneous movable cyst c/w pilar cyst      Firm pink to brown papule c/w dermatofibroma      Pedunculated fleshy papule(s) c/w skin tag(s)      Evenly pigmented macule c/w junctional nevus     Mildly variegated pigmented, slightly irregular-bordered macule c/w mildly atypical nevus      Flesh colored to evenly pigmented papule c/w intradermal nevus       Pink pearly papule/plaque c/w basal cell carcinoma      Erythematous hyperkeratotic cursted plaque c/w SCC      Surgical scar with no sign of skin cancer recurrence      Open and closed comedones      Inflammatory papules and pustules      Verrucoid papule consistent consistent with wart     Erythematous eczematous patches and plaques     Dystrophic onycholytic nail with subungual debris c/w onychomycosis     Umbilicated papule    Erythematous-base heme-crusted tan verrucoid plaque consistent with inflamed seborrheic keratosis     Erythematous Silvery Scaling Plaque c/w Psoriasis     See annotation      Assessment / Plan:        Screening exam for skin cancer  Total body skin examination performed today including at least 12 points as noted in physical examination. No lesions suspicious for malignancy noted.    Psoriasis with arthropathy -   Continue Otelza 30 mg po BID - pt tolerating well -   Most active area continues to be hands and forearms. Will change  to class I ointment to use.  -     clobetasol 0.05% (TEMOVATE) 0.05 % Oint; Apply topically 2 (two) times daily. To rash on  Hands under gloves  Dispense: 60 g; Refill: 2    Viral warts, unspecified type  Pt using vinegar and warts appear to be responding.           Follow up in about 6 months (around 5/4/2020).

## 2019-11-08 RX ORDER — SODIUM CHLORIDE 0.9 % (FLUSH) 0.9 %
10 SYRINGE (ML) INJECTION
Status: CANCELLED | OUTPATIENT
Start: 2019-11-08

## 2019-11-08 RX ORDER — SODIUM CHLORIDE 9 MG/ML
INJECTION, SOLUTION INTRAVENOUS CONTINUOUS
Status: CANCELLED | OUTPATIENT
Start: 2019-11-08

## 2019-11-11 ENCOUNTER — ANESTHESIA (OUTPATIENT)
Dept: ENDOSCOPY | Facility: HOSPITAL | Age: 74
End: 2019-11-11
Payer: MEDICARE

## 2019-11-11 ENCOUNTER — ANESTHESIA EVENT (OUTPATIENT)
Dept: ENDOSCOPY | Facility: HOSPITAL | Age: 74
End: 2019-11-11
Payer: MEDICARE

## 2019-11-11 ENCOUNTER — HOSPITAL ENCOUNTER (OUTPATIENT)
Facility: HOSPITAL | Age: 74
Discharge: HOME | End: 2019-11-11
Attending: INTERNAL MEDICINE | Admitting: INTERNAL MEDICINE
Payer: MEDICARE

## 2019-11-11 VITALS
WEIGHT: 265 LBS | BODY MASS INDEX: 32.95 KG/M2 | DIASTOLIC BLOOD PRESSURE: 79 MMHG | OXYGEN SATURATION: 95 % | TEMPERATURE: 97 F | HEART RATE: 73 BPM | RESPIRATION RATE: 14 BRPM | HEIGHT: 75 IN | SYSTOLIC BLOOD PRESSURE: 158 MMHG

## 2019-11-11 DIAGNOSIS — Z85.038 HISTORY OF COLON CANCER: ICD-10-CM

## 2019-11-11 PROCEDURE — 63600175 PHARM REV CODE 636 W HCPCS: Performed by: INTERNAL MEDICINE

## 2019-11-11 PROCEDURE — 45381 PR COLONOSCPY,FLEX,W/DIR SUBMUC INJECT: ICD-10-PCS | Mod: 51,,, | Performed by: INTERNAL MEDICINE

## 2019-11-11 PROCEDURE — 63600175 PHARM REV CODE 636 W HCPCS: Performed by: NURSE ANESTHETIST, CERTIFIED REGISTERED

## 2019-11-11 PROCEDURE — 45388: ICD-10-PCS | Mod: 51,,, | Performed by: INTERNAL MEDICINE

## 2019-11-11 PROCEDURE — 45381 COLONOSCOPY SUBMUCOUS NJX: CPT | Performed by: INTERNAL MEDICINE

## 2019-11-11 PROCEDURE — 27201089 HC SNARE, DISP (ANY): Performed by: INTERNAL MEDICINE

## 2019-11-11 PROCEDURE — 88305 TISSUE EXAM BY PATHOLOGIST: CPT | Performed by: PATHOLOGY

## 2019-11-11 PROCEDURE — 88305 TISSUE EXAM BY PATHOLOGIST: ICD-10-PCS | Mod: 26,,, | Performed by: PATHOLOGY

## 2019-11-11 PROCEDURE — 37000008 HC ANESTHESIA 1ST 15 MINUTES: Performed by: INTERNAL MEDICINE

## 2019-11-11 PROCEDURE — 45380 COLONOSCOPY AND BIOPSY: CPT | Mod: 59,,, | Performed by: INTERNAL MEDICINE

## 2019-11-11 PROCEDURE — 45388 COLONOSCOPY W/ABLATION: CPT | Mod: 51,,, | Performed by: INTERNAL MEDICINE

## 2019-11-11 PROCEDURE — E9220 PRA ENDO ANESTHESIA: ICD-10-PCS | Mod: ,,, | Performed by: NURSE ANESTHETIST, CERTIFIED REGISTERED

## 2019-11-11 PROCEDURE — E9220 PRA ENDO ANESTHESIA: HCPCS | Mod: ,,, | Performed by: NURSE ANESTHETIST, CERTIFIED REGISTERED

## 2019-11-11 PROCEDURE — 45385 COLONOSCOPY W/LESION REMOVAL: CPT | Performed by: INTERNAL MEDICINE

## 2019-11-11 PROCEDURE — 45385 COLONOSCOPY W/LESION REMOVAL: CPT | Mod: 59,,, | Performed by: INTERNAL MEDICINE

## 2019-11-11 PROCEDURE — 27201012 HC FORCEPS, HOT/COLD, DISP: Performed by: INTERNAL MEDICINE

## 2019-11-11 PROCEDURE — 45381 COLONOSCOPY SUBMUCOUS NJX: CPT | Mod: 51,,, | Performed by: INTERNAL MEDICINE

## 2019-11-11 PROCEDURE — 27201028 HC NEEDLE, SCLERO: Performed by: INTERNAL MEDICINE

## 2019-11-11 PROCEDURE — 45388 COLONOSCOPY W/ABLATION: CPT | Performed by: INTERNAL MEDICINE

## 2019-11-11 PROCEDURE — 45385 PR COLONOSCOPY,REMV LESN,SNARE: ICD-10-PCS | Mod: 59,,, | Performed by: INTERNAL MEDICINE

## 2019-11-11 PROCEDURE — 37000009 HC ANESTHESIA EA ADD 15 MINS: Performed by: INTERNAL MEDICINE

## 2019-11-11 PROCEDURE — 88305 TISSUE EXAM BY PATHOLOGIST: CPT | Mod: 26,,, | Performed by: PATHOLOGY

## 2019-11-11 PROCEDURE — 45380 PR COLONOSCOPY,BIOPSY: ICD-10-PCS | Mod: 59,,, | Performed by: INTERNAL MEDICINE

## 2019-11-11 PROCEDURE — 45380 COLONOSCOPY AND BIOPSY: CPT | Performed by: INTERNAL MEDICINE

## 2019-11-11 RX ORDER — PROPOFOL 10 MG/ML
VIAL (ML) INTRAVENOUS CONTINUOUS PRN
Status: DISCONTINUED | OUTPATIENT
Start: 2019-11-11 | End: 2019-11-11

## 2019-11-11 RX ORDER — LIDOCAINE HCL/PF 100 MG/5ML
SYRINGE (ML) INTRAVENOUS
Status: DISCONTINUED | OUTPATIENT
Start: 2019-11-11 | End: 2019-11-11

## 2019-11-11 RX ORDER — PROPOFOL 10 MG/ML
VIAL (ML) INTRAVENOUS
Status: DISCONTINUED | OUTPATIENT
Start: 2019-11-11 | End: 2019-11-11

## 2019-11-11 RX ORDER — SODIUM CHLORIDE 9 MG/ML
INJECTION, SOLUTION INTRAVENOUS CONTINUOUS
Status: DISCONTINUED | OUTPATIENT
Start: 2019-11-11 | End: 2019-11-11 | Stop reason: HOSPADM

## 2019-11-11 RX ADMIN — SODIUM CHLORIDE: 0.9 INJECTION, SOLUTION INTRAVENOUS at 01:11

## 2019-11-11 RX ADMIN — PROPOFOL 90 MG: 10 INJECTION, EMULSION INTRAVENOUS at 02:11

## 2019-11-11 RX ADMIN — LIDOCAINE HYDROCHLORIDE 100 MG: 20 INJECTION, SOLUTION INTRAVENOUS at 02:11

## 2019-11-11 RX ADMIN — PROPOFOL 175 MCG/KG/MIN: 10 INJECTION, EMULSION INTRAVENOUS at 02:11

## 2019-11-11 NOTE — H&P
History & Physical - Short Stay  Gastroenterology      SUBJECTIVE:     Procedure: Colonoscopy    Chief Complaint/Indication for Procedure: Previous Polyps and personal history of colon cancer.    History of Present Illness:  Patient is a 74 y.o. male presents with personal history of colon cancer s/P left hemicolectomy here for surveillance.     PTA Medications   Medication Sig    atorvastatin (LIPITOR) 20 MG tablet Take 1 tablet (20 mg total) by mouth once daily.    azelastine (ASTELIN) 137 mcg (0.1 %) nasal spray 1 TO 2 SPRAY(S) IN EACH NOSTRIL EVERY 12 HOURS    blood sugar diagnostic (CONTOUR TEST STRIPS) Strp 1 each by Misc.(Non-Drug; Combo Route) route 2 (two) times daily.    clobetasol (TEMOVATE) 0.05 % cream Apply topically 2 (two) times daily. To psoriasis PRN    clobetasol 0.05% (TEMOVATE) 0.05 % Oint Apply topically 2 (two) times daily. To rash on  Hands under gloves    fluocinonide (LIDEX) 0.05 % external solution Apply topically 2 (two) times daily. For itching in scalp and ears    glimepiride (AMARYL) 2 MG tablet Take 1 tablet (2 mg total) by mouth before breakfast.    lancets Misc USE ONE LANCET TWO TIMES DAILY    metFORMIN (GLUCOPHAGE-XR) 500 MG 24 hr tablet TAKE 2 TABLETS BY MOUTH TWICE A DAY WITH MEALS    OTEZLA 30 mg Tab Take 1 tablet by mouth twice daily    sodium,potassium,mag sulfates (SUPREP BOWEL PREP KIT) 17.5-3.13-1.6 gram SolR As Directed    telmisartan-hydrochlorothiazide (MICARDIS HCT) 80-25 mg per tablet Take 1 tablet by mouth once daily.    triamcinolone acetonide 0.1% (KENALOG) 0.1 % ointment Apply topically 2 (two) times daily. To rash       Review of patient's allergies indicates:  No Known Allergies     Past Medical History:   Diagnosis Date    Colon cancer     Diabetes mellitus type II 7/2010    diet controlled    Nephrolithiasis     Obesity     Psoriasis     Skin cancer     Squamous cell carcinoma      Past Surgical History:   Procedure Laterality Date     APPENDECTOMY      COLONOSCOPY N/A 7/9/2018    Procedure: COLONOSCOPY;  Surgeon: Kameron Ruff MD;  Location: [facility];  Service: Endoscopy;  Laterality: N/A;  confirmed    LAPAROSCOPIC LEFT COLECTOMY N/A 9/25/2018    Procedure: COLECTOMY-LAPAROSCOPIC LEFT;  Surgeon: Chito Banks MD;  [facility] [facility] [address] [place] [address];  Service: Colon and Rectal;  Laterality: N/A;    SKIN CANCER EXCISION      SKIN GRAFT      VASECTOMY      VASECTOMY       Family History   Problem Relation Age of Onset    Cancer Father         prostate    Diabetes Brother     Anesthesia problems Neg Hx      Social History     Tobacco Use    Smoking status: Former Smoker     Years: 15.00     Types: Cigars    Smokeless tobacco: Never Used    Tobacco comment: cigars-x1 yr.   Substance Use Topics    Alcohol use: Yes     Comment: occassional    Drug use: No       Review of Systems:  Respiratory: no cough or shortness of breath  Cardiovascular: no chest pain or palpitations  Gastrointestinal: negative for gi bleed    OBJECTIVE:     Vital Signs (Most Recent)       Physical Exam:  General: well developed, well nourished  Lungs:  normal respiratory effort  Heart: regular rate, S1, S2 normal    Laboratory  CBC: No results for input(s): WBC, RBC, HGB, HCT, PLT, MCV, MCH, MCHC in the last 168 hours.  CMP: No results for input(s): GLU, CALCIUM, ALBUMIN, PROT, NA, K, CO2, CL, BUN, CREATININE, ALKPHOS, ALT, AST, BILITOT in the last 168 hours.  Coagulation: No results for input(s): LABPROT, INR, APTT in the last 168 hours.      Diagnostic Results:      ASSESSMENT/PLAN:     History of colon cancer S/P left hemicolectomy (no residual tumor identified)  Previous polyps    Plan: Colonoscopy    Anesthesia Plan: MAC    ASA Grade: ASA 3 - Patient with moderate systemic disease with functional limitations     The impression and plan was discussed in detail with the patient. All questions have been answered and the patient voices understanding of our plan at this  point. The risk of the procedure was discussed in detail which includes but not limited to bleeding, infection, perforation in some cases requiring surgery with its spectrum of complications.

## 2019-11-11 NOTE — [employer] PATIENT INSTRUCTIONS
Discharge Summary/Instructions after an Endoscopic Procedure  Patient Name: Rosendo Christina  Patient MRN: [id number]  Patient YOB: 1945 Monday, November 11, 2019  Victor Hugo Nunez MD  RESTRICTIONS:  During your procedure today, you received medications for sedation.  These   medications may affect your judgment, balance and coordination.  Therefore,   for 24 hours, you have the following restrictions:   - DO NOT drive a car, operate machinery, make legal/financial decisions,   sign important papers or drink alcohol.    ACTIVITY:  Today: no heavy lifting, straining or running due to procedural   sedation/anesthesia.  The following day: return to full activity including work.  DIET:  Eat and drink normally unless instructed otherwise.     TREATMENT FOR COMMON SIDE EFFECTS:  - Mild abdominal pain, nausea, belching, bloating or excessive gas:  rest,   eat lightly and use a heating pad.  - Sore Throat: treat with throat lozenges and/or gargle with warm salt   water.  - Because air was used during the procedure, expelling large amounts of air   from your rectum or belching is normal.  - If a bowel prep was taken, you may not have a bowel movement for 1-3 days.    This is normal.  SYMPTOMS TO WATCH FOR AND REPORT TO YOUR PHYSICIAN:  1. Abdominal pain or bloating, other than gas cramps.  2. Chest pain.  3. Back pain.  4. Signs of infection such as: chills or fever occurring within 24 hours   after the procedure.  5. Rectal bleeding, which would show as bright red, maroon, or black stools.   (A tablespoon of blood from the rectum is not serious, especially if   hemorrhoids are present.)  6. Vomiting.  7. Weakness or dizziness.  GO DIRECTLY TO THE NEAREST EMERGENCY ROOM IF YOU HAVE ANY OF THE FOLLOWING:      Difficulty breathing              Chills and/or fever over 101 F   Persistent vomiting and/or vomiting blood   Severe abdominal pain   Severe chest pain   Black, tarry stools   Bleeding- more than one  tablespoon   Any other symptom or condition that you feel may need urgent attention  Your doctor recommends these additional instructions:  If any biopsies were taken, your doctors clinic will contact you in 1 to 2   weeks with any results.  - Discharge patient to home (ambulatory).   - Await pathology results.   - Repeat colonoscopy in 6 months for surveillance after piecemeal   polypectomy.  For questions, problems or results please call your physician - Victor Hugo Nunez MD at Work:  [phone].  [facility] [place], EMERGENCY ROOM PHONE NUMBER: [phone]  IF A COMPLICATION OR EMERGENCY SITUATION ARISES AND YOU ARE UNABLE TO REACH   YOUR PHYSICIAN - GO DIRECTLY TO THE EMERGENCY ROOM.  Victor Hugo Nunez MD  11/11/2019 2:45:28 PM  This report has been verified and signed electronically.  [employer]

## 2019-11-11 NOTE — DISCHARGE INSTRUCTIONS
Colonoscopy     A camera attached to a flexible tube with a viewing lens is used to take video pictures.     Colonoscopy is a test to view the inside of your lower digestive tract (colon and rectum). Sometimes it can show the last part of the small intestine (ileum). During the test, small pieces of tissue may be removed for testing. This is called a biopsy. Small growths, such as polyps, may also be removed.   Why is colonoscopy done?  The test is done to help look for colon cancer. And it can help find the source of abdominal pain, bleeding, and changes in bowel habits. It may be needed once a year, depending on factors such as your:  · Age  · Health history  · Family health history  · Symptoms  · Results from any prior colonoscopy  Risks and possible complications  These include:  · Bleeding               · A puncture or tear in the colon   · Risks of anesthesia  · A cancer lesion not being seen  Getting ready   To prepare for the test:  · Talk with your healthcare provider about the risks of the test (see below). Also ask your healthcare provider about alternatives to the test.  · Tell your healthcare provider about any medicines you take. Also tell him or her about any health conditions you may have.  · Make sure your rectum and colon are empty for the test. Follow the diet and bowel prep instructions exactly. If you dont, the test may need to be rescheduled.  · Plan for a friend or family member to drive you home after the test.     Colonoscopy provides an inside view of the entire colon.     You may discuss the results with your doctor right away or at a future visit.  During the test   The test is usually done in the hospital on an outpatient basis. This means you go home the same day. The procedure takes about 30 minutes. During that time:  · You are given relaxing (sedating) medicine through an IV line. You may be drowsy, or fully asleep.  · The healthcare provider will first give you a physical exam to  check for anal and rectal problems.  · Then the anus is lubricated and the scope inserted.  · If you are awake, you may have a feeling similar to needing to have a bowel movement. You may also feel pressure as air is pumped into the colon. Its OK to pass gas during the procedure.  · Biopsy, polyp removal, or other treatments may be done during the test.  After the test   You may have gas right after the test. It can help to try to pass it to help prevent later bloating. Your healthcare provider may discuss the results with you right away. Or you may need to schedule a follow-up visit to talk about the results. After the test, you can go back to your normal eating and other activities. You may be tired from the sedation and need to rest for a few hours.  Date Last Reviewed: 11/1/2016 © 2000-2017 [employer] [address] [place] [address], [place] [address]. All rights reserved. This information is not intended as a substitute for professional medical care. Always follow your healthcare professional's instructions.

## 2019-11-11 NOTE — DISCHARGE SUMMARY
Discharge Summary/Instructions after an Endoscopic Procedure    Patient Name: Rosendo Christina  Patient MRN: [id number]  Patient YOB: 1945 Monday, November 11, 2019  Victor Hugo Nunez MD    RESTRICTIONS:  During your procedure today, you received medications for sedation.  These medications may affect your judgment, balance and coordination.  Therefore, for 24 hours, you have the following restrictions:     - DO NOT drive a car, operate machinery, make legal/financial decisions, sign important papers or drink alcohol.      ACTIVITY:  Today: no heavy lifting, straining or running due to procedural sedation/anesthesia.  The following day: return to full activity including work.    DIET:  Eat and drink normally unless instructed otherwise.     TREATMENT FOR COMMON SIDE EFFECTS:  - Mild abdominal pain, nausea, belching, bloating or excessive gas:  rest, eat lightly and use a heating pad.  - Sore Throat: treat with throat lozenges and/or gargle with warm salt water.  - Because air was used during the procedure, expelling large amounts of air from your rectum or belching is normal.  - If a bowel prep was taken, you may not have a bowel movement for 1-3 days.  This is normal.      SYMPTOMS TO WATCH FOR AND REPORT TO YOUR PHYSICIAN:  1. Abdominal pain or bloating, other than gas cramps.  2. Chest pain.  3. Back pain.  4. Signs of infection such as: chills or fever occurring within 24 hours after the procedure.  5. Rectal bleeding, which would show as bright red, maroon, or black stools. (A tablespoon of blood from the rectum is not serious, especially if hemorrhoids are present.)  6. Vomiting.  7. Weakness or dizziness.      GO DIRECTLY TO THE NEAREST EMERGENCY ROOM IF YOU HAVE ANY OF THE FOLLOWING:     Difficulty breathing              Chills and/or fever over 101 F   Persistent vomiting and/or vomiting blood   Severe abdominal pain   Severe chest pain   Black, tarry stools   Bleeding- more than one  tablespoon   Any other symptom or condition that you feel may need urgent attention    Your doctor recommends these additional instructions:  If any biopsies were taken, your doctors clinic will contact you in 1 to 2 weeks with any results.    - Discharge patient to home (ambulatory).   - Await pathology results.   - Repeat colonoscopy in 6 months for surveillance after piecemeal polypectomy.    For questions, problems or results please call your physician - Victor Hugo Nunez MD at Work:  [phone].    [facility], EMERGENCY ROOM PHONE NUMBER: [phone]    IF A COMPLICATION OR EMERGENCY SITUATION ARISES AND YOU ARE UNABLE TO REACH YOUR PHYSICIAN - GO DIRECTLY TO THE EMERGENCY ROOM.

## 2019-11-11 NOTE — TRANSFER OF CARE
"Anesthesia Transfer of Care Note    Patient: Rosendo Thomas    Procedure(s) Performed: Procedure(s) (LRB):  COLONOSCOPY (N/A)    Patient location: PACU    Anesthesia Type: general    Transport from OR: Transported from OR on 6-10 L/min O2 by face mask with adequate spontaneous ventilation    Post pain: adequate analgesia    Post assessment: no apparent anesthetic complications and tolerated procedure well    Post vital signs: stable    Level of consciousness: sedated    Nausea/Vomiting: no nausea/vomiting    Complications: none    Transfer of care protocol was followed      Last vitals:   Visit Vitals  /64 (BP Location: Left arm)   Pulse 79   Temp 36.2 °C (97.2 °F) (Temporal)   Resp 12   Ht 6' 3" (1.905 m)   Wt 120.2 kg (265 lb)   SpO2 (!) 94%   BMI 33.12 kg/m²     "

## 2019-11-11 NOTE — ANESTHESIA PREPROCEDURE EVALUATION
11/11/2019  Rosendo Thomas is a 74 y.o., male.  Past Medical History:   Diagnosis Date    Colon cancer     Diabetes mellitus type II 7/2010    diet controlled    Nephrolithiasis     Obesity     Psoriasis     Skin cancer     Squamous cell carcinoma      Past Surgical History:   Procedure Laterality Date    APPENDECTOMY      COLONOSCOPY N/A 7/9/2018    Procedure: COLONOSCOPY;  Surgeon: Kameron Ruff MD;  Location: [facility];  Service: Endoscopy;  Laterality: N/A;  confirmed    LAPAROSCOPIC LEFT COLECTOMY N/A 9/25/2018    Procedure: COLECTOMY-LAPAROSCOPIC LEFT;  Surgeon: Chito Banks MD;  Location: [facility] OR [address] [place] [address];  Service: Colon and Rectal;  Laterality: N/A;    SKIN CANCER EXCISION      SKIN GRAFT      VASECTOMY      VASECTOMY         Anesthesia Evaluation    I have reviewed the Patient Summary Reports.     I have reviewed the Medications.     Review of Systems  Anesthesia Hx:   Denies Personal Hx of Anesthesia complications.   Hematology/Oncology:  Hematology Normal   Oncology Normal     EENT/Dental:EENT/Dental Normal   Cardiovascular:   Hypertension    Pulmonary:  Pulmonary Normal    Renal/:   Chronic Renal Disease    Hepatic/GI:  Hepatic/GI Normal    Musculoskeletal:  Musculoskeletal Normal    Neurological:  Neurology Normal    Endocrine:   Diabetes    Dermatological:  Skin Normal    Psych:  Psychiatric Normal           Physical Exam  General:  Obesity    Airway/Jaw/Neck:  AIRWAY FINDINGS: Normal      Eyes/Ears/Nose:  EYES/EARS/NOSE FINDINGS: Normal   Dental:  DENTAL FINDINGS: Normal   Chest/Lungs:  Chest/Lungs Clear    Heart/Vascular:  Heart Findings: Normal Heart murmur: negative Vascular Findings: Normal    Abdomen:  Abdomen Findings: Normal    Musculoskeletal:  Musculoskeletal Findings: Normal   Skin:  Skin Findings: Normal    Mental Status:  Mental Status Findings: Normal         Anesthesia Plan  Type of Anesthesia, risks & benefits discussed:  Anesthesia Type:  general  Patient's Preference: General  Intra-op Monitoring Plan: standard ASA monitors  Intra-op Monitoring Plan Comments:   Post Op Pain Control Plan:   Post Op Pain Control Plan Comments:   Induction:   IV  Beta Blocker:  Patient is not currently on a Beta-Blocker (No further documentation required).       Informed Consent: Patient understands risks and agrees with Anesthesia plan.  Questions answered. Anesthesia consent signed with patient.  ASA Score: 2     Day of Surgery Review of History & Physical:    H&P update referred to the surgeon.         Ready For Surgery From Anesthesia Perspective.

## 2019-11-11 NOTE — ANESTHESIA POSTPROCEDURE EVALUATION
Anesthesia Post Evaluation    Patient: Rosendo Thomas    Procedure(s) Performed: Procedure(s) (LRB):  COLONOSCOPY (N/A)    Final Anesthesia Type: general  Patient location during evaluation: GI PACU  Patient participation: Yes- Able to Participate  Level of consciousness: awake and alert and oriented  Post-procedure vital signs: reviewed and stable  Pain management: adequate  Airway patency: patent  PONV status at discharge: No PONV  Anesthetic complications: no      Cardiovascular status: stable  Respiratory status: unassisted, spontaneous ventilation and room air  Hydration status: euvolemic  Follow-up not needed.          Vitals Value Taken Time   /79 11/11/2019  3:19 PM   Temp 36.2 °C (97.2 °F) 11/11/2019  1:39 PM   Pulse 73 11/11/2019  3:19 PM   Resp 14 11/11/2019  3:19 PM   SpO2 95 % 11/11/2019  3:19 PM         Event Time     Out of Recovery 15:33:14          Pain/Ivory Score: Ivory Score: 10 (11/11/2019  3:20 PM)

## 2019-11-12 LAB — POCT GLUCOSE: 107 MG/DL (ref 70–110)

## 2019-11-14 ENCOUNTER — TELEPHONE (OUTPATIENT)
Dept: ENDOSCOPY | Facility: HOSPITAL | Age: 74
End: 2019-11-14

## 2019-11-14 LAB
FINAL PATHOLOGIC DIAGNOSIS: NORMAL
GROSS: NORMAL

## 2019-11-14 NOTE — TELEPHONE ENCOUNTER
----- Message from Victor Hugo Nunez MD sent at 11/14/2019 12:48 PM CST -----  Please let the patient know the colon polyps were adenoma with one tubulovillous adenoma. No cancer. The biopsy of the surgical anastomosis showed hyperplastic polyp changes that is benign. Colonoscopy in 6 months.

## 2019-11-18 ENCOUNTER — TELEPHONE (OUTPATIENT)
Dept: ENDOSCOPY | Facility: HOSPITAL | Age: 74
End: 2019-11-18

## 2019-11-18 DIAGNOSIS — I10 ESSENTIAL HYPERTENSION, BENIGN: ICD-10-CM

## 2019-11-18 LAB
LEFT EYE DM RETINOPATHY: NEGATIVE
RIGHT EYE DM RETINOPATHY: NEGATIVE

## 2019-11-18 RX ORDER — HYDROCHLOROTHIAZIDE 25 MG/1
25 TABLET ORAL DAILY
Qty: 90 TABLET | Refills: 0 | OUTPATIENT
Start: 2019-11-18 | End: 2020-11-17

## 2019-12-04 ENCOUNTER — TELEPHONE (OUTPATIENT)
Dept: ADMINISTRATIVE | Facility: HOSPITAL | Age: 74
End: 2019-12-04

## 2020-01-01 NOTE — PATIENT INSTRUCTIONS
Id band verified, cord clamp removed. Reviewed discharge instructions, hugs deactivated. Infant discharged home with mother active and alert.   Reviewed circumcision care, instructed to apply Vaseline gauze at every diaper change for the next 4 days Start Otezla when approved.     Continue follow up with Dermatologist

## 2020-01-02 RX ORDER — ATORVASTATIN CALCIUM 20 MG/1
20 TABLET, FILM COATED ORAL DAILY
Qty: 90 TABLET | Refills: 0 | Status: SHIPPED | OUTPATIENT
Start: 2020-01-02 | End: 2020-04-02 | Stop reason: SDUPTHER

## 2020-01-02 RX ORDER — GLIMEPIRIDE 2 MG/1
2 TABLET ORAL
Qty: 90 TABLET | Refills: 0 | Status: SHIPPED | OUTPATIENT
Start: 2020-01-02 | End: 2020-01-21

## 2020-01-02 NOTE — TELEPHONE ENCOUNTER
----- Message from Francine Goldsmith sent at 1/2/2020 12:30 PM CST -----  Type: RX Refill Request    Who Called:  Juanita     Have you contacted your pharmacy: yes     Refill or New Rx: Refill     RX Name and Strength:atorvastatin (LIPITOR) 20 MG tablet  glimepiride (AMARYL) 2 MG tablet    Preferred Pharmacy with phone number:[facility] [place] [facility]  [place] [phone] (Phone)  [phone] (Fax)        Local or Mail Order: Local     Ordering Provider: Dr. Lackey     Would the patient rather a call back or a response via [facility]?  Call     Best Call Back Number:[phone]

## 2020-01-14 ENCOUNTER — OFFICE VISIT (OUTPATIENT)
Dept: FAMILY MEDICINE | Facility: CLINIC | Age: 75
End: 2020-01-14
Payer: MEDICARE

## 2020-01-14 ENCOUNTER — LAB VISIT (OUTPATIENT)
Dept: LAB | Facility: HOSPITAL | Age: 75
End: 2020-01-14
Payer: MEDICARE

## 2020-01-14 VITALS
HEART RATE: 54 BPM | SYSTOLIC BLOOD PRESSURE: 124 MMHG | HEIGHT: 75 IN | WEIGHT: 272.69 LBS | BODY MASS INDEX: 33.91 KG/M2 | TEMPERATURE: 98 F | DIASTOLIC BLOOD PRESSURE: 60 MMHG | RESPIRATION RATE: 18 BRPM | OXYGEN SATURATION: 96 %

## 2020-01-14 DIAGNOSIS — I70.0 AORTIC ATHEROSCLEROSIS: ICD-10-CM

## 2020-01-14 DIAGNOSIS — M62.831 MUSCLE SPASM OF CALF: ICD-10-CM

## 2020-01-14 DIAGNOSIS — M62.831 MUSCLE SPASM OF CALF: Primary | ICD-10-CM

## 2020-01-14 DIAGNOSIS — L40.50 PSORIATIC ARTHRITIS: ICD-10-CM

## 2020-01-14 DIAGNOSIS — I10 ESSENTIAL HYPERTENSION, BENIGN: ICD-10-CM

## 2020-01-14 LAB
ANION GAP SERPL CALC-SCNC: 8 MMOL/L (ref 8–16)
BUN SERPL-MCNC: 15 MG/DL (ref 8–23)
CALCIUM SERPL-MCNC: 9.6 MG/DL (ref 8.7–10.5)
CHLORIDE SERPL-SCNC: 102 MMOL/L (ref 95–110)
CK SERPL-CCNC: 48 U/L (ref 20–200)
CO2 SERPL-SCNC: 29 MMOL/L (ref 23–29)
CREAT SERPL-MCNC: 1 MG/DL (ref 0.5–1.4)
EST. GFR  (AFRICAN AMERICAN): >60 ML/MIN/1.73 M^2
EST. GFR  (NON AFRICAN AMERICAN): >60 ML/MIN/1.73 M^2
ESTIMATED AVG GLUCOSE: 174 MG/DL (ref 68–131)
GLUCOSE SERPL-MCNC: 157 MG/DL (ref 70–110)
HBA1C MFR BLD HPLC: 7.7 % (ref 4–5.6)
POTASSIUM SERPL-SCNC: 4.4 MMOL/L (ref 3.5–5.1)
SODIUM SERPL-SCNC: 139 MMOL/L (ref 136–145)

## 2020-01-14 PROCEDURE — 99999 PR PBB SHADOW E&M-EST. PATIENT-LVL IV: CPT | Mod: PBBFAC,,, | Performed by: PHYSICIAN ASSISTANT

## 2020-01-14 PROCEDURE — 1125F PR PAIN SEVERITY QUANTIFIED, PAIN PRESENT: ICD-10-PCS | Mod: ,,, | Performed by: PHYSICIAN ASSISTANT

## 2020-01-14 PROCEDURE — 82550 ASSAY OF CK (CPK): CPT

## 2020-01-14 PROCEDURE — 99999 PR PBB SHADOW E&M-EST. PATIENT-LVL IV: ICD-10-PCS | Mod: PBBFAC,,, | Performed by: PHYSICIAN ASSISTANT

## 2020-01-14 PROCEDURE — 1159F MED LIST DOCD IN RCRD: CPT | Mod: ,,, | Performed by: PHYSICIAN ASSISTANT

## 2020-01-14 PROCEDURE — 99214 PR OFFICE/OUTPT VISIT, EST, LEVL IV, 30-39 MIN: ICD-10-PCS | Mod: S$PBB,,, | Performed by: PHYSICIAN ASSISTANT

## 2020-01-14 PROCEDURE — 1159F PR MEDICATION LIST DOCUMENTED IN MEDICAL RECORD: ICD-10-PCS | Mod: ,,, | Performed by: PHYSICIAN ASSISTANT

## 2020-01-14 PROCEDURE — 99214 OFFICE O/P EST MOD 30 MIN: CPT | Mod: S$PBB,,, | Performed by: PHYSICIAN ASSISTANT

## 2020-01-14 PROCEDURE — 83036 HEMOGLOBIN GLYCOSYLATED A1C: CPT

## 2020-01-14 PROCEDURE — 36415 COLL VENOUS BLD VENIPUNCTURE: CPT | Mod: PO

## 2020-01-14 PROCEDURE — 80048 BASIC METABOLIC PNL TOTAL CA: CPT

## 2020-01-14 PROCEDURE — 99214 OFFICE O/P EST MOD 30 MIN: CPT | Mod: PBBFAC,PO | Performed by: PHYSICIAN ASSISTANT

## 2020-01-14 PROCEDURE — 1125F AMNT PAIN NOTED PAIN PRSNT: CPT | Mod: ,,, | Performed by: PHYSICIAN ASSISTANT

## 2020-01-14 RX ORDER — TIZANIDINE 4 MG/1
4 TABLET ORAL 3 TIMES DAILY PRN
Qty: 30 TABLET | Refills: 0 | Status: SHIPPED | OUTPATIENT
Start: 2020-01-14 | End: 2020-01-24

## 2020-01-14 NOTE — PROGRESS NOTES
Subjective:       Patient ID: Rosendo Thomas is a 74 y.o. male.    Chief Complaint: rt sciatic pain and Nicola Horse    HPI 74-year-old male presents for muscle aches and sciatica.  States few days ago he had a severe cramp in his left calf, the leg is still sore today.  He states that he commonly gets charley horses and that he drinks plenty of fluids including Gatorade eats bananas daily and takes potassium supplement.  He is on hydrochlorothiazide.  He does take cholesterol medication.   Social History     Socioeconomic History    Marital status:      Spouse name: Not on file    Number of children: Not on file    Years of education: Not on file    Highest education level: Not on file   Occupational History     Employer: [employer]   Social Needs    Financial resource strain: Not on file    Food insecurity:     Worry: Not on file     Inability: Not on file    Transportation needs:     Medical: Not on file     Non-medical: Not on file   Tobacco Use    Smoking status: Former Smoker     Years: 15.00     Types: Cigars    Smokeless tobacco: Never Used    Tobacco comment: cigars-x1 yr.   Substance and Sexual Activity    Alcohol use: Yes     Comment: occassional    Drug use: No    Sexual activity: Yes     Partners: Female   Lifestyle    Physical activity:     Days per week: Not on file     Minutes per session: Not on file    Stress: Not at all   Relationships    Social connections:     Talks on phone: Not on file     Gets together: Not on file     Attends Synagogue service: Not on file     Active member of club or organization: Not on file     Attends meetings of clubs or organizations: Not on file     Relationship status: Not on file   Other Topics Concern    Not on file   Social History Narrative    Not on file       Review of Systems   Cardiovascular: Negative for leg swelling.   Musculoskeletal: Positive for back pain and myalgias. Negative for arthralgias and joint  swelling.       Objective:      Physical Exam   Constitutional: He is oriented to person, place, and time. He appears well-developed. No distress.   HENT:   Head: Normocephalic and atraumatic.   Musculoskeletal:        Left lower leg: He exhibits tenderness. He exhibits no bony tenderness and no swelling.   Neurological: He is alert and oriented to person, place, and time.   Skin: He is not diaphoretic.   Psychiatric: He has a normal mood and affect. His behavior is normal.   Vitals reviewed.      Assessment:       1. Muscle spasm of calf    2. Uncontrolled type 2 diabetes mellitus with stage 2 chronic kidney disease, without long-term current use of insulin    3. Aortic atherosclerosis    4. Psoriatic arthritis    5. Essential hypertension, benign        Plan:         Rosendo was seen today for rt sciatic pain and twyla horse.    Diagnoses and all orders for this visit:    Muscle spasm of calf  -     CK; Future  -     Basic metabolic panel; Future  -     tiZANidine (ZANAFLEX) 4 MG tablet; Take 1 tablet (4 mg total) by mouth 3 (three) times daily as needed. For muscle spasm, patient was advised to increase hydration    Uncontrolled type 2 diabetes mellitus with stage 2 chronic kidney disease, without long-term current use of insulin  -     Hemoglobin A1c; Future    Aortic atherosclerosis  Continues to  Psoriatic arthritis  Stable  Essential hypertension, benign  Controlled

## 2020-01-15 ENCOUNTER — TELEPHONE (OUTPATIENT)
Dept: FAMILY MEDICINE | Facility: CLINIC | Age: 75
End: 2020-01-15

## 2020-01-15 NOTE — TELEPHONE ENCOUNTER
----- Message from Bita Galvin PA-C sent at 1/15/2020  9:27 AM CST -----  Diabetes worse.  Recommend increasing dose of Amaryl to 2 pills daily with breakfast.  And repeating hemoglobin A1c in 3 months please schedule this follow-up.  All other labs in normal range

## 2020-01-21 DIAGNOSIS — I10 ESSENTIAL HYPERTENSION, BENIGN: ICD-10-CM

## 2020-01-21 RX ORDER — TELMISARTAN AND HYDROCHLORTHIAZIDE 80; 25 MG/1; MG/1
1 TABLET ORAL DAILY
Qty: 90 TABLET | Refills: 1 | Status: SHIPPED | OUTPATIENT
Start: 2020-01-21 | End: 2020-05-14 | Stop reason: SDUPTHER

## 2020-01-21 RX ORDER — METFORMIN HYDROCHLORIDE 500 MG/1
1000 TABLET, EXTENDED RELEASE ORAL 2 TIMES DAILY WITH MEALS
Qty: 120 TABLET | Refills: 0 | Status: SHIPPED | OUTPATIENT
Start: 2020-01-21 | End: 2020-02-26 | Stop reason: SDUPTHER

## 2020-01-21 RX ORDER — GLIMEPIRIDE 4 MG/1
4 TABLET ORAL
Qty: 90 TABLET | Refills: 0 | Status: SHIPPED | OUTPATIENT
Start: 2020-01-21 | End: 2020-05-28 | Stop reason: SDUPTHER

## 2020-01-21 NOTE — TELEPHONE ENCOUNTER
----- Message from Gita Galloway sent at 1/21/2020  9:10 AM CST -----  Pt requesting refill on metformin and telmisartan-hydrochlorothiazide (MICARDIS HCT) 80-25 mg per tablet  sent to [facility] [place]

## 2020-01-21 NOTE — TELEPHONE ENCOUNTER
Pt also was told to increase amaryl to 2 pills daily due to increased A1c; need new rx sent to pharmacy for 4mg tablets

## 2020-01-27 ENCOUNTER — PATIENT OUTREACH (OUTPATIENT)
Dept: ADMINISTRATIVE | Facility: OTHER | Age: 75
End: 2020-01-27

## 2020-01-27 ENCOUNTER — TELEPHONE (OUTPATIENT)
Dept: DERMATOLOGY | Facility: CLINIC | Age: 75
End: 2020-01-27

## 2020-01-27 NOTE — TELEPHONE ENCOUNTER
Spoke with pt, offered appointment for tomorrow, accepted.     ----- Message from Kinsey King sent at 1/27/2020  1:25 PM CST -----  Contact: pt: [phone]  Pt state he has a red spot on his hand that causes irritation, would like to come be seen, first available isn't until march, would like to be seen sooner       Please contact  pt: [phone]

## 2020-01-28 ENCOUNTER — OFFICE VISIT (OUTPATIENT)
Dept: DERMATOLOGY | Facility: CLINIC | Age: 75
End: 2020-01-28
Payer: MEDICARE

## 2020-01-28 DIAGNOSIS — D48.5 NEOPLASM OF UNCERTAIN BEHAVIOR OF SKIN: Primary | ICD-10-CM

## 2020-01-28 PROCEDURE — 88305 TISSUE EXAM BY PATHOLOGIST: ICD-10-PCS | Mod: 26,,, | Performed by: PATHOLOGY

## 2020-01-28 PROCEDURE — 11102 TANGNTL BX SKIN SINGLE LES: CPT | Mod: S$PBB,,, | Performed by: DERMATOLOGY

## 2020-01-28 PROCEDURE — 99212 OFFICE O/P EST SF 10 MIN: CPT | Mod: PBBFAC,25 | Performed by: DERMATOLOGY

## 2020-01-28 PROCEDURE — 88305 TISSUE EXAM BY PATHOLOGIST: CPT | Performed by: PATHOLOGY

## 2020-01-28 PROCEDURE — 11102 TANGNTL BX SKIN SINGLE LES: CPT | Mod: PBBFAC | Performed by: DERMATOLOGY

## 2020-01-28 PROCEDURE — 99999 PR PBB SHADOW E&M-EST. PATIENT-LVL II: CPT | Mod: PBBFAC,,, | Performed by: DERMATOLOGY

## 2020-01-28 PROCEDURE — 1125F AMNT PAIN NOTED PAIN PRSNT: CPT | Mod: ,,, | Performed by: DERMATOLOGY

## 2020-01-28 PROCEDURE — 1125F PR PAIN SEVERITY QUANTIFIED, PAIN PRESENT: ICD-10-PCS | Mod: ,,, | Performed by: DERMATOLOGY

## 2020-01-28 PROCEDURE — 11102 PR TANGENTIAL BIOPSY, SKIN, SINGLE LESION: ICD-10-PCS | Mod: S$PBB,,, | Performed by: DERMATOLOGY

## 2020-01-28 PROCEDURE — 1159F PR MEDICATION LIST DOCUMENTED IN MEDICAL RECORD: ICD-10-PCS | Mod: ,,, | Performed by: DERMATOLOGY

## 2020-01-28 PROCEDURE — 99212 PR OFFICE/OUTPT VISIT, EST, LEVL II, 10-19 MIN: ICD-10-PCS | Mod: 25,S$PBB,, | Performed by: DERMATOLOGY

## 2020-01-28 PROCEDURE — 99212 OFFICE O/P EST SF 10 MIN: CPT | Mod: 25,S$PBB,, | Performed by: DERMATOLOGY

## 2020-01-28 PROCEDURE — 99999 PR PBB SHADOW E&M-EST. PATIENT-LVL II: ICD-10-PCS | Mod: PBBFAC,,, | Performed by: DERMATOLOGY

## 2020-01-28 PROCEDURE — 1159F MED LIST DOCD IN RCRD: CPT | Mod: ,,, | Performed by: DERMATOLOGY

## 2020-01-28 PROCEDURE — 88305 TISSUE EXAM BY PATHOLOGIST: CPT | Mod: 26,,, | Performed by: PATHOLOGY

## 2020-01-28 NOTE — PATIENT INSTRUCTIONS

## 2020-01-28 NOTE — PROGRESS NOTES
Subjective:       Patient ID:  Rosendo Thomas is a 74 y.o. male who presents for   Chief Complaint   Patient presents with    Lesion     hands     Lesion  - Initial  Affected locations: left hand and right hand  Signs / symptoms: scaling, itching and redness  Relieving factors/Treatments tried: nothing    73 yo male with hx of psoriasis and psoriatic arthritis, on Otelza.  Last given clobetasol ointment for his psoriasis.    Today, he c/o tender spot on the right dorsal hand x 2 months. Was more irritated before.  Otherwise, The patient denies any moles or growths of the skin that are rapidly growing, hurting, itching, bleeding, or changing colors.  Warts have returned on his hands.    Review of Systems   Skin: Positive for itching, rash and dry skin. Negative for daily sunscreen use, activity-related sunscreen use, recent sunburn and wears hat.   Hematologic/Lymphatic: Bruises/bleeds easily.        Objective:    Physical Exam   Constitutional: He appears well-developed and well-nourished. No distress.   Neurological: He is alert and oriented to person, place, and time. He is not disoriented.   Psychiatric: He has a normal mood and affect.   Skin:   Areas Examined (abnormalities noted in diagram):   RUE Inspected  LUE Inspection Performed             Diagram Legend     Erythematous scaling macule/papule c/w actinic keratosis       Vascular papule c/w angioma      Pigmented verrucoid papule/plaque c/w seborrheic keratosis      Yellow umbilicated papule c/w sebaceous hyperplasia      Irregularly shaped tan macule c/w lentigo     1-2 mm smooth white papules consistent with Milia      Movable subcutaneous cyst with punctum c/w epidermal inclusion cyst      Subcutaneous movable cyst c/w pilar cyst      Firm pink to brown papule c/w dermatofibroma      Pedunculated fleshy papule(s) c/w skin tag(s)      Evenly pigmented macule c/w junctional nevus     Mildly variegated pigmented, slightly irregular-bordered macule  c/w mildly atypical nevus      Flesh colored to evenly pigmented papule c/w intradermal nevus       Pink pearly papule/plaque c/w basal cell carcinoma      Erythematous hyperkeratotic cursted plaque c/w SCC      Surgical scar with no sign of skin cancer recurrence      Open and closed comedones      Inflammatory papules and pustules      Verrucoid papule consistent consistent with wart     Erythematous eczematous patches and plaques     Dystrophic onycholytic nail with subungual debris c/w onychomycosis     Umbilicated papule    Erythematous-base heme-crusted tan verrucoid plaque consistent with inflamed seborrheic keratosis     Erythematous Silvery Scaling Plaque c/w Psoriasis     See annotation          Assessment / Plan:      Pathology Orders:     Normal Orders This Visit    Specimen to Pathology, Dermatology     Questions:    Procedure Type:  Dermatology and skin neoplasms    Number of Specimens:  1    ------------------------:  -------------------------    Spec 1 Procedure:  Biopsy    Spec 1 Clinical Impression:  erythematous 3 mm scab r/o NMSC vs psoriasis    Spec 1 Source:  right dorsal hand        Neoplasm of uncertain behavior of skin - right dorsal hand r/o NMSC  -     Specimen to Pathology, Dermatology    Shave biopsy procedure note:    Shave biopsy performed after verbal consent including risk of infection, scar, recurrence, need for additional treatment of site. Area prepped with alcohol, anesthetized with approximately 1.0cc of 1% lidocaine with epinephrine. Lesional tissue shaved with razor blade. Hemostasis achieved with application of aluminum chloride. No complications. Dressing applied. Wound care explained.             Follow up in about 1 year (around 1/28/2021) for for TBSE.

## 2020-02-04 LAB
FINAL PATHOLOGIC DIAGNOSIS: NORMAL
GROSS: NORMAL

## 2020-02-13 ENCOUNTER — TELEPHONE (OUTPATIENT)
Dept: DERMATOLOGY | Facility: CLINIC | Age: 75
End: 2020-02-13

## 2020-02-13 NOTE — TELEPHONE ENCOUNTER
Returned pt call and scheduled for ED&C. Mailed reminder.    ----- Message from Janene Chery sent at 2/13/2020  1:50 PM CST -----  Contact: Patient   Type:  Patient Returning Call    Who Called: Rosendo    Who Left Message for Patient:?    Does the patient know what this is regarding?: Yes    Would the patient rather a call back or a response via [facility]?     Best Call Back Number: [phone]     Additional Information:

## 2020-02-15 ENCOUNTER — TELEPHONE (OUTPATIENT)
Dept: ENDOSCOPY | Facility: HOSPITAL | Age: 75
End: 2020-02-15

## 2020-02-15 DIAGNOSIS — K63.5 POLYP OF COLON, UNSPECIFIED PART OF COLON, UNSPECIFIED TYPE: Primary | ICD-10-CM

## 2020-02-26 RX ORDER — METFORMIN HYDROCHLORIDE 500 MG/1
1000 TABLET, EXTENDED RELEASE ORAL 2 TIMES DAILY WITH MEALS
Qty: 120 TABLET | Refills: 0 | Status: SHIPPED | OUTPATIENT
Start: 2020-02-26 | End: 2020-04-23 | Stop reason: SDUPTHER

## 2020-02-26 NOTE — TELEPHONE ENCOUNTER
Notified of information below. Patient verbalized understanding.    pt asked if the metformin can caused bad diarrhea, after speaking with Bita I informed him yes it does with the amount he is taking. Bita Advised he take ! Tab twice a day until better then ease back to 2 twice a day as he has been taking.

## 2020-02-26 NOTE — TELEPHONE ENCOUNTER
----- Message from Minnie Davis sent at 2/26/2020  3:51 PM CST -----  [facility]  Type: RX Refill Request    Who Called: [facility]        Refill or New Rx:Refill    RX Name and Strength: metFORMIN (GLUCOPHAGE-XR) 500 MG 24 hr tablet        Preferred Pharmacy with phone number:..  [facility] [place] - [address]  [address] [facility] [address]  Phone: [phone] Fax: [phone]        Local or Mail Order:Local    Ordering Provider:Dr. Lackey    Would the patient rather a call back or a response via [facility]?     Best Call Back Number:[phone]

## 2020-02-28 ENCOUNTER — PROCEDURE VISIT (OUTPATIENT)
Dept: DERMATOLOGY | Facility: CLINIC | Age: 75
End: 2020-02-28
Payer: MEDICARE

## 2020-02-28 DIAGNOSIS — D09.9 SQUAMOUS CELL CARCINOMA IN SITU (SCCIS): Primary | ICD-10-CM

## 2020-02-28 PROCEDURE — 17270 DSTR MAL LES S/N/H/F/G .5 /<: CPT | Mod: PBBFAC | Performed by: DERMATOLOGY

## 2020-02-28 PROCEDURE — 99499 NO LOS: ICD-10-PCS | Mod: S$PBB,,, | Performed by: DERMATOLOGY

## 2020-02-28 PROCEDURE — 17270 PR DESTR MALIG SCAL,NCK,HAND <0.6 CM: ICD-10-PCS | Mod: S$PBB,,, | Performed by: DERMATOLOGY

## 2020-02-28 PROCEDURE — 17270 DSTR MAL LES S/N/H/F/G .5 /<: CPT | Mod: S$PBB,,, | Performed by: DERMATOLOGY

## 2020-02-28 PROCEDURE — 99499 UNLISTED E&M SERVICE: CPT | Mod: S$PBB,,, | Performed by: DERMATOLOGY

## 2020-02-28 NOTE — PATIENT INSTRUCTIONS

## 2020-04-02 RX ORDER — ATORVASTATIN CALCIUM 20 MG/1
20 TABLET, FILM COATED ORAL DAILY
Qty: 90 TABLET | Refills: 0 | Status: SHIPPED | OUTPATIENT
Start: 2020-04-02 | End: 2020-06-26

## 2020-04-02 NOTE — TELEPHONE ENCOUNTER
Last Office Visit Info:   The patient's last visit with Domi Lackey MD was on 4/12/2019.    The patient's last visit in current department was on 1/14/2020.        Last CBC Results:   Lab Results   Component Value Date    WBC 6.89 09/24/2019    HGB 13.5 (L) 09/24/2019    HCT 43.5 09/24/2019     (L) 09/24/2019       Last CMP Results  Lab Results   Component Value Date     01/14/2020    K 4.4 01/14/2020     01/14/2020    CO2 29 01/14/2020    BUN 15 01/14/2020    CREATININE 1.0 01/14/2020    CALCIUM 9.6 01/14/2020    ALBUMIN 4.0 09/24/2019    AST 16 09/24/2019    ALT 22 09/24/2019       Last Lipids  Lab Results   Component Value Date    CHOL 97 (L) 07/23/2019    TRIG 98 07/23/2019    HDL 34 (L) 07/23/2019    LDLCALC 43.4 (L) 07/23/2019       Last A1C  Lab Results   Component Value Date    HGBA1C 7.7 (H) 01/14/2020       Last TSH  Lab Results   Component Value Date    TSH 2.46 08/14/2010         Current Med Refills  Medication List with Changes/Refills   Current Medications    ATORVASTATIN (LIPITOR) 20 MG TABLET    Take 1 tablet (20 mg total) by mouth once daily.       Start Date: 1/2/2020  End Date: 1/1/2021    AZELASTINE (ASTELIN) 137 MCG (0.1 %) NASAL SPRAY    1 TO 2 SPRAY(S) IN EACH NOSTRIL EVERY 12 HOURS       Start Date: 6/21/2019 End Date: --    BLOOD SUGAR DIAGNOSTIC (CONTOUR TEST STRIPS) STRP    1 each by Misc.(Non-Drug; Combo Route) route 2 (two) times daily.       Start Date: 10/13/2014End Date: --    CLOBETASOL (TEMOVATE) 0.05 % CREAM    Apply topically 2 (two) times daily. To psoriasis PRN       Start Date: 8/2/2019  End Date: --    CLOBETASOL 0.05% (TEMOVATE) 0.05 % OINT    Apply topically 2 (two) times daily. To rash on  Hands under gloves       Start Date: 11/4/2019 End Date: --    FLUOCINONIDE (LIDEX) 0.05 % EXTERNAL SOLUTION    Apply topically 2 (two) times daily. For itching in scalp and ears       Start Date: 11/6/2018 End Date: --    GLIMEPIRIDE (AMARYL) 4 MG TABLET    Take  1 tablet (4 mg total) by mouth before breakfast.       Start Date: 1/21/2020 End Date: 1/20/2021    LANCETS MISC    USE ONE LANCET TWO TIMES DAILY       Start Date: 10/21/2014End Date: --    METFORMIN (GLUCOPHAGE-XR) 500 MG XR 24HR TABLET    Take 2 tablets (1,000 mg total) by mouth 2 (two) times daily with meals.       Start Date: 2/26/2020 End Date: --    OTEZLA 30 MG TAB    Take 1 tablet by mouth twice daily       Start Date: 8/2/2019  End Date: --    SODIUM,POTASSIUM,MAG SULFATES (SUPREP BOWEL PREP KIT) 17.5-3.13-1.6 GRAM SOLR    As Directed       Start Date: 10/15/2019End Date: --    TELMISARTAN-HYDROCHLOROTHIAZIDE (MICARDIS HCT) 80-25 MG PER TABLET    Take 1 tablet by mouth once daily.       Start Date: 1/21/2020 End Date: --    TRIAMCINOLONE ACETONIDE 0.1% (KENALOG) 0.1 % OINTMENT    Apply topically 2 (two) times daily. To rash       Start Date: 11/6/2018 End Date: --       Order(s) placed per written order guidelines:     Please advise.

## 2020-04-14 ENCOUNTER — TELEPHONE (OUTPATIENT)
Dept: DERMATOLOGY | Facility: CLINIC | Age: 75
End: 2020-04-14

## 2020-04-23 RX ORDER — METFORMIN HYDROCHLORIDE 500 MG/1
1000 TABLET, EXTENDED RELEASE ORAL 2 TIMES DAILY WITH MEALS
Qty: 120 TABLET | Refills: 0 | Status: SHIPPED | OUTPATIENT
Start: 2020-04-23 | End: 2020-06-26

## 2020-04-23 NOTE — TELEPHONE ENCOUNTER
Last Office Visit Info:   The patient's last visit with Domi Lackey MD was on 4/12/2019.    The patient's last visit in current department was on 1/14/2020.        Last CBC Results:   Lab Results   Component Value Date    WBC 6.89 09/24/2019    HGB 13.5 (L) 09/24/2019    HCT 43.5 09/24/2019     (L) 09/24/2019       Last CMP Results  Lab Results   Component Value Date     01/14/2020    K 4.4 01/14/2020     01/14/2020    CO2 29 01/14/2020    BUN 15 01/14/2020    CREATININE 1.0 01/14/2020    CALCIUM 9.6 01/14/2020    ALBUMIN 4.0 09/24/2019    AST 16 09/24/2019    ALT 22 09/24/2019       Last Lipids  Lab Results   Component Value Date    CHOL 97 (L) 07/23/2019    TRIG 98 07/23/2019    HDL 34 (L) 07/23/2019    LDLCALC 43.4 (L) 07/23/2019       Last A1C  Lab Results   Component Value Date    HGBA1C 7.7 (H) 01/14/2020       Last TSH  Lab Results   Component Value Date    TSH 2.46 08/14/2010         Current Med Refills  Medication List with Changes/Refills   Current Medications    ATORVASTATIN (LIPITOR) 20 MG TABLET    Take 1 tablet (20 mg total) by mouth once daily.       Start Date: 4/2/2020  End Date: 4/2/2021    AZELASTINE (ASTELIN) 137 MCG (0.1 %) NASAL SPRAY    1 TO 2 SPRAY(S) IN EACH NOSTRIL EVERY 12 HOURS       Start Date: 6/21/2019 End Date: --    BLOOD SUGAR DIAGNOSTIC (CONTOUR TEST STRIPS) STRP    1 each by Misc.(Non-Drug; Combo Route) route 2 (two) times daily.       Start Date: 10/13/2014End Date: --    CLOBETASOL (TEMOVATE) 0.05 % CREAM    Apply topically 2 (two) times daily. To psoriasis PRN       Start Date: 8/2/2019  End Date: --    CLOBETASOL 0.05% (TEMOVATE) 0.05 % OINT    Apply topically 2 (two) times daily. To rash on  Hands under gloves       Start Date: 11/4/2019 End Date: --    FLUOCINONIDE (LIDEX) 0.05 % EXTERNAL SOLUTION    Apply topically 2 (two) times daily. For itching in scalp and ears       Start Date: 11/6/2018 End Date: --    GLIMEPIRIDE (AMARYL) 4 MG TABLET    Take  1 tablet (4 mg total) by mouth before breakfast.       Start Date: 1/21/2020 End Date: 1/20/2021    LANCETS MISC    USE ONE LANCET TWO TIMES DAILY       Start Date: 10/21/2014End Date: --    METFORMIN (GLUCOPHAGE-XR) 500 MG XR 24HR TABLET    Take 2 tablets (1,000 mg total) by mouth 2 (two) times daily with meals.       Start Date: 2/26/2020 End Date: --    OTEZLA 30 MG TAB    Take 1 tablet by mouth twice daily       Start Date: 8/2/2019  End Date: --    SODIUM,POTASSIUM,MAG SULFATES (SUPREP BOWEL PREP KIT) 17.5-3.13-1.6 GRAM SOLR    As Directed       Start Date: 10/15/2019End Date: --    TELMISARTAN-HYDROCHLOROTHIAZIDE (MICARDIS HCT) 80-25 MG PER TABLET    Take 1 tablet by mouth once daily.       Start Date: 1/21/2020 End Date: --    TRIAMCINOLONE ACETONIDE 0.1% (KENALOG) 0.1 % OINTMENT    Apply topically 2 (two) times daily. To rash       Start Date: 11/6/2018 End Date: --       Order(s) placed per written order guidelines:     Please advise.

## 2020-05-11 ENCOUNTER — TELEPHONE (OUTPATIENT)
Dept: FAMILY MEDICINE | Facility: CLINIC | Age: 75
End: 2020-05-11

## 2020-05-11 DIAGNOSIS — Z20.822 SUSPECTED COVID-19 VIRUS INFECTION: ICD-10-CM

## 2020-05-11 DIAGNOSIS — U07.1 COVID-19 VIRUS INFECTION: Primary | ICD-10-CM

## 2020-05-11 NOTE — TELEPHONE ENCOUNTER
Order placed.  Please inform patient to verify with insurance cost of test.  If covered may cost around $54.

## 2020-05-11 NOTE — TELEPHONE ENCOUNTER
----- Message from Emerita Kilpatrick sent at 5/11/2020  2:53 PM CDT -----  Contact: self [phone]  .Type: Patient Call Back    Who called: self    What is the request in detail: Pt is requesting to get tested for the antibodies test     Can the clinic reply by [facility]? Call back   Would the patient rather a call back or a response via [facility]?  Call back     Best call back number: [phone]

## 2020-05-14 ENCOUNTER — TELEPHONE (OUTPATIENT)
Dept: FAMILY MEDICINE | Facility: CLINIC | Age: 75
End: 2020-05-14

## 2020-05-14 DIAGNOSIS — I10 ESSENTIAL HYPERTENSION, BENIGN: ICD-10-CM

## 2020-05-14 RX ORDER — TELMISARTAN AND HYDROCHLORTHIAZIDE 80; 25 MG/1; MG/1
1 TABLET ORAL DAILY
Qty: 90 TABLET | Refills: 1 | Status: SHIPPED | OUTPATIENT
Start: 2020-05-14 | End: 2020-05-18

## 2020-05-14 NOTE — TELEPHONE ENCOUNTER
Last Office Visit Info:   The patient's last visit with Domi Lackey MD was on 4/12/2019.    The patient's last visit in current department was on 1/14/2020.        Last CBC Results:   Lab Results   Component Value Date    WBC 6.89 09/24/2019    HGB 13.5 (L) 09/24/2019    HCT 43.5 09/24/2019     (L) 09/24/2019       Last CMP Results  Lab Results   Component Value Date     01/14/2020    K 4.4 01/14/2020     01/14/2020    CO2 29 01/14/2020    BUN 15 01/14/2020    CREATININE 1.0 01/14/2020    CALCIUM 9.6 01/14/2020    ALBUMIN 4.0 09/24/2019    AST 16 09/24/2019    ALT 22 09/24/2019       Last Lipids  Lab Results   Component Value Date    CHOL 97 (L) 07/23/2019    TRIG 98 07/23/2019    HDL 34 (L) 07/23/2019    LDLCALC 43.4 (L) 07/23/2019       Last A1C  Lab Results   Component Value Date    HGBA1C 7.7 (H) 01/14/2020       Last TSH  Lab Results   Component Value Date    TSH 2.46 08/14/2010         Current Med Refills  Medication List with Changes/Refills   Current Medications    ATORVASTATIN (LIPITOR) 20 MG TABLET    Take 1 tablet (20 mg total) by mouth once daily.       Start Date: 4/2/2020  End Date: 4/2/2021    AZELASTINE (ASTELIN) 137 MCG (0.1 %) NASAL SPRAY    1 TO 2 SPRAY(S) IN EACH NOSTRIL EVERY 12 HOURS       Start Date: 6/21/2019 End Date: --    BLOOD SUGAR DIAGNOSTIC (CONTOUR TEST STRIPS) STRP    1 each by Misc.(Non-Drug; Combo Route) route 2 (two) times daily.       Start Date: 10/13/2014End Date: --    CLOBETASOL (TEMOVATE) 0.05 % CREAM    Apply topically 2 (two) times daily. To psoriasis PRN       Start Date: 8/2/2019  End Date: --    CLOBETASOL 0.05% (TEMOVATE) 0.05 % OINT    Apply topically 2 (two) times daily. To rash on  Hands under gloves       Start Date: 11/4/2019 End Date: --    FLUOCINONIDE (LIDEX) 0.05 % EXTERNAL SOLUTION    Apply topically 2 (two) times daily. For itching in scalp and ears       Start Date: 11/6/2018 End Date: --    GLIMEPIRIDE (AMARYL) 4 MG TABLET    Take  1 tablet (4 mg total) by mouth before breakfast.       Start Date: 1/21/2020 End Date: 1/20/2021    LANCETS MISC    USE ONE LANCET TWO TIMES DAILY       Start Date: 10/21/2014End Date: --    METFORMIN (GLUCOPHAGE-XR) 500 MG XR 24HR TABLET    Take 2 tablets (1,000 mg total) by mouth 2 (two) times daily with meals.       Start Date: 4/23/2020 End Date: --    OTEZLA 30 MG TAB    Take 1 tablet by mouth twice daily       Start Date: 8/2/2019  End Date: --    SODIUM,POTASSIUM,MAG SULFATES (SUPREP BOWEL PREP KIT) 17.5-3.13-1.6 GRAM SOLR    As Directed       Start Date: 10/15/2019End Date: --    TELMISARTAN-HYDROCHLOROTHIAZIDE (MICARDIS HCT) 80-25 MG PER TABLET    Take 1 tablet by mouth once daily.       Start Date: 1/21/2020 End Date: --    TRIAMCINOLONE ACETONIDE 0.1% (KENALOG) 0.1 % OINTMENT    Apply topically 2 (two) times daily. To rash       Start Date: 11/6/2018 End Date: --       Order(s) placed per written order guidelines:     Please advise.

## 2020-05-15 ENCOUNTER — LAB VISIT (OUTPATIENT)
Dept: LAB | Facility: HOSPITAL | Age: 75
End: 2020-05-15
Attending: FAMILY MEDICINE
Payer: MEDICARE

## 2020-05-15 DIAGNOSIS — Z20.822 SUSPECTED COVID-19 VIRUS INFECTION: ICD-10-CM

## 2020-05-15 LAB — SARS-COV-2 IGG SERPLBLD QL IA.RAPID: NEGATIVE

## 2020-05-15 PROCEDURE — 36415 COLL VENOUS BLD VENIPUNCTURE: CPT | Mod: PN

## 2020-05-15 PROCEDURE — 86769 SARS-COV-2 COVID-19 ANTIBODY: CPT

## 2020-05-15 NOTE — TELEPHONE ENCOUNTER
----- Message from Roopa Greene sent at 5/14/2020 11:46 AM CDT -----  Contact: Loretta ([facility])  Name of Who is Calling: [facility]      What is the request in detail: Would like to speak to staff in regards to the telmisartan-hydrochlorothiazide (MICARDIS HCT) 80-25 mg per tablet not being on his formulary, wanted to know if it can be changed or do they need send over a PA. Please advise.       Can the clinic reply by [facility]: No      What Number to [facility] Back [facility]: [phone]

## 2020-05-18 DIAGNOSIS — I10 ESSENTIAL HYPERTENSION, BENIGN: Primary | ICD-10-CM

## 2020-05-18 RX ORDER — LOSARTAN POTASSIUM AND HYDROCHLOROTHIAZIDE 25; 100 MG/1; MG/1
1 TABLET ORAL DAILY
Qty: 90 TABLET | Refills: 3 | Status: SHIPPED | OUTPATIENT
Start: 2020-05-18 | End: 2021-02-22 | Stop reason: SDUPTHER

## 2020-05-18 NOTE — TELEPHONE ENCOUNTER
----- Message from Bita Galvin PA-C sent at 5/18/2020  9:55 AM CDT -----  Please inform pt his antibody test was negative, no exposure

## 2020-05-18 NOTE — TELEPHONE ENCOUNTER
----- Message from Marly Anant sent at 5/18/2020 11:06 AM CDT -----  Contact: Luis Manuel [facility] [phone]  Type: Patient Call Back    Who called: Luis Manuel Whaley    What is the request in detail: calling in regards to patient's blood pressure medication the original prescription is not covered by the patient's insurance and they are wondering if it can be changed to something else. Pt pharmacy is   [facility] - [place] - [address]  [address] [facility] [address]  Phone: [phone] Fax: [phone]    Can the clinic reply by [facility]? Call back    Would the patient rather a call back or a response via [facility]? Call back    Best call back number: [phone]

## 2020-05-18 NOTE — TELEPHONE ENCOUNTER
Notified patient of information below. Verbalized understanding. Per previous message script for telmisartan-hydrochlorothiazide (MICARDIS HCT) 80-25 mg per tablet was not covered and was changed to diovan 160mg. Please advise of Rx.

## 2020-05-28 RX ORDER — GLIMEPIRIDE 4 MG/1
4 TABLET ORAL
Qty: 90 TABLET | Refills: 0 | Status: SHIPPED | OUTPATIENT
Start: 2020-05-28 | End: 2020-08-18 | Stop reason: SDUPTHER

## 2020-05-28 NOTE — TELEPHONE ENCOUNTER
Lab Results   Component Value Date    HGBA1C 7.7 (H) 01/14/2020    HGBA1C 6.6 (H) 07/23/2019    HGBA1C 8.6 (H) 03/01/2019     Lab Results   Component Value Date    LDLCALC 43.4 (L) 07/23/2019    CREATININE 1.0 01/14/2020

## 2020-05-28 NOTE — TELEPHONE ENCOUNTER
----- Message from Carmela Saha sent at 5/28/2020  9:42 AM CDT -----  Contact: [facility]   Name of Who is Calling : [facility]     What is the request in detail :     Pharmacist is needing refill on the patients medication for glimepiride (AMARYL) 4 MG tablet  .....Please contact to further discuss and advise.    Can the clinic reply by [facility] : No     What Number to Call Back : [facility] - [place], [address] [phone] (Phone)  [phone] (Fax)

## 2020-05-29 ENCOUNTER — TELEPHONE (OUTPATIENT)
Dept: DERMATOLOGY | Facility: CLINIC | Age: 75
End: 2020-05-29

## 2020-05-29 NOTE — TELEPHONE ENCOUNTER
Returned pt call. Left message to return call.    ----- Message from Sujey Guerrero sent at 5/29/2020  8:51 AM CDT -----  Contact: Patient   Type: Patient Call Back    Who called:Patient     What is the request in detail: Pt is requesting a call back in regards to his appointment     Can the clinic reply by [facility]?    Would the patient rather a call back or a response via [facility]? Call back     Best call back number:[phone]    Pt is requesting a call back after 2pm

## 2020-06-02 ENCOUNTER — LAB VISIT (OUTPATIENT)
Dept: LAB | Facility: HOSPITAL | Age: 75
End: 2020-06-02
Attending: PHYSICIAN ASSISTANT
Payer: MEDICARE

## 2020-06-02 LAB
ESTIMATED AVG GLUCOSE: 157 MG/DL (ref 68–131)
HBA1C MFR BLD HPLC: 7.1 % (ref 4–5.6)

## 2020-06-02 PROCEDURE — 83036 HEMOGLOBIN GLYCOSYLATED A1C: CPT

## 2020-06-02 PROCEDURE — 36415 COLL VENOUS BLD VENIPUNCTURE: CPT | Mod: PO

## 2020-06-29 ENCOUNTER — TELEPHONE (OUTPATIENT)
Dept: ENDOSCOPY | Facility: HOSPITAL | Age: 75
End: 2020-06-29

## 2020-06-29 NOTE — TELEPHONE ENCOUNTER
Spoke with patient. Colonoscopy schedule for 7/8 at 11:30a. Endo scheduling nurse will contact patient with prep.

## 2020-06-30 ENCOUNTER — TELEPHONE (OUTPATIENT)
Dept: ENDOSCOPY | Facility: HOSPITAL | Age: 75
End: 2020-06-30

## 2020-06-30 DIAGNOSIS — Z12.11 SPECIAL SCREENING FOR MALIGNANT NEOPLASMS, COLON: Primary | ICD-10-CM

## 2020-06-30 RX ORDER — SODIUM, POTASSIUM,MAG SULFATES 17.5-3.13G
1 SOLUTION, RECONSTITUTED, ORAL ORAL ONCE
Qty: 1 KIT | Refills: 0 | Status: SHIPPED | OUTPATIENT
Start: 2020-06-30 | End: 2020-06-30

## 2020-06-30 NOTE — TELEPHONE ENCOUNTER
Spoke with patient about instructions for Colonoscopy scheduled 7/8/20 at 1130.  Suprep instructions mailed.  Covid-19 tamara 7/5/20 at 1300 at [facility].

## 2020-07-05 ENCOUNTER — LAB VISIT (OUTPATIENT)
Dept: URGENT CARE | Facility: CLINIC | Age: 75
End: 2020-07-05
Payer: MEDICARE

## 2020-07-05 DIAGNOSIS — U07.1 COVID-19: ICD-10-CM

## 2020-07-05 PROCEDURE — U0003 INFECTIOUS AGENT DETECTION BY NUCLEIC ACID (DNA OR RNA); SEVERE ACUTE RESPIRATORY SYNDROME CORONAVIRUS 2 (SARS-COV-2) (CORONAVIRUS DISEASE [COVID-19]), AMPLIFIED PROBE TECHNIQUE, MAKING USE OF HIGH THROUGHPUT TECHNOLOGIES AS DESCRIBED BY CMS-2020-01-R: HCPCS

## 2020-07-06 LAB — SARS-COV-2 RNA RESP QL NAA+PROBE: NOT DETECTED

## 2020-07-08 ENCOUNTER — HOSPITAL ENCOUNTER (OUTPATIENT)
Facility: HOSPITAL | Age: 75
Discharge: HOME | End: 2020-07-08
Attending: INTERNAL MEDICINE | Admitting: INTERNAL MEDICINE
Payer: MEDICARE

## 2020-07-08 ENCOUNTER — ANESTHESIA (OUTPATIENT)
Dept: ENDOSCOPY | Facility: HOSPITAL | Age: 75
End: 2020-07-08
Payer: MEDICARE

## 2020-07-08 ENCOUNTER — ANESTHESIA EVENT (OUTPATIENT)
Dept: ENDOSCOPY | Facility: HOSPITAL | Age: 75
End: 2020-07-08
Payer: MEDICARE

## 2020-07-08 VITALS
HEART RATE: 60 BPM | HEIGHT: 75 IN | BODY MASS INDEX: 34.19 KG/M2 | TEMPERATURE: 98 F | DIASTOLIC BLOOD PRESSURE: 77 MMHG | OXYGEN SATURATION: 97 % | RESPIRATION RATE: 19 BRPM | SYSTOLIC BLOOD PRESSURE: 156 MMHG | WEIGHT: 275 LBS

## 2020-07-08 DIAGNOSIS — Z86.010 PERSONAL HISTORY OF COLONIC POLYPS: Primary | ICD-10-CM

## 2020-07-08 PROBLEM — Z86.0100 PERSONAL HISTORY OF COLONIC POLYPS: Status: ACTIVE | Noted: 2020-07-08

## 2020-07-08 LAB
POCT GLUCOSE: 137 MG/DL (ref 70–110)
POCT GLUCOSE: 145 MG/DL (ref 70–110)

## 2020-07-08 PROCEDURE — 82962 GLUCOSE BLOOD TEST: CPT | Performed by: INTERNAL MEDICINE

## 2020-07-08 PROCEDURE — 88305 TISSUE EXAM BY PATHOLOGIST: CPT | Mod: 26,,, | Performed by: PATHOLOGY

## 2020-07-08 PROCEDURE — 45380 PR COLONOSCOPY,BIOPSY: ICD-10-PCS | Mod: 59,,, | Performed by: INTERNAL MEDICINE

## 2020-07-08 PROCEDURE — D9220A PRA ANESTHESIA: Mod: PT,CRNA,, | Performed by: NURSE ANESTHETIST, CERTIFIED REGISTERED

## 2020-07-08 PROCEDURE — 27201089 HC SNARE, DISP (ANY): Performed by: INTERNAL MEDICINE

## 2020-07-08 PROCEDURE — 45380 COLONOSCOPY AND BIOPSY: CPT | Mod: 59,,, | Performed by: INTERNAL MEDICINE

## 2020-07-08 PROCEDURE — 63600175 PHARM REV CODE 636 W HCPCS: Performed by: NURSE ANESTHETIST, CERTIFIED REGISTERED

## 2020-07-08 PROCEDURE — 45385 COLONOSCOPY W/LESION REMOVAL: CPT | Performed by: INTERNAL MEDICINE

## 2020-07-08 PROCEDURE — 45380 COLONOSCOPY AND BIOPSY: CPT | Performed by: INTERNAL MEDICINE

## 2020-07-08 PROCEDURE — 94761 N-INVAS EAR/PLS OXIMETRY MLT: CPT

## 2020-07-08 PROCEDURE — D9220A PRA ANESTHESIA: ICD-10-PCS | Mod: PT,CRNA,, | Performed by: NURSE ANESTHETIST, CERTIFIED REGISTERED

## 2020-07-08 PROCEDURE — D9220A PRA ANESTHESIA: ICD-10-PCS | Mod: PT,ANES,, | Performed by: ANESTHESIOLOGY

## 2020-07-08 PROCEDURE — 45385 COLONOSCOPY W/LESION REMOVAL: CPT | Mod: PT,,, | Performed by: INTERNAL MEDICINE

## 2020-07-08 PROCEDURE — 37000009 HC ANESTHESIA EA ADD 15 MINS: Performed by: INTERNAL MEDICINE

## 2020-07-08 PROCEDURE — 45385 PR COLONOSCOPY,REMV LESN,SNARE: ICD-10-PCS | Mod: PT,,, | Performed by: INTERNAL MEDICINE

## 2020-07-08 PROCEDURE — 25000003 PHARM REV CODE 250: Performed by: INTERNAL MEDICINE

## 2020-07-08 PROCEDURE — 37000008 HC ANESTHESIA 1ST 15 MINUTES: Performed by: INTERNAL MEDICINE

## 2020-07-08 PROCEDURE — 27201012 HC FORCEPS, HOT/COLD, DISP: Performed by: INTERNAL MEDICINE

## 2020-07-08 PROCEDURE — 88305 TISSUE EXAM BY PATHOLOGIST: ICD-10-PCS | Mod: 26,,, | Performed by: PATHOLOGY

## 2020-07-08 PROCEDURE — 88305 TISSUE EXAM BY PATHOLOGIST: CPT | Mod: 59 | Performed by: PATHOLOGY

## 2020-07-08 PROCEDURE — D9220A PRA ANESTHESIA: Mod: PT,ANES,, | Performed by: ANESTHESIOLOGY

## 2020-07-08 RX ORDER — PROPOFOL 10 MG/ML
VIAL (ML) INTRAVENOUS CONTINUOUS PRN
Status: DISCONTINUED | OUTPATIENT
Start: 2020-07-08 | End: 2020-07-08

## 2020-07-08 RX ORDER — LIDOCAINE HCL/PF 100 MG/5ML
SYRINGE (ML) INTRAVENOUS
Status: DISCONTINUED | OUTPATIENT
Start: 2020-07-08 | End: 2020-07-08

## 2020-07-08 RX ORDER — SODIUM CHLORIDE 0.9 % (FLUSH) 0.9 %
10 SYRINGE (ML) INJECTION
Status: DISCONTINUED | OUTPATIENT
Start: 2020-07-08 | End: 2020-07-08 | Stop reason: HOSPADM

## 2020-07-08 RX ORDER — SODIUM CHLORIDE 9 MG/ML
INJECTION, SOLUTION INTRAVENOUS CONTINUOUS
Status: DISCONTINUED | OUTPATIENT
Start: 2020-07-08 | End: 2020-07-08 | Stop reason: HOSPADM

## 2020-07-08 RX ORDER — PROPOFOL 10 MG/ML
VIAL (ML) INTRAVENOUS
Status: DISCONTINUED | OUTPATIENT
Start: 2020-07-08 | End: 2020-07-08

## 2020-07-08 RX ORDER — HYDROMORPHONE HYDROCHLORIDE 1 MG/ML
0.2 INJECTION, SOLUTION INTRAMUSCULAR; INTRAVENOUS; SUBCUTANEOUS EVERY 5 MIN PRN
Status: DISCONTINUED | OUTPATIENT
Start: 2020-07-08 | End: 2020-07-08 | Stop reason: HOSPADM

## 2020-07-08 RX ORDER — SODIUM CHLORIDE 0.9 % (FLUSH) 0.9 %
3 SYRINGE (ML) INJECTION
Status: DISCONTINUED | OUTPATIENT
Start: 2020-07-08 | End: 2020-07-08 | Stop reason: HOSPADM

## 2020-07-08 RX ADMIN — SODIUM CHLORIDE: 0.9 INJECTION, SOLUTION INTRAVENOUS at 11:07

## 2020-07-08 RX ADMIN — PROPOFOL 150 MCG/KG/MIN: 10 INJECTION, EMULSION INTRAVENOUS at 12:07

## 2020-07-08 RX ADMIN — PROPOFOL 90 MG: 10 INJECTION, EMULSION INTRAVENOUS at 12:07

## 2020-07-08 RX ADMIN — Medication 40 MG: at 12:07

## 2020-07-08 NOTE — [employer] PATIENT INSTRUCTIONS
Discharge Summary/Instructions after an Endoscopic Procedure  Patient Name: Rosendo Christina  Patient MRN: [id number]  Patient YOB: 1945 Wednesday, July 08, 2020  Victor Hugo Nunez MD  RESTRICTIONS:  During your procedure today, you received medications for sedation.  These   medications may affect your judgment, balance and coordination.  Therefore,   for 24 hours, you have the following restrictions:   - DO NOT drive a car, operate machinery, make legal/financial decisions,   sign important papers or drink alcohol.    ACTIVITY:  Today: no heavy lifting, straining or running due to procedural   sedation/anesthesia.  The following day: return to full activity including work.  DIET:  Eat and drink normally unless instructed otherwise.     TREATMENT FOR COMMON SIDE EFFECTS:  - Mild abdominal pain, nausea, belching, bloating or excessive gas:  rest,   eat lightly and use a heating pad.  - Sore Throat: treat with throat lozenges and/or gargle with warm salt   water.  - Because air was used during the procedure, expelling large amounts of air   from your rectum or belching is normal.  - If a bowel prep was taken, you may not have a bowel movement for 1-3 days.    This is normal.  SYMPTOMS TO WATCH FOR AND REPORT TO YOUR PHYSICIAN:  1. Abdominal pain or bloating, other than gas cramps.  2. Chest pain.  3. Back pain.  4. Signs of infection such as: chills or fever occurring within 24 hours   after the procedure.  5. Rectal bleeding, which would show as bright red, maroon, or black stools.   (A tablespoon of blood from the rectum is not serious, especially if   hemorrhoids are present.)  6. Vomiting.  7. Weakness or dizziness.  GO DIRECTLY TO THE NEAREST EMERGENCY ROOM IF YOU HAVE ANY OF THE FOLLOWING:      Difficulty breathing              Chills and/or fever over 101 F   Persistent vomiting and/or vomiting blood   Severe abdominal pain   Severe chest pain   Black, tarry stools   Bleeding- more than one  tablespoon   Any other symptom or condition that you feel may need urgent attention  Your doctor recommends these additional instructions:  If any biopsies were taken, your doctors clinic will contact you in 1 to 2   weeks with any results.  - Discharge patient to home (ambulatory).   - Await pathology results.   - Repeat colonoscopy in 1 year for surveillance.   - Resume previous diet.  For questions, problems or results please call your physician - Victor Hugo Nunez MD at Work:  [phone].  [facility] [place], EMERGENCY ROOM PHONE NUMBER: [phone]  IF A COMPLICATION OR EMERGENCY SITUATION ARISES AND YOU ARE UNABLE TO REACH   YOUR PHYSICIAN - GO DIRECTLY TO THE EMERGENCY ROOM.  Victor Hugo Nunez MD  7/8/2020 12:59:48 PM  This report has been verified and signed electronically.  [employer]

## 2020-07-08 NOTE — PROGRESS NOTES
Client discharged to home with friend and belongings. The patient is stable and in no apparent distress, vitals stable, no c/o  Nausea or vomiting, and is tolerating fluids. Discharge instructions and follow up care handouts given and explained; verbalized understanding. Discharge criteria meet. Patient escorted to private vehicle by [facility] via wheelchair.

## 2020-07-08 NOTE — TRANSFER OF CARE
"Anesthesia Transfer of Care Note    Patient: Rosendo Thomas    Procedure(s) Performed: Procedure(s) (LRB):  COLONOSCOPY (N/A)    Patient location: PACU    Anesthesia Type: general    Transport from OR: Transported from OR on 6-10 L/min O2 by face mask with adequate spontaneous ventilation    Post pain: adequate analgesia    Post assessment: no apparent anesthetic complications    Post vital signs: stable    Level of consciousness: awake    Nausea/Vomiting: no nausea/vomiting    Complications: none    Transfer of care protocol was followed      Last vitals:   Visit Vitals  Ht 6' 3" (1.905 m)   Wt 124.7 kg (275 lb)   BMI 34.37 kg/m²     "

## 2020-07-08 NOTE — ANESTHESIA POSTPROCEDURE EVALUATION
Anesthesia Post Evaluation    Patient: Rosendo Thomas    Procedure(s) Performed: Procedure(s) (LRB):  COLONOSCOPY (N/A)    Final Anesthesia Type: general    Patient location during evaluation: PACU  Patient participation: Yes- Able to Participate  Level of consciousness: awake and alert and oriented  Post-procedure vital signs: reviewed and stable  Pain management: adequate  Airway patency: patent    PONV status at discharge: No PONV  Anesthetic complications: no      Cardiovascular status: hemodynamically stable  Respiratory status: unassisted and spontaneous ventilation  Hydration status: euvolemic  Follow-up not needed.          Vitals Value Taken Time   /77 07/08/20 1346   Temp 36.6 °C (97.9 °F) 07/08/20 1345   Pulse 52 07/08/20 1351   Resp 33 07/08/20 1351   SpO2 98 % 07/08/20 1350   Vitals shown include unvalidated device data.      No case tracking events are documented in the log.      Pain/Ivory Score: Ivory Score: 10 (7/8/2020  1:15 PM)

## 2020-07-08 NOTE — DISCHARGE SUMMARY
Discharge Summary/Instructions after an Endoscopic Procedure    Patient Name: Rosendo Christina  Patient MRN: [id number]  Patient YOB: 1945 Wednesday, July 08, 2020  Victor Hugo Nunez MD    RESTRICTIONS:  During your procedure today, you received medications for sedation.  These medications may affect your judgment, balance and coordination.  Therefore, for 24 hours, you have the following restrictions:     - DO NOT drive a car, operate machinery, make legal/financial decisions, sign important papers or drink alcohol.      ACTIVITY:  Today: no heavy lifting, straining or running due to procedural sedation/anesthesia.  The following day: return to full activity including work.    DIET:  Eat and drink normally unless instructed otherwise.     TREATMENT FOR COMMON SIDE EFFECTS:  - Mild abdominal pain, nausea, belching, bloating or excessive gas:  rest, eat lightly and use a heating pad.  - Sore Throat: treat with throat lozenges and/or gargle with warm salt water.  - Because air was used during the procedure, expelling large amounts of air from your rectum or belching is normal.  - If a bowel prep was taken, you may not have a bowel movement for 1-3 days.  This is normal.      SYMPTOMS TO WATCH FOR AND REPORT TO YOUR PHYSICIAN:  1. Abdominal pain or bloating, other than gas cramps.  2. Chest pain.  3. Back pain.  4. Signs of infection such as: chills or fever occurring within 24 hours after the procedure.  5. Rectal bleeding, which would show as bright red, maroon, or black stools. (A tablespoon of blood from the rectum is not serious, especially if hemorrhoids are present.)  6. Vomiting.  7. Weakness or dizziness.      GO DIRECTLY TO THE NEAREST EMERGENCY ROOM IF YOU HAVE ANY OF THE FOLLOWING:     Difficulty breathing              Chills and/or fever over 101 F   Persistent vomiting and/or vomiting blood   Severe abdominal pain   Severe chest pain   Black, tarry stools   Bleeding- more than one tablespoon   Any  other symptom or condition that you feel may need urgent attention    Your doctor recommends these additional instructions:  If any biopsies were taken, your doctors clinic will contact you in 1 to 2 weeks with any results.    - Discharge patient to home (ambulatory).   - Await pathology results.   - Repeat colonoscopy in 1 year for surveillance.   - Resume previous diet.    For questions, problems or results please call your physician - Victor Hugo Nunez MD at Work:  [phone].    [facility] [place], EMERGENCY ROOM PHONE NUMBER: [phone]    IF A COMPLICATION OR EMERGENCY SITUATION ARISES AND YOU ARE UNABLE TO REACH YOUR PHYSICIAN - GO DIRECTLY TO THE EMERGENCY ROOM.

## 2020-07-08 NOTE — DISCHARGE INSTRUCTIONS
Anesthesia: General Anesthesia     You are watched continuously during your procedure by your anesthesia provider.     Youre due to have surgery. During surgery, youll be given medicine called anesthesia or anesthetic. This will keep you comfortable and pain-free. Your anesthesia provider will use general anesthesia.  What is general anesthesia?  General anesthesia puts you into a state like deep sleep. It goes into the bloodstream (IV anesthetics), into the lungs (gas anesthetics), or both. You feel nothing during the procedure. You will not remember it. During the procedure, the anesthesia provider monitors you continuously. He or she checks your heart rate and rhythm, blood pressure, breathing, and blood oxygen.  · IV anesthetics. IV anesthetics are given through an IV line in your arm. Theyre often given first. This is so you are asleep before a gas anesthetic is started. Some kinds of IV anesthetics relieve pain. Others relax you. Your doctor will decide which kind is best in your case.  · Gas anesthetics. Gas anesthetics are breathed into the lungs. They are often used to keep you asleep. They can be given through a facemask or a tube placed in your larynx or trachea (breathing tube).  ¨ If you have a facemask, your anesthesia provider will most likely place it over your nose and mouth while youre still awake. Youll breathe oxygen through the mask as your IV anesthetic is started. Gas anesthetic may be added through the mask.  ¨ If you have a tube in the larynx or trachea, it will be inserted into your throat after youre asleep.  Anesthesia tools and medicines  You will likely have:  · IV anesthetics. These are put into an IV line into your bloodstream.  · Gas anesthetics. You breathe these anesthetics into your lungs, where they pass into your bloodstream.  · Pulse oximeter. This is a small clip that is attached to the end of your finger. This measures your blood oxygen level.  · Electrocardiography  leads (electrodes). These are small sticky pads that are placed on your chest. They record your heart rate and rhythm.  · Blood pressure cuff. This reads your blood pressure.  Risks and possible complications  General anesthesia has some risks. These include:  · Breathing problems  · Nausea and vomiting  · Sore throat or hoarseness (usually temporary)  · Allergic reaction to the anesthetic  · Irregular heartbeat (rare)  · Cardiac arrest (rare)   Anesthesia safety  · Follow all instructions you are given for how long not to eat or drink before your procedure.  · Be sure your doctor knows what medicines and drugs you take. This includes over-the-counter medicines, herbs, supplements, alcohol or other drugs. You will be asked when those were last taken.  · Have an adult family member or friend drive you home after the procedure.  · For the first 24 hours after your surgery:  ¨ Do not drive or use heavy equipment.  ¨ Do not make important decisions or sign legal documents. If important decisions or signing legal documents is necessary during the first 24 hours after surgery, have a trusted family member or spouse act on your behalf.  ¨ Avoid alcohol.  ¨ Have a responsible adult stay with you. He or she can watch for problems and help keep you safe.  Date Last Reviewed: 12/1/2016  © 8815-3687 [employer]. [address] [place] [address], [place] [address]. All rights reserved. This information is not intended as a substitute for professional medical care. Always follow your healthcare professional's instructions.

## 2020-07-08 NOTE — PLAN OF CARE
This writer spoke to Emily Meléndez, patient's friend who is driving him home. I informed her the patient is in the recovery area. She states she will be on her way to the hospital shortly. It takes her about 30 minutes to get here.

## 2020-07-08 NOTE — H&P
History & Physical - Short Stay  Gastroenterology      SUBJECTIVE:     Procedure: Colonoscopy    Chief Complaint/Indication for Procedure: Previous Polyps    History of Present Illness:  Patient is a 74 y.o. male presents with personal history of colon cancer and previous colon polyps S/P EMR here for surveillance.   PTA Medications   Medication Sig    atorvastatin (LIPITOR) 20 MG tablet TAKE 1 TABLET BY MOUTH ONCE DAILY    glimepiride (AMARYL) 4 MG tablet Take 1 tablet (4 mg total) by mouth before breakfast.    losartan-hydrochlorothiazide 100-25 mg (HYZAAR) 100-25 mg per tablet Take 1 tablet by mouth once daily.    metFORMIN (GLUCOPHAGE-XR) 500 MG XR 24hr tablet TAKE 2 TABLETS BY MOUTH TWICE A DAY WITH MEALS    OTEZLA 30 mg Tab Take 1 tablet by mouth twice daily    azelastine (ASTELIN) 137 mcg (0.1 %) nasal spray 1 TO 2 SPRAY(S) IN EACH NOSTRIL EVERY 12 HOURS    blood sugar diagnostic (CONTOUR TEST STRIPS) Strp 1 each by Misc.(Non-Drug; Combo Route) route 2 (two) times daily.    clobetasol (TEMOVATE) 0.05 % cream Apply topically 2 (two) times daily. To psoriasis PRN    clobetasol 0.05% (TEMOVATE) 0.05 % Oint Apply topically 2 (two) times daily. To rash on  Hands under gloves    fluocinonide (LIDEX) 0.05 % external solution Apply topically 2 (two) times daily. For itching in scalp and ears    lancets Misc USE ONE LANCET TWO TIMES DAILY    sodium,potassium,mag sulfates (SUPREP BOWEL PREP KIT) 17.5-3.13-1.6 gram SolR As Directed    triamcinolone acetonide 0.1% (KENALOG) 0.1 % ointment Apply topically 2 (two) times daily. To rash       Review of patient's allergies indicates:  No Known Allergies     Past Medical History:   Diagnosis Date    Colon cancer     Colon polyps     Diabetes mellitus type II 7/2010    diet controlled    Nephrolithiasis     Obesity     Psoriasis     Skin cancer     Squamous cell carcinoma      Past Surgical History:   Procedure Laterality Date    APPENDECTOMY       COLONOSCOPY N/A 7/9/2018    Procedure: COLONOSCOPY;  Surgeon: Kameron Ruff MD;  Location: [facility] ENDO;  Service: Endoscopy;  Laterality: N/A;  confirmed    COLONOSCOPY N/A 11/11/2019    Procedure: COLONOSCOPY;  Surgeon: Victor Hugo Nunez MD;  Location: [facility] (4TH FLR);  Service: Endoscopy;  Laterality: N/A;  Pt stated this is the only day he has somebody to drive him and  to stay during procedure  PM Prep    LAPAROSCOPIC LEFT COLECTOMY N/A 9/25/2018    Procedure: COLECTOMY-LAPAROSCOPIC LEFT;  Surgeon: Chito Banks MD;  [facility];  Service: Colon and Rectal;  Laterality: N/A;    SKIN CANCER EXCISION      SKIN GRAFT      VASECTOMY      VASECTOMY       Family History   Problem Relation Age of Onset    Cancer Father         prostate    Diabetes Brother     Anesthesia problems Neg Hx      Social History     Tobacco Use    Smoking status: Former Smoker     Years: 15.00     Types: Cigars    Smokeless tobacco: Never Used    Tobacco comment: cigars-x1 yr.   Substance Use Topics    Alcohol use: Yes     Comment: occassional    Drug use: No       Review of Systems:  Constitutional: no fever or chills  Respiratory: no cough or shortness of breath  Cardiovascular: no chest pain or palpitations  Gastrointestinal: no nausea or vomiting, no abdominal pain or change in bowel habits    OBJECTIVE:     Vital Signs (Most Recent)       Physical Exam:  General: well developed, well nourished  Lungs:  normal respiratory effort  Heart: regular rate, S1, S2 normal    Laboratory  CBC: No results for input(s): WBC, RBC, HGB, HCT, PLT, MCV, MCH, MCHC in the last 168 hours.  CMP: No results for input(s): GLU, CALCIUM, ALBUMIN, PROT, NA, K, CO2, CL, BUN, CREATININE, ALKPHOS, ALT, AST, BILITOT in the last 168 hours.  Coagulation: No results for input(s): LABPROT, INR, APTT in the last 168 hours.      Diagnostic Results:      ASSESSMENT/PLAN:     Personal history of colon cancer  Previous Colon polyps    Plan:  Colonoscopy    Anesthesia Plan: MAC    ASA Grade: ASA 3 - Patient with moderate systemic disease with functional limitations     The impression and plan was discussed in detail with the patient. All questions have been answered and the patient voices understanding of our plan at this point. The risk of the procedure was discussed in detail which includes but not limited to bleeding, infection, perforation in some cases requiring surgery with its spectrum of complications.

## 2020-07-08 NOTE — PLAN OF CARE
This writer called endoscopy and informed them the patient is awake and ready to talk to Dr. Nunez.

## 2020-07-08 NOTE — ANESTHESIA PREPROCEDURE EVALUATION
07/08/2020  Rosendo Thomas is a 74 y.o., male.  Past Medical History:   Diagnosis Date    Colon cancer     Colon polyps     Diabetes mellitus type II 7/2010    diet controlled    Nephrolithiasis     Obesity     Psoriasis     Skin cancer     Squamous cell carcinoma      Patient Active Problem List   Diagnosis    Uncontrolled type 2 diabetes mellitus with stage 2 chronic kidney disease, without long-term current use of insulin    Psoriasis    Essential hypertension, benign    Sinus bradycardia    Psoriatic arthritis    History of colon cancer    Aortic atherosclerosis    Personal history of colonic polyps     Past Surgical History:   Procedure Laterality Date    APPENDECTOMY      COLONOSCOPY N/A 7/9/2018    Procedure: COLONOSCOPY;  Surgeon: Kameron Ruff MD;  Location: [facility] ENDO;  Service: Endoscopy;  Laterality: N/A;  confirmed    COLONOSCOPY N/A 11/11/2019    Procedure: COLONOSCOPY;  Surgeon: Victor Hugo Nunez MD;  [facility]  Service: Endoscopy;  Laterality: N/A;  Pt stated this is the only day he has somebody to drive him and  to stay during procedure  PM Prep    LAPAROSCOPIC LEFT COLECTOMY N/A 9/25/2018    Procedure: COLECTOMY-LAPAROSCOPIC LEFT;  Surgeon: Chito Banks MD;  Location: [facility] OR [address] [place] [address];  Service: Colon and Rectal;  Laterality: N/A;    SKIN CANCER EXCISION      SKIN GRAFT      VASECTOMY      VASECTOMY       Review of patient's allergies indicates:  No Known Allergies    Anesthesia Evaluation    I have reviewed the Patient Summary Reports.    I have reviewed the Nursing Notes. I have reviewed the NPO Status.   I have reviewed the Medications.     Review of Systems  Cardiovascular:   Hypertension    Renal/:   Chronic Renal Disease    Endocrine:   Diabetes           Anesthesia Plan  Type of Anesthesia, risks & benefits discussed:  Anesthesia  Type:  general  Patient's Preference:   Intra-op Monitoring Plan:   Intra-op Monitoring Plan Comments:   Post Op Pain Control Plan: multimodal analgesia  Post Op Pain Control Plan Comments:   Induction:   IV  Beta Blocker:  Patient is not currently on a Beta-Blocker (No further documentation required).       Informed Consent: Patient understands risks and agrees with Anesthesia plan.  Questions answered. Anesthesia consent signed with patient.  ASA Score: 2     Day of Surgery Review of History & Physical:    H&P update referred to the surgeon.         Ready For Surgery From Anesthesia Perspective.

## 2020-07-10 LAB
FINAL PATHOLOGIC DIAGNOSIS: NORMAL
GROSS: NORMAL

## 2020-07-13 ENCOUNTER — TELEPHONE (OUTPATIENT)
Dept: ENDOSCOPY | Facility: HOSPITAL | Age: 75
End: 2020-07-13

## 2020-07-13 NOTE — TELEPHONE ENCOUNTER
----- Message from Victor Hugo Nunez MD sent at 7/10/2020  5:04 PM CDT -----  Please let the patient know the polyps were adenoma. No cancer or dysplasia. Need colonoscopy in 1 year.

## 2020-07-17 DIAGNOSIS — Z71.89 COMPLEX CARE COORDINATION: ICD-10-CM

## 2020-07-19 ENCOUNTER — PATIENT OUTREACH (OUTPATIENT)
Dept: ADMINISTRATIVE | Facility: OTHER | Age: 75
End: 2020-07-19

## 2020-07-19 NOTE — PROGRESS NOTES
Patient's chart was reviewed for overdue ALEJANDRA topics.  Immunizations reconciled.    Orders placed:n/a  Labs Linked:n/a

## 2020-07-23 ENCOUNTER — PES CALL (OUTPATIENT)
Dept: ADMINISTRATIVE | Facility: CLINIC | Age: 75
End: 2020-07-23

## 2020-07-24 ENCOUNTER — TELEPHONE (OUTPATIENT)
Dept: DERMATOLOGY | Facility: CLINIC | Age: 75
End: 2020-07-24

## 2020-07-24 DIAGNOSIS — E11.9 TYPE 2 DIABETES MELLITUS WITHOUT COMPLICATION: ICD-10-CM

## 2020-07-24 NOTE — TELEPHONE ENCOUNTER
----- Message from Odette Mendoza sent at 7/24/2020 10:12 AM CDT -----  Name of Who is Calling: CASSY VALENTE [[id number]]    What is the request in detail: Would like to speak with staff to reschedule appointment he missed on 7/20. Please contact to further discuss and advise      Can the clinic reply by [facility]: no    What Number to Call Back if not [facility]: [phone]

## 2020-07-24 NOTE — TELEPHONE ENCOUNTER
Spoke with pt and let her know that Dr. Wright and staff is out of the office until next week and will give him a call back to reschedule appointment.

## 2020-07-27 ENCOUNTER — TELEPHONE (OUTPATIENT)
Dept: DERMATOLOGY | Facility: CLINIC | Age: 75
End: 2020-07-27

## 2020-07-27 NOTE — TELEPHONE ENCOUNTER
Scheduled patient appointment per message received in the Dermatology department. Patient acknowledges appointment made and confirms.   ----- Message from Annie Shine MA sent at 7/24/2020  1:51 PM CDT -----    ----- Message -----  From: Odette Mendoza  Sent: 7/24/2020  10:12 AM CDT  To: Adriana Rob Staff    Name of Who is Calling: CASSY VALENTE [[id number]]    What is the request in detail: Would like to speak with staff to reschedule appointment he missed on 7/20. Please contact to further discuss and advise      Can the clinic reply by [facility]: no    What Number to Call Back if not in [facility]: [phone]

## 2020-07-28 ENCOUNTER — OFFICE VISIT (OUTPATIENT)
Dept: RHEUMATOLOGY | Facility: CLINIC | Age: 75
End: 2020-07-28
Payer: MEDICARE

## 2020-07-28 ENCOUNTER — HOSPITAL ENCOUNTER (OUTPATIENT)
Dept: RADIOLOGY | Facility: HOSPITAL | Age: 75
Discharge: HOME | End: 2020-07-28
Attending: STUDENT IN AN ORGANIZED HEALTH CARE EDUCATION/TRAINING PROGRAM
Payer: MEDICARE

## 2020-07-28 VITALS
HEART RATE: 60 BPM | HEIGHT: 75 IN | WEIGHT: 280.44 LBS | SYSTOLIC BLOOD PRESSURE: 132 MMHG | BODY MASS INDEX: 34.87 KG/M2 | DIASTOLIC BLOOD PRESSURE: 65 MMHG

## 2020-07-28 DIAGNOSIS — L40.9 PSORIASIS: ICD-10-CM

## 2020-07-28 DIAGNOSIS — L40.50 PSORIATIC ARTHRITIS: ICD-10-CM

## 2020-07-28 DIAGNOSIS — Z72.89 OTHER PROBLEMS RELATED TO LIFESTYLE: ICD-10-CM

## 2020-07-28 DIAGNOSIS — D84.9 IMMUNOSUPPRESSION: ICD-10-CM

## 2020-07-28 DIAGNOSIS — L40.50 PSORIATIC ARTHRITIS: Primary | ICD-10-CM

## 2020-07-28 DIAGNOSIS — Z11.4 ENCOUNTER FOR SCREENING FOR HUMAN IMMUNODEFICIENCY VIRUS (HIV): ICD-10-CM

## 2020-07-28 PROCEDURE — 99214 PR OFFICE/OUTPT VISIT, EST, LEVL IV, 30-39 MIN: ICD-10-PCS | Mod: S$PBB,GC,, | Performed by: STUDENT IN AN ORGANIZED HEALTH CARE EDUCATION/TRAINING PROGRAM

## 2020-07-28 PROCEDURE — 99999 PR PBB SHADOW E&M-EST. PATIENT-LVL V: CPT | Mod: PBBFAC,GC,, | Performed by: STUDENT IN AN ORGANIZED HEALTH CARE EDUCATION/TRAINING PROGRAM

## 2020-07-28 PROCEDURE — 73620 X-RAY EXAM OF FOOT: CPT | Mod: TC,50

## 2020-07-28 PROCEDURE — 99999 PR PBB SHADOW E&M-EST. PATIENT-LVL V: ICD-10-PCS | Mod: PBBFAC,GC,, | Performed by: STUDENT IN AN ORGANIZED HEALTH CARE EDUCATION/TRAINING PROGRAM

## 2020-07-28 PROCEDURE — 99214 OFFICE O/P EST MOD 30 MIN: CPT | Mod: S$PBB,GC,, | Performed by: STUDENT IN AN ORGANIZED HEALTH CARE EDUCATION/TRAINING PROGRAM

## 2020-07-28 PROCEDURE — 73620 XR FOOT AP BILAT: ICD-10-PCS | Mod: 26,50,, | Performed by: RADIOLOGY

## 2020-07-28 PROCEDURE — 73130 X-RAY EXAM OF HAND: CPT | Mod: 26,50,, | Performed by: RADIOLOGY

## 2020-07-28 PROCEDURE — 73130 XR HAND COMPLETE 3 VIEWS BILATERAL: ICD-10-PCS | Mod: 26,50,, | Performed by: RADIOLOGY

## 2020-07-28 PROCEDURE — 73130 X-RAY EXAM OF HAND: CPT | Mod: TC,50

## 2020-07-28 PROCEDURE — 99215 OFFICE O/P EST HI 40 MIN: CPT | Mod: PBBFAC | Performed by: STUDENT IN AN ORGANIZED HEALTH CARE EDUCATION/TRAINING PROGRAM

## 2020-07-28 PROCEDURE — 73620 X-RAY EXAM OF FOOT: CPT | Mod: 26,50,, | Performed by: RADIOLOGY

## 2020-07-28 ASSESSMENT — ROUTINE ASSESSMENT OF PATIENT INDEX DATA (RAPID3)
PATIENT GLOBAL ASSESSMENT SCORE: 0
PSYCHOLOGICAL DISTRESS SCORE: 0
FATIGUE SCORE: 0.5
TOTAL RAPID3 SCORE: 0
MDHAQ FUNCTION SCORE: 0
PAIN SCORE: 0
AM STIFFNESS SCORE: 0, NO

## 2020-07-28 NOTE — PROGRESS NOTES
"Subjective:       Patient ID: Rosendo Thomas is a 74 y.o. male.    Chief Complaint: Disease Management (Psoriatic arthritis)    HPI     The patient is a 74 year old male with past medical history of T2DM, osteoarthritis of the hands, colon cancer s/p left colectomy, HTN, and psoriatic arthritis on Otezla 30mg BID, bee soap, and clobetasol cream. He was last seen on October 2019, and at the time had rashes over his hands, elbows, and knees as well as bilateral 3rd digit dactylitis. Currently, he says that he has rashes on his scalp, arms, and fingers. The rashes over his gluteal cleft, abdomen, and knees are gone. He does have trouble making a fist with his right hand and has some stiffness in his second digit. Otherwise his joints are doing well.       Review of Systems   Constitutional: Negative for activity change, chills, fatigue and fever.   HENT: Negative for ear pain, facial swelling and mouth sores.    Eyes: Negative for redness and visual disturbance.   Respiratory: Negative for cough and shortness of breath.    Cardiovascular: Negative for chest pain, palpitations and leg swelling.   Gastrointestinal: Negative for abdominal distention and abdominal pain.   Musculoskeletal: Positive for arthralgias. Negative for joint swelling.   Skin: Positive for rash.   Neurological: Negative for weakness.         Objective:   /65   Pulse 60   Ht 6' 3" (1.905 m)   Wt 127.2 kg (280 lb 6.8 oz)   BMI 35.05 kg/m²      Physical Exam   Constitutional: He is well-developed, well-nourished, and in no distress. No distress.   HENT:   Head: Normocephalic and atraumatic.   Mouth/Throat: No oropharyngeal exudate.   Eyes: Pupils are equal, round, and reactive to light. Right eye exhibits no discharge. Left eye exhibits no discharge. No scleral icterus.   Neck: Normal range of motion. Neck supple.   Cardiovascular: Normal rate, regular rhythm and normal heart sounds.    Pulmonary/Chest: Effort normal and breath sounds " normal.   Abdominal: Soft. Bowel sounds are normal.   Skin: Rash (Guttate psoriasis over both elbows, forearms, hands, and above the right knee) noted. He is not diaphoretic.     Musculoskeletal:      Comments: See pictures          No data to display          Assessment:       1. Psoriatic arthritis    2. Psoriasis    3. Encounter for screening for human immunodeficiency virus (HIV)     4. Other problems related to lifestyle     5. Immunosuppression        The patient is a 74 year old male with past medical history of T2DM, osteoarthritis of the hands, colon cancer s/p left colectomy, HTN, and psoriatic arthritis on Otezla 30mg BID, bee soap, and clobetasol cream. He has rashes on his scalp, arms, and fingers and does not appear to be controlled on Otezla. He does have trouble making a fist with his right hand and has some stiffness in his second digit. Otherwise his joints are doing well. Will check labs and Xrays today to see if he has erosive disease. If he does, will consider switching him to Tremfya or Taltz.     Plan:       Problem List Items Addressed This Visit        Derm    Psoriasis    Relevant Orders    CBC auto differential    Comprehensive metabolic panel    Sedimentation rate    C-Reactive Protein    X-Ray Hand Complete Bilateral    X-Ray Foot AP Bilateral    HIV 1/2 Ag/Ab (4th Gen)    Hepatitis B surface antigen    HBcAB    Hepatitis B surface antibody    Hepatitis C antibody    Hepatitis A antibody, IgG    Strongyloides IgG Antibodies    Quantiferon Gold TB    RPR    Varicella zoster antibody, IgG       Orthopedic    Psoriatic arthritis - Primary    Relevant Orders    CBC auto differential    Comprehensive metabolic panel    Sedimentation rate    C-Reactive Protein    X-Ray Hand Complete Bilateral    X-Ray Foot AP Bilateral    HIV 1/2 Ag/Ab (4th Gen)    Hepatitis B surface antigen    HBcAB    Hepatitis B surface antibody    Hepatitis C antibody    Hepatitis A antibody, IgG    Strongyloides IgG  Antibodies    Quantiferon Gold TB    RPR    Varicella zoster antibody, IgG      Other Visit Diagnoses     Encounter for screening for human immunodeficiency virus (HIV)         Relevant Orders    HIV 1/2 Ag/Ab (4th Gen)    Other problems related to lifestyle         Relevant Orders    HIV 1/2 Ag/Ab (4th Gen)    Immunosuppression            Psoriatic arthritis  - Continue Otezla 30mg BID for now   - Patient with history of malignancy limits additional treatment options such as TNFs for now and Cosentyx (has no data in patients with malignancy), may consider MTX, Sulfasalazine, IL17i, Xeljanz for arthritis as an alternative option if needed. Taltz or Tremfya may be the best option for both plaque psoriasis and psoriatic arthritis.   - ESR, CRP, CBC, CMP, and pre-DMARDs today  - Hand and feet Xrays today     Psoriasis  - Continue to f/u with Dermatology  - Continue topicals       Patient seen and discussed with Dr. Saregnt    RTC in 3 months    Holley Slaughter M.D.  Rheumatology, PGY 5

## 2020-07-28 NOTE — PROGRESS NOTES
RHEUMATOLOGY ATTENDING:  Patient presented, personally interviewed and examined, medical records reviewed.  74 yr old man with multiple morbidities (including DM II) initially send by his PCP for psoriatic arthritis.  He has had psoriasis x >10 yrs with bilateral hand pain w swelling of DIPs & entire digits. No LBP or enthesitis. No FHx of SpA, IBD, uveitis.   He had been on Enbrel x 1 yr in the past for psoriasis but it was ineffective and he developed infections.  He has used a number of topicals but they were not effective either. Bee soap however helps his psoriasis.  He underwent surgery for sigmoid cancer & was seen by Derm who agreed with us with the use of apremilast & he was started on it around May, 2019 and has had an improvement in his skin lesions (see Dr. Hirsch's previous photos & current photos by Dr. Slaughter) but they have now worsened again and he has some difficulty flexing his 2nd digits bilaterally.    Exam is remarkable for many psoriatic skin lesions.  No clear synovitis of joint tenderness. There is a suggestion of dactylitis 2nd digits, but there is no pain or TTP.  Imaging previously reviewed read as OA of hands but very c/w PsA. Feet with OA bilateral calcaneal spurs inferiorly & superiorly.  Previous labs with anemia, mild thromobocytopenia & elevated CRP  Rx: Psoriatic arthritis   Arthritis probably moderately well controled on apremilast prescribed by  Dermatologist.    Skin not well controlled.  OA of hands & feet  Obesity     Plan:   Imaging.  Labs  Decide on escalation of rx after albs and imaging & in conjunction with dermatologist.   If needed in future for PsA options include tofa, 1L7a inhibitors & IL23 inhibitors.  Findings discussed with patient and  whose note and assessment I agree with.

## 2020-07-29 ENCOUNTER — TELEPHONE (OUTPATIENT)
Dept: RHEUMATOLOGY | Facility: CLINIC | Age: 75
End: 2020-07-29

## 2020-07-29 NOTE — TELEPHONE ENCOUNTER
Mr. Thomas had requested calls only and does not use the patient portal. I tried to call but it went straight to voicemail. I left a message explaining that he has new erosions on Xray and will need to switch from Otezla to Taltz. I will try to call again tomorrow to explain before prescribing the medication.

## 2020-07-30 DIAGNOSIS — L40.9 PSORIASIS: Primary | ICD-10-CM

## 2020-07-30 DIAGNOSIS — L40.50 PSORIATIC ARTHRITIS: ICD-10-CM

## 2020-07-30 RX ORDER — IXEKIZUMAB 80 MG/ML
INJECTION, SOLUTION SUBCUTANEOUS
Qty: 8 ML | Refills: 0 | Status: SHIPPED | OUTPATIENT
Start: 2020-07-30 | End: 2021-03-09

## 2020-07-30 NOTE — PROGRESS NOTES
I spoke with Mr. Thomas regarding his results. He has new erosive changes on Xray, in light of this as well as psoriatic rashes on Otezla, will get approval to switch the patient to Taltz. I discussed potential side effects including cytopenias, infections, injection reactions, and GI distress. All questions answered.       Patient discussed with Dr. Arie Slaughter M.D.  Rheumatology, PGY 5

## 2020-07-31 ENCOUNTER — TELEPHONE (OUTPATIENT)
Dept: PHARMACY | Facility: CLINIC | Age: 75
End: 2020-07-31

## 2020-08-04 DIAGNOSIS — L40.50 PSORIATIC ARTHRITIS: ICD-10-CM

## 2020-08-04 DIAGNOSIS — L40.9 PSORIASIS: Primary | ICD-10-CM

## 2020-08-04 RX ORDER — IXEKIZUMAB 80 MG/ML
80 INJECTION, SOLUTION SUBCUTANEOUS
Qty: 3 ML | Refills: 0 | Status: SHIPPED | OUTPATIENT
Start: 2020-08-04 | End: 2021-03-03 | Stop reason: SDUPTHER

## 2020-08-06 NOTE — TELEPHONE ENCOUNTER
DOCUMENTATION ONLY:  Prior authorization for Taltz approved until 2/5/2021 with an $80 co-pay. [facility] will reach out to the patient to discuss assistance options.

## 2020-08-17 ENCOUNTER — OFFICE VISIT (OUTPATIENT)
Dept: DERMATOLOGY | Facility: CLINIC | Age: 75
End: 2020-08-17
Payer: MEDICARE

## 2020-08-17 DIAGNOSIS — L40.9 PSORIASIS: ICD-10-CM

## 2020-08-17 DIAGNOSIS — D48.5 NEOPLASM OF UNCERTAIN BEHAVIOR OF SKIN: Primary | ICD-10-CM

## 2020-08-17 DIAGNOSIS — L81.4 LENTIGO: ICD-10-CM

## 2020-08-17 DIAGNOSIS — L21.9 SEBORRHEIC DERMATITIS OF SCALP: ICD-10-CM

## 2020-08-17 DIAGNOSIS — Z12.83 ENCOUNTER FOR SCREENING FOR MALIGNANT NEOPLASM OF SKIN: ICD-10-CM

## 2020-08-17 DIAGNOSIS — D22.9 JUNCTIONAL NEVUS: ICD-10-CM

## 2020-08-17 PROCEDURE — 88305 TISSUE EXAM BY PATHOLOGIST: CPT | Performed by: PATHOLOGY

## 2020-08-17 PROCEDURE — 99213 OFFICE O/P EST LOW 20 MIN: CPT | Mod: 25,S$PBB,GC, | Performed by: DERMATOLOGY

## 2020-08-17 PROCEDURE — 88305 TISSUE EXAM BY PATHOLOGIST: ICD-10-PCS | Mod: 26,,, | Performed by: PATHOLOGY

## 2020-08-17 PROCEDURE — 99999 PR PBB SHADOW E&M-EST. PATIENT-LVL I: ICD-10-PCS | Mod: PBBFAC,,,

## 2020-08-17 PROCEDURE — 11102 TANGNTL BX SKIN SINGLE LES: CPT | Mod: S$PBB,GC,, | Performed by: STUDENT IN AN ORGANIZED HEALTH CARE EDUCATION/TRAINING PROGRAM

## 2020-08-17 PROCEDURE — 99999 PR PBB SHADOW E&M-EST. PATIENT-LVL I: CPT | Mod: PBBFAC,,,

## 2020-08-17 PROCEDURE — 99211 OFF/OP EST MAY X REQ PHY/QHP: CPT | Mod: PBBFAC,25

## 2020-08-17 PROCEDURE — 88305 TISSUE EXAM BY PATHOLOGIST: CPT | Mod: 26,,, | Performed by: PATHOLOGY

## 2020-08-17 PROCEDURE — 11102 PR TANGENTIAL BIOPSY, SKIN, SINGLE LESION: ICD-10-PCS | Mod: S$PBB,GC,, | Performed by: STUDENT IN AN ORGANIZED HEALTH CARE EDUCATION/TRAINING PROGRAM

## 2020-08-17 PROCEDURE — 99213 PR OFFICE/OUTPT VISIT, EST, LEVL III, 20-29 MIN: ICD-10-PCS | Mod: 25,S$PBB,GC, | Performed by: DERMATOLOGY

## 2020-08-17 RX ORDER — FLUOCINONIDE TOPICAL SOLUTION USP, 0.05% 0.5 MG/ML
SOLUTION TOPICAL 2 TIMES DAILY
Qty: 60 ML | Refills: 3 | Status: SHIPPED | OUTPATIENT
Start: 2020-08-17 | End: 2022-01-28

## 2020-08-17 NOTE — PROGRESS NOTES
Subjective:       Patient ID:  Rosendo Thomas is a 75 y.o. male who presents for No chief complaint on file.    75 yo male with hx of psoriasis and psoriatic arthritis, on Otelza and clobetasol ointment for his psoriasis.    The patient reports that his hand xrays showed progression of joint disease so rheumatology started Taltz.   Hx of SCC to right dorsal hand s/p ED&C.  He report his scalp has been itchy.  Denies recurrence to lesion on right hand.  No other lesions of concern.  Otherwise, The patient denies any moles or growths of the skin that are rapidly growing, hurting, itching, bleeding, or changing colors.     Review of Systems   Skin: Positive for itching, rash and dry skin. Negative for daily sunscreen use, activity-related sunscreen use, recent sunburn and wears hat.   Hematologic/Lymphatic: Bruises/bleeds easily.          Review of Systems   Constitutional: Negative for fever and chills.   HENT: Negative for mouth sores.    Respiratory: Negative for cough and shortness of breath.    Genitourinary: Negative for genital sores.   Skin: Positive for itching and rash.        Objective:    Physical Exam   Constitutional: He appears well-developed and well-nourished.   Neurological: He is alert and oriented to person, place, and time.   Psychiatric: He has a normal mood and affect.   Skin:   Areas Examined (abnormalities noted in diagram):   Scalp / Hair Palpated and Inspected  Head / Face Inspection Performed  Neck Inspection Performed  Chest / Axilla Inspection Performed  Abdomen Inspection Performed  Genitals / Buttocks / Groin Inspection Performed  Back Inspection Performed  RUE Inspected  LUE Inspection Performed  RLE Inspected  LLE Inspection Performed  Nails and Digits Inspection Performed              Diagram Legend     Erythematous scaling macule/papule c/w actinic keratosis       Vascular papule c/w angioma      Pigmented verrucoid papule/plaque c/w seborrheic keratosis      Yellow umbilicated  papule c/w sebaceous hyperplasia      Irregularly shaped tan macule c/w lentigo     1-2 mm smooth white papules consistent with Milia      Movable subcutaneous cyst with punctum c/w epidermal inclusion cyst      Subcutaneous movable cyst c/w pilar cyst      Firm pink to brown papule c/w dermatofibroma      Pedunculated fleshy papule(s) c/w skin tag(s)      Evenly pigmented macule c/w junctional nevus     Mildly variegated pigmented, slightly irregular-bordered macule c/w mildly atypical nevus      Flesh colored to evenly pigmented papule c/w intradermal nevus       Pink pearly papule/plaque c/w basal cell carcinoma      Erythematous hyperkeratotic cursted plaque c/w SCC      Surgical scar with no sign of skin cancer recurrence      Open and closed comedones      Inflammatory papules and pustules      Verrucoid papule consistent consistent with wart     Erythematous eczematous patches and plaques     Dystrophic onycholytic nail with subungual debris c/w onychomycosis     Umbilicated papule    Erythematous-base heme-crusted tan verrucoid plaque consistent with inflamed seborrheic keratosis     Erythematous Silvery Scaling Plaque c/w Psoriasis     See annotation                  Assessment / Plan:        Neoplasm of uncertain behavior of skin  -     Specimen to Pathology, Dermatology  - Ddx includes luna's vs lichenoid keratosis vs other  - Discussed options for management and patient consented for biopsy    Shave biopsy procedure note:  Left shoulder  Shave biopsy performed after verbal consent including risk of infection, scar, recurrence, need for additional treatment of site. Area prepped with alcohol, anesthetized with approximately 1.0cc of 1% lidocaine with epinephrine. Lesional tissue shaved with razor blade. Hemostasis achieved with application of aluminum chloride followed by hyfrecation. No complications. Dressing applied. Wound care explained.    Psoriasis  -     Continue clobetasol 0.05% twice daily to  areas of involvement on hands/arms/legs  - Continue to follow with rheumatology for treatment with otezla and taltz  - Will monitor center of back psoriasis lesion for resolution with treatment    Lentigo  -     Discussed etiology; will monitor  - Discussed sun protection    Seborrheic dermatitis of scalp  -     fluocinonide (LIDEX) 0.05 % external solution; Apply topically 2 (two) times daily.  Dispense: 60 mL; Refill: 3  - Recommend T-sal shampoo OTC twice weekly and lidex solution as detailed above    Junctional nevus  -     Reassurance to benign nature provided    Encounter for screening for malignant neoplasm of skin  -     Discussed and educated patient on sun protection         Follow up in about 6 months (around 2/17/2021).

## 2020-08-18 RX ORDER — GLIMEPIRIDE 4 MG/1
4 TABLET ORAL
Qty: 90 TABLET | Refills: 0 | Status: SHIPPED | OUTPATIENT
Start: 2020-08-18 | End: 2020-11-20 | Stop reason: SDUPTHER

## 2020-08-20 LAB
FINAL PATHOLOGIC DIAGNOSIS: NORMAL
GROSS: NORMAL
MICROSCOPIC EXAM: NORMAL

## 2020-08-21 NOTE — PATIENT INSTRUCTIONS

## 2020-09-02 NOTE — TELEPHONE ENCOUNTER
Patient notified of Taltz assistance approval. He will call to schedule delivery and injection training if desired, and notify MD of start date so follow up labs may be scheduled. Made patient aware to call [facility] or MD if renewal assistance is needed. Patient verbalized understanding.

## 2020-09-04 ENCOUNTER — OFFICE VISIT (OUTPATIENT)
Dept: FAMILY MEDICINE | Facility: CLINIC | Age: 75
End: 2020-09-04
Payer: MEDICARE

## 2020-09-04 ENCOUNTER — LAB VISIT (OUTPATIENT)
Dept: LAB | Facility: HOSPITAL | Age: 75
End: 2020-09-04
Attending: FAMILY MEDICINE
Payer: MEDICARE

## 2020-09-04 VITALS
BODY MASS INDEX: 33.77 KG/M2 | HEIGHT: 75 IN | HEART RATE: 50 BPM | OXYGEN SATURATION: 97 % | DIASTOLIC BLOOD PRESSURE: 70 MMHG | WEIGHT: 271.63 LBS | RESPIRATION RATE: 18 BRPM | TEMPERATURE: 99 F | SYSTOLIC BLOOD PRESSURE: 112 MMHG

## 2020-09-04 DIAGNOSIS — I10 ESSENTIAL HYPERTENSION, BENIGN: ICD-10-CM

## 2020-09-04 DIAGNOSIS — I70.0 AORTIC ATHEROSCLEROSIS: ICD-10-CM

## 2020-09-04 DIAGNOSIS — Z00.00 ENCOUNTER FOR PREVENTIVE HEALTH EXAMINATION: Primary | ICD-10-CM

## 2020-09-04 DIAGNOSIS — N52.9 ERECTILE DYSFUNCTION, UNSPECIFIED ERECTILE DYSFUNCTION TYPE: ICD-10-CM

## 2020-09-04 DIAGNOSIS — E11.9 TYPE 2 DIABETES MELLITUS WITHOUT COMPLICATION: ICD-10-CM

## 2020-09-04 DIAGNOSIS — Z85.038 HISTORY OF COLON CANCER: ICD-10-CM

## 2020-09-04 PROCEDURE — 99999 PR PBB SHADOW E&M-EST. PATIENT-LVL V: CPT | Mod: PBBFAC,,, | Performed by: PHYSICIAN ASSISTANT

## 2020-09-04 PROCEDURE — 99999 PR PBB SHADOW E&M-EST. PATIENT-LVL V: ICD-10-PCS | Mod: PBBFAC,,, | Performed by: PHYSICIAN ASSISTANT

## 2020-09-04 PROCEDURE — 83036 HEMOGLOBIN GLYCOSYLATED A1C: CPT

## 2020-09-04 PROCEDURE — G0439 PR MEDICARE ANNUAL WELLNESS SUBSEQUENT VISIT: ICD-10-PCS | Mod: S$GLB,,, | Performed by: PHYSICIAN ASSISTANT

## 2020-09-04 PROCEDURE — 99215 OFFICE O/P EST HI 40 MIN: CPT | Mod: PBBFAC,PO | Performed by: PHYSICIAN ASSISTANT

## 2020-09-04 PROCEDURE — G0439 PPPS, SUBSEQ VISIT: HCPCS | Mod: S$GLB,,, | Performed by: PHYSICIAN ASSISTANT

## 2020-09-04 PROCEDURE — 36415 COLL VENOUS BLD VENIPUNCTURE: CPT | Mod: PO

## 2020-09-04 PROCEDURE — 80061 LIPID PANEL: CPT

## 2020-09-04 RX ORDER — SILDENAFIL 50 MG/1
50 TABLET, FILM COATED ORAL DAILY PRN
Qty: 30 TABLET | Refills: 2 | Status: SHIPPED | OUTPATIENT
Start: 2020-09-04 | End: 2021-05-12

## 2020-09-04 NOTE — PATIENT INSTRUCTIONS
Counseling and Referral of Other Preventative  (Italic type indicates deductible and co-insurance are waived)    Patient Name: Rosendo Thomas  Today's Date: 9/4/2020    Health Maintenance       Date Due Completion Date    TETANUS VACCINE 07/31/1963 ---    Shingles Vaccine (1 of 2) 07/31/1995 ---    Foot Exam 04/12/2020 4/12/2019    Lipid Panel 07/23/2020 7/23/2019    Influenza Vaccine (1) 08/01/2020 ---    Hemoglobin A1c 12/02/2020 6/2/2020    Override on 8/14/2014: Done    Eye Exam 06/05/2021 6/5/2020    Colorectal Cancer Screening 07/08/2021 7/8/2020    High Dose Statin 09/04/2021 9/4/2020        No orders of the defined types were placed in this encounter.    The following information is provided to all patients.  This information is to help you find resources for any of the problems found today that may be affecting your health:                Living healthy guide: www.[facility].[place].[facility]      Understanding Diabetes: www.diabetes.org      Eating healthy: www.cdc.gov/healthyweight      CDC home safety checklist: www.cdc.gov/steadi/patient.html      Agency on Aging: www.goea.[place].gov      Alcoholics anonymous (AA): www.aa.org      Physical Activity: www.harry.nih.gov/fz4nnzw      Tobacco use: www.quitwithusla.org

## 2020-09-04 NOTE — PROGRESS NOTES
Health Maintenance Due   Topic Date Due    TETANUS VACCINE  07/31/1963    Shingles Vaccine (1 of 2) 07/31/1995    Foot Exam  04/12/2020    Lipid Panel  Schedule for today     Influenza Vaccine (1) Patient refused vaccine today

## 2020-09-04 NOTE — PROGRESS NOTES
"  Rosendo Thomas presented for a  Medicare AWV and comprehensive Health Risk Assessment today. The following components were reviewed and updated:    · Medical history  · Family History  · Social history  · Allergies and Current Medications  · Health Risk Assessment  · Health Maintenance  · Care Team     ** See Completed Assessments for Annual Wellness Visit within the encounter summary.**         The following assessments were completed:  · Living Situation  · CAGE  · Depression Screening  · Timed Get Up and Go  · Whisper Test  · Cognitive Function Screening  · Nutrition Screening  · ADL Screening  · PAQ Screening        Vitals:    09/04/20 0954   BP: 112/70   BP Location: Left arm   Patient Position: Sitting   BP Method: Large (Manual)   Pulse: (!) 50   Resp: 18   Temp: 98.5 °F (36.9 °C)   TempSrc: Oral   SpO2: 97%   Weight: 123.2 kg (271 lb 9.7 oz)   Height: 6' 3" (1.905 m)     Body mass index is 33.95 kg/m².  Physical Exam          Diagnoses and health risks identified today and associated recommendations/orders:    1. Encounter for preventive health examination  Provided Rosendo with a 5-10 year written screening schedule and personal prevention plan. Recommendations were developed using the USPSTF age appropriate recommendations. Education, counseling, and referrals were provided as needed. After Visit Summary printed and given to patient which includes a list of additional screenings\tests needed    2. Erectile dysfunction, unspecified erectile dysfunction type  - sildenafiL (VIAGRA) 50 MG tablet; Take 1 tablet (50 mg total) by mouth daily as needed for Erectile Dysfunction.  Dispense: 30 tablet; Refill: 2    3. Aortic atherosclerosis  Continue meds    4. History of colon cancer  colonoscopy uTD    5. Uncontrolled type 2 diabetes mellitus with stage 2 chronic kidney disease, without long-term current use of insulin  hga1c today stable    6. Essential hypertension, benign  Well controlled          No " follow-ups on file.    Bita Galvin PA-C

## 2020-09-05 LAB
CHOLEST SERPL-MCNC: 103 MG/DL (ref 120–199)
CHOLEST/HDLC SERPL: 3.1 {RATIO} (ref 2–5)
ESTIMATED AVG GLUCOSE: 166 MG/DL (ref 68–131)
HBA1C MFR BLD HPLC: 7.4 % (ref 4–5.6)
HDLC SERPL-MCNC: 33 MG/DL (ref 40–75)
HDLC SERPL: 32 % (ref 20–50)
LDLC SERPL CALC-MCNC: 46.6 MG/DL (ref 63–159)
NONHDLC SERPL-MCNC: 70 MG/DL
TRIGL SERPL-MCNC: 117 MG/DL (ref 30–150)

## 2020-10-03 RX ORDER — ATORVASTATIN CALCIUM 20 MG/1
TABLET, FILM COATED ORAL
Qty: 90 TABLET | Refills: 0 | Status: SHIPPED | OUTPATIENT
Start: 2020-10-03 | End: 2021-01-04

## 2020-10-07 ENCOUNTER — TELEPHONE (OUTPATIENT)
Dept: RHEUMATOLOGY | Facility: CLINIC | Age: 75
End: 2020-10-07

## 2020-10-07 ENCOUNTER — OFFICE VISIT (OUTPATIENT)
Dept: FAMILY MEDICINE | Facility: CLINIC | Age: 75
End: 2020-10-07
Payer: MEDICARE

## 2020-10-07 ENCOUNTER — TELEPHONE (OUTPATIENT)
Dept: FAMILY MEDICINE | Facility: CLINIC | Age: 75
End: 2020-10-07

## 2020-10-07 VITALS
BODY MASS INDEX: 33.55 KG/M2 | TEMPERATURE: 98 F | OXYGEN SATURATION: 97 % | RESPIRATION RATE: 16 BRPM | HEART RATE: 54 BPM | SYSTOLIC BLOOD PRESSURE: 128 MMHG | HEIGHT: 75 IN | DIASTOLIC BLOOD PRESSURE: 70 MMHG | WEIGHT: 269.81 LBS

## 2020-10-07 DIAGNOSIS — I70.0 AORTIC ATHEROSCLEROSIS: ICD-10-CM

## 2020-10-07 DIAGNOSIS — I10 ESSENTIAL HYPERTENSION, BENIGN: ICD-10-CM

## 2020-10-07 DIAGNOSIS — Z85.038 HISTORY OF COLON CANCER: ICD-10-CM

## 2020-10-07 PROCEDURE — 99999 PR PBB SHADOW E&M-EST. PATIENT-LVL III: ICD-10-PCS | Mod: PBBFAC,,, | Performed by: FAMILY MEDICINE

## 2020-10-07 PROCEDURE — 99214 PR OFFICE/OUTPT VISIT, EST, LEVL IV, 30-39 MIN: ICD-10-PCS | Mod: S$PBB,,, | Performed by: FAMILY MEDICINE

## 2020-10-07 PROCEDURE — 99999 PR PBB SHADOW E&M-EST. PATIENT-LVL III: CPT | Mod: PBBFAC,,, | Performed by: FAMILY MEDICINE

## 2020-10-07 PROCEDURE — 99214 OFFICE O/P EST MOD 30 MIN: CPT | Mod: S$PBB,,, | Performed by: FAMILY MEDICINE

## 2020-10-07 PROCEDURE — 99213 OFFICE O/P EST LOW 20 MIN: CPT | Mod: PBBFAC,PO | Performed by: FAMILY MEDICINE

## 2020-10-07 NOTE — PROGRESS NOTES
Subjective:       Patient ID: Rosendo Thomas is a 75 y.o. male.    Chief Complaint: Annual Exam    HPI:  Here for annual exam.  Patient with chronic diabetes, HTN and obesity.  BS range 114-130.  Psoriasis much improved with new medication.  Review of Systems   Constitutional: Negative for appetite change, chills, diaphoresis, fatigue, fever and unexpected weight change.   HENT: Negative for ear pain, sinus pressure/congestion, sore throat and trouble swallowing.    Eyes: Negative for visual disturbance.   Respiratory: Negative for cough, chest tightness, shortness of breath and wheezing.    Cardiovascular: Negative for chest pain, palpitations and leg swelling.   Gastrointestinal: Positive for diarrhea (secondary to medication). Negative for abdominal distention, abdominal pain, constipation, nausea and vomiting.   Genitourinary: Negative for difficulty urinating, discharge, dysuria, frequency, hematuria and urgency.   Musculoskeletal: Negative for arthralgias, back pain and joint swelling.   Integumentary:  Negative for rash.   Neurological: Negative for dizziness, light-headedness and headaches.   Hematological: Negative for adenopathy.   Psychiatric/Behavioral: Negative for dysphoric mood and sleep disturbance. The patient is not nervous/anxious.          Objective:      Physical Exam  Constitutional:       Appearance: He is well-developed.   Neck:      Musculoskeletal: Normal range of motion and neck supple.      Thyroid: No thyromegaly.   Cardiovascular:      Rate and Rhythm: Normal rate and regular rhythm.      Heart sounds: Normal heart sounds. No murmur.   Pulmonary:      Effort: Pulmonary effort is normal. No respiratory distress.      Breath sounds: Normal breath sounds. No wheezing.   Abdominal:      General: Bowel sounds are normal.      Palpations: Abdomen is soft. There is no mass.      Tenderness: There is no abdominal tenderness.   Skin:     General: Skin is warm and dry.   Neurological:       Mental Status: He is alert and oriented to person, place, and time.         Results for orders placed or performed in visit on 09/04/20   Hemoglobin A1c   Result Value Ref Range    Hemoglobin A1C 7.4 (H) 4.0 - 5.6 %    Estimated Avg Glucose 166 (H) 68 - 131 mg/dL   Lipid panel   Result Value Ref Range    Cholesterol 103 (L) 120 - 199 mg/dL    Triglycerides 117 30 - 150 mg/dL    HDL 33 (L) 40 - 75 mg/dL    LDL Cholesterol 46.6 (L) 63.0 - 159.0 mg/dL    Hdl/Cholesterol Ratio 32.0 20.0 - 50.0 %    Total Cholesterol/HDL Ratio 3.1 2.0 - 5.0    Non-HDL Cholesterol 70 mg/dL     Assessment:       1. Uncontrolled type 2 diabetes mellitus with stage 2 chronic kidney disease, without long-term current use of insulin    2. Essential hypertension, benign    3. History of colon cancer    4. Aortic atherosclerosis        Plan:     Rosendo was seen today for annual exam.    Diagnoses and all orders for this visit:    Uncontrolled type 2 diabetes mellitus with stage 2 chronic kidney disease, without long-term current use of insulin  Watch diet and encourage exercise.  Continue current regimen    Essential hypertension, benign  BP stable    History of colon cancer  Recent colonoscopy with polyps     Aortic atherosclerosis  No chest pain

## 2020-10-07 NOTE — TELEPHONE ENCOUNTER
I spoke with Mr. Thomas. He has had diarrhea from Taltz (3 watery stools in the morning which then resolves with Imodium). He is roughly 2 months in and is likely having diarrhea as the initial few doses are dosed every 2 weeks. After 3 months, the doses become spaced out to every 4 weeks. He states that he only had two episodes of diarrhea today and he feels he can continue for now and see if this improves once his doses become more spaced out. He states that it works very well for his psoriasis and joint pain. Discussed that he should reach out to me if it worsens. All questions answered.

## 2020-10-07 NOTE — TELEPHONE ENCOUNTER
Good morning!, Dr. Lackey has seen this patient this morning and asked me to send you a message for the patient regarding his is having severe diarrhea due to secondary medicine. Patient stated he left message a couple of weeks ago with no return call, please call patient to discuss, thanks

## 2020-10-26 ENCOUNTER — PATIENT OUTREACH (OUTPATIENT)
Dept: ADMINISTRATIVE | Facility: OTHER | Age: 75
End: 2020-10-26

## 2020-10-26 NOTE — PROGRESS NOTES
Care Everywhere:  Immunization: updated(delay in links)   Health Maintenance: updated  Media Review:  Legacy Review:   Order placed:   Upcoming appts:

## 2020-11-20 RX ORDER — GLIMEPIRIDE 4 MG/1
4 TABLET ORAL
Qty: 90 TABLET | Refills: 0 | Status: SHIPPED | OUTPATIENT
Start: 2020-11-20 | End: 2021-02-09 | Stop reason: SDUPTHER

## 2020-11-20 NOTE — TELEPHONE ENCOUNTER
Last Office Visit Info:   The patient's last visit with Domi Lackey MD was on 10/7/2020.    The patient's last visit in current department was on 10/7/2020.        Last CBC Results:   Lab Results   Component Value Date    WBC 6.31 07/28/2020    HGB 13.0 (L) 07/28/2020    HCT 41.5 07/28/2020     07/28/2020       Last CMP Results  Lab Results   Component Value Date     07/28/2020    K 4.3 07/28/2020     07/28/2020    CO2 31 (H) 07/28/2020    BUN 14 07/28/2020    CREATININE 1.1 07/28/2020    CALCIUM 9.0 07/28/2020    ALBUMIN 3.7 07/28/2020    AST 18 07/28/2020    ALT 29 07/28/2020       Last Lipids  Lab Results   Component Value Date    CHOL 103 (L) 09/04/2020    TRIG 117 09/04/2020    HDL 33 (L) 09/04/2020    LDLCALC 46.6 (L) 09/04/2020       Last A1C  Lab Results   Component Value Date    HGBA1C 7.4 (H) 09/04/2020       Last TSH  Lab Results   Component Value Date    TSH 2.46 08/14/2010         Current Med Refills  Medication List with Changes/Refills   Current Medications    ATORVASTATIN (LIPITOR) 20 MG TABLET    TAKE 1 TABLET BY MOUTH ONCE DAILY       Start Date: 10/3/2020 End Date: --    AZELASTINE (ASTELIN) 137 MCG (0.1 %) NASAL SPRAY    1 TO 2 SPRAY(S) IN EACH NOSTRIL EVERY 12 HOURS       Start Date: 6/21/2019 End Date: --    BLOOD SUGAR DIAGNOSTIC (CONTOUR TEST STRIPS) STRP    1 each by Misc.(Non-Drug; Combo Route) route 2 (two) times daily.       Start Date: 10/13/2014End Date: --    CLOBETASOL (TEMOVATE) 0.05 % CREAM    Apply topically 2 (two) times daily. To psoriasis PRN       Start Date: 8/2/2019  End Date: --    CLOBETASOL 0.05% (TEMOVATE) 0.05 % OINT    Apply topically 2 (two) times daily. To rash on  Hands under gloves       Start Date: 11/4/2019 End Date: --    FLUOCINONIDE (LIDEX) 0.05 % EXTERNAL SOLUTION    Apply topically 2 (two) times daily.       Start Date: 8/17/2020 End Date: --    GLIMEPIRIDE (AMARYL) 4 MG TABLET    Take 1 tablet (4 mg total) by mouth before breakfast.        Start Date: 8/18/2020 End Date: 8/18/2021    IXEKIZUMAB (TALTZ AUTOINJECTOR) 80 MG/ML ATIN    Inject 80 mg into the skin every 30 days.       Start Date: 8/4/2020  End Date: --    IXEKIZUMAB (TALTZ AUTOINJECTOR, 2 PACK,) 80 MG/ML ATIN    Inject 2 pens (160 mg) into the skin on day1, followed by 1 pen (80 mg) into the skin at weeks 2, 4, 6, 8, 10, and 12, and then 1 pen (80 mg) every 4 weeks thereafter       Start Date: 7/30/2020 End Date: --    LANCETS MISC    USE ONE LANCET TWO TIMES DAILY       Start Date: 10/21/2014End Date: --    LOSARTAN-HYDROCHLOROTHIAZIDE 100-25 MG (HYZAAR) 100-25 MG PER TABLET    Take 1 tablet by mouth once daily.       Start Date: 5/18/2020 End Date: 5/18/2021    METFORMIN (GLUCOPHAGE-XR) 500 MG ER 24HR TABLET    TAKE 2 TABLETS BY MOUTH TWICE A DAY WITH MEALS       Start Date: 11/19/2020End Date: --    SILDENAFIL (VIAGRA) 50 MG TABLET    Take 1 tablet (50 mg total) by mouth daily as needed for Erectile Dysfunction.       Start Date: 9/4/2020  End Date: 9/4/2021    SODIUM,POTASSIUM,MAG SULFATES (SUPREP BOWEL PREP KIT) 17.5-3.13-1.6 GRAM SOLR    As Directed       Start Date: 10/15/2019End Date: --    TRIAMCINOLONE ACETONIDE 0.1% (KENALOG) 0.1 % OINTMENT    Apply topically 2 (two) times daily. To rash       Start Date: 11/6/2018 End Date: --       Order(s) placed per written order guidelines:     Please advise.

## 2020-12-31 ENCOUNTER — OFFICE VISIT (OUTPATIENT)
Dept: PODIATRY | Facility: CLINIC | Age: 75
End: 2020-12-31
Payer: MEDICARE

## 2020-12-31 ENCOUNTER — PATIENT OUTREACH (OUTPATIENT)
Dept: ADMINISTRATIVE | Facility: OTHER | Age: 75
End: 2020-12-31

## 2020-12-31 VITALS
HEART RATE: 59 BPM | DIASTOLIC BLOOD PRESSURE: 73 MMHG | HEIGHT: 75 IN | SYSTOLIC BLOOD PRESSURE: 132 MMHG | BODY MASS INDEX: 33.55 KG/M2 | WEIGHT: 269.81 LBS

## 2020-12-31 DIAGNOSIS — L60.0 INGROWN NAIL: ICD-10-CM

## 2020-12-31 DIAGNOSIS — E11.49 TYPE II DIABETES MELLITUS WITH NEUROLOGICAL MANIFESTATIONS: Primary | ICD-10-CM

## 2020-12-31 PROCEDURE — 99212 OFFICE O/P EST SF 10 MIN: CPT | Mod: S$PBB,,, | Performed by: PODIATRIST

## 2020-12-31 PROCEDURE — 99214 OFFICE O/P EST MOD 30 MIN: CPT | Mod: PBBFAC,PO | Performed by: PODIATRIST

## 2020-12-31 PROCEDURE — 99999 PR PBB SHADOW E&M-EST. PATIENT-LVL IV: CPT | Mod: PBBFAC,,, | Performed by: PODIATRIST

## 2020-12-31 PROCEDURE — 99212 PR OFFICE/OUTPT VISIT, EST, LEVL II, 10-19 MIN: ICD-10-PCS | Mod: S$PBB,,, | Performed by: PODIATRIST

## 2020-12-31 PROCEDURE — 99999 PR PBB SHADOW E&M-EST. PATIENT-LVL IV: ICD-10-PCS | Mod: PBBFAC,,, | Performed by: PODIATRIST

## 2021-01-07 ENCOUNTER — LAB VISIT (OUTPATIENT)
Dept: LAB | Facility: HOSPITAL | Age: 76
End: 2021-01-07
Attending: FAMILY MEDICINE
Payer: MEDICARE

## 2021-01-07 LAB
ESTIMATED AVG GLUCOSE: 154 MG/DL (ref 68–131)
HBA1C MFR BLD HPLC: 7 % (ref 4–5.6)

## 2021-01-07 PROCEDURE — 36415 COLL VENOUS BLD VENIPUNCTURE: CPT | Mod: PO

## 2021-01-07 PROCEDURE — 83036 HEMOGLOBIN GLYCOSYLATED A1C: CPT

## 2021-01-10 ENCOUNTER — IMMUNIZATION (OUTPATIENT)
Dept: OBSTETRICS AND GYNECOLOGY | Facility: CLINIC | Age: 76
End: 2021-01-10
Payer: MEDICARE

## 2021-01-10 DIAGNOSIS — Z23 NEED FOR VACCINATION: ICD-10-CM

## 2021-01-10 PROCEDURE — 91300 COVID-19, MRNA, LNP-S, PF, 30 MCG/0.3 ML DOSE VACCINE: CPT | Mod: PBBFAC

## 2021-01-19 ENCOUNTER — TELEPHONE (OUTPATIENT)
Dept: FAMILY MEDICINE | Facility: CLINIC | Age: 76
End: 2021-01-19

## 2021-01-19 DIAGNOSIS — N18.2 CONTROLLED TYPE 2 DIABETES MELLITUS WITH STAGE 2 CHRONIC KIDNEY DISEASE, WITH LONG-TERM CURRENT USE OF INSULIN: Primary | ICD-10-CM

## 2021-01-19 DIAGNOSIS — Z79.4 CONTROLLED TYPE 2 DIABETES MELLITUS WITH STAGE 2 CHRONIC KIDNEY DISEASE, WITH LONG-TERM CURRENT USE OF INSULIN: Primary | ICD-10-CM

## 2021-01-19 DIAGNOSIS — E11.22 CONTROLLED TYPE 2 DIABETES MELLITUS WITH STAGE 2 CHRONIC KIDNEY DISEASE, WITH LONG-TERM CURRENT USE OF INSULIN: Primary | ICD-10-CM

## 2021-01-21 RX ORDER — LINAGLIPTIN 5 MG/1
5 TABLET, FILM COATED ORAL DAILY
Qty: 30 TABLET | Refills: 5 | Status: SHIPPED | OUTPATIENT
Start: 2021-01-21 | End: 2021-01-26 | Stop reason: ALTCHOICE

## 2021-01-22 ENCOUNTER — TELEPHONE (OUTPATIENT)
Dept: FAMILY MEDICINE | Facility: CLINIC | Age: 76
End: 2021-01-22

## 2021-01-26 ENCOUNTER — TELEPHONE (OUTPATIENT)
Dept: FAMILY MEDICINE | Facility: CLINIC | Age: 76
End: 2021-01-26

## 2021-01-31 ENCOUNTER — IMMUNIZATION (OUTPATIENT)
Dept: OBSTETRICS AND GYNECOLOGY | Facility: CLINIC | Age: 76
End: 2021-01-31
Payer: MEDICARE

## 2021-01-31 ENCOUNTER — EXTERNAL CHRONIC CARE MANAGEMENT (OUTPATIENT)
Dept: PRIMARY CARE CLINIC | Facility: CLINIC | Age: 76
End: 2021-01-31
Payer: MEDICARE

## 2021-01-31 DIAGNOSIS — Z23 NEED FOR VACCINATION: Primary | ICD-10-CM

## 2021-01-31 PROCEDURE — 99490 CHRNC CARE MGMT STAFF 1ST 20: CPT | Mod: S$PBB,,, | Performed by: FAMILY MEDICINE

## 2021-01-31 PROCEDURE — 91300 COVID-19, MRNA, LNP-S, PF, 30 MCG/0.3 ML DOSE VACCINE: CPT | Mod: PBBFAC | Performed by: FAMILY MEDICINE

## 2021-01-31 PROCEDURE — 99490 PR CHRONIC CARE MGMT, 1ST 20 MIN: ICD-10-PCS | Mod: S$PBB,,, | Performed by: FAMILY MEDICINE

## 2021-01-31 PROCEDURE — 99439 CHRNC CARE MGMT STAF EA ADDL: CPT | Mod: S$PBB,,, | Performed by: FAMILY MEDICINE

## 2021-01-31 PROCEDURE — 99439 CHRNC CARE MGMT STAF EA ADDL: CPT | Mod: PBBFAC,PO,25,27 | Performed by: FAMILY MEDICINE

## 2021-01-31 PROCEDURE — 99439 PR CHRONIC CARE MGMT, EA ADDTL 20 MIN: ICD-10-PCS | Mod: S$PBB,,, | Performed by: FAMILY MEDICINE

## 2021-01-31 PROCEDURE — 99490 CHRNC CARE MGMT STAFF 1ST 20: CPT | Mod: PBBFAC,PO,25,27 | Performed by: FAMILY MEDICINE

## 2021-01-31 PROCEDURE — 0002A COVID-19, MRNA, LNP-S, PF, 30 MCG/0.3 ML DOSE VACCINE: CPT | Mod: PBBFAC | Performed by: FAMILY MEDICINE

## 2021-02-09 ENCOUNTER — TELEPHONE (OUTPATIENT)
Dept: FAMILY MEDICINE | Facility: CLINIC | Age: 76
End: 2021-02-09

## 2021-02-09 RX ORDER — GLIMEPIRIDE 4 MG/1
TABLET ORAL
Qty: 135 TABLET | Refills: 0 | Status: SHIPPED | OUTPATIENT
Start: 2021-02-09 | End: 2021-05-12

## 2021-02-22 DIAGNOSIS — I10 ESSENTIAL HYPERTENSION, BENIGN: ICD-10-CM

## 2021-02-23 RX ORDER — LOSARTAN POTASSIUM AND HYDROCHLOROTHIAZIDE 25; 100 MG/1; MG/1
1 TABLET ORAL DAILY
Qty: 90 TABLET | Refills: 3 | Status: SHIPPED | OUTPATIENT
Start: 2021-02-23 | End: 2021-05-12 | Stop reason: SDUPTHER

## 2021-02-28 ENCOUNTER — EXTERNAL CHRONIC CARE MANAGEMENT (OUTPATIENT)
Dept: PRIMARY CARE CLINIC | Facility: CLINIC | Age: 76
End: 2021-02-28
Payer: MEDICARE

## 2021-02-28 PROCEDURE — 99439 CHRNC CARE MGMT STAF EA ADDL: CPT | Mod: S$PBB,,, | Performed by: FAMILY MEDICINE

## 2021-02-28 PROCEDURE — 99439 CHRNC CARE MGMT STAF EA ADDL: CPT | Mod: PBBFAC,PO | Performed by: FAMILY MEDICINE

## 2021-02-28 PROCEDURE — 99490 CHRNC CARE MGMT STAFF 1ST 20: CPT | Mod: S$PBB,,, | Performed by: FAMILY MEDICINE

## 2021-02-28 PROCEDURE — 99439 PR CHRONIC CARE MGMT, EA ADDTL 20 MIN: ICD-10-PCS | Mod: S$PBB,,, | Performed by: FAMILY MEDICINE

## 2021-02-28 PROCEDURE — 99490 PR CHRONIC CARE MGMT, 1ST 20 MIN: ICD-10-PCS | Mod: S$PBB,,, | Performed by: FAMILY MEDICINE

## 2021-02-28 PROCEDURE — 99490 CHRNC CARE MGMT STAFF 1ST 20: CPT | Mod: PBBFAC,PO | Performed by: FAMILY MEDICINE

## 2021-03-01 ENCOUNTER — PATIENT OUTREACH (OUTPATIENT)
Dept: ADMINISTRATIVE | Facility: OTHER | Age: 76
End: 2021-03-01

## 2021-03-03 ENCOUNTER — OFFICE VISIT (OUTPATIENT)
Dept: DERMATOLOGY | Facility: CLINIC | Age: 76
End: 2021-03-03
Payer: MEDICARE

## 2021-03-03 ENCOUNTER — SPECIALTY PHARMACY (OUTPATIENT)
Dept: PHARMACY | Facility: CLINIC | Age: 76
End: 2021-03-03

## 2021-03-03 DIAGNOSIS — L40.50 PSORIASIS WITH ARTHROPATHY: Primary | ICD-10-CM

## 2021-03-03 DIAGNOSIS — L40.9 PSORIASIS: ICD-10-CM

## 2021-03-03 DIAGNOSIS — L40.50 PSORIATIC ARTHRITIS: ICD-10-CM

## 2021-03-03 PROCEDURE — 99214 OFFICE O/P EST MOD 30 MIN: CPT | Mod: S$PBB,,, | Performed by: DERMATOLOGY

## 2021-03-03 PROCEDURE — 99211 OFF/OP EST MAY X REQ PHY/QHP: CPT | Mod: PBBFAC | Performed by: DERMATOLOGY

## 2021-03-03 PROCEDURE — 99999 PR PBB SHADOW E&M-EST. PATIENT-LVL I: CPT | Mod: PBBFAC,,, | Performed by: DERMATOLOGY

## 2021-03-03 PROCEDURE — 99214 PR OFFICE/OUTPT VISIT, EST, LEVL IV, 30-39 MIN: ICD-10-PCS | Mod: S$PBB,,, | Performed by: DERMATOLOGY

## 2021-03-03 PROCEDURE — 99999 PR PBB SHADOW E&M-EST. PATIENT-LVL I: ICD-10-PCS | Mod: PBBFAC,,, | Performed by: DERMATOLOGY

## 2021-03-03 RX ORDER — FLUOCINONIDE TOPICAL SOLUTION USP, 0.05% 0.5 MG/ML
SOLUTION TOPICAL 2 TIMES DAILY
Qty: 60 ML | Refills: 3 | Status: SHIPPED | OUTPATIENT
Start: 2021-03-03

## 2021-03-03 RX ORDER — IXEKIZUMAB 80 MG/ML
80 INJECTION, SOLUTION SUBCUTANEOUS
Qty: 3 ML | Refills: 0 | COMMUNITY
Start: 2021-03-03 | End: 2022-08-09

## 2021-03-09 ENCOUNTER — LAB VISIT (OUTPATIENT)
Dept: LAB | Facility: HOSPITAL | Age: 76
End: 2021-03-09
Payer: MEDICARE

## 2021-03-09 ENCOUNTER — OFFICE VISIT (OUTPATIENT)
Dept: RHEUMATOLOGY | Facility: CLINIC | Age: 76
End: 2021-03-09
Payer: MEDICARE

## 2021-03-09 VITALS
WEIGHT: 283.31 LBS | HEART RATE: 65 BPM | DIASTOLIC BLOOD PRESSURE: 65 MMHG | HEIGHT: 75 IN | BODY MASS INDEX: 35.23 KG/M2 | SYSTOLIC BLOOD PRESSURE: 128 MMHG

## 2021-03-09 DIAGNOSIS — L40.50 PSORIATIC ARTHRITIS: ICD-10-CM

## 2021-03-09 DIAGNOSIS — M85.89 OTHER SPECIFIED DISORDERS OF BONE DENSITY AND STRUCTURE, MULTIPLE SITES: ICD-10-CM

## 2021-03-09 DIAGNOSIS — D84.9 IMMUNOSUPPRESSION: ICD-10-CM

## 2021-03-09 DIAGNOSIS — L40.9 PSORIASIS: ICD-10-CM

## 2021-03-09 DIAGNOSIS — L40.50 PSORIATIC ARTHRITIS: Primary | ICD-10-CM

## 2021-03-09 LAB
ALBUMIN SERPL BCP-MCNC: 3.7 G/DL (ref 3.5–5.2)
ALP SERPL-CCNC: 70 U/L (ref 55–135)
ALT SERPL W/O P-5'-P-CCNC: 24 U/L (ref 10–44)
ANION GAP SERPL CALC-SCNC: 7 MMOL/L (ref 8–16)
AST SERPL-CCNC: 17 U/L (ref 10–40)
BASOPHILS # BLD AUTO: 0.03 K/UL (ref 0–0.2)
BASOPHILS NFR BLD: 0.4 % (ref 0–1.9)
BILIRUB SERPL-MCNC: 0.3 MG/DL (ref 0.1–1)
BUN SERPL-MCNC: 18 MG/DL (ref 8–23)
CALCIUM SERPL-MCNC: 9.3 MG/DL (ref 8.7–10.5)
CHLORIDE SERPL-SCNC: 104 MMOL/L (ref 95–110)
CO2 SERPL-SCNC: 28 MMOL/L (ref 23–29)
CREAT SERPL-MCNC: 1.5 MG/DL (ref 0.5–1.4)
CRP SERPL-MCNC: 7 MG/L (ref 0–8.2)
DIFFERENTIAL METHOD: ABNORMAL
EOSINOPHIL # BLD AUTO: 0.1 K/UL (ref 0–0.5)
EOSINOPHIL NFR BLD: 1.6 % (ref 0–8)
ERYTHROCYTE [DISTWIDTH] IN BLOOD BY AUTOMATED COUNT: 13.6 % (ref 11.5–14.5)
ERYTHROCYTE [SEDIMENTATION RATE] IN BLOOD BY WESTERGREN METHOD: 7 MM/HR (ref 0–23)
EST. GFR  (AFRICAN AMERICAN): 51.9 ML/MIN/1.73 M^2
EST. GFR  (NON AFRICAN AMERICAN): 44.9 ML/MIN/1.73 M^2
GLUCOSE SERPL-MCNC: 310 MG/DL (ref 70–110)
HCT VFR BLD AUTO: 43.1 % (ref 40–54)
HGB BLD-MCNC: 13.5 G/DL (ref 14–18)
IMM GRANULOCYTES # BLD AUTO: 0.03 K/UL (ref 0–0.04)
IMM GRANULOCYTES NFR BLD AUTO: 0.4 % (ref 0–0.5)
LYMPHOCYTES # BLD AUTO: 1.4 K/UL (ref 1–4.8)
LYMPHOCYTES NFR BLD: 19.2 % (ref 18–48)
MCH RBC QN AUTO: 28.4 PG (ref 27–31)
MCHC RBC AUTO-ENTMCNC: 31.3 G/DL (ref 32–36)
MCV RBC AUTO: 91 FL (ref 82–98)
MONOCYTES # BLD AUTO: 0.6 K/UL (ref 0.3–1)
MONOCYTES NFR BLD: 8.1 % (ref 4–15)
NEUTROPHILS # BLD AUTO: 5 K/UL (ref 1.8–7.7)
NEUTROPHILS NFR BLD: 70.3 % (ref 38–73)
NRBC BLD-RTO: 0 /100 WBC
PLATELET # BLD AUTO: 159 K/UL (ref 150–350)
PMV BLD AUTO: 11.9 FL (ref 9.2–12.9)
POTASSIUM SERPL-SCNC: 5.1 MMOL/L (ref 3.5–5.1)
PROT SERPL-MCNC: 6.8 G/DL (ref 6–8.4)
RBC # BLD AUTO: 4.76 M/UL (ref 4.6–6.2)
SODIUM SERPL-SCNC: 139 MMOL/L (ref 136–145)
WBC # BLD AUTO: 7.07 K/UL (ref 3.9–12.7)

## 2021-03-09 PROCEDURE — 99214 OFFICE O/P EST MOD 30 MIN: CPT | Mod: PBBFAC | Performed by: STUDENT IN AN ORGANIZED HEALTH CARE EDUCATION/TRAINING PROGRAM

## 2021-03-09 PROCEDURE — 85025 COMPLETE CBC W/AUTO DIFF WBC: CPT | Performed by: STUDENT IN AN ORGANIZED HEALTH CARE EDUCATION/TRAINING PROGRAM

## 2021-03-09 PROCEDURE — 99214 PR OFFICE/OUTPT VISIT, EST, LEVL IV, 30-39 MIN: ICD-10-PCS | Mod: S$PBB,GC,, | Performed by: STUDENT IN AN ORGANIZED HEALTH CARE EDUCATION/TRAINING PROGRAM

## 2021-03-09 PROCEDURE — 85652 RBC SED RATE AUTOMATED: CPT | Performed by: STUDENT IN AN ORGANIZED HEALTH CARE EDUCATION/TRAINING PROGRAM

## 2021-03-09 PROCEDURE — 36415 COLL VENOUS BLD VENIPUNCTURE: CPT | Performed by: STUDENT IN AN ORGANIZED HEALTH CARE EDUCATION/TRAINING PROGRAM

## 2021-03-09 PROCEDURE — 80053 COMPREHEN METABOLIC PANEL: CPT | Performed by: STUDENT IN AN ORGANIZED HEALTH CARE EDUCATION/TRAINING PROGRAM

## 2021-03-09 PROCEDURE — 99214 OFFICE O/P EST MOD 30 MIN: CPT | Mod: S$PBB,GC,, | Performed by: STUDENT IN AN ORGANIZED HEALTH CARE EDUCATION/TRAINING PROGRAM

## 2021-03-09 PROCEDURE — 99999 PR PBB SHADOW E&M-EST. PATIENT-LVL IV: ICD-10-PCS | Mod: PBBFAC,GC,, | Performed by: STUDENT IN AN ORGANIZED HEALTH CARE EDUCATION/TRAINING PROGRAM

## 2021-03-09 PROCEDURE — 99999 PR PBB SHADOW E&M-EST. PATIENT-LVL IV: CPT | Mod: PBBFAC,GC,, | Performed by: STUDENT IN AN ORGANIZED HEALTH CARE EDUCATION/TRAINING PROGRAM

## 2021-03-09 PROCEDURE — 86140 C-REACTIVE PROTEIN: CPT | Performed by: STUDENT IN AN ORGANIZED HEALTH CARE EDUCATION/TRAINING PROGRAM

## 2021-03-09 ASSESSMENT — ROUTINE ASSESSMENT OF PATIENT INDEX DATA (RAPID3)
MDHAQ FUNCTION SCORE: 0
PATIENT GLOBAL ASSESSMENT SCORE: 0
FATIGUE SCORE: 0
AM STIFFNESS SCORE: 0, NO
PSYCHOLOGICAL DISTRESS SCORE: 1.1
TOTAL RAPID3 SCORE: 0
PAIN SCORE: 0

## 2021-03-11 ENCOUNTER — TELEPHONE (OUTPATIENT)
Dept: RHEUMATOLOGY | Facility: CLINIC | Age: 76
End: 2021-03-11

## 2021-03-16 ENCOUNTER — HOSPITAL ENCOUNTER (OUTPATIENT)
Dept: RADIOLOGY | Facility: CLINIC | Age: 76
Discharge: HOME | End: 2021-03-16
Attending: STUDENT IN AN ORGANIZED HEALTH CARE EDUCATION/TRAINING PROGRAM
Payer: MEDICARE

## 2021-03-16 DIAGNOSIS — L40.50 PSORIATIC ARTHRITIS: ICD-10-CM

## 2021-03-16 DIAGNOSIS — L40.9 PSORIASIS: ICD-10-CM

## 2021-03-16 DIAGNOSIS — M85.89 OTHER SPECIFIED DISORDERS OF BONE DENSITY AND STRUCTURE, MULTIPLE SITES: ICD-10-CM

## 2021-03-16 PROCEDURE — 77080 DXA BONE DENSITY AXIAL: CPT | Mod: TC

## 2021-03-16 PROCEDURE — 77080 DXA BONE DENSITY AXIAL: CPT | Mod: 26,,, | Performed by: INTERNAL MEDICINE

## 2021-03-16 PROCEDURE — 77080 DEXA BONE DENSITY SPINE HIP: ICD-10-PCS | Mod: 26,,, | Performed by: INTERNAL MEDICINE

## 2021-03-31 ENCOUNTER — EXTERNAL CHRONIC CARE MANAGEMENT (OUTPATIENT)
Dept: PRIMARY CARE CLINIC | Facility: CLINIC | Age: 76
End: 2021-03-31
Payer: MEDICARE

## 2021-03-31 PROCEDURE — 99490 PR CHRONIC CARE MGMT, 1ST 20 MIN: ICD-10-PCS | Mod: S$PBB,,, | Performed by: FAMILY MEDICINE

## 2021-03-31 PROCEDURE — 99490 CHRNC CARE MGMT STAFF 1ST 20: CPT | Mod: S$PBB,,, | Performed by: FAMILY MEDICINE

## 2021-03-31 PROCEDURE — 99490 CHRNC CARE MGMT STAFF 1ST 20: CPT | Mod: PBBFAC,PO | Performed by: FAMILY MEDICINE

## 2021-04-30 ENCOUNTER — EXTERNAL CHRONIC CARE MANAGEMENT (OUTPATIENT)
Dept: PRIMARY CARE CLINIC | Facility: CLINIC | Age: 76
End: 2021-04-30
Payer: MEDICARE

## 2021-04-30 PROCEDURE — 99439 PR CHRONIC CARE MGMT, EA ADDTL 20 MIN: ICD-10-PCS | Mod: S$PBB,,, | Performed by: FAMILY MEDICINE

## 2021-04-30 PROCEDURE — 99490 PR CHRONIC CARE MGMT, 1ST 20 MIN: ICD-10-PCS | Mod: S$PBB,,, | Performed by: FAMILY MEDICINE

## 2021-04-30 PROCEDURE — 99490 CHRNC CARE MGMT STAFF 1ST 20: CPT | Mod: PBBFAC,PO | Performed by: FAMILY MEDICINE

## 2021-04-30 PROCEDURE — 99439 CHRNC CARE MGMT STAF EA ADDL: CPT | Mod: S$PBB,,, | Performed by: FAMILY MEDICINE

## 2021-04-30 PROCEDURE — 99439 CHRNC CARE MGMT STAF EA ADDL: CPT | Mod: PBBFAC,PO | Performed by: FAMILY MEDICINE

## 2021-04-30 PROCEDURE — 99490 CHRNC CARE MGMT STAFF 1ST 20: CPT | Mod: S$PBB,,, | Performed by: FAMILY MEDICINE

## 2021-05-05 DIAGNOSIS — E11.9 TYPE 2 DIABETES MELLITUS WITHOUT COMPLICATION: ICD-10-CM

## 2021-05-12 ENCOUNTER — OFFICE VISIT (OUTPATIENT)
Dept: FAMILY MEDICINE | Facility: CLINIC | Age: 76
End: 2021-05-12
Payer: MEDICARE

## 2021-05-12 VITALS
HEIGHT: 75 IN | RESPIRATION RATE: 20 BRPM | DIASTOLIC BLOOD PRESSURE: 64 MMHG | HEART RATE: 56 BPM | OXYGEN SATURATION: 96 % | WEIGHT: 273.13 LBS | BODY MASS INDEX: 33.96 KG/M2 | TEMPERATURE: 97 F | SYSTOLIC BLOOD PRESSURE: 116 MMHG

## 2021-05-12 DIAGNOSIS — N18.30 CONTROLLED TYPE 2 DIABETES MELLITUS WITH STAGE 3 CHRONIC KIDNEY DISEASE, WITHOUT LONG-TERM CURRENT USE OF INSULIN: ICD-10-CM

## 2021-05-12 DIAGNOSIS — Z00.00 WELL ADULT EXAM: Primary | ICD-10-CM

## 2021-05-12 DIAGNOSIS — I10 ESSENTIAL HYPERTENSION, BENIGN: ICD-10-CM

## 2021-05-12 DIAGNOSIS — E11.22 CONTROLLED TYPE 2 DIABETES MELLITUS WITH STAGE 3 CHRONIC KIDNEY DISEASE, WITHOUT LONG-TERM CURRENT USE OF INSULIN: ICD-10-CM

## 2021-05-12 PROCEDURE — 99397 PR PREVENTIVE VISIT,EST,65 & OVER: ICD-10-PCS | Mod: S$PBB,,, | Performed by: FAMILY MEDICINE

## 2021-05-12 PROCEDURE — 99397 PER PM REEVAL EST PAT 65+ YR: CPT | Mod: S$PBB,,, | Performed by: FAMILY MEDICINE

## 2021-05-12 PROCEDURE — 99213 OFFICE O/P EST LOW 20 MIN: CPT | Mod: PBBFAC,PO | Performed by: FAMILY MEDICINE

## 2021-05-12 PROCEDURE — 99999 PR PBB SHADOW E&M-EST. PATIENT-LVL III: ICD-10-PCS | Mod: PBBFAC,,, | Performed by: FAMILY MEDICINE

## 2021-05-12 PROCEDURE — 99999 PR PBB SHADOW E&M-EST. PATIENT-LVL III: CPT | Mod: PBBFAC,,, | Performed by: FAMILY MEDICINE

## 2021-05-12 RX ORDER — GLIPIZIDE 5 MG/1
5 TABLET ORAL
Qty: 90 TABLET | Refills: 1 | Status: SHIPPED | OUTPATIENT
Start: 2021-05-12 | End: 2021-11-19

## 2021-05-12 RX ORDER — LOSARTAN POTASSIUM AND HYDROCHLOROTHIAZIDE 25; 100 MG/1; MG/1
1 TABLET ORAL DAILY
Qty: 90 TABLET | Refills: 3 | Status: SHIPPED | OUTPATIENT
Start: 2021-05-12 | End: 2022-06-03

## 2021-05-12 RX ORDER — ATORVASTATIN CALCIUM 20 MG/1
20 TABLET, FILM COATED ORAL DAILY
Qty: 90 TABLET | Refills: 3 | Status: SHIPPED | OUTPATIENT
Start: 2021-05-12 | End: 2022-07-20

## 2021-05-31 ENCOUNTER — EXTERNAL CHRONIC CARE MANAGEMENT (OUTPATIENT)
Dept: PRIMARY CARE CLINIC | Facility: CLINIC | Age: 76
End: 2021-05-31
Payer: MEDICARE

## 2021-05-31 PROCEDURE — 99490 CHRNC CARE MGMT STAFF 1ST 20: CPT | Mod: PBBFAC,PO | Performed by: FAMILY MEDICINE

## 2021-05-31 PROCEDURE — 99490 PR CHRONIC CARE MGMT, 1ST 20 MIN: ICD-10-PCS | Mod: S$PBB,,, | Performed by: FAMILY MEDICINE

## 2021-05-31 PROCEDURE — 99490 CHRNC CARE MGMT STAFF 1ST 20: CPT | Mod: S$PBB,,, | Performed by: FAMILY MEDICINE

## 2021-06-30 ENCOUNTER — EXTERNAL CHRONIC CARE MANAGEMENT (OUTPATIENT)
Dept: PRIMARY CARE CLINIC | Facility: CLINIC | Age: 76
End: 2021-06-30
Payer: MEDICARE

## 2021-06-30 PROCEDURE — 99490 CHRNC CARE MGMT STAFF 1ST 20: CPT | Mod: PBBFAC,PO | Performed by: FAMILY MEDICINE

## 2021-06-30 PROCEDURE — 99490 PR CHRONIC CARE MGMT, 1ST 20 MIN: ICD-10-PCS | Mod: S$PBB,,, | Performed by: FAMILY MEDICINE

## 2021-06-30 PROCEDURE — 99490 CHRNC CARE MGMT STAFF 1ST 20: CPT | Mod: S$PBB,,, | Performed by: FAMILY MEDICINE

## 2021-07-31 ENCOUNTER — EXTERNAL CHRONIC CARE MANAGEMENT (OUTPATIENT)
Dept: PRIMARY CARE CLINIC | Facility: CLINIC | Age: 76
End: 2021-07-31
Payer: MEDICARE

## 2021-07-31 PROCEDURE — 99439 CHRNC CARE MGMT STAF EA ADDL: CPT | Mod: S$PBB,,, | Performed by: FAMILY MEDICINE

## 2021-07-31 PROCEDURE — 99490 CHRNC CARE MGMT STAFF 1ST 20: CPT | Mod: PBBFAC,PO | Performed by: FAMILY MEDICINE

## 2021-07-31 PROCEDURE — 99490 CHRNC CARE MGMT STAFF 1ST 20: CPT | Mod: S$PBB,,, | Performed by: FAMILY MEDICINE

## 2021-07-31 PROCEDURE — 99490 PR CHRONIC CARE MGMT, 1ST 20 MIN: ICD-10-PCS | Mod: S$PBB,,, | Performed by: FAMILY MEDICINE

## 2021-07-31 PROCEDURE — 99439 PR CHRONIC CARE MGMT, EA ADDTL 20 MIN: ICD-10-PCS | Mod: S$PBB,,, | Performed by: FAMILY MEDICINE

## 2021-07-31 PROCEDURE — 99439 CHRNC CARE MGMT STAF EA ADDL: CPT | Mod: PBBFAC,PO | Performed by: FAMILY MEDICINE

## 2021-08-12 ENCOUNTER — LAB VISIT (OUTPATIENT)
Dept: LAB | Facility: HOSPITAL | Age: 76
End: 2021-08-12
Attending: FAMILY MEDICINE
Payer: MEDICARE

## 2021-08-12 DIAGNOSIS — N18.2 CONTROLLED TYPE 2 DIABETES MELLITUS WITH STAGE 2 CHRONIC KIDNEY DISEASE, WITH LONG-TERM CURRENT USE OF INSULIN: ICD-10-CM

## 2021-08-12 DIAGNOSIS — Z79.4 CONTROLLED TYPE 2 DIABETES MELLITUS WITH STAGE 2 CHRONIC KIDNEY DISEASE, WITH LONG-TERM CURRENT USE OF INSULIN: ICD-10-CM

## 2021-08-12 DIAGNOSIS — E11.22 CONTROLLED TYPE 2 DIABETES MELLITUS WITH STAGE 2 CHRONIC KIDNEY DISEASE, WITH LONG-TERM CURRENT USE OF INSULIN: ICD-10-CM

## 2021-08-12 LAB
ESTIMATED AVG GLUCOSE: 255 MG/DL (ref 68–131)
HBA1C MFR BLD: 10.5 % (ref 4–5.6)

## 2021-08-12 PROCEDURE — 83036 HEMOGLOBIN GLYCOSYLATED A1C: CPT | Performed by: FAMILY MEDICINE

## 2021-08-12 PROCEDURE — 36415 COLL VENOUS BLD VENIPUNCTURE: CPT | Mod: PO | Performed by: FAMILY MEDICINE

## 2021-08-15 ENCOUNTER — PATIENT OUTREACH (OUTPATIENT)
Dept: ADMINISTRATIVE | Facility: OTHER | Age: 76
End: 2021-08-15

## 2021-08-17 ENCOUNTER — OFFICE VISIT (OUTPATIENT)
Dept: DERMATOLOGY | Facility: CLINIC | Age: 76
End: 2021-08-17
Payer: MEDICARE

## 2021-08-17 DIAGNOSIS — Z12.83 SCREENING EXAM FOR SKIN CANCER: ICD-10-CM

## 2021-08-17 DIAGNOSIS — L40.50 PSORIASIS WITH ARTHROPATHY: Primary | ICD-10-CM

## 2021-08-17 DIAGNOSIS — L81.4 LENTIGO: ICD-10-CM

## 2021-08-17 PROCEDURE — 99999 PR PBB SHADOW E&M-EST. PATIENT-LVL II: CPT | Mod: PBBFAC,,, | Performed by: DERMATOLOGY

## 2021-08-17 PROCEDURE — 99213 PR OFFICE/OUTPT VISIT, EST, LEVL III, 20-29 MIN: ICD-10-PCS | Mod: S$PBB,,, | Performed by: DERMATOLOGY

## 2021-08-17 PROCEDURE — 99212 OFFICE O/P EST SF 10 MIN: CPT | Mod: PBBFAC | Performed by: DERMATOLOGY

## 2021-08-17 PROCEDURE — 99213 OFFICE O/P EST LOW 20 MIN: CPT | Mod: S$PBB,,, | Performed by: DERMATOLOGY

## 2021-08-17 PROCEDURE — 99999 PR PBB SHADOW E&M-EST. PATIENT-LVL II: ICD-10-PCS | Mod: PBBFAC,,, | Performed by: DERMATOLOGY

## 2021-08-31 ENCOUNTER — EXTERNAL CHRONIC CARE MANAGEMENT (OUTPATIENT)
Dept: PRIMARY CARE CLINIC | Facility: CLINIC | Age: 76
End: 2021-08-31
Payer: MEDICARE

## 2021-08-31 PROCEDURE — 99439 CHRNC CARE MGMT STAF EA ADDL: CPT | Mod: PBBFAC,PO | Performed by: FAMILY MEDICINE

## 2021-08-31 PROCEDURE — 99490 CHRNC CARE MGMT STAFF 1ST 20: CPT | Mod: S$PBB,,, | Performed by: FAMILY MEDICINE

## 2021-08-31 PROCEDURE — 99490 PR CHRONIC CARE MGMT, 1ST 20 MIN: ICD-10-PCS | Mod: S$PBB,,, | Performed by: FAMILY MEDICINE

## 2021-08-31 PROCEDURE — 99439 CHRNC CARE MGMT STAF EA ADDL: CPT | Mod: S$PBB,,, | Performed by: FAMILY MEDICINE

## 2021-08-31 PROCEDURE — 99439 PR CHRONIC CARE MGMT, EA ADDTL 20 MIN: ICD-10-PCS | Mod: S$PBB,,, | Performed by: FAMILY MEDICINE

## 2021-08-31 PROCEDURE — 99490 CHRNC CARE MGMT STAFF 1ST 20: CPT | Mod: PBBFAC,PO,27 | Performed by: FAMILY MEDICINE

## 2021-09-30 ENCOUNTER — EXTERNAL CHRONIC CARE MANAGEMENT (OUTPATIENT)
Dept: PRIMARY CARE CLINIC | Facility: CLINIC | Age: 76
End: 2021-09-30
Payer: MEDICARE

## 2021-09-30 PROCEDURE — 99487 CPLX CHRNC CARE 1ST 60 MIN: CPT | Mod: S$PBB,,, | Performed by: FAMILY MEDICINE

## 2021-09-30 PROCEDURE — 99489 CPLX CHRNC CARE EA ADDL 30: CPT | Mod: PBBFAC,PO | Performed by: FAMILY MEDICINE

## 2021-09-30 PROCEDURE — 99487 CPLX CHRNC CARE 1ST 60 MIN: CPT | Mod: PBBFAC,PO,25,27 | Performed by: FAMILY MEDICINE

## 2021-09-30 PROCEDURE — 99489 PR COMPLX CHRON CARE MGMT, EA ADDTL 30 MIN, PER MONTH: ICD-10-PCS | Mod: S$PBB,,, | Performed by: FAMILY MEDICINE

## 2021-09-30 PROCEDURE — 99487 PR COMPLX CHRON CARE MGMT, 1ST HR, PER MONTH: ICD-10-PCS | Mod: S$PBB,,, | Performed by: FAMILY MEDICINE

## 2021-09-30 PROCEDURE — 99489 CPLX CHRNC CARE EA ADDL 30: CPT | Mod: S$PBB,,, | Performed by: FAMILY MEDICINE

## 2021-10-31 ENCOUNTER — EXTERNAL CHRONIC CARE MANAGEMENT (OUTPATIENT)
Dept: PRIMARY CARE CLINIC | Facility: CLINIC | Age: 76
End: 2021-10-31
Payer: MEDICARE

## 2021-10-31 PROCEDURE — 99490 CHRNC CARE MGMT STAFF 1ST 20: CPT | Mod: PBBFAC,PO | Performed by: FAMILY MEDICINE

## 2021-10-31 PROCEDURE — 99490 PR CHRONIC CARE MGMT, 1ST 20 MIN: ICD-10-PCS | Mod: S$PBB,,, | Performed by: FAMILY MEDICINE

## 2021-10-31 PROCEDURE — 99490 CHRNC CARE MGMT STAFF 1ST 20: CPT | Mod: S$PBB,,, | Performed by: FAMILY MEDICINE

## 2021-11-15 ENCOUNTER — OFFICE VISIT (OUTPATIENT)
Dept: PODIATRY | Facility: CLINIC | Age: 76
End: 2021-11-15
Payer: MEDICARE

## 2021-11-15 VITALS — BODY MASS INDEX: 33.96 KG/M2 | WEIGHT: 273.13 LBS | HEIGHT: 75 IN

## 2021-11-15 DIAGNOSIS — E11.49 TYPE II DIABETES MELLITUS WITH NEUROLOGICAL MANIFESTATIONS: Primary | ICD-10-CM

## 2021-11-15 DIAGNOSIS — L60.0 INGROWN NAIL: ICD-10-CM

## 2021-11-15 DIAGNOSIS — B35.1 ONYCHOMYCOSIS DUE TO DERMATOPHYTE: ICD-10-CM

## 2021-11-15 PROCEDURE — 99999 PR PBB SHADOW E&M-EST. PATIENT-LVL III: ICD-10-PCS | Mod: PBBFAC,,, | Performed by: PODIATRIST

## 2021-11-15 PROCEDURE — 99213 OFFICE O/P EST LOW 20 MIN: CPT | Mod: PBBFAC,PO | Performed by: PODIATRIST

## 2021-11-15 PROCEDURE — 99214 OFFICE O/P EST MOD 30 MIN: CPT | Mod: S$PBB,,, | Performed by: PODIATRIST

## 2021-11-15 PROCEDURE — 99214 PR OFFICE/OUTPT VISIT, EST, LEVL IV, 30-39 MIN: ICD-10-PCS | Mod: S$PBB,,, | Performed by: PODIATRIST

## 2021-11-15 PROCEDURE — 99999 PR PBB SHADOW E&M-EST. PATIENT-LVL III: CPT | Mod: PBBFAC,,, | Performed by: PODIATRIST

## 2021-11-15 RX ORDER — CICLOPIROX 80 MG/ML
SOLUTION TOPICAL NIGHTLY
Qty: 6.6 ML | Refills: 3 | Status: SHIPPED | OUTPATIENT
Start: 2021-11-15 | End: 2022-01-28

## 2021-11-30 ENCOUNTER — EXTERNAL CHRONIC CARE MANAGEMENT (OUTPATIENT)
Dept: PRIMARY CARE CLINIC | Facility: CLINIC | Age: 76
End: 2021-11-30
Payer: MEDICARE

## 2021-11-30 PROCEDURE — 99490 CHRNC CARE MGMT STAFF 1ST 20: CPT | Mod: S$PBB,,, | Performed by: FAMILY MEDICINE

## 2021-11-30 PROCEDURE — 99490 PR CHRONIC CARE MGMT, 1ST 20 MIN: ICD-10-PCS | Mod: S$PBB,,, | Performed by: FAMILY MEDICINE

## 2021-11-30 PROCEDURE — 99490 CHRNC CARE MGMT STAFF 1ST 20: CPT | Mod: PBBFAC,PO | Performed by: FAMILY MEDICINE

## 2022-01-25 NOTE — TELEPHONE ENCOUNTER
No new care gaps identified.  Powered by [employer]. Reference number: [id number].   1/25/2022 1:14:35 PM CST

## 2022-01-26 ENCOUNTER — TELEPHONE (OUTPATIENT)
Dept: FAMILY MEDICINE | Facility: CLINIC | Age: 77
End: 2022-01-26
Payer: MEDICARE

## 2022-01-26 NOTE — TELEPHONE ENCOUNTER
----- Message from Ashley Kilpatrick sent at 1/25/2022  2:55 PM CST -----  Regarding: self  .Type: Patient Call Back    Who called: self     What is the request in detail:SITagliptin (JANUVIA) 100 MG Tab  copay is going to be over $100 for this medication is requesting a different rx please of possible     Can the clinic reply by [facility]?    Would the patient rather a call back or a response via [facility]? Call     Best call back number: .[phone]        [facility] [place] [facility] [address]  [address]  [place] [address]  Phone: [phone] Fax: [phone]

## 2022-01-26 NOTE — TELEPHONE ENCOUNTER
Pt states insurance is not paying as much as they used to for januvia and it will cost more out of pocket; informed him he will need to schedule OV with another provider to establish care since Dr Lackey no longer here; pt verbalized understanding; scheduled with Dr conde

## 2022-01-28 ENCOUNTER — OFFICE VISIT (OUTPATIENT)
Dept: FAMILY MEDICINE | Facility: CLINIC | Age: 77
End: 2022-01-28
Payer: MEDICARE

## 2022-01-28 VITALS
OXYGEN SATURATION: 96 % | HEIGHT: 75 IN | BODY MASS INDEX: 32.79 KG/M2 | TEMPERATURE: 98 F | RESPIRATION RATE: 16 BRPM | DIASTOLIC BLOOD PRESSURE: 68 MMHG | HEART RATE: 64 BPM | SYSTOLIC BLOOD PRESSURE: 120 MMHG | WEIGHT: 263.69 LBS

## 2022-01-28 DIAGNOSIS — Z76.89 ESTABLISHING CARE WITH NEW DOCTOR, ENCOUNTER FOR: Primary | ICD-10-CM

## 2022-01-28 DIAGNOSIS — L40.50 PSORIATIC ARTHRITIS: ICD-10-CM

## 2022-01-28 DIAGNOSIS — I70.0 AORTIC ATHEROSCLEROSIS: ICD-10-CM

## 2022-01-28 DIAGNOSIS — N18.30 CONTROLLED TYPE 2 DIABETES MELLITUS WITH STAGE 3 CHRONIC KIDNEY DISEASE, WITHOUT LONG-TERM CURRENT USE OF INSULIN: ICD-10-CM

## 2022-01-28 DIAGNOSIS — E11.22 CONTROLLED TYPE 2 DIABETES MELLITUS WITH STAGE 3 CHRONIC KIDNEY DISEASE, WITHOUT LONG-TERM CURRENT USE OF INSULIN: ICD-10-CM

## 2022-01-28 DIAGNOSIS — I10 ESSENTIAL HYPERTENSION, BENIGN: ICD-10-CM

## 2022-01-28 PROCEDURE — 99214 PR OFFICE/OUTPT VISIT, EST, LEVL IV, 30-39 MIN: ICD-10-PCS | Mod: S$PBB,,, | Performed by: INTERNAL MEDICINE

## 2022-01-28 PROCEDURE — 99999 PR PBB SHADOW E&M-EST. PATIENT-LVL IV: CPT | Mod: PBBFAC,,, | Performed by: INTERNAL MEDICINE

## 2022-01-28 PROCEDURE — 99214 OFFICE O/P EST MOD 30 MIN: CPT | Mod: S$PBB,,, | Performed by: INTERNAL MEDICINE

## 2022-01-28 PROCEDURE — 99999 PR PBB SHADOW E&M-EST. PATIENT-LVL IV: ICD-10-PCS | Mod: PBBFAC,,, | Performed by: INTERNAL MEDICINE

## 2022-01-28 PROCEDURE — 99214 OFFICE O/P EST MOD 30 MIN: CPT | Mod: PBBFAC,PO | Performed by: INTERNAL MEDICINE

## 2022-01-28 RX ORDER — GLIPIZIDE 5 MG/1
5 TABLET ORAL DAILY
Qty: 90 TABLET | Refills: 0 | Status: SHIPPED | OUTPATIENT
Start: 2022-01-28 | End: 2022-04-25 | Stop reason: SDUPTHER

## 2022-01-28 NOTE — PROGRESS NOTES
Health Maintenance Due   Topic Date Due    Shingles Vaccine (1 of 2) Hx chickenpox, notified can get it at the pharmacy      Foot Exam  Pt will schedule    Eye Exam  Pt completed    Colonoscopy  Pt will complete    COVID-19 Vaccine (3 - Booster for Pfizer series) 07/31/2021    Influenza Vaccine (1) Pt refused    Lipid Panel  Pt will complete

## 2022-01-28 NOTE — ASSESSMENT & PLAN NOTE
"CT 10/18: "aorta maintains normal caliber with minimal atherosclerotic calcification". On statin  "

## 2022-01-28 NOTE — TELEPHONE ENCOUNTER
No new care gaps identified.  Powered by [employer]. Reference number: [id number].   1/28/2022 1:23:24 PM CST

## 2022-01-28 NOTE — TELEPHONE ENCOUNTER
----- Message from Emerita Finesse sent at 1/28/2022  1:16 PM CST -----  Regarding: [facility] [phone]  .Type: RX Refill Request    Who Called:  self     Have you contacted your pharmacy: yes   Refill or New Rx refill     RX Name and Strength: glipiZIDE (GLUCOTROL) 5 MG tablet    Is this a 30 day or 90 day RX: 90 day     Preferred Pharmacy with phone number: .    [facility] [address]  [address]  [place] [address]  Phone: [phone] Fax: [phone]    Local or Mail Order: local   Would the patient rather a call back or a response via [facility]?  Call back     Best Call Back Number: [phone]

## 2022-01-28 NOTE — PROGRESS NOTES
Subjective:       Patient ID: Rosendo Thomas is a pleasant 76 y.o. White male patient    Chief Complaint: Follow-up      Patient is a new pt to me but was established pt from Dr. Lackey, last visit in 05/12/2021 for a Well adult exam.    HPI     He has a PMH as per list of problems below.  He comes today to establish care with a new PCP and to follow-up regarding his medical conditions, especially diabetes.  He reports he cannot afford Januvia and Tradjenta (150 $ a month). He cannot tell me when he was started on these. States it was cheaper before? He is intolerant to metformin with severe diarrhea and now only takes glipizide.   He had a pretty good control of the diabetes until August where it went up to 10.1%.  He relates this to personal stressors making him drink excessive amount of alcohol.  He reports that  he has been rather abstinent from alcohol from this time except one beer from the moment of the New Year.  He does not check his blood sugar level at his place for now as he needs a battery for his meter and plans to go and buy one today.  He reports a 10 pounds WL from last visit due to stopping OH.       Patient Active Problem List   Diagnosis    Uncontrolled type 2 diabetes mellitus with stage 2 chronic kidney disease, without long-term current use of insulin    Psoriasis    Essential hypertension, benign    Sinus bradycardia    Psoriatic arthritis    History of colon cancer    Aortic atherosclerosis    Personal history of colonic polyps          ACTIVE MEDICAL ISSUES:  Documented in Problem List     PAST MEDICAL HISTORY  Documented     PAST SURGICAL HISTORY:  Documented     SOCIAL HISTORY:  Documented     FAMILY HISTORY:  Documented     ALLERGIES AND MEDICATIONS: updated and reviewed.  Documented    Review of Systems   Constitutional: Negative for activity change, appetite change and fatigue.   HENT: Negative for postnasal drip, rhinorrhea, sinus pressure and sore throat.    Eyes:  "Negative for pain and redness.   Respiratory: Negative for cough, chest tightness, shortness of breath and wheezing.    Cardiovascular: Negative for chest pain, palpitations and leg swelling.   Gastrointestinal: Negative for abdominal pain and constipation.   Genitourinary: Negative for frequency and urgency.   Musculoskeletal: Negative for back pain, gait problem and myalgias.   Skin: Negative for color change.   Allergic/Immunologic: Negative for environmental allergies and food allergies.   Neurological: Negative for headaches.   Hematological: Negative for adenopathy. Does not bruise/bleed easily.   Psychiatric/Behavioral: Negative for behavioral problems, sleep disturbance and suicidal ideas. The patient is not nervous/anxious.        Objective:      Physical Exam  Vitals and nursing note reviewed.   Constitutional:       Appearance: Normal appearance. He is obese.   HENT:      Right Ear: Tympanic membrane normal.      Left Ear: Tympanic membrane normal.   Cardiovascular:      Rate and Rhythm: Normal rate and regular rhythm.      Pulses: Normal pulses.      Heart sounds: Normal heart sounds.   Pulmonary:      Effort: Pulmonary effort is normal.      Breath sounds: Normal breath sounds.   Skin:     General: Skin is warm and dry.   Neurological:      General: No focal deficit present.      Mental Status: He is alert and oriented to person, place, and time.   Psychiatric:         Mood and Affect: Mood normal.         Behavior: Behavior normal.         Thought Content: Thought content normal.         Judgment: Judgment normal.         Vitals:    01/28/22 1116   BP: 120/68   BP Location: Right arm   Patient Position: Sitting   BP Method: Large (Manual)   Pulse: 64   Resp: 16   Temp: 98.1 °F (36.7 °C)   TempSrc: Oral   SpO2: 96%   Weight: 119.6 kg (263 lb 10.7 oz)   Height: 6' 3" (1.905 m)     Body mass index is 32.96 kg/m².    RESULTS: Reviewed labs from last 12 months    Last Lab Results:     Lab Results "   Component Value Date    WBC 7.07 03/09/2021    HGB 13.5 (L) 03/09/2021    HCT 43.1 03/09/2021     03/09/2021     03/09/2021    K 5.1 03/09/2021     03/09/2021    CO2 28 03/09/2021    BUN 18 03/09/2021    CREATININE 1.5 (H) 03/09/2021    CALCIUM 9.3 03/09/2021    ALBUMIN 3.7 03/09/2021    AST 17 03/09/2021    ALT 24 03/09/2021    CHOL 103 (L) 09/04/2020    TRIG 117 09/04/2020    HDL 33 (L) 09/04/2020    LDLCALC 46.6 (L) 09/04/2020    HGBA1C 10.5 (H) 08/12/2021    TSH 2.46 08/14/2010    PSA 4.1 (H) 01/10/2018       Assessment:       1. Establishing care with new doctor, encounter for    2. Essential hypertension, benign    3. Uncontrolled type 2 diabetes mellitus with stage 2 chronic kidney disease, without long-term current use of insulin    4. Psoriatic arthritis    5. Aortic atherosclerosis        Plan:   Rosendo was seen today for follow-up.    Diagnoses and all orders for this visit:    Establishing care with new doctor, encounter for    Discussed the importance of a good pt-doctor trust relationship. Provided him with my contact.    Essential hypertension, benign  -     CBC Auto Differential; Future  -     Comprehensive Metabolic Panel; Future  -     TSH; Future    Will monitor. Blood work.    Uncontrolled type 2 diabetes mellitus with stage 2 chronic kidney disease, without long-term current use of insulin  -     Hemoglobin A1C; Future  -     Lipid Panel; Future    Will recheck. May be better due to WL and holding OH. Cannot afford Tradjenta and Januvia. Will assess next week after blood work.     Psoriatic arthritis    F-up Rheumatologist.    Aortic atherosclerosis    See list of problems.    No follow-ups on file.    This note was created by combination of typed  and M-Modal dictation.  Transcription errors may be present.  If there are any questions, please contact me.

## 2022-01-31 ENCOUNTER — NURSE TRIAGE (OUTPATIENT)
Dept: ADMINISTRATIVE | Facility: CLINIC | Age: 77
End: 2022-01-31
Payer: MEDICARE

## 2022-01-31 ENCOUNTER — LAB VISIT (OUTPATIENT)
Dept: LAB | Facility: HOSPITAL | Age: 77
End: 2022-01-31
Attending: INTERNAL MEDICINE
Payer: MEDICARE

## 2022-01-31 ENCOUNTER — TELEPHONE (OUTPATIENT)
Dept: FAMILY MEDICINE | Facility: CLINIC | Age: 77
End: 2022-01-31
Payer: MEDICARE

## 2022-01-31 DIAGNOSIS — I10 ESSENTIAL HYPERTENSION, BENIGN: ICD-10-CM

## 2022-01-31 LAB
ALBUMIN SERPL BCP-MCNC: 3.8 G/DL (ref 3.5–5.2)
ALP SERPL-CCNC: 72 U/L (ref 55–135)
ALT SERPL W/O P-5'-P-CCNC: 25 U/L (ref 10–44)
ANION GAP SERPL CALC-SCNC: 10 MMOL/L (ref 8–16)
AST SERPL-CCNC: 21 U/L (ref 10–40)
BASOPHILS # BLD AUTO: 0.03 K/UL (ref 0–0.2)
BASOPHILS NFR BLD: 0.5 % (ref 0–1.9)
BILIRUB SERPL-MCNC: 0.8 MG/DL (ref 0.1–1)
BUN SERPL-MCNC: 16 MG/DL (ref 8–23)
CALCIUM SERPL-MCNC: 9.7 MG/DL (ref 8.7–10.5)
CHLORIDE SERPL-SCNC: 101 MMOL/L (ref 95–110)
CHOLEST SERPL-MCNC: 121 MG/DL (ref 120–199)
CHOLEST/HDLC SERPL: 4 {RATIO} (ref 2–5)
CO2 SERPL-SCNC: 25 MMOL/L (ref 23–29)
CREAT SERPL-MCNC: 1.3 MG/DL (ref 0.5–1.4)
DIFFERENTIAL METHOD: ABNORMAL
EOSINOPHIL # BLD AUTO: 0.1 K/UL (ref 0–0.5)
EOSINOPHIL NFR BLD: 1.6 % (ref 0–8)
ERYTHROCYTE [DISTWIDTH] IN BLOOD BY AUTOMATED COUNT: 14.1 % (ref 11.5–14.5)
EST. GFR  (AFRICAN AMERICAN): >60 ML/MIN/1.73 M^2
EST. GFR  (NON AFRICAN AMERICAN): 53 ML/MIN/1.73 M^2
ESTIMATED AVG GLUCOSE: 217 MG/DL (ref 68–131)
GLUCOSE SERPL-MCNC: 242 MG/DL (ref 70–110)
HBA1C MFR BLD: 9.2 % (ref 4–5.6)
HCT VFR BLD AUTO: 46 % (ref 40–54)
HDLC SERPL-MCNC: 30 MG/DL (ref 40–75)
HDLC SERPL: 24.8 % (ref 20–50)
HGB BLD-MCNC: 14.5 G/DL (ref 14–18)
IMM GRANULOCYTES # BLD AUTO: 0.03 K/UL (ref 0–0.04)
IMM GRANULOCYTES NFR BLD AUTO: 0.5 % (ref 0–0.5)
LDLC SERPL CALC-MCNC: 61.8 MG/DL (ref 63–159)
LYMPHOCYTES # BLD AUTO: 1.1 K/UL (ref 1–4.8)
LYMPHOCYTES NFR BLD: 18.1 % (ref 18–48)
MCH RBC QN AUTO: 28.2 PG (ref 27–31)
MCHC RBC AUTO-ENTMCNC: 31.5 G/DL (ref 32–36)
MCV RBC AUTO: 89 FL (ref 82–98)
MONOCYTES # BLD AUTO: 0.6 K/UL (ref 0.3–1)
MONOCYTES NFR BLD: 9.5 % (ref 4–15)
NEUTROPHILS # BLD AUTO: 4.4 K/UL (ref 1.8–7.7)
NEUTROPHILS NFR BLD: 69.8 % (ref 38–73)
NONHDLC SERPL-MCNC: 91 MG/DL
NRBC BLD-RTO: 0 /100 WBC
PLATELET # BLD AUTO: 128 K/UL (ref 150–450)
PMV BLD AUTO: 12.6 FL (ref 9.2–12.9)
POTASSIUM SERPL-SCNC: 4.5 MMOL/L (ref 3.5–5.1)
PROT SERPL-MCNC: 7 G/DL (ref 6–8.4)
RBC # BLD AUTO: 5.15 M/UL (ref 4.6–6.2)
SODIUM SERPL-SCNC: 136 MMOL/L (ref 136–145)
TRIGL SERPL-MCNC: 146 MG/DL (ref 30–150)
TSH SERPL DL<=0.005 MIU/L-ACNC: 2.82 UIU/ML (ref 0.4–4)
WBC # BLD AUTO: 6.31 K/UL (ref 3.9–12.7)

## 2022-01-31 PROCEDURE — 80061 LIPID PANEL: CPT | Performed by: INTERNAL MEDICINE

## 2022-01-31 PROCEDURE — 80053 COMPREHEN METABOLIC PANEL: CPT | Performed by: INTERNAL MEDICINE

## 2022-01-31 PROCEDURE — 85025 COMPLETE CBC W/AUTO DIFF WBC: CPT | Performed by: INTERNAL MEDICINE

## 2022-01-31 PROCEDURE — 84443 ASSAY THYROID STIM HORMONE: CPT | Performed by: INTERNAL MEDICINE

## 2022-01-31 PROCEDURE — 83036 HEMOGLOBIN GLYCOSYLATED A1C: CPT | Performed by: INTERNAL MEDICINE

## 2022-01-31 PROCEDURE — 36415 COLL VENOUS BLD VENIPUNCTURE: CPT | Mod: PO | Performed by: INTERNAL MEDICINE

## 2022-01-31 NOTE — TELEPHONE ENCOUNTER
----- Message from Kayley Villanueva sent at 1/31/2022  9:44 AM CST -----  Type: RX Refill Request    Who Called: [facility] [phone]    Have you contacted your pharmacy:    Refill or New Rx:glipiZIDE (GLUCOTROL) 5 MG tablet- requesting to change medication to something cheaper. Call [facility]    RX Name and Strength:    How is the patient currently taking it? (ex. 1XDay):    Is this a 30 day or 90 day RX:    Preferred Pharmacy with phone number:    Local or Mail Order:    Ordering Provider:    Would the patient rather a call back or a response via [facility]?     Best Call Back Number:    Additional Information:

## 2022-01-31 NOTE — TELEPHONE ENCOUNTER
Spoke to pharmacist at [facility]; he states the copay on januvia went up and pt wants to know if this can be changed to something cheaper; he just had labs done today to check his DM

## 2022-02-01 NOTE — TELEPHONE ENCOUNTER
Sammie pt's daughter calling states Rosendo has a fever, current temp 101, coughing, generalized body aches & decreased appetite that began within 24 hours of getting covid vaccine booster. Advised pt per triage protocol on call provider paged, if no call back within 30 mins instructed to go to ED for physician eval and home care instructions provided per protocol for the meantime.    Additional Information   Negative: [1] Difficulty breathing or swallowing AND [2] starts within 2 hours after injection   Negative: Sounds like a life-threatening emergency to the triager   Negative: Fever > 104 F (40 C)   Negative: Sounds like a severe, unusual reaction to the triager   Negative: [1] Redness or red streak around the injection site AND [2] started > 48 hours after getting vaccine AND [3] fever   [1] Fever > 101 F (38.3 C) AND [2] age > 60 years AND [3] started > 48 hours after getting vaccine    Protocols used: CORONAVIRUS (COVID-19) VACCINE QUESTIONS AND [id number]

## 2022-02-01 NOTE — TELEPHONE ENCOUNTER
Updated and notified Sammie pt's daughter that triager unable to reach on call provider and instructed to go to ED for physician eval now.

## 2022-02-02 NOTE — PROGRESS NOTES
Subjective:       Patient ID: Rosendo Thomas is a pleasant 76 y.o. White male patient    Chief Complaint: COVID-19 Post Vaccine Symptoms      Patient is a pt I saw last on 01/28/2022. See my last notes and the list of problems below.    HPI     He first requested this visit due to symptoms after taking the COVID booster. Reports that he had fever, fatigue, muscle and joint pain, he fell bad for 2 days, then better all of a sudden. It is not a problem anymore.  He would also like to discuss DM treatment. Reports he is not able to afford the 150 $ copay for Tradjenta and Januvia.  States he did not tolerate metformin, due to diarrhea, he thinks he did not receive the ER form.  He does not check his BS at his place, he fell sometimes symptoms of hypoglycemia, in this case has a coke.    Patient Active Problem List   Diagnosis    Uncontrolled type 2 diabetes mellitus with stage 2 chronic kidney disease, without long-term current use of insulin    Psoriasis    Essential hypertension, benign    Sinus bradycardia    Psoriatic arthritis    History of colon cancer    Aortic atherosclerosis    Personal history of colonic polyps    Immunosuppression          ACTIVE MEDICAL ISSUES:  Documented in Problem List     PAST MEDICAL HISTORY  Documented     PAST SURGICAL HISTORY:  Documented     SOCIAL HISTORY:  Documented     FAMILY HISTORY:  Documented     ALLERGIES AND MEDICATIONS: updated and reviewed.  Documented    Review of Systems   Constitutional: Negative for activity change, appetite change and fatigue.   HENT: Negative for postnasal drip, rhinorrhea, sinus pressure and sore throat.    Eyes: Negative for pain and redness.   Respiratory: Negative for cough, chest tightness, shortness of breath and wheezing.    Cardiovascular: Negative for chest pain, palpitations and leg swelling.   Gastrointestinal: Negative for abdominal pain and constipation.   Genitourinary: Negative for frequency and urgency.  "  Musculoskeletal: Negative for back pain, gait problem and myalgias.   Skin: Negative for color change.   Allergic/Immunologic: Negative for environmental allergies and food allergies.   Neurological: Negative for headaches.   Hematological: Negative for adenopathy. Does not bruise/bleed easily.   Psychiatric/Behavioral: Negative for behavioral problems, sleep disturbance and suicidal ideas. The patient is not nervous/anxious.        Objective:      Physical Exam  Vitals and nursing note reviewed.   Constitutional:       Appearance: Normal appearance. He is obese.   HENT:      Right Ear: Tympanic membrane normal.      Left Ear: Tympanic membrane normal.   Eyes:      Conjunctiva/sclera: Conjunctivae normal.   Cardiovascular:      Rate and Rhythm: Normal rate and regular rhythm.      Pulses: Normal pulses.      Heart sounds: Normal heart sounds.   Pulmonary:      Effort: Pulmonary effort is normal.      Breath sounds: Normal breath sounds.   Skin:     General: Skin is warm and dry.   Neurological:      General: No focal deficit present.      Mental Status: He is alert and oriented to person, place, and time.   Psychiatric:         Mood and Affect: Mood normal.         Behavior: Behavior normal.         Thought Content: Thought content normal.         Judgment: Judgment normal.         Vitals:    02/03/22 1312   BP: 118/86   BP Location: Right arm   Patient Position: Sitting   BP Method: Small (Manual)   Pulse: (!) 56   Resp: 16   Temp: 98.6 °F (37 °C)   TempSrc: Oral   SpO2: 96%   Weight: 120.3 kg (265 lb 3.4 oz)   Height: 6' 3" (1.905 m)     Body mass index is 33.15 kg/m².    RESULTS: Reviewed labs from last 12 months  Last Lab Results:     Lab Results   Component Value Date    WBC 6.31 01/31/2022    HGB 14.5 01/31/2022    HCT 46.0 01/31/2022     (L) 01/31/2022     01/31/2022    K 4.5 01/31/2022     01/31/2022    CO2 25 01/31/2022    BUN 16 01/31/2022    CREATININE 1.3 01/31/2022    CALCIUM 9.7 " 01/31/2022    ALBUMIN 3.8 01/31/2022    AST 21 01/31/2022    ALT 25 01/31/2022    CHOL 121 01/31/2022    TRIG 146 01/31/2022    HDL 30 (L) 01/31/2022    LDLCALC 61.8 (L) 01/31/2022    HGBA1C 9.2 (H) 01/31/2022    TSH 2.819 01/31/2022    PSA 4.1 (H) 01/10/2018       Assessment:       1. Essential hypertension, benign    2. Uncontrolled type 2 diabetes mellitus with stage 3 chronic kidney disease, without long-term current use of insulin    3. Stage 3a chronic kidney disease    4. Psoriatic arthritis    5. Immunosuppression        Plan:   Rosendo was seen today for covid-19 post vaccine symptoms.    Diagnoses and all orders for this visit:    Essential hypertension, benign    BP at goal, same treatment.    Uncontrolled type 2 diabetes mellitus with stage 3 chronic kidney disease, without long-term current use of insulin  -     metFORMIN (FORTAMET) 500 mg 24hr tablet; Take in the evening on a full stomach    Not at goal. Long discussion with the patient. Cannot afford present treatment except glipizide that I am a bit afraid to increase as pt reports hypoglycemia. He does not check his BS. It was decided that we will try metformin ER, to be taken once a day only, and will reassess tolerance. Told him to always have a peppermint with him if hypo. May benefit of nateglinide (STARLIX) if not covered or SE? Referral to Endocrinology?    Stage 3a chronic kidney disease    Will monitor, no NSAIDs.    Psoriatic arthritis    On ixekizumab.     Immunosuppression    See above.      No follow-ups on file.    This note was created by combination of typed  and M-Modal dictation.  Transcription errors may be present.  If there are any questions, please contact me.

## 2022-02-03 ENCOUNTER — OFFICE VISIT (OUTPATIENT)
Dept: FAMILY MEDICINE | Facility: CLINIC | Age: 77
End: 2022-02-03
Payer: MEDICARE

## 2022-02-03 VITALS
DIASTOLIC BLOOD PRESSURE: 86 MMHG | RESPIRATION RATE: 16 BRPM | HEART RATE: 56 BPM | HEIGHT: 75 IN | SYSTOLIC BLOOD PRESSURE: 118 MMHG | BODY MASS INDEX: 32.97 KG/M2 | TEMPERATURE: 99 F | OXYGEN SATURATION: 96 % | WEIGHT: 265.19 LBS

## 2022-02-03 DIAGNOSIS — D84.9 IMMUNOSUPPRESSION: ICD-10-CM

## 2022-02-03 DIAGNOSIS — L40.50 PSORIATIC ARTHRITIS: ICD-10-CM

## 2022-02-03 DIAGNOSIS — I10 ESSENTIAL HYPERTENSION, BENIGN: Primary | ICD-10-CM

## 2022-02-03 DIAGNOSIS — N18.31 STAGE 3A CHRONIC KIDNEY DISEASE: ICD-10-CM

## 2022-02-03 PROCEDURE — 99214 OFFICE O/P EST MOD 30 MIN: CPT | Mod: S$PBB,,, | Performed by: INTERNAL MEDICINE

## 2022-02-03 PROCEDURE — 99214 PR OFFICE/OUTPT VISIT, EST, LEVL IV, 30-39 MIN: ICD-10-PCS | Mod: S$PBB,,, | Performed by: INTERNAL MEDICINE

## 2022-02-03 PROCEDURE — 99999 PR PBB SHADOW E&M-EST. PATIENT-LVL III: ICD-10-PCS | Mod: PBBFAC,,, | Performed by: INTERNAL MEDICINE

## 2022-02-03 PROCEDURE — 99999 PR PBB SHADOW E&M-EST. PATIENT-LVL III: CPT | Mod: PBBFAC,,, | Performed by: INTERNAL MEDICINE

## 2022-02-03 PROCEDURE — 99213 OFFICE O/P EST LOW 20 MIN: CPT | Mod: PBBFAC,PO | Performed by: INTERNAL MEDICINE

## 2022-02-03 RX ORDER — NATEGLINIDE 120 MG/1
120 TABLET ORAL
Qty: 270 TABLET | Refills: 3 | Status: CANCELLED | OUTPATIENT
Start: 2022-02-03 | End: 2023-02-03

## 2022-02-03 RX ORDER — METFORMIN HYDROCHLORIDE EXTENDED-RELEASE TABLETS 500 MG/1
TABLET, FILM COATED, EXTENDED RELEASE ORAL
Qty: 30 TABLET | Refills: 11 | Status: SHIPPED | OUTPATIENT
Start: 2022-02-03 | End: 2022-02-25 | Stop reason: SDUPTHER

## 2022-02-03 RX ORDER — LINAGLIPTIN 5 MG/1
5 TABLET, FILM COATED ORAL DAILY
COMMUNITY
Start: 2021-12-20 | End: 2022-02-04

## 2022-02-03 NOTE — PROGRESS NOTES
Health Maintenance Due   Topic     TETANUS VACCINE      Shingles Vaccine (1 of 2)     Foot Exam      Eye Exam      Colonoscopy      Influenza Vaccine (1)

## 2022-02-04 PROBLEM — D84.9 IMMUNOSUPPRESSION: Status: ACTIVE | Noted: 2022-02-04

## 2022-02-07 ENCOUNTER — TELEPHONE (OUTPATIENT)
Dept: FAMILY MEDICINE | Facility: CLINIC | Age: 77
End: 2022-02-07
Payer: MEDICARE

## 2022-02-07 NOTE — TELEPHONE ENCOUNTER
----- Message from Zheng Ann sent at 2/7/2022  9:37 AM CST -----  Regarding: Patient Advice  Name of Who is Calling:CASSY VALENTE [[id number]]    What is the request in detail:Medformin has the same effects give patient severed diarrhea. Pt is req a call back with advice on what to do.     Can the clinic reply by [facility]:No    What Number to Call Back if not in [facility]:[phone]

## 2022-02-07 NOTE — TELEPHONE ENCOUNTER
Pt state metformin is causing him to have diarrhea; state he picked up metformin 2/5 and began having diarrhea a few hours after taking first dose; pt would like to know what else can be prescribed for his diabetes

## 2022-02-08 ENCOUNTER — TELEPHONE (OUTPATIENT)
Dept: FAMILY MEDICINE | Facility: CLINIC | Age: 77
End: 2022-02-08
Payer: MEDICARE

## 2022-02-08 NOTE — TELEPHONE ENCOUNTER
Cannot tolerate metformin. On glipizide but hypos. Tradjenta and Januvia too expensive. Referred to Endocrinology

## 2022-02-09 RX ORDER — LINAGLIPTIN 5 MG/1
TABLET, FILM COATED ORAL
Qty: 30 TABLET | Refills: 5 | OUTPATIENT
Start: 2022-02-09

## 2022-02-09 NOTE — TELEPHONE ENCOUNTER
Quick DC. Inappropriate Request    Refill Authorization Note   Rosendo Thomas  is requesting a refill authorization.  Brief Assessment and Rationale for Refill:  Quick Discontinue  Medication Therapy Plan:  Discontinued by: PCP MD on 2/4/2022     Medication Reconciliation Completed:  No      Comments:   Pended Medication(s)       Requested Prescriptions     Pending Prescriptions Disp Refills    TRADJENTA 5 mg Tab tablet [Pharmacy Med Name: Tradjenta 5 mg tablet] 30 tablet 5     Sig: take 1 tablet by mouth once daily        Duplicate Pended Encounter(s)/ Last Prescribed Details: (includes pharmacy & prescriber details)   ose: 5 mg Route: Oral Frequency: Daily   Dispense Quantity: -- Refills: --          Sig: Take 5 mg by mouth once daily.         Start Date: 12/20/21 End Date: 02/04/22   Discontinued by: Tory Hatch MD on 2/4/2022 12:54         Written Date: -- Expiration Date: --   Ordering Date: 02/03/22     Source:  Received from: External Pharmacy     Providers    Authorizing Provider:    Historical Provider   [address] [place] [address]   Phone:  [phone]   CHAY #:  --   NPI:  --        Ordering User:  Hardik Medina MA          Pharmacy    [facility] [place] [address]   [facility] [address], [place] [address]   Phone:  [phone]  Fax:  [phone]   CHAY #:  --   REJI Reason: --       Outpatient Medication Detail     Disp Refills Start End REJI   TRADJENTA 5 mg Tab tablet (Discontinued)   12/20/2021 2/4/2022 Yes   Sig - Route: Take 5 mg by mouth once daily. - Oral   Class: Historical Med   Order: [id number]   Date/Time Signed: 2/3/2022 13:21                  Note composed:3:48 PM 02/09/2022

## 2022-02-11 ENCOUNTER — TELEPHONE (OUTPATIENT)
Dept: FAMILY MEDICINE | Facility: CLINIC | Age: 77
End: 2022-02-11
Payer: MEDICARE

## 2022-02-11 NOTE — TELEPHONE ENCOUNTER
----- Message from Chanell Scott sent at 2/11/2022 10:53 AM CST -----  Regarding: Jarred/ [facility] [place] - [address] [phone]  Type: Patient Call Back    Who called:  Jarred    What is the request in detail:  Patient would like to change his metFORMIN (FORTAMET) 500 mg 24hr tablet he stated it's giving him stomach issues.  Pharmacy would like a call back with the change.  Thank you    Would the patient rather a call back or a response via [facility]?  Call back    Best call back number:  [phone]        Thank you

## 2022-02-11 NOTE — TELEPHONE ENCOUNTER
----- Message from Martina Griffin sent at 2/11/2022 11:23 AM CST -----  Pt is returning call   [phone] (home)

## 2022-02-11 NOTE — TELEPHONE ENCOUNTER
Call came from the pharmacist. Metformin causing diarrhea. States the doesn't want to continue the medication. Tried reaching out to the patient to discuss, no answer.

## 2022-02-25 ENCOUNTER — OFFICE VISIT (OUTPATIENT)
Dept: ENDOCRINOLOGY | Facility: CLINIC | Age: 77
End: 2022-02-25
Payer: MEDICARE

## 2022-02-25 VITALS
SYSTOLIC BLOOD PRESSURE: 124 MMHG | TEMPERATURE: 98 F | WEIGHT: 266.81 LBS | HEART RATE: 56 BPM | DIASTOLIC BLOOD PRESSURE: 57 MMHG | BODY MASS INDEX: 33.35 KG/M2

## 2022-02-25 DIAGNOSIS — N18.31 STAGE 3A CHRONIC KIDNEY DISEASE: ICD-10-CM

## 2022-02-25 DIAGNOSIS — E66.09 CLASS 1 OBESITY DUE TO EXCESS CALORIES WITH SERIOUS COMORBIDITY AND BODY MASS INDEX (BMI) OF 33.0 TO 33.9 IN ADULT: ICD-10-CM

## 2022-02-25 DIAGNOSIS — I10 ESSENTIAL HYPERTENSION, BENIGN: ICD-10-CM

## 2022-02-25 DIAGNOSIS — E08.3293 DIABETES MELLITUS DUE TO UNDERLYING CONDITION WITH BOTH EYES AFFECTED BY MILD NONPROLIFERATIVE RETINOPATHY WITHOUT MACULAR EDEMA, WITHOUT LONG-TERM CURRENT USE OF INSULIN: ICD-10-CM

## 2022-02-25 DIAGNOSIS — Z85.038 HISTORY OF COLON CANCER: ICD-10-CM

## 2022-02-25 DIAGNOSIS — E11.65 UNCONTROLLED TYPE 2 DIABETES MELLITUS WITH HYPERGLYCEMIA, WITHOUT LONG-TERM CURRENT USE OF INSULIN: Primary | ICD-10-CM

## 2022-02-25 DIAGNOSIS — R00.1 SINUS BRADYCARDIA: ICD-10-CM

## 2022-02-25 PROBLEM — E66.811 CLASS 1 OBESITY DUE TO EXCESS CALORIES WITH SERIOUS COMORBIDITY AND BODY MASS INDEX (BMI) OF 33.0 TO 33.9 IN ADULT: Status: ACTIVE | Noted: 2022-02-25

## 2022-02-25 LAB — GLUCOSE SERPL-MCNC: 324 MG/DL (ref 70–110)

## 2022-02-25 PROCEDURE — 99999 PR PBB SHADOW E&M-EST. PATIENT-LVL IV: ICD-10-PCS | Mod: PBBFAC,,, | Performed by: HOSPITALIST

## 2022-02-25 PROCEDURE — 99204 OFFICE O/P NEW MOD 45 MIN: CPT | Mod: S$PBB,,, | Performed by: HOSPITALIST

## 2022-02-25 PROCEDURE — 82962 GLUCOSE BLOOD TEST: CPT | Mod: PBBFAC | Performed by: HOSPITALIST

## 2022-02-25 PROCEDURE — 99999 PR PBB SHADOW E&M-EST. PATIENT-LVL IV: CPT | Mod: PBBFAC,,, | Performed by: HOSPITALIST

## 2022-02-25 PROCEDURE — 99214 OFFICE O/P EST MOD 30 MIN: CPT | Mod: PBBFAC | Performed by: HOSPITALIST

## 2022-02-25 PROCEDURE — 99204 PR OFFICE/OUTPT VISIT, NEW, LEVL IV, 45-59 MIN: ICD-10-PCS | Mod: S$PBB,,, | Performed by: HOSPITALIST

## 2022-02-25 RX ORDER — DULAGLUTIDE 0.75 MG/.5ML
0.75 INJECTION, SOLUTION SUBCUTANEOUS
Qty: 4 PEN | Refills: 11 | Status: SHIPPED | OUTPATIENT
Start: 2022-02-25 | End: 2022-03-30

## 2022-02-25 RX ORDER — METFORMIN HYDROCHLORIDE EXTENDED-RELEASE TABLETS 500 MG/1
TABLET, FILM COATED, EXTENDED RELEASE ORAL
Qty: 30 TABLET | Refills: 11
Start: 2022-02-25 | End: 2022-04-25

## 2022-02-25 NOTE — ASSESSMENT & PLAN NOTE
- Renal function reviewed on lab work today, stable   - Renally Adjust diabetes medication, avoid hypoglycemia  - continue routine monitoring  - PCP/nephro

## 2022-02-25 NOTE — PATIENT INSTRUCTIONS
----------------------------------------------------------  Changes were made today:             Glipizide 5mg daily   Metformin XR 500mg daily    Start: Trulicity 0.75mg once a week injections      Goal for your blood sugar   In the morning before breakfast: 100-140  Before going to bed: 100-140  Do not go to bed with glucose less than 100    Please check glucose 2 times a day (before breakfast at bedtime).   You can keep track of the glucose with Glucose logs given in clinic, or any cheap notebook or loose leaf papers  Please filled out and bring back to next office visit for me to review  Document any (LOW BLOOD GLUCOSE) hypoglycemia  episode with date and time for me to review    Please make sure to get your dilated eyes examine once a year with your eye doctor.  Monitor your feet for any cuts or skin breakdown regularly, schedule your podiatrist visit if you see one    We will plan an in-clinic visit in 2 months, with labs prior to that appointment.      Contact information:  Yordan Varela M.D  [facility] [address] [facility] [address], [address]  [place] [address]    Office:  [phone]  Fax:  [phone]     ----------------------------------------------------------

## 2022-02-25 NOTE — ASSESSMENT & PLAN NOTE
- Body mass index is 33.35 kg/m².  - dietary discussion as above  - continue to monitor weight  - start GLP1

## 2022-02-25 NOTE — ASSESSMENT & PLAN NOTE
- Diabetes is not at goal, given most current A1C, Goal A1C for patient is 7%  - Limit data/lack of glucose log, making adjustment of diabetes regiment very difficult  - Complicated by hyperglycemia as noted in clinic today, after breast and dietary indiscretion  - Diabetic supplies/medications reviewed this visit to ensure continue refills and compliance  - Advised patient to get periodic eye and feet exam.   - Reviewed routine maintenance: lipid, U:P/C     Plan  - Discussion with patient about dietary modification, portion size control, decreasing carbohydrates intake  - Continue: Glipizide 5mg daily. Temporary continue Metformin XR 500mg daily, if issue with diarrhea will stop  - Start: Trulicity 0.75mg once a week injections, demo shown in clinic  - Advised pt to check glucoses regularly, asked to filled glucose log and bring back for review at next office visit  - Symptoms of hyperglycemia and hypoglycemia discussed, advised in the treatment of hypoglycemia  - Clear written instruction given on AVS, Follow up as scheduled

## 2022-02-25 NOTE — PROGRESS NOTES
Subjective:      Patient ID: Rosendo Thomas is a 76 y.o. male presented to [facility] on 2/25/2022.  Chief Complaint:  Diabetes      History of Present Illness: Rosendo Thomas is a 76 y.o. male here for  Type 2 diabetes  Other significant past medical history: CKD3a, psoriasis     With regards to Diabetes Mellitus Type 2  Known diabetic complications: nephropathy and retinopathy  Diagnosed w/ DM: in 2010    Interval history: Here for diabetes, poorly control. Dietary discretion.  In clinic glucose check: 324 after breakfast, carter cake    Current meds:               Glipizide 5 mg daily   Metformin XR 500mg>> causing diarrrhea, pt is concern  Misses medication doses - Yes/no  Injection Technique: Good  Rotation of injection site: Yes   Previous meds:   Home glucose checks: checks daily, Logs reviewed/Unavailable: oral recall: 180 in AM   Hypoglycemia: denies  Diet/Exercise:               Eating 2* meals per day, salad              Drink: no soda  Weight trend: stable  Diabetes Education: No  Diabetes Related Hospitalization:  No  Hx of pancreatitis, hx of thyroid cancer: No  Family history of diabetes: Yes, retired  Occupation: retired    Eye exam current (within one year): yes, DR: no  Reports cuts or ulcers on feet:   Denies    Statin: Taking  ACE/ARB: Taking    Diabetes Management Status: Reviewed     A1C Trend  Lab Results   Component Value Date    HGBA1C 9.2 (H) 01/31/2022    HGBA1C 10.5 (H) 08/12/2021    HGBA1C 7.0 (H) 01/07/2021       Lab Results   Component Value Date    MICALBCREAT 26.6 08/12/2021     No results found for: QJCOOIBB16  No results found for: TTGIGA    No results found for: CPEPTIDE, GLUTAMICACID, ISLETCELLANT, FRUCTOSAMINE     Screening or Prevention Patient's value Goal Complete/Controlled?   Lipid profile : 01/31/2022 Annually Yes   LDL control Lab Results   Component Value Date    LDLCALC 61.8 (L) 01/31/2022    Annually/Less than 100 mg/dl  Yes   Nephropathy  screening Lab Results   Component Value Date    LABMICR 32.0 08/12/2021     Lab Results   Component Value Date    PROTEINUA Negative 09/13/2018    Annually Yes   Blood pressure BP Readings from Last 1 Encounters:   02/25/22 (!) 124/57    Less than 140/90 Yes   Dilated retinal exam : 11/19/2021 Annually Yes   Foot exam   : 04/12/2019 Annually No       Reviewed past surgical, medical, family, social history and updated as appropriate.    Review of Systems: see HPI above    Objective:   BP (!) 124/57 (BP Location: Right arm)   Pulse (!) 56   Temp 97.6 °F (36.4 °C) (Oral)   Wt 121 kg (266 lb 12.8 oz)   BMI 33.35 kg/m²     Body mass index is 33.35 kg/m².  Vital signs reviewed    Physical Exam  Vitals and nursing note reviewed.   Constitutional:       General: He is not in acute distress.     Appearance: Normal appearance. He is well-developed. He is obese. He is not toxic-appearing.   Neck:      Thyroid: No thyromegaly.   Cardiovascular:      Heart sounds: Normal heart sounds.   Pulmonary:      Effort: Pulmonary effort is normal. No respiratory distress.   Abdominal:      Tenderness: There is no abdominal tenderness.   Musculoskeletal:         General: No deformity. Normal range of motion.      Cervical back: Normal range of motion.   Skin:     Findings: No bruising.   Neurological:      Mental Status: He is alert and oriented to person, place, and time.   Psychiatric:         Mood and Affect: Mood normal.         Lab Reviewed:   Lab Results   Component Value Date    HGBA1C 9.2 (H) 01/31/2022       Lab Results   Component Value Date    CHOL 121 01/31/2022    HDL 30 (L) 01/31/2022    LDLCALC 61.8 (L) 01/31/2022    TRIG 146 01/31/2022    CHOLHDL 24.8 01/31/2022       Lab Results   Component Value Date     01/31/2022    K 4.5 01/31/2022     01/31/2022    CO2 25 01/31/2022     (H) 01/31/2022    BUN 16 01/31/2022    CREATININE 1.3 01/31/2022    CALCIUM 9.7 01/31/2022    PROT 7.0 01/31/2022    ALBUMIN  3.8 01/31/2022    BILITOT 0.8 01/31/2022    ALKPHOS 72 01/31/2022    AST 21 01/31/2022    ALT 25 01/31/2022    ANIONGAP 10 01/31/2022    ESTGFRAFRICA >60.0 01/31/2022    EGFRNONAA 53.0 (A) 01/31/2022    TSH 2.819 01/31/2022        Lab Results   Component Value Date    CALCIUM 9.7 01/31/2022    CALCIUM 9.3 03/09/2021    CALCIUM 9.0 07/28/2020    PHOS 3.3 10/08/2018    PHOS 2.5 (L) 10/07/2018    PHOS 2.7 10/06/2018    ALKPHOS 72 01/31/2022    ALKPHOS 70 03/09/2021    ALKPHOS 75 07/28/2020    TSH 2.819 01/31/2022       Assessment     1. Uncontrolled type 2 diabetes mellitus with hyperglycemia, without long-term current use of insulin  Hemoglobin A1C    POCT Glucose, Hand-Held Device    Comprehensive Metabolic Panel    metFORMIN (FORTAMET) 500 mg 24hr tablet    dulaglutide (TRULICITY) 0.75 mg/0.5 mL pen injector   2. Uncontrolled type 2 diabetes mellitus with stage 3 chronic kidney disease, without long-term current use of insulin  Ambulatory referral/consult to Endocrinology    Hemoglobin A1C    POCT Glucose, Hand-Held Device   3. Essential hypertension, benign     4. Class 1 obesity due to excess calories with serious comorbidity and body mass index (BMI) of 33.0 to 33.9 in adult  dulaglutide (TRULICITY) 0.75 mg/0.5 mL pen injector   5. Sinus bradycardia     6. History of colon cancer     7. Stage 3a chronic kidney disease     8. 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   - Reviewed routine maintenance: lipid, U:P/C     Plan  - Discussion with patient about dietary modification, portion size control, decreasing carbohydrates intake  - Continue: Glipizide 5mg daily. Temporary continue Metformin XR 500mg daily, if issue with diarrhea will stop  - Start: Trulicity 0.75mg once a week injections, demo shown in clinic  - Advised pt to check glucoses regularly, asked to filled glucose log and bring back for review at next office visit  - Symptoms of hyperglycemia and hypoglycemia discussed, advised in the treatment of hypoglycemia  - Clear written instruction given on AVS, Follow up as scheduled    Essential hypertension, benign  - BP reviewed today  - continue home medication (s)  - manage by PCP      Sinus bradycardia  - chronic and stable    History of colon cancer  - with resection, colectomy could be causing diarrhea from metformin?    Class 1 obesity due to excess calories with serious comorbidity and body mass index (BMI) of 33.0 to 33.9 in adult  - Body mass index is 33.35 kg/m².  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   RTC in 2 months      Yordan Varela M.D.  Endocrinology  [facility]  2/25/2022      Disclaimer: This note has been generated in part with the use of voice-recognition software. There may be  typographical errors that have been missed during proof-reading.

## 2022-04-07 ENCOUNTER — TELEPHONE (OUTPATIENT)
Dept: DERMATOLOGY | Facility: CLINIC | Age: 77
End: 2022-04-07
Payer: MEDICARE

## 2022-04-07 NOTE — TELEPHONE ENCOUNTER
I have attempted without success to contact this patient by phone to inform pt about appointment change

## 2022-04-19 ENCOUNTER — LAB VISIT (OUTPATIENT)
Dept: LAB | Facility: HOSPITAL | Age: 77
End: 2022-04-19
Attending: HOSPITALIST
Payer: MEDICARE

## 2022-04-19 DIAGNOSIS — E11.65 UNCONTROLLED TYPE 2 DIABETES MELLITUS WITH HYPERGLYCEMIA, WITHOUT LONG-TERM CURRENT USE OF INSULIN: ICD-10-CM

## 2022-04-19 LAB
ALBUMIN SERPL BCP-MCNC: 3.8 G/DL (ref 3.5–5.2)
ALP SERPL-CCNC: 57 U/L (ref 55–135)
ALT SERPL W/O P-5'-P-CCNC: 20 U/L (ref 10–44)
ANION GAP SERPL CALC-SCNC: 11 MMOL/L (ref 8–16)
AST SERPL-CCNC: 16 U/L (ref 10–40)
BILIRUB SERPL-MCNC: 0.6 MG/DL (ref 0.1–1)
BUN SERPL-MCNC: 18 MG/DL (ref 8–23)
CALCIUM SERPL-MCNC: 9.6 MG/DL (ref 8.7–10.5)
CHLORIDE SERPL-SCNC: 104 MMOL/L (ref 95–110)
CO2 SERPL-SCNC: 28 MMOL/L (ref 23–29)
CREAT SERPL-MCNC: 1.2 MG/DL (ref 0.5–1.4)
EST. GFR  (AFRICAN AMERICAN): >60 ML/MIN/1.73 M^2
EST. GFR  (NON AFRICAN AMERICAN): 58.4 ML/MIN/1.73 M^2
ESTIMATED AVG GLUCOSE: 183 MG/DL (ref 68–131)
GLUCOSE SERPL-MCNC: 158 MG/DL (ref 70–110)
HBA1C MFR BLD: 8 % (ref 4–5.6)
POTASSIUM SERPL-SCNC: 4.4 MMOL/L (ref 3.5–5.1)
PROT SERPL-MCNC: 6.8 G/DL (ref 6–8.4)
SODIUM SERPL-SCNC: 143 MMOL/L (ref 136–145)

## 2022-04-19 PROCEDURE — 36415 COLL VENOUS BLD VENIPUNCTURE: CPT | Mod: PO | Performed by: HOSPITALIST

## 2022-04-19 PROCEDURE — 80053 COMPREHEN METABOLIC PANEL: CPT | Performed by: HOSPITALIST

## 2022-04-19 PROCEDURE — 83036 HEMOGLOBIN GLYCOSYLATED A1C: CPT | Performed by: HOSPITALIST

## 2022-04-25 ENCOUNTER — OFFICE VISIT (OUTPATIENT)
Dept: ENDOCRINOLOGY | Facility: CLINIC | Age: 77
End: 2022-04-25
Payer: MEDICARE

## 2022-04-25 VITALS
DIASTOLIC BLOOD PRESSURE: 68 MMHG | WEIGHT: 265.31 LBS | TEMPERATURE: 99 F | HEART RATE: 76 BPM | BODY MASS INDEX: 33.16 KG/M2 | SYSTOLIC BLOOD PRESSURE: 117 MMHG

## 2022-04-25 DIAGNOSIS — E11.65 TYPE 2 DIABETES MELLITUS WITH HYPERGLYCEMIA, WITHOUT LONG-TERM CURRENT USE OF INSULIN: ICD-10-CM

## 2022-04-25 DIAGNOSIS — Z85.038 HISTORY OF COLON CANCER: ICD-10-CM

## 2022-04-25 DIAGNOSIS — E11.65 UNCONTROLLED TYPE 2 DIABETES MELLITUS WITH HYPERGLYCEMIA, WITHOUT LONG-TERM CURRENT USE OF INSULIN: Primary | ICD-10-CM

## 2022-04-25 DIAGNOSIS — N18.30 CONTROLLED TYPE 2 DIABETES MELLITUS WITH STAGE 3 CHRONIC KIDNEY DISEASE, WITHOUT LONG-TERM CURRENT USE OF INSULIN: ICD-10-CM

## 2022-04-25 DIAGNOSIS — I10 ESSENTIAL HYPERTENSION, BENIGN: ICD-10-CM

## 2022-04-25 DIAGNOSIS — E66.09 CLASS 1 OBESITY DUE TO EXCESS CALORIES WITH SERIOUS COMORBIDITY AND BODY MASS INDEX (BMI) OF 33.0 TO 33.9 IN ADULT: ICD-10-CM

## 2022-04-25 DIAGNOSIS — E11.22 CONTROLLED TYPE 2 DIABETES MELLITUS WITH STAGE 3 CHRONIC KIDNEY DISEASE, WITHOUT LONG-TERM CURRENT USE OF INSULIN: ICD-10-CM

## 2022-04-25 DIAGNOSIS — N18.31 STAGE 3A CHRONIC KIDNEY DISEASE: ICD-10-CM

## 2022-04-25 PROCEDURE — 99999 PR PBB SHADOW E&M-EST. PATIENT-LVL III: ICD-10-PCS | Mod: PBBFAC,,, | Performed by: HOSPITALIST

## 2022-04-25 PROCEDURE — 99214 PR OFFICE/OUTPT VISIT, EST, LEVL IV, 30-39 MIN: ICD-10-PCS | Mod: S$PBB,,, | Performed by: HOSPITALIST

## 2022-04-25 PROCEDURE — 99999 PR PBB SHADOW E&M-EST. PATIENT-LVL III: CPT | Mod: PBBFAC,,, | Performed by: HOSPITALIST

## 2022-04-25 PROCEDURE — 99213 OFFICE O/P EST LOW 20 MIN: CPT | Mod: PBBFAC | Performed by: HOSPITALIST

## 2022-04-25 PROCEDURE — 99214 OFFICE O/P EST MOD 30 MIN: CPT | Mod: S$PBB,,, | Performed by: HOSPITALIST

## 2022-04-25 RX ORDER — GLIPIZIDE 5 MG/1
5 TABLET ORAL DAILY
Qty: 90 TABLET | Refills: 3 | Status: SHIPPED | OUTPATIENT
Start: 2022-04-25 | End: 2023-05-18

## 2022-04-25 RX ORDER — DULAGLUTIDE 0.75 MG/.5ML
0.75 INJECTION, SOLUTION SUBCUTANEOUS
Qty: 12 PEN | Refills: 3 | Status: SHIPPED | OUTPATIENT
Start: 2022-04-25 | End: 2023-03-01

## 2022-04-25 NOTE — PROGRESS NOTES
Subjective:      Patient ID: Rosendo Thomas is a 76 y.o. male presented to [facility] on 4/25/2022.  Chief Complaint:  Diabetes      History of Present Illness: Rosendo Thomas is a 76 y.o. male here for  Type 2 diabetes  Other significant past medical history: CKD3a, psoriasis     With regards to Diabetes Mellitus Type 2  Known diabetic complications: nephropathy and retinopathy  Diagnosed w/ DM: in 2010      Interval history: Here for diabetes, diabetes have been better.  Patient has made better dietary modification.  Off Trulicity, did not get refills  Metformin causes diarrhea, he is in.  Glipizide only at this time    Current meds:    Glipizide 5mg daily   Metformin stop due to diarrhea   Trulicity ran out of medication  Injection Technique: Good  Rotation of injection site: Yes   Previous meds:   Home glucose checks: checks daily, Logs reviewed   Hyperglycemia in a.m. fasting         Hypoglycemia: denies  Diet/Exercise:               Eating 2 meals per day, salad              Drink: no soda  Weight trend: stable  Diabetes Education: No  Diabetes Related Hospitalization:  No  Hx of pancreatitis, hx of thyroid cancer: No  Family history of diabetes: Yes, retired  Occupation: retired    Eye exam current (within one year): yes, DR: no  Reports cuts or ulcers on feet:   Denies    Statin: Taking  ACE/ARB: Taking    Diabetes Management Status: Reviewed     A1C Trend  Lab Results   Component Value Date    HGBA1C 8.0 (H) 04/19/2022    HGBA1C 9.2 (H) 01/31/2022    HGBA1C 10.5 (H) 08/12/2021       Lab Results   Component Value Date    MICALBCREAT 26.6 08/12/2021     No results found for: [id number]  No results found for: TTGIGA    No results found for: CPEPTIDE, GLUTAMICACID, ISLETCELLANT, FRUCTOSAMINE     Screening or Prevention Patient's value Goal Complete/Controlled?   Lipid profile : 01/31/2022 Annually Yes   LDL control Lab Results   Component Value Date    LDLCALC 61.8 (L) 01/31/2022     Annually/Less than 100 mg/dl  Yes   Nephropathy screening Lab Results   Component Value Date    LABMICR 32.0 08/12/2021     Lab Results   Component Value Date    PROTEINUA Negative 09/13/2018    Annually Yes   Blood pressure BP Readings from Last 1 Encounters:   04/25/22 117/68    Less than 140/90 Yes   Dilated retinal exam : 01/13/2022 Annually Yes   Foot exam   : 04/25/2022 Annually No       Reviewed past surgical, medical, family, social history and updated as appropriate.    Review of Systems: see HPI above    Objective:   /68 (BP Location: Right arm)   Pulse 76   Temp 98.5 °F (36.9 °C) (Oral)   Wt 120.3 kg (265 lb 4.8 oz)   BMI 33.16 kg/m²     Body mass index is 33.16 kg/m².  Vital signs reviewed    Physical Exam  Vitals and nursing note reviewed.   Constitutional:       General: He is not in acute distress.     Appearance: Normal appearance. He is well-developed. He is obese. He is not toxic-appearing.   Neck:      Thyroid: No thyromegaly.   Cardiovascular:      Pulses:           Dorsalis pedis pulses are 1+ on the right side and 1+ on the left side.   Pulmonary:      Effort: Pulmonary effort is normal.   Abdominal:      Tenderness: There is no abdominal tenderness.   Musculoskeletal:         General: No deformity. Normal range of motion.      Right foot: Normal range of motion. No deformity.      Left foot: Normal range of motion. No deformity.        Feet:    Feet:      Right foot:      Protective Sensation: 4 sites tested. 4 sites sensed.      Skin integrity: Skin integrity normal. No ulcer or blister.      Left foot:      Protective Sensation: 4 sites tested. 4 sites sensed.      Skin integrity: Skin integrity normal. No ulcer or blister.      Toenail Condition: Left toenails are abnormally thick.   Skin:     Findings: No bruising.   Neurological:      Mental Status: He is alert and oriented to person, place, and time.   Psychiatric:         Mood and Affect: Mood normal.         Lab Reviewed:    Lab Results   Component Value Date    HGBA1C 8.0 (H) 04/19/2022       Lab Results   Component Value Date    CHOL 121 01/31/2022    HDL 30 (L) 01/31/2022    LDLCALC 61.8 (L) 01/31/2022    TRIG 146 01/31/2022    CHOLHDL 24.8 01/31/2022       Lab Results   Component Value Date     04/19/2022    K 4.4 04/19/2022     04/19/2022    CO2 28 04/19/2022     (H) 04/19/2022    BUN 18 04/19/2022    CREATININE 1.2 04/19/2022    CALCIUM 9.6 04/19/2022    PROT 6.8 04/19/2022    ALBUMIN 3.8 04/19/2022    BILITOT 0.6 04/19/2022    ALKPHOS 57 04/19/2022    AST 16 04/19/2022    ALT 20 04/19/2022    ANIONGAP 11 04/19/2022    ESTGFRAFRICA >60.0 04/19/2022    EGFRNONAA 58.4 (A) 04/19/2022    TSH 2.819 01/31/2022        Lab Results   Component Value Date    CALCIUM 9.6 04/19/2022    CALCIUM 9.7 01/31/2022    CALCIUM 9.3 03/09/2021    PHOS 3.3 10/08/2018    PHOS 2.5 (L) 10/07/2018    PHOS 2.7 10/06/2018    ALKPHOS 57 04/19/2022    ALKPHOS 72 01/31/2022    ALKPHOS 70 03/09/2021    TSH 2.819 01/31/2022       Assessment     1. Uncontrolled type 2 diabetes mellitus with hyperglycemia, without long-term current use of insulin  dulaglutide (TRULICITY) 0.75 mg/0.5 mL pen injector    glipiZIDE (GLUCOTROL) 5 MG tablet    Basic Metabolic Panel    Hemoglobin A1C    Microalbumin/Creatinine Ratio, Urine    CANCELED: Microalbumin/Creatinine Ratio, Urine   2. Controlled type 2 diabetes mellitus with stage 3 chronic kidney disease, without long-term current use of insulin  glipiZIDE (GLUCOTROL) 5 MG tablet    Basic Metabolic Panel    Hemoglobin A1C    Microalbumin/Creatinine Ratio, Urine    CANCELED: Microalbumin/Creatinine Ratio, Urine   3. Type 2 diabetes mellitus with hyperglycemia, without long-term current use of insulin     4. Essential hypertension, benign     5. History of colon cancer     6. Class 1 obesity due to excess calories with serious comorbidity and body mass index (BMI) of 33.0 to 33.9 in adult     7. Stage 3a  chronic kidney disease          Plan     Type 2 diabetes mellitus with hyperglycemia, without long-term current use of insulin  - Diabetes is not at goal, given most current A1C, Goal A1C for patient is 7%>> albeit with better improvement recently  - glucose log review show hyperglycemia  - Diabetic supplies/medications reviewed this visit to ensure continue refills and compliance  - Advised patient to get periodic eye and feet exam.   - Reviewed routine maintenance: lipid, U:P/C   - continue encouragement of  dietary modification, portion size control, decreasing carbohydrates intake    Plan  - Continue: Glipizide 5mg daily.   - stop metformin due to frequent diarrhea on low doses  - needs to restart Trulicity 0.75mg once a week injections, as glucose was much better while on this medication.  - Advised pt to check glucoses regularly, asked to filled glucose log and bring back for review at next office visit  - Clear written instruction given on AVS, Follow up as scheduled  - foot exam completed for this year      Essential hypertension, benign  - BP reviewed today  - continue home medication (s)  - manage by PCP      History of colon cancer  - with resection, colectomy causing diarrhea with metformin.    - Stop metformin at this time    Class 1 obesity due to excess calories with serious comorbidity and body mass index (BMI) of 33.0 to 33.9 in adult  - Body mass index is 33.16 kg/m².  - dietary discussion as above  - continue to monitor weight  - restart GLP1    Stage 3a chronic kidney disease  - Renal function reviewed on lab work today, stable   - Renally Adjust diabetes medication, avoid hypoglycemia  - continue routine monitoring    Advised patient to follow up with PCP for routine health maintenance care.   RTC in 3-4 months      Yordan Varela M.D.  Endocrinology  [facility]  4/25/2022      Disclaimer: This note has been generated in part with the use of voice-recognition  software. There may be typographical errors that have been missed during proof-reading.

## 2022-04-25 NOTE — PATIENT INSTRUCTIONS
----------------------------------------------------------  Changes were made today:           Glipizide 5mg daily   Trulicity 0.75mg once weekly       STOP Metformin XR 500mg daily    Goal for your blood sugar   In the morning before breakfast:   Before going to bed: 100-140  Do not go to bed with glucose less than 100      Please check glucose 2 times a day (before breakfast at bedtime).   You can keep track of the glucose with Glucose logs given in clinic, or any cheap notebook or loose leaf papers  Please filled out and bring back to next office visit for me to review  Document any (LOW BLOOD GLUCOSE) hypoglycemia  episode with date and time for me to review    Please make sure to get your dilated eyes examine once a year with your eye doctor.  Monitor your feet for any cuts or skin breakdown regularly, schedule your podiatrist visit if you see one    We will plan an in-clinic visit in 4 months, with labs prior to that appointment.      Contact information:  Yordan Varela M.D  [facility] [address] [facility] [address], [address]  [place] [address]    Office:  [phone]  Fax:  [phone]     ----------------------------------------------------------

## 2022-04-25 NOTE — ASSESSMENT & PLAN NOTE
- Diabetes is not at goal, given most current A1C, Goal A1C for patient is 7%>> albeit with better improvement recently  - glucose log review show hyperglycemia  - Diabetic supplies/medications reviewed this visit to ensure continue refills and compliance  - Advised patient to get periodic eye and feet exam.   - Reviewed routine maintenance: lipid, U:P/C   - continue encouragement of  dietary modification, portion size control, decreasing carbohydrates intake    Plan  - Continue: Glipizide 5mg daily.   - stop metformin due to frequent diarrhea on low doses  - needs to restart Trulicity 0.75mg once a week injections, as glucose was much better while on this medication.  - Advised pt to check glucoses regularly, asked to filled glucose log and bring back for review at next office visit  - Clear written instruction given on AVS, Follow up as scheduled

## 2022-04-25 NOTE — ASSESSMENT & PLAN NOTE
- Body mass index is 33.16 kg/m².  - dietary discussion as above  - continue to monitor weight  - restart GLP1

## 2022-04-25 NOTE — ASSESSMENT & PLAN NOTE
- Renal function reviewed on lab work today, stable   - Renally Adjust diabetes medication, avoid hypoglycemia  - continue routine monitoring

## 2022-04-27 ENCOUNTER — TELEPHONE (OUTPATIENT)
Dept: ENDOCRINOLOGY | Facility: CLINIC | Age: 77
End: 2022-04-27
Payer: MEDICARE

## 2022-04-27 NOTE — TELEPHONE ENCOUNTER
----- Message from Annie Carlson sent at 4/27/2022 11:08 AM CDT -----  Type: Patient Call Back    Who called: self     What is the request in detail: Patient would like to speak with a nurse regarding his dulaglutide (TRULICITY) 0.75 mg/0.5 mL pen injector prescription. Having issues getting it filled, and hasn't heard anything back from the pharmacy.     Can the clinic reply by [facility]? No     Would the patient rather a call back or a response via [facility]? Call back     Best call back number: [phone]

## 2022-04-27 NOTE — TELEPHONE ENCOUNTER
Called [facility] , they said the medication has been ready for  for two days.    Called patient and informed it's ready , said he'll go get it .

## 2022-05-06 ENCOUNTER — TELEPHONE (OUTPATIENT)
Dept: RHEUMATOLOGY | Facility: CLINIC | Age: 77
End: 2022-05-06
Payer: MEDICARE

## 2022-05-06 NOTE — TELEPHONE ENCOUNTER
----- Message from Janelle Erazo sent at 5/6/2022  8:20 AM CDT -----  Contact: Pt  Pt's requesting a call back re: scheduling f/u visit for RA. Also, pt request a call back on next Tuesday.     Confirmed contact info below:  Contact Name: Rosendo Thomas  Phone Number: [phone]

## 2022-05-16 ENCOUNTER — TELEPHONE (OUTPATIENT)
Dept: FAMILY MEDICINE | Facility: CLINIC | Age: 77
End: 2022-05-16
Payer: MEDICARE

## 2022-05-16 DIAGNOSIS — U07.1 COVID: ICD-10-CM

## 2022-05-16 DIAGNOSIS — U07.1 COVID-19 VIRUS INFECTION: Primary | ICD-10-CM

## 2022-05-16 NOTE — TELEPHONE ENCOUNTER
----- Message from Bernabe Carter sent at 5/16/2022  8:11 AM CDT -----  Type: Patient Call Back    Who called:self    What is the request in detail:Pt has tested positive for covid on 5/14 and he would like to know if he can come in for an infusion treatment.    Can the clinic reply by [facility]?no    Would the patient rather a call back or a response via [facility]? call    Best call back number:[phone]

## 2022-05-16 NOTE — TELEPHONE ENCOUNTER
Pt states he is having joint pain only; had bad cough on Saturday and went to urgent care; he tested positive then and they gave him antibiotics and cough medication    Also pt had appt scheduled with you for this week but I changed it to next week, it is regarding his DM but he sees endo, saw them last month with labs and has f/u scheduled in august with labs; does he need the OV with you??

## 2022-05-17 ENCOUNTER — INFUSION (OUTPATIENT)
Dept: INFECTIOUS DISEASES | Facility: HOSPITAL | Age: 77
End: 2022-05-17
Attending: INTERNAL MEDICINE
Payer: MEDICARE

## 2022-05-17 ENCOUNTER — NURSE TRIAGE (OUTPATIENT)
Dept: ADMINISTRATIVE | Facility: CLINIC | Age: 77
End: 2022-05-17
Payer: MEDICARE

## 2022-05-17 VITALS
HEIGHT: 76 IN | SYSTOLIC BLOOD PRESSURE: 92 MMHG | WEIGHT: 265 LBS | TEMPERATURE: 98 F | HEART RATE: 69 BPM | BODY MASS INDEX: 32.27 KG/M2 | OXYGEN SATURATION: 93 % | DIASTOLIC BLOOD PRESSURE: 50 MMHG | RESPIRATION RATE: 18 BRPM

## 2022-05-17 DIAGNOSIS — U07.1 COVID-19 VIRUS INFECTION: ICD-10-CM

## 2022-05-17 DIAGNOSIS — U07.1 COVID-19: Primary | ICD-10-CM

## 2022-05-17 PROCEDURE — 63600175 PHARM REV CODE 636 W HCPCS: Performed by: INTERNAL MEDICINE

## 2022-05-17 PROCEDURE — M0222 HC IV INJECTION, BEBTELOVIMAB, INCL POST ADMIN MONIT: HCPCS | Performed by: INTERNAL MEDICINE

## 2022-05-17 RX ORDER — ACETAMINOPHEN 325 MG/1
650 TABLET ORAL
Status: ACTIVE | OUTPATIENT
Start: 2022-05-17 | End: 2022-05-18

## 2022-05-17 RX ORDER — BEBTELOVIMAB 87.5 MG/ML
175 INJECTION, SOLUTION INTRAVENOUS
Status: COMPLETED | OUTPATIENT
Start: 2022-05-17 | End: 2022-05-17

## 2022-05-17 RX ORDER — DIPHENHYDRAMINE HYDROCHLORIDE 50 MG/ML
25 INJECTION INTRAMUSCULAR; INTRAVENOUS
Status: ACTIVE | OUTPATIENT
Start: 2022-05-17 | End: 2022-05-18

## 2022-05-17 RX ORDER — ONDANSETRON 4 MG/1
4 TABLET, ORALLY DISINTEGRATING ORAL
Status: ACTIVE | OUTPATIENT
Start: 2022-05-17 | End: 2022-05-18

## 2022-05-17 RX ORDER — ALBUTEROL SULFATE 90 UG/1
2 AEROSOL, METERED RESPIRATORY (INHALATION)
Status: ACTIVE | OUTPATIENT
Start: 2022-05-17 | End: 2022-05-20

## 2022-05-17 RX ORDER — EPINEPHRINE 0.3 MG/.3ML
0.3 INJECTION SUBCUTANEOUS
Status: ACTIVE | OUTPATIENT
Start: 2022-05-17 | End: 2022-05-20

## 2022-05-17 RX ADMIN — BEBTELOVIMAB 175 MG: 87.5 INJECTION, SOLUTION INTRAVENOUS at 10:05

## 2022-05-17 NOTE — TELEPHONE ENCOUNTER
[facility] outgoing call - Pt c/o cough, tiredness, HA, stiff neck, afebrile. Covid protocol followed and pt advised to tx at home. Pt asking for MAB infusion. Encounter routed to PCP to bring attention to encounter and Pt request. Alert and oriented, Pt responsive to teaching given.     Reason for Disposition   [1] COVID-19 diagnosed by positive lab test (e.g., PCR, rapid self-test kit) AND [2] mild symptoms (e.g., cough, fever, others) AND [3] no complications or SOB    Additional Information   Negative: SEVERE difficulty breathing (e.g., struggling for each breath, speaks in single words)   Negative: Difficult to awaken or acting confused (e.g., disoriented, slurred speech)   Negative: Bluish (or gray) lips or face now   Negative: Shock suspected (e.g., cold/pale/clammy skin, too weak to stand, low BP, rapid pulse)   Negative: Sounds like a life-threatening emergency to the triager   Negative: SEVERE or constant chest pain or pressure (Exception: mild central chest pain, present only when coughing)   Negative: MODERATE difficulty breathing (e.g., speaks in phrases, SOB even at rest, pulse 100-120)   Negative: Headache and stiff neck (can't touch chin to chest)   Negative: Chest pain or pressure   Negative: Patient sounds very sick or weak to the triager   Negative: MILD difficulty breathing (e.g., minimal/no SOB at rest, SOB with walking, pulse <100)   Negative: Fever > 103 F (39.4 C)   Negative: [1] Fever > 101 F (38.3 C) AND [2] over 60 years of age   Negative: [1] Fever > 100.0 F (37.8 C) AND [2] bedridden (e.g., nursing home patient, CVA, chronic illness, recovering from surgery)   Negative: HIGH RISK for severe COVID complications (e.g., age > 64 years, obesity with BMI > 25, pregnant, chronic lung disease or other chronic medical condition) (Exception: Already seen by PCP and no new or worsening symptoms.)   Negative: [1] HIGH RISK patient AND [2] influenza is widespread in the community AND [3]  Neurologic Chief Complaint: Facial droop, dysarthria     Subjective:     Interval History: Patient is seen for follow-up neurological assessment and treatment recommendations:     NAEON. No concerns for worsening NIHSS.     HPI, Past Medical, Family, and Social History remains the same as documented in the initial encounter.     Review of Systems   Unable to perform ROS: Acuity of condition   Constitutional: Negative for fever.   Respiratory: Positive for wheezing. Negative for shortness of breath.    Cardiovascular: Negative for chest pain.   Neurological: Positive for facial asymmetry and speech difficulty. Negative for dizziness, tremors, seizures, syncope, weakness, light-headedness, numbness and headaches.   Hematological: Negative for adenopathy.   Psychiatric/Behavioral: Negative for agitation and behavioral problems.     Scheduled Meds:   atorvastatin  80 mg Oral Daily    DULoxetine  60 mg Oral Daily    furosemide  40 mg Oral BID    spironolactone  25 mg Oral Daily     Continuous Infusions:   niCARdipine       PRN Meds:calcium gluconate IVPB, calcium gluconate IVPB, calcium gluconate IVPB, dextrose 50%, glucagon (human recombinant), insulin aspart U-100, labetalol, magnesium sulfate IVPB, magnesium sulfate IVPB, metoprolol, potassium chloride in water **AND** potassium chloride in water **AND** potassium chloride in water, sodium chloride 0.9%, sodium phosphate IVPB, sodium phosphate IVPB, sodium phosphate IVPB    Objective:     Vital Signs (Most Recent):  Temp: 99.4 °F (37.4 °C) (08/15/20 0705)  Pulse: 94 (08/15/20 0905)  Resp: (!) 26 (08/15/20 0905)  BP: 131/71 (08/15/20 0905)  SpO2: 100 % (08/15/20 0905)  BP Location: Right arm    Vital Signs Range (Last 24H):  Temp:  [98 °F (36.7 °C)-99.4 °F (37.4 °C)]   Pulse:  []   Resp:  [0-32]   BP: (110-146)/(65-96)   SpO2:  [96 %-100 %]   BP Location: Right arm    Physical Exam  HENT:      Head: Normocephalic and atraumatic.      Mouth/Throat:       Mouth: Mucous membranes are moist.   Eyes:      Extraocular Movements: Extraocular movements intact.      Pupils: Pupils are equal, round, and reactive to light.   Cardiovascular:      Rate and Rhythm: Normal rate.   Pulmonary:      Effort: No respiratory distress.   Abdominal:      Palpations: Abdomen is soft.         Neurological Exam:   LOC: alert  Attention Span: Good   Language: No aphasia  Articulation: Dysarthria  Orientation: Person, Place, Time   Visual Fields: Full  EOM (CN III, IV, VI): Full/intact  Pupils (CN II, III): PERRL  Facial Sensation (CN V): Normal  Facial Movement (CN VII): Lower facial weakness on the Left  Gag Reflex: present  Reflexes: 2+ throughout  Motor: Arm left  Normal 5/5  Leg left  Normal 5/5  Arm right  Normal 5/5  Leg right Normal 5/5  Cebellar: No evidence of appendicular or axial ataxia  Sensation: Intact to light touch, temperature and vibration  Tone: Normal tone throughout    Laboratory:  CMP:   Recent Labs   Lab 08/15/20  0221   CALCIUM 8.1*   ALBUMIN 3.0*   PROT 5.8*      K 3.0*   CO2 29      BUN 12   CREATININE 0.7   ALKPHOS 108   ALT 23   AST 18   BILITOT 0.7     BMP:   Recent Labs   Lab 08/15/20  0221      K 3.0*      CO2 29   BUN 12   CREATININE 0.7   CALCIUM 8.1*     CBC:   Recent Labs   Lab 08/15/20  0221   WBC 6.75   RBC 3.80*   HGB 10.7*   HCT 33.8*      MCV 89   MCH 28.2   MCHC 31.7*     Lipid Panel:   Recent Labs   Lab 08/14/20  1035   CHOL 186   LDLCALC 125.0   HDL 39*   TRIG 110     Coagulation:   Recent Labs   Lab 08/14/20  0818   INR 0.9     Platelet Aggregation Study: No results for input(s): PLTAGG, PLTAGINTERP, PLTAGREGLACO, ADPPLTAGGREG in the last 168 hours.  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 without associated  ONE OR MORE respiratory symptoms: cough, sore throat, runny or stuffy nose   Negative: [1] HIGH RISK patient AND [2] influenza exposure within the last 7 days AND [3] ONE OR MORE respiratory symptoms: cough, sore throat, runny or stuffy nose   Negative: [1] COVID-19 infection suspected by caller or triager AND [2] mild symptoms (cough, fever, or others) AND [3] negative COVID-19 rapid test   Negative: [1] COVID-19 infection suspected by caller or triager AND [2] mild symptoms (cough, fever, or others) AND [3] has not gotten tested yet   Negative: Fever present > 3 days (72 hours)   Negative: [1] Fever returns after gone for over 24 hours AND [2] symptoms worse or not improved   Negative: [1] Continuous (nonstop) coughing interferes with work or school AND [2] no improvement using cough treatment per Care Advice   Negative: Cough present > 3 weeks   Negative: [1] COVID-19 diagnosed by positive lab test (e.g., PCR, rapid self-test kit) AND [2] NO symptoms (e.g., cough, fever, others)    Protocols used: CORONAVIRUS (COVID-19) DIAGNOSED OR [id number]       hemorrhagic component.  Minimal edema and regional mass effect without significant midline shift or herniation.    Vessel Imaging   CTA   Right frontal lobe moderate-sized acute infarct centered within the insular region, without associated hemorrhagic component, not significantly changed from imaging studies earlier same day.     CTA demonstrates major branch vessel occlusion of the proximal anterior superior M2 branch of right MCA.    Cardiac Imaging   · Severe left ventricular enlargement.  · Severely decreased left ventricular systolic function. The estimated ejection fraction is 15%.  · Moderate right ventricular enlargement.  · Mildly to moderately reduced right ventricular systolic function.  · Grade I (mild) left ventricular diastolic dysfunction consistent with impaired relaxation.  · Trivial pericardial effusion.  · Normal central venous pressure (3 mmHg).

## 2022-05-17 NOTE — PROGRESS NOTES
Patient arrives for Bebtelovimab IV Injection. Ambulatory. Pt AAox3. No distress noted. RR even and unlabored.     Symptoms and onset date:  5/14/22 Cough, no smell/taste    Tested COVID + on 5/14/22

## 2022-05-17 NOTE — PROGRESS NOTES
Bebtelovimab IV Injection administered. Pt tolerated injection well. No S/S of injection reaction noted at present time. One hour observation started.

## 2022-05-17 NOTE — PROGRESS NOTES
Patient remains with no signs of complications noted. Patient received Bebtelovimab IV Injection according to FDA recommendations and [facility] SOP without complications noted and left with mask in place. Drug fact sheet provided. Pt discharged home. Ambulatory. Remains AAox3. No distress noted. RR even and unlabored.

## 2022-05-25 ENCOUNTER — OFFICE VISIT (OUTPATIENT)
Dept: FAMILY MEDICINE | Facility: CLINIC | Age: 77
End: 2022-05-25
Payer: MEDICARE

## 2022-05-25 VITALS
DIASTOLIC BLOOD PRESSURE: 80 MMHG | HEIGHT: 76 IN | BODY MASS INDEX: 32.24 KG/M2 | RESPIRATION RATE: 16 BRPM | OXYGEN SATURATION: 97 % | WEIGHT: 264.75 LBS | HEART RATE: 60 BPM | SYSTOLIC BLOOD PRESSURE: 116 MMHG | TEMPERATURE: 99 F

## 2022-05-25 DIAGNOSIS — Z12.12 ENCOUNTER FOR COLORECTAL CANCER SCREENING: ICD-10-CM

## 2022-05-25 DIAGNOSIS — E66.09 CLASS 1 OBESITY DUE TO EXCESS CALORIES WITH SERIOUS COMORBIDITY AND BODY MASS INDEX (BMI) OF 32.0 TO 32.9 IN ADULT: ICD-10-CM

## 2022-05-25 DIAGNOSIS — I10 ESSENTIAL HYPERTENSION, BENIGN: ICD-10-CM

## 2022-05-25 DIAGNOSIS — N18.31 STAGE 3A CHRONIC KIDNEY DISEASE: ICD-10-CM

## 2022-05-25 DIAGNOSIS — D84.9 IMMUNOSUPPRESSION: ICD-10-CM

## 2022-05-25 DIAGNOSIS — Z12.11 ENCOUNTER FOR COLORECTAL CANCER SCREENING: ICD-10-CM

## 2022-05-25 DIAGNOSIS — Z86.16 HISTORY OF COVID-19: ICD-10-CM

## 2022-05-25 DIAGNOSIS — L98.9 SKIN LESIONS: ICD-10-CM

## 2022-05-25 DIAGNOSIS — L40.50 PSORIATIC ARTHRITIS: ICD-10-CM

## 2022-05-25 PROCEDURE — 99999 PR PBB SHADOW E&M-EST. PATIENT-LVL V: CPT | Mod: PBBFAC,,, | Performed by: INTERNAL MEDICINE

## 2022-05-25 PROCEDURE — 99215 OFFICE O/P EST HI 40 MIN: CPT | Mod: PBBFAC,PO | Performed by: INTERNAL MEDICINE

## 2022-05-25 PROCEDURE — 99214 OFFICE O/P EST MOD 30 MIN: CPT | Mod: S$PBB,,, | Performed by: INTERNAL MEDICINE

## 2022-05-25 PROCEDURE — 99999 PR PBB SHADOW E&M-EST. PATIENT-LVL V: ICD-10-PCS | Mod: PBBFAC,,, | Performed by: INTERNAL MEDICINE

## 2022-05-25 PROCEDURE — 99214 PR OFFICE/OUTPT VISIT, EST, LEVL IV, 30-39 MIN: ICD-10-PCS | Mod: S$PBB,,, | Performed by: INTERNAL MEDICINE

## 2022-05-25 NOTE — PROGRESS NOTES
Health Maintenance Due   Topic     TETANUS VACCINE      Shingles Vaccine (1 of 2) hx chickenpox ; inform pt can get vaccine at pharmacy.    Pneumococcal Vaccines (Age 65+) (3 - PPSV23 or PCV20) Pt decline     Colonoscopy  Pending cscope     COVID-19 Vaccine (4 - Booster for Pfizer series)

## 2022-05-25 NOTE — PROGRESS NOTES
Subjective:       Patient ID: Rosendo Thomas is a pleasant 76 y.o. White male patient    Chief Complaint: Hypertension (Follow up ) and Diabetes (Follow up )      Patient is a pt I saw last on 02/03/2022, see my last notes and the list of problems below.    HPI     In the interval:  - Endocrinology  - COVID 10 days ago, received EUA infusion.  He is very pleased of the infusion, states he fell so much better the day before.  Reports that he went to a graduation, was not aware that he had COVID then and gave it to several family members.   He tries to work on his lifestyle, eating cauliflower rice, veggies, broiled chicken, sweet potatoes. He stays away from candies and cake.  He has an area of skin lesions on his L calf, that has been present for 2 months, not really itchy. He tried to obtain an appt with Dr. Wright, Dermatologist, but no opening until August.   He has other raman of skin lesions due to psoriasis on the elbows, and a very dry skin.      Patient Active Problem List   Diagnosis    Uncontrolled type 2 diabetes mellitus with stage 2 chronic kidney disease, without long-term current use of insulin    Psoriasis    Essential hypertension, benign    Sinus bradycardia    Psoriatic arthritis    History of colon cancer    Aortic atherosclerosis    Personal history of colonic polyps    Immunosuppression    Uncontrolled type 2 diabetes mellitus with hyperglycemia, without long-term current use of insulin    Class 1 obesity due to excess calories with serious comorbidity and body mass index (BMI) of 33.0 to 33.9 in adult    Stage 3a chronic kidney disease    Diabetes mellitus due to underlying condition with both eyes affected by mild nonproliferative retinopathy without macular edema, without long-term current use of insulin    Type 2 diabetes mellitus with hyperglycemia, without long-term current use of insulin    COVID          ACTIVE MEDICAL ISSUES:  Documented in Problem List     PAST MEDICAL  HISTORY  Documented     PAST SURGICAL HISTORY:  Documented     SOCIAL HISTORY:  Documented     FAMILY HISTORY:  Documented     ALLERGIES AND MEDICATIONS: updated and reviewed.  Documented    Review of Systems   Constitutional: Negative for activity change, appetite change and fatigue.   HENT: Negative for postnasal drip, rhinorrhea, sinus pressure and sore throat.    Eyes: Negative for pain and redness.   Respiratory: Negative for cough, chest tightness, shortness of breath and wheezing.    Cardiovascular: Negative for chest pain, palpitations and leg swelling.   Gastrointestinal: Negative for abdominal pain and constipation.   Genitourinary: Negative for frequency and urgency.   Musculoskeletal: Negative for back pain, gait problem and myalgias.   Skin: Positive for rash. Negative for color change.   Allergic/Immunologic: Negative for environmental allergies and food allergies.   Neurological: Negative for headaches.   Hematological: Negative for adenopathy. Does not bruise/bleed easily.   Psychiatric/Behavioral: Negative for behavioral problems, sleep disturbance and suicidal ideas. The patient is not nervous/anxious.        Objective:      Physical Exam  Vitals and nursing note reviewed.   Constitutional:       Appearance: Normal appearance. He is obese.   HENT:      Right Ear: Tympanic membrane normal.      Left Ear: Tympanic membrane normal.   Eyes:      Conjunctiva/sclera: Conjunctivae normal.   Cardiovascular:      Rate and Rhythm: Normal rate and regular rhythm.      Pulses: Normal pulses.      Heart sounds: Normal heart sounds.   Pulmonary:      Effort: Pulmonary effort is normal.      Breath sounds: Normal breath sounds.   Abdominal:      General: Bowel sounds are normal.   Musculoskeletal:         General: Normal range of motion.   Skin:     General: Skin is warm and dry.      Comments: See pictures in media.  10/8 cm area on L calf with possible former vesicles, now red, no drainage.  Skin is dry,  "psoriasis lesions on the elbows   Neurological:      Mental Status: He is alert.         Vitals:    05/25/22 1358   BP: 116/80   BP Location: Right arm   Patient Position: Sitting   BP Method: Small (Manual)   Pulse: 60   Resp: 16   Temp: 98.5 °F (36.9 °C)   TempSrc: Oral   SpO2: 97%   Weight: 120.1 kg (264 lb 12.4 oz)   Height: 6' 4" (1.93 m)     Body mass index is 32.23 kg/m².    RESULTS: Reviewed labs from last 12 months    Last Lab Results:     Lab Results   Component Value Date    WBC 6.31 01/31/2022    HGB 14.5 01/31/2022    HCT 46.0 01/31/2022     (L) 01/31/2022     04/19/2022    K 4.4 04/19/2022     04/19/2022    CO2 28 04/19/2022    BUN 18 04/19/2022    CREATININE 1.2 04/19/2022    CALCIUM 9.6 04/19/2022    ALBUMIN 3.8 04/19/2022    AST 16 04/19/2022    ALT 20 04/19/2022    CHOL 121 01/31/2022    TRIG 146 01/31/2022    HDL 30 (L) 01/31/2022    LDLCALC 61.8 (L) 01/31/2022    HGBA1C 8.0 (H) 04/19/2022    TSH 2.819 01/31/2022    PSA 4.1 (H) 01/10/2018       Assessment:       1. Uncontrolled type 2 diabetes mellitus with stage 3 chronic kidney disease, without long-term current use of insulin    2. Essential hypertension, benign    3. Stage 3a chronic kidney disease    4. Psoriatic arthritis    5. Skin lesions    6. Immunosuppression    7. Class 1 obesity due to excess calories with serious comorbidity and body mass index (BMI) of 32.0 to 32.9 in adult    8. History of COVID-19    9. Encounter for colorectal cancer screening        Plan:   Rosendo was seen today for hypertension and diabetes.    Diagnoses and all orders for this visit:    Uncontrolled type 2 diabetes mellitus with stage 3 chronic kidney disease, without long-term current use of insulin    F-up Dr. Varela. Same treatment. Needs to work on his lifestyle. Praised him for his nice efforts.    Essential hypertension, benign    BP at goal today, Same treatment.    Stage 3a chronic kidney disease    Stable.    Psoriatic " arthritis    Doing well on treatment.    Skin lesions    See HPI and PE, also pictures in media. Not recent, present for at least 2 months. Will try and send a message to Dr. Wright to gather her expertise.    Immunosuppression    Due to treatment for psoriasis.    Class 1 obesity due to excess calories with serious comorbidity and body mass index (BMI) of 32.0 to 32.9 in adult    Working on his lifestyle.    History of COVID-19    Recent, I sent him to have EUA ab infusion, pleased of it as fell better soon after receiving it.    Encounter for colorectal cancer screening  -     Case Request Endoscopy: COLONOSCOPY      Follow up in about 6 months (around 11/25/2022) for f-up.    This note was created by combination of typed  and M-Modal dictation.  Transcription errors may be present.  If there are any questions, please contact me.

## 2022-05-31 ENCOUNTER — EXTERNAL CHRONIC CARE MANAGEMENT (OUTPATIENT)
Dept: PRIMARY CARE CLINIC | Facility: CLINIC | Age: 77
End: 2022-05-31
Payer: MEDICARE

## 2022-05-31 PROCEDURE — 99439 CHRNC CARE MGMT STAF EA ADDL: CPT | Mod: S$PBB,,, | Performed by: FAMILY MEDICINE

## 2022-05-31 PROCEDURE — 99439 CHRNC CARE MGMT STAF EA ADDL: CPT | Mod: PBBFAC,PN | Performed by: FAMILY MEDICINE

## 2022-05-31 PROCEDURE — 99490 CHRNC CARE MGMT STAFF 1ST 20: CPT | Mod: PBBFAC,PN | Performed by: FAMILY MEDICINE

## 2022-05-31 PROCEDURE — 99439 PR CHRONIC CARE MGMT, EA ADDTL 20 MIN: ICD-10-PCS | Mod: S$PBB,,, | Performed by: FAMILY MEDICINE

## 2022-05-31 PROCEDURE — 99490 PR CHRONIC CARE MGMT, 1ST 20 MIN: ICD-10-PCS | Mod: S$PBB,,, | Performed by: FAMILY MEDICINE

## 2022-05-31 PROCEDURE — 99490 CHRNC CARE MGMT STAFF 1ST 20: CPT | Mod: S$PBB,,, | Performed by: FAMILY MEDICINE

## 2022-06-03 DIAGNOSIS — I10 ESSENTIAL HYPERTENSION, BENIGN: ICD-10-CM

## 2022-06-03 RX ORDER — LOSARTAN POTASSIUM AND HYDROCHLOROTHIAZIDE 25; 100 MG/1; MG/1
1 TABLET ORAL DAILY
Qty: 90 TABLET | Refills: 3 | Status: SHIPPED | OUTPATIENT
Start: 2022-06-03 | End: 2023-05-26

## 2022-06-03 NOTE — TELEPHONE ENCOUNTER
No new care gaps identified.  [facility] Embedded Care Gaps. Reference number: [id number]. 6/03/2022   8:00:39 AM CDT

## 2022-06-03 NOTE — TELEPHONE ENCOUNTER
Refill Routing Note   Medication(s) are not appropriate for processing by [facility] for the following reason(s):      - Medication not previously prescribed by PCP    [facility] action(s):  Defer          Medication reconciliation completed: No     Appointments  past 12m or future 3m with PCP    Date Provider   Last Visit   5/25/2022 Tory Hatch MD   Next Visit   11/23/2022 Tory Hatch MD   ED visits in past 90 days: 0        Note composed:4:40 PM 06/03/2022

## 2022-06-30 ENCOUNTER — EXTERNAL CHRONIC CARE MANAGEMENT (OUTPATIENT)
Dept: PRIMARY CARE CLINIC | Facility: CLINIC | Age: 77
End: 2022-06-30
Payer: MEDICARE

## 2022-06-30 PROCEDURE — 99490 CHRNC CARE MGMT STAFF 1ST 20: CPT | Mod: PBBFAC,PN | Performed by: FAMILY MEDICINE

## 2022-06-30 PROCEDURE — 99439 CHRNC CARE MGMT STAF EA ADDL: CPT | Mod: PBBFAC,PN | Performed by: FAMILY MEDICINE

## 2022-06-30 PROCEDURE — 99439 CHRNC CARE MGMT STAF EA ADDL: CPT | Mod: S$PBB,,, | Performed by: FAMILY MEDICINE

## 2022-06-30 PROCEDURE — 99490 CHRNC CARE MGMT STAFF 1ST 20: CPT | Mod: S$PBB,,, | Performed by: FAMILY MEDICINE

## 2022-06-30 PROCEDURE — 99490 PR CHRONIC CARE MGMT, 1ST 20 MIN: ICD-10-PCS | Mod: S$PBB,,, | Performed by: FAMILY MEDICINE

## 2022-06-30 PROCEDURE — 99439 PR CHRONIC CARE MGMT, EA ADDTL 20 MIN: ICD-10-PCS | Mod: S$PBB,,, | Performed by: FAMILY MEDICINE

## 2022-07-07 ENCOUNTER — TELEPHONE (OUTPATIENT)
Dept: ENDOCRINOLOGY | Facility: CLINIC | Age: 77
End: 2022-07-07
Payer: MEDICARE

## 2022-07-07 NOTE — TELEPHONE ENCOUNTER
----- Message from Kelly Cadet sent at 7/7/2022 12:57 PM CDT -----  Regarding: request  Contact: [phone]  Pt Questions    Who Called:Stephany Li   Questions:Calling the office to request a blood sugar log for the pt to be mailed out to him . Please contact pt   Call Back number: [phone]

## 2022-07-11 NOTE — TELEPHONE ENCOUNTER
Spoke with pt letting him know we can mail him sugar logs but Dr Varela says pt can use a notebook. Pt agreed and verbalized understanding.

## 2022-07-14 ENCOUNTER — OFFICE VISIT (OUTPATIENT)
Dept: PODIATRY | Facility: CLINIC | Age: 77
End: 2022-07-14
Payer: MEDICARE

## 2022-07-14 VITALS — HEIGHT: 76 IN | BODY MASS INDEX: 32.24 KG/M2 | WEIGHT: 264.75 LBS

## 2022-07-14 DIAGNOSIS — E11.49 TYPE II DIABETES MELLITUS WITH NEUROLOGICAL MANIFESTATIONS: Primary | ICD-10-CM

## 2022-07-14 DIAGNOSIS — L60.0 INGROWN NAIL: ICD-10-CM

## 2022-07-14 PROCEDURE — 99999 PR PBB SHADOW E&M-EST. PATIENT-LVL III: CPT | Mod: PBBFAC,,, | Performed by: PODIATRIST

## 2022-07-14 PROCEDURE — 99999 PR PBB SHADOW E&M-EST. PATIENT-LVL III: ICD-10-PCS | Mod: PBBFAC,,, | Performed by: PODIATRIST

## 2022-07-14 PROCEDURE — 99213 OFFICE O/P EST LOW 20 MIN: CPT | Mod: PBBFAC,PO | Performed by: PODIATRIST

## 2022-07-14 PROCEDURE — 99214 PR OFFICE/OUTPT VISIT, EST, LEVL IV, 30-39 MIN: ICD-10-PCS | Mod: S$PBB,,, | Performed by: PODIATRIST

## 2022-07-14 PROCEDURE — 99214 OFFICE O/P EST MOD 30 MIN: CPT | Mod: S$PBB,,, | Performed by: PODIATRIST

## 2022-07-14 NOTE — PROGRESS NOTES
Subjective:      Patient ID: Rosendo Thomas is a 76 y.o. male.    Chief Complaint: Ingrown Toenail and Diabetes Mellitus    Rosendo is a 76 y.o. male who presents to the clinic for evaluation and treatment of high risk feet. Rosendo has a past medical history of Colon cancer, Colon polyps, Diabetes mellitus type II (7/2010), Obesity, Psoriasis, Skin cancer, Squamous cell carcinoma, and Type 2 diabetes mellitus. The patient has no major complaints with feet. Chief concern is how to care for feet as a diabetic.    This patient has documented high risk feet requiring routine maintenance secondary to diabetes mellitis and those secondary complications of diabetes, as mentioned..  Reports ingrown nail right great toe.     PCP: Tory Hatch MD    Date Last Seen by PCP:   Chief Complaint   Patient presents with    Ingrown Toenail    Diabetes Mellitus       Current shoe gear:  Tennis shoes     Hemoglobin A1C   Date Value Ref Range Status   04/19/2022 8.0 (H) 4.0 - 5.6 % Final     Comment:     ADA Screening Guidelines:  5.7-6.4%  Consistent with prediabetes  >or=6.5%  Consistent with diabetes    High levels of fetal hemoglobin interfere with the HbA1C  assay. Heterozygous hemoglobin variants (HbS, HgC, etc)do  not significantly interfere with this assay.   However, presence of multiple variants may affect accuracy.     01/31/2022 9.2 (H) 4.0 - 5.6 % Final     Comment:     ADA Screening Guidelines:  5.7-6.4%  Consistent with prediabetes  >or=6.5%  Consistent with diabetes    High levels of fetal hemoglobin interfere with the HbA1C  assay. Heterozygous hemoglobin variants (HbS, HgC, etc)do  not significantly interfere with this assay.   However, presence of multiple variants may affect accuracy.     08/12/2021 10.5 (H) 4.0 - 5.6 % Final     Comment:     ADA Screening Guidelines:  5.7-6.4%  Consistent with prediabetes  >or=6.5%  Consistent with diabetes    High levels of fetal hemoglobin interfere with the  HbA1C  assay. Heterozygous hemoglobin variants (HbS, HgC, etc)do  not significantly interfere with this assay.   However, presence of multiple variants may affect accuracy.       Patient Active Problem List   Diagnosis    Uncontrolled type 2 diabetes mellitus with stage 2 chronic kidney disease, without long-term current use of insulin    Psoriasis    Essential hypertension, benign    Sinus bradycardia    Psoriatic arthritis    History of colon cancer    Aortic atherosclerosis    Personal history of colonic polyps    Immunosuppression    Uncontrolled type 2 diabetes mellitus with hyperglycemia, without long-term current use of insulin    Class 1 obesity due to excess calories with serious comorbidity and body mass index (BMI) of 33.0 to 33.9 in adult    Stage 3a chronic kidney disease    Diabetes mellitus due to underlying condition with both eyes affected by mild nonproliferative retinopathy without macular edema, without long-term current use of insulin    Type 2 diabetes mellitus with hyperglycemia, without long-term current use of insulin    COVID     Current Outpatient Medications on File Prior to Visit   Medication Sig Dispense Refill    atorvastatin (LIPITOR) 20 MG tablet Take 1 tablet (20 mg total) by mouth once daily. 90 tablet 3    azelastine (ASTELIN) 137 mcg (0.1 %) nasal spray 1 TO 2 SPRAY(S) IN EACH NOSTRIL EVERY 12 HOURS  0    blood sugar diagnostic (CONTOUR TEST STRIPS) Strp 1 each by Misc.(Non-Drug; Combo Route) route 2 (two) times daily. 50 each 11    clobetasol (TEMOVATE) 0.05 % cream Apply topically 2 (two) times daily. To psoriasis PRN 60 g 3    dulaglutide (TRULICITY) 0.75 mg/0.5 mL pen injector Inject 0.75 mg into the skin every 7 days. 12 pen 3    fluocinonide (LIDEX) 0.05 % external solution Apply topically 2 (two) times daily. To itching in scalp area prn 60 mL 3    glipiZIDE (GLUCOTROL) 5 MG tablet Take 1 tablet (5 mg total) by mouth once daily. 90 tablet 3     ixekizumab (TALTZ AUTOINJECTOR) 80 mg/mL AtIn Inject 80 mg into the skin every 30 days. 3 mL 0    lancets Misc USE ONE LANCET TWO TIMES DAILY 100 each 11    losartan-hydrochlorothiazide 100-25 mg (HYZAAR) 100-25 mg per tablet Take 1 tablet by mouth once daily. 90 tablet 3     Current Facility-Administered Medications on File Prior to Visit   Medication Dose Route Frequency Provider Last Rate Last Admin    enoxaparin injection 40 mg  40 mg Subcutaneous Q24H Florence Trevino NP   40 mg at 10/07/18 2214    gabapentin capsule 300 mg  300 mg Oral On Call Procedure Florence Trevino NP   300 mg at 09/25/18 1347     Review of patient's allergies indicates:  No Known Allergies  Past Surgical History:   Procedure Laterality Date    APPENDECTOMY      COLONOSCOPY N/A 7/9/2018    Procedure: COLONOSCOPY;  Surgeon: Kameron Ruff MD;  Location: [facility];  Service: Endoscopy;  Laterality: N/A;  confirmed    COLONOSCOPY N/A 11/11/2019    Procedure: COLONOSCOPY;  Surgeon: Victor Hugo Nunez MD;  Location: [facility] (4TH FLR);  Service: Endoscopy;  Laterality: N/A;  Pt stated this is the only day he has somebody to drive him and  to stay during procedure  PM Prep    COLONOSCOPY N/A 7/8/2020    Procedure: COLONOSCOPY;  Surgeon: Victor Hugo Nunez MD;  Location: [facility] (2ND FLR);  Service: Endoscopy;  Laterality: N/A;  Cov[facility] 7/5/20 at [facility]    LAPAROSCOPIC LEFT COLECTOMY N/A 9/25/2018    Procedure: COLECTOMY-LAPAROSCOPIC LEFT;  Surgeon: Chito Banks MD;  Location: [facility] 2ND FLR;  Service: Colon and Rectal;  Laterality: N/A;    SKIN CANCER EXCISION      SKIN GRAFT      VASECTOMY      VASECTOMY       Family History   Problem Relation Age of Onset    Cancer Father         prostate    Diabetes Brother     Liver cancer Brother     Anesthesia problems Neg Hx      Social History     Socioeconomic History    Marital status:    Occupational History     Employer:  "[employer]   Tobacco Use    Smoking status: Former Smoker     Years: 15.00     Types: Cigars    Smokeless tobacco: Never Used    Tobacco comment: cigars-x1 yr.   Substance and Sexual Activity    Alcohol use: Yes     Comment: occassional    Drug use: No    Sexual activity: Yes     Partners: Female        Review of Systems   Constitutional: Negative for chills and fever.   Cardiovascular: Negative for claudication and leg swelling.   Respiratory: Negative for cough and shortness of breath.    Skin: Positive for dry skin and nail changes. Negative for itching and rash.   Musculoskeletal: Positive for joint pain. Negative for falls, joint swelling and muscle weakness.   Gastrointestinal: Negative for diarrhea, nausea and vomiting.   Neurological: Positive for paresthesias. Negative for numbness, tremors and weakness.   Psychiatric/Behavioral: Negative for altered mental status and hallucinations.           Objective:       Vitals:    07/14/22 0929   Weight: 120.1 kg (264 lb 12.4 oz)   Height: 6' 4" (1.93 m)   PainSc:   4       Physical Exam  Vitals and nursing note reviewed.   Constitutional:       Appearance: He is not diaphoretic.      Comments: General: Pt. is well-developed, well-nourished, appears stated age, in no acute distress, alert and oriented x 3. No evidence of depression, anxiety, or agitation. Calm, cooperative, and communicative. Appropriate interactions and affect.       Cardiovascular:      Pulses:           Dorsalis pedis pulses are 1+ on the right side and 1+ on the left side.        Posterior tibial pulses are 2+ on the right side and 2+ on the left side.      Comments: There is decreased digital hair  Musculoskeletal:      Right ankle: No swelling. No tenderness. Normal range of motion.      Right Achilles Tendon: No defects.      Left ankle: No swelling. No tenderness. Normal range of motion.      Left Achilles Tendon: No defects.      Right foot: Decreased range of " motion. No tenderness.      Left foot: Decreased range of motion. No tenderness.      Comments: muscle strength is 5/5 in all groups bilaterally.    Decreased stride, station of gait.  apropulsive toe off.  Increased angle and base of gait.    Patient has hammertoes of digits 2-5 bilateral partially reducible without symptom today.    Decreased first MPJ range of motion both weightbearing and nonweightbearing, no crepitus observed the first MP joint, + dorsal flag sign. Mild  bunion deformity is observed .    Fat pad atrophy to heels and met heads bilateral   Skin:     General: Skin is warm and dry.      Coloration: Skin is not pale.      Findings: No abrasion, ecchymosis, erythema, lesion or rash.      Nails: There is no clubbing.      Comments: Toenails 1-5 bilaterally are elongated by 2-3 mm, thickened by 2-3 mm, discolored/yellowed, dystrophic, brittle with subungual debris.      BL hallux nail margin of B/L  foot with ingrown nail plate.    Neurological:      Motor: No tremor or atrophy.      Comments: [place] 5.07 monofilament is intact bilateral feet. Sharp/dull sensation is also intact Bilateral feet.    Decreased/absent vibratory sensation bilateral feet to 128Hz tuning fork.         Psychiatric:         Speech: Speech normal.               Assessment:       Encounter Diagnoses   Name Primary?    Type II diabetes mellitus with neurological manifestations Yes    Ingrown nail          Plan:       Rosendo was seen today for ingrown toenail and diabetes mellitus.    Diagnoses and all orders for this visit:    Type II diabetes mellitus with neurological manifestations    Ingrown nail      I counseled the patient on his conditions, their implications and medical management. Greater than 20 minutes spent on education about the diabetic foot, neuropathy, and prevention of limb loss.    - Shoe inspection. Diabetic Foot Education. Patient reminded of the importance of good nutrition and blood sugar control  to help prevent podiatric complications of diabetes. Patient instructed on proper foot hygeine. We discussed wearing proper shoe gear, daily foot inspections, never walking without protective shoe gear.    Rx diabetic shoes for protection and support    He will continue to monitor the areas daily, inspect his feet, wear protective shoe gear when ambulatory, moisturizer to maintain skin integrity and follow in this office in approximately 6-12 months, sooner p.r.n.    Discussed treatment options with patient. Options included soaking, avulsion and matrixectomy. Risks and benefits discussed and all questions were answered. The patient wishes to proceed with  Nail avulsion at a later date      In depth conversation on the treatment of ingrown nail; partial nail avulsion vs chemical matrixectomy vs conservative treatment of soaking and nail trimming    Utilizing sterile toenail clippers I aggressively trimmed  the offending B/L hallux B/L  nail border approximately 3 mm from its edge and carried the nail plate incision down at an angle in order to wedge out the offending cryptotic portion of the nail plate. The offending border was then removed in toto. The remaining nail was grinded down with an electric  down to nail bed.  No blood was drawn. Patient tolerated the procedure well and related significant relief.    At patient's request, I discussed different treatments for toenail fungus. We discussed oral antifungals but I did not recommend them as a first line treatment since the medication is taken internally and can have side effects such as rash, taste disturbances, and liver enzyme elevation. We discussed topical Penlac to be applied daily and removed weekly. Pt. Expresses understanding and would like to try the Penlac. Rx sent to the pharmacy.

## 2022-07-18 NOTE — Clinical Note
Addended by: ZENY MONTALVO on: 7/18/2022 08:54 AM     Modules accepted: Orders     September 10, 2018      Victor Hugo Nunez MD  [address]  [facility] [address]           [facility]  [address]  [facility] [address]  Phone: [phone]          Patient: Rosendo Christina   MR Number: [id number]   YOB: 1945   Date of Visit: 9/4/2018       Dear Dr. Victor Hugo Nunez:    Thank you for referring Rosendo Christina to me for evaluation. Attached you will find relevant portions of my assessment and plan of care.    If you have questions, please do not hesitate to call me. I look forward to following Rosendo Christina along with you.    Sincerely,    Chito Banks MD    Enclosure  CC:  No Recipients    If you would like to receive this communication electronically, please contact [email or web address] or [phone] to request more information on [employer] access.    For providers and/or their staff who would like to refer a patient to [facility], please contact us through our one-stop-shop provider referral line, [facility], at [phone].    If you feel you have received this communication in error or would no longer like to receive these types of communications, please e-mail [email or web address]

## 2022-07-31 ENCOUNTER — EXTERNAL CHRONIC CARE MANAGEMENT (OUTPATIENT)
Dept: PRIMARY CARE CLINIC | Facility: CLINIC | Age: 77
End: 2022-07-31
Payer: MEDICARE

## 2022-07-31 PROCEDURE — 99490 CHRNC CARE MGMT STAFF 1ST 20: CPT | Mod: S$PBB,,, | Performed by: FAMILY MEDICINE

## 2022-07-31 PROCEDURE — 99490 CHRNC CARE MGMT STAFF 1ST 20: CPT | Mod: PBBFAC,PN | Performed by: FAMILY MEDICINE

## 2022-07-31 PROCEDURE — 99490 PR CHRONIC CARE MGMT, 1ST 20 MIN: ICD-10-PCS | Mod: S$PBB,,, | Performed by: FAMILY MEDICINE

## 2022-08-04 NOTE — PROGRESS NOTES
Subjective:     Patient ID: Rosendo Thomas is a 77 y.o. male     Chief Complaint: No chief complaint on file.     PCP: Tory Hatch MD  Derm: Liane Wright MD    HPI   77 yr old man w PsA seen last by us (Dr. Higginbotham & myself) on 3/9/21.  Was being followed by Rheum and also by Derm (Dr. Wright).  Was on Taltz and Otezla in the most recent past prescribed by Derm. Plan was to get off Otezla when Taltz kicked in.   Is a poor historian. Not sure about his meds, but did run out of Taltz -possibly 6 months ago--not sure when. Also ran out of Otezla--does not know when.  Having lots of psoriasis all over and joint pain mostly in his hands R>>L; AM stiffness in hands all day especially in R hand.  Cannot make good fists especially on the R.   Not complaining of any other joints, except recent R shoulder pain which he also feels in front of R scapula. This was activity precipitated & hurts to sleep on at night.   Not too concerned about his morbidities.       3/9/21  75 yr old man with multiple morbidities (including DM II) initially send by his PCP for psoriatic arthritis.  He has had psoriasis x >10 yrs with bilateral hand pain w swelling of DIPs & entire digits. No LBP or enthesitis. No FHx of SpA, IBD, uveitis.   He had been on Enbrel x 1 yr in the past for psoriasis but it was ineffective and he developed infections.  He has used a number of topicals but they were not effective either. Bee soap however helps his psoriasis.  He underwent surgery for sigmoid cancer & was seen by Derm who agreed with us with the use of apremilast & he was started on it around May, 2019 and has had an improvement in his skin lesions (see Dr. Hirsch's previous photos & more recent photos by Dr. Slaughter) but he exacerbated on apremilast and Taltz was begun with good response.   However, he ran out of it and rashes returned. He re-instituted apremilast, as he had some but he still has lesions on his elbows and fingers.    Previous  labs with anemia, mild thromobocytopenia & elevated CRP                 Current Outpatient Medications   Medication Sig Dispense Refill    acetaminophen (TYLENOL ORAL) Take by mouth.      atorvastatin (LIPITOR) 20 MG tablet take 1 tablet by mouth once daily 90 tablet 3    blood sugar diagnostic (CONTOUR TEST STRIPS) Strp 1 each by Misc.(Non-Drug; Combo Route) route 2 (two) times daily. 50 each 11    clobetasol (TEMOVATE) 0.05 % cream Apply topically 2 (two) times daily. To psoriasis PRN 60 g 3    dulaglutide (TRULICITY) 0.75 mg/0.5 mL pen injector Inject 0.75 mg into the skin every 7 days. 12 pen 3    fluocinonide (LIDEX) 0.05 % external solution Apply topically 2 (two) times daily. To itching in scalp area prn 60 mL 3    glipiZIDE (GLUCOTROL) 5 MG tablet Take 1 tablet (5 mg total) by mouth once daily. 90 tablet 3    lancets Misc USE ONE LANCET TWO TIMES DAILY 100 each 11    losartan-hydrochlorothiazide 100-25 mg (HYZAAR) 100-25 mg per tablet Take 1 tablet by mouth once daily. 90 tablet 3     No current facility-administered medications for this visit.     Facility-Administered Medications Ordered in Other Visits   Medication Dose Route Frequency Provider Last Rate Last Admin    enoxaparin injection 40 mg  40 mg Subcutaneous Q24H Florence Trevino NP   40 mg at 10/07/18 2214    gabapentin capsule 300 mg  300 mg Oral On Call Procedure Florence Trevino NP   300 mg at 09/25/18 1347         Review of patient's allergies indicates:   Allergen Reactions    Metformin Diarrhea       Review of Systems   Constitutional: Positive for fatigue and unexpected weight change.   HENT: Negative for mouth sores and trouble swallowing.    Eyes: Negative for redness.   Respiratory: Negative.  Negative for cough and shortness of breath.    Cardiovascular: Negative.  Negative for chest pain.   Gastrointestinal: Negative.  Negative for constipation and diarrhea.   Endocrine: Negative.  Negative for cold intolerance and  heat intolerance.   Genitourinary: Negative for dysuria.   Musculoskeletal: Positive for arthralgias.   Skin: Positive for rash.   Neurological: Negative.  Negative for numbness and headaches.   Hematological: Negative.  Does not bruise/bleed easily.   Psychiatric/Behavioral: Positive for sleep disturbance.       Past Medical History:   Diagnosis Date    Colon cancer     Colon polyps     Diabetes mellitus type II 7/2010    diet controlled    Obesity     Psoriasis     Skin cancer     Squamous cell carcinoma     Type 2 diabetes mellitus        Past Surgical History:   Procedure Laterality Date    APPENDECTOMY      COLONOSCOPY N/A 7/9/2018    Procedure: COLONOSCOPY;  Surgeon: Kameron Ruff MD;  Location: [facility];  Service: Endoscopy;  Laterality: N/A;  confirmed    COLONOSCOPY N/A 11/11/2019    Procedure: COLONOSCOPY;  Surgeon: Victor Hugo Nunez MD;  Location: [facility] (4TH FLR);  Service: Endoscopy;  Laterality: N/A;  Pt stated this is the only day he has somebody to drive him and  to stay during procedure  PM Prep    COLONOSCOPY N/A 7/8/2020    Procedure: COLONOSCOPY;  Surgeon: Victor Hugo Nunez MD;  Location: [facility] (2ND FLR);  Service: Endoscopy;  Laterality: N/A;  Cov[facility]19 [facility] 7/5/20 at [facility]    LAPAROSCOPIC LEFT COLECTOMY N/A 9/25/2018    Procedure: COLECTOMY-LAPAROSCOPIC LEFT;  Surgeon: Chito Banks MD;  [facility];  Service: Colon and Rectal;  Laterality: N/A;    SKIN CANCER EXCISION      SKIN GRAFT      VASECTOMY      VASECTOMY         Family History   Problem Relation Age of Onset    Cancer Father         prostate    Diabetes Brother     Liver cancer Brother     Anesthesia problems Neg Hx        Social History     Tobacco Use    Smoking status: Former Smoker     Years: 15.00     Types: Cigars    Smokeless tobacco: Never Used    Tobacco comment: cigars-x1 yr.   Substance Use Topics    Alcohol use: Yes     Comment:  "occassional    Drug use: No   Wife  a few years back; she was arranging his meds.     Objective:   /68   Pulse 66   Temp 98.6 °F (37 °C)   Ht 6' 4" (1.93 m)   Wt 117.2 kg (258 lb 6.1 oz)   BMI 31.45 kg/m²   Physical Exam   Constitutional: He is oriented to person, place, and time. No distress.   HENT:   Head: Normocephalic and atraumatic.   Mouth/Throat: Oropharynx is clear and moist. No oropharyngeal exudate.   No parotidomegaly;   Temporal arteries with good pulsations.   No temporal artery tenderness;   No scalp tenderness.  No oral ulcers;   Eyes: Pupils are equal, round, and reactive to light. Conjunctivae are normal. No scleral icterus.   Neck: No JVD present. No tracheal deviation present. No thyromegaly present.   Cardiovascular: Normal rate, regular rhythm and normal heart sounds. Exam reveals no gallop and no friction rub.   No murmur heard.  Pulmonary/Chest: Effort normal and breath sounds normal. No respiratory distress. He has no wheezes. He has no rales. He exhibits no tenderness.   Abdominal: Soft. Bowel sounds are normal. He exhibits no distension and no mass. There is no splenomegaly or hepatomegaly. There is no abdominal tenderness. There is no rebound and no guarding.   Musculoskeletal:         General: Deformity present. No tenderness.      Right lower leg: Edema present.      Left lower leg: Edema present.      Comments: Shoulders: FROM; TTP R upper back, behind scapula;no synovitis  Elbows: FROM; no synovitis; no tophi or nodules  Wrists: FROM; no synovitis  See photos for hands: no metacarpalgia;   Some dactylitic fingers (R 4th angel);   Makes incomplete fist on R; better on left, but  poor bilaterally  HIPS: adequate ROM  Knees: Bila bony hypertrophy; deepak PF crepitus; no synovitis; no instability;  Ankles: FROM: no synovitis   Toes: ok; no metatarsalgia       Lymphadenopathy:     He has no cervical adenopathy.   Neurological: He is alert and oriented to person, place, and " time. He has normal reflexes. No cranial nerve deficit.   Motor strength: 5/5 prox & distal.   Skin: Skin is warm and dry. Rash noted.   See photos;  Multiple nail changes w pitting, ridging, onycholysis   Psychiatric: His behavior is normal. Mood, memory and affect normal.   Pleasant.   Vitals reviewed.          8/9/22: R hand --cannot make full fist                        8/9/22: L hand: fist    4/19/22: cnne 1.2; GFR 58.4; glu 158;   1/31/22: ptls 128;   Prior labs with elevated CRP, anemia & mild thrombocytopenia    7/8/20: Bilateral hands: personally viewed: with erosions & periostitis.   7/8/20: Bilateral feet:single AP; personally viewed:  L HV; mild OA  Imaging previously reviewed read as OA of hands but very c/w PsA. Feet with OA bilateral calcaneal spurs inferiorly & superiorly.  Assessment:   Psoriatic arthritis   Arthritis now primarily in hands   Skin & joints not well controlled on Otezla alone.              Skin and joints were controlled on Taltz.  Psoriasis> 15 yrs   S/P Enbrel (ineffective; developed infections)   Topicals ineffective   Believes Bee soap was helpful.   R shoulder pain  OA of hands & feet  Chronic mild thrombocytopenia since 2008   Lowest 120k  CKD 3a  DM II   Poorly controlled   ? Component of cheirarthropathy  Poor compliance  S/P Covid 19 ~ 5/16/22  Obesity    Plan:   Educated on PsA and purpose of medications.  Discussed dosing of Taltz for PsA different than from plaque psoriasis.  Discussed other options as well.   We will get labs including pre-DMARDs today & allow Derm to order Taltz or choose from others now available.  Imaging of hands & R shoulder today.   Strongly urged to bring in his meds at next visit and at visit to derm.  Cautioned against using NSAIDs.  RTC 3 months or prn    CC: Liane Wright MD    8/9/22: ESR <2; CRP 7.3; CBC ok; CMP ok;    8/9/22: Bilateral hands: personally viewed: uniform joint space narrowing, marginal erosions, periosteal new bone formation,  and enthesitis primarily involving the DIP and PIP joints bilaterally.  No acro-osteolysis.  Mild juxta-articular soft tissue swelling.   8/9/22: R shoulder: personally viewed: mild OA;

## 2022-08-09 ENCOUNTER — HOSPITAL ENCOUNTER (OUTPATIENT)
Dept: RADIOLOGY | Facility: HOSPITAL | Age: 77
Discharge: HOME | End: 2022-08-09
Attending: INTERNAL MEDICINE
Payer: MEDICARE

## 2022-08-09 ENCOUNTER — OFFICE VISIT (OUTPATIENT)
Dept: RHEUMATOLOGY | Facility: CLINIC | Age: 77
End: 2022-08-09
Payer: MEDICARE

## 2022-08-09 VITALS
HEIGHT: 76 IN | BODY MASS INDEX: 31.46 KG/M2 | WEIGHT: 258.38 LBS | SYSTOLIC BLOOD PRESSURE: 121 MMHG | TEMPERATURE: 99 F | HEART RATE: 66 BPM | DIASTOLIC BLOOD PRESSURE: 68 MMHG

## 2022-08-09 DIAGNOSIS — M25.511 ACUTE PAIN OF RIGHT SHOULDER: ICD-10-CM

## 2022-08-09 DIAGNOSIS — L40.50 PSORIATIC ARTHRITIS: ICD-10-CM

## 2022-08-09 DIAGNOSIS — E11.69 TYPE 2 DIABETES MELLITUS WITH OTHER SPECIFIED COMPLICATION, WITHOUT LONG-TERM CURRENT USE OF INSULIN: ICD-10-CM

## 2022-08-09 DIAGNOSIS — L40.9 PSORIASIS: ICD-10-CM

## 2022-08-09 DIAGNOSIS — L40.50 PSORIATIC ARTHRITIS: Primary | ICD-10-CM

## 2022-08-09 DIAGNOSIS — N18.31 STAGE 3A CHRONIC KIDNEY DISEASE: ICD-10-CM

## 2022-08-09 DIAGNOSIS — E66.9 OBESITY (BMI 30.0-34.9): ICD-10-CM

## 2022-08-09 PROCEDURE — 99999 PR PBB SHADOW E&M-EST. PATIENT-LVL V: CPT | Mod: PBBFAC,,, | Performed by: INTERNAL MEDICINE

## 2022-08-09 PROCEDURE — 99999 PR PBB SHADOW E&M-EST. PATIENT-LVL V: ICD-10-PCS | Mod: PBBFAC,,, | Performed by: INTERNAL MEDICINE

## 2022-08-09 PROCEDURE — 73030 XR SHOULDER COMPLETE 2 OR MORE VIEWS RIGHT: ICD-10-PCS | Mod: 26,RT,, | Performed by: RADIOLOGY

## 2022-08-09 PROCEDURE — 73030 X-RAY EXAM OF SHOULDER: CPT | Mod: 26,RT,, | Performed by: RADIOLOGY

## 2022-08-09 PROCEDURE — 99215 OFFICE O/P EST HI 40 MIN: CPT | Mod: S$PBB,,, | Performed by: INTERNAL MEDICINE

## 2022-08-09 PROCEDURE — 73130 XR HAND COMPLETE 3 VIEWS BILATERAL: ICD-10-PCS | Mod: 26,50,, | Performed by: INTERNAL MEDICINE

## 2022-08-09 PROCEDURE — 99215 OFFICE O/P EST HI 40 MIN: CPT | Mod: PBBFAC | Performed by: INTERNAL MEDICINE

## 2022-08-09 PROCEDURE — 73030 X-RAY EXAM OF SHOULDER: CPT | Mod: TC,RT

## 2022-08-09 PROCEDURE — 99215 PR OFFICE/OUTPT VISIT, EST, LEVL V, 40-54 MIN: ICD-10-PCS | Mod: S$PBB,,, | Performed by: INTERNAL MEDICINE

## 2022-08-09 PROCEDURE — 73130 X-RAY EXAM OF HAND: CPT | Mod: TC,50

## 2022-08-09 PROCEDURE — 73130 X-RAY EXAM OF HAND: CPT | Mod: 26,50,, | Performed by: INTERNAL MEDICINE

## 2022-08-09 ASSESSMENT — ROUTINE ASSESSMENT OF PATIENT INDEX DATA (RAPID3)
MDHAQ FUNCTION SCORE: 0.3
PATIENT GLOBAL ASSESSMENT SCORE: 7
TOTAL RAPID3 SCORE: 4.33
PAIN SCORE: 5
PSYCHOLOGICAL DISTRESS SCORE: 1.1
AM STIFFNESS SCORE: 0, NO
FATIGUE SCORE: 5

## 2022-08-09 NOTE — PATIENT INSTRUCTIONS
I will let Dr. Wright order Sal as you need a higher dose due to your psoriasis.  Make sure you keep your appointment with her.    Do not take any NSAIDs.    Bring & know your medicines at next visit.

## 2022-08-18 ENCOUNTER — LAB VISIT (OUTPATIENT)
Dept: LAB | Facility: HOSPITAL | Age: 77
End: 2022-08-18
Attending: HOSPITALIST
Payer: MEDICARE

## 2022-08-18 DIAGNOSIS — E11.22 CONTROLLED TYPE 2 DIABETES MELLITUS WITH STAGE 3 CHRONIC KIDNEY DISEASE, WITHOUT LONG-TERM CURRENT USE OF INSULIN: ICD-10-CM

## 2022-08-18 DIAGNOSIS — N18.30 CONTROLLED TYPE 2 DIABETES MELLITUS WITH STAGE 3 CHRONIC KIDNEY DISEASE, WITHOUT LONG-TERM CURRENT USE OF INSULIN: ICD-10-CM

## 2022-08-18 DIAGNOSIS — E11.65 UNCONTROLLED TYPE 2 DIABETES MELLITUS WITH HYPERGLYCEMIA, WITHOUT LONG-TERM CURRENT USE OF INSULIN: ICD-10-CM

## 2022-08-18 LAB
ANION GAP SERPL CALC-SCNC: 8 MMOL/L (ref 8–16)
BUN SERPL-MCNC: 15 MG/DL (ref 8–23)
CALCIUM SERPL-MCNC: 9.7 MG/DL (ref 8.7–10.5)
CHLORIDE SERPL-SCNC: 105 MMOL/L (ref 95–110)
CO2 SERPL-SCNC: 27 MMOL/L (ref 23–29)
CREAT SERPL-MCNC: 1.1 MG/DL (ref 0.5–1.4)
EST. GFR  (NO RACE VARIABLE): >60 ML/MIN/1.73 M^2
ESTIMATED AVG GLUCOSE: 146 MG/DL (ref 68–131)
GLUCOSE SERPL-MCNC: 116 MG/DL (ref 70–110)
HBA1C MFR BLD: 6.7 % (ref 4–5.6)
POTASSIUM SERPL-SCNC: 4.1 MMOL/L (ref 3.5–5.1)
SODIUM SERPL-SCNC: 140 MMOL/L (ref 136–145)

## 2022-08-18 PROCEDURE — 36415 COLL VENOUS BLD VENIPUNCTURE: CPT | Performed by: HOSPITALIST

## 2022-08-18 PROCEDURE — 80048 BASIC METABOLIC PNL TOTAL CA: CPT | Performed by: HOSPITALIST

## 2022-08-18 PROCEDURE — 83036 HEMOGLOBIN GLYCOSYLATED A1C: CPT | Performed by: HOSPITALIST

## 2022-08-22 ENCOUNTER — TELEPHONE (OUTPATIENT)
Dept: FAMILY MEDICINE | Facility: CLINIC | Age: 77
End: 2022-08-22

## 2022-08-22 ENCOUNTER — OFFICE VISIT (OUTPATIENT)
Dept: FAMILY MEDICINE | Facility: CLINIC | Age: 77
End: 2022-08-22
Payer: MEDICARE

## 2022-08-22 ENCOUNTER — TELEPHONE (OUTPATIENT)
Dept: ENDOSCOPY | Facility: HOSPITAL | Age: 77
End: 2022-08-22
Payer: MEDICARE

## 2022-08-22 VITALS
TEMPERATURE: 99 F | DIASTOLIC BLOOD PRESSURE: 68 MMHG | SYSTOLIC BLOOD PRESSURE: 118 MMHG | OXYGEN SATURATION: 97 % | HEART RATE: 54 BPM | BODY MASS INDEX: 31.44 KG/M2 | HEIGHT: 76 IN | WEIGHT: 258.19 LBS | RESPIRATION RATE: 18 BRPM

## 2022-08-22 DIAGNOSIS — H91.93 BILATERAL HEARING LOSS, UNSPECIFIED HEARING LOSS TYPE: Primary | ICD-10-CM

## 2022-08-22 DIAGNOSIS — B96.89 ACUTE BACTERIAL SINUSITIS: Primary | ICD-10-CM

## 2022-08-22 DIAGNOSIS — J01.90 ACUTE BACTERIAL SINUSITIS: Primary | ICD-10-CM

## 2022-08-22 DIAGNOSIS — Z12.11 ENCOUNTER FOR COLORECTAL CANCER SCREENING: ICD-10-CM

## 2022-08-22 DIAGNOSIS — Z12.12 ENCOUNTER FOR COLORECTAL CANCER SCREENING: ICD-10-CM

## 2022-08-22 DIAGNOSIS — Z12.11 SPECIAL SCREENING FOR MALIGNANT NEOPLASMS, COLON: Primary | ICD-10-CM

## 2022-08-22 PROCEDURE — 99215 OFFICE O/P EST HI 40 MIN: CPT | Mod: PBBFAC,PO | Performed by: PHYSICIAN ASSISTANT

## 2022-08-22 PROCEDURE — 99214 PR OFFICE/OUTPT VISIT, EST, LEVL IV, 30-39 MIN: ICD-10-PCS | Mod: S$PBB,,, | Performed by: PHYSICIAN ASSISTANT

## 2022-08-22 PROCEDURE — 99214 OFFICE O/P EST MOD 30 MIN: CPT | Mod: S$PBB,,, | Performed by: PHYSICIAN ASSISTANT

## 2022-08-22 PROCEDURE — 99999 PR PBB SHADOW E&M-EST. PATIENT-LVL V: ICD-10-PCS | Mod: PBBFAC,,, | Performed by: PHYSICIAN ASSISTANT

## 2022-08-22 PROCEDURE — 99999 PR PBB SHADOW E&M-EST. PATIENT-LVL V: CPT | Mod: PBBFAC,,, | Performed by: PHYSICIAN ASSISTANT

## 2022-08-22 RX ORDER — CETIRIZINE HYDROCHLORIDE 10 MG/1
10 TABLET ORAL DAILY PRN
COMMUNITY
Start: 2022-05-14

## 2022-08-22 RX ORDER — BENZONATATE 200 MG/1
200 CAPSULE ORAL 3 TIMES DAILY PRN
Qty: 30 CAPSULE | Refills: 0 | Status: SHIPPED | OUTPATIENT
Start: 2022-08-22 | End: 2022-09-01

## 2022-08-22 RX ORDER — BENZONATATE 200 MG/1
400 CAPSULE ORAL EVERY 8 HOURS PRN
COMMUNITY
Start: 2022-05-14 | End: 2022-08-22

## 2022-08-22 RX ORDER — SODIUM, POTASSIUM,MAG SULFATES 17.5-3.13G
SOLUTION, RECONSTITUTED, ORAL ORAL
Qty: 1 KIT | Refills: 0 | Status: SHIPPED | OUTPATIENT
Start: 2022-08-22

## 2022-08-22 RX ORDER — FLUTICASONE PROPIONATE 50 MCG
2 SPRAY, SUSPENSION (ML) NASAL DAILY
COMMUNITY
Start: 2022-05-14

## 2022-08-22 RX ORDER — AZITHROMYCIN 250 MG/1
TABLET, FILM COATED ORAL
Qty: 6 TABLET | Refills: 0 | Status: SHIPPED | OUTPATIENT
Start: 2022-08-22 | End: 2022-08-27

## 2022-08-22 NOTE — PROGRESS NOTES
Health Maintenance Due   Topic     TETANUS VACCINE      Shingles Vaccine (1 of 2)  hx chicken pox. Notified pt can get vaccine at pharmacy    Colonoscopy  Pending order

## 2022-08-22 NOTE — PROGRESS NOTES
Subjective:       Patient ID: Rosendo Thomas is a 77 y.o. male.    Chief Complaint: Cough    Cough  This is a new problem. The current episode started 1 to 4 weeks ago (2 weeks). The problem has been gradually improving. The cough is productive of sputum (green). Associated symptoms include postnasal drip. Pertinent negatives include no chest pain, ear pain, fever, headaches, hemoptysis, nasal congestion, sore throat, shortness of breath or wheezing. Treatments tried: flonase,  There is no history of asthma.     Social History     Socioeconomic History    Marital status:    Occupational History     Employer: [employer]   Tobacco Use    Smoking status: Former Smoker     Years: 15.00     Types: Cigars    Smokeless tobacco: Never Used    Tobacco comment: cigars-x1 yr.   Substance and Sexual Activity    Alcohol use: Yes     Comment: occassional    Drug use: No    Sexual activity: Yes     Partners: Female       Review of Systems   Constitutional: Negative for fever.   HENT: Positive for postnasal drip. Negative for ear pain and sore throat.    Respiratory: Positive for cough. Negative for hemoptysis, shortness of breath and wheezing.    Cardiovascular: Negative for chest pain.   Neurological: Negative for headaches.         Objective:      Physical Exam  Constitutional:       Appearance: Normal appearance.   HENT:      Head: Normocephalic and atraumatic.   Cardiovascular:      Rate and Rhythm: Normal rate and regular rhythm.   Pulmonary:      Effort: Pulmonary effort is normal.      Breath sounds: Normal breath sounds.   Neurological:      General: No focal deficit present.      Mental Status: He is alert and oriented to person, place, and time.   Psychiatric:         Mood and Affect: Mood normal.         Behavior: Behavior normal.         Assessment:       Problem List Items Addressed This Visit    None     Visit Diagnoses     Acute bacterial sinusitis    -  Primary    Relevant  Medications    azithromycin (Z-HAZEL) 250 MG tablet    benzonatate (TESSALON) 200 MG capsule    Encounter for colorectal cancer screening        Relevant Orders    Case Request Endoscopy: COLONOSCOPY (Completed)          Plan:         Rosendo was seen today for cough.    Diagnoses and all orders for this visit:    Acute bacterial sinusitis  -     azithromycin (Z-HAZEL) 250 MG tablet; Take 2 tablets by mouth on day 1; Take 1 tablet by mouth on days 2-5  -     benzonatate (TESSALON) 200 MG capsule; Take 1 capsule (200 mg total) by mouth 3 (three) times daily as needed.    Encounter for colorectal cancer screening  -     Case Request Endoscopy: COLONOSCOPY

## 2022-08-22 NOTE — TELEPHONE ENCOUNTER
Monique Bautista Licensed Doctor of Audiology. With [facility]. [phone]

## 2022-08-22 NOTE — TELEPHONE ENCOUNTER
----- Message from Francine Goldsmith sent at 8/22/2022  7:53 AM CDT -----  Type:  Patient Requesting Referral    Who Called: Self     Referral to What Specialty:     Reason for Referral: Hearing test     Does the patient want the referral with a specific physician?: on [address]     Is the specialist an [facility]?: not sure     Would the patient rather a call back or a response via [facility]? Call     Best Call Back Number:.[phone] (home)

## 2022-08-25 ENCOUNTER — OFFICE VISIT (OUTPATIENT)
Dept: ENDOCRINOLOGY | Facility: CLINIC | Age: 77
End: 2022-08-25
Payer: MEDICARE

## 2022-08-25 VITALS
BODY MASS INDEX: 31.43 KG/M2 | TEMPERATURE: 98 F | HEART RATE: 54 BPM | SYSTOLIC BLOOD PRESSURE: 128 MMHG | WEIGHT: 258.19 LBS | DIASTOLIC BLOOD PRESSURE: 69 MMHG

## 2022-08-25 DIAGNOSIS — Z85.038 HISTORY OF COLON CANCER: ICD-10-CM

## 2022-08-25 DIAGNOSIS — E11.22 CONTROLLED TYPE 2 DIABETES MELLITUS WITH STAGE 3 CHRONIC KIDNEY DISEASE, WITHOUT LONG-TERM CURRENT USE OF INSULIN: ICD-10-CM

## 2022-08-25 DIAGNOSIS — N18.30 CONTROLLED TYPE 2 DIABETES MELLITUS WITH STAGE 3 CHRONIC KIDNEY DISEASE, WITHOUT LONG-TERM CURRENT USE OF INSULIN: ICD-10-CM

## 2022-08-25 DIAGNOSIS — E08.3293 DIABETES MELLITUS DUE TO UNDERLYING CONDITION WITH BOTH EYES AFFECTED BY MILD NONPROLIFERATIVE RETINOPATHY WITHOUT MACULAR EDEMA, WITHOUT LONG-TERM CURRENT USE OF INSULIN: ICD-10-CM

## 2022-08-25 DIAGNOSIS — E66.09 CLASS 1 OBESITY DUE TO EXCESS CALORIES WITH SERIOUS COMORBIDITY AND BODY MASS INDEX (BMI) OF 33.0 TO 33.9 IN ADULT: ICD-10-CM

## 2022-08-25 DIAGNOSIS — E11.65 UNCONTROLLED TYPE 2 DIABETES MELLITUS WITH HYPERGLYCEMIA, WITHOUT LONG-TERM CURRENT USE OF INSULIN: ICD-10-CM

## 2022-08-25 DIAGNOSIS — I10 ESSENTIAL HYPERTENSION, BENIGN: ICD-10-CM

## 2022-08-25 DIAGNOSIS — L40.50 PSORIATIC ARTHRITIS: ICD-10-CM

## 2022-08-25 DIAGNOSIS — E11.65 TYPE 2 DIABETES MELLITUS WITH HYPERGLYCEMIA, WITHOUT LONG-TERM CURRENT USE OF INSULIN: Primary | ICD-10-CM

## 2022-08-25 DIAGNOSIS — N18.31 STAGE 3A CHRONIC KIDNEY DISEASE: ICD-10-CM

## 2022-08-25 PROCEDURE — 99214 OFFICE O/P EST MOD 30 MIN: CPT | Mod: PBBFAC | Performed by: HOSPITALIST

## 2022-08-25 PROCEDURE — 99214 PR OFFICE/OUTPT VISIT, EST, LEVL IV, 30-39 MIN: ICD-10-PCS | Mod: S$PBB,,, | Performed by: HOSPITALIST

## 2022-08-25 PROCEDURE — 99999 PR PBB SHADOW E&M-EST. PATIENT-LVL IV: CPT | Mod: PBBFAC,,, | Performed by: HOSPITALIST

## 2022-08-25 PROCEDURE — 99999 PR PBB SHADOW E&M-EST. PATIENT-LVL IV: ICD-10-PCS | Mod: PBBFAC,,, | Performed by: HOSPITALIST

## 2022-08-25 PROCEDURE — 99214 OFFICE O/P EST MOD 30 MIN: CPT | Mod: S$PBB,,, | Performed by: HOSPITALIST

## 2022-08-25 NOTE — ASSESSMENT & PLAN NOTE
- with resection, colectomy causing diarrhea with metformin.    - no further issue with diarrhea since stopping metformin

## 2022-08-25 NOTE — PROGRESS NOTES
Subjective:      Patient ID: Rosendo Thomas is a 77 y.o. male presented to [facility] on 8/25/2022.  Chief Complaint:  Diabetes      History of Present Illness: Rosendo Thomas is a 77 y.o. male here for  Type 2 diabetes  Other significant past medical history: CKD3a, psoriasis     With regards to Diabetes Mellitus Type 2  Known diabetic complications: nephropathy and retinopathy  Diagnosed w/ DM: in 2010      Interval history: Here for diabetes, diabetes have been better.  Patient has made better dietary modification.  Watching portion size.  Weight loss noted.  A1c improved to 6.7%  On Trulicity, on glipizide 5 mg daily.  No hypoglycemia recently report      Current meds:    Glipizide 5mg daily.    Trulicity 0.75mg once a week injections, as glucose was much better while on this medication.  Injection Technique: Good  Rotation of injection site: Yes   Previous meds:    Metformin: stop due to frequent diarrhea   Home glucose checks: checks daily, Logs reviewed   131  103  153  117  131  130  129  126  129  138  107  199      Diet/Exercise:               Eating 2 meals per day, salad              Drink: no soda  Weight trend: stable  Diabetes Education: No  Diabetes Related Hospitalization:  No  Hx of pancreatitis, hx of thyroid cancer: No  Family history of diabetes: Yes, retired  Occupation: retired    Eye exam current (within one year): yes, DR: no  Reports cuts or ulcers on feet:   Denies    Statin: Taking  ACE/ARB: Taking    Diabetes Management Status: Reviewed     A1C Trend  Lab Results   Component Value Date    HGBA1C 6.7 (H) 08/18/2022    HGBA1C 8.0 (H) 04/19/2022    HGBA1C 9.2 (H) 01/31/2022       Lab Results   Component Value Date    MICALBCREAT 32.2 (H) 08/18/2022     No results found for: IVRBMGXF87  No results found for: TTGIGA    No results found for: CPEPTIDE, GLUTAMICACID, ISLETCELLANT, FRUCTOSAMINE     Screening or Prevention Patient's value Goal Complete/Controlled?   Lipid  profile : 01/31/2022 Annually Yes   LDL control Lab Results   Component Value Date    LDLCALC 61.8 (L) 01/31/2022    Annually/Less than 100 mg/dl  Yes   Nephropathy screening Lab Results   Component Value Date    LABMICR 38.0 08/18/2022     Lab Results   Component Value Date    PROTEINUA Negative 09/13/2018    Annually Yes   Blood pressure BP Readings from Last 1 Encounters:   08/25/22 128/69    Less than 140/90 Yes   Dilated retinal exam : 05/24/2022 Annually Yes   Foot exam   : 04/25/2022 Annually No       Reviewed past surgical, medical, family, social history and updated as appropriate.    Review of Systems: see HPI above    Objective:   /69 (BP Location: Left arm)   Pulse (!) 54   Temp 98.1 °F (36.7 °C) (Oral)   Wt 117.1 kg (258 lb 3.2 oz)   BMI 31.43 kg/m²     Body mass index is 31.43 kg/m².  Vital signs reviewed    Physical Exam  Vitals and nursing note reviewed.   Constitutional:       Appearance: Normal appearance. He is well-developed. He is obese. He is not ill-appearing.   Neck:      Thyroid: No thyromegaly.   Pulmonary:      Effort: Pulmonary effort is normal. No respiratory distress.   Musculoskeletal:         General: Normal range of motion.      Cervical back: Normal range of motion.   Neurological:      General: No focal deficit present.      Mental Status: He is alert. Mental status is at baseline.   Psychiatric:         Mood and Affect: Mood normal.         Behavior: Behavior normal.         Lab Reviewed:   Lab Results   Component Value Date    HGBA1C 6.7 (H) 08/18/2022       Lab Results   Component Value Date    CHOL 121 01/31/2022    HDL 30 (L) 01/31/2022    LDLCALC 61.8 (L) 01/31/2022    TRIG 146 01/31/2022    CHOLHDL 24.8 01/31/2022       Lab Results   Component Value Date     08/18/2022    K 4.1 08/18/2022     08/18/2022    CO2 27 08/18/2022     (H) 08/18/2022    BUN 15 08/18/2022    CREATININE 1.1 08/18/2022    CALCIUM 9.7 08/18/2022    PROT 7.1 08/09/2022     ALBUMIN 4.0 08/09/2022    BILITOT 0.7 08/09/2022    ALKPHOS 72 08/09/2022    AST 25 08/09/2022    ALT 33 08/09/2022    ANIONGAP 8 08/18/2022    ESTGFRAFRICA >60.0 04/19/2022    EGFRNONAA 58.4 (A) 04/19/2022    TSH 2.819 01/31/2022        Lab Results   Component Value Date    CALCIUM 9.7 08/18/2022    CALCIUM 9.7 08/09/2022    CALCIUM 9.6 04/19/2022    PHOS 3.3 10/08/2018    PHOS 2.5 (L) 10/07/2018    PHOS 2.7 10/06/2018    ALKPHOS 72 08/09/2022    ALKPHOS 57 04/19/2022    ALKPHOS 72 01/31/2022    TSH 2.819 01/31/2022       Assessment     1. Type 2 diabetes mellitus with hyperglycemia, without long-term current use of insulin  Vitamin D   2. Controlled type 2 diabetes mellitus with stage 3 chronic kidney disease, without long-term current use of insulin  Hemoglobin A1C    Basic Metabolic Panel   3. Essential hypertension, benign     4. Uncontrolled type 2 diabetes mellitus with hyperglycemia, without long-term current use of insulin     5. Stage 3a chronic kidney disease     6. Class 1 obesity due to excess calories with serious comorbidity and body mass index (BMI) of 33.0 to 33.9 in adult     7. Body mass index (BMI) 31.0-31.9, adult   Vitamin D   8. History of colon cancer     9. Diabetes mellitus due to underlying condition with both eyes affected by mild nonproliferative retinopathy without macular edema, without long-term current use of insulin     10. Psoriatic arthritis          Plan     Type 2 diabetes mellitus with hyperglycemia, without long-term current use of insulin  - Diabetes is  at goal, given most current A1C, Goal A1C for patient is 7%  - much better glycemic control noted, still with occasional postprandial hyperglycemia on glucose log  - Diabetic supplies/medications reviewed this visit to ensure continue refills and compliance  - Advised patient to get periodic eye and feet exam.   - Reviewed routine maintenance: lipid, U:P/C     Plan  - has made better dietary changes: Encourage patient to  continue dietary modification, portion size control, decreasing carbohydrates intake  - weight loss noted, this is help with glycemic control  - offered to increase Trulicity, patient declined for now, will re-evaluate in 12/22   - Continue: Glipizide 5mg daily.    - Advised patient to monitor for hypoglycemia and treatment of hypoglycemia discussed  - Advised pt to check glucoses regularly, asked to filled glucose log and bring back for review at next office visit  - Clear written instruction given on AVS, Follow up as scheduled    Essential hypertension, benign  - BP reviewed today  - continue home medication (s)  - manage by PCP    History of colon cancer  - with resection, colectomy causing diarrhea with metformin.    - no further issue with diarrhea since stopping metformin    Class 1 obesity due to excess calories with serious comorbidity and body mass index (BMI) of 33.0 to 33.9 in adult  - Body mass index is 31.43 kg/m².  - dietary discussion as above  - continue to monitor weight  - continue Trulicity, weight loss noted  - encourage patient to target weight of 248    Diabetes mellitus due to underlying condition with both eyes affected by mild nonproliferative retinopathy without macular edema, without long-term current use of insulin  - optimize diabetes control, continue follow-up with Ophthalmology    Stage 3a chronic kidney disease  - Renal function reviewed on lab work today, stable   - Renally Adjust diabetes medication, avoid hypoglycemia  - continue routine monitoring    Psoriatic arthritis  - under the management of Rheumatology as well as dermatology  - currently not on a systemic steroid      Advised patient to follow up with PCP for routine health maintenance care.   RTC in 12/22      Yordan Varela M.D.  Endocrinology  [facility]  8/25/2022      Disclaimer: This note has been generated in part with the use of voice-recognition software. There may be typographical  errors that have been missed during proof-reading.

## 2022-08-25 NOTE — PATIENT INSTRUCTIONS
----------------------------------------------------------  Changes were made today:           Glipizide 5mg daily>> monitor for low glucose, ok to stop this medication   Trulicity 0.75mg once weekly       Goal for your blood sugar   In the morning before breakfast:   Before going to bed: 100-140  Do not go to bed with glucose less than 100      Please check glucose 2 times a day (before breakfast at bedtime).   You can keep track of the glucose with Glucose logs given in clinic, or any cheap notebook or loose leaf papers  Please filled out and bring back to next office visit for me to review  Document any (LOW BLOOD GLUCOSE) hypoglycemia  episode with date and time for me to review    Please make sure to get your dilated eyes examine once a year with your eye doctor.  Monitor your feet for any cuts or skin breakdown regularly, schedule your podiatrist visit if you see one      We will plan an in-clinic visit in 4 months, with labs prior to that appointment.      Contact information:  Yordan Varela M.D  [facility] [address] [facility] [address], [address]  [place] [address]    Office:  [phone]  Fax:  [phone]     ----------------------------------------------------------

## 2022-08-25 NOTE — ASSESSMENT & PLAN NOTE
- Body mass index is 31.43 kg/m².  - dietary discussion as above  - continue to monitor weight  - continue Trulicity, weight loss noted  - encourage patient to target weight of 248

## 2022-08-25 NOTE — ASSESSMENT & PLAN NOTE
- Diabetes is  at goal, given most current A1C, Goal A1C for patient is 7%  - much better glycemic control noted, still with occasional postprandial hyperglycemia on glucose log  - Diabetic supplies/medications reviewed this visit to ensure continue refills and compliance  - Advised patient to get periodic eye and feet exam.   - Reviewed routine maintenance: lipid, U:P/C     Plan  - has made better dietary changes: Encourage patient to continue dietary modification, portion size control, decreasing carbohydrates intake  - weight loss noted, this is help with glycemic control  - offered to increase Trulicity, patient declined for now, will re-evaluate in 12/22   - Continue: Glipizide 5mg daily.    - Advised patient to monitor for hypoglycemia and treatment of hypoglycemia discussed  - Advised pt to check glucoses regularly, asked to filled glucose log and bring back for review at next office visit  - Clear written instruction given on AVS, Follow up as scheduled

## 2022-08-25 NOTE — ASSESSMENT & PLAN NOTE
- under the management of Rheumatology as well as dermatology  - currently not on a systemic steroid

## 2022-08-30 ENCOUNTER — TELEPHONE (OUTPATIENT)
Dept: FAMILY MEDICINE | Facility: CLINIC | Age: 77
End: 2022-08-30
Payer: MEDICARE

## 2022-08-30 DIAGNOSIS — H91.90 HEARING LOSS, UNSPECIFIED HEARING LOSS TYPE, UNSPECIFIED LATERALITY: Primary | ICD-10-CM

## 2022-08-31 ENCOUNTER — OFFICE VISIT (OUTPATIENT)
Dept: DERMATOLOGY | Facility: CLINIC | Age: 77
End: 2022-08-31
Payer: MEDICARE

## 2022-08-31 ENCOUNTER — EXTERNAL CHRONIC CARE MANAGEMENT (OUTPATIENT)
Dept: PRIMARY CARE CLINIC | Facility: CLINIC | Age: 77
End: 2022-08-31
Payer: MEDICARE

## 2022-08-31 DIAGNOSIS — L40.50 PSORIASIS WITH ARTHROPATHY: Primary | ICD-10-CM

## 2022-08-31 PROCEDURE — 99487 CPLX CHRNC CARE 1ST 60 MIN: CPT | Mod: S$PBB,,, | Performed by: FAMILY MEDICINE

## 2022-08-31 PROCEDURE — 99489 PR COMPLX CHRON CARE MGMT, EA ADDTL 30 MIN, PER MONTH: ICD-10-PCS | Mod: S$PBB,,, | Performed by: FAMILY MEDICINE

## 2022-08-31 PROCEDURE — 99489 CPLX CHRNC CARE EA ADDL 30: CPT | Mod: PBBFAC,25,27,PN | Performed by: FAMILY MEDICINE

## 2022-08-31 PROCEDURE — 99487 CPLX CHRNC CARE 1ST 60 MIN: CPT | Mod: PBBFAC,PN | Performed by: FAMILY MEDICINE

## 2022-08-31 PROCEDURE — 99489 CPLX CHRNC CARE EA ADDL 30: CPT | Mod: S$PBB,,, | Performed by: FAMILY MEDICINE

## 2022-08-31 PROCEDURE — 99999 PR PBB SHADOW E&M-EST. PATIENT-LVL II: CPT | Mod: PBBFAC,,, | Performed by: DERMATOLOGY

## 2022-08-31 PROCEDURE — 99212 OFFICE O/P EST SF 10 MIN: CPT | Mod: PBBFAC,25 | Performed by: DERMATOLOGY

## 2022-08-31 PROCEDURE — 99999 PR PBB SHADOW E&M-EST. PATIENT-LVL II: ICD-10-PCS | Mod: PBBFAC,,, | Performed by: DERMATOLOGY

## 2022-08-31 PROCEDURE — 99214 PR OFFICE/OUTPT VISIT, EST, LEVL IV, 30-39 MIN: ICD-10-PCS | Mod: S$PBB,,, | Performed by: DERMATOLOGY

## 2022-08-31 PROCEDURE — 99487 PR COMPLX CHRON CARE MGMT, 1ST HR, PER MONTH: ICD-10-PCS | Mod: S$PBB,,, | Performed by: FAMILY MEDICINE

## 2022-08-31 PROCEDURE — 99214 OFFICE O/P EST MOD 30 MIN: CPT | Mod: S$PBB,,, | Performed by: DERMATOLOGY

## 2022-08-31 RX ORDER — IXEKIZUMAB 80 MG/ML
INJECTION, SOLUTION SUBCUTANEOUS
Qty: 8 ML | Refills: 0 | Status: SHIPPED | OUTPATIENT
Start: 2022-08-31

## 2022-08-31 RX ORDER — IXEKIZUMAB 80 MG/ML
INJECTION, SOLUTION SUBCUTANEOUS
Qty: 3 ML | Refills: 1 | Status: SHIPPED | OUTPATIENT
Start: 2022-08-31 | End: 2023-12-06 | Stop reason: SDUPTHER

## 2022-08-31 NOTE — PROGRESS NOTES
Subjective:       Patient ID:  Rosendo Thomas is a 77 y.o. male who presents for   Chief Complaint   Patient presents with    Psoriasis     Psoriasis - Follow-up  Symptom course: worsening  Affected locations: diffuse  Signs / symptoms: itching and dryness  Severity: moderate    78 yo male with hx of psoriasis with arthropathy here for f/u. Had been doing well on Taltz; stopped receiving his medication in the mail and has been off for months, now skin flaring on hands, arms, legs.    Saw rheum who advised he obtain refills here.   Labs completed in August.  Had been tolerating Taltz well without issue.  He restarted old Otelza x this past month and does not seem to be helping.    Review of Systems   Skin:  Positive for itching, rash and dry skin.      Objective:    Physical Exam   Constitutional: He appears well-developed and well-nourished. No distress.   Neurological: He is alert and oriented to person, place, and time. He is not disoriented.   Psychiatric: He has a normal mood and affect.   Skin:   Areas Examined (abnormalities noted in diagram):   Scalp / Hair Palpated and Inspected  Head / Face Inspection Performed  Neck Inspection Performed  Chest / Axilla Inspection Performed  Abdomen Inspection Performed  Back Inspection Performed  RUE Inspected  LUE Inspection Performed  RLE Inspected  LLE Inspection Performed            Diagram Legend     Erythematous scaling macule/papule c/w actinic keratosis       Vascular papule c/w angioma      Pigmented verrucoid papule/plaque c/w seborrheic keratosis      Yellow umbilicated papule c/w sebaceous hyperplasia      Irregularly shaped tan macule c/w lentigo     1-2 mm smooth white papules consistent with Milia      Movable subcutaneous cyst with punctum c/w epidermal inclusion cyst      Subcutaneous movable cyst c/w pilar cyst      Firm pink to brown papule c/w dermatofibroma      Pedunculated fleshy papule(s) c/w skin tag(s)      Evenly pigmented macule c/w  junctional nevus     Mildly variegated pigmented, slightly irregular-bordered macule c/w mildly atypical nevus      Flesh colored to evenly pigmented papule c/w intradermal nevus       Pink pearly papule/plaque c/w basal cell carcinoma      Erythematous hyperkeratotic cursted plaque c/w SCC      Surgical scar with no sign of skin cancer recurrence      Open and closed comedones      Inflammatory papules and pustules      Verrucoid papule consistent consistent with wart     Erythematous eczematous patches and plaques     Dystrophic onycholytic nail with subungual debris c/w onychomycosis     Umbilicated papule    Erythematous-base heme-crusted tan verrucoid plaque consistent with inflamed seborrheic keratosis     Erythematous Silvery Scaling Plaque c/w Psoriasis     See annotation    Lab Results   Component Value Date    WBC 5.28 08/09/2022    HGB 14.5 08/09/2022    HCT 45.7 08/09/2022    MCV 88 08/09/2022     08/09/2022     Lab Results   Component Value Date    ALT 33 08/09/2022    AST 25 08/09/2022    ALKPHOS 72 08/09/2022    BILITOT 0.7 08/09/2022     BMP  Lab Results   Component Value Date     08/18/2022    K 4.1 08/18/2022     08/18/2022    CO2 27 08/18/2022    BUN 15 08/18/2022    CREATININE 1.1 08/18/2022    CALCIUM 9.7 08/18/2022    ANIONGAP 8 08/18/2022    ESTGFRAFRICA >60.0 04/19/2022    EGFRNONAA 58.4 (A) 04/19/2022     Quant gold 8/9/22 negative      Assessment / Plan:        Psoriasis with arthropathy - currently flaring;   -     TALTZ AUTOINJECTOR, 3 PACK, 80 mg/mL AtIn; Start Taltz at 160mg SQ on day 1 then 80mg SQ day 14 and qoweek through week 12  Dispense: 8 mL; Refill: 0  -     TALTZ AUTOINJECTOR, 3 PACK, 80 mg/mL AtIn; 1 - 80mg dose SQ q month  Dispense: 3 mL; Refill: 1  Will redose given lapse in therapy  Rx sent to [facility] today  Pt instructed to use topical steroids appropriately - does not need refills    Rv 6 mo           Follow up in about 6 months (around 2/28/2023).

## 2022-09-07 NOTE — PROGRESS NOTES
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 bx done on 1/28/2020:    Notes recorded by Liane Wright MD on 2/5/2020 at 8:10 AM CST  1.  Skin, right dorsal hand, shave biopsy:   - SQUAMOUS CELL CARCINOMA IN SITU.   - MARGINS ARE NEGATIVE IN THE PLANES OF SECTION.   Electrodessication and Curettage Procedure note:    Verbal consent obtained. Lesional tissue marked and prepped with alcohol. Lesion anesthetized with 1% lidocaine with epinephrine. Curettage and Desiccation x 3 cycles to base. Aluminum chloride for hemostasis. Lesion size after primary curettage: 0.5 cm    Area bandaged and wound care explained.    F/u 3 months     Lm notifying pt

## 2022-09-08 ENCOUNTER — SPECIALTY PHARMACY (OUTPATIENT)
Dept: PHARMACY | Facility: CLINIC | Age: 77
End: 2022-09-08
Payer: MEDICARE

## 2022-09-08 DIAGNOSIS — L40.9 PSORIASIS: Primary | ICD-10-CM

## 2022-09-08 NOTE — TELEPHONE ENCOUNTER
Samy, this is Santy Amanda with [facility].  We are working on your prescription that your doctor has sent us. We will be working with your insurance to get this approved for you. We will be calling you along the way with updates on your medication.  If you have any questions, you can reach us at [phone].    Welcome call outcome: Patient/caregiver reached    Patient states he was previously on Taltz before and receiving it from the  for free but that all of a sudden stopped > 6 months ago. Informed him that insurance is requiring a new PA for the higher dose.     Taltz rx received   -PA required.  -PA to be submitted once chart notes signed. Message sent to provider.

## 2022-09-13 NOTE — TELEPHONE ENCOUNTER
Chart notes signed.   PA submitted. [facility] key: [id number]  -PA approved from 08/14/2022 to 03/12/2023  Case ID: [id number]

## 2022-09-13 NOTE — TELEPHONE ENCOUNTER
Provider portion received. Outgoing call to pt and confirmed he will come tomorrow to sign documents - on my desk.

## 2022-09-13 NOTE — TELEPHONE ENCOUNTER
Benefits Investigation -Taltz     Insurance name: [employer] Medicare Part D   Copay: $80 (use 2-pack for loading dose; single pack for maintenance dose)   LIS LVL: none    Forward to FA for review. Approval letter uploaded to media.

## 2022-09-13 NOTE — TELEPHONE ENCOUNTER
Outgoing call to pt, consent for PAP received. Pt wishes to come in person for documents 9/14. Would like a call back today at 3pm for confirmation.     Provider portion faxed + Staff message sent. Will monitor.

## 2022-09-14 NOTE — TELEPHONE ENCOUNTER
Outgoing call to see if pt still planning on coming today - pt says he can't talk right now. Will check tomorrow.

## 2022-09-16 NOTE — TELEPHONE ENCOUNTER
Incoming call from pt regarding clarification on proof of income for PAP. Explained what would qualify as proof of income. Pt verbalized understanding and will obtain.

## 2022-09-16 NOTE — TELEPHONE ENCOUNTER
Outgoing call to Agnieszka to check PAP status - rep states they need a date on the prescription, added a date, faxed back. Will monitor.

## 2022-09-19 NOTE — TELEPHONE ENCOUNTER
Outgoing call to Agnieszka to check PAP status - rep harmony says pt approved and they will fax us approval. Will monitor.

## 2022-09-20 NOTE — TELEPHONE ENCOUNTER
Incoming call from pt returning [facility] call, informed pt of approval. Provided pt with phone number to [facility]

## 2022-09-20 NOTE — TELEPHONE ENCOUNTER
No approval received - outgoing call to adri, rep [place] says he will fax it again.    Approval received through 12/31/22, outgoing call to pt to inform- LVM. Taltz must be filled through [facility] ([phone]) going forward.     Closing out FA, routing to Santy

## 2022-09-23 NOTE — TELEPHONE ENCOUNTER
Pt dropped off medicare statement and left, unsure if Adri needed more information and pt confused.    Outgoing call to adri to see if they needed more information, rep Eugene says they do not need any information and pt is still enrolled fine.

## 2022-09-30 ENCOUNTER — EXTERNAL CHRONIC CARE MANAGEMENT (OUTPATIENT)
Dept: PRIMARY CARE CLINIC | Facility: CLINIC | Age: 77
End: 2022-09-30
Payer: MEDICARE

## 2022-09-30 PROCEDURE — 99490 CHRNC CARE MGMT STAFF 1ST 20: CPT | Mod: PBBFAC,PN | Performed by: FAMILY MEDICINE

## 2022-09-30 PROCEDURE — 99490 CHRNC CARE MGMT STAFF 1ST 20: CPT | Mod: S$PBB,,, | Performed by: FAMILY MEDICINE

## 2022-09-30 PROCEDURE — 99439 CHRNC CARE MGMT STAF EA ADDL: CPT | Mod: PBBFAC,PN | Performed by: FAMILY MEDICINE

## 2022-09-30 PROCEDURE — 99439 PR CHRONIC CARE MGMT, EA ADDTL 20 MIN: ICD-10-PCS | Mod: S$PBB,,, | Performed by: FAMILY MEDICINE

## 2022-09-30 PROCEDURE — 99439 CHRNC CARE MGMT STAF EA ADDL: CPT | Mod: S$PBB,,, | Performed by: FAMILY MEDICINE

## 2022-09-30 PROCEDURE — 99490 PR CHRONIC CARE MGMT, 1ST 20 MIN: ICD-10-PCS | Mod: S$PBB,,, | Performed by: FAMILY MEDICINE

## 2022-10-21 DIAGNOSIS — Z12.11 SPECIAL SCREENING FOR MALIGNANT NEOPLASMS, COLON: Primary | ICD-10-CM

## 2022-10-31 ENCOUNTER — EXTERNAL CHRONIC CARE MANAGEMENT (OUTPATIENT)
Dept: PRIMARY CARE CLINIC | Facility: CLINIC | Age: 77
End: 2022-10-31
Payer: MEDICARE

## 2022-10-31 PROCEDURE — 99490 CHRNC CARE MGMT STAFF 1ST 20: CPT | Mod: PBBFAC,PN | Performed by: FAMILY MEDICINE

## 2022-10-31 PROCEDURE — 99439 CHRNC CARE MGMT STAF EA ADDL: CPT | Mod: PBBFAC,PN | Performed by: FAMILY MEDICINE

## 2022-10-31 PROCEDURE — 99439 CHRNC CARE MGMT STAF EA ADDL: CPT | Mod: S$PBB,,, | Performed by: FAMILY MEDICINE

## 2022-10-31 PROCEDURE — 99490 CHRNC CARE MGMT STAFF 1ST 20: CPT | Mod: S$PBB,,, | Performed by: FAMILY MEDICINE

## 2022-10-31 PROCEDURE — 99490 PR CHRONIC CARE MGMT, 1ST 20 MIN: ICD-10-PCS | Mod: S$PBB,,, | Performed by: FAMILY MEDICINE

## 2022-10-31 PROCEDURE — 99439 PR CHRONIC CARE MGMT, EA ADDTL 20 MIN: ICD-10-PCS | Mod: S$PBB,,, | Performed by: FAMILY MEDICINE

## 2022-11-03 ENCOUNTER — TELEPHONE (OUTPATIENT)
Dept: RHEUMATOLOGY | Facility: CLINIC | Age: 77
End: 2022-11-03
Payer: MEDICARE

## 2022-11-03 NOTE — TELEPHONE ENCOUNTER
Called to inform patient of the need to r/s 11/15/22 appointment with Dr. Sargent, due to a change in the provider's schedule.  Patient verbalizes understanding.  Appointment rescheduled to 11/18/22 @ 9:30am

## 2022-11-12 NOTE — PROGRESS NOTES
Subjective:     Patient ID: Rosendo Thomas is a 77 y.o. male     Chief Complaint: No chief complaint on file.     PCP: Tory Hatch MD  Derm: Liane Wright MD    HPI   LV: 8/9/22    77 yr old w PsA seen last on 8/9/22 and started on Taltz by Dr. Wright (for higher dosing as he has plaque psoriasis as well as PsA)~ 9/2022 w loading doses.  Currently on 80 mg SC q 2 weeks x 12 weeks & then it will be 80 mg q 4 weeks.  Doing great. Rashes have greatly improved. Joints much better as well. Makes better fists as both hands have improved greatly. Nails have improved just a little. No more AM stiffness.  No enthesopathies. Still w dactylitis on R hand especially but also improving.  No adverse effects to Taltz.    He thinks he is losing weight and is pleased with himself, but VS indicated otherwise.  He lives alone and his diet is a bit off.       8/9/22  77 yr old man w PsA seen last by us (Dr. Higginbotham & myself) on 3/9/21.  Was being followed by Rheum and also by Derm (Dr. Wright).  Was on Taltz and Otezla in the most recent past prescribed by Derm. Plan was to get off Otezla when Taltz kicked in.   Is a poor historian. Not sure about his meds, but did run out of Taltz -possibly 6 months ago--not sure when. Also ran out of Otezla--does not know when.  Having lots of psoriasis all over and joint pain mostly in his hands R>>L; AM stiffness in hands all day especially in R hand.  Cannot make good fists especially on the R.   Not complaining of any other joints, except recent R shoulder pain which he also feels in front of R scapula. This was activity precipitated & hurts to sleep on at night.   Not too concerned about his morbidities.       3/9/21  75 yr old man with multiple morbidities (including DM II) initially send by his PCP for psoriatic arthritis.  He has had psoriasis x >10 yrs with bilateral hand pain w swelling of DIPs & entire digits. No LBP or enthesitis. No FHx of SpA, IBD, uveitis.   He had been on Enbrel x 1  yr in the past for psoriasis but it was ineffective and he developed infections.  He has used a number of topicals but they were not effective either. Bee soap however helps his psoriasis.  He underwent surgery for sigmoid cancer & was seen by Derm who agreed with us with the use of apremilast & he was started on it around May, 2019 and has had an improvement in his skin lesions (see Dr. Hirsch's previous photos & more recent photos by Dr. Slaughter) but he exacerbated on apremilast and Taltz was begun with good response.   However, he ran out of it and rashes returned. He re-instituted apremilast, as he had some but he still has lesions on his elbows and fingers.    Previous labs with anemia, mild thromobocytopenia & elevated CRP                 Current Outpatient Medications   Medication Sig Dispense Refill    acetaminophen (TYLENOL ORAL) Take by mouth.      atorvastatin (LIPITOR) 20 MG tablet take 1 tablet by mouth once daily 90 tablet 3    blood sugar diagnostic (CONTOUR TEST STRIPS) Strp 1 each by Misc.(Non-Drug; Combo Route) route 2 (two) times daily. 50 each 11    cetirizine (ZYRTEC) 10 MG tablet Take 10 mg by mouth daily as needed.      clobetasol (TEMOVATE) 0.05 % cream Apply topically 2 (two) times daily. To psoriasis PRN 60 g 3    dulaglutide (TRULICITY) 0.75 mg/0.5 mL pen injector Inject 0.75 mg (one pen) into the skin every 7 days. 12 pen 3    fluocinonide (LIDEX) 0.05 % external solution Apply topically 2 (two) times daily. To itching in scalp area prn 60 mL 3    fluticasone propionate (FLONASE) 50 mcg/actuation nasal spray 2 sprays by Each Nostril route once daily.      glipiZIDE (GLUCOTROL) 5 MG tablet Take 1 tablet (5 mg total) by mouth once daily. 90 tablet 3    lancets Misc USE ONE LANCET TWO TIMES DAILY 100 each 11    losartan-hydrochlorothiazide 100-25 mg (HYZAAR) 100-25 mg per tablet Take 1 tablet by mouth once daily. 90 tablet 3    sodium,potassium,mag sulfates (SUPREP BOWEL PREP KIT)  17.5-3.13-1.6 gram SolR As Directed 1 kit 0    TALTZ AUTOINJECTOR, 3 PACK, 80 mg/mL AtIn Start Taltz at 160mg SQ on day 1 then 80mg SQ day 14 and qoweek through week 12 8 mL 0    TALTZ AUTOINJECTOR, 3 PACK, 80 mg/mL AtIn 1 - 80mg dose SQ q month 3 mL 1     No current facility-administered medications for this visit.     Facility-Administered Medications Ordered in Other Visits   Medication Dose Route Frequency Provider Last Rate Last Admin    enoxaparin injection 40 mg  40 mg Subcutaneous Q24H Florence Trevino NP   40 mg at 10/07/18 2214    gabapentin capsule 300 mg  300 mg Oral On Call Procedure Florence Trevino NP   300 mg at 09/25/18 1347         Review of patient's allergies indicates:   Allergen Reactions    Metformin Diarrhea       Review of Systems   Constitutional:  Negative for fatigue and unexpected weight change (He thinks he is losing weight due to his diet efforts).   HENT: Negative.  Negative for mouth sores and trouble swallowing.    Eyes: Negative.  Negative for redness.   Respiratory: Negative.  Negative for cough and shortness of breath.    Cardiovascular: Negative.  Negative for chest pain.   Gastrointestinal: Negative.  Negative for constipation and diarrhea.   Endocrine: Negative.  Negative for cold intolerance and heat intolerance.   Genitourinary: Negative.  Negative for dysuria.   Musculoskeletal:  Positive for arthralgias (much improved). Negative for back pain.   Skin:  Positive for rash.   Allergic/Immunologic: Negative.    Neurological: Negative.  Negative for numbness and headaches.   Hematological: Negative.  Does not bruise/bleed easily.   Psychiatric/Behavioral:  Positive for sleep disturbance.      Past Medical History:   Diagnosis Date    Colon cancer     Colon polyps     Diabetes mellitus type II 7/2010    diet controlled    Obesity     Psoriasis     Skin cancer     Squamous cell carcinoma     Type 2 diabetes mellitus        Past Surgical History:   Procedure Laterality  Date    APPENDECTOMY      COLONOSCOPY N/A 2018    Procedure: COLONOSCOPY;  Surgeon: Kameron Ruff MD;  Location: [facility] ENDO;  Service: Endoscopy;  Laterality: N/A;  confirmed    COLONOSCOPY N/A 2019    Procedure: COLONOSCOPY;  Surgeon: Victor Hugo Nunez MD;  Location: [facility] ENDO (4TH FLR);  Service: Endoscopy;  Laterality: N/A;  Pt stated this is the only day he has somebody to drive him and  to stay during procedure  PM Prep    COLONOSCOPY N/A 2020    Procedure: COLONOSCOPY;  Surgeon: Victor Hugo Nunez MD;  Location: [facility] ENDO (2ND FLR);  Service: Endoscopy;  Laterality: N/A;  Covid-19 test 20 at [facility]    LAPAROSCOPIC LEFT COLECTOMY N/A 2018    Procedure: COLECTOMY-LAPAROSCOPIC LEFT;  Surgeon: Chito Banks MD;  Location: [facility] OR 2ND FLR;  Service: Colon and Rectal;  Laterality: N/A;    SKIN CANCER EXCISION      SKIN GRAFT      VASECTOMY      VASECTOMY         Family History   Problem Relation Age of Onset    Cancer Father         prostate    Diabetes Brother     Liver cancer Brother     Anesthesia problems Neg Hx    Brother  of liver cancer in his 60's; non drinker; was    Social History     Tobacco Use    Smoking status: Former     Types: Cigars    Smokeless tobacco: Never    Tobacco comments:     cigars-x1 yr.   Substance Use Topics    Alcohol use: Yes     Comment: occassional    Drug use: No   Wife  a few years back; she was arranging his meds.   Lives by himself; children call and check in on him often.  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    Objective:   /75 (BP Location: Left arm, Patient Position: Sitting)   Pulse (!) 50   Temp 97.5 °F (36.4 °C)   Wt 121.8 kg (268 lb 8.3 oz)   BMI 32.69 kg/m²   Was 258 lb 6.1 oz on 22  Physical Exam   Constitutional: He is oriented to person, place, and time. No distress.   HENT:   Head: Normocephalic and atraumatic.    Mouth/Throat: Oropharynx is clear and moist. No oropharyngeal exudate.   No parotidomegaly;      Eyes: Pupils are equal, round, and reactive to light. Conjunctivae are normal. No scleral icterus.   Neck: No JVD present. No tracheal deviation present. No thyromegaly present.   Cardiovascular: Normal rate, regular rhythm and normal heart sounds. Exam reveals no gallop and no friction rub.   No murmur heard.  Pulmonary/Chest: Effort normal and breath sounds normal. No respiratory distress. He has no wheezes. He has no rales. He exhibits no tenderness.   Abdominal: Soft. Bowel sounds are normal. He exhibits no distension and no mass. There is no splenomegaly or hepatomegaly. There is no abdominal tenderness. There is no rebound and no guarding.   Musculoskeletal:         General: Deformity present. No tenderness.      Right lower leg: No edema.      Left lower leg: No edema.      Comments: Shoulders: FROM; no synovitis  Elbows: FROM; no synovitis; no tophi or nodules  Wrists: FROM; no synovitis  See photos for hands & elbows; no metacarpalgia;   Some dactylitic fingers (R 4th angel);   Makes better fist on R; good on left, but  much better bilaterally  HIPS: adequate ROM  Knees: Bila bony hypertrophy; deepak PF crepitus; no synovitis; no instability;  Ankles: FROM: no synovitis   Toes: ok; no metatarsalgia       Lymphadenopathy:     He has no cervical adenopathy.   Neurological: He is alert and oriented to person, place, and time. He has normal reflexes. No cranial nerve deficit.   Motor strength: 5/5 prox & distal.   Skin: Skin is warm and dry. Rash noted.   See photos;  Multiple nail changes w pitting, ridging, onycholysis   Psychiatric: His behavior is normal. Mood, memory and affect normal.   Pleasant.   Vitals reviewed.        8/9/22: R hand --cannot make full fist                        8/9/22: L hand: fist            11/18/22 11/18/22 8/9/22: ESR <2; CRP 7.3; CBC ok; CMP ok; pre-DMARDS ok;   4/19/22:  cnne 1.2; GFR 58.4; glu 158;   1/31/22: ptls 128;   Prior labs with elevated CRP, anemia & mild thrombocytopenia      8/9/22: Bilateral hands: personally viewed: uniform joint space narrowing, marginal erosions, periosteal new bone formation, and enthesitis primarily involving the DIP and PIP joints bilaterally.  No acro-osteolysis.  Mild juxta-articular soft tissue swelling.   8/9/22: R shoulder: personally viewed: mild OA;   7/8/20: Bilateral hands: personally viewed: with erosions & periostitis.   7/8/20: Bilateral feet:single AP; personally viewed:  L HV; mild OA  Imaging previously reviewed read as OA of hands but very c/w PsA. Feet with OA bilateral calcaneal spurs inferiorly & superiorly.  Assessment:   Psoriatic arthritis   Arthritis now primarily in hands   Erosive changes, enthesitis & periostitis on imaging    Skin & joints not well controlled on Otezla alone.              Skin and joints were controlled on Taltz & improving since Taltz restarted 9/2022   Currently 80 mg q 2 weeks (x 12 weeks) then 80 mg q 4 weeks thereafter (handled by Dr. Wright)  Plaque psoriasis> 15 yrs   S/P Enbrel (ineffective; developed infections)   Topicals ineffective   Believes Bee soap was helpful.   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 Component of cheirarthropathy  S/P Covid 19 ~ 5/16/22  Obesity    Plan:   Discussed his diet and referred him back to his PCP and/or endocrinologist as he is interested in Ozempic.  Weight loss  Continue Taltz as per Dr. Wright.  RTC 6 months or prn    CC: Tory Hatch MD

## 2022-11-14 ENCOUNTER — TELEPHONE (OUTPATIENT)
Dept: PHARMACY | Facility: CLINIC | Age: 77
End: 2022-11-14
Payer: MEDICARE

## 2022-11-15 ENCOUNTER — SPECIALTY PHARMACY (OUTPATIENT)
Dept: PHARMACY | Facility: CLINIC | Age: 77
End: 2022-11-15
Payer: MEDICARE

## 2022-11-16 ENCOUNTER — TELEPHONE (OUTPATIENT)
Dept: FAMILY MEDICINE | Facility: CLINIC | Age: 77
End: 2022-11-16
Payer: MEDICARE

## 2022-11-18 ENCOUNTER — OFFICE VISIT (OUTPATIENT)
Dept: RHEUMATOLOGY | Facility: CLINIC | Age: 77
End: 2022-11-18
Payer: MEDICARE

## 2022-11-18 VITALS
DIASTOLIC BLOOD PRESSURE: 75 MMHG | SYSTOLIC BLOOD PRESSURE: 134 MMHG | TEMPERATURE: 98 F | WEIGHT: 268.5 LBS | HEART RATE: 50 BPM | BODY MASS INDEX: 32.69 KG/M2

## 2022-11-18 DIAGNOSIS — N18.31 STAGE 3A CHRONIC KIDNEY DISEASE: ICD-10-CM

## 2022-11-18 DIAGNOSIS — L40.50 PSORIATIC ARTHRITIS: Primary | ICD-10-CM

## 2022-11-18 DIAGNOSIS — L40.9 PSORIASIS: ICD-10-CM

## 2022-11-18 DIAGNOSIS — Z55.9 EDUCATIONAL CIRCUMSTANCE: ICD-10-CM

## 2022-11-18 DIAGNOSIS — E11.69 TYPE 2 DIABETES MELLITUS WITH OTHER SPECIFIED COMPLICATION, WITHOUT LONG-TERM CURRENT USE OF INSULIN: ICD-10-CM

## 2022-11-18 DIAGNOSIS — Z79.60 LONG-TERM USE OF IMMUNOSUPPRESSANT MEDICATION: ICD-10-CM

## 2022-11-18 PROCEDURE — 99215 OFFICE O/P EST HI 40 MIN: CPT | Mod: PBBFAC | Performed by: INTERNAL MEDICINE

## 2022-11-18 PROCEDURE — 99999 PR PBB SHADOW E&M-EST. PATIENT-LVL V: ICD-10-PCS | Mod: PBBFAC,,, | Performed by: INTERNAL MEDICINE

## 2022-11-18 PROCEDURE — 99214 OFFICE O/P EST MOD 30 MIN: CPT | Mod: S$PBB,,, | Performed by: INTERNAL MEDICINE

## 2022-11-18 PROCEDURE — 99999 PR PBB SHADOW E&M-EST. PATIENT-LVL V: CPT | Mod: PBBFAC,,, | Performed by: INTERNAL MEDICINE

## 2022-11-18 PROCEDURE — 99214 PR OFFICE/OUTPT VISIT, EST, LEVL IV, 30-39 MIN: ICD-10-PCS | Mod: S$PBB,,, | Performed by: INTERNAL MEDICINE

## 2022-11-18 SDOH — SOCIAL DETERMINANTS OF HEALTH (SDOH): PROBLEMS RELATED TO EDUCATION AND LITERACY, UNSPECIFIED: Z55.9

## 2022-11-18 NOTE — PATIENT INSTRUCTIONS
Ozempic is the drug for diabetes like trulicity but people have lost weight on it.  Discuss with your PCP,or your endocrinologist, if you want to switch.    Your diet need improvement.

## 2022-11-30 ENCOUNTER — EXTERNAL CHRONIC CARE MANAGEMENT (OUTPATIENT)
Dept: PRIMARY CARE CLINIC | Facility: CLINIC | Age: 77
End: 2022-11-30
Payer: MEDICARE

## 2022-11-30 PROCEDURE — 99490 CHRNC CARE MGMT STAFF 1ST 20: CPT | Mod: S$PBB,,, | Performed by: FAMILY MEDICINE

## 2022-11-30 PROCEDURE — 99439 PR CHRONIC CARE MGMT, EA ADDTL 20 MIN: ICD-10-PCS | Mod: S$PBB,,, | Performed by: FAMILY MEDICINE

## 2022-11-30 PROCEDURE — 99439 CHRNC CARE MGMT STAF EA ADDL: CPT | Mod: PBBFAC,PN | Performed by: FAMILY MEDICINE

## 2022-11-30 PROCEDURE — 99439 CHRNC CARE MGMT STAF EA ADDL: CPT | Mod: S$PBB,,, | Performed by: FAMILY MEDICINE

## 2022-11-30 PROCEDURE — 99490 CHRNC CARE MGMT STAFF 1ST 20: CPT | Mod: PBBFAC,PN | Performed by: FAMILY MEDICINE

## 2022-11-30 PROCEDURE — 99490 PR CHRONIC CARE MGMT, 1ST 20 MIN: ICD-10-PCS | Mod: S$PBB,,, | Performed by: FAMILY MEDICINE

## 2022-12-17 NOTE — SUBJECTIVE & OBJECTIVE
Subjective:     Interval History: No acute events overnight, s/p lap L colectomy, pain controlled with PO and IVBT medications notes increase with coughing, no nausea/vomiting, tolerated sips last night.     Post-Op Info:  Procedure(s) (LRB):  COLECTOMY-LAPAROSCOPIC LEFT (N/A)   1 Day Post-Op      Medications:  Continuous Infusions:   sodium chloride 0.9% 100 mL/hr at 09/25/18 1921    glucagon (human recombinant)       Scheduled Meds:   enoxparin  40 mg Subcutaneous Q24H    gabapentin  300 mg Oral TID    olmesartan  40 mg Oral Daily     PRN Meds:   dextrose 50%    dextrose 50%    gabapentin    glucagon (human recombinant)    glucagon (human recombinant)    glucose    glucose    glucose    HYDROmorphone    HYDROmorphone    insulin aspart U-100    sodium chloride 0.9%        Objective:     Vital Signs (Most Recent):  Temp: 98.8 °F (37.1 °C) (09/26/18 0801)  Pulse: 65 (09/26/18 0801)  Resp: 18 (09/26/18 0801)  BP: (!) 126/59 (09/26/18 0801)  SpO2: (!) 94 % (09/26/18 0801) Vital Signs (24h Range):  Temp:  [96.4 °F (35.8 °C)-98.8 °F (37.1 °C)] 98.8 °F (37.1 °C)  Pulse:  [56-84] 65  Resp:  [12-20] 18  SpO2:  [91 %-100 %] 94 %  BP: (126-173)/(59-78) 126/59     Intake/Output - Last 3 Shifts       09/24 0700 - 09/25 0659 09/25 0700 - 09/26 0659 09/26 0700 - 09/27 0659    I.V. (mL/kg)  2900 (24)     Total Intake(mL/kg)  2900 (24)     Urine (mL/kg/hr)  940 250 (1.4)    Blood  150     Total Output  1090 250    Net  +1810 -250                 Physical Exam   Constitutional: He appears well-developed and well-nourished.   HENT:   Head: Normocephalic and atraumatic.   Cardiovascular: Normal rate.   Pulmonary/Chest: Effort normal.   On room air with no increased work of breathing    Abdominal: Soft. He exhibits no distension.   Appropriately TTP around the lower midline incision site sealed with dermabond, lap port incision sites also c/d/i    Neurological: He is alert.   Skin: Skin is warm and dry.   Nursing  note and vitals reviewed.          Significant Labs:  BMP (Last 3 Results):   Recent Labs   Lab  09/26/18   0421   GLU  149*   NA  140   K  4.5   CL  108   CO2  23   BUN  10   CREATININE  0.8   CALCIUM  8.1*     CBC (Last 3 Results):   Recent Labs   Lab  09/21/18   1215  09/26/18   0421   WBC  6.14  10.72   RBC  4.54*  4.46*   HGB  13.3*  12.8*   HCT  40.1  40.3   PLT  160  143*   MCV  88  90   MCH  29.3  28.7   MCHC  33.2  31.8*       Significant Diagnostics:  None   Yes

## 2022-12-20 ENCOUNTER — LAB VISIT (OUTPATIENT)
Dept: LAB | Facility: HOSPITAL | Age: 77
End: 2022-12-20
Attending: HOSPITALIST
Payer: MEDICARE

## 2022-12-20 DIAGNOSIS — E11.22 CONTROLLED TYPE 2 DIABETES MELLITUS WITH STAGE 3 CHRONIC KIDNEY DISEASE, WITHOUT LONG-TERM CURRENT USE OF INSULIN: ICD-10-CM

## 2022-12-20 DIAGNOSIS — N18.30 CONTROLLED TYPE 2 DIABETES MELLITUS WITH STAGE 3 CHRONIC KIDNEY DISEASE, WITHOUT LONG-TERM CURRENT USE OF INSULIN: ICD-10-CM

## 2022-12-20 DIAGNOSIS — E11.65 TYPE 2 DIABETES MELLITUS WITH HYPERGLYCEMIA, WITHOUT LONG-TERM CURRENT USE OF INSULIN: ICD-10-CM

## 2022-12-20 LAB
25(OH)D3+25(OH)D2 SERPL-MCNC: 12 NG/ML (ref 30–96)
ANION GAP SERPL CALC-SCNC: 6 MMOL/L (ref 8–16)
BUN SERPL-MCNC: 18 MG/DL (ref 8–23)
CALCIUM SERPL-MCNC: 9.2 MG/DL (ref 8.7–10.5)
CHLORIDE SERPL-SCNC: 103 MMOL/L (ref 95–110)
CO2 SERPL-SCNC: 28 MMOL/L (ref 23–29)
CREAT SERPL-MCNC: 1.1 MG/DL (ref 0.5–1.4)
EST. GFR  (NO RACE VARIABLE): >60 ML/MIN/1.73 M^2
ESTIMATED AVG GLUCOSE: 154 MG/DL (ref 68–131)
GLUCOSE SERPL-MCNC: 159 MG/DL (ref 70–110)
HBA1C MFR BLD: 7 % (ref 4–5.6)
POTASSIUM SERPL-SCNC: 3.9 MMOL/L (ref 3.5–5.1)
SODIUM SERPL-SCNC: 137 MMOL/L (ref 136–145)

## 2022-12-20 PROCEDURE — 80048 BASIC METABOLIC PNL TOTAL CA: CPT | Performed by: HOSPITALIST

## 2022-12-20 PROCEDURE — 82306 VITAMIN D 25 HYDROXY: CPT | Performed by: HOSPITALIST

## 2022-12-20 PROCEDURE — 83036 HEMOGLOBIN GLYCOSYLATED A1C: CPT | Performed by: HOSPITALIST

## 2022-12-20 PROCEDURE — 36415 COLL VENOUS BLD VENIPUNCTURE: CPT | Performed by: HOSPITALIST

## 2022-12-23 ENCOUNTER — TELEPHONE (OUTPATIENT)
Dept: FAMILY MEDICINE | Facility: CLINIC | Age: 77
End: 2022-12-23
Payer: MEDICARE

## 2022-12-23 DIAGNOSIS — U07.1 COVID-19 VIRUS INFECTION: Primary | ICD-10-CM

## 2022-12-23 NOTE — TELEPHONE ENCOUNTER
----- Message from Ebony Goldsmith sent at 12/23/2022  1:42 PM CST -----  Type: Patient Call Back        Who called: Self         What is the request in detail: Pt states he would like to speak to  or a nurse ..         Can the clinic reply by [facility]? No         Would the patient rather a call back or a response via [facility]? Yes         Best call back number: [phone] (home)                     Thank You

## 2022-12-23 NOTE — TELEPHONE ENCOUNTER
----- Message from Willa Ramey sent at 12/23/2022  8:57 AM CST -----  Type: Patient Call Back    Who called:self    What is the request in detail:pt tested positive for covid and he wants to know how he can get infusion treatment. Please call    Can the clinic reply by [facility]?    Would the patient rather a call back or a response via [facility]? call    Best call back number:[phone] (home)

## 2022-12-31 ENCOUNTER — EXTERNAL CHRONIC CARE MANAGEMENT (OUTPATIENT)
Dept: PRIMARY CARE CLINIC | Facility: CLINIC | Age: 77
End: 2022-12-31
Payer: MEDICARE

## 2022-12-31 PROCEDURE — 99490 CHRNC CARE MGMT STAFF 1ST 20: CPT | Mod: PBBFAC,PN | Performed by: FAMILY MEDICINE

## 2022-12-31 PROCEDURE — 99490 PR CHRONIC CARE MGMT, 1ST 20 MIN: ICD-10-PCS | Mod: S$PBB,,, | Performed by: FAMILY MEDICINE

## 2022-12-31 PROCEDURE — 99490 CHRNC CARE MGMT STAFF 1ST 20: CPT | Mod: S$PBB,,, | Performed by: FAMILY MEDICINE

## 2023-01-17 ENCOUNTER — TELEPHONE (OUTPATIENT)
Dept: FAMILY MEDICINE | Facility: CLINIC | Age: 78
End: 2023-01-17
Payer: MEDICARE

## 2023-01-17 NOTE — TELEPHONE ENCOUNTER
----- Message from Concha Gomez sent at 1/17/2023 10:54 AM CST -----  Regarding: Patient call back  Who called: CASSY VALENTE [[id number]]    What is the request in detail: Patient is requesting a call back. Pt states he is having issues with his hip. He states he was seen in ortho with xrays and was advised it is probably muscular. He would like to know how often he can get a Cortizone shot. He would like to speak with the doctor or PA/NP.   Please advise.    Can the clinic reply by [facility]? No    Best call back number: [phone]    Additional Information: N/A

## 2023-01-31 ENCOUNTER — EXTERNAL CHRONIC CARE MANAGEMENT (OUTPATIENT)
Dept: PRIMARY CARE CLINIC | Facility: CLINIC | Age: 78
End: 2023-01-31
Payer: MEDICARE

## 2023-01-31 PROCEDURE — 99439 CHRNC CARE MGMT STAF EA ADDL: CPT | Mod: S$PBB,,, | Performed by: FAMILY MEDICINE

## 2023-01-31 PROCEDURE — 99490 CHRNC CARE MGMT STAFF 1ST 20: CPT | Mod: PBBFAC,PN | Performed by: FAMILY MEDICINE

## 2023-01-31 PROCEDURE — 99439 PR CHRONIC CARE MGMT, EA ADDTL 20 MIN: ICD-10-PCS | Mod: S$PBB,,, | Performed by: FAMILY MEDICINE

## 2023-01-31 PROCEDURE — 99490 PR CHRONIC CARE MGMT, 1ST 20 MIN: ICD-10-PCS | Mod: S$PBB,,, | Performed by: FAMILY MEDICINE

## 2023-01-31 PROCEDURE — 99490 CHRNC CARE MGMT STAFF 1ST 20: CPT | Mod: S$PBB,,, | Performed by: FAMILY MEDICINE

## 2023-01-31 PROCEDURE — 99439 CHRNC CARE MGMT STAF EA ADDL: CPT | Mod: PBBFAC,PN | Performed by: FAMILY MEDICINE

## 2023-02-20 ENCOUNTER — TELEPHONE (OUTPATIENT)
Dept: ENDOCRINOLOGY | Facility: CLINIC | Age: 78
End: 2023-02-20
Payer: MEDICARE

## 2023-02-20 ENCOUNTER — PES CALL (OUTPATIENT)
Dept: ADMINISTRATIVE | Facility: CLINIC | Age: 78
End: 2023-02-20
Payer: MEDICARE

## 2023-02-20 DIAGNOSIS — E11.22 CONTROLLED TYPE 2 DIABETES MELLITUS WITH STAGE 3 CHRONIC KIDNEY DISEASE, WITHOUT LONG-TERM CURRENT USE OF INSULIN: Primary | ICD-10-CM

## 2023-02-20 DIAGNOSIS — N18.30 CONTROLLED TYPE 2 DIABETES MELLITUS WITH STAGE 3 CHRONIC KIDNEY DISEASE, WITHOUT LONG-TERM CURRENT USE OF INSULIN: Primary | ICD-10-CM

## 2023-02-20 NOTE — TELEPHONE ENCOUNTER
----- Message from Edith Varela sent at 2/20/2023 10:56 AM CST -----  Contact: CASSY VALENTE [[id number]] [phone]  GENERAL CALL    Patient asks if he should get an A1C done before his follow up in March? Please advise: [phone]

## 2023-02-28 ENCOUNTER — EXTERNAL CHRONIC CARE MANAGEMENT (OUTPATIENT)
Dept: PRIMARY CARE CLINIC | Facility: CLINIC | Age: 78
End: 2023-02-28
Payer: MEDICARE

## 2023-02-28 ENCOUNTER — LAB VISIT (OUTPATIENT)
Dept: LAB | Facility: HOSPITAL | Age: 78
End: 2023-02-28
Attending: HOSPITALIST
Payer: MEDICARE

## 2023-02-28 DIAGNOSIS — N18.30 CONTROLLED TYPE 2 DIABETES MELLITUS WITH STAGE 3 CHRONIC KIDNEY DISEASE, WITHOUT LONG-TERM CURRENT USE OF INSULIN: ICD-10-CM

## 2023-02-28 DIAGNOSIS — E11.22 CONTROLLED TYPE 2 DIABETES MELLITUS WITH STAGE 3 CHRONIC KIDNEY DISEASE, WITHOUT LONG-TERM CURRENT USE OF INSULIN: ICD-10-CM

## 2023-02-28 LAB
ANION GAP SERPL CALC-SCNC: 3 MMOL/L (ref 8–16)
BUN SERPL-MCNC: 21 MG/DL (ref 8–23)
CALCIUM SERPL-MCNC: 9.1 MG/DL (ref 8.7–10.5)
CHLORIDE SERPL-SCNC: 108 MMOL/L (ref 95–110)
CO2 SERPL-SCNC: 29 MMOL/L (ref 23–29)
CREAT SERPL-MCNC: 1.4 MG/DL (ref 0.5–1.4)
EST. GFR  (NO RACE VARIABLE): 51.8 ML/MIN/1.73 M^2
ESTIMATED AVG GLUCOSE: 166 MG/DL (ref 68–131)
GLUCOSE SERPL-MCNC: 161 MG/DL (ref 70–110)
HBA1C MFR BLD: 7.4 % (ref 4–5.6)
POTASSIUM SERPL-SCNC: 4.5 MMOL/L (ref 3.5–5.1)
SODIUM SERPL-SCNC: 140 MMOL/L (ref 136–145)

## 2023-02-28 PROCEDURE — 99490 PR CHRONIC CARE MGMT, 1ST 20 MIN: ICD-10-PCS | Mod: S$PBB,,, | Performed by: FAMILY MEDICINE

## 2023-02-28 PROCEDURE — 99490 CHRNC CARE MGMT STAFF 1ST 20: CPT | Mod: PBBFAC,PN | Performed by: FAMILY MEDICINE

## 2023-02-28 PROCEDURE — 36415 COLL VENOUS BLD VENIPUNCTURE: CPT | Mod: PO | Performed by: HOSPITALIST

## 2023-02-28 PROCEDURE — 99490 CHRNC CARE MGMT STAFF 1ST 20: CPT | Mod: S$PBB,,, | Performed by: FAMILY MEDICINE

## 2023-02-28 PROCEDURE — 80048 BASIC METABOLIC PNL TOTAL CA: CPT | Performed by: HOSPITALIST

## 2023-02-28 PROCEDURE — 83036 HEMOGLOBIN GLYCOSYLATED A1C: CPT | Performed by: HOSPITALIST

## 2023-02-28 NOTE — TELEPHONE ENCOUNTER
Called pt and assisted with scheduling labs , preferred completing today after lunch at [facility] .

## 2023-03-01 ENCOUNTER — OFFICE VISIT (OUTPATIENT)
Dept: ENDOCRINOLOGY | Facility: CLINIC | Age: 78
End: 2023-03-01
Payer: MEDICARE

## 2023-03-01 VITALS
SYSTOLIC BLOOD PRESSURE: 124 MMHG | BODY MASS INDEX: 32.87 KG/M2 | HEART RATE: 65 BPM | WEIGHT: 270 LBS | TEMPERATURE: 98 F | DIASTOLIC BLOOD PRESSURE: 70 MMHG

## 2023-03-01 DIAGNOSIS — E66.09 CLASS 1 OBESITY DUE TO EXCESS CALORIES WITH SERIOUS COMORBIDITY AND BODY MASS INDEX (BMI) OF 33.0 TO 33.9 IN ADULT: ICD-10-CM

## 2023-03-01 DIAGNOSIS — I10 ESSENTIAL HYPERTENSION, BENIGN: ICD-10-CM

## 2023-03-01 DIAGNOSIS — E55.9 VITAMIN D DEFICIENCY: ICD-10-CM

## 2023-03-01 DIAGNOSIS — E11.65 TYPE 2 DIABETES MELLITUS WITH HYPERGLYCEMIA, WITHOUT LONG-TERM CURRENT USE OF INSULIN: Primary | ICD-10-CM

## 2023-03-01 PROCEDURE — 99214 OFFICE O/P EST MOD 30 MIN: CPT | Mod: PBBFAC | Performed by: HOSPITALIST

## 2023-03-01 PROCEDURE — 99999 PR PBB SHADOW E&M-EST. PATIENT-LVL IV: ICD-10-PCS | Mod: PBBFAC,,, | Performed by: HOSPITALIST

## 2023-03-01 PROCEDURE — 99214 OFFICE O/P EST MOD 30 MIN: CPT | Mod: S$PBB,,, | Performed by: HOSPITALIST

## 2023-03-01 PROCEDURE — 99214 PR OFFICE/OUTPT VISIT, EST, LEVL IV, 30-39 MIN: ICD-10-PCS | Mod: S$PBB,,, | Performed by: HOSPITALIST

## 2023-03-01 PROCEDURE — 99999 PR PBB SHADOW E&M-EST. PATIENT-LVL IV: CPT | Mod: PBBFAC,,, | Performed by: HOSPITALIST

## 2023-03-01 RX ORDER — ERGOCALCIFEROL 1.25 MG/1
50000 CAPSULE ORAL
Qty: 13 CAPSULE | Refills: 3 | Status: SHIPPED | OUTPATIENT
Start: 2023-03-01 | End: 2023-07-03 | Stop reason: SDUPTHER

## 2023-03-01 NOTE — ASSESSMENT & PLAN NOTE
- vitamin-D deficiency noted on lab work 12/2022   - start patient on ergocalciferol 03272 IU once a week  - repeat lab work 4 mo

## 2023-03-01 NOTE — PROGRESS NOTES
Subjective:      Patient ID: Rosendo Thomas is a 77 y.o. male presented to [facility] on 3/1/2023.  Chief Complaint:  Diabetes      History of Present Illness: Rosendo Thomas is a 77 y.o. male here for  Type 2 diabetes  Other significant past medical history: CKD3a, psoriasis       With regards to Diabetes Mellitus Type 2  - Known diabetic complications: nephropathy and retinopathy  - Diagnosed w/ DM: in 2010    Interval history: Here for diabetes, diabetes have been better.  Patient has made better dietary modification.  Watching portion size.  Weight loss noted.  A1c increased to 7.4%  R hip pain, did get steroid injection. Getting PT on hip  COVID positive, 2/4  On Trulicity, on glipizide 5 mg daily.  No hypoglycemia recently report    Current meds:    Glipizide 5mg daily.    Trulicity 0.75mg once a week injections, as glucose was much better while on this medication.  Injection Technique: Good  Rotation of injection site: Yes   Previous meds:    Metformin: stop due to frequent diarrhea   Home glucose checks: checks daily, Logs reviewed   131  Diet/Exercise:               Eating 2 meals per day, salad              Drink: no soda  Weight trend: stable  Diabetes Education: No  Diabetes Related Hospitalization:  No  Hx of pancreatitis, hx of thyroid cancer: No  Family history of diabetes: Yes, retired  Occupation: retired, active social life    Eye exam current (within one year): yes, DR: no  Reports cuts or ulcers on feet:   Denies  Statin: Taking, ACE/ARB: Taking    Diabetes lab work  Lab Results   Component Value Date    HGBA1C 7.4 (H) 02/28/2023    HGBA1C 7.0 (H) 12/20/2022    HGBA1C 6.7 (H) 08/18/2022    HGBA1C 8.0 (H) 04/19/2022     No results found for: CPEPTIDE, GLUTAMICACID, ISLETCELLANT   No results found for: FRUCTOSAMINE  Lab Results   Component Value Date    MICALBCREAT 32.2 (H) 08/18/2022     No results found for: [id number]    Diabetes Management Status: Reviewed this office  visit  Screening or Prevention Patient's value Goal Complete/Controlled?   Lipid profile : 01/31/2022 Annually No     Dilated retinal exam : 10/27/2022 Annually Yes     Foot exam   Most Recent Foot Exam Date: Not Found Annually Yes       Reviewed past surgical, medical, family, social history and updated as appropriate.  Review of Systems: see HPI above    Objective:   /70 (BP Location: Right arm)   Pulse 65   Temp 97.9 °F (36.6 °C) (Oral)   Wt 122.5 kg (270 lb)   BMI 32.87 kg/m²     Body mass index is 32.87 kg/m².  Vital signs reviewed    Physical Exam  Vitals and nursing note reviewed.   Constitutional:       Appearance: Normal appearance. He is well-developed. He is obese. He is not ill-appearing.   Neck:      Thyroid: No thyromegaly.   Pulmonary:      Effort: Pulmonary effort is normal. No respiratory distress.   Musculoskeletal:         General: Normal range of motion.      Cervical back: Normal range of motion.   Neurological:      General: No focal deficit present.      Mental Status: He is alert. Mental status is at baseline.   Psychiatric:         Mood and Affect: Mood normal.         Behavior: Behavior normal.     Lab Reviewed:   Lab Results   Component Value Date    CHOL 121 01/31/2022    HDL 30 (L) 01/31/2022    LDLCALC 61.8 (L) 01/31/2022    TRIG 146 01/31/2022    CHOLHDL 24.8 01/31/2022     Lab Results   Component Value Date     02/28/2023    K 4.5 02/28/2023     02/28/2023    CO2 29 02/28/2023     (H) 02/28/2023    BUN 21 02/28/2023    CREATININE 1.4 02/28/2023    CALCIUM 9.1 02/28/2023    PROT 7.1 08/09/2022    ALBUMIN 4.0 08/09/2022    BILITOT 0.7 08/09/2022    ALKPHOS 72 08/09/2022    AST 25 08/09/2022    ALT 33 08/09/2022    ANIONGAP 3 (L) 02/28/2023    ESTGFRAFRICA >60.0 04/19/2022    EGFRNONAA 58.4 (A) 04/19/2022    TSH 2.819 01/31/2022    [id number] 12 (L) 12/20/2022     Assessment     1. Type 2 diabetes mellitus with hyperglycemia, without long-term current use  of insulin  dulaglutide (TRULICITY) 1.5 mg/0.5 mL pen injector    Basic Metabolic Panel    Hemoglobin A1C      2. Vitamin D deficiency  ergocalciferol (ERGOCALCIFEROL) 50,000 unit Cap    Vitamin D      3. Essential hypertension, benign        4. Class 1 obesity due to excess calories with serious comorbidity and body mass index (BMI) of 33.0 to 33.9 in adult            Plan     Type 2 diabetes mellitus with hyperglycemia, without long-term current use of insulin  - Diabetes is at goal given most current A1C, Goal A1C for patient is 7%  - Much better glycemic control noted, still with occasional postprandial hyperglycemia on glucose log  - Diabetic supplies/medications reviewed this visit to ensure continue refills and compliance  - Advised patient to get periodic eye and feet exam.   - Reviewed routine maintenance: lipid, U:P/C     Plan  - encourage better dietary changes: Encourage patient to continue dietary modification, portion size control, decreasing carbohydrates intake  - Increase Trulicity to 1.5 mg once a week injection  - Continue: Glipizide 5mg daily.    - Advised patient to monitor for hypoglycemia and treatment of hypoglycemia discussed  - Advised pt to check glucoses regularly, asked to filled glucose log and bring back for review at next office visit  - Clear written instruction given on AVS, Follow up as scheduled    Essential hypertension, benign  - BP reviewed today  - continue home medication (s)  - manage by PCP    Class 1 obesity due to excess calories with serious comorbidity and body mass index (BMI) of 33.0 to 33.9 in adult  - Body mass index is 32.87 kg/m².  - dietary discussion as above  - continue to monitor weight  - increase Trulicity    Vitamin D deficiency  - vitamin-D deficiency noted on lab work 12/2022   - start patient on ergocalciferol 91045 IU once a week  - repeat lab work 4 mo    Advised patient to follow up with PCP for routine health maintenance care.   RTC in  12/22      Yordan Varela M.D.  Endocrinology  [facility]  3/1/2023      Disclaimer: This note has been generated in part with the use of voice-recognition software. There may be typographical errors that have been missed during proof-reading.

## 2023-03-01 NOTE — ASSESSMENT & PLAN NOTE
- Diabetes is at goal given most current A1C, Goal A1C for patient is 7%  - Much better glycemic control noted, still with occasional postprandial hyperglycemia on glucose log  - Diabetic supplies/medications reviewed this visit to ensure continue refills and compliance  - Advised patient to get periodic eye and feet exam.   - Reviewed routine maintenance: lipid, U:P/C     Plan  - encourage better dietary changes: Encourage patient to continue dietary modification, portion size control, decreasing carbohydrates intake  - Increase Trulicity to 1.5 mg once a week injection  - Continue: Glipizide 5mg daily.    - Advised patient to monitor for hypoglycemia and treatment of hypoglycemia discussed  - Advised pt to check glucoses regularly, asked to filled glucose log and bring back for review at next office visit  - Clear written instruction given on AVS, Follow up as scheduled

## 2023-03-01 NOTE — ASSESSMENT & PLAN NOTE
- Body mass index is 32.87 kg/m².  - dietary discussion as above  - continue to monitor weight  - increase Trulicity

## 2023-03-23 ENCOUNTER — TELEPHONE (OUTPATIENT)
Dept: DERMATOLOGY | Facility: CLINIC | Age: 78
End: 2023-03-23
Payer: MEDICARE

## 2023-03-23 NOTE — TELEPHONE ENCOUNTER
Called patient to schedule an appointment with Dermatology, per message. No answer. Message was left asking patient to call the office back for an appointment.     ----- Message from Juanita Graham sent at 3/23/2023  9:57 AM CDT -----  Regarding: appt  Contact: [phone]  Pt requesting a follow up appt from recall letter. Please call to discuss further.

## 2023-03-29 ENCOUNTER — TELEPHONE (OUTPATIENT)
Dept: DERMATOLOGY | Facility: CLINIC | Age: 78
End: 2023-03-29
Payer: MEDICARE

## 2023-03-29 NOTE — TELEPHONE ENCOUNTER
Spoke with pt - scheduled next available date and time. Pt thanked me for call     ----- Message -----  From: Lilibeth Delgadillo CMA  Sent: 3/23/2023   1:53 PM CDT  To: Chanell Nathan LPN, [facility] Staff  Subject: Annual appt request                              Pt states he would like to schedule an appt for an annual. He is returning a call from Chanell.   I was unable to pull any appts.    Thank you

## 2023-03-31 ENCOUNTER — EXTERNAL CHRONIC CARE MANAGEMENT (OUTPATIENT)
Dept: PRIMARY CARE CLINIC | Facility: CLINIC | Age: 78
End: 2023-03-31
Payer: MEDICARE

## 2023-03-31 PROCEDURE — 99490 PR CHRONIC CARE MGMT, 1ST 20 MIN: ICD-10-PCS | Mod: S$PBB,,, | Performed by: FAMILY MEDICINE

## 2023-03-31 PROCEDURE — 99490 CHRNC CARE MGMT STAFF 1ST 20: CPT | Mod: PBBFAC,PN | Performed by: FAMILY MEDICINE

## 2023-03-31 PROCEDURE — 99490 CHRNC CARE MGMT STAFF 1ST 20: CPT | Mod: S$PBB,,, | Performed by: FAMILY MEDICINE

## 2023-04-26 NOTE — PROGRESS NOTES
Patient is here for COVID 19  Testing     Post-Op Assessment Note    CV Status:  Stable  Pain Score: 0    Pain management: adequate     Mental Status:  Sleepy and arousable   Hydration Status:  Stable   PONV Controlled:  None   Airway Patency:  Patent      Post Op Vitals Reviewed: Yes      Staff: CRNA         No notable events documented      BP  141/66    Temp 98 3   Pulse 56   Resp 17   SpO2 96

## 2023-04-30 ENCOUNTER — EXTERNAL CHRONIC CARE MANAGEMENT (OUTPATIENT)
Dept: PRIMARY CARE CLINIC | Facility: CLINIC | Age: 78
End: 2023-04-30
Payer: MEDICARE

## 2023-04-30 PROCEDURE — 99490 PR CHRONIC CARE MGMT, 1ST 20 MIN: ICD-10-PCS | Mod: S$PBB,,, | Performed by: FAMILY MEDICINE

## 2023-04-30 PROCEDURE — 99490 CHRNC CARE MGMT STAFF 1ST 20: CPT | Mod: PBBFAC,PN | Performed by: FAMILY MEDICINE

## 2023-04-30 PROCEDURE — 99490 CHRNC CARE MGMT STAFF 1ST 20: CPT | Mod: S$PBB,,, | Performed by: FAMILY MEDICINE

## 2023-05-02 ENCOUNTER — OFFICE VISIT (OUTPATIENT)
Dept: DERMATOLOGY | Facility: CLINIC | Age: 78
End: 2023-05-02
Payer: MEDICARE

## 2023-05-02 ENCOUNTER — LAB VISIT (OUTPATIENT)
Dept: LAB | Facility: HOSPITAL | Age: 78
End: 2023-05-02
Attending: DERMATOLOGY
Payer: MEDICARE

## 2023-05-02 DIAGNOSIS — L81.4 LENTIGO: ICD-10-CM

## 2023-05-02 DIAGNOSIS — Z12.83 SCREENING EXAM FOR SKIN CANCER: ICD-10-CM

## 2023-05-02 DIAGNOSIS — L40.50 PSORIASIS WITH ARTHROPATHY: ICD-10-CM

## 2023-05-02 DIAGNOSIS — Z79.899 ENCOUNTER FOR LONG-TERM CURRENT USE OF HIGH RISK MEDICATION: ICD-10-CM

## 2023-05-02 DIAGNOSIS — L57.0 ACTINIC KERATOSIS: ICD-10-CM

## 2023-05-02 DIAGNOSIS — L82.1 SEBORRHEIC KERATOSES: ICD-10-CM

## 2023-05-02 DIAGNOSIS — L40.50 PSORIASIS WITH ARTHROPATHY: Primary | ICD-10-CM

## 2023-05-02 DIAGNOSIS — L40.9 PSORIASIS: ICD-10-CM

## 2023-05-02 LAB
ALBUMIN SERPL BCP-MCNC: 4 G/DL (ref 3.5–5.2)
ALP SERPL-CCNC: 63 U/L (ref 55–135)
ALT SERPL W/O P-5'-P-CCNC: 34 U/L (ref 10–44)
ANION GAP SERPL CALC-SCNC: 4 MMOL/L (ref 8–16)
AST SERPL-CCNC: 29 U/L (ref 10–40)
BASOPHILS # BLD AUTO: 0.03 K/UL (ref 0–0.2)
BASOPHILS NFR BLD: 0.5 % (ref 0–1.9)
BILIRUB SERPL-MCNC: 0.4 MG/DL (ref 0.1–1)
BUN SERPL-MCNC: 16 MG/DL (ref 8–23)
CALCIUM SERPL-MCNC: 10.3 MG/DL (ref 8.7–10.5)
CHLORIDE SERPL-SCNC: 100 MMOL/L (ref 95–110)
CO2 SERPL-SCNC: 32 MMOL/L (ref 23–29)
CREAT SERPL-MCNC: 1 MG/DL (ref 0.5–1.4)
DIFFERENTIAL METHOD: ABNORMAL
EOSINOPHIL # BLD AUTO: 0.1 K/UL (ref 0–0.5)
EOSINOPHIL NFR BLD: 1.4 % (ref 0–8)
ERYTHROCYTE [DISTWIDTH] IN BLOOD BY AUTOMATED COUNT: 14.1 % (ref 11.5–14.5)
EST. GFR  (NO RACE VARIABLE): >60 ML/MIN/1.73 M^2
GLUCOSE SERPL-MCNC: 89 MG/DL (ref 70–110)
HCT VFR BLD AUTO: 42.6 % (ref 40–54)
HGB BLD-MCNC: 13.5 G/DL (ref 14–18)
IMM GRANULOCYTES # BLD AUTO: 0.01 K/UL (ref 0–0.04)
IMM GRANULOCYTES NFR BLD AUTO: 0.2 % (ref 0–0.5)
LYMPHOCYTES # BLD AUTO: 1.6 K/UL (ref 1–4.8)
LYMPHOCYTES NFR BLD: 25.9 % (ref 18–48)
MCH RBC QN AUTO: 28.7 PG (ref 27–31)
MCHC RBC AUTO-ENTMCNC: 31.7 G/DL (ref 32–36)
MCV RBC AUTO: 90 FL (ref 82–98)
MONOCYTES # BLD AUTO: 0.5 K/UL (ref 0.3–1)
MONOCYTES NFR BLD: 8.1 % (ref 4–15)
NEUTROPHILS # BLD AUTO: 4.1 K/UL (ref 1.8–7.7)
NEUTROPHILS NFR BLD: 63.9 % (ref 38–73)
NRBC BLD-RTO: 0 /100 WBC
PLATELET # BLD AUTO: 162 K/UL (ref 150–450)
PMV BLD AUTO: 11.5 FL (ref 9.2–12.9)
POTASSIUM SERPL-SCNC: 4 MMOL/L (ref 3.5–5.1)
PROT SERPL-MCNC: 6.8 G/DL (ref 6–8.4)
RBC # BLD AUTO: 4.71 M/UL (ref 4.6–6.2)
SODIUM SERPL-SCNC: 136 MMOL/L (ref 136–145)
WBC # BLD AUTO: 6.33 K/UL (ref 3.9–12.7)

## 2023-05-02 PROCEDURE — 99999 PR PBB SHADOW E&M-EST. PATIENT-LVL III: CPT | Mod: PBBFAC,,, | Performed by: DERMATOLOGY

## 2023-05-02 PROCEDURE — 17000 PR DESTRUCTION(LASER SURGERY,CRYOSURGERY,CHEMOSURGERY),PREMALIGNANT LESIONS,FIRST LESION: ICD-10-PCS | Mod: S$PBB,,, | Performed by: DERMATOLOGY

## 2023-05-02 PROCEDURE — 85025 COMPLETE CBC W/AUTO DIFF WBC: CPT | Performed by: DERMATOLOGY

## 2023-05-02 PROCEDURE — 36415 COLL VENOUS BLD VENIPUNCTURE: CPT | Performed by: DERMATOLOGY

## 2023-05-02 PROCEDURE — 17000 DESTRUCT PREMALG LESION: CPT | Mod: PBBFAC | Performed by: DERMATOLOGY

## 2023-05-02 PROCEDURE — 80053 COMPREHEN METABOLIC PANEL: CPT | Performed by: DERMATOLOGY

## 2023-05-02 PROCEDURE — 99213 OFFICE O/P EST LOW 20 MIN: CPT | Mod: 25,S$PBB,, | Performed by: DERMATOLOGY

## 2023-05-02 PROCEDURE — 17003 DESTRUCT PREMALG LES 2-14: CPT | Mod: 59,S$PBB,, | Performed by: DERMATOLOGY

## 2023-05-02 PROCEDURE — 17003 DESTRUCTION, PREMALIGNANT LESIONS; SECOND THROUGH 14 LESIONS: ICD-10-PCS | Mod: 59,S$PBB,, | Performed by: DERMATOLOGY

## 2023-05-02 PROCEDURE — 17000 DESTRUCT PREMALG LESION: CPT | Mod: S$PBB,,, | Performed by: DERMATOLOGY

## 2023-05-02 PROCEDURE — 99213 OFFICE O/P EST LOW 20 MIN: CPT | Mod: PBBFAC | Performed by: DERMATOLOGY

## 2023-05-02 PROCEDURE — 17003 DESTRUCT PREMALG LES 2-14: CPT | Mod: 59,PBBFAC | Performed by: DERMATOLOGY

## 2023-05-02 PROCEDURE — 99999 PR PBB SHADOW E&M-EST. PATIENT-LVL III: ICD-10-PCS | Mod: PBBFAC,,, | Performed by: DERMATOLOGY

## 2023-05-02 PROCEDURE — 99213 PR OFFICE/OUTPT VISIT, EST, LEVL III, 20-29 MIN: ICD-10-PCS | Mod: 25,S$PBB,, | Performed by: DERMATOLOGY

## 2023-05-02 NOTE — Clinical Note
Tomas Spears, this is a patient who is well known to me as I see him for his psoriasis. He told me today he is having problems with depression and he has only spoken with his  about it thus far.  Would you be able to see him for this?  I asked him if it was ok to message you and he was ok with that. Thank you and hope you are well.  Liane

## 2023-05-02 NOTE — PROGRESS NOTES
Subjective:      Patient ID:  Rosendo Thomas is a 77 y.o. 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 Skin almost 100% clear with an active patch on right elbow only.  The patient denies any moles or growths of the skin that are rapidly growing, hurting, itching, bleeding, or changing colors.  Has a dark spot on back of left calf x many years, not changing but concerned due to color and irregularity.    Review of Systems   Skin:  Positive for itching, rash and dry skin.   + depressed mood     Objective:   Physical Exam   Constitutional: He appears well-developed and well-nourished. No distress.   Neurological: He is alert and oriented to person, place, and time. He is not disoriented.   Psychiatric: He has a normal mood and affect.   Skin:   Areas Examined (abnormalities noted in diagram):   Scalp / Hair Palpated and Inspected  Head / Face Inspection Performed  Neck Inspection Performed  Chest / Axilla Inspection Performed  Abdomen Inspection Performed  Back Inspection Performed  RUE Inspected  LUE Inspection Performed  RLE Inspected  LLE Inspection Performed                   Diagram Legend     Erythematous scaling macule/papule c/w actinic keratosis       Vascular papule c/w angioma      Pigmented verrucoid papule/plaque c/w seborrheic keratosis      Yellow umbilicated papule c/w sebaceous hyperplasia      Irregularly shaped tan macule c/w lentigo     1-2 mm smooth white papules consistent with Milia      Movable subcutaneous cyst with punctum c/w epidermal inclusion cyst      Subcutaneous movable cyst c/w pilar cyst      Firm pink to brown papule c/w dermatofibroma      Pedunculated fleshy papule(s)  c/w skin tag(s)      Evenly pigmented macule c/w junctional nevus     Mildly variegated pigmented, slightly irregular-bordered macule c/w mildly atypical nevus      Flesh colored to evenly pigmented papule c/w intradermal nevus       Pink pearly papule/plaque c/w basal cell carcinoma      Erythematous hyperkeratotic cursted plaque c/w SCC      Surgical scar with no sign of skin cancer recurrence      Open and closed comedones      Inflammatory papules and pustules      Verrucoid papule consistent consistent with wart     Erythematous eczematous patches and plaques     Dystrophic onycholytic nail with subungual debris c/w onychomycosis     Umbilicated papule    Erythematous-base heme-crusted tan verrucoid plaque consistent with inflamed seborrheic keratosis     Erythematous Silvery Scaling Plaque c/w Psoriasis     See annotation      Assessment / Plan:        Psoriasis with arthropathy - nearly 100% clear with Taltz; continue  -     CBC Auto Differential; Future; Expected date: 05/02/2023  -     Comprehensive Metabolic Panel; Future; Expected date: 05/02/2023    Screening exam for skin cancer    Total body skin examination performed today including at least 12 points as noted in physical examination. No lesions suspicious for malignancy noted.    Recommend daily sun protection/avoidance, use of at least SPF 30, broad spectrum sunscreen (OTC drug), skin self examinations, and routine physician surveillance to optimize early detection    Lentigo  This is a benign hyperpigmented sun induced lesion. Recommend daily sun protection/avoidance and use of at least SPF 30, broad spectrum sunscreen (OTC drug) will reduce the number of new lesions. Treatment of these benign lesions are considered cosmetic.    Encounter for long-term current use of high risk medication  -     CBC Auto Differential; Future; Expected date: 05/02/2023  -     Comprehensive Metabolic Panel; Future; Expected date: 05/02/2023    Seborrheic  keratoses  Posterior left calf - reassured - to monitor    Actinic keratosis  Cryosurgery Procedure Note    Verbal consent from the patient is obtained including, but not limited to, risk of hypopigmentation/hyperpigmentation, scar, recurrence of lesion. The patient is aware of the precancerous quality and need for treatment of these lesions. Liquid nitrogen cryosurgery is applied to the 10 actinic keratoses, as detailed in the physical exam, to produce a freeze injury. The patient is aware that blisters may form and is instructed on wound care with gentle cleansing and use of vaseline ointment to keep moist until healed. The patient is supplied a handout on cryosurgery and is instructed to call if lesions do not completely resolve.           Depression - will msg PCP    Follow up in about 6 months (around 11/2/2023).

## 2023-05-18 DIAGNOSIS — E11.22 CONTROLLED TYPE 2 DIABETES MELLITUS WITH STAGE 3 CHRONIC KIDNEY DISEASE, WITHOUT LONG-TERM CURRENT USE OF INSULIN: ICD-10-CM

## 2023-05-18 DIAGNOSIS — N18.30 CONTROLLED TYPE 2 DIABETES MELLITUS WITH STAGE 3 CHRONIC KIDNEY DISEASE, WITHOUT LONG-TERM CURRENT USE OF INSULIN: ICD-10-CM

## 2023-05-18 DIAGNOSIS — E11.65 UNCONTROLLED TYPE 2 DIABETES MELLITUS WITH HYPERGLYCEMIA, WITHOUT LONG-TERM CURRENT USE OF INSULIN: ICD-10-CM

## 2023-05-18 RX ORDER — GLIPIZIDE 5 MG/1
TABLET ORAL
Qty: 90 TABLET | Refills: 3 | Status: SHIPPED | OUTPATIENT
Start: 2023-05-18 | End: 2023-09-27

## 2023-05-26 DIAGNOSIS — I10 ESSENTIAL HYPERTENSION, BENIGN: ICD-10-CM

## 2023-05-26 RX ORDER — LOSARTAN POTASSIUM AND HYDROCHLOROTHIAZIDE 25; 100 MG/1; MG/1
1 TABLET ORAL DAILY
Qty: 90 TABLET | Refills: 0 | Status: SHIPPED | OUTPATIENT
Start: 2023-05-26 | End: 2023-09-21

## 2023-05-26 NOTE — TELEPHONE ENCOUNTER
Refill Decision Note   Rosendo Thomas  is requesting a refill authorization.  Brief Assessment and Rationale for Refill:  Approve     Medication Therapy Plan:         Comments:     Note composed:11:44 AM 05/26/2023             Appointments     Last Visit   5/25/2022 Tory Hatch MD   Next Visit   Visit date not found Tory Hatch MD

## 2023-05-26 NOTE — TELEPHONE ENCOUNTER
No care due was identified.  [facility] Embedded Care Due Messages. Reference number: [id number].   5/26/2023 8:04:17 AM CDT

## 2023-05-31 ENCOUNTER — EXTERNAL CHRONIC CARE MANAGEMENT (OUTPATIENT)
Dept: PRIMARY CARE CLINIC | Facility: CLINIC | Age: 78
End: 2023-05-31
Payer: MEDICARE

## 2023-05-31 PROCEDURE — 99490 CHRNC CARE MGMT STAFF 1ST 20: CPT | Mod: PBBFAC,PN | Performed by: FAMILY MEDICINE

## 2023-05-31 PROCEDURE — 99490 PR CHRONIC CARE MGMT, 1ST 20 MIN: ICD-10-PCS | Mod: S$PBB,,, | Performed by: FAMILY MEDICINE

## 2023-05-31 PROCEDURE — 99490 CHRNC CARE MGMT STAFF 1ST 20: CPT | Mod: S$PBB,,, | Performed by: FAMILY MEDICINE

## 2023-06-13 ENCOUNTER — PES CALL (OUTPATIENT)
Dept: ADMINISTRATIVE | Facility: CLINIC | Age: 78
End: 2023-06-13
Payer: MEDICARE

## 2023-06-26 ENCOUNTER — LAB VISIT (OUTPATIENT)
Dept: LAB | Facility: HOSPITAL | Age: 78
End: 2023-06-26
Attending: HOSPITALIST
Payer: MEDICARE

## 2023-06-26 DIAGNOSIS — E55.9 VITAMIN D DEFICIENCY: ICD-10-CM

## 2023-06-26 DIAGNOSIS — E11.65 TYPE 2 DIABETES MELLITUS WITH HYPERGLYCEMIA, WITHOUT LONG-TERM CURRENT USE OF INSULIN: ICD-10-CM

## 2023-06-26 LAB
25(OH)D3+25(OH)D2 SERPL-MCNC: 18 NG/ML (ref 30–96)
ANION GAP SERPL CALC-SCNC: 8 MMOL/L (ref 8–16)
BUN SERPL-MCNC: 18 MG/DL (ref 8–23)
CALCIUM SERPL-MCNC: 9.4 MG/DL (ref 8.7–10.5)
CHLORIDE SERPL-SCNC: 104 MMOL/L (ref 95–110)
CO2 SERPL-SCNC: 25 MMOL/L (ref 23–29)
CREAT SERPL-MCNC: 1 MG/DL (ref 0.5–1.4)
EST. GFR  (NO RACE VARIABLE): >60 ML/MIN/1.73 M^2
ESTIMATED AVG GLUCOSE: 143 MG/DL (ref 68–131)
GLUCOSE SERPL-MCNC: 142 MG/DL (ref 70–110)
HBA1C MFR BLD: 6.6 % (ref 4–5.6)
POTASSIUM SERPL-SCNC: 4.5 MMOL/L (ref 3.5–5.1)
SODIUM SERPL-SCNC: 137 MMOL/L (ref 136–145)

## 2023-06-26 PROCEDURE — 36415 COLL VENOUS BLD VENIPUNCTURE: CPT | Mod: PO | Performed by: HOSPITALIST

## 2023-06-26 PROCEDURE — 82306 VITAMIN D 25 HYDROXY: CPT | Performed by: HOSPITALIST

## 2023-06-26 PROCEDURE — 83036 HEMOGLOBIN GLYCOSYLATED A1C: CPT | Performed by: HOSPITALIST

## 2023-06-26 PROCEDURE — 80048 BASIC METABOLIC PNL TOTAL CA: CPT | Performed by: HOSPITALIST

## 2023-06-30 ENCOUNTER — EXTERNAL CHRONIC CARE MANAGEMENT (OUTPATIENT)
Dept: PRIMARY CARE CLINIC | Facility: CLINIC | Age: 78
End: 2023-06-30
Payer: MEDICARE

## 2023-06-30 PROCEDURE — 99490 CHRNC CARE MGMT STAFF 1ST 20: CPT | Mod: S$PBB,,, | Performed by: FAMILY MEDICINE

## 2023-06-30 PROCEDURE — 99490 CHRNC CARE MGMT STAFF 1ST 20: CPT | Mod: PBBFAC,PN | Performed by: FAMILY MEDICINE

## 2023-06-30 PROCEDURE — 99490 PR CHRONIC CARE MGMT, 1ST 20 MIN: ICD-10-PCS | Mod: S$PBB,,, | Performed by: FAMILY MEDICINE

## 2023-07-03 ENCOUNTER — OFFICE VISIT (OUTPATIENT)
Dept: ENDOCRINOLOGY | Facility: CLINIC | Age: 78
End: 2023-07-03
Payer: MEDICARE

## 2023-07-03 VITALS
HEART RATE: 60 BPM | DIASTOLIC BLOOD PRESSURE: 53 MMHG | SYSTOLIC BLOOD PRESSURE: 118 MMHG | TEMPERATURE: 98 F | WEIGHT: 268.81 LBS | BODY MASS INDEX: 32.72 KG/M2

## 2023-07-03 DIAGNOSIS — I10 ESSENTIAL HYPERTENSION, BENIGN: ICD-10-CM

## 2023-07-03 DIAGNOSIS — E11.65 TYPE 2 DIABETES MELLITUS WITH HYPERGLYCEMIA, WITHOUT LONG-TERM CURRENT USE OF INSULIN: Primary | ICD-10-CM

## 2023-07-03 DIAGNOSIS — E66.09 CLASS 1 OBESITY DUE TO EXCESS CALORIES WITH SERIOUS COMORBIDITY AND BODY MASS INDEX (BMI) OF 33.0 TO 33.9 IN ADULT: ICD-10-CM

## 2023-07-03 DIAGNOSIS — E55.9 VITAMIN D DEFICIENCY: ICD-10-CM

## 2023-07-03 PROCEDURE — 99214 OFFICE O/P EST MOD 30 MIN: CPT | Mod: S$PBB,,, | Performed by: HOSPITALIST

## 2023-07-03 PROCEDURE — 99214 PR OFFICE/OUTPT VISIT, EST, LEVL IV, 30-39 MIN: ICD-10-PCS | Mod: S$PBB,,, | Performed by: HOSPITALIST

## 2023-07-03 PROCEDURE — 99999 PR PBB SHADOW E&M-EST. PATIENT-LVL III: CPT | Mod: PBBFAC,,, | Performed by: HOSPITALIST

## 2023-07-03 PROCEDURE — 99213 OFFICE O/P EST LOW 20 MIN: CPT | Mod: PBBFAC | Performed by: HOSPITALIST

## 2023-07-03 PROCEDURE — 99999 PR PBB SHADOW E&M-EST. PATIENT-LVL III: ICD-10-PCS | Mod: PBBFAC,,, | Performed by: HOSPITALIST

## 2023-07-03 RX ORDER — ERGOCALCIFEROL 1.25 MG/1
50000 CAPSULE ORAL
Qty: 13 CAPSULE | Refills: 3 | Status: SHIPPED | OUTPATIENT
Start: 2023-07-03

## 2023-07-03 RX ORDER — DULAGLUTIDE 3 MG/.5ML
3 INJECTION, SOLUTION SUBCUTANEOUS
Qty: 4 PEN | Refills: 11 | Status: SHIPPED | OUTPATIENT
Start: 2023-07-03 | End: 2023-09-27

## 2023-07-03 NOTE — ASSESSMENT & PLAN NOTE
- Diabetes is at goal given most current A1C, Goal A1C for patient is 7%  - Much better glycemic control noted, still with occasional postprandial hyperglycemia on glucose log  - Diabetic supplies/medications reviewed this visit to ensure continue refills and compliance  - Advised patient to get periodic eye and feet exam.   - Reviewed routine maintenance: lipid, U:P/C     Plan  - encourage better dietary changes: Encourage patient to continue dietary modification, portion size control, decreasing carbohydrates intake  - increase  Trulicity to 3.0 mg once a week injection patient declined switching to Ozempic  - Continue: Glipizide 5mg daily, okay for patient to decrease dose as needed to 2.5mg  - Advised patient to monitor for hypoglycemia and treatment of hypoglycemia discussed  - Advised pt to check glucoses regularly, asked to filled glucose log and bring back for review at next office visit  - Clear written instruction given on AVS, Follow up as scheduled

## 2023-07-03 NOTE — PROGRESS NOTES
Subjective:      Patient ID: Rosendo Thomas is a 77 y.o. male presented to [facility] on 7/3/2023.  Chief Complaint:  Diabetes    History of Present Illness: Rosendo Thomas is a 77 y.o. male here for  Type 2 diabetes  Other significant past medical history: CKD3a, psoriasis     With regards to Diabetes Mellitus Type 2  - Known diabetic complications: nephropathy and retinopathy  - Diagnosed w/ DM: in 2010    Interval history: Here for diabetes, diabetes have been better.  Patient has made better dietary modification.  Watching portion size.  Weight loss noted.  A1c decrease to 6.6%  Getting PT on hip, did have 1 episode of hypoglycemia while getting physical therapy.  Symptomatic.  Did not check glucose  On Trulicity, on glipizide 5 mg daily.  Reading carbs on label  Patient not satisfied about no weight loss    Current meds:    Glipizide 5mg daily.    Trulicity 1.5mg once a week injections, as glucose was much better while on this medication.  Injection Technique: Good  Rotation of injection site: Yes   Previous meds:    Metformin: stop due to frequent diarrhea   Home glucose checks: checks daily, Logs reviewed unavailable for review  Diet/Exercise:               Eating 2 meals per day, salad              Drink: no soda  Weight trend: stable  Diabetes Education: No  Diabetes Related Hospitalization:  No  Hx of pancreatitis, hx of thyroid cancer: No  Family history of diabetes: Yes, retired  Occupation: retired, active social life    Eye exam current (within one year): yes, DR: no  Reports cuts or ulcers on feet:   Denies  Statin: Taking, ACE/ARB: Taking    Diabetes lab work  Lab Results   Component Value Date    HGBA1C 6.6 (H) 06/26/2023    HGBA1C 7.4 (H) 02/28/2023    HGBA1C 7.0 (H) 12/20/2022    HGBA1C 6.7 (H) 08/18/2022     No results found for: CPEPTIDE, GLUTAMICACID, ISLETCELLANT   No results found for: FRUCTOSAMINE  Lab Results   Component Value Date    MICALBCREAT 32.2 (H) 08/18/2022      No results found for: VONAZXIA87    Diabetes Management Status: Reviewed this office visit  Screening or Prevention Patient's value Goal Complete/Controlled?   Lipid profile : 01/31/2022 Annually No     Dilated retinal exam : 10/27/2022 Annually Yes     Foot exam   Most Recent Foot Exam Date: Not Found Annually Yes       Reviewed past surgical, medical, family, social history and updated as appropriate.  Review of Systems: see HPI above    Objective:   BP (!) 118/53   Pulse 60   Temp 98.2 °F (36.8 °C) (Oral)   Wt 121.9 kg (268 lb 12.8 oz)   BMI 32.72 kg/m²     Body mass index is 32.72 kg/m².  Vital signs reviewed    Physical Exam  Vitals and nursing note reviewed.   Constitutional:       Appearance: Normal appearance. He is well-developed. He is obese. He is not ill-appearing.   Neck:      Thyroid: No thyromegaly.   Pulmonary:      Effort: Pulmonary effort is normal. No respiratory distress.   Musculoskeletal:         General: Normal range of motion.      Cervical back: Normal range of motion.   Neurological:      General: No focal deficit present.      Mental Status: He is alert. Mental status is at baseline.   Psychiatric:         Mood and Affect: Mood normal.         Behavior: Behavior normal.     Lab Reviewed:   Lab Results   Component Value Date    CHOL 121 01/31/2022    HDL 30 (L) 01/31/2022    LDLCALC 61.8 (L) 01/31/2022    TRIG 146 01/31/2022    CHOLHDL 24.8 01/31/2022     Lab Results   Component Value Date     06/26/2023    K 4.5 06/26/2023     06/26/2023    CO2 25 06/26/2023     (H) 06/26/2023    BUN 18 06/26/2023    CREATININE 1.0 06/26/2023    CALCIUM 9.4 06/26/2023    PROT 6.8 05/02/2023    ALBUMIN 4.0 05/02/2023    BILITOT 0.4 05/02/2023    ALKPHOS 63 05/02/2023    AST 29 05/02/2023    ALT 34 05/02/2023    ANIONGAP 8 06/26/2023    ESTGFRAFRICA >60.0 04/19/2022    EGFRNONAA 58.4 (A) 04/19/2022    TSH 2.819 01/31/2022    VXTUEQAH10SA 18 (L) 06/26/2023     Assessment     1. Type  2 diabetes mellitus with hyperglycemia, without long-term current use of insulin  dulaglutide (TRULICITY) 3 mg/0.5 mL pen injector    Hemoglobin A1C    RENAL FUNCTION PANEL    Microalbumin/Creatinine Ratio, Urine      2. Essential hypertension, benign        3. Vitamin D deficiency  ergocalciferol (ERGOCALCIFEROL) 50,000 unit Cap      4. Class 1 obesity due to excess calories with serious comorbidity and body mass index (BMI) of 33.0 to 33.9 in adult          Plan     Type 2 diabetes mellitus with hyperglycemia, without long-term current use of insulin  - Diabetes is at goal given most current A1C, Goal A1C for patient is 7%  - Much better glycemic control noted, still with occasional postprandial hyperglycemia on glucose log  - Diabetic supplies/medications reviewed this visit to ensure continue refills and compliance  - Advised patient to get periodic eye and feet exam.   - Reviewed routine maintenance: lipid, U:P/C     Plan  - encourage better dietary changes: Encourage patient to continue dietary modification, portion size control, decreasing carbohydrates intake  - increase  Trulicity to 3.0 mg once a week injection patient declined switching to Ozempic  - Continue: Glipizide 5mg daily, okay for patient to decrease dose as needed to 2.5mg  - Advised patient to monitor for hypoglycemia and treatment of hypoglycemia discussed  - Advised pt to check glucoses regularly, asked to filled glucose log and bring back for review at next office visit  - Clear written instruction given on AVS, Follow up as scheduled    Essential hypertension, benign  - BP reviewed today  - continue home medication (s)  - manage by PCP    Vitamin D deficiency  - vitamin-D deficiency noted on lab work 12/2022   - continue ergocalciferol 63479 IU once a week  - add vitamin D3 OTC 2000 IU daily  - repeat lab work every 6 mo    Class 1 obesity due to excess calories with serious comorbidity and body mass index (BMI) of 33.0 to 33.9 in adult  -  Body mass index is 32.72 kg/m².  - dietary discussion as above  - continue to monitor weight  - increase Trulicity    Advised patient to follow up with PCP for routine health maintenance care.   RTC in 11/2023    Yordan Varela M.D.  Endocrinology  [facility]  7/3/2023      Disclaimer: This note has been generated in part with the use of voice-recognition software. There may be typographical errors that have been missed during proof-reading.

## 2023-07-03 NOTE — PATIENT INSTRUCTIONS
----------------------------------------------------------  Changes were made today:           Glipizide 5mg daily>> monitor for low glucose, ok to stop this medication   Trulicity 3.0 mg once weekly       Goal for your blood sugar   In the morning before breakfast:   Before going to bed: 100-140  Do not go to bed with glucose less than 100    Please make sure to get your dilated eyes examine once a year with your eye doctor.  Monitor your feet for any cuts or skin breakdown regularly, schedule your podiatrist visit if you see one      We will plan an in-clinic visit in 4 months, with labs prior to that appointment.      Contact information:  Yordan Varela M.D  [facility] [address] [facility] [address], [address]  [place] [address]    Office:  [phone]  Fax:  [phone]     ----------------------------------------------------------

## 2023-07-03 NOTE — ASSESSMENT & PLAN NOTE
- Body mass index is 32.72 kg/m².  - dietary discussion as above  - continue to monitor weight  - increase Trulicity

## 2023-07-03 NOTE — ASSESSMENT & PLAN NOTE
- vitamin-D deficiency noted on lab work 12/2022   - continue ergocalciferol 40036 IU once a week  - add vitamin D3 OTC 2000 IU daily  - repeat lab work every 6 mo

## 2023-07-06 DIAGNOSIS — N18.30 CONTROLLED TYPE 2 DIABETES MELLITUS WITH STAGE 3 CHRONIC KIDNEY DISEASE, WITHOUT LONG-TERM CURRENT USE OF INSULIN: ICD-10-CM

## 2023-07-06 DIAGNOSIS — E11.22 CONTROLLED TYPE 2 DIABETES MELLITUS WITH STAGE 3 CHRONIC KIDNEY DISEASE, WITHOUT LONG-TERM CURRENT USE OF INSULIN: ICD-10-CM

## 2023-07-06 RX ORDER — ATORVASTATIN CALCIUM 20 MG/1
TABLET, FILM COATED ORAL
Qty: 90 TABLET | Refills: 3 | Status: SHIPPED | OUTPATIENT
Start: 2023-07-06

## 2023-07-06 NOTE — TELEPHONE ENCOUNTER
Refill Routing Note   Medication(s) are not appropriate for processing by [facility] for the following reason(s):      Required labs outdated    [facility] action(s):  Defer None identified          Appointments  past 12m or future 3m with PCP    Date Provider   Last Visit   5/25/2022 Tory Hatch MD   Next Visit   Visit date not found Tory Hatch MD   ED visits in past 90 days: 0        Note composed:8:35 AM 07/06/2023

## 2023-07-06 NOTE — TELEPHONE ENCOUNTER
No care due was identified.  Health [facility] Embedded Care Due Messages. Reference number: [id number].   7/06/2023 8:04:24 AM CDT

## 2023-07-25 ENCOUNTER — PES CALL (OUTPATIENT)
Dept: ADMINISTRATIVE | Facility: CLINIC | Age: 78
End: 2023-07-25
Payer: MEDICARE

## 2023-09-11 ENCOUNTER — TELEPHONE (OUTPATIENT)
Dept: FAMILY MEDICINE | Facility: CLINIC | Age: 78
End: 2023-09-11
Payer: MEDICARE

## 2023-09-11 NOTE — TELEPHONE ENCOUNTER
----- Message from Sarkis Ochoa sent at 9/11/2023  9:33 AM CDT -----  Type: Patient Call Back    Who called:self     What is the request in detail: Asking if he can come in early for appt 09/15// PT asking for a call     Can the clinic reply by [facility]? No     Would the patient rather a call back or a response via [facility]? Call     Best call back number:# [phone] (home)

## 2023-09-13 ENCOUNTER — TELEPHONE (OUTPATIENT)
Dept: ADMINISTRATIVE | Facility: CLINIC | Age: 78
End: 2023-09-13
Payer: MEDICARE

## 2023-09-13 NOTE — TELEPHONE ENCOUNTER
Called pt, informed pt I was calling to remind pt of his in office EAWV on 9/15/23; clinic location provided to patient; pt confirmed appointment

## 2023-09-15 ENCOUNTER — OFFICE VISIT (OUTPATIENT)
Dept: FAMILY MEDICINE | Facility: CLINIC | Age: 78
End: 2023-09-15
Payer: MEDICARE

## 2023-09-15 VITALS
SYSTOLIC BLOOD PRESSURE: 118 MMHG | WEIGHT: 264.56 LBS | HEIGHT: 76 IN | HEART RATE: 60 BPM | BODY MASS INDEX: 32.22 KG/M2 | DIASTOLIC BLOOD PRESSURE: 75 MMHG | OXYGEN SATURATION: 99 % | TEMPERATURE: 99 F | RESPIRATION RATE: 18 BRPM

## 2023-09-15 DIAGNOSIS — Z00.00 ENCOUNTER FOR PREVENTIVE HEALTH EXAMINATION: Primary | ICD-10-CM

## 2023-09-15 DIAGNOSIS — Z12.11 ENCOUNTER FOR COLORECTAL CANCER SCREENING: ICD-10-CM

## 2023-09-15 DIAGNOSIS — J06.9 UPPER RESPIRATORY TRACT INFECTION, UNSPECIFIED TYPE: ICD-10-CM

## 2023-09-15 DIAGNOSIS — I70.0 AORTIC ATHEROSCLEROSIS: ICD-10-CM

## 2023-09-15 DIAGNOSIS — Z12.12 ENCOUNTER FOR COLORECTAL CANCER SCREENING: ICD-10-CM

## 2023-09-15 DIAGNOSIS — E11.22 TYPE 2 DIABETES MELLITUS WITH STAGE 2 CHRONIC KIDNEY DISEASE, WITHOUT LONG-TERM CURRENT USE OF INSULIN: ICD-10-CM

## 2023-09-15 DIAGNOSIS — L40.50 PSORIATIC ARTHRITIS: ICD-10-CM

## 2023-09-15 DIAGNOSIS — N18.2 TYPE 2 DIABETES MELLITUS WITH STAGE 2 CHRONIC KIDNEY DISEASE, WITHOUT LONG-TERM CURRENT USE OF INSULIN: ICD-10-CM

## 2023-09-15 DIAGNOSIS — D84.9 IMMUNOSUPPRESSION: ICD-10-CM

## 2023-09-15 PROBLEM — N18.31 STAGE 3A CHRONIC KIDNEY DISEASE: Status: RESOLVED | Noted: 2022-02-25 | Resolved: 2023-09-15

## 2023-09-15 PROCEDURE — G0439 PPPS, SUBSEQ VISIT: HCPCS | Mod: ,,, | Performed by: PHYSICIAN ASSISTANT

## 2023-09-15 PROCEDURE — 99999 PR PBB SHADOW E&M-EST. PATIENT-LVL V: CPT | Mod: PBBFAC,,, | Performed by: PHYSICIAN ASSISTANT

## 2023-09-15 PROCEDURE — 99215 OFFICE O/P EST HI 40 MIN: CPT | Mod: PBBFAC,PO | Performed by: PHYSICIAN ASSISTANT

## 2023-09-15 PROCEDURE — G0439 PR MEDICARE ANNUAL WELLNESS SUBSEQUENT VISIT: ICD-10-PCS | Mod: ,,, | Performed by: PHYSICIAN ASSISTANT

## 2023-09-15 PROCEDURE — 99999 PR PBB SHADOW E&M-EST. PATIENT-LVL V: ICD-10-PCS | Mod: PBBFAC,,, | Performed by: PHYSICIAN ASSISTANT

## 2023-09-15 RX ORDER — PROMETHAZINE HYDROCHLORIDE AND DEXTROMETHORPHAN HYDROBROMIDE 6.25; 15 MG/5ML; MG/5ML
5 SYRUP ORAL NIGHTLY PRN
Qty: 180 ML | Refills: 0 | Status: SHIPPED | OUTPATIENT
Start: 2023-09-15 | End: 2023-09-27

## 2023-09-15 RX ORDER — BENZONATATE 100 MG/1
100 CAPSULE ORAL
COMMUNITY
Start: 2023-09-07

## 2023-09-15 NOTE — PATIENT INSTRUCTIONS
Counseling and Referral of Other Preventative  (Italic type indicates deductible and co-insurance are waived)    Patient Name: Rosendo Thomas  Today's Date: 9/15/2023    Health Maintenance       Date Due Completion Date    TETANUS VACCINE Never done ---    Shingles Vaccine (1 of 2) Never done ---    Colonoscopy 07/08/2021 7/8/2020    COVID-19 Vaccine (5 - Pfizer series) 09/14/2022 7/20/2022    Lipid Panel 01/31/2023 1/31/2022    Influenza Vaccine (1) Never done ---    Diabetes Urine Screening 08/18/2023 8/18/2022    Eye Exam 10/27/2023 10/27/2022    Hemoglobin A1c 12/26/2023 6/26/2023    Override on 8/14/2014: Done        Orders Placed This Encounter   Procedures    Ambulatory referral/consult to Endo Procedure        The following information is provided to all patients.  This information is to help you find resources for any of the problems found today that may be affecting your health:                Living healthy guide: www.[facility].[place].gov      Understanding Diabetes: www.diabetes.org      Eating healthy: www.cdc.gov/healthyweight      CDC home safety checklist: www.cdc.gov/steadi/patient.html      Agency on Aging: www.goea.[place].gov      Alcoholics anonymous (AA): www.aa.org      Physical Activity: www.harry.nih.gov/kb9cyyl      Tobacco use: www.quitwithusla.org

## 2023-09-15 NOTE — PROGRESS NOTES
Health Maintenance Due   Topic     TETANUS VACCINE  Notified pt can get vaccine at pharmacy.    Shingles Vaccine (1 of 2) Hx of chickenpox. Notified pt can get vaccine at pharmacy.    Colonoscopy  Information received.      COVID-19 Vaccine (5 - Pfizer series) Not offered at this Facility    Lipid Panel  Consult pcp    Influenza Vaccine (1)     Diabetes Urine Screening  Consult pcp    Eye Exam  Consult pcp

## 2023-09-15 NOTE — PROGRESS NOTES
"  Rosendo Thomas presented for a  Medicare AWV and comprehensive Health Risk Assessment today. The following components were reviewed and updated:    Medical history  Family History  Social history  Allergies and Current Medications  Health Risk Assessment  Health Maintenance  Care Team         ** See Completed Assessments for Annual Wellness Visit within the encounter summary.**         The following assessments were completed:  Living Situation  CAGE  Depression Screening  Timed Get Up and Go  Whisper Test  Cognitive Function Screening  Nutrition Screening  ADL Screening  PAQ Screening        Vitals:    09/15/23 0920   BP: 118/75   BP Location: Right arm   Patient Position: Sitting   BP Method: Large (Automatic)   Pulse: 60   Resp: 18   Temp: 98.7 °F (37.1 °C)   TempSrc: Oral   SpO2: 99%   Weight: 120 kg (264 lb 8.8 oz)   Height: 6' 4" (1.93 m)     Body mass index is 32.2 kg/m².  Physical Exam          Diagnoses and health risks identified today and associated recommendations/orders:    1. Encounter for preventive health examination  Provided Rosendo with a 5-10 year written screening schedule and personal prevention plan. Recommendations were developed using the USPSTF age appropriate recommendations. Education, counseling, and referrals were provided as needed. After Visit Summary printed and given to patient which includes a list of additional screenings\tests needed.    2. Encounter for colorectal cancer screening  - Ambulatory referral/consult to Endo Procedure ; Future    3. Upper respiratory tract infection, unspecified type  - promethazine-dextromethorphan (PROMETHAZINE-DM) 6.25-15 mg/5 mL Syrp; Take 5 mLs by mouth nightly as needed (cough).  Dispense: 180 mL; Refill: 0    4. Aortic atherosclerosis  Continue aspirin statin    5. Immunosuppression  On injections for psoriasis    6. Psoriatic arthritis  Followed by rheum on injections    7. Type 2 diabetes mellitus with stage 2 chronic kidney " disease, without long-term current use of insulin  Followed by antoine doing well      Review for Opioid Screening: Patient does not have rx for Opioids.    Review for Substance Use Disorders: Patient does not use substance.      No follow-ups on file.    Bita Owens PA-C  I offered to discuss advanced care planning, including how to pick a person who would make decisions for you if you were unable to make them for yourself, called a health care power of , and what kind of decisions you might make such as use of life sustaining treatments such as ventilators and tube feeding when faced with a life limiting illness recorded on a living will that they will need to know. (How you want to be cared for as you near the end of your natural life)     X Patient is interested in learning more about how to make advanced directives.  I provided them paperwork and offered to discuss this with them.

## 2023-09-18 PROBLEM — E11.65 TYPE 2 DIABETES MELLITUS WITH HYPERGLYCEMIA, WITHOUT LONG-TERM CURRENT USE OF INSULIN: Status: RESOLVED | Noted: 2022-04-25 | Resolved: 2023-09-18

## 2023-09-20 ENCOUNTER — LAB VISIT (OUTPATIENT)
Dept: LAB | Facility: HOSPITAL | Age: 78
End: 2023-09-20
Attending: HOSPITALIST
Payer: MEDICARE

## 2023-09-20 DIAGNOSIS — I10 ESSENTIAL HYPERTENSION, BENIGN: ICD-10-CM

## 2023-09-20 DIAGNOSIS — E11.65 TYPE 2 DIABETES MELLITUS WITH HYPERGLYCEMIA, WITHOUT LONG-TERM CURRENT USE OF INSULIN: ICD-10-CM

## 2023-09-20 LAB
ALBUMIN SERPL BCP-MCNC: 3.8 G/DL (ref 3.5–5.2)
ANION GAP SERPL CALC-SCNC: 6 MMOL/L (ref 8–16)
BUN SERPL-MCNC: 15 MG/DL (ref 8–23)
CALCIUM SERPL-MCNC: 9.7 MG/DL (ref 8.7–10.5)
CHLORIDE SERPL-SCNC: 103 MMOL/L (ref 95–110)
CO2 SERPL-SCNC: 31 MMOL/L (ref 23–29)
CREAT SERPL-MCNC: 1 MG/DL (ref 0.5–1.4)
EST. GFR  (NO RACE VARIABLE): >60 ML/MIN/1.73 M^2
ESTIMATED AVG GLUCOSE: 151 MG/DL (ref 68–131)
GLUCOSE SERPL-MCNC: 148 MG/DL (ref 70–110)
HBA1C MFR BLD: 6.9 % (ref 4–5.6)
PHOSPHATE SERPL-MCNC: 3.7 MG/DL (ref 2.7–4.5)
POTASSIUM SERPL-SCNC: 3.8 MMOL/L (ref 3.5–5.1)
SODIUM SERPL-SCNC: 140 MMOL/L (ref 136–145)

## 2023-09-20 PROCEDURE — 36415 COLL VENOUS BLD VENIPUNCTURE: CPT | Mod: PO | Performed by: HOSPITALIST

## 2023-09-20 PROCEDURE — 80069 RENAL FUNCTION PANEL: CPT | Performed by: HOSPITALIST

## 2023-09-20 PROCEDURE — 83036 HEMOGLOBIN GLYCOSYLATED A1C: CPT | Performed by: HOSPITALIST

## 2023-09-20 NOTE — TELEPHONE ENCOUNTER
No care due was identified.  Health [facility] Embedded Care Due Messages. Reference number: [id number].   9/20/2023 11:08:15 AM CDT

## 2023-09-20 NOTE — TELEPHONE ENCOUNTER
Last Office Visit Info:   The patient's last visit with Tory Hatch MD was on 5/25/2022.    The patient's last visit in current department was on 9/15/2023.        Last CBC Results:   Lab Results   Component Value Date    WBC 6.33 05/02/2023    HGB 13.5 (L) 05/02/2023    HCT 42.6 05/02/2023     05/02/2023       Last CMP Results  Lab Results   Component Value Date     06/26/2023    K 4.5 06/26/2023     06/26/2023    CO2 25 06/26/2023    BUN 18 06/26/2023    CREATININE 1.0 06/26/2023    CALCIUM 9.4 06/26/2023    ALBUMIN 4.0 05/02/2023    AST 29 05/02/2023    ALT 34 05/02/2023       Last Lipids  Lab Results   Component Value Date    CHOL 121 01/31/2022    TRIG 146 01/31/2022    HDL 30 (L) 01/31/2022    LDLCALC 61.8 (L) 01/31/2022       Last A1C  Lab Results   Component Value Date    HGBA1C 6.6 (H) 06/26/2023       Last TSH  Lab Results   Component Value Date    TSH 2.819 01/31/2022             Current Med Refills  Medication List with Changes/Refills   Current Medications    ACETAMINOPHEN (TYLENOL ORAL)    Take by mouth.       Start Date: --        End Date: --    ATORVASTATIN (LIPITOR) 20 MG TABLET    take 1 tablet by mouth once daily       Start Date: 7/6/2023  End Date: --    BENZONATATE (TESSALON) 100 MG CAPSULE    Take 100 mg by mouth.       Start Date: 9/7/2023  End Date: --    BLOOD SUGAR DIAGNOSTIC (CONTOUR TEST STRIPS) STRP    1 each by Misc.(Non-Drug; Combo Route) route 2 (two) times daily.       Start Date: 10/13/2014End Date: --    CETIRIZINE (ZYRTEC) 10 MG TABLET    Take 10 mg by mouth daily as needed.       Start Date: 5/14/2022 End Date: --    CLOBETASOL (TEMOVATE) 0.05 % CREAM    Apply topically 2 (two) times daily. To psoriasis PRN       Start Date: 8/2/2019  End Date: --    DULAGLUTIDE (TRULICITY) 3 MG/0.5 ML PEN INJECTOR    Inject 3 mg into the skin every 7 days.       Start Date: 7/3/2023  End Date: 7/2/2024    ERGOCALCIFEROL (ERGOCALCIFEROL) 50,000 UNIT CAP    Take 1  capsule (50,000 Units total) by mouth every 7 days.       Start Date: 7/3/2023  End Date: --    FLUOCINONIDE (LIDEX) 0.05 % EXTERNAL SOLUTION    Apply topically 2 (two) times daily. To itching in scalp area prn       Start Date: 3/3/2021  End Date: --    FLUTICASONE PROPIONATE (FLONASE) 50 MCG/ACTUATION NASAL SPRAY    2 sprays by Each Nostril route once daily.       Start Date: 5/14/2022 End Date: --    GLIPIZIDE (GLUCOTROL) 5 MG TABLET    TAKE ONE TABLET BY MOUTH ONCE DAILY       Start Date: 5/18/2023 End Date: --    LANCETS MISC    USE ONE LANCET TWO TIMES DAILY       Start Date: 10/21/2014End Date: --    LOSARTAN-HYDROCHLOROTHIAZIDE 100-25 MG (HYZAAR) 100-25 MG PER TABLET    take 1 tablet by mouth once daily       Start Date: 5/26/2023 End Date: --    PROMETHAZINE-DEXTROMETHORPHAN (PROMETHAZINE-DM) 6.25-15 MG/5 ML SYRP    Take 5 mLs by mouth nightly as needed (cough).       Start Date: 9/15/2023 End Date: 9/25/2023    SODIUM,POTASSIUM,MAG SULFATES (SUPREP BOWEL PREP KIT) 17.5-3.13-1.6 GRAM SOLR    As Directed       Start Date: 8/22/2022 End Date: --    TALTZ AUTOINJECTOR, 3 PACK, 80 MG/ML ATIN    Start Taltz at 160mg SQ on day 1 then 80mg SQ day 14 and qoweek through week 12       Start Date: 8/31/2022 End Date: --    TALTZ AUTOINJECTOR, 3 PACK, 80 MG/ML ATIN    1 - 80mg dose SQ q month       Start Date: 8/31/2022 End Date: --       Order(s) placed per written order guidelines: none    Please advise.

## 2023-09-21 RX ORDER — LOSARTAN POTASSIUM AND HYDROCHLOROTHIAZIDE 25; 100 MG/1; MG/1
1 TABLET ORAL DAILY
Qty: 90 TABLET | Refills: 0 | Status: SHIPPED | OUTPATIENT
Start: 2023-09-21

## 2023-09-22 DIAGNOSIS — E11.65 TYPE 2 DIABETES MELLITUS WITH HYPERGLYCEMIA, WITHOUT LONG-TERM CURRENT USE OF INSULIN: Primary | ICD-10-CM

## 2023-09-27 ENCOUNTER — OFFICE VISIT (OUTPATIENT)
Dept: ENDOCRINOLOGY | Facility: CLINIC | Age: 78
End: 2023-09-27
Payer: MEDICARE

## 2023-09-27 VITALS
HEART RATE: 66 BPM | BODY MASS INDEX: 32.4 KG/M2 | SYSTOLIC BLOOD PRESSURE: 124 MMHG | DIASTOLIC BLOOD PRESSURE: 69 MMHG | TEMPERATURE: 98 F | WEIGHT: 266.19 LBS

## 2023-09-27 DIAGNOSIS — E11.22 TYPE 2 DIABETES MELLITUS WITH STAGE 2 CHRONIC KIDNEY DISEASE, WITHOUT LONG-TERM CURRENT USE OF INSULIN: ICD-10-CM

## 2023-09-27 DIAGNOSIS — N18.2 TYPE 2 DIABETES MELLITUS WITH STAGE 2 CHRONIC KIDNEY DISEASE, WITHOUT LONG-TERM CURRENT USE OF INSULIN: ICD-10-CM

## 2023-09-27 DIAGNOSIS — E11.65 TYPE 2 DIABETES MELLITUS WITH HYPERGLYCEMIA, WITHOUT LONG-TERM CURRENT USE OF INSULIN: Primary | ICD-10-CM

## 2023-09-27 DIAGNOSIS — I10 ESSENTIAL HYPERTENSION, BENIGN: ICD-10-CM

## 2023-09-27 DIAGNOSIS — E55.9 VITAMIN D DEFICIENCY: ICD-10-CM

## 2023-09-27 DIAGNOSIS — E66.09 CLASS 1 OBESITY DUE TO EXCESS CALORIES WITH SERIOUS COMORBIDITY AND BODY MASS INDEX (BMI) OF 33.0 TO 33.9 IN ADULT: ICD-10-CM

## 2023-09-27 PROCEDURE — 99214 OFFICE O/P EST MOD 30 MIN: CPT | Mod: PBBFAC | Performed by: HOSPITALIST

## 2023-09-27 PROCEDURE — 99214 PR OFFICE/OUTPT VISIT, EST, LEVL IV, 30-39 MIN: ICD-10-PCS | Mod: S$PBB,,, | Performed by: HOSPITALIST

## 2023-09-27 PROCEDURE — 99999 PR PBB SHADOW E&M-EST. PATIENT-LVL IV: ICD-10-PCS | Mod: PBBFAC,,, | Performed by: HOSPITALIST

## 2023-09-27 PROCEDURE — 99999 PR PBB SHADOW E&M-EST. PATIENT-LVL IV: CPT | Mod: PBBFAC,,, | Performed by: HOSPITALIST

## 2023-09-27 PROCEDURE — 99214 OFFICE O/P EST MOD 30 MIN: CPT | Mod: S$PBB,,, | Performed by: HOSPITALIST

## 2023-09-27 RX ORDER — DULAGLUTIDE 4.5 MG/.5ML
4.5 INJECTION, SOLUTION SUBCUTANEOUS
Qty: 4 PEN | Refills: 11 | Status: SHIPPED | OUTPATIENT
Start: 2023-09-27 | End: 2024-03-26

## 2023-09-27 NOTE — ASSESSMENT & PLAN NOTE
- vitamin-D deficiency noted on lab work 12/2022   - continue ergocalciferol 49005 IU once a week  - add vitamin D3 OTC 2000 IU daily  - repeat lab work every 6 mo

## 2023-09-27 NOTE — PATIENT INSTRUCTIONS
----------------------------------------------------------  Changes were made today:           INCREASE Trulicity 4.5mg once a week  STOP Glipizide 5mg daily       Take Vit D3 2000 iu daily     Goal for your blood sugar   In the morning before breakfast:   Before going to bed: 100-140  Do not go to bed with glucose less than 100    Please make sure to get your dilated eyes examine once a year with your eye doctor.  Monitor your feet for any cuts or skin breakdown regularly, schedule your podiatrist visit if you see one    We will plan an in-clinic visit in 3-4 months, with labs prior to that appointment.      Contact information:  Yordan Varela M.D  [facility] [address], [address]  [place] [address]    Office:  [phone]  Fax:  [phone]     ----------------------------------------------------------

## 2023-09-27 NOTE — ASSESSMENT & PLAN NOTE
- Body mass index is 32.4 kg/m².  - dietary discussion as above  - continue to monitor weight  - increase Trulicity

## 2023-09-27 NOTE — PROGRESS NOTES
"Subjective:      Patient ID: Rosendo Thomas is a 78 y.o. male presented to [facility] on 9/27/2023.  Chief Complaint:  Diabetes    History of Present Illness: oRsendo Thomas is a 78 y.o. male here for  Type 2 diabetes  Other significant past medical history: CKD3a, psoriasis     With regards to Diabetes Mellitus Type 2  - Known diabetic complications: nephropathy and retinopathy  - Diagnosed w/ DM: in 2010    Interval history: Here for diabetes, diabetes have been better.  Patient has made better dietary modification.  Watching portion size.  Weight loss noted.  A1c increase 6.9%  Did have some symptoms of hypoglycemia if he doesn't eat. when he donated blood with symptoms of hypoglycemia  Did have some URI symptoms  Crush pineapple snack  Reading carbs on label  Patient not satisfied about no weight loss    Current meds:    Trulicity 3.0 mg once a week injection on Tuesday  Glipizide 5mg daily  Injection Technique: Good  Rotation of injection site: Yes   Previous meds:    Metformin: stop due to frequent diarrhea   Home glucose checks: checks daily, Logs reviewed unavailable for review  Diet/Exercise:               Eating 2 meals per day, salad              Drink: no soda  Weight trend: stable  Diabetes Education: No  Diabetes Related Hospitalization:  No  Hx of pancreatitis, hx of thyroid cancer: No  Family history of diabetes: Yes, retired  Occupation: retired, active social life    Eye exam current (within one year): yes, DR: no  Reports cuts or ulcers on feet:   Denies  Statin: Taking, ACE/ARB: Taking    Diabetes lab work  Lab Results   Component Value Date    HGBA1C 6.9 (H) 09/20/2023    HGBA1C 6.6 (H) 06/26/2023    HGBA1C 7.4 (H) 02/28/2023    HGBA1C 7.0 (H) 12/20/2022     No results found for: "CPEPTIDE", "GLUTAMICACID", "ISLETCELLANT"   No results found for: "FRUCTOSAMINE"  Lab Results   Component Value Date    MICALBCREAT 32.2 (H) 08/18/2022     No results found for: " ""ERTOSRSP01"    Diabetes Management Status: Reviewed this office visit  Screening or Prevention Patient's value Goal Complete/Controlled?   Lipid profile : 01/31/2022 Annually No     Dilated retinal exam : 07/28/2023 Annually Yes     Foot exam   Most Recent Foot Exam Date: Not Found Annually Yes       Reviewed past surgical, medical, family, social history and updated as appropriate.  Review of Systems: see HPI above    Objective:   /69   Pulse 66   Temp 98.4 °F (36.9 °C) (Oral)   Wt 120.7 kg (266 lb 3.2 oz)   BMI 32.40 kg/m²     Body mass index is 32.4 kg/m².  Vital signs reviewed    Physical Exam  Vitals and nursing note reviewed.   Constitutional:       Appearance: Normal appearance. He is well-developed. He is obese. He is not ill-appearing.   Neck:      Thyroid: No thyromegaly.   Pulmonary:      Effort: Pulmonary effort is normal. No respiratory distress.   Musculoskeletal:         General: Normal range of motion.      Cervical back: Normal range of motion.   Neurological:      General: No focal deficit present.      Mental Status: He is alert. Mental status is at baseline.   Psychiatric:         Mood and Affect: Mood normal.         Behavior: Behavior normal.       Lab Reviewed:   Lab Results   Component Value Date    CHOL 121 01/31/2022    HDL 30 (L) 01/31/2022    LDLCALC 61.8 (L) 01/31/2022    TRIG 146 01/31/2022    CHOLHDL 24.8 01/31/2022     Lab Results   Component Value Date     09/20/2023    K 3.8 09/20/2023     09/20/2023    CO2 31 (H) 09/20/2023     (H) 09/20/2023    BUN 15 09/20/2023    CREATININE 1.0 09/20/2023    CALCIUM 9.7 09/20/2023    PROT 6.8 05/02/2023    ALBUMIN 3.8 09/20/2023    BILITOT 0.4 05/02/2023    ALKPHOS 63 05/02/2023    AST 29 05/02/2023    ALT 34 05/02/2023    ANIONGAP 6 (L) 09/20/2023    ESTGFRAFRICA >60.0 04/19/2022    EGFRNONAA 58.4 (A) 04/19/2022    TSH 2.819 01/31/2022    LGREBJHE19KC 18 (L) 06/26/2023     Assessment     1. Type 2 diabetes mellitus " with hyperglycemia, without long-term current use of insulin  dulaglutide (TRULICITY) 4.5 mg/0.5 mL pen injector    Renal Function Panel    Hemoglobin A1C    Ambulatory referral/consult to Podiatry    Microalbumin/Creatinine Ratio, Urine    CANCELED: Microalbumin/Creatinine Ratio, Urine      2. Essential hypertension, benign        3. Vitamin D deficiency        4. Class 1 obesity due to excess calories with serious comorbidity and body mass index (BMI) of 33.0 to 33.9 in adult        5. Type 2 diabetes mellitus with stage 2 chronic kidney disease, without long-term current use of insulin            Plan     Type 2 diabetes mellitus with hyperglycemia, without long-term current use of insulin  - Diabetes is at goal given most current A1C, Goal A1C for patient is 7%  - Much better glycemic control noted, still with occasional postprandial hyperglycemia on glucose log  - Diabetic supplies/medications reviewed this visit to ensure continue refills and compliance  - Advised patient to get periodic eye and feet exam.   - Reviewed routine maintenance: lipid, U:P/C     Plan  - encourage better dietary changes: Encourage patient to continue dietary modification, portion size control, decreasing carbohydrates intake  - sporadic hypoglycemia when skipping meals, at this time will stop glipizide, he is aware  - increase Trulicity to 4.5 mg once a week injection, patient declined Ozempic/Mounjaro  - Advised patient to monitor for hypoglycemia and treatment of hypoglycemia discussed  - Advised pt to check glucoses regularly, asked to filled glucose log and bring back for review at next office visit  - Clear written instruction given on AVS, Follow up as scheduled    Essential hypertension, benign  - BP reviewed today  - continue home medication (s)  - manage by PCP    Vitamin D deficiency  - vitamin-D deficiency noted on lab work 12/2022   - continue ergocalciferol 74742 IU once a week  - add vitamin D3 OTC 2000 IU daily  -  repeat lab work every 6 mo    Class 1 obesity due to excess calories with serious comorbidity and body mass index (BMI) of 33.0 to 33.9 in adult  - Body mass index is 32.4 kg/m².  - dietary discussion as above  - continue to monitor weight  - increase Trulicity    Type 2 diabetes mellitus with stage 2 chronic kidney disease, without long-term current use of insulin  - diabetes nephropathy, continue ARB      Advised patient to follow up with PCP for routine health maintenance care.   RTC in 11/2023    Yordan Varela M.D.  Endocrinology  [facility]  9/27/2023      Disclaimer: This note has been generated in part with the use of voice-recognition software. There may be typographical errors that have been missed during proof-reading.

## 2023-09-27 NOTE — ASSESSMENT & PLAN NOTE
- Diabetes is at goal given most current A1C, Goal A1C for patient is 7%  - Much better glycemic control noted, still with occasional postprandial hyperglycemia on glucose log  - Diabetic supplies/medications reviewed this visit to ensure continue refills and compliance  - Advised patient to get periodic eye and feet exam.   - Reviewed routine maintenance: lipid, U:P/C     Plan  - encourage better dietary changes: Encourage patient to continue dietary modification, portion size control, decreasing carbohydrates intake  - sporadic hypoglycemia when skipping meals, at this time will stop glipizide, he is aware  - increase Trulicity to 4.5 mg once a week injection, patient declined Ozempic/Mounjaro  - Advised patient to monitor for hypoglycemia and treatment of hypoglycemia discussed  - Advised pt to check glucoses regularly, asked to filled glucose log and bring back for review at next office visit  - Clear written instruction given on AVS, Follow up as scheduled

## 2023-10-17 ENCOUNTER — OFFICE VISIT (OUTPATIENT)
Dept: PODIATRY | Facility: CLINIC | Age: 78
End: 2023-10-17
Payer: MEDICARE

## 2023-10-17 DIAGNOSIS — L60.0 INGROWN NAIL: ICD-10-CM

## 2023-10-17 DIAGNOSIS — E11.49 TYPE II DIABETES MELLITUS WITH NEUROLOGICAL MANIFESTATIONS: Primary | ICD-10-CM

## 2023-10-17 DIAGNOSIS — B35.1 ONYCHOMYCOSIS DUE TO DERMATOPHYTE: ICD-10-CM

## 2023-10-17 DIAGNOSIS — E11.9 ENCOUNTER FOR COMPREHENSIVE DIABETIC FOOT EXAMINATION, TYPE 2 DIABETES MELLITUS: ICD-10-CM

## 2023-10-17 PROCEDURE — 99999 PR PBB SHADOW E&M-EST. PATIENT-LVL II: ICD-10-PCS | Mod: PBBFAC,,, | Performed by: PODIATRIST

## 2023-10-17 PROCEDURE — 99999 PR PBB SHADOW E&M-EST. PATIENT-LVL II: CPT | Mod: PBBFAC,,, | Performed by: PODIATRIST

## 2023-10-17 PROCEDURE — 99214 OFFICE O/P EST MOD 30 MIN: CPT | Mod: S$PBB,,, | Performed by: PODIATRIST

## 2023-10-17 PROCEDURE — 99212 OFFICE O/P EST SF 10 MIN: CPT | Mod: PBBFAC,PO | Performed by: PODIATRIST

## 2023-10-17 PROCEDURE — 99214 PR OFFICE/OUTPT VISIT, EST, LEVL IV, 30-39 MIN: ICD-10-PCS | Mod: S$PBB,,, | Performed by: PODIATRIST

## 2023-11-21 ENCOUNTER — TELEPHONE (OUTPATIENT)
Dept: DERMATOLOGY | Facility: CLINIC | Age: 78
End: 2023-11-21
Payer: MEDICARE

## 2023-11-21 DIAGNOSIS — Z79.899 ENCOUNTER FOR LONG-TERM CURRENT USE OF HIGH RISK MEDICATION: Primary | ICD-10-CM

## 2023-11-21 DIAGNOSIS — L40.50 PSORIASIS WITH ARTHROPATHY: ICD-10-CM

## 2023-11-21 RX ORDER — IXEKIZUMAB 80 MG/ML
INJECTION, SOLUTION SUBCUTANEOUS
Qty: 3 ML | Refills: 0 | OUTPATIENT
Start: 2023-11-21

## 2023-11-21 NOTE — TELEPHONE ENCOUNTER
Spoke w/ patient. He would not like to do a virtual appt, so I set him up for a visit with our acute derm clinic (explained clinic). Informed patient that Dr. Wright has placed orders for him to get labs. Pt voiced understanding. Will complete labs and come for visit in order to renew his Taltz Rx. Pt thanked me for call.     ----- Message from Liane Wright MD sent at 11/21/2023 10:04 AM CST -----  He is overdue for labs and appt for his Taltz  Please schedule him for virtual visit f/u and labs (ordered today cbc cmp quant gold)

## 2023-11-27 ENCOUNTER — LAB VISIT (OUTPATIENT)
Dept: LAB | Facility: HOSPITAL | Age: 78
End: 2023-11-27
Attending: DERMATOLOGY
Payer: MEDICARE

## 2023-11-27 DIAGNOSIS — Z79.899 ENCOUNTER FOR LONG-TERM CURRENT USE OF HIGH RISK MEDICATION: ICD-10-CM

## 2023-11-27 DIAGNOSIS — L40.50 PSORIASIS WITH ARTHROPATHY: ICD-10-CM

## 2023-11-27 LAB
ALBUMIN SERPL BCP-MCNC: 4 G/DL (ref 3.5–5.2)
ALP SERPL-CCNC: 79 U/L (ref 55–135)
ALT SERPL W/O P-5'-P-CCNC: 25 U/L (ref 10–44)
ANION GAP SERPL CALC-SCNC: 7 MMOL/L (ref 8–16)
AST SERPL-CCNC: 21 U/L (ref 10–40)
BASOPHILS # BLD AUTO: 0.02 K/UL (ref 0–0.2)
BASOPHILS NFR BLD: 0.4 % (ref 0–1.9)
BILIRUB SERPL-MCNC: 0.6 MG/DL (ref 0.1–1)
BUN SERPL-MCNC: 16 MG/DL (ref 8–23)
CALCIUM SERPL-MCNC: 9.6 MG/DL (ref 8.7–10.5)
CHLORIDE SERPL-SCNC: 102 MMOL/L (ref 95–110)
CO2 SERPL-SCNC: 31 MMOL/L (ref 23–29)
CREAT SERPL-MCNC: 1 MG/DL (ref 0.5–1.4)
DIFFERENTIAL METHOD: NORMAL
EOSINOPHIL # BLD AUTO: 0.1 K/UL (ref 0–0.5)
EOSINOPHIL NFR BLD: 2 % (ref 0–8)
ERYTHROCYTE [DISTWIDTH] IN BLOOD BY AUTOMATED COUNT: 14.2 % (ref 11.5–14.5)
EST. GFR  (NO RACE VARIABLE): >60 ML/MIN/1.73 M^2
GLUCOSE SERPL-MCNC: 146 MG/DL (ref 70–110)
HCT VFR BLD AUTO: 44.1 % (ref 40–54)
HGB BLD-MCNC: 14.1 G/DL (ref 14–18)
IMM GRANULOCYTES # BLD AUTO: 0.02 K/UL (ref 0–0.04)
IMM GRANULOCYTES NFR BLD AUTO: 0.4 % (ref 0–0.5)
LYMPHOCYTES # BLD AUTO: 1.4 K/UL (ref 1–4.8)
LYMPHOCYTES NFR BLD: 24.2 % (ref 18–48)
MCH RBC QN AUTO: 27.9 PG (ref 27–31)
MCHC RBC AUTO-ENTMCNC: 32 G/DL (ref 32–36)
MCV RBC AUTO: 87 FL (ref 82–98)
MONOCYTES # BLD AUTO: 0.4 K/UL (ref 0.3–1)
MONOCYTES NFR BLD: 7.9 % (ref 4–15)
NEUTROPHILS # BLD AUTO: 3.7 K/UL (ref 1.8–7.7)
NEUTROPHILS NFR BLD: 65.1 % (ref 38–73)
NRBC BLD-RTO: 0 /100 WBC
PLATELET # BLD AUTO: 163 K/UL (ref 150–450)
PMV BLD AUTO: 12.7 FL (ref 9.2–12.9)
POTASSIUM SERPL-SCNC: 4.3 MMOL/L (ref 3.5–5.1)
PROT SERPL-MCNC: 7.1 G/DL (ref 6–8.4)
RBC # BLD AUTO: 5.05 M/UL (ref 4.6–6.2)
SODIUM SERPL-SCNC: 140 MMOL/L (ref 136–145)
WBC # BLD AUTO: 5.59 K/UL (ref 3.9–12.7)

## 2023-11-27 PROCEDURE — 86480 TB TEST CELL IMMUN MEASURE: CPT | Performed by: DERMATOLOGY

## 2023-11-27 PROCEDURE — 80053 COMPREHEN METABOLIC PANEL: CPT | Performed by: DERMATOLOGY

## 2023-11-27 PROCEDURE — 36415 COLL VENOUS BLD VENIPUNCTURE: CPT | Mod: PO | Performed by: DERMATOLOGY

## 2023-11-27 PROCEDURE — 85025 COMPLETE CBC W/AUTO DIFF WBC: CPT | Performed by: DERMATOLOGY

## 2023-11-29 LAB
GAMMA INTERFERON BACKGROUND BLD IA-ACNC: 0.04 IU/ML
M TB IFN-G CD4+ BCKGRND COR BLD-ACNC: -0.01 IU/ML
M TB IFN-G CD4+ BCKGRND COR BLD-ACNC: -0.01 IU/ML
MITOGEN IGNF BCKGRD COR BLD-ACNC: 5.82 IU/ML
TB GOLD PLUS: NEGATIVE

## 2023-12-06 ENCOUNTER — OFFICE VISIT (OUTPATIENT)
Dept: DERMATOLOGY | Facility: CLINIC | Age: 78
End: 2023-12-06
Payer: MEDICARE

## 2023-12-06 DIAGNOSIS — B07.9 VERRUCA VULGARIS: ICD-10-CM

## 2023-12-06 DIAGNOSIS — L40.50 PSORIASIS WITH ARTHROPATHY: ICD-10-CM

## 2023-12-06 DIAGNOSIS — L40.9 PSORIASIS: Primary | ICD-10-CM

## 2023-12-06 DIAGNOSIS — Z51.81 ENCOUNTER FOR MEDICATION MONITORING: ICD-10-CM

## 2023-12-06 PROCEDURE — 17110 DESTRUCTION B9 LES UP TO 14: CPT | Mod: PBBFAC | Performed by: DERMATOLOGY

## 2023-12-06 PROCEDURE — 99213 OFFICE O/P EST LOW 20 MIN: CPT | Mod: PBBFAC | Performed by: DERMATOLOGY

## 2023-12-06 PROCEDURE — 99213 OFFICE O/P EST LOW 20 MIN: CPT | Mod: 25,S$PBB,, | Performed by: DERMATOLOGY

## 2023-12-06 PROCEDURE — 99213 PR OFFICE/OUTPT VISIT, EST, LEVL III, 20-29 MIN: ICD-10-PCS | Mod: 25,S$PBB,, | Performed by: DERMATOLOGY

## 2023-12-06 PROCEDURE — 17110 PR DESTRUCTION BENIGN LESIONS UP TO 14: ICD-10-PCS | Mod: S$PBB,,, | Performed by: DERMATOLOGY

## 2023-12-06 PROCEDURE — 99999 PR PBB SHADOW E&M-EST. PATIENT-LVL III: CPT | Mod: PBBFAC,,, | Performed by: DERMATOLOGY

## 2023-12-06 PROCEDURE — 17110 DESTRUCTION B9 LES UP TO 14: CPT | Mod: S$PBB,,, | Performed by: DERMATOLOGY

## 2023-12-06 PROCEDURE — 99999 PR PBB SHADOW E&M-EST. PATIENT-LVL III: ICD-10-PCS | Mod: PBBFAC,,, | Performed by: DERMATOLOGY

## 2023-12-06 RX ORDER — IXEKIZUMAB 80 MG/ML
INJECTION, SOLUTION SUBCUTANEOUS
Qty: 3 ML | Refills: 3 | Status: SHIPPED | OUTPATIENT
Start: 2023-12-06

## 2023-12-06 NOTE — PROGRESS NOTES
Subjective:      Patient ID:  Rosendo Thomas is a 78 y.o. male who presents for   Chief Complaint   Patient presents with    Psoriasis     Mr. Thomas is a 78M with PMHx of psoriasis, HTN, HLD, DM2, colon cancer s/p resection here for acute dermatology visit. LOV on 5/2/23 when he was continued on taltz 80mg subcutaneously every month. He reports having great control of psoriatic skin lesions on current therapy. Only has small areas on elbows. Denies joint pains, tendinitis, nail changes. He is happy with current treatment and would like to continue.             Review of Systems   Skin:  Negative for itching and rash.       Objective:   Physical Exam   Neurological: He is alert and oriented to person, place, and time.   Skin:              Diagram Legend     Erythematous scaling macule/papule c/w actinic keratosis       Vascular papule c/w angioma      Pigmented verrucoid papule/plaque c/w seborrheic keratosis      Yellow umbilicated papule c/w sebaceous hyperplasia      Irregularly shaped tan macule c/w lentigo     1-2 mm smooth white papules consistent with Milia      Movable subcutaneous cyst with punctum c/w epidermal inclusion cyst      Subcutaneous movable cyst c/w pilar cyst      Firm pink to brown papule c/w dermatofibroma      Pedunculated fleshy papule(s) c/w skin tag(s)      Evenly pigmented macule c/w junctional nevus     Mildly variegated pigmented, slightly irregular-bordered macule c/w mildly atypical nevus      Flesh colored to evenly pigmented papule c/w intradermal nevus       Pink pearly papule/plaque c/w basal cell carcinoma      Erythematous hyperkeratotic cursted plaque c/w SCC      Surgical scar with no sign of skin cancer recurrence      Open and closed comedones      Inflammatory papules and pustules      Verrucoid papule consistent consistent with wart     Erythematous eczematous patches and plaques     Dystrophic onycholytic nail with subungual debris c/w onychomycosis     Umbilicated  papule    Erythematous-base heme-crusted tan verrucoid plaque consistent with inflamed seborrheic keratosis     Erythematous Silvery Scaling Plaque c/w Psoriasis     See annotation                    Assessment / Plan:        Psoriasis  Encounter for medication monitoring  BSA <1% today. No joint complaints. Well controlled on taltz 80mg subcutaneous monthly. No current use of topicals. Last TB negative 11/27/23. CBC and CMP at baseline.   -- Refill taltz 80mg monthly    Verruca vulgaris  Counseled on etiology and treatment options. Patient elected for LN2 treatment today.     Cryosurgery procedure note:  Verbal consent from the patient is obtained including, but not limited to, risk of hypopigmentation/hyperpigmentation, scar, recurrence of lesion. Liquid nitrogen cryosurgery is applied to 1 verruca after pairing, as detailed in the physical exam, to produce a freeze injury. 2 consecutive freeze thaw cycles are applied to each verruca. The patient is aware that blisters (possibly blood blisters) may form.             Follow up if symptoms worsen or fail to improve.

## 2023-12-08 NOTE — PROGRESS NOTES
I have seen the patient, reviewed the Resident's progress note. I have personally interviewed and examined the patient at bedside and agree with the findings.     Liane Wright MD  [facility]

## 2024-01-12 ENCOUNTER — PATIENT MESSAGE (OUTPATIENT)
Dept: DERMATOLOGY | Facility: CLINIC | Age: 79
End: 2024-01-12
Payer: MEDICARE

## 2024-01-19 ENCOUNTER — OFFICE VISIT (OUTPATIENT)
Dept: PODIATRY | Facility: CLINIC | Age: 79
End: 2024-01-19
Payer: MEDICARE

## 2024-01-19 VITALS
DIASTOLIC BLOOD PRESSURE: 70 MMHG | HEART RATE: 60 BPM | BODY MASS INDEX: 32.41 KG/M2 | HEIGHT: 76 IN | SYSTOLIC BLOOD PRESSURE: 134 MMHG | WEIGHT: 266.13 LBS

## 2024-01-19 DIAGNOSIS — E11.49 TYPE II DIABETES MELLITUS WITH NEUROLOGICAL MANIFESTATIONS: Primary | ICD-10-CM

## 2024-01-19 DIAGNOSIS — B35.1 ONYCHOMYCOSIS DUE TO DERMATOPHYTE: ICD-10-CM

## 2024-01-19 PROCEDURE — 17999 UNLISTD PX SKN MUC MEMB SUBQ: CPT | Mod: CSM,S$GLB,, | Performed by: PODIATRIST

## 2024-01-19 PROCEDURE — 99213 OFFICE O/P EST LOW 20 MIN: CPT | Mod: PBBFAC,PO | Performed by: PODIATRIST

## 2024-01-19 PROCEDURE — 99999 PR PBB SHADOW E&M-EST. PATIENT-LVL III: CPT | Mod: PBBFAC,,, | Performed by: PODIATRIST

## 2024-01-19 PROCEDURE — 99213 OFFICE O/P EST LOW 20 MIN: CPT | Mod: S$PBB,,, | Performed by: PODIATRIST

## 2024-01-19 RX ORDER — CICLOPIROX 80 MG/ML
SOLUTION TOPICAL NIGHTLY
Qty: 6.6 ML | Refills: 11 | Status: SHIPPED | OUTPATIENT
Start: 2024-01-19

## 2024-01-19 NOTE — PROGRESS NOTES
Subjective:      Patient ID: Rosendo Thomas is a 78 y.o. male.    Chief Complaint: Diabetic Foot Exam (Yordan Cui MD 9/27/2023)    Thick hard painful toenails both hallux toenails.  Gradual onset, worsening over past several weeks, aggravated by increased weight bearing, shoe gear, pressure.  Periodic trimming and prior nail procedures helped temporarily. Denies trauma, surgery.        Review of Systems   Constitutional: Negative for chills, diaphoresis, fever, malaise/fatigue and night sweats.   Cardiovascular:  Negative for claudication, cyanosis, leg swelling and syncope.   Skin:  Positive for nail changes. Negative for color change, dry skin, rash, suspicious lesions and unusual hair distribution.   Musculoskeletal:  Negative for falls, joint pain, joint swelling, muscle cramps, muscle weakness and stiffness.   Gastrointestinal:  Negative for constipation, diarrhea, nausea and vomiting.   Neurological:  Negative for brief paralysis, disturbances in coordination, focal weakness, numbness, paresthesias, sensory change and tremors.         Objective:      Physical Exam  Constitutional:       General: He is not in acute distress.     Appearance: He is well-developed. He is not diaphoretic.   Cardiovascular:      Pulses:           Popliteal pulses are 2+ on the right side and 2+ on the left side.        Dorsalis pedis pulses are 2+ on the right side and 2+ on the left side.        Posterior tibial pulses are 2+ on the right side and 2+ on the left side.      Comments: Capillary refill 3 seconds all toes/distal feet, all toes/both feet warm to touch.      Negative lymphadenopathy bilateral popliteal fossa and tarsal tunnel.      Negavie lower extremity edema bilateral.    Musculoskeletal:      Right ankle: No swelling, deformity, ecchymosis or lacerations. Normal range of motion. Normal pulse.      Right Achilles Tendon: Normal. No defects. Varela's test negative.   Lymphadenopathy:      Lower Body: No  right inguinal adenopathy. No left inguinal adenopathy.      Comments: Negative lymphadenopathy bilateral popliteal fossa and tarsal tunnel.    Negative lymphangitic streaking bilateral feet/ankles/legs.   Skin:     General: Skin is warm and dry.      Capillary Refill: Capillary refill takes 2 to 3 seconds.      Coloration: Skin is not pale.      Findings: No abrasion, bruising, burn, ecchymosis, erythema, laceration, lesion or rash.      Nails: There is no clubbing.      Comments:   Toenails 1st  bilateral are hypertrophic, dystrophic, discolored tanish brown with tan, gray crumbly subungual debris.  Tender to distal nail plate pressure, without periungual skin abnormality of each.     Neurological:      Mental Status: He is alert and oriented to person, place, and time.      Sensory: No sensory deficit.      Motor: No tremor, atrophy or abnormal muscle tone.      Gait: Gait normal.      Comments: Negative tinel sign to percussion sural, superficial peroneal, deep peroneal, saphenous, and posterior tibial nerves right and left ankles and feet.    Psychiatric:         Behavior: Behavior is cooperative.           Assessment:       Encounter Diagnoses   Name Primary?    Type II diabetes mellitus with neurological manifestations Yes    Onychomycosis due to dermatophyte          Plan:       Rosendo was seen today for diabetic foot exam.    Diagnoses and all orders for this visit:    Type II diabetes mellitus with neurological manifestations    Onychomycosis due to dermatophyte    Other orders  -     ciclopirox (PENLAC) 8 % Soln; Apply topically nightly.      I counseled the patient on his conditions, their implications and medical management.    Penla     With the patient's permission, I debrided all ten toenails with a sterile nipper and curette, removing all offending nail and debris.  Patient tolerated the procedure well and related significant relief.    Recommend total matrixectomy both hallux if symptoms  continue.    Discussed conservative treatment with shoes of adequate dimensions, material, and style to alleviate symptoms and delay or prevent surgical intervention.    prn          Follow up if symptoms worsen or fail to improve.

## 2024-01-22 ENCOUNTER — LAB VISIT (OUTPATIENT)
Dept: LAB | Facility: HOSPITAL | Age: 79
End: 2024-01-22
Attending: HOSPITALIST
Payer: MEDICARE

## 2024-01-22 DIAGNOSIS — E11.65 TYPE 2 DIABETES MELLITUS WITH HYPERGLYCEMIA, WITHOUT LONG-TERM CURRENT USE OF INSULIN: ICD-10-CM

## 2024-01-22 LAB
ALBUMIN SERPL BCP-MCNC: 3.6 G/DL (ref 3.5–5.2)
ANION GAP SERPL CALC-SCNC: 8 MMOL/L (ref 8–16)
BUN SERPL-MCNC: 15 MG/DL (ref 8–23)
CALCIUM SERPL-MCNC: 9.2 MG/DL (ref 8.7–10.5)
CHLORIDE SERPL-SCNC: 102 MMOL/L (ref 95–110)
CO2 SERPL-SCNC: 28 MMOL/L (ref 23–29)
CREAT SERPL-MCNC: 1.2 MG/DL (ref 0.5–1.4)
EST. GFR  (NO RACE VARIABLE): >60 ML/MIN/1.73 M^2
ESTIMATED AVG GLUCOSE: 189 MG/DL (ref 68–131)
GLUCOSE SERPL-MCNC: 236 MG/DL (ref 70–110)
HBA1C MFR BLD: 8.2 % (ref 4–5.6)
PHOSPHATE SERPL-MCNC: 2.8 MG/DL (ref 2.7–4.5)
POTASSIUM SERPL-SCNC: 4.5 MMOL/L (ref 3.5–5.1)
SODIUM SERPL-SCNC: 138 MMOL/L (ref 136–145)

## 2024-01-22 PROCEDURE — 80069 RENAL FUNCTION PANEL: CPT | Performed by: HOSPITALIST

## 2024-01-22 PROCEDURE — 36415 COLL VENOUS BLD VENIPUNCTURE: CPT | Mod: PO | Performed by: HOSPITALIST

## 2024-01-22 PROCEDURE — 83036 HEMOGLOBIN GLYCOSYLATED A1C: CPT | Performed by: HOSPITALIST

## 2024-01-29 ENCOUNTER — OFFICE VISIT (OUTPATIENT)
Dept: ENDOCRINOLOGY | Facility: CLINIC | Age: 79
End: 2024-01-29
Payer: MEDICARE

## 2024-01-29 VITALS
DIASTOLIC BLOOD PRESSURE: 64 MMHG | SYSTOLIC BLOOD PRESSURE: 130 MMHG | HEART RATE: 60 BPM | BODY MASS INDEX: 32.31 KG/M2 | WEIGHT: 265.38 LBS

## 2024-01-29 DIAGNOSIS — E11.65 TYPE 2 DIABETES MELLITUS WITH HYPERGLYCEMIA, WITHOUT LONG-TERM CURRENT USE OF INSULIN: Primary | ICD-10-CM

## 2024-01-29 DIAGNOSIS — E66.09 CLASS 1 OBESITY DUE TO EXCESS CALORIES WITH SERIOUS COMORBIDITY AND BODY MASS INDEX (BMI) OF 33.0 TO 33.9 IN ADULT: ICD-10-CM

## 2024-01-29 DIAGNOSIS — E55.9 VITAMIN D DEFICIENCY: ICD-10-CM

## 2024-01-29 DIAGNOSIS — I10 ESSENTIAL HYPERTENSION, BENIGN: ICD-10-CM

## 2024-01-29 DIAGNOSIS — Z85.038 HISTORY OF COLON CANCER: ICD-10-CM

## 2024-01-29 PROCEDURE — 99214 OFFICE O/P EST MOD 30 MIN: CPT | Mod: S$PBB,,, | Performed by: HOSPITALIST

## 2024-01-29 PROCEDURE — G2211 COMPLEX E/M VISIT ADD ON: HCPCS | Mod: S$PBB,,, | Performed by: HOSPITALIST

## 2024-01-29 PROCEDURE — 99213 OFFICE O/P EST LOW 20 MIN: CPT | Mod: PBBFAC | Performed by: HOSPITALIST

## 2024-01-29 PROCEDURE — 99999 PR PBB SHADOW E&M-EST. PATIENT-LVL III: CPT | Mod: PBBFAC,,, | Performed by: HOSPITALIST

## 2024-01-29 RX ORDER — GLIPIZIDE 2.5 MG/1
2.5 TABLET, EXTENDED RELEASE ORAL
Qty: 90 TABLET | Refills: 3 | Status: SHIPPED | OUTPATIENT
Start: 2024-01-29 | End: 2024-05-02

## 2024-01-29 RX ORDER — TIRZEPATIDE 7.5 MG/.5ML
7.5 INJECTION, SOLUTION SUBCUTANEOUS
Qty: 12 PEN | Refills: 3 | Status: SHIPPED | OUTPATIENT
Start: 2024-01-29 | End: 2024-05-02

## 2024-01-29 NOTE — ASSESSMENT & PLAN NOTE
- Body mass index is 32.31 kg/m².  - dietary discussion as above  - continue to monitor weight  - Trulicity for now, then switch to Mounjaro

## 2024-01-29 NOTE — PATIENT INSTRUCTIONS
Glipizide 2.5 mg once a day    FINISH OFF Trulicity>>Mounjaro 7.5 mg once a week    Lab work in 3 months.

## 2024-01-29 NOTE — ASSESSMENT & PLAN NOTE
- Diabetes is not at goal given most current A1C, Goal A1C for patient is 7%  - Much better glycemic control noted, still with occasional postprandial hyperglycemia on glucose log  - Diabetic supplies/medications reviewed this visit to ensure continue refills and compliance  - Advised patient to get periodic eye and feet exam.   - Reviewed routine maintenance: lipid, U:P/C     Plan  - encourage better dietary changes: Encourage patient to continue dietary modification, portion size control, decreasing carbohydrates intake  - continue 4.5 mg once a week injection, finish out current supply.  Given worsening A1c, switch patient to Mounjaro 7.5 mg once a week injection,  - restart low-dose glipizide 2.5 mg once daily  - Advised patient to monitor for hypoglycemia and treatment of hypoglycemia discussed  - Advised pt to check glucoses regularly, asked to filled glucose log and bring back for review at next office visit  - Clear written instruction given on AVS, Follow up as scheduled

## 2024-01-29 NOTE — ASSESSMENT & PLAN NOTE
- vitamin-D deficiency noted on lab work 12/2022   - continue ergocalciferol 60741 IU once a week  - add vitamin D3 OTC 2000 IU daily  - repeat lab work every 6 mo

## 2024-01-29 NOTE — PROGRESS NOTES
"Subjective:      Patient ID: Rosendo Thomas is a 78 y.o. male presented to [facility] on 1/29/2024.  Chief Complaint:  Diabetes    History of Present Illness: Rosendo Thomas is a 78 y.o. male here for  Type 2 diabetes  Other significant past medical history: CKD3a, psoriasis     With regards to Diabetes Mellitus Type 2  - Known diabetic complications: nephropathy and retinopathy  - Diagnosed w/ DM: in 2010    Interval history: Here for diabetes, diabetes control worsened due to dietary indiscretion, A1c increased to 8.2%  No weight loss noted.  He is concerned.  Patient not satisfied about no weight loss  Reports glucose:  130-140s  Had issues getting Trulicity regularly    Current meds:    Trulicity 4.5 mg once a week injection on Tuesday  Injection Technique: Good  Rotation of injection site: Yes   Previous meds:    Metformin: stop due to frequent diarrhea    Glipizide on hold  Home glucose checks: checks daily, Logs reviewed unavailable for review  Diet/Exercise:               Eating 2 meals per day, salad              Drink: no soda  Weight trend: stable  Diabetes Education: No  Diabetes Related Hospitalization:  No  Hx of pancreatitis, hx of thyroid cancer: No  Family history of diabetes: Yes, retired  Occupation: retired, active social life    Eye exam current (within one year): yes, DR: no  Reports cuts or ulcers on feet:   Denies  Statin: Taking, ACE/ARB: Taking    Diabetes lab work  Lab Results   Component Value Date    HGBA1C 8.2 (H) 01/22/2024    HGBA1C 6.9 (H) 09/20/2023    HGBA1C 6.6 (H) 06/26/2023    HGBA1C 7.4 (H) 02/28/2023     No results found for: "CPEPTIDE", "GLUTAMICACID", "ISLETCELLANT"   No results found for: "FRUCTOSAMINE"  Lab Results   Component Value Date    MICALBCREAT 32.2 (H) 08/18/2022     No results found for: "TJPOUAEB28"    Diabetes Management Status: Reviewed this office visit  Screening or Prevention Patient's value Goal Complete/Controlled?   Lipid profile " : 01/31/2022 Annually No     Dilated retinal exam : 07/28/2023 Annually Yes     Foot exam   Most Recent Foot Exam Date: Not Found Annually Yes       Reviewed past surgical, medical, family, social history and updated as appropriate.  Review of Systems: see HPI above    Objective:   /64   Pulse 60   Wt 120.4 kg (265 lb 6.4 oz)   BMI 32.31 kg/m²     Body mass index is 32.31 kg/m².  Vital signs reviewed    Physical Exam  Vitals and nursing note reviewed.   Constitutional:       Appearance: Normal appearance. He is well-developed. He is obese. He is not ill-appearing.   Neck:      Thyroid: No thyromegaly.   Pulmonary:      Effort: Pulmonary effort is normal. No respiratory distress.   Musculoskeletal:         General: Normal range of motion.      Cervical back: Normal range of motion.   Neurological:      General: No focal deficit present.      Mental Status: He is alert. Mental status is at baseline.   Psychiatric:         Mood and Affect: Mood normal.         Behavior: Behavior normal.       Lab Reviewed:   Lab Results   Component Value Date    CHOL 121 01/31/2022    HDL 30 (L) 01/31/2022    LDLCALC 61.8 (L) 01/31/2022    TRIG 146 01/31/2022    CHOLHDL 24.8 01/31/2022     Lab Results   Component Value Date     01/22/2024    K 4.5 01/22/2024     01/22/2024    CO2 28 01/22/2024     (H) 01/22/2024    BUN 15 01/22/2024    CREATININE 1.2 01/22/2024    CALCIUM 9.2 01/22/2024    PROT 7.1 11/27/2023    ALBUMIN 3.6 01/22/2024    BILITOT 0.6 11/27/2023    ALKPHOS 79 11/27/2023    AST 21 11/27/2023    ALT 25 11/27/2023    ANIONGAP 8 01/22/2024    ESTGFRAFRICA >60.0 04/19/2022    EGFRNONAA 58.4 (A) 04/19/2022    TSH 2.819 01/31/2022    LFMGRDCF58JV 18 (L) 06/26/2023     Assessment     1. Type 2 diabetes mellitus with hyperglycemia, without long-term current use of insulin  glipiZIDE (GLUCOTROL) 2.5 MG TR24    tirzepatide (MOUNJARO) 7.5 mg/0.5 mL PnIj    HEMOGLOBIN A1C    Basic Metabolic Panel      2.  Essential hypertension, benign        3. Vitamin D deficiency        4. History of colon cancer        5. Class 1 obesity due to excess calories with serious comorbidity and body mass index (BMI) of 33.0 to 33.9 in adult          Plan     Type 2 diabetes mellitus with hyperglycemia, without long-term current use of insulin  - Diabetes is not at goal given most current A1C, Goal A1C for patient is 7%  - Much better glycemic control noted, still with occasional postprandial hyperglycemia on glucose log  - Diabetic supplies/medications reviewed this visit to ensure continue refills and compliance  - Advised patient to get periodic eye and feet exam.   - Reviewed routine maintenance: lipid, U:P/C     Plan  - encourage better dietary changes: Encourage patient to continue dietary modification, portion size control, decreasing carbohydrates intake  - continue 4.5 mg once a week injection, finish out current supply.  Given worsening A1c, switch patient to Mounjaro 7.5 mg once a week injection,  - restart low-dose glipizide 2.5 mg once daily  - Advised patient to monitor for hypoglycemia and treatment of hypoglycemia discussed  - Advised pt to check glucoses regularly, asked to filled glucose log and bring back for review at next office visit  - Clear written instruction given on AVS, Follow up as scheduled    Essential hypertension, benign  - BP reviewed today  - continue home medication (s)  - manage by PCP    Vitamin D deficiency  - vitamin-D deficiency noted on lab work 12/2022   - continue ergocalciferol 93059 IU once a week  - add vitamin D3 OTC 2000 IU daily  - repeat lab work every 6 mo    History of colon cancer  - with resection, colectomy causing diarrhea with metformin.    - no further issue with diarrhea since stopping metformin    Class 1 obesity due to excess calories with serious comorbidity and body mass index (BMI) of 33.0 to 33.9 in adult  - Body mass index is 32.31 kg/m².  - dietary discussion as  above  - continue to monitor weight  - Trulicity for now, then switch to Mounjaro    Advised patient to follow up with PCP for routine health maintenance care.   RTC in 3 months    Yordan Varela M.D.  Endocrinology  [facility]  1/29/2024      Disclaimer: This note has been generated in part with the use of voice-recognition software. There may be typographical errors that have been missed during proof-reading.

## 2024-02-01 ENCOUNTER — OFFICE VISIT (OUTPATIENT)
Dept: FAMILY MEDICINE | Facility: CLINIC | Age: 79
End: 2024-02-01
Payer: MEDICARE

## 2024-02-01 ENCOUNTER — TELEPHONE (OUTPATIENT)
Dept: FAMILY MEDICINE | Facility: CLINIC | Age: 79
End: 2024-02-01

## 2024-02-01 VITALS
HEART RATE: 60 BPM | TEMPERATURE: 98 F | DIASTOLIC BLOOD PRESSURE: 68 MMHG | SYSTOLIC BLOOD PRESSURE: 104 MMHG | BODY MASS INDEX: 31.99 KG/M2 | WEIGHT: 262.81 LBS | OXYGEN SATURATION: 94 %

## 2024-02-01 DIAGNOSIS — R11.0 NAUSEA: Primary | ICD-10-CM

## 2024-02-01 PROBLEM — U07.1 COVID: Status: RESOLVED | Noted: 2022-05-16 | Resolved: 2024-02-01

## 2024-02-01 PROCEDURE — 99999 PR PBB SHADOW E&M-EST. PATIENT-LVL IV: CPT | Mod: PBBFAC,,, | Performed by: INTERNAL MEDICINE

## 2024-02-01 PROCEDURE — 99214 OFFICE O/P EST MOD 30 MIN: CPT | Mod: PBBFAC,PO | Performed by: INTERNAL MEDICINE

## 2024-02-01 PROCEDURE — 99213 OFFICE O/P EST LOW 20 MIN: CPT | Mod: S$PBB,,, | Performed by: INTERNAL MEDICINE

## 2024-02-01 RX ORDER — PHOSPHORATED CARBOHYDRATE 1.87; 21.5; 1.87 G/5ML; MG/5ML; G/5ML
15 SOLUTION ORAL 4 TIMES DAILY PRN
Qty: 118 ML | Refills: 0 | Status: SHIPPED | OUTPATIENT
Start: 2024-02-01

## 2024-02-01 RX ORDER — ONDANSETRON 4 MG/1
4 TABLET, ORALLY DISINTEGRATING ORAL ONCE
Qty: 1 TABLET | Refills: 0 | Status: SHIPPED | OUTPATIENT
Start: 2024-02-01 | End: 2024-02-01

## 2024-02-01 NOTE — PROGRESS NOTES
Chief Complaint: Nausea (Pt has been having a queasy stomach since Tuesday )      Rosendo Thomas  is a 78 y.o. year old patient who presents today for nausea    Patient reports feeling nauseous since Tuesday. Has not vomited. Has mild abd cramping. No changes in BM but does have abd distension. Has not taken any medication. One of his friends was recently similarly sick over the weekend.       Past Surgical History:   Procedure Laterality Date    APPENDECTOMY      COLONOSCOPY N/A 7/9/2018    Procedure: COLONOSCOPY;  Surgeon: Kameron Ruff MD;  Location: [facility];  Service: Endoscopy;  Laterality: N/A;  confirmed    COLONOSCOPY N/A 11/11/2019    Procedure: COLONOSCOPY;  Surgeon: Victor Hugo Nunez MD;  Location: [facility] (4TH FLR);  Service: Endoscopy;  Laterality: N/A;  Pt stated this is the only day he has somebody to drive him and  to stay during procedure  PM Prep    COLONOSCOPY N/A 7/8/2020    Procedure: COLONOSCOPY;  Surgeon: Victor Hugo Nunez MD;  Location: [facility] (2ND FLR);  Service: Endoscopy;  Laterality: N/A;  Covid-19 test 7/5/20 at [facility]    LAPAROSCOPIC LEFT COLECTOMY N/A 9/25/2018    Procedure: COLECTOMY-LAPAROSCOPIC LEFT;  Surgeon: Chito Banks MD;  Location: [facility] OR 2ND FLR;  Service: Colon and Rectal;  Laterality: N/A;    SKIN CANCER EXCISION      SKIN GRAFT      VASECTOMY      VASECTOMY          Family History   Problem Relation Age of Onset    Cancer Father         prostate    Diabetes Brother     Liver cancer Brother     Anesthesia problems Neg Hx         Social History     Socioeconomic History    Marital status: Other   Occupational History     Employer: [employer]   Tobacco Use    Smoking status: Former     Types: Cigars    Smokeless tobacco: Never    Tobacco comments:     cigars-x1 yr.   Substance and Sexual Activity    Alcohol use: Yes     Comment: occassional    Drug use: No    Sexual activity: Yes     Partners: Female      Social Determinants of Health     Financial Resource Strain: Low Risk  (9/15/2023)    Overall Financial Resource Strain (CARDIA)     Difficulty of Paying Living Expenses: Not hard at all   Food Insecurity: No Food Insecurity (9/15/2023)    Hunger Vital Sign     Worried About Running Out of Food in the Last Year: Never true     Ran Out of Food in the Last Year: Never true   Transportation Needs: No Transportation Needs (9/15/2023)    PRAPARE - Transportation     Lack of Transportation (Medical): No     Lack of Transportation (Non-Medical): No   Physical Activity: Inactive (9/15/2023)    Exercise Vital Sign     Days of Exercise per Week: 0 days     Minutes of Exercise per Session: 0 min   Stress: No Stress Concern Present (9/15/2023)    [place] [employer] - Occupational Stress Questionnaire     Feeling of Stress : Not at all   Social Connections: Moderately Integrated (9/15/2023)    Social Connection and Isolation Panel [NHANES]     Frequency of Communication with Friends and Family: More than three times a week     Frequency of Social Gatherings with Friends and Family: More than three times a week     Attends Yarsanism Services: More than 4 times per year     Active Member of Clubs or Organizations: Yes     Attends Club or Organization Meetings: More than 4 times per year     Marital Status:    Housing Stability: Unknown (9/15/2023)    Housing Stability Vital Sign     Unable to Pay for Housing in the Last Year: No     Unstable Housing in the Last Year: No         Current Outpatient Medications:     acetaminophen (TYLENOL ORAL), Take by mouth., Disp: , Rfl:     atorvastatin (LIPITOR) 20 MG tablet, take 1 tablet by mouth once daily, Disp: 90 tablet, Rfl: 3    benzonatate (TESSALON) 100 MG capsule, Take 100 mg by mouth., Disp: , Rfl:     blood sugar diagnostic (CONTOUR TEST STRIPS) Strp, 1 each by Misc.(Non-Drug; Combo Route) route 2 (two) times daily., Disp: 50 each, Rfl: 11     cetirizine (ZYRTEC) 10 MG tablet, Take 10 mg by mouth daily as needed., Disp: , Rfl:     ciclopirox (PENLAC) 8 % Soln, Apply topically nightly., Disp: 6.6 mL, Rfl: 11    clobetasol (TEMOVATE) 0.05 % cream, Apply topically 2 (two) times daily. To psoriasis PRN, Disp: 60 g, Rfl: 3    dulaglutide (TRULICITY) 4.5 mg/0.5 mL pen injector, Inject 4.5 mg into the skin every 7 days., Disp: 4 pen , Rfl: 11    ergocalciferol (ERGOCALCIFEROL) 50,000 unit Cap, Take 1 capsule (50,000 Units total) by mouth every 7 days., Disp: 13 capsule, Rfl: 3    fluocinonide (LIDEX) 0.05 % external solution, Apply topically 2 (two) times daily. To itching in scalp area prn, Disp: 60 mL, Rfl: 3    fluticasone propionate (FLONASE) 50 mcg/actuation nasal spray, 2 sprays by Each Nostril route once daily., Disp: , Rfl:     glipiZIDE (GLUCOTROL) 2.5 MG TR24, Take 1 tablet (2.5 mg total) by mouth daily with breakfast., Disp: 90 tablet, Rfl: 3    lancets Misc, USE ONE LANCET TWO TIMES DAILY, Disp: 100 each, Rfl: 11    losartan-hydrochlorothiazide 100-25 mg (HYZAAR) 100-25 mg per tablet, take 1 tablet by mouth once daily, Disp: 90 tablet, Rfl: 0    sodium,potassium,mag sulfates (SUPREP BOWEL PREP KIT) 17.5-3.13-1.6 gram SolR, As Directed, Disp: 1 kit, Rfl: 0    TALTZ AUTOINJECTOR, 3 PACK, 80 mg/mL AtIn, Start Taltz at 160mg SQ on day 1 then 80mg SQ day 14 and qoweek through week 12, Disp: 8 mL, Rfl: 0    TALTZ AUTOINJECTOR, 3 PACK, 80 mg/mL AtIn, 1 - 80mg dose SQ q month, Disp: 3 mL, Rfl: 3    tirzepatide (MOUNJARO) 7.5 mg/0.5 mL PnIj, Inject 7.5 mg into the skin every 7 days. Give 90 day supply, Disp: 12 Pen, Rfl: 3    ondansetron (ZOFRAN-ODT) 4 MG TbDL, Take 1 tablet (4 mg total) by mouth once. for 1 dose, Disp: 1 tablet, Rfl: 0    phosphorated carbohydrate (EMETROL) Soln, Take 15 mLs by mouth 4 (four) times daily as needed (nausea)., Disp: 118 mL, Rfl: 0  No current facility-administered medications for this visit.    Facility-Administered  Medications Ordered in Other Visits:     enoxaparin injection 40 mg, 40 mg, Subcutaneous, Q24H, Florence Trevino NP, 40 mg at 10/07/18 2214    gabapentin capsule 300 mg, 300 mg, Oral, On Call Procedure, Florence Trevino NP, 300 mg at 09/25/18 1347     Review of Systems   Constitutional:  Negative for chills and fever.   Gastrointestinal:  Positive for abdominal pain and nausea. Negative for blood in stool, constipation, diarrhea, melena and vomiting.   Genitourinary:  Negative for dysuria.        Objective:      Vitals:    02/01/24 1515   BP: 104/68   Pulse: 60   Temp: 98.2 °F (36.8 °C)       Physical Exam  Constitutional:       Appearance: Normal appearance. He is obese.   Abdominal:      General: Bowel sounds are normal. There is distension.      Palpations: Abdomen is soft. There is no mass.      Tenderness: There is no abdominal tenderness.      Hernia: No hernia is present.   Neurological:      Mental Status: He is alert.          Assessment:       1. Nausea          Plan:   1. Nausea  Assessment & Plan:  Acute, likely viral gastroenteritis.     - advised patient to eat more bland food until symptoms resolve such as porridge. To avoid red meat and fatty food  - zofran and emetrol prn   - advised to stay hydrated   - advised to follow up if his symptoms don't resolve in two weeks       Orders:  -     ondansetron (ZOFRAN-ODT) 4 MG TbDL; Take 1 tablet (4 mg total) by mouth once. for 1 dose  Dispense: 1 tablet; Refill: 0  -     phosphorated carbohydrate (EMETROL) Soln; Take 15 mLs by mouth 4 (four) times daily as needed (nausea).  Dispense: 118 mL; Refill: 0         No follow-ups on file.

## 2024-02-01 NOTE — PROGRESS NOTES
Health Maintenance Due   Topic     TETANUS VACCINE      Shingles Vaccine (1 of 2)  hx chicken pox. Notified pt can get vaccine at pharmacy    RSV Vaccine (Age 60+ and Pregnant patients) (1 - 1-dose 60+ series) Not offered at this office    Colonoscopy  Consult pcp    Lipid Panel  Consult pcp    Diabetes Urine Screening  Consult pcp    Influenza Vaccine (1)     COVID-19 Vaccine (5 - 2023-24 season) Not offered at this office

## 2024-02-02 NOTE — ASSESSMENT & PLAN NOTE
Acute, likely viral gastroenteritis.     - advised patient to eat more bland food until symptoms resolve such as porridge. To avoid red meat and fatty food  - zofran and emetrol prn   - advised to stay hydrated   - advised to follow up if his symptoms don't resolve in two weeks

## 2024-03-26 ENCOUNTER — TELEPHONE (OUTPATIENT)
Dept: ENDOCRINOLOGY | Facility: CLINIC | Age: 79
End: 2024-03-26
Payer: MEDICARE

## 2024-03-26 DIAGNOSIS — E11.65 TYPE 2 DIABETES MELLITUS WITH HYPERGLYCEMIA, WITHOUT LONG-TERM CURRENT USE OF INSULIN: Primary | ICD-10-CM

## 2024-03-26 RX ORDER — DULAGLUTIDE 4.5 MG/.5ML
4.5 INJECTION, SOLUTION SUBCUTANEOUS
Qty: 4 PEN | Refills: 11 | Status: SHIPPED | OUTPATIENT
Start: 2024-03-26 | End: 2024-05-02 | Stop reason: SDUPTHER

## 2024-03-26 NOTE — TELEPHONE ENCOUNTER
Pt is asking for Trulicity to be sent to [facility]. Message sent to provider, understanding verbalized.

## 2024-03-26 NOTE — TELEPHONE ENCOUNTER
----- Message from Stoney Rod sent at 3/26/2024  4:27 PM CDT -----  Type: Patient Call Back    Who called:SELF    What is the request in detail:  tirzepatide (MOUNJARO) 7.5 mg/0.5 mL PnIj    IS BACK ORDERED FOR A MONTH, DO YOU PREFER MA GETTING TRULICITY     Can the clinic reply by [facility]?NO    Would the patient rather a call back or a response via [facility]? CALL    Best call back number:[phone] (home)       Additional Information:

## 2024-04-24 ENCOUNTER — LAB VISIT (OUTPATIENT)
Dept: LAB | Facility: HOSPITAL | Age: 79
End: 2024-04-24
Attending: HOSPITALIST
Payer: MEDICARE

## 2024-04-24 DIAGNOSIS — E11.65 TYPE 2 DIABETES MELLITUS WITH HYPERGLYCEMIA, WITHOUT LONG-TERM CURRENT USE OF INSULIN: ICD-10-CM

## 2024-04-24 LAB
ANION GAP SERPL CALC-SCNC: 9 MMOL/L (ref 8–16)
BUN SERPL-MCNC: 17 MG/DL (ref 8–23)
CALCIUM SERPL-MCNC: 9.3 MG/DL (ref 8.7–10.5)
CHLORIDE SERPL-SCNC: 102 MMOL/L (ref 95–110)
CO2 SERPL-SCNC: 28 MMOL/L (ref 23–29)
CREAT SERPL-MCNC: 1.1 MG/DL (ref 0.5–1.4)
EST. GFR  (NO RACE VARIABLE): >60 ML/MIN/1.73 M^2
ESTIMATED AVG GLUCOSE: 148 MG/DL (ref 68–131)
GLUCOSE SERPL-MCNC: 260 MG/DL (ref 70–110)
HBA1C MFR BLD: 6.8 % (ref 4–5.6)
POTASSIUM SERPL-SCNC: 4 MMOL/L (ref 3.5–5.1)
SODIUM SERPL-SCNC: 139 MMOL/L (ref 136–145)

## 2024-04-24 PROCEDURE — 36415 COLL VENOUS BLD VENIPUNCTURE: CPT | Mod: PO | Performed by: HOSPITALIST

## 2024-04-24 PROCEDURE — 83036 HEMOGLOBIN GLYCOSYLATED A1C: CPT | Performed by: HOSPITALIST

## 2024-04-24 PROCEDURE — 80048 BASIC METABOLIC PNL TOTAL CA: CPT | Performed by: HOSPITALIST

## 2024-05-02 ENCOUNTER — OFFICE VISIT (OUTPATIENT)
Dept: ENDOCRINOLOGY | Facility: CLINIC | Age: 79
End: 2024-05-02
Payer: MEDICARE

## 2024-05-02 VITALS
WEIGHT: 262.63 LBS | BODY MASS INDEX: 31.96 KG/M2 | HEART RATE: 70 BPM | SYSTOLIC BLOOD PRESSURE: 102 MMHG | DIASTOLIC BLOOD PRESSURE: 58 MMHG

## 2024-05-02 DIAGNOSIS — I10 ESSENTIAL HYPERTENSION, BENIGN: ICD-10-CM

## 2024-05-02 DIAGNOSIS — E55.9 VITAMIN D DEFICIENCY: ICD-10-CM

## 2024-05-02 DIAGNOSIS — E08.3293 DIABETES MELLITUS DUE TO UNDERLYING CONDITION WITH BOTH EYES AFFECTED BY MILD NONPROLIFERATIVE RETINOPATHY WITHOUT MACULAR EDEMA, WITHOUT LONG-TERM CURRENT USE OF INSULIN: ICD-10-CM

## 2024-05-02 DIAGNOSIS — E11.65 TYPE 2 DIABETES MELLITUS WITH HYPERGLYCEMIA, WITHOUT LONG-TERM CURRENT USE OF INSULIN: Primary | ICD-10-CM

## 2024-05-02 DIAGNOSIS — E66.09 CLASS 1 OBESITY DUE TO EXCESS CALORIES WITH SERIOUS COMORBIDITY AND BODY MASS INDEX (BMI) OF 33.0 TO 33.9 IN ADULT: ICD-10-CM

## 2024-05-02 PROCEDURE — 99214 OFFICE O/P EST MOD 30 MIN: CPT | Mod: S$PBB,,, | Performed by: HOSPITALIST

## 2024-05-02 PROCEDURE — 99999 PR PBB SHADOW E&M-EST. PATIENT-LVL IV: CPT | Mod: PBBFAC,,, | Performed by: HOSPITALIST

## 2024-05-02 PROCEDURE — 99214 OFFICE O/P EST MOD 30 MIN: CPT | Mod: PBBFAC | Performed by: HOSPITALIST

## 2024-05-02 RX ORDER — DULAGLUTIDE 4.5 MG/.5ML
4.5 INJECTION, SOLUTION SUBCUTANEOUS
Qty: 12 PEN | Refills: 3 | Status: SHIPPED | OUTPATIENT
Start: 2024-05-02 | End: 2024-06-04

## 2024-05-02 RX ORDER — GLIPIZIDE 2.5 MG/1
5 TABLET, EXTENDED RELEASE ORAL
Qty: 180 TABLET | Refills: 3 | Status: SHIPPED | OUTPATIENT
Start: 2024-05-02 | End: 2025-05-02

## 2024-05-02 RX ORDER — LOSARTAN POTASSIUM AND HYDROCHLOROTHIAZIDE 25; 100 MG/1; MG/1
1 TABLET ORAL
Qty: 90 TABLET | Refills: 0 | Status: SHIPPED | OUTPATIENT
Start: 2024-05-02

## 2024-05-02 NOTE — ASSESSMENT & PLAN NOTE
- Body mass index is 31.96 kg/m².  - dietary discussion as above  - continue to monitor weight  - Trulicity

## 2024-05-02 NOTE — PATIENT INSTRUCTIONS
Glipizide 2.5 mg once a day (can increase to 2 pills if needed)    Trulicity 4.5 mg once a week    Lab work in 3 months.

## 2024-05-02 NOTE — TELEPHONE ENCOUNTER
Care Due:                  Date            Visit Type   Department     Provider  --------------------------------------------------------------------------------                                EP -                              PRIMARY      [facility]  Last Visit: 02-      CARE ([facility])   MEDICINE       Dede Eric  Next Visit: None Scheduled  None         None Found                                                            Last  Test          Frequency    Reason                     Performed    Due Date  --------------------------------------------------------------------------------    Lipid Panel.  12 months..  atorvastatin.............  01- 01-    [facility] Embedded Care Due Messages. Reference number: [id number].   5/02/2024 2:18:39 PM CDT

## 2024-05-02 NOTE — ASSESSMENT & PLAN NOTE
- Diabetes is improving given most current A1C, Goal A1C for patient is 7%  - Much better glycemic control noted, still with occasional postprandial hyperglycemia on glucose log  - Diabetic supplies/medications reviewed this visit to ensure continue refills and compliance  - Advised patient to get periodic eye and feet exam.   - Reviewed routine maintenance: lipid, U:P/C     Plan  - Encourage better dietary changes: Encourage patient to continue dietary modification, portion size control, decreasing carbohydrates intake  - Continue Trulicity 4.5 mg once a week injection, finish out current supply.  Unable to get Mounjaro 7.5 mg once a week injection  - Increase glipizide 2.5 mg once twice a day  - Advised patient to monitor for hypoglycemia and treatment of hypoglycemia discussed  - Advised pt to check glucoses regularly, asked to filled glucose log and bring back for review at next office visit  - Clear written instruction given on AVS, Follow up as scheduled

## 2024-05-02 NOTE — PROGRESS NOTES
"Subjective:      Patient ID: Rosendo Thomas is a 78 y.o. male presented to [facility] on 5/2/2024.  Chief Complaint:  Diabetes    History of Present Illness: Rosendo Thomas is a 78 y.o. male here for  Type 2 diabetes  Other significant past medical history: CKD3a, psoriasis     Diabetes Mellitus Type 2  - Known diabetic complications: nephropathy and retinopathy  - Diagnosed w/ DM: in 2010    Interval history: Here for diabetes, diabetes control worsened due to dietary indiscretion, A1c improved  8.2% to 6.8%  No weight loss noted.  He is not concern.  Did not bring glucose log for review.  But denies hypoglycemia.  Unfortunately unable to get Mounjaro due to shortages.  Currently on Trulicity 4.5 mg once a week.    Also on glipizide 2.5 mg daily.      Current meds:    Trulicity 4.5 mg once a week   Glipzide 2.5mg once a week  Injection Technique: Good  Rotation of injection site: Yes   Previous meds:    Metformin: stop due to frequent diarrhea   Home glucose checks: checks daily, Logs reviewed unavailable for review  Diet/Exercise:               Eating 2 meals per day, salad              Drink: no soda  Weight trend: stable  Diabetes Education: No  Diabetes Related Hospitalization:  No  Hx of pancreatitis, hx of thyroid cancer: No  Family history of diabetes: Yes, retired  Occupation: retired, active social life    Eye exam current (within one year): yes, DR: no  Reports cuts or ulcers on feet:   Denies  Statin: Taking, ACE/ARB: Taking    Diabetes lab work  Lab Results   Component Value Date    HGBA1C 6.8 (H) 04/24/2024    HGBA1C 8.2 (H) 01/22/2024    HGBA1C 6.9 (H) 09/20/2023    HGBA1C 6.6 (H) 06/26/2023     No results found for: "CPEPTIDE", "GLUTAMICACID", "ISLETCELLANT"   No results found for: "FRUCTOSAMINE"  Lab Results   Component Value Date    MICALBCREAT 32.2 (H) 08/18/2022     No results found for: "[id number]"    Diabetes Management Status: Reviewed this office visit  Screening or " Prevention Patient's value Goal Complete/Controlled?   Lipid profile : 01/31/2022 Annually No     Dilated retinal exam : 07/28/2023 Annually Yes     Foot exam   Most Recent Foot Exam Date: Not Found Annually Yes       Reviewed past surgical, medical, family, social history and updated as appropriate.  Review of Systems: see HPI above    Objective:   BP (!) 102/58   Pulse 70   Wt 119.1 kg (262 lb 9.6 oz)   BMI 31.96 kg/m²     Body mass index is 31.96 kg/m².  Vital signs reviewed    Physical Exam  Vitals and nursing note reviewed.   Constitutional:       Appearance: Normal appearance. He is well-developed. He is obese. He is not ill-appearing.   Neck:      Thyroid: No thyromegaly.   Pulmonary:      Effort: Pulmonary effort is normal. No respiratory distress.   Musculoskeletal:         General: Normal range of motion.      Cervical back: Normal range of motion.   Neurological:      General: No focal deficit present.      Mental Status: He is alert. Mental status is at baseline.   Psychiatric:         Mood and Affect: Mood normal.         Behavior: Behavior normal.     Lab Reviewed:  See results in subjective  Lab Results   Component Value Date    HGBA1C 6.8 (H) 04/24/2024     Lab Results   Component Value Date    CHOL 121 01/31/2022    HDL 30 (L) 01/31/2022    LDLCALC 61.8 (L) 01/31/2022    TRIG 146 01/31/2022    CHOLHDL 24.8 01/31/2022     Lab Results   Component Value Date     04/24/2024    K 4.0 04/24/2024     04/24/2024    CO2 28 04/24/2024     (H) 04/24/2024    BUN 17 04/24/2024    CREATININE 1.1 04/24/2024    CALCIUM 9.3 04/24/2024    PHOS 2.8 01/22/2024    PROT 7.1 11/27/2023    ALBUMIN 3.6 01/22/2024    BILITOT 0.6 11/27/2023    ALKPHOS 79 11/27/2023    AST 21 11/27/2023    ALT 25 11/27/2023    ANIONGAP 9 04/24/2024    EGFRNORACEVR >60.0 04/24/2024    TSH 2.819 01/31/2022    [id number] 18 (L) 06/26/2023      Assessment     1. Type 2 diabetes mellitus with hyperglycemia, without long-term  current use of insulin  HEMOGLOBIN A1C    Basic Metabolic Panel    dulaglutide (TRULICITY) 4.5 mg/0.5 mL pen injector    glipiZIDE (GLUCOTROL) 2.5 MG TR24      2. Vitamin D deficiency  Vitamin D      3. Essential hypertension, benign        4. Class 1 obesity due to excess calories with serious comorbidity and body mass index (BMI) of 33.0 to 33.9 in adult        5. Diabetes mellitus due to underlying condition with both eyes affected by mild nonproliferative retinopathy without macular edema, without long-term current use of insulin          Plan     Type 2 diabetes mellitus with hyperglycemia, without long-term current use of insulin  - Diabetes is improving given most current A1C, Goal A1C for patient is 7%  - Much better glycemic control noted, still with occasional postprandial hyperglycemia on glucose log  - Diabetic supplies/medications reviewed this visit to ensure continue refills and compliance  - Advised patient to get periodic eye and feet exam.   - Reviewed routine maintenance: lipid, U:P/C     Plan  - Encourage better dietary changes: Encourage patient to continue dietary modification, portion size control, decreasing carbohydrates intake  - Continue Trulicity 4.5 mg once a week injection, finish out current supply.  Unable to get Mounjaro 7.5 mg once a week injection  - Increase glipizide 2.5 mg once twice a day  - Advised patient to monitor for hypoglycemia and treatment of hypoglycemia discussed  - Advised pt to check glucoses regularly, asked to filled glucose log and bring back for review at next office visit  - Clear written instruction given on AVS, Follow up as scheduled    Essential hypertension, benign  - BP reviewed today  - continue home medication (s)  - manage by PCP    Vitamin D deficiency  - vitamin-D deficiency noted on lab work 12/2022   - continue ergocalciferol 64993 IU once a week  - add vitamin D3 OTC 2000 IU daily  - repeat lab work every 6 mo    Class 1 obesity due to excess  calories with serious comorbidity and body mass index (BMI) of 33.0 to 33.9 in adult  - Body mass index is 31.96 kg/m².  - dietary discussion as above  - continue to monitor weight  - Trulicity      Diabetes mellitus due to underlying condition with both eyes affected by mild nonproliferative retinopathy without macular edema, without long-term current use of insulin  - optimize diabetes control, continue follow-up with Ophthalmology    Advised patient to follow up with PCP for routine health maintenance care.   RTC in 3 months    Yordan Varela M.D.  Endocrinology  [facility]  5/2/2024      Disclaimer: This note has been generated in part with the use of voice-recognition software. There may be typographical errors that have been missed during proof-reading.

## 2024-05-02 NOTE — ASSESSMENT & PLAN NOTE
- vitamin-D deficiency noted on lab work 12/2022   - continue ergocalciferol 86111 IU once a week  - add vitamin D3 OTC 2000 IU daily  - repeat lab work every 6 mo

## 2024-06-04 ENCOUNTER — TELEPHONE (OUTPATIENT)
Dept: ENDOCRINOLOGY | Facility: CLINIC | Age: 79
End: 2024-06-04
Payer: MEDICARE

## 2024-06-04 DIAGNOSIS — E11.65 TYPE 2 DIABETES MELLITUS WITH HYPERGLYCEMIA, WITHOUT LONG-TERM CURRENT USE OF INSULIN: Primary | ICD-10-CM

## 2024-06-04 RX ORDER — TIRZEPATIDE 5 MG/.5ML
5 INJECTION, SOLUTION SUBCUTANEOUS
Qty: 4 PEN | Refills: 4 | Status: SHIPPED | OUTPATIENT
Start: 2024-06-04

## 2024-06-04 NOTE — TELEPHONE ENCOUNTER
Pt stated Trulicity is on backorder. The pharmacy informed him he was on mounjaro before and to see if he can get again. Pt is requesting mounjajro be sent to [facility]. Understanding verbalized.

## 2024-06-04 NOTE — TELEPHONE ENCOUNTER
----- Message from Owen Lange sent at 6/4/2024  9:25 AM CDT -----  Regarding: self  Type: Patient Call Back    Who called:self    What is the request in detail:pt is calling to get dulaglutide (TRULICITY) 4.5 mg/0.5 mL pen injector changed to mounjoro because its on back order. Would like to speak to office     Can the clinic reply by [facility]?no    Would the patient rather a call back or a response via [facility]? callback    Best call back number:[phone]    Additional Information:

## 2024-07-21 DIAGNOSIS — E11.22 CONTROLLED TYPE 2 DIABETES MELLITUS WITH STAGE 3 CHRONIC KIDNEY DISEASE, WITHOUT LONG-TERM CURRENT USE OF INSULIN: ICD-10-CM

## 2024-07-21 DIAGNOSIS — N18.30 CONTROLLED TYPE 2 DIABETES MELLITUS WITH STAGE 3 CHRONIC KIDNEY DISEASE, WITHOUT LONG-TERM CURRENT USE OF INSULIN: ICD-10-CM

## 2024-07-21 NOTE — TELEPHONE ENCOUNTER
Care Due:                  Date            Visit Type   Department     Provider  --------------------------------------------------------------------------------                                EP -                              PRIMARY      [facility]  Last Visit: 02-      CARE ([facility])   MEDICINE       Dede Eric  Next Visit: None Scheduled  None         None Found                                                            Last  Test          Frequency    Reason                     Performed    Due Date  --------------------------------------------------------------------------------    Lipid Panel.  12 months..  atorvastatin.............  01- 01-    [facility] Embedded Care Due Messages. Reference number: [id number].   7/21/2024 8:04:43 AM CDT

## 2024-07-22 ENCOUNTER — OFFICE VISIT (OUTPATIENT)
Dept: RHEUMATOLOGY | Facility: CLINIC | Age: 79
End: 2024-07-22
Payer: MEDICARE

## 2024-07-22 VITALS
HEART RATE: 63 BPM | SYSTOLIC BLOOD PRESSURE: 116 MMHG | BODY MASS INDEX: 30.34 KG/M2 | DIASTOLIC BLOOD PRESSURE: 75 MMHG | WEIGHT: 249.13 LBS | HEIGHT: 76 IN

## 2024-07-22 DIAGNOSIS — L40.50 PSORIASIS WITH ARTHROPATHY: ICD-10-CM

## 2024-07-22 DIAGNOSIS — L40.50 PSORIATIC ARTHRITIS: Primary | ICD-10-CM

## 2024-07-22 PROCEDURE — 99999 PR PBB SHADOW E&M-EST. PATIENT-LVL III: CPT | Mod: PBBFAC,,, | Performed by: INTERNAL MEDICINE

## 2024-07-22 PROCEDURE — 99214 OFFICE O/P EST MOD 30 MIN: CPT | Mod: S$PBB,,, | Performed by: INTERNAL MEDICINE

## 2024-07-22 PROCEDURE — 99213 OFFICE O/P EST LOW 20 MIN: CPT | Mod: PBBFAC | Performed by: INTERNAL MEDICINE

## 2024-07-22 RX ORDER — IXEKIZUMAB 80 MG/ML
INJECTION, SOLUTION SUBCUTANEOUS
Qty: 3 ML | Refills: 1 | Status: SHIPPED | OUTPATIENT
Start: 2024-07-22

## 2024-07-22 RX ORDER — ATORVASTATIN CALCIUM 20 MG/1
TABLET, FILM COATED ORAL
Qty: 90 TABLET | Refills: 0 | OUTPATIENT
Start: 2024-07-22

## 2024-07-22 NOTE — PROGRESS NOTES
History of present illness:  78-year-old male originally seen by Dr. Sargent in 2018.  He presented at that time with a 10 year history of psoriasis but the more recent development of joint pain and swelling.  Was diagnosed as having psoriatic arthritis.  He has been on multiple therapies in the past.  He was last seen in 2022.  He had just been started on Taltz.  He was last seen by Dr. Wright in December.  He was doing well at that time.    He ran out of the Taltz and not get it refilled since his last derm visit.  His psoriasis is coming back.  He has had increased joint swelling in his hands.  He has had no other areas of pain.  The pain is bad in the morning.  Has 1 hour morning stiffness.  It does not increase with activity.  It does not wake him up at night.    Tylenol has given him some relief.  He has not used heat, ice, or topical medications.    Physical examination:  Skin: He has psoriatic plaques of the elbows and knees.  I did not look at other areas of skin.  Musculoskeletal: He has dactylitis of the right 3rd and 4th finger.    Assessment:  Recurrent psoriatic arthritis due to lack of medication.    Plans:  1.  I will resume his Taltz 80 mg monthly.  I did not give him the loading dose although it had previously been ordered but he does not remember taking it   2.  I recommend the use of Voltaren gel to his hands   3.  Continue Tylenol as before  4.  Return in 6 months

## 2024-08-13 ENCOUNTER — PATIENT OUTREACH (OUTPATIENT)
Dept: ADMINISTRATIVE | Facility: HOSPITAL | Age: 79
End: 2024-08-13
Payer: MEDICARE

## 2024-08-13 NOTE — PROGRESS NOTES
Population Health Chart Review & Patient Outreach Details      Additional Pop Health Notes:    DUE FOR COLONOSCOPY, URINE SCREENING AND LIPID PANEL. IF ALREADY DONE, NEED TO GET RECORDS INFORMATION.  LINKED URINE SCREENING AND LIPID PANEL TOWARD NEXT LAB APPOINTMENT.            Updates Requested / Reviewed:      Updated Care Coordination Note, Care Everywhere, and Care Team Updated         Health Maintenance Topics Overdue:      [facility] Score: 1     Lipid Panel    Tetanus Vaccine  Shingles/Zoster Vaccine  RSV Vaccine                  Health Maintenance Topic(s) Outreach Outcomes & Actions Taken:    Lab(s) - Outreach Outcomes & Actions Taken  : Overdue Lab(s) Scheduled

## 2024-08-21 DIAGNOSIS — N18.30 CONTROLLED TYPE 2 DIABETES MELLITUS WITH STAGE 3 CHRONIC KIDNEY DISEASE, WITHOUT LONG-TERM CURRENT USE OF INSULIN: ICD-10-CM

## 2024-08-21 DIAGNOSIS — I10 ESSENTIAL HYPERTENSION, BENIGN: ICD-10-CM

## 2024-08-21 DIAGNOSIS — E11.22 CONTROLLED TYPE 2 DIABETES MELLITUS WITH STAGE 3 CHRONIC KIDNEY DISEASE, WITHOUT LONG-TERM CURRENT USE OF INSULIN: ICD-10-CM

## 2024-08-21 RX ORDER — ATORVASTATIN CALCIUM 20 MG/1
TABLET, FILM COATED ORAL
Qty: 90 TABLET | Refills: 0 | Status: SHIPPED | OUTPATIENT
Start: 2024-08-21

## 2024-08-21 RX ORDER — LOSARTAN POTASSIUM AND HYDROCHLOROTHIAZIDE 25; 100 MG/1; MG/1
1 TABLET ORAL
Qty: 90 TABLET | Refills: 0 | Status: SHIPPED | OUTPATIENT
Start: 2024-08-21

## 2024-08-21 NOTE — TELEPHONE ENCOUNTER
No care due was identified.  [facility] Embedded Care Due Messages. Reference number: [id number].   8/21/2024 11:11:51 AM CDT

## 2024-08-21 NOTE — TELEPHONE ENCOUNTER
Last Office Visit Info:   The patient's last visit with Tory Hatch MD was on 5/25/2022.    The patient's last visit in current department was on Visit date not found.        Last CBC Results:   Lab Results   Component Value Date    WBC 5.59 11/27/2023    HGB 14.1 11/27/2023    HCT 44.1 11/27/2023     11/27/2023       Last CMP Results  Lab Results   Component Value Date     04/24/2024    K 4.0 04/24/2024     04/24/2024    CO2 28 04/24/2024    BUN 17 04/24/2024    CREATININE 1.1 04/24/2024    CALCIUM 9.3 04/24/2024    ALBUMIN 3.6 01/22/2024    AST 21 11/27/2023    ALT 25 11/27/2023       Last Lipids  Lab Results   Component Value Date    CHOL 121 01/31/2022    TRIG 146 01/31/2022    HDL 30 (L) 01/31/2022    LDLCALC 61.8 (L) 01/31/2022       Last A1C  Lab Results   Component Value Date    HGBA1C 6.8 (H) 04/24/2024       Last TSH  Lab Results   Component Value Date    TSH 2.819 01/31/2022             Current Med Refills  Medication List with Changes/Refills   Current Medications    ACETAMINOPHEN (TYLENOL ORAL)    Take by mouth.       Start Date: --        End Date: --    ATORVASTATIN (LIPITOR) 20 MG TABLET    take 1 tablet by mouth once daily       Start Date: 7/6/2023  End Date: --    BENZONATATE (TESSALON) 100 MG CAPSULE    Take 100 mg by mouth.       Start Date: 9/7/2023  End Date: --    BLOOD SUGAR DIAGNOSTIC (CONTOUR TEST STRIPS) STRP    1 each by Misc.(Non-Drug; Combo Route) route 2 (two) times daily.       Start Date: 10/13/2014End Date: --    CETIRIZINE (ZYRTEC) 10 MG TABLET    Take 10 mg by mouth daily as needed.       Start Date: 5/14/2022 End Date: --    CICLOPIROX (PENLAC) 8 % SOLN    Apply topically nightly.       Start Date: 1/19/2024 End Date: --    CLOBETASOL (TEMOVATE) 0.05 % CREAM    Apply topically 2 (two) times daily. To psoriasis PRN       Start Date: 8/2/2019  End Date: --    ERGOCALCIFEROL (ERGOCALCIFEROL) 50,000 UNIT CAP    Take 1 capsule (50,000 Units total) by mouth  every 7 days.       Start Date: 7/3/2023  End Date: --    FLUOCINONIDE (LIDEX) 0.05 % EXTERNAL SOLUTION    Apply topically 2 (two) times daily. To itching in scalp area prn       Start Date: 3/3/2021  End Date: --    FLUTICASONE PROPIONATE (FLONASE) 50 MCG/ACTUATION NASAL SPRAY    2 sprays by Each Nostril route once daily.       Start Date: 5/14/2022 End Date: --    GLIPIZIDE (GLUCOTROL) 2.5 MG TR24    Take 2 tablets (5 mg total) by mouth daily with breakfast.       Start Date: 5/2/2024  End Date: 5/2/2025    LANCETS MISC    USE ONE LANCET TWO TIMES DAILY       Start Date: 10/21/2014End Date: --    LOSARTAN-HYDROCHLOROTHIAZIDE 100-25 MG (HYZAAR) 100-25 MG PER TABLET    take 1 tablet by mouth once daily FOR BLOOD PRESSURE       Start Date: 5/2/2024  End Date: --    MOUNJARO 5 MG/0.5 ML PNIJ    Inject 5 mg into the skin every 7 days.       Start Date: 6/4/2024  End Date: --    PHOSPHORATED CARBOHYDRATE (EMETROL) SOLN    Take 15 mLs by mouth 4 (four) times daily as needed (nausea).       Start Date: 2/1/2024  End Date: --    SODIUM,POTASSIUM,MAG SULFATES (SUPREP BOWEL PREP KIT) 17.5-3.13-1.6 GRAM SOLR    As Directed       Start Date: 8/22/2022 End Date: --    TALTZ AUTOINJECTOR, 3 PACK, 80 MG/ML ATIN    1 - 80mg dose SQ q month       Start Date: 7/22/2024 End Date: --       Order(s) placed per written order guidelines: none    Please advise.

## 2024-09-04 ENCOUNTER — LAB VISIT (OUTPATIENT)
Dept: LAB | Facility: HOSPITAL | Age: 79
End: 2024-09-04
Attending: HOSPITALIST
Payer: MEDICARE

## 2024-09-04 DIAGNOSIS — E55.9 VITAMIN D DEFICIENCY: ICD-10-CM

## 2024-09-04 DIAGNOSIS — E11.65 TYPE 2 DIABETES MELLITUS WITH HYPERGLYCEMIA, WITHOUT LONG-TERM CURRENT USE OF INSULIN: ICD-10-CM

## 2024-09-04 LAB
25(OH)D3+25(OH)D2 SERPL-MCNC: 26 NG/ML (ref 30–96)
ANION GAP SERPL CALC-SCNC: 6 MMOL/L (ref 8–16)
BUN SERPL-MCNC: 19 MG/DL (ref 8–23)
CALCIUM SERPL-MCNC: 9.9 MG/DL (ref 8.7–10.5)
CHLORIDE SERPL-SCNC: 104 MMOL/L (ref 95–110)
CO2 SERPL-SCNC: 26 MMOL/L (ref 23–29)
CREAT SERPL-MCNC: 1.2 MG/DL (ref 0.5–1.4)
EST. GFR  (NO RACE VARIABLE): >60 ML/MIN/1.73 M^2
ESTIMATED AVG GLUCOSE: 131 MG/DL (ref 68–131)
GLUCOSE SERPL-MCNC: 165 MG/DL (ref 70–110)
HBA1C MFR BLD: 6.2 % (ref 4–5.6)
POTASSIUM SERPL-SCNC: 4.8 MMOL/L (ref 3.5–5.1)
SODIUM SERPL-SCNC: 136 MMOL/L (ref 136–145)

## 2024-09-04 PROCEDURE — 82306 VITAMIN D 25 HYDROXY: CPT | Performed by: HOSPITALIST

## 2024-09-04 PROCEDURE — 83036 HEMOGLOBIN GLYCOSYLATED A1C: CPT | Performed by: HOSPITALIST

## 2024-09-04 PROCEDURE — 80048 BASIC METABOLIC PNL TOTAL CA: CPT | Performed by: HOSPITALIST

## 2024-09-12 NOTE — SUBJECTIVE & OBJECTIVE
Past Medical History:   Diagnosis Date    Diabetes mellitus type II 7/2010    diet controlled    Nephrolithiasis     Obesity     Psoriasis     Skin cancer     Squamous cell carcinoma        Past Surgical History:   Procedure Laterality Date    APPENDECTOMY      COLECTOMY-LAPAROSCOPIC LEFT N/A 9/25/2018    Performed by Chito Banks [facility] at [facility] OR 2ND FLR    COLONOSCOPY N/A 7/9/2018    Procedure: COLONOSCOPY;  Surgeon: Kameron Ruff MD;  Location: [facility];  Service: Endoscopy;  Laterality: N/A;  confirmed    COLONOSCOPY N/A 7/9/2018    Performed by Kameron Ruff MD at [facility]    LAPAROSCOPIC LEFT COLECTOMY N/A 9/25/2018    Procedure: COLECTOMY-LAPAROSCOPIC LEFT;  Surgeon: Chito Banks MD;  [facility];  Service: Colon and Rectal;  Laterality: N/A;    SKIN CANCER EXCISION      SKIN GRAFT      VASECTOMY      VASECTOMY         Review of patient's allergies indicates:  No Known Allergies    No current facility-administered medications on file prior to encounter.      Current Outpatient Medications on File Prior to Encounter   Medication Sig    blood sugar diagnostic (CONTOUR TEST STRIPS) Strp 1 each by Misc.(Non-Drug; Combo Route) route 2 (two) times daily.    lancets Misc USE ONE LANCET TWO TIMES DAILY    metFORMIN (GLUCOPHAGE) 500 MG tablet Take 1.5 tablets (750 mg total) by mouth 2 (two) times daily with meals. (Patient taking differently: Take 500 mg by mouth daily with breakfast. )    olmesartan-hydrochlorothiazide (BENICAR HCT) 40-25 mg per tablet Take 1 tablet by mouth once daily. (Patient taking differently: Take 1 tablet by mouth every morning. )     Family History     Problem Relation (Age of Onset)    Cancer Father    Diabetes Brother        Tobacco Use    Smoking status: Light Tobacco Smoker     Years: 15.00     Types: Cigars    Smokeless tobacco: Never Used    Tobacco comment: cigars-x1 yr.   Substance and Sexual Activity    Alcohol use: Yes     Comment:  occassional    Drug use: No    Sexual activity: Yes     Partners: Female     Review of Systems   Constitution: Negative for chills, diaphoresis, fever and night sweats.   HENT: Negative.    Eyes: Negative for blurred vision and photophobia.   Cardiovascular: Negative for chest pain, claudication, cyanosis, dyspnea on exertion, irregular heartbeat, leg swelling, near-syncope, orthopnea, palpitations, paroxysmal nocturnal dyspnea and syncope.   Respiratory: Negative for cough, hemoptysis, shortness of breath and wheezing.    Skin: Negative for color change and rash.   Musculoskeletal: Negative for back pain and falls.   Gastrointestinal: Positive for abdominal pain. Negative for nausea and vomiting.   Genitourinary: Negative for dysuria and hematuria.   Neurological: Negative for focal weakness, numbness and seizures.   Psychiatric/Behavioral: Negative for altered mental status. The patient is not nervous/anxious.      Objective:     Vital Signs (Most Recent):  Temp: 99.3 °F (37.4 °C) (10/01/18 0730)  Pulse: 77 (10/01/18 1114)  Resp: 16 (10/01/18 0730)  BP: (!) 194/84 (10/01/18 0730)  SpO2: 95 % (10/01/18 0730) Vital Signs (24h Range):  Temp:  [98 °F (36.7 °C)-99.3 °F (37.4 °C)] 99.3 °F (37.4 °C)  Pulse:  [49-77] 77  Resp:  [16-20] 16  SpO2:  [94 %-95 %] 95 %  BP: (166-194)/(72-84) 194/84     Weight: 120.7 kg (266 lb)  Body mass index is 33.25 kg/m².    SpO2: 95 %  O2 Device (Oxygen Therapy): nasal cannula      Intake/Output Summary (Last 24 hours) at 10/1/2018 1254  Last data filed at 10/1/2018 0900  Gross per 24 hour   Intake 500 ml   Output 2050 ml   Net -1550 ml       Lines/Drains/Airways     Drain                 NG/OG Tube 10/01/18 0250 16 Fr. Left nostril less than 1 day          Peripheral Intravenous Line                 Peripheral IV - Single Lumen Anterior;Right Antecubital -- days                Physical Exam   Constitutional: He is oriented to person, place, and time. He appears well-developed and  well-nourished. No distress.   HENT:   Head: Normocephalic and atraumatic.   NG tube   Eyes: EOM are normal. Pupils are equal, round, and reactive to light.   Neck: Normal range of motion. No JVD present.   Cardiovascular: Normal rate, regular rhythm, normal heart sounds and intact distal pulses. Exam reveals no gallop and no friction rub.   No murmur heard.  Pulmonary/Chest: Effort normal and breath sounds normal. No respiratory distress. He has no wheezes. He has no rales.   Abdominal: Soft. Bowel sounds are normal. He exhibits no distension. There is no tenderness.   Abdominal incision c/d/i   Musculoskeletal: Normal range of motion. He exhibits no edema.   Neurological: He is alert and oriented to person, place, and time.   Skin: Skin is warm. No rash noted. He is not diaphoretic.   Psychiatric: He has a normal mood and affect. His behavior is normal.      Home

## 2024-09-25 DIAGNOSIS — Z00.00 ENCOUNTER FOR MEDICARE ANNUAL WELLNESS EXAM: ICD-10-CM

## 2024-10-07 ENCOUNTER — OFFICE VISIT (OUTPATIENT)
Dept: DERMATOLOGY | Facility: CLINIC | Age: 79
End: 2024-10-07
Payer: MEDICARE

## 2024-10-07 ENCOUNTER — LAB VISIT (OUTPATIENT)
Dept: LAB | Facility: HOSPITAL | Age: 79
End: 2024-10-07
Attending: DERMATOLOGY
Payer: MEDICARE

## 2024-10-07 DIAGNOSIS — L40.9 PSORIASIS: ICD-10-CM

## 2024-10-07 DIAGNOSIS — L40.50 PSORIASIS WITH ARTHROPATHY: ICD-10-CM

## 2024-10-07 DIAGNOSIS — Z79.899 ENCOUNTER FOR LONG-TERM CURRENT USE OF HIGH RISK MEDICATION: ICD-10-CM

## 2024-10-07 DIAGNOSIS — Z12.83 SKIN CANCER SCREENING: Primary | ICD-10-CM

## 2024-10-07 LAB
ALBUMIN SERPL BCP-MCNC: 4 G/DL (ref 3.5–5.2)
ALP SERPL-CCNC: 94 U/L (ref 55–135)
ALT SERPL W/O P-5'-P-CCNC: 18 U/L (ref 10–44)
ANION GAP SERPL CALC-SCNC: 7 MMOL/L (ref 8–16)
AST SERPL-CCNC: 19 U/L (ref 10–40)
BASOPHILS # BLD AUTO: 0.04 K/UL (ref 0–0.2)
BASOPHILS NFR BLD: 0.5 % (ref 0–1.9)
BILIRUB SERPL-MCNC: 0.5 MG/DL (ref 0.1–1)
BUN SERPL-MCNC: 16 MG/DL (ref 8–23)
CALCIUM SERPL-MCNC: 10.1 MG/DL (ref 8.7–10.5)
CHLORIDE SERPL-SCNC: 104 MMOL/L (ref 95–110)
CO2 SERPL-SCNC: 27 MMOL/L (ref 23–29)
CREAT SERPL-MCNC: 1 MG/DL (ref 0.5–1.4)
DIFFERENTIAL METHOD BLD: ABNORMAL
EOSINOPHIL # BLD AUTO: 0.1 K/UL (ref 0–0.5)
EOSINOPHIL NFR BLD: 1.4 % (ref 0–8)
ERYTHROCYTE [DISTWIDTH] IN BLOOD BY AUTOMATED COUNT: 14.8 % (ref 11.5–14.5)
EST. GFR  (NO RACE VARIABLE): >60 ML/MIN/1.73 M^2
GLUCOSE SERPL-MCNC: 96 MG/DL (ref 70–110)
HCT VFR BLD AUTO: 44.1 % (ref 40–54)
HGB BLD-MCNC: 14.1 G/DL (ref 14–18)
IMM GRANULOCYTES # BLD AUTO: 0.03 K/UL (ref 0–0.04)
IMM GRANULOCYTES NFR BLD AUTO: 0.4 % (ref 0–0.5)
LYMPHOCYTES # BLD AUTO: 1.9 K/UL (ref 1–4.8)
LYMPHOCYTES NFR BLD: 24.5 % (ref 18–48)
MCH RBC QN AUTO: 27.4 PG (ref 27–31)
MCHC RBC AUTO-ENTMCNC: 32 G/DL (ref 32–36)
MCV RBC AUTO: 86 FL (ref 82–98)
MONOCYTES # BLD AUTO: 0.5 K/UL (ref 0.3–1)
MONOCYTES NFR BLD: 7 % (ref 4–15)
NEUTROPHILS # BLD AUTO: 5 K/UL (ref 1.8–7.7)
NEUTROPHILS NFR BLD: 66.2 % (ref 38–73)
NRBC BLD-RTO: 0 /100 WBC
PLATELET # BLD AUTO: 177 K/UL (ref 150–450)
PMV BLD AUTO: 12 FL (ref 9.2–12.9)
POTASSIUM SERPL-SCNC: 4 MMOL/L (ref 3.5–5.1)
PROT SERPL-MCNC: 7.3 G/DL (ref 6–8.4)
RBC # BLD AUTO: 5.15 M/UL (ref 4.6–6.2)
SODIUM SERPL-SCNC: 138 MMOL/L (ref 136–145)
WBC # BLD AUTO: 7.59 K/UL (ref 3.9–12.7)

## 2024-10-07 PROCEDURE — 99214 OFFICE O/P EST MOD 30 MIN: CPT | Mod: S$PBB,,, | Performed by: DERMATOLOGY

## 2024-10-07 PROCEDURE — 36415 COLL VENOUS BLD VENIPUNCTURE: CPT | Performed by: DERMATOLOGY

## 2024-10-07 PROCEDURE — 85025 COMPLETE CBC W/AUTO DIFF WBC: CPT | Performed by: DERMATOLOGY

## 2024-10-07 PROCEDURE — 80053 COMPREHEN METABOLIC PANEL: CPT | Performed by: DERMATOLOGY

## 2024-10-07 PROCEDURE — G2211 COMPLEX E/M VISIT ADD ON: HCPCS | Mod: S$PBB,,, | Performed by: DERMATOLOGY

## 2024-10-07 RX ORDER — IXEKIZUMAB 80 MG/ML
INJECTION, SOLUTION SUBCUTANEOUS
Qty: 3 ML | Refills: 1 | Status: SHIPPED | OUTPATIENT
Start: 2024-10-07

## 2024-10-07 RX ORDER — IXEKIZUMAB 80 MG/ML
INJECTION, SOLUTION SUBCUTANEOUS
Qty: 8 ML | Refills: 0 | Status: SHIPPED | OUTPATIENT
Start: 2024-10-07

## 2024-10-13 NOTE — TELEPHONE ENCOUNTER
----- Message from Juanita Mosquera sent at 1/14/2019 10:34 AM CST -----  Contact: Self  Patient called because the pharmacy does not have listed medication and is asking for a substitution. Please call at [phone]        olmesartan-hydrochlorothiazide (BENICAR HCT) 40-25 mg per tablet 90 tablet    Vancomycin Dosing by Pharmacy- FOLLOW UP    Lul Lizama is a 49 y.o. year old adult who Pharmacy has been consulted for vancomycin dosing for cellulitis, skin and soft tissue. Based on the patient's indication and renal status this patient is being dosed based on a goal AUC of 400-600.     Renal function is currently stable.    Current vancomycin dose: 1000 mg given every 12 hours    Estimated vancomycin AUC on current dose: 557 mg/L.hr     Visit Vitals  /76 (BP Location: Right arm, Patient Position: Lying)   Pulse 61   Temp 36.2 °C (97.2 °F) (Temporal)   Resp 16        Lab Results   Component Value Date    CREATININE 1.30 10/11/2024        Patient weight is as follows:   Vitals:    10/11/24 2152   Weight: 98.1 kg (216 lb 3.2 oz)       Cultures:  No results found for the encounter in last 14 days.       I/O last 3 completed shifts:  In: 1070 (10.9 mL/kg) [P.O.:670; IV Piggyback:400]  Out: - (0 mL/kg)   Weight: 98.1 kg   I/O during current shift:  No intake/output data recorded.    Temp (24hrs), Av.3 °C (97.4 °F), Min:36.1 °C (97 °F), Max:36.7 °C (98.1 °F)      Assessment/Plan    Within goal AUC range. Continue current vancomycin regimen.    This dosing regimen is predicted by [employer] to result in the following pharmacokinetic parameters:  Loading dose: N/A  Regimen: 1000 mg IV every 12 hours.  Start time: 16:09 on 10/13/2024  Exposure target: AUC24 (range)400-600 mg/L.hr   [id number]: 527 mg/L.hr  AUC24,ss: 557 mg/L.hr  Probability of AUC24 > 400: 91 %  Ctrough,ss: 17.7 mg/L  Probability of Ctrough,ss > 20: 36 %    The next level will be obtained on 10/16 at first AM labs. May be obtained sooner if clinically indicated.   Will continue to monitor renal function daily while on vancomycin and order serum creatinine at least every 48 hours if not already ordered.  Follow for continued vancomycin needs, clinical response, and signs/symptoms of toxicity.     Thank you,  Kelsie Romero, PharmD, BCPS

## 2024-12-02 ENCOUNTER — OFFICE VISIT (OUTPATIENT)
Dept: PODIATRY | Facility: CLINIC | Age: 79
End: 2024-12-02
Payer: MEDICARE

## 2024-12-02 VITALS — WEIGHT: 249.13 LBS | BODY MASS INDEX: 30.34 KG/M2 | HEIGHT: 76 IN

## 2024-12-02 DIAGNOSIS — B35.1 ONYCHOMYCOSIS DUE TO DERMATOPHYTE: ICD-10-CM

## 2024-12-02 DIAGNOSIS — E11.49 TYPE II DIABETES MELLITUS WITH NEUROLOGICAL MANIFESTATIONS: Primary | ICD-10-CM

## 2024-12-02 PROCEDURE — 99213 OFFICE O/P EST LOW 20 MIN: CPT | Mod: PBBFAC,PO,25 | Performed by: PODIATRIST

## 2024-12-02 PROCEDURE — 11721 DEBRIDE NAIL 6 OR MORE: CPT | Mod: Q9,PBBFAC,PO | Performed by: PODIATRIST

## 2024-12-02 PROCEDURE — 99999 PR PBB SHADOW E&M-EST. PATIENT-LVL III: CPT | Mod: PBBFAC,,, | Performed by: PODIATRIST

## 2024-12-02 NOTE — PROGRESS NOTES
Subjective:      Patient ID: Rosendo Thomas is a 79 y.o. male.    Chief Complaint: Ingrown Toenail    Thick hard painful toenails both hallux toenails.  Gradual onset, worsening over past several weeks, aggravated by increased weight bearing, shoe gear, pressure.  Periodic trimming and prior nail procedures helped temporarily. Denies trauma, surgery.    Chief Complaint   Patient presents with    Ingrown Toenail     Casual shoes        Review of Systems   Constitutional: Negative for chills, diaphoresis, fever, malaise/fatigue and night sweats.   Cardiovascular:  Negative for claudication, cyanosis, leg swelling and syncope.   Skin:  Positive for nail changes. Negative for color change, dry skin, rash, suspicious lesions and unusual hair distribution.   Musculoskeletal:  Negative for falls, joint pain, joint swelling, muscle cramps, muscle weakness and stiffness.   Gastrointestinal:  Negative for constipation, diarrhea, nausea and vomiting.   Neurological:  Negative for brief paralysis, disturbances in coordination, focal weakness, numbness, paresthesias, sensory change and tremors.           Objective:      Physical Exam  Constitutional:       General: He is not in acute distress.     Appearance: He is well-developed. He is not diaphoretic.   Cardiovascular:      Pulses:           Popliteal pulses are 2+ on the right side and 2+ on the left side.        Dorsalis pedis pulses are 2+ on the right side and 2+ on the left side.        Posterior tibial pulses are 2+ on the right side and 2+ on the left side.      Comments: Capillary refill 3 seconds all toes/distal feet, all toes/both feet warm to touch.      Negative lymphadenopathy bilateral popliteal fossa and tarsal tunnel.      Negavie lower extremity edema bilateral.    Musculoskeletal:      Right ankle: No swelling, deformity, ecchymosis or lacerations. Normal range of motion. Normal pulse.      Right Achilles Tendon: Normal. No defects. Varela's test  negative.   Lymphadenopathy:      Lower Body: No right inguinal adenopathy. No left inguinal adenopathy.      Comments: Negative lymphadenopathy bilateral popliteal fossa and tarsal tunnel.    Negative lymphangitic streaking bilateral feet/ankles/legs.   Skin:     General: Skin is warm and dry.      Capillary Refill: Capillary refill takes 2 to 3 seconds.      Coloration: Skin is not pale.      Findings: No abrasion, bruising, burn, ecchymosis, erythema, laceration, lesion or rash.      Nails: There is no clubbing.      Comments:   Toenails 1st, 2nd, 3rd, 4th, 5th  bilateral are hypertrophic thickened 2-3 mm, dystrophic, discolored tanish brown with tan, gray crumbly subungual debris.  Tender to distal nail plate pressure, without periungual skin abnormality of each.    Otherwise, Skin thin, shiny, atrophic, with decreased density and distribution of pedal hair bilateral, but without hyperpigmentation, alfred discoloration,  ulcers, masses, nodules or cords palpated bilateral feet and legs.    Neurological:      Mental Status: He is alert and oriented to person, place, and time.      Motor: No tremor, atrophy or abnormal muscle tone.      Gait: Gait normal.      Comments: Paresthesias, and burning bilateral feet with no clearly identified trigger or source.    Negative tinel sign to percussion sural, superficial peroneal, deep peroneal, saphenous, and posterior tibial nerves right and left ankles and feet.    Psychiatric:         Behavior: Behavior is cooperative.           Assessment:       Encounter Diagnoses   Name Primary?    Type II diabetes mellitus with neurological manifestations Yes    Onychomycosis due to dermatophyte          Plan:       Rosendo was seen today for ingrown toenail.    Diagnoses and all orders for this visit:    Type II diabetes mellitus with neurological manifestations    Onychomycosis due to dermatophyte      I counseled the patient on his conditions, their implications and medical  management.    [facility]     With the patient's permission, I debrided all ten toenails with a sterile nipper and curette, removing all offending nail and debris.  Patient tolerated the procedure well and related significant relief.    Recommend total matrixectomy both hallux if symptoms continue.    Discussed conservative treatment with shoes of adequate dimensions, material, and style to alleviate symptoms and delay or prevent surgical intervention.    prn          No follow-ups on file.

## 2024-12-03 DIAGNOSIS — E11.65 TYPE 2 DIABETES MELLITUS WITH HYPERGLYCEMIA, WITHOUT LONG-TERM CURRENT USE OF INSULIN: ICD-10-CM

## 2024-12-03 RX ORDER — TIRZEPATIDE 5 MG/.5ML
5 INJECTION, SOLUTION SUBCUTANEOUS
Qty: 4 PEN | Refills: 6 | Status: SHIPPED | OUTPATIENT
Start: 2024-12-03

## 2024-12-03 NOTE — TELEPHONE ENCOUNTER
----- Message from Owen sent at 12/3/2024  3:16 PM CST -----  Regarding: self  Type: RX Refill Request    Who Called:self    Have you contacted your pharmacy:    Refill    RX Name and Strength:MOUNJARO 5 mg/0.5 mL June      [facility] [address]  [address]   [place] [address]  Phone: [phone] Fax: [phone]        Local or Mail Order:local    Would the patient rather a call back or a response via [facility]? callback    Best Call Back Number:[phone]    Additional Information:

## 2024-12-16 ENCOUNTER — TELEPHONE (OUTPATIENT)
Dept: ADMINISTRATIVE | Facility: CLINIC | Age: 79
End: 2024-12-16
Payer: MEDICARE

## 2024-12-17 ENCOUNTER — OFFICE VISIT (OUTPATIENT)
Dept: FAMILY MEDICINE | Facility: CLINIC | Age: 79
End: 2024-12-17
Payer: MEDICARE

## 2024-12-17 VITALS
WEIGHT: 244.06 LBS | DIASTOLIC BLOOD PRESSURE: 72 MMHG | BODY MASS INDEX: 29.72 KG/M2 | RESPIRATION RATE: 16 BRPM | OXYGEN SATURATION: 98 % | TEMPERATURE: 98 F | SYSTOLIC BLOOD PRESSURE: 118 MMHG | HEART RATE: 99 BPM | HEIGHT: 76 IN

## 2024-12-17 DIAGNOSIS — E11.22 TYPE 2 DIABETES MELLITUS WITH STAGE 2 CHRONIC KIDNEY DISEASE, WITHOUT LONG-TERM CURRENT USE OF INSULIN: ICD-10-CM

## 2024-12-17 DIAGNOSIS — Z71.89 ADVANCED DIRECTIVES, COUNSELING/DISCUSSION: ICD-10-CM

## 2024-12-17 DIAGNOSIS — I10 ESSENTIAL HYPERTENSION, BENIGN: ICD-10-CM

## 2024-12-17 DIAGNOSIS — L40.50 PSORIATIC ARTHRITIS: ICD-10-CM

## 2024-12-17 DIAGNOSIS — Z00.00 ENCOUNTER FOR MEDICARE ANNUAL WELLNESS EXAM: Primary | ICD-10-CM

## 2024-12-17 DIAGNOSIS — R79.9 ABNORMAL FINDING OF BLOOD CHEMISTRY, UNSPECIFIED: ICD-10-CM

## 2024-12-17 DIAGNOSIS — Z12.11 COLON CANCER SCREENING: ICD-10-CM

## 2024-12-17 DIAGNOSIS — D84.9 IMMUNOSUPPRESSION: ICD-10-CM

## 2024-12-17 DIAGNOSIS — I70.0 AORTIC ATHEROSCLEROSIS: ICD-10-CM

## 2024-12-17 DIAGNOSIS — N18.2 TYPE 2 DIABETES MELLITUS WITH STAGE 2 CHRONIC KIDNEY DISEASE, WITHOUT LONG-TERM CURRENT USE OF INSULIN: ICD-10-CM

## 2024-12-17 DIAGNOSIS — Z13.220 LIPID SCREENING: ICD-10-CM

## 2024-12-17 PROCEDURE — 99999 PR PBB SHADOW E&M-EST. PATIENT-LVL V: CPT | Mod: PBBFAC,,,

## 2024-12-17 PROCEDURE — 99215 OFFICE O/P EST HI 40 MIN: CPT | Mod: PBBFAC,PO

## 2024-12-17 NOTE — PROGRESS NOTES
HPI     Chief Complaint:  AWV    Rosendo Thomas is a 79 y.o. male with multiple medical diagnoses as listed in the medical history and problem list that presented for a Medicare AWV and comprehensive Health Risk Assessment today.      The following components were reviewed and updated:    Medical history  Family History  Social history  Allergies and Current Medications  Health Risk Assessment  Health Maintenance  Care Team     HPI  History of Present Illness    CHIEF COMPLAINT:  Rosendo presents today for annual wellness visit.    DIABETES MANAGEMENT:  He has been on Mounjaro for diabetes and weight management for approximately 6 months, previously on Tegafur. He discontinued metformin due to severe GI urgency. He currently takes Glipizide and recently obtained a refill.    GI CONCERNS:  He reports experiencing unmanageable diarrhea, which he attributes to Mounjaro. He has been taking medication to manage the symptoms.    RECENT INJURY:  He fell on stairs at his son's house prior to Halloween while descending in the dark wearing slip-on shoes. He sustained impact injuries to his knee and elbow, and collided with a wall. He reports ongoing nerve symptoms in his knee, which was previously explained as nerves attempting to reconnect following the injury.    DERMATOLOGIC:  He receives Taltz treatment for psoriasis management and is transitioning to a new dermatologist for ongoing care.    SUPPLEMENTS:  He takes vitamin B, vitamin D, vitamin E, vitamin C, magnesium, potassium, and zinc supplements.                  Assessment & Plan     Assessment & Plan    Assessed patient's annual wellness visit requirements  Evaluated recent fall and associated neurological symptoms  Considered unmanageable diarrhea, potentially related to Mounjaro  Reviewed current medications and refill status  Noted upcoming dermatology appointment for psoriasis treatment          Diagnoses and health risks identified today and associated  recommendations/orders:    Problem List Items Addressed This Visit       Type 2 diabetes mellitus with stage 2 chronic kidney disease, without long-term current use of insulin    Overview     diet controlled    TYPE 2 DIABETES MELLITUS:  - Continued Glipizide.  - Refilled Glipizide.  - Follow up with Dr. Varela to discuss checking blood sugar.  DIARRHEA:  - Follow up with Dr. Varela regarding diarrhea potentially related to Mounjaro and to discuss alternative options.          Essential hypertension, benign  HYPERTENSION:  - Continued blood pressure medication.  - Digital medicine ordered for blood pressure monitoring.      Psoriatic arthritis   Stable   Continue Taltz injector   Continue to follow up with Rheumatology      Aortic atherosclerosis   Stable    Continue with control blood pressure    Immunosuppression   Stable   Continues to follow up with Rheumatology    Encounter for Medicare annual wellness exam - Primary  Counseled on age appropriate medical preventative services including age appropriate cancer screenings, age appropriate eye and dental exams, over all nutritional health, need for a consistent exercise regimen, and an over all push towards maintaining a vigorous and active lifestyle. Counseled on age appropriate vaccines and discussed upcoming health care needs based on age/gender. Discussed good sleep hygiene and stress management.       Advanced directives, counseling/discussion  I offered to discuss advanced care planning, including how to pick a person who would make decisions for you if you were unable to make them for yourself, called a health care power of , and what kind of decisions you might make such as use of life sustaining treatments such as ventilators and tube feeding when faced with a life limiting illness recorded on a living will that they will need to know. (How you want to be cared for as you near the end of your natural life)     X Patient is interested in learning more  about how to make advanced directives.  I provided them paperwork and offered to discuss this with them.    ADVANCE CARE PLANNING:  - Rosendo to complete and return advanced care directive form at convenience.     Other Visit Diagnoses       Lipid screening        Relevant Orders    LIPID PANEL  GENERAL HEALTH MONITORING:  - Cholesterol panel ordered.  - Urine test ordered to check kidney function.    Abnormal finding of blood chemistry, unspecified        Relevant Orders    LIPID PANEL      Colonoscopy   Last colonoscopy was 07/08/2020; patient was recommended to repeat colonoscopy 1 year later at that time   Pathology at that time found tubular adenoma polyps noted to be not cancer or dysplasia   Placed an order for colonoscopy so this could be repeated.           --------------------------------------------    ** See Completed Assessments for Annual Wellness Visit within the encounter summary.**    The following assessments were completed:  Living Situation  CAGE  Depression Screening  Timed Get Up and Go  Whisper Test  Cognitive Function Screening  Nutrition Screening  ADL Screening  PAQ Screening      Provided Rosendo Thomas  with a 5-10 year written screening schedule and personal prevention plan. Recommendations were developed using the USPSTF age appropriate recommendations. Education, counseling, and referrals were provided as needed. After Visit Summary printed and given to patient which includes a list of additional screenings\tests needed.      Opioid documentation:      Patient does not have a current opioid prescription.        Exam     Review of Systems:  (as noted above)  Review of Systems  ROS:  General: -fever, -chills, -fatigue, -weight gain, -weight loss  Eyes: -vision changes, -redness, -discharge  ENT: -ear pain, -nasal congestion, -sore throat  Cardiovascular: -chest pain, -palpitations, -lower extremity edema  Respiratory: -cough, -shortness of breath  Gastrointestinal: -abdominal pain,  "-nausea, -vomiting, +diarrhea, -constipation, -blood in stool  Genitourinary: -dysuria, -hematuria, -frequency  Musculoskeletal: -joint pain, -muscle pain  Skin: -rash, -lesion  Neurological: -headache, -dizziness, -numbness, -tingling  Psychiatric: -anxiety, -depression, -sleep difficulty     Physical Exam:   Physical Exam  Vitals:    12/17/24 1110   BP: 118/72   BP Location: Left arm   Patient Position: Sitting   Pulse: 99   Resp: 16   Temp: 98 °F (36.7 °C)   TempSrc: Oral   SpO2: 98%   Weight: 110.7 kg (244 lb 0.8 oz)   Height: 6' 4" (1.93 m)      Body mass index is 29.71 kg/m².    Physical Exam    General: No acute distress. Well-developed. Well-nourished.  Eyes: EOMI. Sclerae anicteric.  HENT: Normocephalic. Atraumatic. Nares patent.   Cardiovascular: Regular rate. Regular rhythm. No murmurs. No rubs. No gallops. Normal S1, S2.  Respiratory: Normal respiratory effort. Clear to auscultation bilaterally. No rales. No rhonchi. No wheezing.  Musculoskeletal: No  obvious deformity.  Extremities: No lower extremity edema.  Neurological: Alert & oriented x3. No slurred speech. Normal gait.  Psychiatric: Normal mood. Normal affect. Good insight. Good judgment.  Skin: Warm. Dry. No rash.           Clock:              Health Maintenance:  Health Maintenance         Date Due Completion Date    TETANUS VACCINE Never done ---    Shingles Vaccine (1 of 2) Never done ---    RSV Vaccine (Age 60+ and Pregnant patients) (1 - 1-dose 75+ series) Never done ---    Colonoscopy 07/08/2021 7/8/2020    Lipid Panel 01/31/2023 1/31/2022    Diabetes Urine Screening 08/18/2023 8/18/2022    Influenza Vaccine (1) Never done ---    COVID-19 Vaccine (5 - 2024-25 season) 09/01/2024 7/20/2022    Hemoglobin A1c 03/04/2025 9/4/2024    Override on 8/14/2014: Done    Eye Exam 04/30/2025 4/30/2024            Health maintenance reviewed and Advised patient on the importance of completing overdue health maintenance items      Follow Up:  Follow up if " symptoms worsen or fail to improve.    History     Past Medical History:  Past Medical History:   Diagnosis Date    Colon cancer     Colon polyps     Diabetes mellitus type II 7/2010    diet controlled    Obesity     Psoriasis     Skin cancer     Squamous cell carcinoma     Type 2 diabetes mellitus        Past Surgical History:  Past Surgical History:   Procedure Laterality Date    APPENDECTOMY      COLONOSCOPY N/A 7/9/2018    Procedure: COLONOSCOPY;  Surgeon: Kameron Ruff MD;  Location: [facility];  Service: Endoscopy;  Laterality: N/A;  confirmed    COLONOSCOPY N/A 11/11/2019    Procedure: COLONOSCOPY;  Surgeon: Victor Hugo Nunez MD;  Location: [facility] (4TH FLR);  Service: Endoscopy;  Laterality: N/A;  Pt stated this is the only day he has somebody to drive him and  to stay during procedure  PM Prep    COLONOSCOPY N/A 7/8/2020    Procedure: COLONOSCOPY;  Surgeon: Victor Hugo Nunez MD;  Location: [facility] (2ND FLR);  Service: Endoscopy;  Laterality: N/A;  Covid-19 test 7/5/20 at [facility]    LAPAROSCOPIC LEFT COLECTOMY N/A 9/25/2018    Procedure: COLECTOMY-LAPAROSCOPIC LEFT;  Surgeon: Chito Banks MD;  Location: [facility] OR 2ND FLR;  Service: Colon and Rectal;  Laterality: N/A;    SKIN CANCER EXCISION      SKIN GRAFT      VASECTOMY      VASECTOMY         Social History:  Social History     Socioeconomic History    Marital status: Other   Occupational History     Employer: [employer]   Tobacco Use    Smoking status: Former     Types: Cigars    Smokeless tobacco: Never    Tobacco comments:     cigars-x1 yr.   Substance and Sexual Activity    Alcohol use: Yes     Comment: occassional    Drug use: No    Sexual activity: Yes     Partners: Female     Social Drivers of Health     Financial Resource Strain: Low Risk  (12/17/2024)    Overall Financial Resource Strain (CARDIA)     Difficulty of Paying Living Expenses: Not hard at all   Food Insecurity: No Food  Insecurity (12/17/2024)    Hunger Vital Sign     Worried About Running Out of Food in the Last Year: Never true     Ran Out of Food in the Last Year: Never true   Transportation Needs: No Transportation Needs (12/17/2024)    PRAPARE - Transportation     Lack of Transportation (Medical): No     Lack of Transportation (Non-Medical): No   Physical Activity: Sufficiently Active (12/17/2024)    Exercise Vital Sign     Days of Exercise per Week: 4 days     Minutes of Exercise per Session: 40 min   Stress: No Stress Concern Present (12/17/2024)    [employer] - Occupational Stress Questionnaire     Feeling of Stress : Not at all   Housing Stability: Low Risk  (12/17/2024)    Housing Stability Vital Sign     Unable to Pay for Housing in the Last Year: No     Homeless in the Last Year: No       Family History:  Family History   Problem Relation Name Age of Onset    Cancer Father          prostate    Diabetes Brother      Liver cancer Brother      Anesthesia problems Neg Hx         Allergies and Medications: (updated and reviewed)  Review of patient's allergies indicates:   Allergen Reactions    Influenza virus vaccines     Metformin Diarrhea     Current Outpatient Medications   Medication Sig Dispense Refill    acetaminophen (TYLENOL ORAL) Take by mouth.      atorvastatin (LIPITOR) 20 MG tablet take 1 tablet by mouth once daily 90 tablet 0    blood sugar diagnostic (CONTOUR TEST STRIPS) Strp 1 each by Misc.(Non-Drug; Combo Route) route 2 (two) times daily. 50 each 11    clobetasol (TEMOVATE) 0.05 % cream Apply topically 2 (two) times daily. To psoriasis PRN 60 g 3    ergocalciferol (ERGOCALCIFEROL) 50,000 unit Cap Take 1 capsule (50,000 Units total) by mouth every 7 days. 13 capsule 3    fluticasone propionate (FLONASE) 50 mcg/actuation nasal spray 2 sprays by Each Nostril route once daily.      glipiZIDE (GLUCOTROL) 2.5 MG TR24 Take 2 tablets (5 mg total) by mouth daily with breakfast. 180  tablet 3    ixekizumab (TALTZ AUTOINJECTOR) 80 mg/mL AtIn Inject 80 mg into the skin every 28 days. Starting on week 12 1 mL 5    ixekizumab (TALTZ AUTOINJECTOR, 2 PACK,) 80 mg/mL AtIn Inject 80 mg (1 pen) into the skin every other week on week 4, 6, 8, and 10 2 mL 1    ixekizumab (TALTZ AUTOINJECTOR, 3 PACK,) 80 mg/mL AtIn Inject 160 mg (2 pens) into the skin on week 0, then inject 80 mg (1 pen) into the skin on week 2 and every other week through week 12. 3 mL 0    lancets Misc USE ONE LANCET TWO TIMES DAILY 100 each 11    losartan-hydrochlorothiazide 100-25 mg (HYZAAR) 100-25 mg per tablet take 1 tablet by mouth once daily FOR BLOOD PRESSURE 90 tablet 0    MOUNJARO 5 mg/0.5 mL PnIj Inject 5 mg (one pen) into the skin every 7 days. 4 Pen 6     No current facility-administered medications for this visit.     Facility-Administered Medications Ordered in Other Visits   Medication Dose Route Frequency Provider Last Rate Last Admin    enoxaparin injection 40 mg  40 mg Subcutaneous Q24H Florence Trevino NP   40 mg at 10/07/18 2214    gabapentin capsule 300 mg  300 mg Oral On Call Procedure Florence Trevino NP   300 mg at 09/25/18 1347       This note was generated with the assistance of ambient listening technology. Verbal consent was obtained by the patient and accompanying visitor(s) for the recording of patient appointment to facilitate this note. I attest to having reviewed and edited the generated note for accuracy, though some syntax or spelling errors may persist. Please contact the author of this note for any clarification.        - The patient is given an After Visit Summary that lists all medications with directions, allergies, education, orders placed during this encounter and follow-up instructions.      - I have reviewed the patient's medical information including past medical, family, and social history sections including the medications and allergies.      - We discussed the patient's current  medications.     This note was created by combination of typed  and MModal dictation.  Transcription errors may be present.  If there are any questions, please contact me.     Aidee Mercado PA-C

## 2024-12-17 NOTE — PROGRESS NOTES
Health Maintenance Due   Topic     TETANUS VACCINE      Shingles Vaccine (1 of 2) hx chickenpox ; inform pt can get vaccine at pharmacy.    RSV Vaccine (Age 60+ and Pregnant patients) (1 - 1-dose 75+ series) Not given at this facility       Colonoscopy  CONSULT WITH PCP    Lipid Panel      Diabetes Urine Screening      Influenza Vaccine (1) PATIENT DECLINE     COVID-19 Vaccine (5 - 2024-25 season) PATIENT DECLINE

## 2024-12-23 ENCOUNTER — TELEPHONE (OUTPATIENT)
Dept: ENDOCRINOLOGY | Facility: CLINIC | Age: 79
End: 2024-12-23
Payer: MEDICARE

## 2024-12-23 NOTE — TELEPHONE ENCOUNTER
----- Message from Barbara sent at 12/23/2024 11:41 AM CST -----  Regarding: Self  Who Called: Self    Symptoms (please be specific):  diarrhea / Pt stated he takes imodium and it does nothing      How long has patient had these symptoms:3 weeks       Pharmacy: .      [facility]  [address]  [address]  [place] [address]  Phone: [phone] Fax: [phone]      Would the patient rather a call back or a response via My [facility]?  Call back     Best Call Back Number: .[phone]      Additional Information: Pt is down 4lbs since last visit.

## 2024-12-23 NOTE — TELEPHONE ENCOUNTER
Spoke to patient, states that he has been taking mounjaro for a while but he has had severe diarrhea for over 2 weeks. Patient believes that the mounjaro is the cause of the diarrhea. Patient notified that Dr Varela is out of the country and will return on 1/6/2025. Patient also advised to stop taking mounjaro due to it causing severe diarrhea and to continue glipizide as prescribed. Patient notified that Dr Varela will address changing his medication upon his return. Patient verbalized understanding

## 2025-01-13 ENCOUNTER — HOSPITAL ENCOUNTER (OUTPATIENT)
Facility: HOSPITAL | Age: 80
Discharge: HOME | End: 2025-01-14
Attending: EMERGENCY MEDICINE | Admitting: EMERGENCY MEDICINE
Payer: MEDICARE

## 2025-01-13 DIAGNOSIS — I48.0 PAROXYSMAL ATRIAL FIBRILLATION: Primary | ICD-10-CM

## 2025-01-13 DIAGNOSIS — J10.1 INFLUENZA A: ICD-10-CM

## 2025-01-13 DIAGNOSIS — I48.91 NEW ONSET ATRIAL FIBRILLATION: ICD-10-CM

## 2025-01-13 DIAGNOSIS — R07.9 CHEST PAIN: ICD-10-CM

## 2025-01-13 DIAGNOSIS — R06.09 DOE (DYSPNEA ON EXERTION): ICD-10-CM

## 2025-01-13 DIAGNOSIS — J06.9 VIRAL URI WITH COUGH: ICD-10-CM

## 2025-01-13 LAB
ALBUMIN SERPL BCP-MCNC: 3.8 G/DL (ref 3.5–5.2)
ALLENS TEST: ABNORMAL
ALP SERPL-CCNC: 69 U/L (ref 40–150)
ALT SERPL W/O P-5'-P-CCNC: 31 U/L (ref 10–44)
ANION GAP SERPL CALC-SCNC: 13 MMOL/L (ref 8–16)
APTT PPP: 24 SEC (ref 21–32)
AST SERPL-CCNC: 45 U/L (ref 10–40)
B-OH-BUTYR BLD STRIP-SCNC: 0.7 MMOL/L (ref 0–0.5)
BASOPHILS # BLD AUTO: 0.03 K/UL (ref 0–0.2)
BASOPHILS NFR BLD: 0.6 % (ref 0–1.9)
BILIRUB SERPL-MCNC: 0.6 MG/DL (ref 0.1–1)
BNP SERPL-MCNC: 63 PG/ML (ref 0–99)
BUN SERPL-MCNC: 16 MG/DL (ref 8–23)
CALCIUM SERPL-MCNC: 9.2 MG/DL (ref 8.7–10.5)
CHLORIDE SERPL-SCNC: 102 MMOL/L (ref 95–110)
CO2 SERPL-SCNC: 21 MMOL/L (ref 23–29)
CREAT SERPL-MCNC: 1 MG/DL (ref 0.5–1.4)
CTP QC/QA: YES
DELSYS: ABNORMAL
DIFFERENTIAL METHOD BLD: ABNORMAL
EOSINOPHIL # BLD AUTO: 0 K/UL (ref 0–0.5)
EOSINOPHIL NFR BLD: 0.6 % (ref 0–8)
ERYTHROCYTE [DISTWIDTH] IN BLOOD BY AUTOMATED COUNT: 15.2 % (ref 11.5–14.5)
EST. GFR  (NO RACE VARIABLE): >60 ML/MIN/1.73 M^2
FIO2: 21
GLUCOSE SERPL-MCNC: 94 MG/DL (ref 70–110)
GLUCOSE SERPL-MCNC: 96 MG/DL (ref 70–110)
HCO3 UR-SCNC: 26.6 MMOL/L (ref 24–28)
HCT VFR BLD AUTO: 44 % (ref 40–54)
HGB BLD-MCNC: 13.9 G/DL (ref 14–18)
IMM GRANULOCYTES # BLD AUTO: 0.01 K/UL (ref 0–0.04)
IMM GRANULOCYTES NFR BLD AUTO: 0.2 % (ref 0–0.5)
INR PPP: 1 (ref 0.8–1.2)
LACTATE SERPL-SCNC: 1 MMOL/L (ref 0.5–2.2)
LYMPHOCYTES # BLD AUTO: 0.5 K/UL (ref 1–4.8)
LYMPHOCYTES NFR BLD: 10.8 % (ref 18–48)
MAGNESIUM SERPL-MCNC: 2 MG/DL (ref 1.6–2.6)
MCH RBC QN AUTO: 27.5 PG (ref 27–31)
MCHC RBC AUTO-ENTMCNC: 31.6 G/DL (ref 32–36)
MCV RBC AUTO: 87 FL (ref 82–98)
MODE: ABNORMAL
MONOCYTES # BLD AUTO: 0.4 K/UL (ref 0.3–1)
MONOCYTES NFR BLD: 8.4 % (ref 4–15)
NEUTROPHILS # BLD AUTO: 4 K/UL (ref 1.8–7.7)
NEUTROPHILS NFR BLD: 79.4 % (ref 38–73)
NRBC BLD-RTO: 0 /100 WBC
PCO2 BLDA: 49.7 MMHG (ref 35–45)
PH SMN: 7.34 [PH] (ref 7.35–7.45)
PLATELET # BLD AUTO: 108 K/UL (ref 150–450)
PMV BLD AUTO: 10.8 FL (ref 9.2–12.9)
PO2 BLDA: 22 MMHG (ref 40–60)
POC BE: 0 MMOL/L
POC SATURATED O2: 34 % (ref 95–100)
POC TCO2: 28 MMOL/L (ref 24–29)
POTASSIUM SERPL-SCNC: 3.8 MMOL/L (ref 3.5–5.1)
PROT SERPL-MCNC: 7.3 G/DL (ref 6–8.4)
PROTHROMBIN TIME: 11.5 SEC (ref 9–12.5)
RBC # BLD AUTO: 5.06 M/UL (ref 4.6–6.2)
RSV AG SPEC QL IA: NEGATIVE
SAMPLE: ABNORMAL
SARS-COV-2 RDRP RESP QL NAA+PROBE: NEGATIVE
SITE: ABNORMAL
SODIUM SERPL-SCNC: 136 MMOL/L (ref 136–145)
SPECIMEN SOURCE: NORMAL
TROPONIN I SERPL DL<=0.01 NG/ML-MCNC: 0.01 NG/ML (ref 0–0.03)
WBC # BLD AUTO: 5.02 K/UL (ref 3.9–12.7)

## 2025-01-13 PROCEDURE — 80053 COMPREHEN METABOLIC PANEL: CPT | Performed by: PHYSICIAN ASSISTANT

## 2025-01-13 PROCEDURE — 83605 ASSAY OF LACTIC ACID: CPT | Performed by: PHYSICIAN ASSISTANT

## 2025-01-13 PROCEDURE — 85730 THROMBOPLASTIN TIME PARTIAL: CPT | Performed by: PHYSICIAN ASSISTANT

## 2025-01-13 PROCEDURE — 87634 RSV DNA/RNA AMP PROBE: CPT | Performed by: PHYSICIAN ASSISTANT

## 2025-01-13 PROCEDURE — 96365 THER/PROPH/DIAG IV INF INIT: CPT

## 2025-01-13 PROCEDURE — 82010 KETONE BODYS QUAN: CPT | Performed by: PHYSICIAN ASSISTANT

## 2025-01-13 PROCEDURE — 87635 SARS-COV-2 COVID-19 AMP PRB: CPT

## 2025-01-13 PROCEDURE — 83880 ASSAY OF NATRIURETIC PEPTIDE: CPT | Performed by: PHYSICIAN ASSISTANT

## 2025-01-13 PROCEDURE — 99285 EMERGENCY DEPT VISIT HI MDM: CPT | Mod: 25

## 2025-01-13 PROCEDURE — 87040 BLOOD CULTURE FOR BACTERIA: CPT | Performed by: PHYSICIAN ASSISTANT

## 2025-01-13 PROCEDURE — 93010 ELECTROCARDIOGRAM REPORT: CPT | Mod: ,,, | Performed by: INTERNAL MEDICINE

## 2025-01-13 PROCEDURE — 82803 BLOOD GASES ANY COMBINATION: CPT

## 2025-01-13 PROCEDURE — 25000003 PHARM REV CODE 250: Performed by: PHYSICIAN ASSISTANT

## 2025-01-13 PROCEDURE — 96360 HYDRATION IV INFUSION INIT: CPT | Mod: 59

## 2025-01-13 PROCEDURE — 83735 ASSAY OF MAGNESIUM: CPT | Performed by: PHYSICIAN ASSISTANT

## 2025-01-13 PROCEDURE — 85610 PROTHROMBIN TIME: CPT | Performed by: PHYSICIAN ASSISTANT

## 2025-01-13 PROCEDURE — 63600175 PHARM REV CODE 636 W HCPCS: Performed by: PHYSICIAN ASSISTANT

## 2025-01-13 PROCEDURE — 93005 ELECTROCARDIOGRAM TRACING: CPT

## 2025-01-13 PROCEDURE — 84484 ASSAY OF TROPONIN QUANT: CPT | Performed by: PHYSICIAN ASSISTANT

## 2025-01-13 PROCEDURE — 85025 COMPLETE CBC W/AUTO DIFF WBC: CPT | Performed by: PHYSICIAN ASSISTANT

## 2025-01-13 PROCEDURE — 99900035 HC TECH TIME PER 15 MIN (STAT)

## 2025-01-13 PROCEDURE — 82962 GLUCOSE BLOOD TEST: CPT

## 2025-01-13 PROCEDURE — 96375 TX/PRO/DX INJ NEW DRUG ADDON: CPT | Mod: 59

## 2025-01-13 PROCEDURE — 25500020 PHARM REV CODE 255: Performed by: EMERGENCY MEDICINE

## 2025-01-13 RX ORDER — CEFTRIAXONE 1 G/1
1 INJECTION, POWDER, FOR SOLUTION INTRAMUSCULAR; INTRAVENOUS
Status: COMPLETED | OUTPATIENT
Start: 2025-01-13 | End: 2025-01-13

## 2025-01-13 RX ORDER — DEXTROMETHORPHAN POLISTIREX 30 MG/5 ML
1 SUSPENSION, EXTENDED RELEASE 12 HR ORAL DAILY PRN
Status: DISCONTINUED | OUTPATIENT
Start: 2025-01-14 | End: 2025-01-14 | Stop reason: HOSPADM

## 2025-01-13 RX ORDER — IBUPROFEN 200 MG
16 TABLET ORAL
Status: DISCONTINUED | OUTPATIENT
Start: 2025-01-14 | End: 2025-01-14 | Stop reason: HOSPADM

## 2025-01-13 RX ORDER — PROCHLORPERAZINE EDISYLATE 5 MG/ML
5 INJECTION INTRAMUSCULAR; INTRAVENOUS EVERY 6 HOURS PRN
Status: DISCONTINUED | OUTPATIENT
Start: 2025-01-14 | End: 2025-01-14 | Stop reason: HOSPADM

## 2025-01-13 RX ORDER — TALC
6 POWDER (GRAM) TOPICAL NIGHTLY PRN
Status: DISCONTINUED | OUTPATIENT
Start: 2025-01-14 | End: 2025-01-14 | Stop reason: HOSPADM

## 2025-01-13 RX ORDER — LOSARTAN POTASSIUM AND HYDROCHLOROTHIAZIDE 12.5; 5 MG/1; MG/1
1 TABLET ORAL DAILY
Status: DISCONTINUED | OUTPATIENT
Start: 2025-01-14 | End: 2025-01-14 | Stop reason: HOSPADM

## 2025-01-13 RX ORDER — ONDANSETRON HYDROCHLORIDE 2 MG/ML
4 INJECTION, SOLUTION INTRAVENOUS EVERY 8 HOURS PRN
Status: DISCONTINUED | OUTPATIENT
Start: 2025-01-14 | End: 2025-01-14 | Stop reason: HOSPADM

## 2025-01-13 RX ORDER — POLYETHYLENE GLYCOL 3350 17 G/17G
17 POWDER, FOR SOLUTION ORAL DAILY
Status: DISCONTINUED | OUTPATIENT
Start: 2025-01-14 | End: 2025-01-14 | Stop reason: HOSPADM

## 2025-01-13 RX ORDER — NALOXONE HCL 0.4 MG/ML
0.02 VIAL (ML) INJECTION
Status: DISCONTINUED | OUTPATIENT
Start: 2025-01-14 | End: 2025-01-14 | Stop reason: HOSPADM

## 2025-01-13 RX ORDER — ALUMINUM HYDROXIDE, MAGNESIUM HYDROXIDE, AND SIMETHICONE 1200; 120; 1200 MG/30ML; MG/30ML; MG/30ML
30 SUSPENSION ORAL 4 TIMES DAILY PRN
Status: DISCONTINUED | OUTPATIENT
Start: 2025-01-14 | End: 2025-01-14 | Stop reason: HOSPADM

## 2025-01-13 RX ORDER — CLOBETASOL PROPIONATE 0.5 MG/G
CREAM TOPICAL 2 TIMES DAILY
Status: DISCONTINUED | OUTPATIENT
Start: 2025-01-14 | End: 2025-01-14 | Stop reason: CLARIF

## 2025-01-13 RX ORDER — SODIUM CHLORIDE 0.9 % (FLUSH) 0.9 %
10 SYRINGE (ML) INJECTION EVERY 12 HOURS PRN
Status: DISCONTINUED | OUTPATIENT
Start: 2025-01-14 | End: 2025-01-14 | Stop reason: HOSPADM

## 2025-01-13 RX ORDER — IBUPROFEN 200 MG
24 TABLET ORAL
Status: DISCONTINUED | OUTPATIENT
Start: 2025-01-14 | End: 2025-01-14 | Stop reason: HOSPADM

## 2025-01-13 RX ORDER — ACETAMINOPHEN 325 MG/1
650 TABLET ORAL EVERY 8 HOURS PRN
Status: DISCONTINUED | OUTPATIENT
Start: 2025-01-14 | End: 2025-01-14 | Stop reason: HOSPADM

## 2025-01-13 RX ORDER — GLUCAGON 1 MG
1 KIT INJECTION
Status: DISCONTINUED | OUTPATIENT
Start: 2025-01-14 | End: 2025-01-14 | Stop reason: HOSPADM

## 2025-01-13 RX ORDER — ACETAMINOPHEN 325 MG/1
650 TABLET ORAL EVERY 4 HOURS PRN
Status: DISCONTINUED | OUTPATIENT
Start: 2025-01-14 | End: 2025-01-14 | Stop reason: HOSPADM

## 2025-01-13 RX ORDER — ATORVASTATIN CALCIUM 10 MG/1
20 TABLET, FILM COATED ORAL DAILY
Status: DISCONTINUED | OUTPATIENT
Start: 2025-01-14 | End: 2025-01-14 | Stop reason: HOSPADM

## 2025-01-13 RX ORDER — FLUTICASONE PROPIONATE 50 MCG
2 SPRAY, SUSPENSION (ML) NASAL DAILY
Status: DISCONTINUED | OUTPATIENT
Start: 2025-01-14 | End: 2025-01-14 | Stop reason: HOSPADM

## 2025-01-13 RX ADMIN — IOHEXOL 75 ML: 350 INJECTION, SOLUTION INTRAVENOUS at 10:01

## 2025-01-13 RX ADMIN — CEFTRIAXONE 1 G: 1 INJECTION, POWDER, FOR SOLUTION INTRAMUSCULAR; INTRAVENOUS at 10:01

## 2025-01-13 RX ADMIN — SODIUM CHLORIDE, POTASSIUM CHLORIDE, SODIUM LACTATE AND CALCIUM CHLORIDE 1000 ML: 600; 310; 30; 20 INJECTION, SOLUTION INTRAVENOUS at 10:01

## 2025-01-13 RX ADMIN — AZITHROMYCIN MONOHYDRATE 500 MG: 500 INJECTION, POWDER, LYOPHILIZED, FOR SOLUTION INTRAVENOUS at 10:01

## 2025-01-13 NOTE — Clinical Note
Diagnosis: New onset a-fib [648723]   Future Attending Provider: ED CELESTIN [[id number]]   Special Needs:: No Special Needs [1]

## 2025-01-14 VITALS
SYSTOLIC BLOOD PRESSURE: 140 MMHG | WEIGHT: 244.94 LBS | OXYGEN SATURATION: 95 % | TEMPERATURE: 98 F | DIASTOLIC BLOOD PRESSURE: 67 MMHG | BODY MASS INDEX: 30.45 KG/M2 | HEART RATE: 86 BPM | RESPIRATION RATE: 20 BRPM | HEIGHT: 75 IN

## 2025-01-14 PROBLEM — J10.1 INFLUENZA A: Status: ACTIVE | Noted: 2025-01-14

## 2025-01-14 PROBLEM — E78.5 DYSLIPIDEMIA: Status: ACTIVE | Noted: 2025-01-14

## 2025-01-14 PROBLEM — J06.9 URTI (ACUTE UPPER RESPIRATORY INFECTION): Status: ACTIVE | Noted: 2025-01-14

## 2025-01-14 LAB
ALBUMIN SERPL BCP-MCNC: 3.2 G/DL (ref 3.5–5.2)
ALP SERPL-CCNC: 59 U/L (ref 40–150)
ALT SERPL W/O P-5'-P-CCNC: 30 U/L (ref 10–44)
ANION GAP SERPL CALC-SCNC: 8 MMOL/L (ref 8–16)
ASCENDING AORTA: 3.44 CM
AST SERPL-CCNC: 38 U/L (ref 10–40)
AV INDEX (PROSTH): 0.7
AV MEAN GRADIENT: 3.7 MMHG
AV PEAK GRADIENT: 5.8 MMHG
AV VALVE AREA BY VELOCITY RATIO: 2.8 CM²
AV VALVE AREA: 2.6 CM²
AV VELOCITY RATIO: 0.75
BASOPHILS # BLD AUTO: 0.01 K/UL (ref 0–0.2)
BASOPHILS NFR BLD: 0.3 % (ref 0–1.9)
BILIRUB SERPL-MCNC: 0.5 MG/DL (ref 0.1–1)
BSA FOR ECHO PROCEDURE: 2.42 M2
BUN SERPL-MCNC: 14 MG/DL (ref 8–23)
CALCIUM SERPL-MCNC: 8.2 MG/DL (ref 8.7–10.5)
CHLORIDE SERPL-SCNC: 105 MMOL/L (ref 95–110)
CO2 SERPL-SCNC: 24 MMOL/L (ref 23–29)
CREAT SERPL-MCNC: 0.8 MG/DL (ref 0.5–1.4)
CTP QC/QA: YES
CV ECHO LV RWT: 0.71 CM
DIFFERENTIAL METHOD BLD: ABNORMAL
DOP CALC AO PEAK VEL: 1.2 M/S
DOP CALC AO VTI: 22.3 CM
DOP CALC LVOT AREA: 3.8 CM2
DOP CALC LVOT DIAMETER: 2.2 CM
DOP CALC LVOT PEAK VEL: 0.9 M/S
DOP CALC LVOT STROKE VOLUME: 58.9 CM3
DOP CALCLVOT PEAK VEL VTI: 15.5 CM
E/E' RATIO: 9.48 M/S
ECHO LV POSTERIOR WALL: 1.2 CM (ref 0.6–1.1)
EOSINOPHIL # BLD AUTO: 0 K/UL (ref 0–0.5)
EOSINOPHIL NFR BLD: 0.5 % (ref 0–8)
ERYTHROCYTE [DISTWIDTH] IN BLOOD BY AUTOMATED COUNT: 15.2 % (ref 11.5–14.5)
EST. GFR  (NO RACE VARIABLE): >60 ML/MIN/1.73 M^2
FRACTIONAL SHORTENING: 35.3 % (ref 28–44)
GLUCOSE SERPL-MCNC: 100 MG/DL (ref 70–110)
HCT VFR BLD AUTO: 40.1 % (ref 40–54)
HGB BLD-MCNC: 12.3 G/DL (ref 14–18)
IMM GRANULOCYTES # BLD AUTO: 0.02 K/UL (ref 0–0.04)
IMM GRANULOCYTES NFR BLD AUTO: 0.5 % (ref 0–0.5)
INTERVENTRICULAR SEPTUM: 1.1 CM (ref 0.6–1.1)
IVC DIAMETER: 2.07 CM
IVRT: 57.09 MSEC
LA MAJOR: 6.75 CM
LA MINOR: 6.95 CM
LA WIDTH: 3.9 CM
LEFT ATRIUM SIZE: 5.3 CM
LEFT ATRIUM VOLUME INDEX: 50.3 ML/M2
LEFT ATRIUM VOLUME: 120.33 CM3
LEFT INTERNAL DIMENSION IN SYSTOLE: 2.2 CM (ref 2.1–4)
LEFT VENTRICLE DIASTOLIC VOLUME INDEX: 19.75 ML/M2
LEFT VENTRICLE DIASTOLIC VOLUME: 47.21 ML
LEFT VENTRICLE MASS INDEX: 51 G/M2
LEFT VENTRICLE SYSTOLIC VOLUME INDEX: 7.1 ML/M2
LEFT VENTRICLE SYSTOLIC VOLUME: 17.02 ML
LEFT VENTRICULAR INTERNAL DIMENSION IN DIASTOLE: 3.4 CM (ref 3.5–6)
LEFT VENTRICULAR MASS: 122 G
LV LATERAL E/E' RATIO: 9.08 M/S
LV SEPTAL E/E' RATIO: 9.91 M/S
LVED V (TEICH): 47.21 ML
LVES V (TEICH): 17.02 ML
LVOT MG: 1.63 MMHG
LVOT MV: 0.6 CM/S
LYMPHOCYTES # BLD AUTO: 0.7 K/UL (ref 1–4.8)
LYMPHOCYTES NFR BLD: 17.5 % (ref 18–48)
MAGNESIUM SERPL-MCNC: 1.9 MG/DL (ref 1.6–2.6)
MCH RBC QN AUTO: 26.4 PG (ref 27–31)
MCHC RBC AUTO-ENTMCNC: 30.7 G/DL (ref 32–36)
MCV RBC AUTO: 86 FL (ref 82–98)
MONOCYTES # BLD AUTO: 0.4 K/UL (ref 0.3–1)
MONOCYTES NFR BLD: 10.5 % (ref 4–15)
MV PEAK E VEL: 1.09 M/S
NEUTROPHILS # BLD AUTO: 2.6 K/UL (ref 1.8–7.7)
NEUTROPHILS NFR BLD: 70.7 % (ref 38–73)
NRBC BLD-RTO: 0 /100 WBC
OHS CV RV/LV RATIO: 1 CM
PHOSPHATE SERPL-MCNC: 2.7 MG/DL (ref 2.7–4.5)
PISA TR MAX VEL: 2.49 M/S
PLATELET # BLD AUTO: 112 K/UL (ref 150–450)
PMV BLD AUTO: 10.9 FL (ref 9.2–12.9)
POC MOLECULAR INFLUENZA A AGN: POSITIVE
POC MOLECULAR INFLUENZA B AGN: NEGATIVE
POCT GLUCOSE: 98 MG/DL (ref 70–110)
POTASSIUM SERPL-SCNC: 3.5 MMOL/L (ref 3.5–5.1)
PROT SERPL-MCNC: 5.9 G/DL (ref 6–8.4)
PULM VEIN S/D RATIO: 1
PV PEAK D VEL: 0.3 M/S
PV PEAK GRADIENT: 3 MMHG
PV PEAK S VEL: 0.3 M/S
PV PEAK VELOCITY: 0.93 M/S
RA MAJOR: 4.9 CM
RA PRESSURE ESTIMATED: 8 MMHG
RA WIDTH: 3.8 CM
RBC # BLD AUTO: 4.66 M/UL (ref 4.6–6.2)
RIGHT VENTRICLE DIASTOLIC BASEL DIMENSION: 3.4 CM
RIGHT VENTRICULAR END-DIASTOLIC DIMENSION: 3.41 CM
RV TB RVSP: 10 MMHG
RV TISSUE DOPPLER FREE WALL SYSTOLIC VELOCITY 1 (APICAL 4 CHAMBER VIEW): 13.06 CM/S
SINUS: 3.36 CM
SODIUM SERPL-SCNC: 137 MMOL/L (ref 136–145)
STJ: 3.09 CM
TDI LATERAL: 0.12 M/S
TDI SEPTAL: 0.11 M/S
TDI: 0.12 M/S
TR MAX PG: 25 MMHG
TRICUSPID ANNULAR PLANE SYSTOLIC EXCURSION: 1.95 CM
TV PEAK GRADIENT: 2 MMHG
TV REST PULMONARY ARTERY PRESSURE: 33 MMHG
WBC # BLD AUTO: 3.72 K/UL (ref 3.9–12.7)
Z-SCORE OF LEFT VENTRICULAR DIMENSION IN END DIASTOLE: -11.46
Z-SCORE OF LEFT VENTRICULAR DIMENSION IN END SYSTOLE: -8.45

## 2025-01-14 PROCEDURE — 25000003 PHARM REV CODE 250: Performed by: HOSPITALIST

## 2025-01-14 PROCEDURE — 25000242 PHARM REV CODE 250 ALT 637 W/ HCPCS

## 2025-01-14 PROCEDURE — 25000003 PHARM REV CODE 250

## 2025-01-14 PROCEDURE — 84100 ASSAY OF PHOSPHORUS: CPT

## 2025-01-14 PROCEDURE — 99900035 HC TECH TIME PER 15 MIN (STAT)

## 2025-01-14 PROCEDURE — 94640 AIRWAY INHALATION TREATMENT: CPT

## 2025-01-14 PROCEDURE — 99204 OFFICE O/P NEW MOD 45 MIN: CPT | Mod: 25,,, | Performed by: INTERNAL MEDICINE

## 2025-01-14 PROCEDURE — 85025 COMPLETE CBC W/AUTO DIFF WBC: CPT

## 2025-01-14 PROCEDURE — 94761 N-INVAS EAR/PLS OXIMETRY MLT: CPT

## 2025-01-14 PROCEDURE — G0378 HOSPITAL OBSERVATION PER HR: HCPCS

## 2025-01-14 PROCEDURE — 83735 ASSAY OF MAGNESIUM: CPT

## 2025-01-14 PROCEDURE — 25000003 PHARM REV CODE 250: Performed by: INTERNAL MEDICINE

## 2025-01-14 PROCEDURE — 80053 COMPREHEN METABOLIC PANEL: CPT

## 2025-01-14 PROCEDURE — 94640 AIRWAY INHALATION TREATMENT: CPT | Mod: XB

## 2025-01-14 PROCEDURE — 94799 UNLISTED PULMONARY SVC/PX: CPT

## 2025-01-14 RX ORDER — METOPROLOL SUCCINATE 25 MG/1
25 TABLET, EXTENDED RELEASE ORAL DAILY
Status: DISCONTINUED | OUTPATIENT
Start: 2025-01-14 | End: 2025-01-14 | Stop reason: HOSPADM

## 2025-01-14 RX ORDER — CLOBETASOL PROPIONATE 0.5 MG/G
CREAM TOPICAL 2 TIMES DAILY
Status: DISCONTINUED | OUTPATIENT
Start: 2025-01-14 | End: 2025-01-14 | Stop reason: HOSPADM

## 2025-01-14 RX ORDER — BENZONATATE 100 MG/1
100 CAPSULE ORAL 3 TIMES DAILY PRN
Status: DISCONTINUED | OUTPATIENT
Start: 2025-01-14 | End: 2025-01-14 | Stop reason: HOSPADM

## 2025-01-14 RX ORDER — CEFDINIR 300 MG/1
300 CAPSULE ORAL 2 TIMES DAILY
COMMUNITY
Start: 2025-01-11

## 2025-01-14 RX ORDER — BENZONATATE 200 MG/1
200 CAPSULE ORAL 3 TIMES DAILY
COMMUNITY
Start: 2025-01-11

## 2025-01-14 RX ORDER — HYDRALAZINE HYDROCHLORIDE 20 MG/ML
10 INJECTION INTRAMUSCULAR; INTRAVENOUS EVERY 6 HOURS PRN
Status: DISCONTINUED | OUTPATIENT
Start: 2025-01-14 | End: 2025-01-14 | Stop reason: HOSPADM

## 2025-01-14 RX ORDER — INSULIN ASPART 100 [IU]/ML
0-5 INJECTION, SOLUTION INTRAVENOUS; SUBCUTANEOUS EVERY 4 HOURS PRN
Status: DISCONTINUED | OUTPATIENT
Start: 2025-01-14 | End: 2025-01-14 | Stop reason: HOSPADM

## 2025-01-14 RX ORDER — METOPROLOL SUCCINATE 25 MG/1
25 TABLET, EXTENDED RELEASE ORAL DAILY
Qty: 90 TABLET | Refills: 3 | Status: SHIPPED | OUTPATIENT
Start: 2025-01-15 | End: 2026-01-15

## 2025-01-14 RX ORDER — MELOXICAM 15 MG/1
15 TABLET ORAL DAILY PRN
COMMUNITY
Start: 2024-10-14

## 2025-01-14 RX ORDER — OSELTAMIVIR PHOSPHATE 75 MG/1
75 CAPSULE ORAL DAILY
Qty: 5 CAPSULE | Refills: 0 | Status: SHIPPED | OUTPATIENT
Start: 2025-01-15 | End: 2025-01-20

## 2025-01-14 RX ORDER — CYCLOBENZAPRINE HCL 10 MG
10 TABLET ORAL 3 TIMES DAILY PRN
COMMUNITY
Start: 2024-10-14

## 2025-01-14 RX ORDER — GUAIFENESIN 100 MG/5ML
200 SOLUTION ORAL EVERY 4 HOURS PRN
Status: DISCONTINUED | OUTPATIENT
Start: 2025-01-14 | End: 2025-01-14 | Stop reason: HOSPADM

## 2025-01-14 RX ORDER — OSELTAMIVIR PHOSPHATE 75 MG/1
75 CAPSULE ORAL DAILY
Status: DISCONTINUED | OUTPATIENT
Start: 2025-01-14 | End: 2025-01-14 | Stop reason: HOSPADM

## 2025-01-14 RX ORDER — PROMETHAZINE HYDROCHLORIDE AND DEXTROMETHORPHAN HYDROBROMIDE 6.25; 15 MG/5ML; MG/5ML
10 SYRUP ORAL NIGHTLY
COMMUNITY
Start: 2025-01-11

## 2025-01-14 RX ORDER — IPRATROPIUM BROMIDE AND ALBUTEROL SULFATE 2.5; .5 MG/3ML; MG/3ML
3 SOLUTION RESPIRATORY (INHALATION) EVERY 4 HOURS PRN
Status: DISCONTINUED | OUTPATIENT
Start: 2025-01-14 | End: 2025-01-14 | Stop reason: HOSPADM

## 2025-01-14 RX ADMIN — IPRATROPIUM BROMIDE AND ALBUTEROL SULFATE 3 ML: 2.5; .5 SOLUTION RESPIRATORY (INHALATION) at 12:01

## 2025-01-14 RX ADMIN — OSELTAMIVIR PHOSPHATE 75 MG: 75 CAPSULE ORAL at 08:01

## 2025-01-14 RX ADMIN — APIXABAN 5 MG: 5 TABLET, FILM COATED ORAL at 08:01

## 2025-01-14 RX ADMIN — CLOBETASOL PROPIONATE: 0.5 CREAM TOPICAL at 10:01

## 2025-01-14 RX ADMIN — IPRATROPIUM BROMIDE AND ALBUTEROL SULFATE 3 ML: 2.5; .5 SOLUTION RESPIRATORY (INHALATION) at 08:01

## 2025-01-14 RX ADMIN — APIXABAN 5 MG: 5 TABLET, FILM COATED ORAL at 12:01

## 2025-01-14 RX ADMIN — ATORVASTATIN CALCIUM 20 MG: 10 TABLET, FILM COATED ORAL at 08:01

## 2025-01-14 RX ADMIN — LOSARTAN POTASSIUM AND HYDROCHLOROTHIAZIDE 1 TABLET: 12.5; 5 TABLET ORAL at 09:01

## 2025-01-14 RX ADMIN — FLUTICASONE PROPIONATE 100 MCG: 50 SPRAY, METERED NASAL at 09:01

## 2025-01-14 RX ADMIN — METOPROLOL SUCCINATE 25 MG: 25 TABLET, EXTENDED RELEASE ORAL at 08:01

## 2025-01-14 RX ADMIN — GUAIFENESIN 200 MG: 200 SOLUTION ORAL at 12:01

## 2025-01-14 NOTE — ASSESSMENT & PLAN NOTE
"Last A1c reviewed-   Lab Results   Component Value Date    HGBA1C 6.2 (H) 09/04/2024     Home antihyperglycemic regimen: Glipizide 5 daily    No results for input(s): "POCTGLUCOSE" in the last 72 hours.  Most recent inpatient antihyperglycemic regimen:   Antihyperglycemics (From admission, onward)      Start     Stop Route Frequency Ordered    01/14/25 0100  insulin aspart U-100 pen 0-5 Units         -- SubQ Every 4 hours PRN 01/14/25 0001        Goal blood glucose range while admitted: 140-180 per the NICE-SUGAR trial and American Diabetes Association guidelines  Diabetic diet 2000 antwan  Rechecking A1c if most recent result above > 6 months ago  Diabetic counseling and education (eg nutrition, insulin) to be given prior to discharge    "

## 2025-01-14 NOTE — ED NOTES
Pt AAO x 4, free from falls/injury, and able to make needs known during shift. VSS on room air. Expiratory wheezing heard on auscultation. Respirator treatment requested and provided as ordered.  Pt had no c/o pain during shift. Droplet isolation precautions initiated for influenza status. Cardiac monitoring continued. No acute distress noted.  Bed locked and in lowest position. Bedside table and call light in reach. Care ongoing.

## 2025-01-14 NOTE — ED NOTES
Pt arrived to bed 24. Pt AAO x 4, pleasant, and cooperative. VSS. Expiratory wheezing heard on auscultation of lungs. Bed low, adequate lighting provided, side rails x 2 up, call bell within reach. Patient denied acute distress at this time. Family at bedside. Care ongoing.

## 2025-01-14 NOTE — DISCHARGE SUMMARY
"[facility]  Hospital Medicine  Discharge Summary      Patient Name: Rosendo Thomas  MRN: [id number]  JULI: [id number]  Patient Class: OP- Observation  Admission Date: 1/13/2025  Hospital Length of Stay: 0 days  Discharge Date and Time: 1/14/2025  1:00 PM  Attending Physician: Claudia Lutz MD  Discharging Provider: Neftali Byrd Jr, NP  Primary Care Provider: No primary care provider on file.    Primary Care Team: NEFTALI BYRD    HPI:   Rosendo Thomas is a 79 y.o. male with HTN, NIDDM, psoriasis (on Taltz) who presented to [facility] ED on 01/13/2025 for eval and treatment of 3 day history of cough, congestion, fatigue, generally feeling unwell, worsening shortness of breath.  Daughter at bedside contributes history: reports he seemed "off" while at her house, and lethargic to the point of forgetting some things so she went to check on him today and found he was feeling even worse.  Multiple episodes of loose stools yesterday, took Imodium today with improvement.  No abdominal complaints.  Denies fever chills, does admit to mild myalgias. Negative COVID flu.  No chest pain. No history of ACS.  No known history of CHF or CAD; had a negative cardiac stress in Feb '16.  ED course: New onset AFib CHADSVASC 5. HR 90s, normotensive. no hypoxia. Chest x-ray without evidence of volume overload or dense consolidation.  CTA negative for PE or consolidations.  Given an empiric dose of Rocephin and azithromycin by ED staff.  They were placed in observation under the care of [facility] for further evaluation and treatment.        * No surgery found *      Hospital Course:   Mr. Thomas was placed in observation for new onset AFib.  Presented initially with URI symptoms and was found to be positive for influenza A.  During the course of the ED workup he was noted to be in atrial fibrillation on the monitor.  No prior history of AFib.  Cardiology was consulted.  Patient declines " cardioversion in her legs for medical management.  Prescriptions for apixaban and metoprolol provided.  Prescription for Tamiflu also provided.  Heart rate remained well controlled and stable.  He feels well and amenable to discharge.  All findings and plan discussed with patient, all questions answered.  He verbalizes understanding and agreement.  Discharged home in stable condition to follow up with Cardiology.     Goals of Care Treatment Preferences:  Code Status: Full Code         Consults:   Consults (From admission, onward)          Status Ordering Provider     Inpatient consult to Social Work  Once        Provider:  (Not yet assigned)    Completed GA CASTRO     Inpatient consult to Cardiology  Once        Provider:  (Not yet assigned)    Completed MARI STERLING            URTI (acute upper respiratory infection)  No convincing evidence of PNA on vitals, labs, or imaging.  Will hold off on further ABX but CTM and consider if clinical conditions change.      Paroxysmal atrial fibrillation  Per the most recent EKG/telemetry, pt is in atrial fibrillation.  They do not have a known history of AFib.  Suspect likely etiology to be acute viral illness given his sxs' timing.  No meds given in ED because HR 90-100s.  Their AFib is rate-controlled.    [id number] score 5; use of anticoagulation is favored.    Rate control:  Acutely with Lopressor 25 BID to start; will titrate to HR goal.  Goal rate will be < 85 bpm for symptomatic patients and <110 bpm for asymptomatic patients.  Chronically with Toprol-XL with dose TBD.  Once this high-cardiovascular risk pt is stable, will explore rhythm control per the EAST-AFNET 4 trial  Anticoagulation with Eliquis 5 BID  Maintain on telemetry and daily morning EKGs for a few days following admission  Maintain K > 4, Mag > 2 and Ca/iCal WNL to decrease arrhythmogenic potential  Will move pt to ICU if a gtt is required to maintain AFib control, or if they become  "hemodynamically unstable  Cardiology consult for LUDIN +/- cardioversion as warranted, and will ensure proper PCP + Cardiology follow-up in place prior to discharge      Essential hypertension, benign  Patient's blood pressure range in the last 24 hours was: BP  Min: 134/74  Max: 156/82.The patient's inpatient anti-hypertensive regimen is listed below:  Current Antihypertensives  losartan-hydrochlorothiazide 50-12.5 mg per tablet 1 tablet, Daily, Oral    Plan  - BP is controlled, no changes needed to their regimen      Psoriasis  Stable.  Continue home meds.      Type 2 diabetes mellitus with stage 2 chronic kidney disease, without long-term current use of insulin  Last A1c reviewed-   Lab Results   Component Value Date    HGBA1C 6.2 (H) 09/04/2024     Home antihyperglycemic regimen: Glipizide 5 daily    No results for input(s): "POCTGLUCOSE" in the last 72 hours.  Most recent inpatient antihyperglycemic regimen:   Antihyperglycemics (From admission, onward)      Start     Stop Route Frequency Ordered    01/14/25 0100  insulin aspart U-100 pen 0-5 Units         -- SubQ Every 4 hours PRN 01/14/25 0001        Goal blood glucose range while admitted: 140-180 per the NICE-SUGAR trial and American Diabetes Association guidelines  Diabetic diet 2000 antwan  Rechecking A1c if most recent result above > 6 months ago  Diabetic counseling and education (eg nutrition, insulin) to be given prior to discharge        Final Active Diagnoses:    Diagnosis Date Noted POA    PRINCIPAL PROBLEM:  Influenza A [J10.1] 01/14/2025 Yes    URTI (acute upper respiratory infection) [J06.9] 01/14/2025 Yes    Dyslipidemia [E78.5] 01/14/2025 Yes    Paroxysmal atrial fibrillation [I48.0] 01/13/2025 Yes    Obesity (BMI 30.0-34.9) [E66.811] 02/25/2022 Yes    Essential hypertension, benign [I10] 05/30/2018 Yes    Psoriasis [L40.9]  Yes    Type 2 diabetes mellitus with stage 2 chronic kidney disease, without long-term current use of insulin [E11.22, N18.2] " 07/01/2010 Yes      Problems Resolved During this Admission:       Discharged Condition: stable    Disposition: Home or Self Care    Follow Up:   Follow-up Information       Abbe Whitehead MD. Go to.    Specialties: Cardiology, Interventional Cardiology  Why: Hospital follow-up on 2/10/25 @9:20am  Contact information:  [address]  [address]  [place] [address]  [phone]               Aidee Mercado PA-C. Go to.    Specialty: Family Medicine  Why: Hospital follow-up on 1/17/25 @ 1:30pm  Contact information:  [address] [facility] [address]  [phone]                           Patient Instructions:      Diet diabetic     Diet Cardiac     Activity as tolerated       Significant Diagnostic Studies: Labs: CMP   Recent Labs   Lab 01/13/25 2118 01/14/25  0555    137   K 3.8 3.5    105   CO2 21* 24   GLU 96 100   BUN 16 14   CREATININE 1.0 0.8   CALCIUM 9.2 8.2*   PROT 7.3 5.9*   ALBUMIN 3.8 3.2*   BILITOT 0.6 0.5   ALKPHOS 69 59   AST 45* 38   ALT 31 30   ANIONGAP 13 8   , CBC   Recent Labs   Lab 01/13/25 2118 01/14/25  0555   WBC 5.02 3.72*   HGB 13.9* 12.3*   HCT 44.0 40.1   * 112*   , and Troponin   Recent Labs   Lab 01/13/25 2118   TROPONINI 0.009     Cardiac Graphics: Echocardiogram: Transthoracic echo (TTE) complete (Cupid Only):   Results for orders placed or performed during the hospital encounter of 01/13/25   Echo   Result Value Ref Range    BSA 2.42 m2    LVOT stroke volume 58.9 cm3    LVIDd 3.4 (A) 3.5 - 6.0 cm    LV Systolic Volume 17.02 mL    LV Systolic Volume Index 7.1 mL/m2    LVIDs 2.2 2.1 - 4.0 cm    LV Diastolic Volume 47.21 mL    LV Diastolic Volume Index 19.75 mL/m2    Left Ventricular End Systolic Volume by Teichholz Method 17.02 mL    Left Ventricular End Diastolic Volume by Teichholz Method 47.21 mL    IVS 1.1 0.6 - 1.1 cm    LVOT diameter 2.2 cm    LVOT area 3.8 cm2    FS 35.3 28 - 44 %    Left Ventricle Relative Wall Thickness 0.71 cm     PW 1.2 (A) 0.6 - 1.1 cm    LV mass 122.0 g    LV Mass Index 51.0 g/m2    MV Peak E Kenn 1.09 m/s    TDI LATERAL 0.12 m/s    TDI SEPTAL 0.11 m/s    E/E' ratio 9.48 m/s    TR Max Kenn 2.49 m/s    IVRT 57.09 msec    LV SEPTAL E/E' RATIO 9.91 m/s    LV LATERAL E/E' RATIO 9.08 m/s    PV Peak S Kenn 0.30 m/s    PV Peak D Kenn 0.30 m/s    Pulm vein S/D ratio 1.00     LVOT peak kenn 0.9 m/s    Left Ventricular Outflow Tract Mean Velocity 0.60 cm/s    Left Ventricular Outflow Tract Mean Gradient 1.63 mmHg    RV- bruno basal diam 3.4 cm    RV S' 13.06 cm/s    TAPSE 1.95 cm    RV/LV Ratio 1.00 cm    LA size 5.30 cm    Left Atrium Minor Axis 6.95 cm    Left Atrium Major Axis 6.75 cm    RA Major Axis 4.90 cm    AV mean gradient 3.7 mmHg    AV peak gradient 5.8 mmHg    Ao peak kenn 1.2 m/s    Ao VTI 22.3 cm    LVOT peak VTI 15.5 cm    AV valve area 2.6 cm²    AV Velocity Ratio 0.75     AV index (prosthetic) 0.70     ALEA by Velocity Ratio 2.8 cm²    TV peak gradient 2 mmHg    Triscuspid Valve Regurgitation Peak Gradient 25 mmHg    PV PEAK VELOCITY 0.93 m/s    PV peak gradient 3 mmHg    Sinus 3.36 cm    STJ 3.09 cm    Ascending aorta 3.44 cm    IVC diameter 2.07 cm    Mean e' 0.12 m/s    ZLVIDS -8.45     ZLVIDD -11.46     RVDD 3.41 cm    LEANNE 50.3 mL/m2    LA Vol 120.33 cm3    LA WIDTH 3.9 cm    RA Width 3.8 cm    TV resting pulmonary artery pressure 33 mmHg    RV TB RVSP 10 mmHg    Est. RA pres 8 mmHg    Narrative      Left Ventricle: The left ventricle is normal in size. Mildly increased   wall thickness. There is mild concentric hypertrophy. There is normal   systolic function with a visually estimated ejection fraction of 55 - 60%.   Unable to assess diastolic function due to atrial fibrillation.    Right Ventricle: Normal right ventricular cavity size. Systolic   function is normal.    Pulmonic Valve: There is a 2.0x1.6 large fixed echogenic spherical mass   present.    Pulmonary Artery: The estimated pulmonary artery systolic  pressure is   33 mmHg.    Pt appears to be in AF/FL throughout exam.    Consider LUDIN for further investigation of PV findings above.         Pending Diagnostic Studies:       None           Medications:  Reconciled Home Medications:      Medication List        START taking these medications      ELIQUIS 5 mg Tab  Generic drug: apixaban  Take 1 tablet (5 mg total) by mouth 2 (two) times daily.     metoprolol succinate 25 MG 24 hr tablet  Commonly known as: TOPROL-XL  Take 1 tablet (25 mg total) by mouth once daily.  Start taking on: January 15, 2025     oseltamivir 75 MG capsule  Commonly known as: TAMIFLU  Take 1 capsule (75 mg total) by mouth once daily. for 5 days  Start taking on: January 15, 2025            CONTINUE taking these medications      atorvastatin 20 MG tablet  Commonly known as: LIPITOR  take 1 tablet by mouth once daily     benzonatate 200 MG capsule  Commonly known as: TESSALON  Take 200 mg by mouth 3 (three) times daily.     blood sugar diagnostic Strp  Commonly known as: CONTOUR TEST STRIPS  1 each by Misc.(Non-Drug; Combo Route) route 2 (two) times daily.     cefdinir 300 MG capsule  Commonly known as: OMNICEF  Take 300 mg by mouth 2 (two) times daily.     cyclobenzaprine 10 MG tablet  Commonly known as: FLEXERIL  Take 10 mg by mouth 3 (three) times daily as needed.     ergocalciferol 50,000 unit Cap  Commonly known as: ERGOCALCIFEROL  Take 1 capsule (50,000 Units total) by mouth every 7 days.     fluticasone propionate 50 mcg/actuation nasal spray  Commonly known as: FLONASE  2 sprays by Each Nostril route once daily.     glipiZIDE 2.5 MG Tr24  Commonly known as: GLUCOTROL  Take 2 tablets (5 mg total) by mouth daily with breakfast.     lancets Misc  USE ONE LANCET TWO TIMES DAILY     losartan-hydrochlorothiazide 100-25 mg 100-25 mg per tablet  Commonly known as: HYZAAR  take 1 tablet by mouth once daily FOR BLOOD PRESSURE     meloxicam 15 MG tablet  Commonly known as: MOBIC  Take 15 mg by mouth  daily as needed.     MOUNJARO 5 mg/0.5 mL Pnij  Generic drug: tirzepatide  Inject 5 mg (one pen) into the skin every 7 days.     promethazine-dextromethorphan 6.25-15 mg/5 mL Syrp  Commonly known as: PROMETHAZINE-DM  Take 10 mLs by mouth every evening.     * TALTZ AUTOINJECTOR (2 PACK) 80 mg/mL Atin  Generic drug: ixekizumab  Inject 80 mg (1 pen) into the skin every other week on week 4, 6, 8, and 10           * This list has 1 medication(s) that are the same as other medications prescribed for you. Read the directions carefully, and ask your doctor or other care provider to review them with you.                ASK your doctor about these medications      clobetasoL 0.05 % cream  Commonly known as: TEMOVATE  Apply topically 2 (two) times daily. To psoriasis PRN     * TALTZ AUTOINJECTOR (3 PACK) 80 mg/mL Atin  Generic drug: ixekizumab  Inject 160 mg (2 pens) into the skin on week 0, then inject 80 mg (1 pen) into the skin on week 2 and every other week through week 12.     * TALTZ AUTOINJECTOR 80 mg/mL Atin  Generic drug: ixekizumab  Inject 80 mg into the skin every 28 days. Starting on week 12           * This list has 2 medication(s) that are the same as other medications prescribed for you. Read the directions carefully, and ask your doctor or other care provider to review them with you.                  Indwelling Lines/Drains at time of discharge:   Lines/Drains/Airways       None                   Time spent on the discharge of patient: 35 minutes         Neftali Byrd Jr NP  Department of Hospital Medicine  [facility] [place] - Emergency Dept

## 2025-01-14 NOTE — ASSESSMENT & PLAN NOTE
Body mass index is 30.62 kg/m². Morbid obesity complicates all aspects of disease management from diagnostic modalities to treatment. Weight loss encouraged and health benefits explained to patient.

## 2025-01-14 NOTE — ED PROVIDER NOTES
Encounter Date: 1/13/2025       History     Chief Complaint   Patient presents with    Shortness of Breath     Patient reports SOB and Chest that started Saturday, reports going to  and treated for symptoms of URI.      79-year-old male presents to ED with chief complaint fatigue, SOB, cough, congestion, generally feeling unwell.    Began on Saturday afternoon with nausea, fatigue, cough.  Went to a local urgent care, tested negative for influenza.  He was given promethazine, Tessalon, and Omnicef.  Patient states Sunday only got out of bed to have loose stools---multiple episodes of watery loose stools throughout the day; another episode of loose stools this morning, but took Imodium, no BM since then.  Denies abdominal pain.  Denies fever, denies chills, does admit to mild myalgias.  Poor appetite and intake since onset of symptoms.  He admits to nausea, no emesis.  He admits to worsening shortness of breath since onset of symptoms; mostly dyspnea on exertion.  He denies chest pain.  No personal history of ACS.  Denies hyperlipidemia.  Nonsmoker.  Denies history of CHF.  No orthopnea.  No paroxysmal nocturnal dyspnea. He does admit to polydipsia, polyuria recently. Denies any recent illness or known sick contacts, but has been working outside in the cold recently.    Has not taken Mounjaro in 2w due to diarrhea s/e    On Taltz for Psoriasis    PMH:  NIDDM  CKD2  Obesity  Psoriasis  Psoriatic arthritis  Hx colonic polyps  Hx colon CA  Essential hypertension  History of sinus bradycardia  Aortic arthrosclerosis  Vitamin-D deficiency  Immunosuppressed state      TTE 10/02/2018:   CONCLUSIONS    1 - Normal left ventricular systolic function (EF 60-65%).    2 - Normal right ventricular systolic function .    3 - Normal left ventricular diastolic function.      Cardiac treadmill stress 02/27/2018:   EKG Conclusions:  1. The EKG portion of this study is negative for ischemia at a moderate workload, and peak heart rate  of 126 bpm (85% of predicted).  2. Blood pressure response to exercise was normal (Presenting BP: 143/71 Peak BP: 245/79).  3. No significant arrhythmias were present.  4. There were no symptoms of chest discomfort or significant dyspnea throughout the protocol.  5. The Jacobsen treadmill score was 5 suggesting a low probability for future cardiovascular events.      Review of patient's allergies indicates:   Allergen Reactions    Influenza virus vaccines     Metformin Diarrhea     Past Medical History:   Diagnosis Date    Colon cancer     Colon polyps     Diabetes mellitus type II 7/2010    diet controlled    Obesity     Psoriasis     Skin cancer     Squamous cell carcinoma     Type 2 diabetes mellitus      Past Surgical History:   Procedure Laterality Date    APPENDECTOMY      COLONOSCOPY N/A 7/9/2018    Procedure: COLONOSCOPY;  Surgeon: Kameron Ruff MD;  Location: [facility];  Service: Endoscopy;  Laterality: N/A;  confirmed    COLONOSCOPY N/A 11/11/2019    Procedure: COLONOSCOPY;  Surgeon: Victor Hugo Nunez MD;  Location: [facility] (4TH FLR);  Service: Endoscopy;  Laterality: N/A;  Pt stated this is the only day he has somebody to drive him and  to stay during procedure  PM Prep    COLONOSCOPY N/A 7/8/2020    Procedure: COLONOSCOPY;  Surgeon: Victor Hugo Nunez MD;  Location: [facility] (2ND FLR);  Service: Endoscopy;  Laterality: N/A;  Covid-19 test 7/5/20 at [facility] -     LAPAROSCOPIC LEFT COLECTOMY N/A 9/25/2018    Procedure: COLECTOMY-LAPAROSCOPIC LEFT;  Surgeon: Chito Banks MD;  Location: [facility] OR 2ND FLR;  Service: Colon and Rectal;  Laterality: N/A;    SKIN CANCER EXCISION      SKIN GRAFT      VASECTOMY      VASECTOMY       Family History   Problem Relation Name Age of Onset    Cancer Father          prostate    Diabetes Brother      Liver cancer Brother      Anesthesia problems Neg Hx       Social History     Tobacco Use    Smoking status: Former     Types: Cigars     Smokeless tobacco: Never    Tobacco comments:     cigars-x1 yr.   Substance Use Topics    Alcohol use: Yes     Comment: occassional    Drug use: No     Review of Systems   Constitutional:  Positive for activity change, appetite change and fatigue. Negative for chills and fever.   HENT:  Positive for congestion. Negative for sore throat.    Eyes:  Negative for discharge and redness.   Respiratory:  Positive for cough and shortness of breath.    Cardiovascular:  Negative for chest pain and leg swelling.   Gastrointestinal:  Positive for diarrhea and nausea. Negative for abdominal pain and vomiting.   Endocrine: Positive for polydipsia and polyuria.   Musculoskeletal:  Positive for myalgias. Negative for neck pain and neck stiffness.   Neurological:  Negative for syncope.       Physical Exam     Initial Vitals [01/13/25 1811]   BP Pulse Resp Temp SpO2   (!) 156/82 84 18 98.3 °F (36.8 °C) 98 %      MAP       --         Physical Exam    Nursing note and vitals reviewed.  Constitutional: He appears well-developed and well-nourished. He is not diaphoretic. No distress.   Ill appearing, nontoxic.  Speaking in full sentences with occasional pauses.   HENT:   Nasal congestion   Neck: Neck supple.   Normal range of motion.  Cardiovascular:            Irregularly irregular.  Trace pitting pretibial edema bilateral lower extremities.  No unilateral leg swelling or calf tenderness.   Pulmonary/Chest: No respiratory distress.   Faint inspiratory crackles to right lower lung zone, inspir/expir rhonchi L lower lung zones.  Mild tachypnea.  No hypoxia on room air, no accessory muscle use.   Abdominal: Abdomen is soft. There is no abdominal tenderness.   Musculoskeletal:         General: No tenderness. Normal range of motion.      Cervical back: Normal range of motion and neck supple.     Neurological: He is alert and oriented to person, place, and time. GCS score is 15. GCS eye subscore is 4. GCS verbal subscore is 5. GCS motor  subscore is 6.   Skin: Skin is warm.   Psychiatric: He has a normal mood and affect. Thought content normal.         ED Course   Procedures  Labs Reviewed   CBC W/ AUTO DIFFERENTIAL - Abnormal       Result Value    WBC 5.02      RBC 5.06      Hemoglobin 13.9 (*)     Hematocrit 44.0      MCV 87      MCH 27.5      MCHC 31.6 (*)     RDW 15.2 (*)     Platelets 108 (*)     MPV 10.8      Immature Granulocytes 0.2      Gran # (ANC) 4.0      Immature Grans (Abs) 0.01      Lymph # 0.5 (*)     Mono # 0.4      Eos # 0.0      Baso # 0.03      nRBC 0      Gran % 79.4 (*)     Lymph % 10.8 (*)     Mono % 8.4      Eosinophil % 0.6      Basophil % 0.6      Differential Method Automated     COMPREHENSIVE METABOLIC PANEL - Abnormal    Sodium 136      Potassium 3.8      Chloride 102      CO2 21 (*)     Glucose 96      BUN 16      Creatinine 1.0      Calcium 9.2      Total Protein 7.3      Albumin 3.8      Total Bilirubin 0.6      Alkaline Phosphatase 69      AST 45 (*)     ALT 31      eGFR >60      Anion Gap 13     BETA - HYDROXYBUTYRATE, SERUM - Abnormal    Beta-Hydroxybutyrate 0.7 (*)    ISTAT PROCEDURE - Abnormal    POC PH 7.336 (*)     POC PCO2 49.7 (*)     POC PO2 22 (*)     POC HCO3 26.6      POC BE 0      POC SATURATED O2 34      POC TCO2 28      Sample VENOUS      Site Other      Allens Test N/A      DelSys Room Air      Mode SPONT      FiO2 21     CULTURE, BLOOD   CULTURE, BLOOD   RSV ANTIGEN DETECTION    RSV Source Nasopharyngeal Swab      RSV Ag by Molecular Method Negative     TROPONIN I    Troponin I 0.009     B-TYPE NATRIURETIC PEPTIDE    BNP 63     MAGNESIUM    Magnesium 2.0     PROTIME-INR    Prothrombin Time 11.5      INR 1.0     APTT    aPTT 24.0     LACTIC ACID, PLASMA    Lactate (Lactic Acid) 1.0     SARS-COV-2 RDRP GENE    POC Rapid COVID Negative       Acceptable Yes     POCT INFLUENZA A/B MOLECULAR   POCT GLUCOSE MONITORING CONTINUOUS   POCT GLUCOSE MONITORING CONTINUOUS     EKG Readings:  (Independently Interpreted)   Atrial fibrillation, borderline prolonged QRS, normal QT. No right axis deviation.  No convincing ST elevation.  Nonspecific ST segment changes.  New onset AFib compared to most recent EKG.        Imaging Results              CTA Chest Non-Coronary (PE Studies) (Final result)  Result time 01/14/25 00:34:01      Final result by Sheila Valente MD (01/14/25 00:34:01)                   Impression:      No evidence of PE.  No acute intrathoracic abnormalities identified.      Electronically signed by: Sheila Valente MD  Date:    01/14/2025  Time:    00:34               Narrative:    EXAMINATION:  CTA CHEST NON CORONARY (PE STUDIES)    CLINICAL HISTORY:  Pulmonary embolism (PE) suspected, high prob;suspected CAP, new onset afib, r/o PE;    TECHNIQUE:  Low dose axial images, sagittal and coronal reformations were obtained from the thoracic inlet to the lung bases following the IV administration of 75 mL of Omnipaque 350.  Contrast timing was optimized to evaluate the pulmonary arteries.  MIP images were performed.    COMPARISON:  None    FINDINGS:  Structures at the base of the neck are unremarkable.  Aorta is non-aneurysmal.  The heart is normal in size without pericardial effusion.  No intraluminal filling defects within the pulmonary arteries to suggest pulmonary thromboembolism.   There is no evidence of mediastinal, axillary, or hilar lymph node enlargement.  The esophagus is unremarkable along its course.    The trachea and bronchi are patent.  The lungs are symmetrically expanded.  Lungs show no consolidation, pleural effusion, pulmonary hemorrhage, or infarction.    The visualized abdominal structures are unremarkable.  Osseous structures demonstrate degenerative change.  Extrathoracic soft tissues are unremarkable.                                       X-Ray Chest PA And Lateral (Final result)  Result time 01/13/25 19:27:40      Final result by Sheila Valente MD (01/13/25  19:27:40)                   Impression:      No acute cardiopulmonary process identified.      Electronically signed by: Sheila Valente MD  Date:    01/13/2025  Time:    19:27               Narrative:    EXAMINATION:  XR CHEST PA AND LATERAL    CLINICAL HISTORY:  Chronic cough    TECHNIQUE:  PA and lateral views of the chest were performed.    COMPARISON:  October 2018.    FINDINGS:  Cardiac silhouette is stable in size.  Lungs are symmetrically expanded.  No evidence of focal consolidative process, pneumothorax, or significant pleural effusion.  No acute osseous abnormality identified.                                    X-Rays:   Independently Interpreted Readings:   Chest X-Ray: Personal interpretation:  Cardiomegaly, no large effusion although questionable trace blunting of costophrenic angles, no obvious pneumothorax, increased perihilar markings versus scarred appearance, questionable atelectasis to right lower lung zone, no convincing dense peripheral lobar consolidation.     Medications   atorvastatin tablet 20 mg (has no administration in time range)   clobetasoL 0.05 % cream (has no administration in time range)   fluticasone propionate 50 mcg/actuation nasal spray 100 mcg (has no administration in time range)   losartan-hydrochlorothiazide 50-12.5 mg per tablet 1 tablet (has no administration in time range)   sodium chloride 0.9% flush 10 mL (has no administration in time range)   melatonin tablet 6 mg (has no administration in time range)   ondansetron injection 4 mg (has no administration in time range)   prochlorperazine injection Soln 5 mg (has no administration in time range)   polyethylene glycol packet 17 g (has no administration in time range)   mineral oil enema 1 enema (has no administration in time range)   acetaminophen tablet 650 mg (has no administration in time range)   aluminum-magnesium hydroxide-simethicone 200-200-20 mg/5 mL suspension 30 mL (has no administration in time range)    acetaminophen tablet 650 mg (has no administration in time range)   naloxone 0.4 mg/mL injection 0.02 mg (has no administration in time range)   glucose chewable tablet 16 g (has no administration in time range)   glucose chewable tablet 24 g (has no administration in time range)   glucagon (human recombinant) injection 1 mg (has no administration in time range)   apixaban tablet 5 mg (5 mg Oral Given 1/14/25 0009)   insulin aspart U-100 pen 0-5 Units (has no administration in time range)   guaiFENesin 100 mg/5 ml syrup 200 mg (has no administration in time range)   benzonatate capsule 100 mg (has no administration in time range)   hydrALAZINE injection 10 mg (has no administration in time range)   cefTRIAXone injection 1 g (1 g Intravenous Given 1/13/25 2205)   azithromycin (ZITHROMAX) 500 mg in 0.9% NaCl 250 mL IVPB (admixture device) (0 mg Intravenous Stopped 1/13/25 2317)   lactated ringers bolus 1,000 mL (0 mLs Intravenous Stopped 1/13/25 2306)   iohexoL (OMNIPAQUE 350) injection 75 mL (75 mLs Intravenous Given 1/13/25 2241)     Medical Decision Making  Differential diagnosis:  CHF, PE, ACS, community-acquired pneumonia, viral URI, DKA, hyperglycemia    Amount and/or Complexity of Data Reviewed  External Data Reviewed: labs, ECG and notes.  Labs: ordered. Decision-making details documented in ED Course.  Radiology: ordered and independent interpretation performed. Decision-making details documented in ED Course.  ECG/medicine tests: ordered and independent interpretation performed. Decision-making details documented in ED Course.     Details: History of first-degree AV block.  Today with irregular irregular rhythm, EKG with new onset AFib.  Rate controlled. CHADSVASC 5.     Risk  Prescription drug management.               ED Course as of 01/14/25 0041   Mon Jan 13, 2025 2105 A1c 6.2% on 09/04/2024 [SM]   2210 Despite no convincing dense consolidation on x-ray, given breath sounds, worsening dyspnea on  exertion, associated cough and fatigue, comorbidities, advanced age, I have elected to treat as community-acquired pneumonia.  Remainder of labs and workup pending. [SM]   2211 Will give IV fluids to see if improvement of or resolution of atrial fibrillation.  May need CTA for re-evaluation of PE given worsening dyspnea on exertion. [SM]   2233 Only slightly elevated anion gap, no significant decrease in bicarb on VBG, no hyperglycemia, very mild ketonemia, no significant acidosis--do not think represents DKA at this time. [SM]   2333 After discussion with patient and daughter, he is agreeable for admission to  observation service for evaluation of the onset AFib, likely need for anticoagulation.  He does state he had an episode of bleeding after a colonoscopy, but otherwise denies any history of bleeding.  Remains hemodynamically stable, will continue to monitor heart rate, at this point he is rate controlled.  I feel he can be safely admitted upstairs with continued cardiac monitoring.  Discussed with  provider, agrees to accept observation service. []   Tue Jan 14, 2025   0041 CTA without PE, no intrathoracic abnormalities. []      ED Course User Index  [SM] Rob Pimentel PA-C                           Clinical Impression:  Final diagnoses:  [I48.91] New onset atrial fibrillation  [J06.9] Viral URI with cough (Primary)  [R06.09] BROOKS (dyspnea on exertion)          ED Disposition Condition    Observation Stable                Rob Pimentel PA-C  01/14/25 0041

## 2025-01-14 NOTE — ASSESSMENT & PLAN NOTE
Patient's blood pressure range in the last 24 hours was: BP  Min: 134/74  Max: 156/82.The patient's inpatient anti-hypertensive regimen is listed below:  Current Antihypertensives  losartan-hydrochlorothiazide 50-12.5 mg per tablet 1 tablet, Daily, Oral    Plan  - BP is controlled, no changes needed to their regimen

## 2025-01-14 NOTE — ED NOTES
Report given to MARTHA Bunch. Pt moved to ED main in order to be placed on continuous cardiac monitoring.

## 2025-01-14 NOTE — ASSESSMENT & PLAN NOTE
Noted on admission, new dx.  CHADSVASC 4  No associated sxs  HR controlled at rest, elev with exertion.  ?triggered by Flu A.  Hx asymptomatic bradycardia.  Pt prefers initial strategy of HR control and OAC  Agree with eliquis 5mg bid  Start Toprol XL 25mg qd, titrate as HR/BP allows  Check echo

## 2025-01-14 NOTE — PLAN OF CARE
Case Management Final Discharge Note      Discharge Disposition: home     New DME ordered / company name: none    Relevant SDOH / Transition of Care Barriers:  none    Person available to provide assistance at home when needed and their contact information: pierre pinzon (Daughter)  [phone] (Mobile)     Scheduled followup appointment: PCP 1/17/25 @1:30pm and Cardiology 2/10/25 @9:20am added to AVS    Referrals placed: none    Transportation: family    Patient and family educated on discharge services and updated on DC plan. Bedside RN notified, patient clear to discharge from Case Management Perspective.      01/14/25 1140   Final Note   Assessment Type Final Discharge Note   Anticipated Discharge Disposition Home   What phone number can be called within the next 1-3 days to see how you are doing after discharge? [phone]   Hospital Resources/Appts/Education Provided Appointments scheduled and added to AVS   Post-Acute Status   Discharge Delays None known at this time

## 2025-01-14 NOTE — H&P
"    [facility] Emergency Dept  [facility]  History & Physical    Patient Name: Rosendo Thomas  MRN: [id number]  Patient Class: OP- Observation  Admission Date: 1/13/2025  Attending Physician: Nereida Varela DO   Primary Care Provider: No primary care provider on file.         Patient information was obtained from patient, relative(s), past medical records, and ER records.     Subjective:     Principal Problem:Paroxysmal atrial fibrillation    Chief Complaint:   Chief Complaint   Patient presents with    Shortness of Breath     Patient reports SOB and Chest that started Saturday, reports going to  and treated for symptoms of URI.         HPI: Rosendo Thomas is a 79 y.o. male with HTN, NIDDM, psoriasis (on Taltz) who presented to [facility] ED on 01/13/2025 for eval and treatment of 3 day history of cough, congestion, fatigue, generally feeling unwell, worsening shortness of breath.  Daughter at bedside contributes history: reports he seemed "off" while at her house, and lethargic to the point of forgetting some things so she went to check on him today and found he was feeling even worse.  Multiple episodes of loose stools yesterday, took Imodium today with improvement.  No abdominal complaints.  Denies fever chills, does admit to mild myalgias. Negative COVID flu.  No chest pain. No history of ACS.  No known history of CHF or CAD; had a negative cardiac stress in Feb '16.  ED course: New onset AFib CHADSVASC 5. HR 90s, normotensive. no hypoxia. Chest x-ray without evidence of volume overload or dense consolidation.  CTA negative for PE or consolidations.  Given an empiric dose of Rocephin and azithromycin by ED staff.  They were placed in observation under the care of [facility] for further evaluation and treatment.        Past Medical History:   Diagnosis Date    Colon cancer     Colon polyps     Diabetes mellitus type II 7/2010    diet controlled    Obesity     Psoriasis     " Skin cancer     Squamous cell carcinoma     Type 2 diabetes mellitus        Past Surgical History:   Procedure Laterality Date    APPENDECTOMY      COLONOSCOPY N/A 7/9/2018    Procedure: COLONOSCOPY;  Surgeon: Kameron Ruff MD;  Location: [facility] ENDO;  Service: Endoscopy;  Laterality: N/A;  confirmed    COLONOSCOPY N/A 11/11/2019    Procedure: COLONOSCOPY;  Surgeon: Victor Hugo Nunez MD;  Location: [facility] (4TH FLR);  Service: Endoscopy;  Laterality: N/A;  Pt stated this is the only day he has somebody to drive him and  to stay during procedure  PM Prep    COLONOSCOPY N/A 7/8/2020    Procedure: COLONOSCOPY;  Surgeon: Victor Hugo Nunez MD;  Location: [facility] (2ND FLR);  Service: Endoscopy;  Laterality: N/A;  Covid-19 test 7/5/20 at [facility]    LAPAROSCOPIC LEFT COLECTOMY N/A 9/25/2018    Procedure: COLECTOMY-LAPAROSCOPIC LEFT;  Surgeon: Chito Banks MD;  Location: [facility] OR 2ND FLR;  Service: Colon and Rectal;  Laterality: N/A;    SKIN CANCER EXCISION      SKIN GRAFT      VASECTOMY      VASECTOMY         Review of patient's allergies indicates:   Allergen Reactions    Influenza virus vaccines     Metformin Diarrhea       Current Facility-Administered Medications on File Prior to Encounter   Medication    enoxaparin injection 40 mg    gabapentin capsule 300 mg     Current Outpatient Medications on File Prior to Encounter   Medication Sig    acetaminophen (TYLENOL ORAL) Take by mouth.    atorvastatin (LIPITOR) 20 MG tablet take 1 tablet by mouth once daily    blood sugar diagnostic (CONTOUR TEST STRIPS) Strp 1 each by Misc.(Non-Drug; Combo Route) route 2 (two) times daily.    clobetasol (TEMOVATE) 0.05 % cream Apply topically 2 (two) times daily. To psoriasis PRN    ergocalciferol (ERGOCALCIFEROL) 50,000 unit Cap Take 1 capsule (50,000 Units total) by mouth every 7 days.    fluticasone propionate (FLONASE) 50 mcg/actuation nasal spray 2 sprays by Each Nostril route once daily.     glipiZIDE (GLUCOTROL) 2.5 MG TR24 Take 2 tablets (5 mg total) by mouth daily with breakfast.    ixekizumab (TALTZ AUTOINJECTOR) 80 mg/mL AtIn Inject 80 mg into the skin every 28 days. Starting on week 12    ixekizumab (TALTZ AUTOINJECTOR, 2 PACK,) 80 mg/mL AtIn Inject 80 mg (1 pen) into the skin every other week on week 4, 6, 8, and 10    ixekizumab (TALTZ AUTOINJECTOR, 3 PACK,) 80 mg/mL AtIn Inject 160 mg (2 pens) into the skin on week 0, then inject 80 mg (1 pen) into the skin on week 2 and every other week through week 12.    lancets Misc USE ONE LANCET TWO TIMES DAILY    losartan-hydrochlorothiazide 100-25 mg (HYZAAR) 100-25 mg per tablet take 1 tablet by mouth once daily FOR BLOOD PRESSURE    MOUNJARO 5 mg/0.5 mL PnIj Inject 5 mg (one pen) into the skin every 7 days.     Family History       Problem Relation (Age of Onset)    Cancer Father    Diabetes Brother    Liver cancer Brother          Tobacco Use    Smoking status: Former     Types: Cigars    Smokeless tobacco: Never    Tobacco comments:     cigars-x1 yr.   Substance and Sexual Activity    Alcohol use: Yes     Comment: occassional    Drug use: No    Sexual activity: Yes     Partners: Female     Review of Systems   Reason unable to perform ROS: ROS was performed and pertinent +s and -s are listed in HPI.     Objective:     Vital Signs (Most Recent):  Temp: 98.9 °F (37.2 °C) (01/13/25 2220)  Pulse: 92 (01/13/25 2330)  Resp: (!) 22 (01/13/25 2330)  BP: (!) 149/81 (01/13/25 2300)  SpO2: (!) 94 % (01/13/25 2330) Vital Signs (24h Range):  Temp:  [98 °F (36.7 °C)-98.9 °F (37.2 °C)] 98.9 °F (37.2 °C)  Pulse:  [62-96] 92  Resp:  [18-23] 22  SpO2:  [94 %-100 %] 94 %  BP: (134-156)/(74-82) 149/81     Weight: 111.1 kg (245 lb)  Body mass index is 30.62 kg/m².     Physical Exam  Constitutional:       General: He is not in acute distress.     Appearance: Normal appearance. He is not ill-appearing.   HENT:      Head: Normocephalic.   Neck:      Comments: JVP not  elevated  Cardiovascular:      Rate and Rhythm: Normal rate. Rhythm irregular.      Pulses: Normal pulses.      Heart sounds: Normal heart sounds.   Pulmonary:      Effort: Pulmonary effort is normal.      Breath sounds: Normal breath sounds.   Abdominal:      General: Abdomen is flat. Bowel sounds are normal.      Tenderness: There is no abdominal tenderness. There is no guarding.   Musculoskeletal:      Right lower leg: No edema.      Left lower leg: No edema.   Skin:     General: Skin is warm and dry.      Capillary Refill: Capillary refill takes less than 2 seconds.      Coloration: Skin is not jaundiced.   Neurological:      General: No focal deficit present.      Mental Status: He is alert and oriented to person, place, and time.   Psychiatric:         Mood and Affect: Mood normal.         Behavior: Behavior normal.                Significant Labs: All pertinent labs within the past 24 hours have been reviewed.  ABGs:   Recent Labs   Lab 01/13/25 2140   PH 7.336*   PCO2 49.7*   HCO3 26.6   POCSATURATED 34   BE 0   PO2 22*     CBC:   Recent Labs   Lab 01/13/25 2118   WBC 5.02   HGB 13.9*   HCT 44.0   *     CMP:   Recent Labs   Lab 01/13/25 2118      K 3.8      CO2 21*   GLU 96   BUN 16   CREATININE 1.0   CALCIUM 9.2   PROT 7.3   ALBUMIN 3.8   BILITOT 0.6   ALKPHOS 69   AST 45*   ALT 31   ANIONGAP 13     Cardiac Markers:   Recent Labs   Lab 01/13/25 2118   BNP 63     Troponin:   Recent Labs   Lab 01/13/25 2118   TROPONINI 0.009       Significant Imaging: I have reviewed all pertinent imaging results/findings within the past 24 hours.            Assessment/Plan:     * Paroxysmal atrial fibrillation  Per the most recent EKG/telemetry, pt is in atrial fibrillation.  They do not have a known history of AFib.  Suspect likely etiology to be acute viral illness given his sxs' timing.  No meds given in ED because HR 90-100s.  Their AFib is rate-controlled.    [id number] score 5; use of  "anticoagulation is favored.    Rate control:  Acutely with Lopressor 25 BID to start; will titrate to HR goal.  Goal rate will be < 85 bpm for symptomatic patients and <110 bpm for asymptomatic patients.  Chronically with Toprol-XL with dose TBD.  Once this high-cardiovascular risk pt is stable, will explore rhythm control per the EAST-AFNET 4 trial  Anticoagulation with Eliquis 5 BID  Maintain on telemetry and daily morning EKGs for a few days following admission  Maintain K > 4, Mag > 2 and Ca/iCal WNL to decrease arrhythmogenic potential  Will move pt to ICU if a gtt is required to maintain AFib control, or if they become hemodynamically unstable  Cardiology consult for LUDIN +/- cardioversion as warranted, and will ensure proper PCP + Cardiology follow-up in place prior to discharge      URTI (acute upper respiratory infection)  No convincing evidence of PNA on vitals, labs, or imaging.  Will hold off on further ABX but CTM and consider if clinical conditions change.      Essential hypertension, benign  Patient's blood pressure range in the last 24 hours was: BP  Min: 134/74  Max: 156/82.The patient's inpatient anti-hypertensive regimen is listed below:  Current Antihypertensives  losartan-hydrochlorothiazide 50-12.5 mg per tablet 1 tablet, Daily, Oral    Plan  - BP is controlled, no changes needed to their regimen      Psoriasis  Stable.  Continue home meds.      Type 2 diabetes mellitus with stage 2 chronic kidney disease, without long-term current use of insulin  Last A1c reviewed-   Lab Results   Component Value Date    HGBA1C 6.2 (H) 09/04/2024     Home antihyperglycemic regimen: Glipizide 5 daily    No results for input(s): "POCTGLUCOSE" in the last 72 hours.  Most recent inpatient antihyperglycemic regimen:   Antihyperglycemics (From admission, onward)      Start     Stop Route Frequency Ordered    01/14/25 0100  insulin aspart U-100 pen 0-5 Units         -- SubQ Every 4 hours PRN 01/14/25 0001        Goal " blood glucose range while admitted: 140-180 per the NICE-SUGAR trial and American Diabetes Association guidelines  Diabetic diet 2000 antwan  Rechecking A1c if most recent result above > 6 months ago  Diabetic counseling and education (eg nutrition, insulin) to be given prior to discharge        VTE Risk Mitigation (From admission, onward)           Ordered     apixaban tablet 5 mg  2 times daily         01/13/25 7333                       On 01/14/2025, patient should be placed in hospital observation services under my care.             Chandler Chaudhary MD  Department of Hospital Medicine  [facility] - Emergency Dept

## 2025-01-14 NOTE — SUBJECTIVE & OBJECTIVE
Past Medical History:   Diagnosis Date    Colon cancer     Colon polyps     Diabetes mellitus type II 7/2010    diet controlled    Obesity     Psoriasis     Skin cancer     Squamous cell carcinoma     Type 2 diabetes mellitus        Past Surgical History:   Procedure Laterality Date    APPENDECTOMY      COLONOSCOPY N/A 7/9/2018    Procedure: COLONOSCOPY;  Surgeon: Kameron Ruff MD;  Location: [facility];  Service: Endoscopy;  Laterality: N/A;  confirmed    COLONOSCOPY N/A 11/11/2019    Procedure: COLONOSCOPY;  Surgeon: Victor Hugo Nunez MD;  Location: [facility] (4TH FLR);  Service: Endoscopy;  Laterality: N/A;  Pt stated this is the only day he has somebody to drive him and  to stay during procedure  PM Prep    COLONOSCOPY N/A 7/8/2020    Procedure: COLONOSCOPY;  Surgeon: Victor Hugo Nunez MD;  Location: [facility] (2ND FLR);  Service: Endoscopy;  Laterality: N/A;  Covid-19 test 7/5/20 at [facility]    LAPAROSCOPIC LEFT COLECTOMY N/A 9/25/2018    Procedure: COLECTOMY-LAPAROSCOPIC LEFT;  Surgeon: Chito Banks MD;  Location: [facility] OR 2ND FLR;  Service: Colon and Rectal;  Laterality: N/A;    SKIN CANCER EXCISION      SKIN GRAFT      VASECTOMY      VASECTOMY         Review of patient's allergies indicates:   Allergen Reactions    Influenza virus vaccines     Metformin Diarrhea       Current Facility-Administered Medications on File Prior to Encounter   Medication    enoxaparin injection 40 mg    gabapentin capsule 300 mg     Current Outpatient Medications on File Prior to Encounter   Medication Sig    acetaminophen (TYLENOL ORAL) Take by mouth.    atorvastatin (LIPITOR) 20 MG tablet take 1 tablet by mouth once daily    blood sugar diagnostic (CONTOUR TEST STRIPS) Strp 1 each by Misc.(Non-Drug; Combo Route) route 2 (two) times daily.    clobetasol (TEMOVATE) 0.05 % cream Apply topically 2 (two) times daily. To psoriasis PRN    ergocalciferol (ERGOCALCIFEROL) 50,000 unit Cap Take 1 capsule  (50,000 Units total) by mouth every 7 days.    fluticasone propionate (FLONASE) 50 mcg/actuation nasal spray 2 sprays by Each Nostril route once daily.    glipiZIDE (GLUCOTROL) 2.5 MG TR24 Take 2 tablets (5 mg total) by mouth daily with breakfast.    ixekizumab (TALTZ AUTOINJECTOR) 80 mg/mL AtIn Inject 80 mg into the skin every 28 days. Starting on week 12    ixekizumab (TALTZ AUTOINJECTOR, 2 PACK,) 80 mg/mL AtIn Inject 80 mg (1 pen) into the skin every other week on week 4, 6, 8, and 10    ixekizumab (TALTZ AUTOINJECTOR, 3 PACK,) 80 mg/mL AtIn Inject 160 mg (2 pens) into the skin on week 0, then inject 80 mg (1 pen) into the skin on week 2 and every other week through week 12.    lancets Misc USE ONE LANCET TWO TIMES DAILY    losartan-hydrochlorothiazide 100-25 mg (HYZAAR) 100-25 mg per tablet take 1 tablet by mouth once daily FOR BLOOD PRESSURE    MOUNJARO 5 mg/0.5 mL PnIj Inject 5 mg (one pen) into the skin every 7 days.     Family History       Problem Relation (Age of Onset)    Cancer Father    Diabetes Brother    Liver cancer Brother          Tobacco Use    Smoking status: Former     Types: Cigars    Smokeless tobacco: Never    Tobacco comments:     cigars-x1 yr.   Substance and Sexual Activity    Alcohol use: Yes     Comment: occassional    Drug use: No    Sexual activity: Yes     Partners: Female     Review of Systems   Reason unable to perform ROS: ROS was performed and pertinent +s and -s are listed in HPI.     Objective:     Vital Signs (Most Recent):  Temp: 98.9 °F (37.2 °C) (01/13/25 2220)  Pulse: 92 (01/13/25 2330)  Resp: (!) 22 (01/13/25 2330)  BP: (!) 149/81 (01/13/25 2300)  SpO2: (!) 94 % (01/13/25 2330) Vital Signs (24h Range):  Temp:  [98 °F (36.7 °C)-98.9 °F (37.2 °C)] 98.9 °F (37.2 °C)  Pulse:  [62-96] 92  Resp:  [18-23] 22  SpO2:  [94 %-100 %] 94 %  BP: (134-156)/(74-82) 149/81     Weight: 111.1 kg (245 lb)  Body mass index is 30.62 kg/m².     Physical Exam  Constitutional:       General: He is  not in acute distress.     Appearance: Normal appearance. He is not ill-appearing.   HENT:      Head: Normocephalic.   Neck:      Comments: JVP not elevated  Cardiovascular:      Rate and Rhythm: Normal rate. Rhythm irregular.      Pulses: Normal pulses.      Heart sounds: Normal heart sounds.   Pulmonary:      Effort: Pulmonary effort is normal.      Breath sounds: Normal breath sounds.   Abdominal:      General: Abdomen is flat. Bowel sounds are normal.      Tenderness: There is no abdominal tenderness. There is no guarding.   Musculoskeletal:      Right lower leg: No edema.      Left lower leg: No edema.   Skin:     General: Skin is warm and dry.      Capillary Refill: Capillary refill takes less than 2 seconds.      Coloration: Skin is not jaundiced.   Neurological:      General: No focal deficit present.      Mental Status: He is alert and oriented to person, place, and time.   Psychiatric:         Mood and Affect: Mood normal.         Behavior: Behavior normal.                Significant Labs: All pertinent labs within the past 24 hours have been reviewed.  ABGs:   Recent Labs   Lab 01/13/25 2140   PH 7.336*   PCO2 49.7*   HCO3 26.6   POCSATURATED 34   BE 0   PO2 22*     CBC:   Recent Labs   Lab 01/13/25 2118   WBC 5.02   HGB 13.9*   HCT 44.0   *     CMP:   Recent Labs   Lab 01/13/25 2118      K 3.8      CO2 21*   GLU 96   BUN 16   CREATININE 1.0   CALCIUM 9.2   PROT 7.3   ALBUMIN 3.8   BILITOT 0.6   ALKPHOS 69   AST 45*   ALT 31   ANIONGAP 13     Cardiac Markers:   Recent Labs   Lab 01/13/25 2118   BNP 63     Troponin:   Recent Labs   Lab 01/13/25 2118   TROPONINI 0.009       Significant Imaging: I have reviewed all pertinent imaging results/findings within the past 24 hours.

## 2025-01-14 NOTE — ED NOTES
On auscultation, pt had expiratory wheezing with cough and congestion. Requested neb treatment/respiratory orders from physician. Orders added. Respiratory treatment requested as ordered. PRN med given as ordered.

## 2025-01-14 NOTE — HPI
"Rosendo Thomas is a 79 y.o. male with HTN, NIDDM, psoriasis (on Taltz) who presented to [facility] ED on 01/13/2025 for eval and treatment of 3 day history of cough, congestion, fatigue, generally feeling unwell, worsening shortness of breath.  Daughter at bedside contributes history: reports he seemed "off" while at her house, and lethargic to the point of forgetting some things so she went to check on him today and found he was feeling even worse.  Multiple episodes of loose stools yesterday, took Imodium today with improvement.  No abdominal complaints.  Denies fever chills, does admit to mild myalgias. Negative COVID flu.  No chest pain. No history of ACS.  No known history of CHF or CAD; had a negative cardiac stress in Feb '16.  ED course: New onset AFib CHADSVASC 5. HR 90s, normotensive. no hypoxia. Chest x-ray without evidence of volume overload or dense consolidation.  CTA negative for PE or consolidations.  Given an empiric dose of Rocephin and azithromycin by ED staff.  They were placed in observation under the care of [facility] for further evaluation and treatment.      "

## 2025-01-14 NOTE — PLAN OF CARE
Case Management Assessment     PCP: Aidee Mercado  Pharmacy: [facility] [place]    Patient Arrived From: home  Existing Help at Home: daughter    Barriers to Discharge: no    Discharge Plan:    A. Home    B. Home      01/14/25 1106   Discharge Planning   Assessment Type Discharge Planning Brief Assessment   Resource/Environmental Concerns none   Equipment Currently Used at Home none   Current Living Arrangements home   Patient/Family Anticipates Transition to home   Patient/Family Anticipated Services at Transition none   DME Needed Upon Discharge  none   Discharge Plan A Home   Discharge Plan B Home

## 2025-01-14 NOTE — ASSESSMENT & PLAN NOTE
No convincing evidence of PNA on vitals, labs, or imaging.  Will hold off on further ABX but CTM and consider if clinical conditions change.

## 2025-01-14 NOTE — ASSESSMENT & PLAN NOTE
Per the most recent EKG/telemetry, pt is in atrial fibrillation.  They do not have a known history of AFib.  Suspect likely etiology to be acute viral illness given his sxs' timing.  No meds given in ED because HR 90-100s.  Their AFib is rate-controlled.    [id number] score 5; use of anticoagulation is favored.    Rate control:  Acutely with Lopressor 25 BID to start; will titrate to HR goal.  Goal rate will be < 85 bpm for symptomatic patients and <110 bpm for asymptomatic patients.  Chronically with Toprol-XL with dose TBD.  Once this high-cardiovascular risk pt is stable, will explore rhythm control per the EAST-AFNET 4 trial  Anticoagulation with Eliquis 5 BID  Maintain on telemetry and daily morning EKGs for a few days following admission  Maintain K > 4, Mag > 2 and Ca/iCal WNL to decrease arrhythmogenic potential  Will move pt to ICU if a gtt is required to maintain AFib control, or if they become hemodynamically unstable  Cardiology consult for LUDIN +/- cardioversion as warranted, and will ensure proper PCP + Cardiology follow-up in place prior to discharge

## 2025-01-14 NOTE — SUBJECTIVE & OBJECTIVE
Past Medical History:   Diagnosis Date    Colon cancer     Colon polyps     Diabetes mellitus type II 7/2010    diet controlled    Obesity     Psoriasis     Skin cancer     Squamous cell carcinoma     Type 2 diabetes mellitus        Past Surgical History:   Procedure Laterality Date    APPENDECTOMY      COLONOSCOPY N/A 7/9/2018    Procedure: COLONOSCOPY;  Surgeon: Kameron Ruff MD;  Location: [facility] ENDO;  Service: Endoscopy;  Laterality: N/A;  confirmed    COLONOSCOPY N/A 11/11/2019    Procedure: COLONOSCOPY;  Surgeon: Victor Hugo Nunez MD;  Location: [facility] (4TH FLR);  Service: Endoscopy;  Laterality: N/A;  Pt stated this is the only day he has somebody to drive him and  to stay during procedure  PM Prep    COLONOSCOPY N/A 7/8/2020    Procedure: COLONOSCOPY;  Surgeon: Victor Hugo Nunez MD;  Location: [facility] (2ND FLR);  Service: Endoscopy;  Laterality: N/A;  Covid-19 test 7/5/20 at [facility]    LAPAROSCOPIC LEFT COLECTOMY N/A 9/25/2018    Procedure: COLECTOMY-LAPAROSCOPIC LEFT;  Surgeon: Chito Banks MD;  Location: [facility] OR 2ND FLR;  Service: Colon and Rectal;  Laterality: N/A;    SKIN CANCER EXCISION      SKIN GRAFT      VASECTOMY      VASECTOMY         Review of patient's allergies indicates:   Allergen Reactions    Influenza virus vaccines     Metformin Diarrhea       Current Facility-Administered Medications on File Prior to Encounter   Medication    gabapentin capsule 300 mg    [DISCONTINUED] enoxaparin injection 40 mg     Current Outpatient Medications on File Prior to Encounter   Medication Sig    acetaminophen (TYLENOL ORAL) Take by mouth.    atorvastatin (LIPITOR) 20 MG tablet take 1 tablet by mouth once daily    blood sugar diagnostic (CONTOUR TEST STRIPS) Strp 1 each by Misc.(Non-Drug; Combo Route) route 2 (two) times daily.    clobetasol (TEMOVATE) 0.05 % cream Apply topically 2 (two) times daily. To psoriasis PRN    ergocalciferol (ERGOCALCIFEROL) 50,000 unit Cap  Take 1 capsule (50,000 Units total) by mouth every 7 days.    fluticasone propionate (FLONASE) 50 mcg/actuation nasal spray 2 sprays by Each Nostril route once daily.    glipiZIDE (GLUCOTROL) 2.5 MG TR24 Take 2 tablets (5 mg total) by mouth daily with breakfast.    ixekizumab (TALTZ AUTOINJECTOR) 80 mg/mL AtIn Inject 80 mg into the skin every 28 days. Starting on week 12    ixekizumab (TALTZ AUTOINJECTOR, 2 PACK,) 80 mg/mL AtIn Inject 80 mg (1 pen) into the skin every other week on week 4, 6, 8, and 10    ixekizumab (TALTZ AUTOINJECTOR, 3 PACK,) 80 mg/mL AtIn Inject 160 mg (2 pens) into the skin on week 0, then inject 80 mg (1 pen) into the skin on week 2 and every other week through week 12.    lancets Misc USE ONE LANCET TWO TIMES DAILY    losartan-hydrochlorothiazide 100-25 mg (HYZAAR) 100-25 mg per tablet take 1 tablet by mouth once daily FOR BLOOD PRESSURE    MOUNJARO 5 mg/0.5 mL PnIj Inject 5 mg (one pen) into the skin every 7 days.     Family History       Problem Relation (Age of Onset)    Cancer Father    Diabetes Brother    Liver cancer Brother          Tobacco Use    Smoking status: Former     Types: Cigars    Smokeless tobacco: Never    Tobacco comments:     cigars-x1 yr.   Substance and Sexual Activity    Alcohol use: Yes     Comment: occassional    Drug use: No    Sexual activity: Yes     Partners: Female     Review of Systems   Constitutional: Positive for malaise/fatigue. Negative for chills, diaphoresis and fever.   HENT:  Negative for nosebleeds.    Eyes:  Negative for blurred vision and double vision.   Cardiovascular:  Negative for chest pain, claudication, cyanosis, dyspnea on exertion, leg swelling, orthopnea, palpitations, paroxysmal nocturnal dyspnea and syncope.   Respiratory:  Negative for cough, shortness of breath and wheezing.    Skin:  Negative for dry skin and poor wound healing.   Musculoskeletal:  Negative for back pain, joint swelling and myalgias.   Gastrointestinal:  Negative for  abdominal pain, nausea and vomiting.   Genitourinary:  Negative for hematuria.   Neurological:  Negative for dizziness, headaches, numbness, seizures and weakness.   Psychiatric/Behavioral:  Negative for altered mental status and depression.      Objective:     Vital Signs (Most Recent):  Temp: 98.9 °F (37.2 °C) (01/13/25 2220)  Pulse: 100 (01/14/25 0058)  Resp: (!) 24 (01/14/25 0058)  BP: 135/63 (01/14/25 0058)  SpO2: (!) 91 % (01/14/25 0058) Vital Signs (24h Range):  Temp:  [98 °F (36.7 °C)-98.9 °F (37.2 °C)] 98.9 °F (37.2 °C)  Pulse:  [] 100  Resp:  [18-24] 24  SpO2:  [91 %-100 %] 91 %  BP: (134-156)/(63-82) 135/63     Weight: 111.1 kg (245 lb)  Body mass index is 30.62 kg/m².    SpO2: (!) 91 %       No intake or output data in the 24 hours ending 01/14/25 0557    Lines/Drains/Airways       Peripheral Intravenous Line  Duration                  Peripheral IV - Single Lumen 01/13/25 2141 20 G Anterior;Distal;Left Upper Arm <1 day         Peripheral IV - Single Lumen 01/13/25 2142 20 G Right Antecubital <1 day                     Physical Exam  Constitutional:       General: He is not in acute distress.     Appearance: He is well-developed. He is obese. He is not ill-appearing, toxic-appearing or diaphoretic.   HENT:      Head: Normocephalic and atraumatic.   Eyes:      General: No scleral icterus.     Extraocular Movements: Extraocular movements intact.      Conjunctiva/sclera: Conjunctivae normal.      Pupils: Pupils are equal, round, and reactive to light.   Neck:      Thyroid: No thyromegaly.      Vascular: No JVD.      Trachea: No tracheal deviation.   Cardiovascular:      Rate and Rhythm: Normal rate. Rhythm irregularly irregular.      Heart sounds: S1 normal and S2 normal. No murmur heard.     No friction rub. No gallop.   Pulmonary:      Effort: Pulmonary effort is normal. No respiratory distress.      Breath sounds: No stridor. No wheezing, rhonchi or rales.      Comments: Coarse BS  bilaterally  Chest:      Chest wall: No tenderness.   Abdominal:      General: There is no distension.      Palpations: Abdomen is soft.   Musculoskeletal:         General: No swelling or tenderness. Normal range of motion.      Cervical back: Normal range of motion and neck supple. No rigidity.      Right lower leg: No edema.      Left lower leg: No edema.   Skin:     General: Skin is warm and dry.      Coloration: Skin is not jaundiced.   Neurological:      General: No focal deficit present.      Mental Status: He is alert and oriented to person, place, and time.      Cranial Nerves: No cranial nerve deficit.   Psychiatric:         Mood and Affect: Mood normal.         Behavior: Behavior normal.          Current Medications:   apixaban  5 mg Oral BID    atorvastatin  20 mg Oral Daily    clobetasoL   Topical (Top) BID    fluticasone propionate  2 spray Each Nostril Daily    losartan-hydrochlorothiazide 50-12.5 mg  1 tablet Oral Daily    polyethylene glycol  17 g Oral Daily         Current Facility-Administered Medications:     acetaminophen, 650 mg, Oral, Q8H PRN    acetaminophen, 650 mg, Oral, Q4H PRN    albuterol-ipratropium, 3 mL, Nebulization, Q4H PRN    aluminum-magnesium hydroxide-simethicone, 30 mL, Oral, QID PRN    benzonatate, 100 mg, Oral, TID PRN    glucagon (human recombinant), 1 mg, Intramuscular, PRN    glucose, 16 g, Oral, PRN    glucose, 24 g, Oral, PRN    guaiFENesin 100 mg/5 ml, 200 mg, Oral, Q4H PRN    hydrALAZINE, 10 mg, Intravenous, Q6H PRN    insulin aspart U-100, 0-5 Units, Subcutaneous, Q4H PRN    melatonin, 6 mg, Oral, Nightly PRN    mineral oil, 1 enema, Rectal, Daily PRN    naloxone, 0.02 mg, Intravenous, PRN    ondansetron, 4 mg, Intravenous, Q8H PRN    prochlorperazine, 5 mg, Intravenous, Q6H PRN    sodium chloride 0.9%, 10 mL, Intravenous, Q12H PRN    Laboratory (all labs reviewed):  CBC:  Recent Labs   Lab 08/09/22  1013 05/02/23  1424 11/27/23  1144 10/07/24  1357 01/13/25  8188    WBC 5.28 6.33 5.59 7.59 5.02   Hemoglobin 14.5 13.5 L 14.1 14.1 13.9 L   Hematocrit 45.7 42.6 44.1 44.1 44.0   Platelets 153 162 163 177 108 L       CHEMISTRIES:  Recent Labs   Lab 01/31/22  0815 04/19/22  0748 08/09/22  1013 01/22/24  0815 04/24/24  0816 09/04/24  0929 10/07/24  1357 01/13/25 2118   Glucose 242 H 158 H   < > 236 H 260 H 165 H 96 96   Sodium 136 143   < > 138 139 136 138 136   Potassium 4.5 4.4   < > 4.5 4.0 4.8 4.0 3.8   BUN 16 18   < > 15 17 19 16 16   Creatinine 1.3 1.2   < > 1.2 1.1 1.2 1.0 1.0   eGFR if non  53.0 A 58.4 A  --   --   --   --   --   --    eGFR  --   --    < > >60.0 >60.0 >60.0 >60.0 >60   Calcium 9.7 9.6   < > 9.2 9.3 9.9 10.1 9.2   Magnesium  --   --   --   --   --   --   --  2.0    < > = values in this interval not displayed.       CARDIAC BIOMARKERS:  Recent Labs   Lab 01/13/25 2118   Troponin I 0.009       COAGS:  Recent Labs   Lab 01/13/25 2118   INR 1.0       LIPIDS/LFTS:  Recent Labs   Lab 01/31/22  0815 04/19/22  0748 08/09/22  1013 05/02/23  1424 11/27/23  1144 10/07/24  1357 01/13/25 2118   Cholesterol 121  --   --   --   --   --   --    Triglycerides 146  --   --   --   --   --   --    HDL 30 L  --   --   --   --   --   --    LDL Cholesterol 61.8 L  --   --   --   --   --   --    Non-HDL Cholesterol 91  --   --   --   --   --   --    AST 21   < > 25 29 21 19 45 H   ALT 25   < > 33 34 25 18 31    < > = values in this interval not displayed.       BNP:  Recent Labs   Lab 01/13/25  2118   BNP 63       TSH:  Recent Labs   Lab 01/31/22  0815   TSH 2.819       Free T4:        Diagnostic Results:  ECG (personally reviewed and interpreted tracing(s)):  1/13/25 1804 AF 80, low volt, NSSTTW changes    Chest X-Ray (personally reviewed and interpreted image(s)): 1/13/25 NAD    CTA Chest 1/13/25  No evidence of PE. No acute intrathoracic abnormalities identified.     Echo: 10/2/18    1 - Normal left ventricular systolic function (EF 60-65%).     2 - Normal  right ventricular systolic function .     3 - Normal left ventricular diastolic function.     Stress Test: ETT 2/27/18  The patient exercised for 4.53 minutes on a Richard protocol, corresponding to a functional capacity of 6 estimated METS, achieving a peak heart rate of 126 bpm, which is 85% of the age predicted maximum heart rate.   There were no significant electrocardiographic changes throughout the protocol suggesting ischemia.   EKG Conclusions:  1. The EKG portion of this study is negative for ischemia at a moderate workload, and peak heart rate of 126 bpm (85% of predicted).   2. Blood pressure response to exercise was normal (Presenting BP: 143/71 Peak BP: 245/79).   3. No significant arrhythmias were present.   4. There were no symptoms of chest discomfort or significant dyspnea throughout the protocol.   5. The Jacobsen treadmill score was 5 suggesting a low probability for future cardiovascular events.

## 2025-01-14 NOTE — HPI
"79 y.o. male with HTN, NIDDM, psoriasis (on Taltz) who presented to [facility] ED on 01/13/2025 for eval and treatment of 3 day history of cough, congestion, fatigue, generally feeling unwell, worsening shortness of breath. Daughter at bedside contributes history: reports he seemed "off" while at her house, and lethargic to the point of forgetting some things so she went to check on him today and found he was feeling even worse. Multiple episodes of loose stools yesterday, took Imodium today with improvement. No abdominal complaints. Denies fever chills, does admit to mild myalgias. Negative COVID flu. No chest pain. No history of ACS. No known history of CHF or CAD; had a negative cardiac stress in Feb '16. ED course: New onset AFib CHADSVASC 5. HR 90s, normotensive. no hypoxia. Chest x-ray without evidence of volume overload or dense consolidation. CTA negative for PE or consolidations. Given an empiric dose of Rocephin and azithromycin by ED staff. They were placed in observation under the care of [facility] for further evaluation and treatment.     Cardiology consulted for AF.    The patient is a pleasant 79-year-old man with no prior cardiac history presenting to the emergency room with malaise and found to have flu A.  He is also noted to be in atrial fibrillation with a controlled ventricular response.  He has a history of asymptomatic bradycardia.  He denies any prior history of stroke or TIA.  CHADS-VASc score is 4.  He has been initiated on anticoagulation.  His heart rates are controlled, although he does become mildly tachycardic with moving around in bed.  He otherwise denies angina, dyspnea, palpitations, syncope, PND, orthopnea, or bleeding issues.  I discussed the pros and cons of LUDIN guided cardioversion.  The patient prefers to initiate medical therapy as a 1st option, leaving rhythm control as contingency.  I do not object to this.  "

## 2025-01-14 NOTE — HOSPITAL COURSE
Mr. Thomas was placed in observation for new onset AFib.  Presented initially with URI symptoms and was found to be positive for influenza A.  During the course of the ED workup he was noted to be in atrial fibrillation on the monitor.  No prior history of AFib.  Cardiology was consulted.  Patient declines cardioversion in her legs for medical management.  Prescriptions for apixaban and metoprolol provided.  Prescription for Tamiflu also provided.  Heart rate remained well controlled and stable.  He feels well and amenable to discharge.  All findings and plan discussed with patient, all questions answered.  He verbalizes understanding and agreement.  Discharged home in stable condition to follow up with Cardiology.

## 2025-01-14 NOTE — CONSULTS
"[facility] - Emergency Dept  Cardiology  Consult Note    Patient Name: Rosendo Thomas  MRN: [id number]  Admission Date: 1/13/2025  Hospital Length of Stay: 0 days  Code Status: Full Code   Attending Provider: Claudia Lutz, *   Consulting Provider: Abbe Whitehead MD  Primary Care Physician: No primary care provider on file.  Principal Problem:Influenza A    Patient information was obtained from patient and ER records.     Inpatient consult to Cardiology  Consult performed by: Abbe Whitehead MD  Consult ordered by: Chandler Chaudhary MD  Reason for consult: PAF        Subjective:     Chief Complaint:  Flu     HPI:   79 y.o. male with HTN, NIDDM, psoriasis (on Taltz) who presented to [facility] ED on 01/13/2025 for eval and treatment of 3 day history of cough, congestion, fatigue, generally feeling unwell, worsening shortness of breath. Daughter at bedside contributes history: reports he seemed "off" while at her house, and lethargic to the point of forgetting some things so she went to check on him today and found he was feeling even worse. Multiple episodes of loose stools yesterday, took Imodium today with improvement. No abdominal complaints. Denies fever chills, does admit to mild myalgias. Negative COVID flu. No chest pain. No history of ACS. No known history of CHF or CAD; had a negative cardiac stress in Feb '16. ED course: New onset AFib CHADSVASC 5. HR 90s, normotensive. no hypoxia. Chest x-ray without evidence of volume overload or dense consolidation. CTA negative for PE or consolidations. Given an empiric dose of Rocephin and azithromycin by ED staff. They were placed in observation under the care of [facility] for further evaluation and treatment.     Cardiology consulted for AF.    The patient is a pleasant 79-year-old man with no prior cardiac history presenting to the emergency room with malaise and found to have flu A.  He is also noted to be in atrial " fibrillation with a controlled ventricular response.  He has a history of asymptomatic bradycardia.  He denies any prior history of stroke or TIA.  CHADS-VASc score is 4.  He has been initiated on anticoagulation.  His heart rates are controlled, although he does become mildly tachycardic with moving around in bed.  He otherwise denies angina, dyspnea, palpitations, syncope, PND, orthopnea, or bleeding issues.  I discussed the pros and cons of LUDIN guided cardioversion.  The patient prefers to initiate medical therapy as a 1st option, leaving rhythm control as contingency.  I do not object to this.    Past Medical History:   Diagnosis Date    Colon cancer     Colon polyps     Diabetes mellitus type II 7/2010    diet controlled    Obesity     Psoriasis     Skin cancer     Squamous cell carcinoma     Type 2 diabetes mellitus        Past Surgical History:   Procedure Laterality Date    APPENDECTOMY      COLONOSCOPY N/A 7/9/2018    Procedure: COLONOSCOPY;  Surgeon: Kameron Ruff MD;  Location: [facility];  Service: Endoscopy;  Laterality: N/A;  confirmed    COLONOSCOPY N/A 11/11/2019    Procedure: COLONOSCOPY;  Surgeon: Victor Hugo Nunez MD;  Location: [facility] (4TH FLR);  Service: Endoscopy;  Laterality: N/A;  Pt stated this is the only day he has somebody to drive him and  to stay during procedure  PM Prep    COLONOSCOPY N/A 7/8/2020    Procedure: COLONOSCOPY;  Surgeon: Victor Hugo Nunez MD;  Location: [facility] (2ND FLR);  Service: Endoscopy;  Laterality: N/A;  Covid-19 test 7/5/20 at [facility]    LAPAROSCOPIC LEFT COLECTOMY N/A 9/25/2018    Procedure: COLECTOMY-LAPAROSCOPIC LEFT;  Surgeon: Chito Banks MD;  Location: [facility] OR 2ND FLR;  Service: Colon and Rectal;  Laterality: N/A;    SKIN CANCER EXCISION      SKIN GRAFT      VASECTOMY      VASECTOMY         Review of patient's allergies indicates:   Allergen Reactions    Influenza virus vaccines     Metformin Diarrhea       Current  Facility-Administered Medications on File Prior to Encounter   Medication    gabapentin capsule 300 mg    [DISCONTINUED] enoxaparin injection 40 mg     Current Outpatient Medications on File Prior to Encounter   Medication Sig    acetaminophen (TYLENOL ORAL) Take by mouth.    atorvastatin (LIPITOR) 20 MG tablet take 1 tablet by mouth once daily    blood sugar diagnostic (CONTOUR TEST STRIPS) Strp 1 each by Misc.(Non-Drug; Combo Route) route 2 (two) times daily.    clobetasol (TEMOVATE) 0.05 % cream Apply topically 2 (two) times daily. To psoriasis PRN    ergocalciferol (ERGOCALCIFEROL) 50,000 unit Cap Take 1 capsule (50,000 Units total) by mouth every 7 days.    fluticasone propionate (FLONASE) 50 mcg/actuation nasal spray 2 sprays by Each Nostril route once daily.    glipiZIDE (GLUCOTROL) 2.5 MG TR24 Take 2 tablets (5 mg total) by mouth daily with breakfast.    ixekizumab (TALTZ AUTOINJECTOR) 80 mg/mL AtIn Inject 80 mg into the skin every 28 days. Starting on week 12    ixekizumab (TALTZ AUTOINJECTOR, 2 PACK,) 80 mg/mL AtIn Inject 80 mg (1 pen) into the skin every other week on week 4, 6, 8, and 10    ixekizumab (TALTZ AUTOINJECTOR, 3 PACK,) 80 mg/mL AtIn Inject 160 mg (2 pens) into the skin on week 0, then inject 80 mg (1 pen) into the skin on week 2 and every other week through week 12.    lancets Misc USE ONE LANCET TWO TIMES DAILY    losartan-hydrochlorothiazide 100-25 mg (HYZAAR) 100-25 mg per tablet take 1 tablet by mouth once daily FOR BLOOD PRESSURE    MOUNJARO 5 mg/0.5 mL PnIj Inject 5 mg (one pen) into the skin every 7 days.     Family History       Problem Relation (Age of Onset)    Cancer Father    Diabetes Brother    Liver cancer Brother          Tobacco Use    Smoking status: Former     Types: Cigars    Smokeless tobacco: Never    Tobacco comments:     cigars-x1 yr.   Substance and Sexual Activity    Alcohol use: Yes     Comment: occassional    Drug use: No    Sexual activity: Yes     Partners: Female      Review of Systems   Constitutional: Positive for malaise/fatigue. Negative for chills, diaphoresis and fever.   HENT:  Negative for nosebleeds.    Eyes:  Negative for blurred vision and double vision.   Cardiovascular:  Negative for chest pain, claudication, cyanosis, dyspnea on exertion, leg swelling, orthopnea, palpitations, paroxysmal nocturnal dyspnea and syncope.   Respiratory:  Negative for cough, shortness of breath and wheezing.    Skin:  Negative for dry skin and poor wound healing.   Musculoskeletal:  Negative for back pain, joint swelling and myalgias.   Gastrointestinal:  Negative for abdominal pain, nausea and vomiting.   Genitourinary:  Negative for hematuria.   Neurological:  Negative for dizziness, headaches, numbness, seizures and weakness.   Psychiatric/Behavioral:  Negative for altered mental status and depression.      Objective:     Vital Signs (Most Recent):  Temp: 98.9 °F (37.2 °C) (01/13/25 2220)  Pulse: 100 (01/14/25 0058)  Resp: (!) 24 (01/14/25 0058)  BP: 135/63 (01/14/25 0058)  SpO2: (!) 91 % (01/14/25 0058) Vital Signs (24h Range):  Temp:  [98 °F (36.7 °C)-98.9 °F (37.2 °C)] 98.9 °F (37.2 °C)  Pulse:  [] 100  Resp:  [18-24] 24  SpO2:  [91 %-100 %] 91 %  BP: (134-156)/(63-82) 135/63     Weight: 111.1 kg (245 lb)  Body mass index is 30.62 kg/m².    SpO2: (!) 91 %       No intake or output data in the 24 hours ending 01/14/25 0557    Lines/Drains/Airways       Peripheral Intravenous Line  Duration                  Peripheral IV - Single Lumen 01/13/25 2141 20 G Anterior;Distal;Left Upper Arm <1 day         Peripheral IV - Single Lumen 01/13/25 2142 20 G Right Antecubital <1 day                     Physical Exam  Constitutional:       General: He is not in acute distress.     Appearance: He is well-developed. He is obese. He is not ill-appearing, toxic-appearing or diaphoretic.   HENT:      Head: Normocephalic and atraumatic.   Eyes:      General: No scleral icterus.      Extraocular Movements: Extraocular movements intact.      Conjunctiva/sclera: Conjunctivae normal.      Pupils: Pupils are equal, round, and reactive to light.   Neck:      Thyroid: No thyromegaly.      Vascular: No JVD.      Trachea: No tracheal deviation.   Cardiovascular:      Rate and Rhythm: Normal rate. Rhythm irregularly irregular.      Heart sounds: S1 normal and S2 normal. No murmur heard.     No friction rub. No gallop.   Pulmonary:      Effort: Pulmonary effort is normal. No respiratory distress.      Breath sounds: No stridor. No wheezing, rhonchi or rales.      Comments: Coarse BS bilaterally  Chest:      Chest wall: No tenderness.   Abdominal:      General: There is no distension.      Palpations: Abdomen is soft.   Musculoskeletal:         General: No swelling or tenderness. Normal range of motion.      Cervical back: Normal range of motion and neck supple. No rigidity.      Right lower leg: No edema.      Left lower leg: No edema.   Skin:     General: Skin is warm and dry.      Coloration: Skin is not jaundiced.   Neurological:      General: No focal deficit present.      Mental Status: He is alert and oriented to person, place, and time.      Cranial Nerves: No cranial nerve deficit.   Psychiatric:         Mood and Affect: Mood normal.         Behavior: Behavior normal.          Current Medications:   apixaban  5 mg Oral BID    atorvastatin  20 mg Oral Daily    clobetasoL   Topical (Top) BID    fluticasone propionate  2 spray Each Nostril Daily    losartan-hydrochlorothiazide 50-12.5 mg  1 tablet Oral Daily    polyethylene glycol  17 g Oral Daily         Current Facility-Administered Medications:     acetaminophen, 650 mg, Oral, Q8H PRN    acetaminophen, 650 mg, Oral, Q4H PRN    albuterol-ipratropium, 3 mL, Nebulization, Q4H PRN    aluminum-magnesium hydroxide-simethicone, 30 mL, Oral, QID PRN    benzonatate, 100 mg, Oral, TID PRN    glucagon (human recombinant), 1 mg, Intramuscular, PRN    glucose,  16 g, Oral, PRN    glucose, 24 g, Oral, PRN    guaiFENesin 100 mg/5 ml, 200 mg, Oral, Q4H PRN    hydrALAZINE, 10 mg, Intravenous, Q6H PRN    insulin aspart U-100, 0-5 Units, Subcutaneous, Q4H PRN    melatonin, 6 mg, Oral, Nightly PRN    mineral oil, 1 enema, Rectal, Daily PRN    naloxone, 0.02 mg, Intravenous, PRN    ondansetron, 4 mg, Intravenous, Q8H PRN    prochlorperazine, 5 mg, Intravenous, Q6H PRN    sodium chloride 0.9%, 10 mL, Intravenous, Q12H PRN    Laboratory (all labs reviewed):  CBC:  Recent Labs   Lab 08/09/22  1013 05/02/23  1424 11/27/23  1144 10/07/24  1357 01/13/25  2118   WBC 5.28 6.33 5.59 7.59 5.02   Hemoglobin 14.5 13.5 L 14.1 14.1 13.9 L   Hematocrit 45.7 42.6 44.1 44.1 44.0   Platelets 153 162 163 177 108 L       CHEMISTRIES:  Recent Labs   Lab 01/31/22  0815 04/19/22  0748 08/09/22  1013 01/22/24  0815 04/24/24  0816 09/04/24  0929 10/07/24  1357 01/13/25  2118   Glucose 242 H 158 H   < > 236 H 260 H 165 H 96 96   Sodium 136 143   < > 138 139 136 138 136   Potassium 4.5 4.4   < > 4.5 4.0 4.8 4.0 3.8   BUN 16 18   < > 15 17 19 16 16   Creatinine 1.3 1.2   < > 1.2 1.1 1.2 1.0 1.0   eGFR if non  53.0 A 58.4 A  --   --   --   --   --   --    eGFR  --   --    < > >60.0 >60.0 >60.0 >60.0 >60   Calcium 9.7 9.6   < > 9.2 9.3 9.9 10.1 9.2   Magnesium  --   --   --   --   --   --   --  2.0    < > = values in this interval not displayed.       CARDIAC BIOMARKERS:  Recent Labs   Lab 01/13/25 2118   Troponin I 0.009       COAGS:  Recent Labs   Lab 01/13/25 2118   INR 1.0       LIPIDS/LFTS:  Recent Labs   Lab 01/31/22  0815 04/19/22  0748 08/09/22  1013 05/02/23  1424 11/27/23  1144 10/07/24  1357 01/13/25 2118   Cholesterol 121  --   --   --   --   --   --    Triglycerides 146  --   --   --   --   --   --    HDL 30 L  --   --   --   --   --   --    LDL Cholesterol 61.8 L  --   --   --   --   --   --    Non-HDL Cholesterol 91  --   --   --   --   --   --    AST 21   < > 25 29 21 19 45 H    ALT 25   < > 33 34 25 18 31    < > = values in this interval not displayed.       BNP:  Recent Labs   Lab 01/13/25  2118   BNP 63       TSH:  Recent Labs   Lab 01/31/22  0815   TSH 2.819       Free T4:        Diagnostic Results:  ECG (personally reviewed and interpreted tracing(s)):  1/13/25 1804 AF 80, low volt, NSSTTW changes    Chest X-Ray (personally reviewed and interpreted image(s)): 1/13/25 NAD    CTA Chest 1/13/25  No evidence of PE. No acute intrathoracic abnormalities identified.     Echo: 10/2/18    1 - Normal left ventricular systolic function (EF 60-65%).     2 - Normal right ventricular systolic function .     3 - Normal left ventricular diastolic function.     Stress Test: ETT 2/27/18  The patient exercised for 4.53 minutes on a Richard protocol, corresponding to a functional capacity of 6 estimated METS, achieving a peak heart rate of 126 bpm, which is 85% of the age predicted maximum heart rate.   There were no significant electrocardiographic changes throughout the protocol suggesting ischemia.   EKG Conclusions:  1. The EKG portion of this study is negative for ischemia at a moderate workload, and peak heart rate of 126 bpm (85% of predicted).   2. Blood pressure response to exercise was normal (Presenting BP: 143/71 Peak BP: 245/79).   3. No significant arrhythmias were present.   4. There were no symptoms of chest discomfort or significant dyspnea throughout the protocol.   5. The Jacobsen treadmill score was 5 suggesting a low probability for future cardiovascular events.         Assessment and Plan:     * Influenza A  Seems to be presenting issue  Mgmt per IM    Paroxysmal atrial fibrillation  Noted on admission, new dx.  CHADSVASC 4  No associated sxs  HR controlled at rest, elev with exertion.  ?triggered by Flu A.  Hx asymptomatic bradycardia.  Pt prefers initial strategy of HR control and OAC  Agree with eliquis 5mg bid  Start Toprol XL 25mg qd, titrate as HR/BP allows  Check echo    Essential  hypertension, benign  Cont med rx    Type 2 diabetes mellitus with stage 2 chronic kidney disease, without long-term current use of insulin  Mgmt per IM    Dyslipidemia  Cont statin  Check lipids    Obesity (BMI 30.0-34.9)  Body mass index is 30.62 kg/m². Morbid obesity complicates all aspects of disease management from diagnostic modalities to treatment. Weight loss encouraged and health benefits explained to patient.           VTE Risk Mitigation (From admission, onward)           Ordered     apixaban tablet 5 mg  2 times daily         01/13/25 3125                    Thank you for your consult. I will follow-up with patient. Please contact us if you have any additional questions.    Abbe Whitehead MD  Cardiology   [facility] Emergency Dept    Addendum 1130am    Echo 1/14/25 (images personally reviewed and interpreted)    Left Ventricle: The left ventricle is normal in size. Mildly increased wall thickness. There is mild concentric hypertrophy. There is normal systolic function with a visually estimated ejection fraction of 55 - 60%. Unable to assess diastolic function due to atrial fibrillation.    Right Ventricle: Normal right ventricular cavity size. Systolic function is normal.    Pulmonic Valve: There is a 2.0x1.6 large fixed echogenic spherical mass present.    Pulmonary Artery: The estimated pulmonary artery systolic pressure is 33 mmHg.    Pt appears to be in AF/FL throughout exam.    Consider LUDIN for further investigation of PV findings above.    No further inpat cardiac testing planned.  Dispo planning appropriate  Pt to follow up as outpatient.

## 2025-01-15 LAB
OHS QRS DURATION: 100 MS
OHS QTC CALCULATION: 410 MS
POCT GLUCOSE: 94 MG/DL (ref 70–110)

## 2025-01-16 ENCOUNTER — PATIENT OUTREACH (OUTPATIENT)
Dept: ADMINISTRATIVE | Facility: CLINIC | Age: 80
End: 2025-01-16
Payer: MEDICARE

## 2025-01-17 ENCOUNTER — OFFICE VISIT (OUTPATIENT)
Dept: FAMILY MEDICINE | Facility: CLINIC | Age: 80
End: 2025-01-17
Payer: MEDICARE

## 2025-01-17 VITALS
RESPIRATION RATE: 18 BRPM | WEIGHT: 247.38 LBS | TEMPERATURE: 98 F | HEIGHT: 75 IN | BODY MASS INDEX: 30.76 KG/M2 | DIASTOLIC BLOOD PRESSURE: 72 MMHG | HEART RATE: 65 BPM | OXYGEN SATURATION: 97 % | SYSTOLIC BLOOD PRESSURE: 124 MMHG

## 2025-01-17 DIAGNOSIS — I48.0 PAROXYSMAL ATRIAL FIBRILLATION: ICD-10-CM

## 2025-01-17 DIAGNOSIS — L40.50 PSORIATIC ARTHRITIS: ICD-10-CM

## 2025-01-17 DIAGNOSIS — I10 ESSENTIAL HYPERTENSION, BENIGN: ICD-10-CM

## 2025-01-17 DIAGNOSIS — Z09 HOSPITAL DISCHARGE FOLLOW-UP: Primary | ICD-10-CM

## 2025-01-17 DIAGNOSIS — D69.6 THROMBOCYTOPENIA, UNSPECIFIED: ICD-10-CM

## 2025-01-17 DIAGNOSIS — E11.22 TYPE 2 DIABETES MELLITUS WITH STAGE 2 CHRONIC KIDNEY DISEASE, WITHOUT LONG-TERM CURRENT USE OF INSULIN: ICD-10-CM

## 2025-01-17 DIAGNOSIS — I70.0 AORTIC ATHEROSCLEROSIS: ICD-10-CM

## 2025-01-17 DIAGNOSIS — N18.2 TYPE 2 DIABETES MELLITUS WITH STAGE 2 CHRONIC KIDNEY DISEASE, WITHOUT LONG-TERM CURRENT USE OF INSULIN: ICD-10-CM

## 2025-01-17 DIAGNOSIS — D84.9 IMMUNOSUPPRESSION: ICD-10-CM

## 2025-01-17 LAB
BACTERIA BLD CULT: NORMAL
BACTERIA BLD CULT: NORMAL

## 2025-01-17 PROCEDURE — 99214 OFFICE O/P EST MOD 30 MIN: CPT | Mod: PBBFAC,PO

## 2025-01-17 PROCEDURE — 99214 OFFICE O/P EST MOD 30 MIN: CPT | Mod: S$PBB,,,

## 2025-01-17 PROCEDURE — 99999 PR PBB SHADOW E&M-EST. PATIENT-LVL IV: CPT | Mod: PBBFAC,,,

## 2025-01-17 NOTE — PROGRESS NOTES
HPI     Chief Complaint:  Chief Complaint   Patient presents with    Hospital Follow Up       Rosendo Thomas is a 79 y.o. male with multiple medical diagnoses as listed in the medical history and problem list that presents for Hospital follow-up    HPI    History of Present Illness    CHIEF COMPLAINT:  Rosendo presents today for follow-up after recent hospitalization for flu and newly diagnosed heart condition.    RECENT HOSPITALIZATION COURSE:  He initially sought care at urgent care on Saturday where flu testing was negative. Due to worsening symptoms, he went to [facility] urgent care on Monday and was subsequently transferred to the hospital for chest imaging. At the hospital, he was diagnosed with flu and incidentally found to have atrial fibrillation, for which he was started on anticoagulation for stroke prevention.    CURRENT MEDICATIONS:  He continues his previous blood pressure and diabetes medications. New medications include Eliquis for anticoagulation, a cholesterol medication, an antibiotic twice daily, and two different cough medications (one taken 3 times daily, another at bedtime).    PSORIATIC ARTHRITIS:  He reports improvement in psoriatic arthritis symptoms with immunomodulator injections.      ROS:  General: -fever, -chills, -fatigue, -weight gain, -weight loss  Eyes: -vision changes, -redness, -discharge  ENT: -ear pain, -nasal congestion, -sore throat  Cardiovascular: -chest pain, -palpitations, -lower extremity edema  Respiratory: -cough, -shortness of breath  Gastrointestinal: -abdominal pain, -nausea, -vomiting, -diarrhea, -constipation, -blood in stool  Genitourinary: -dysuria, -hematuria, -frequency  Musculoskeletal: -joint pain, -muscle pain  Skin: -rash, -lesion  Neurological: -headache, -dizziness, -numbness, -tingling  Psychiatric: -anxiety, -depression, -sleep difficulty              01/14/2025 hospital discharge    [facility] [place] - Emergency Dept  Hospital Medicine  Discharge Summary    "     Patient Name: Rosendo Thomas  MRN: [id number]  JULI: [id number]  Patient Class: OP- Observation  Admission Date: 1/13/2025  Hospital Length of Stay: 0 days  Discharge Date and Time: 1/14/2025  1:00 PM  Attending Physician: Claudia Lutz MD  Discharging Provider: Neftali Byrd Jr, NP  Primary Care Provider: No primary care provider on file.     Primary Care Team: NEFTALI BYRD     HPI:   Rosendo Thomas is a 79 y.o. male with HTN, NIDDM, psoriasis (on Taltz) who presented to [facility] ED on 01/13/2025 for eval and treatment of 3 day history of cough, congestion, fatigue, generally feeling unwell, worsening shortness of breath.  Daughter at bedside contributes history: reports he seemed "off" while at her house, and lethargic to the point of forgetting some things so she went to check on him today and found he was feeling even worse.  Multiple episodes of loose stools yesterday, took Imodium today with improvement.  No abdominal complaints.  Denies fever chills, does admit to mild myalgias. Negative COVID flu.  No chest pain. No history of ACS.  No known history of CHF or CAD; had a negative cardiac stress in Feb '16.  ED course: New onset AFib CHADSVASC 5. HR 90s, normotensive. no hypoxia. Chest x-ray without evidence of volume overload or dense consolidation.  CTA negative for PE or consolidations.  Given an empiric dose of Rocephin and azithromycin by ED staff.  They were placed in observation under the care of [facility] for further evaluation and treatment.           * No surgery found *       Hospital Course:   Mr. Thomas was placed in observation for new onset AFib.  Presented initially with URI symptoms and was found to be positive for influenza A.  During the course of the ED workup he was noted to be in atrial fibrillation on the monitor.  No prior history of AFib.  Cardiology was consulted.  Patient declines cardioversion in her legs for medical management.  " "Prescriptions for apixaban and metoprolol provided.  Prescription for Tamiflu also provided.  Heart rate remained well controlled and stable.  He feels well and amenable to discharge.  All findings and plan discussed with patient, all questions answered.  He verbalizes understanding and agreement.  Discharged home in stable condition to follow up with Cardiology.              [facility] - [place] Emergency Dept  [facility] Medicine  Discharge Summary        Patient Name: Rosendo Thomas  MRN: [id number]  JULI: [id number]  Patient Class: OP- Observation  Admission Date: 1/13/2025  Hospital Length of Stay: 0 days  Discharge Date and Time: 1/14/2025  1:00 PM  Attending Physician: Claudia Lutz MD  Discharging Provider: Neftali Byrd Jr, NP  Primary Care Provider: No primary care provider on file.     Primary Care Team: NEFTALI BYRD     HPI:   Rosendo Thomas is a 79 y.o. male with HTN, NIDDM, psoriasis (on Taltz) who presented to [facility] ED on 01/13/2025 for eval and treatment of 3 day history of cough, congestion, fatigue, generally feeling unwell, worsening shortness of breath.  Daughter at bedside contributes history: reports he seemed "off" while at her house, and lethargic to the point of forgetting some things so she went to check on him today and found he was feeling even worse.  Multiple episodes of loose stools yesterday, took Imodium today with improvement.  No abdominal complaints.  Denies fever chills, does admit to mild myalgias. Negative COVID flu.  No chest pain. No history of ACS.  No known history of CHF or CAD; had a negative cardiac stress in Feb '16.  ED course: New onset AFib CHADSVASC 5. HR 90s, normotensive. no hypoxia. Chest x-ray without evidence of volume overload or dense consolidation.  CTA negative for PE or consolidations.  Given an empiric dose of Rocephin and azithromycin by ED staff.  They were placed in observation under the care of [facility] for further " evaluation and treatment.           * No surgery found *       Hospital Course:   Mr. Thomas was placed in observation for new onset AFib.  Presented initially with URI symptoms and was found to be positive for influenza A.  During the course of the ED workup he was noted to be in atrial fibrillation on the monitor.  No prior history of AFib.  Cardiology was consulted.  Patient declines cardioversion in her legs for medical management.  Prescriptions for apixaban and metoprolol provided.  Prescription for Tamiflu also provided.  Heart rate remained well controlled and stable.  He feels well and amenable to discharge.  All findings and plan discussed with patient, all questions answered.  He verbalizes understanding and agreement.  Discharged home in stable condition to follow up with Cardiology.      Goals of Care Treatment Preferences:  Code Status: Full Code           Consults:   Consults (From admission, onward)            Status Ordering Provider       Inpatient consult to Social Work  Once        Provider:  (Not yet assigned)    Completed GA CASTRO       Inpatient consult to Cardiology  Once        Provider:  (Not yet assigned)    Completed MARI STERLING                URTI (acute upper respiratory infection)  No convincing evidence of PNA on vitals, labs, or imaging.  Will hold off on further ABX but CTM and consider if clinical conditions change.        Paroxysmal atrial fibrillation  Per the most recent EKG/telemetry, pt is in atrial fibrillation.  They do not have a known history of AFib.  Suspect likely etiology to be acute viral illness given his sxs' timing.  No meds given in ED because HR 90-100s.  Their AFib is rate-controlled.     [id number] score 5; use of anticoagulation is favored.     Rate control:  Acutely with Lopressor 25 BID to start; will titrate to HR goal.  Goal rate will be < 85 bpm for symptomatic patients and <110 bpm for asymptomatic patients.  Chronically with Toprol-XL  "with dose TBD.  Once this high-cardiovascular risk pt is stable, will explore rhythm control per the EAST-AFNET 4 trial  Anticoagulation with Eliquis 5 BID  Maintain on telemetry and daily morning EKGs for a few days following admission  Maintain K > 4, Mag > 2 and Ca/iCal WNL to decrease arrhythmogenic potential  Will move pt to ICU if a gtt is required to maintain AFib control, or if they become hemodynamically unstable  Cardiology consult for LUDIN +/- cardioversion as warranted, and will ensure proper PCP + Cardiology follow-up in place prior to discharge        Essential hypertension, benign  Patient's blood pressure range in the last 24 hours was: BP  Min: 134/74  Max: 156/82.The patient's inpatient anti-hypertensive regimen is listed below:  Current Antihypertensives  losartan-hydrochlorothiazide 50-12.5 mg per tablet 1 tablet, Daily, Oral     Plan  - BP is controlled, no changes needed to their regimen        Psoriasis  Stable.  Continue home meds.        Type 2 diabetes mellitus with stage 2 chronic kidney disease, without long-term current use of insulin  Last A1c reviewed-         Lab Results   Component Value Date     HGBA1C 6.2 (H) 09/04/2024      Home antihyperglycemic regimen: Glipizide 5 daily     No results for input(s): "POCTGLUCOSE" in the last 72 hours.  Most recent inpatient antihyperglycemic regimen:   Antihyperglycemics (From admission, onward)           Start     Stop Route Frequency Ordered      01/14/25 0100   insulin aspart U-100 pen 0-5 Units         -- SubQ Every 4 hours PRN 01/14/25 0001          Goal blood glucose range while admitted: 140-180 per the NICE-SUGAR trial and American Diabetes Association guidelines  Diabetic diet 2000 antwan  Rechecking A1c if most recent result above > 6 months ago  Diabetic counseling and education (eg nutrition, insulin) to be given prior to discharge     01/13/2025 emergency room        Chief Complaint   Patient presents with    Shortness of Breath       " Patient reports SOB and Chest that started Saturday, reports going to  and treated for symptoms of URI.       79-year-old male presents to ED with chief complaint fatigue, SOB, cough, congestion, generally feeling unwell.     Began on Saturday afternoon with nausea, fatigue, cough.  Went to a local urgent care, tested negative for influenza.  He was given promethazine, Tessalon, and Omnicef.  Patient states Sunday only got out of bed to have loose stools---multiple episodes of watery loose stools throughout the day; another episode of loose stools this morning, but took Imodium, no BM since then.  Denies abdominal pain.  Denies fever, denies chills, does admit to mild myalgias.  Poor appetite and intake since onset of symptoms.  He admits to nausea, no emesis.  He admits to worsening shortness of breath since onset of symptoms; mostly dyspnea on exertion.  He denies chest pain.  No personal history of ACS.  Denies hyperlipidemia.  Nonsmoker.  Denies history of CHF.  No orthopnea.  No paroxysmal nocturnal dyspnea. He does admit to polydipsia, polyuria recently. Denies any recent illness or known sick contacts, but has been working outside in the cold recently.     Has not taken Mounjaro in 2w due to diarrhea s/e     On Taltz for Psoriasis     PMH:  NIDDM  CKD2  Obesity  Psoriasis  Psoriatic arthritis  Hx colonic polyps  Hx colon CA  Essential hypertension  History of sinus bradycardia  Aortic arthrosclerosis  Vitamin-D deficiency  Immunosuppressed state        TTE 10/02/2018:   CONCLUSIONS    1 - Normal left ventricular systolic function (EF 60-65%).    2 - Normal right ventricular systolic function .    3 - Normal left ventricular diastolic function.        Cardiac treadmill stress 02/27/2018:   EKG Conclusions:  1. The EKG portion of this study is negative for ischemia at a moderate workload, and peak heart rate of 126 bpm (85% of predicted).  2. Blood pressure response to exercise was normal (Presenting BP:  143/71 Peak BP: 245/79).  3. No significant arrhythmias were present.  4. There were no symptoms of chest discomfort or significant dyspnea throughout the protocol.  5. The Jacobsen treadmill score was 5 suggesting a low probability for future cardiovascular events.                    Assessment & Plan     Assessment & Plan    Assessed patient's recovery from recent flu and cardiac event  Evaluated blood pressure control, which appears adequate on current regimen  Noted improvement in psoriatic arthritis symptoms with current immunomodulator injection  Considered patient's refusal of COVID-19 vaccination due to previous experience    TETANUS VACCINATION:  - Follow up when ready for tetanus vaccine.    SHINGLES VACCINATION:  - Follow up when ready for shingles vaccine.    RSV VACCINATION:  - Follow up when ready for RSV vaccine.          Problem List Items Addressed This Visit       Type 2 diabetes mellitus with stage 2 chronic kidney disease, without long-term current use of insulin    Overview     diet controlled  DIABETES:  - Blood glucose level of 90 noted during recent hospital visit.  - Ordered urine test to assess diabetes management.  - Continue diabetes medication at current dosage.  - Reminded patient of scheduled endocrinology appointment on the 30th.         Essential hypertension, benign  HYPERTENSION:  - Current blood pressure 124/80, within normal range, improved from previous elevated reading of 140+ during hospital visit.  - Continue blood pressure medications at current dosage.  - Rosendo declined enrollment in digital medicine for blood pressure monitoring.      Psoriatic arthritis  PSORIATIC ARTHRITIS:  - Continue immunomodulator injection for psoriatic arthritis as prescribed.  - Noted significant improvement in the patient's  strength and reduction of red spots on hands, acknowledging the positive response to the current treatment regimen.    Aortic atherosclerosis    HYPERLIPIDEMIA:  - Continue  cholesterol medication.  - Ordered cholesterol test.     Proximal atrial fibrillation  ATRIAL FIBRILLATION:  - Rosendo has started anticoagulants (Eliquis) to prevent blood clots and potential stroke following recent atrial fibrillation diagnosis.  - Continue Eliquis as prescribed.  - Reminded patient of scheduled follow-up visit with cardiologist Dr. Green on February 10th.    Immunosuppression    Thrombocytopenia, unspecified    Hospital discharge follow-up - Primary    PERSISTENT COUGH:  - Rosendo reports persistent cough.  - Verified patient is taking cough medication 3 times daily and cough syrup at night.  INFLUENZA:  - Rosendo's condition has improved, with resolution of persistent cough and wheezing.  - Lungs are clear on auscultation.  - Influenza diagnosis confirmed by previous test results.  - Prescribed Tamiflu course completed.  - Advise follow-up for flu vaccine when ready.         --------------------------------------------      Health Maintenance:  Health Maintenance         Date Due Completion Date    TETANUS VACCINE Never done ---    Shingles Vaccine (1 of 2) Never done ---    RSV Vaccine (Age 60+ and Pregnant patients) (1 - 1-dose 75+ series) Never done ---    Colonoscopy 07/08/2021 7/8/2020    Lipid Panel 01/31/2023 1/31/2022    Diabetes Urine Screening 08/18/2023 8/18/2022    Influenza Vaccine (1) Never done ---    COVID-19 Vaccine (5 - 2024-25 season) 09/01/2024 7/20/2022    Hemoglobin A1c 03/04/2025 9/4/2024    Override on 8/14/2014: Done    Diabetic Eye Exam 04/30/2025 4/30/2024            Health maintenance reviewed and Advised patient on the importance of completing overdue health maintenance items    Follow Up:  Follow up if symptoms worsen or fail to improve.    Exam     Review of Systems:  (as noted above)  Review of Systems    Physical Exam:   Physical Exam  Vitals:    01/17/25 1316   BP: 124/72   BP Location: Right arm   Patient Position: Sitting   Pulse: 65   Resp: 18   Temp: 97.6  "°F (36.4 °C)   TempSrc: Oral   SpO2: 97%   Weight: 112.2 kg (247 lb 5.7 oz)   Height: 6' 3" (1.905 m)      Body mass index is 30.92 kg/m².    Physical Exam    Vitals: Blood pressure: 124/72.  General: No acute distress. Well-developed. Well-nourished.  Eyes: EOMI. Sclerae anicteric.  HENT: Normocephalic. Atraumatic. Nares patent.  Cardiovascular: Regular rate. Regular rhythm.  murmurs. No rubs. No gallops. Normal S1, S2.  Respiratory: Normal respiratory effort. Clear to auscultation bilaterally. No rales. No rhonchi. No wheezing.  Musculoskeletal: No  obvious deformity.  Extremities: No lower extremity edema.  Neurological: Alert & oriented x3. No slurred speech. Normal gait.  Psychiatric: Normal mood. Normal affect. Good insight. Good judgment.  Skin: Warm. Dry. No rash.           History     Past Medical History:  Past Medical History:   Diagnosis Date    Colon cancer     Colon polyps     Diabetes mellitus type II 7/2010    diet controlled    Obesity     Psoriasis     Skin cancer     Squamous cell carcinoma     Type 2 diabetes mellitus        Past Surgical History:  Past Surgical History:   Procedure Laterality Date    APPENDECTOMY      COLONOSCOPY N/A 7/9/2018    Procedure: COLONOSCOPY;  Surgeon: Kameron Ruff MD;  Location: [facility];  Service: Endoscopy;  Laterality: N/A;  confirmed    COLONOSCOPY N/A 11/11/2019    Procedure: COLONOSCOPY;  Surgeon: Victor Hugo Nunez MD;  Location: [facility] ([facility]TH [facility]);  Service: Endoscopy;  Laterality: N/A;  Pt stated this is the only day he has somebody to drive him and  to stay during procedure  PM Prep    COLONOSCOPY N/A 7/8/2020    Procedure: COLONOSCOPY;  Surgeon: Victor Hugo Nunez MD;  Location: [facility] (2ND FLR);  Service: Endoscopy;  Laterality: N/A;  Covid-19 test 7/5/20 at [facility] [place]     LAPAROSCOPIC LEFT COLECTOMY N/A 9/25/2018    Procedure: COLECTOMY-LAPAROSCOPIC LEFT;  Surgeon: Chito Banks MD;  Location: [facility] 2ND FLR;  " Service: Colon and Rectal;  Laterality: N/A;    SKIN CANCER EXCISION      SKIN GRAFT      VASECTOMY      VASECTOMY         Social History:  Social History     Socioeconomic History    Marital status: Other   Occupational History     Employer: [email or web address]   Tobacco Use    Smoking status: Former     Types: Cigars    Smokeless tobacco: Never    Tobacco comments:     cigars-x1 yr.   Substance and Sexual Activity    Alcohol use: Yes     Comment: occassional    Drug use: No    Sexual activity: Yes     Partners: Female     Social Drivers of Health     Financial Resource Strain: Low Risk  (12/17/2024)    Overall Financial Resource Strain (CARDIA)     Difficulty of Paying Living Expenses: Not hard at all   Food Insecurity: No Food Insecurity (12/17/2024)    Hunger Vital Sign     Worried About Running Out of Food in the Last Year: Never true     Ran Out of Food in the Last Year: Never true   Transportation Needs: No Transportation Needs (12/17/2024)    PRAPARE - Transportation     Lack of Transportation (Medical): No     Lack of Transportation (Non-Medical): No   Physical Activity: Sufficiently Active (12/17/2024)    Exercise Vital Sign     Days of Exercise per Week: 4 days     Minutes of Exercise per Session: 40 min   Stress: No Stress Concern Present (12/17/2024)    [employer] - Occupational Stress Questionnaire     Feeling of Stress : Not at all   Housing Stability: Low Risk  (12/17/2024)    Housing Stability Vital Sign     Unable to Pay for Housing in the Last Year: No     Homeless in the Last Year: No       Family History:  Family History   Problem Relation Name Age of Onset    Cancer Father          prostate    Diabetes Brother      Liver cancer Brother      Anesthesia problems Neg Hx         Allergies and Medications: (updated and reviewed)  Review of patient's allergies indicates:   Allergen Reactions    Influenza virus vaccines     Metformin Diarrhea     Current  Outpatient Medications   Medication Sig Dispense Refill    apixaban (ELIQUIS) 5 mg Tab Take 1 tablet (5 mg total) by mouth 2 (two) times daily. 60 tablet 11    atorvastatin (LIPITOR) 20 MG tablet take 1 tablet by mouth once daily 90 tablet 0    benzonatate (TESSALON) 200 MG capsule Take 200 mg by mouth 3 (three) times daily.      blood sugar diagnostic (CONTOUR TEST STRIPS) Strp 1 each by Misc.(Non-Drug; Combo Route) route 2 (two) times daily. 50 each 11    cefdinir (OMNICEF) 300 MG capsule Take 300 mg by mouth 2 (two) times daily.      clobetasol (TEMOVATE) 0.05 % cream Apply topically 2 (two) times daily. To psoriasis PRN 60 g 3    cyclobenzaprine (FLEXERIL) 10 MG tablet Take 10 mg by mouth 3 (three) times daily as needed.      ergocalciferol (ERGOCALCIFEROL) 50,000 unit Cap Take 1 capsule (50,000 Units total) by mouth every 7 days. 13 capsule 3    fluticasone propionate (FLONASE) 50 mcg/actuation nasal spray 2 sprays by Each Nostril route once daily.      glipiZIDE (GLUCOTROL) 2.5 MG TR24 Take 2 tablets (5 mg total) by mouth daily with breakfast. 180 tablet 3    ixekizumab (TALTZ AUTOINJECTOR) 80 mg/mL AtIn Inject 80 mg into the skin every 28 days. Starting on week 12 1 mL 5    lancets Misc USE ONE LANCET TWO TIMES DAILY 100 each 11    losartan-hydrochlorothiazide 100-25 mg (HYZAAR) 100-25 mg per tablet take 1 tablet by mouth once daily FOR BLOOD PRESSURE 90 tablet 0    meloxicam (MOBIC) 15 MG tablet Take 15 mg by mouth daily as needed.      metoprolol succinate (TOPROL-XL) 25 MG 24 hr tablet Take 1 tablet (25 mg total) by mouth once daily. 90 tablet 3    MOUNJARO 5 mg/0.5 mL PnIj Inject 5 mg (one pen) into the skin every 7 days. 4 Pen 6    ixekizumab (TALTZ AUTOINJECTOR, 2 PACK,) 80 mg/mL AtIn Inject 80 mg (1 pen) into the skin every other week on week 4, 6, 8, and 10 (Patient not taking: Reported on 1/17/2025) 2 mL 1    ixekizumab (TALTZ AUTOINJECTOR, 3 PACK,) 80 mg/mL AtIn Inject 160 mg (2 pens) into the skin  on week 0, then inject 80 mg (1 pen) into the skin on week 2 and every other week through week 12. (Patient not taking: Reported on 1/17/2025) 3 mL 0    oseltamivir (TAMIFLU) 75 MG capsule Take 1 capsule (75 mg total) by mouth once daily. for 5 days (Patient not taking: Reported on 1/17/2025) 5 capsule 0    promethazine-dextromethorphan (PROMETHAZINE-DM) 6.25-15 mg/5 mL Syrp Take 10 mLs by mouth every evening. (Patient not taking: Reported on 1/17/2025)       No current facility-administered medications for this visit.     Facility-Administered Medications Ordered in Other Visits   Medication Dose Route Frequency Provider Last Rate Last Admin    gabapentin capsule 300 mg  300 mg Oral On Call Procedure Florence Trevino NP   300 mg at 09/25/18 1347       Patient Care Team:  Shawn Baron OD (Optometry)  Chito Banks MD as Consulting Physician (Colon and Rectal Surgery)  Michel Allen, PharmD as Pharmacist         - The patient is given an After Visit Summary that lists all medications with directions, allergies, education, orders placed during this encounter and follow-up instructions.      - I have reviewed the patient's medical information including past medical, family, and social history sections including the medications and allergies.      - We discussed the patient's current medications.     This note was created by combination of typed  and MModal dictation.  Transcription errors may be present.  If there are any questions, please contact me.     This note was generated with the assistance of ambient listening technology. Verbal consent was obtained by the patient and accompanying visitor(s) for the recording of patient appointment to facilitate this note. I attest to having reviewed and edited the generated note for accuracy, though some syntax or spelling errors may persist. Please contact the author of this note for any clarification.          Aidee Mercado PA-C

## 2025-01-30 ENCOUNTER — OFFICE VISIT (OUTPATIENT)
Dept: ENDOCRINOLOGY | Facility: CLINIC | Age: 80
End: 2025-01-30
Payer: MEDICARE

## 2025-01-30 VITALS
BODY MASS INDEX: 30.37 KG/M2 | DIASTOLIC BLOOD PRESSURE: 82 MMHG | WEIGHT: 243 LBS | HEART RATE: 86 BPM | SYSTOLIC BLOOD PRESSURE: 124 MMHG

## 2025-01-30 DIAGNOSIS — E66.811 OBESITY (BMI 30.0-34.9): ICD-10-CM

## 2025-01-30 DIAGNOSIS — E08.3293 DIABETES MELLITUS DUE TO UNDERLYING CONDITION WITH BOTH EYES AFFECTED BY MILD NONPROLIFERATIVE RETINOPATHY WITHOUT MACULAR EDEMA, WITHOUT LONG-TERM CURRENT USE OF INSULIN: ICD-10-CM

## 2025-01-30 DIAGNOSIS — Z85.038 HISTORY OF COLON CANCER: ICD-10-CM

## 2025-01-30 DIAGNOSIS — E55.9 VITAMIN D DEFICIENCY: ICD-10-CM

## 2025-01-30 DIAGNOSIS — E11.65 TYPE 2 DIABETES MELLITUS WITH HYPERGLYCEMIA, WITHOUT LONG-TERM CURRENT USE OF INSULIN: Primary | ICD-10-CM

## 2025-01-30 PROCEDURE — 99213 OFFICE O/P EST LOW 20 MIN: CPT | Mod: PBBFAC | Performed by: HOSPITALIST

## 2025-01-30 PROCEDURE — 99999 PR PBB SHADOW E&M-EST. PATIENT-LVL III: CPT | Mod: PBBFAC,,, | Performed by: HOSPITALIST

## 2025-01-30 RX ORDER — GLIPIZIDE 2.5 MG/1
5 TABLET, EXTENDED RELEASE ORAL
Qty: 180 TABLET | Refills: 3 | Status: SHIPPED | OUTPATIENT
Start: 2025-01-30 | End: 2026-01-30

## 2025-01-30 NOTE — PROGRESS NOTES
"Subjective:      Patient ID: Rosendo Thomas is a 79 y.o. male presented to [facility] on 1/30/2025.  Chief Complaint:  Diabetes    History of Present Illness: Rosendo Thomas is a 79 y.o. male here for  Type 2 diabetes  Other significant past medical history: CKD3a, psoriasis, AFib      Diabetes Mellitus Type 2  - Known diabetic complications: nephropathy and retinopathy  - Diagnosed w/ DM: in 2010  - intolerance of Mounjaro, started 05/2024 due to Trulicity shortages, patient reports having frequent diarrhea while on Mounjaro 7.5 mg once a week injection.  Since cessation of medication 12/2024, no further diarrhea   - newly diagnosed with AFib    Interval history: Here for diabetes, A1c has been at good control 6.2%.  Recent hospital admission for AFib.  Active weight loss due to portion control and dietary modification  Current weight: 243> previously 262, 265, 266  Did not bring glucose log for review.  But denies hypoglycemia.   Currently reports on glipizide 5 mg daily.  Needs new PCP  Not eating loo much      Current meds:  Glipize 5 mg daily  Previous meds:    Metformin: stop due to frequent diarrhea   Mounjaro: 7.5 mg, stopped 12/2024 due to diarrhea  Home glucose checks: checks daily, Logs reviewed unavailable for review   DENIES HYPOGLYCEMIA  Diet/Exercise:               Eating 2 meals per day, salad              Drink: no soda  Weight trend: stable  Diabetes Education: No  Diabetes Related Hospitalization:  No  Hx of pancreatitis, hx of thyroid cancer: No  Family history of diabetes: Yes, retired  Occupation: retired, active social life    Eye exam current (within one year): yes, DR: no  Reports cuts or ulcers on feet:   Denies  Statin: Taking, ACE/ARB: Taking    Diabetes lab work  Lab Results   Component Value Date    HGBA1C 6.2 (H) 09/04/2024    HGBA1C 6.8 (H) 04/24/2024    HGBA1C 8.2 (H) 01/22/2024    HGBA1C 6.9 (H) 09/20/2023     No results found for: "CPEPTIDE", "GLUTAMICACID", " ""ISLETCELLANT"   No results found for: "FRUCTOSAMINE"  Lab Results   Component Value Date    MICALBCREAT 32.2 (H) 08/18/2022     No results found for: "LJDPGFWT51"    Diabetes Management Status: Reviewed this office visit  Screening or Prevention Patient's value Goal Complete/Controlled?   Lipid profile : 01/31/2022 Annually No     Dilated retinal exam : 04/30/2024 Annually Yes     Foot exam   Most Recent Foot Exam Date: Not Found Annually Yes       Reviewed past surgical, medical, family, social history and updated as appropriate.  Review of Systems: see HPI above    Objective:   /82   Pulse 86   Wt 110.2 kg (243 lb)   BMI 30.37 kg/m²     Body mass index is 30.37 kg/m².  Vital signs reviewed    Physical Exam  Vitals and nursing note reviewed.   Constitutional:       Appearance: Normal appearance. He is well-developed. He is obese. He is not ill-appearing.   Neck:      Thyroid: No thyromegaly.   Pulmonary:      Effort: Pulmonary effort is normal. No respiratory distress.   Musculoskeletal:         General: Normal range of motion.      Cervical back: Normal range of motion.   Neurological:      General: No focal deficit present.      Mental Status: He is alert. Mental status is at baseline.   Psychiatric:         Mood and Affect: Mood normal.         Behavior: Behavior normal.     Lab Reviewed:  See results in subjective  Lab Results   Component Value Date    HGBA1C 6.2 (H) 09/04/2024     Lab Results   Component Value Date    CHOL 121 01/31/2022    HDL 30 (L) 01/31/2022    LDLCALC 61.8 (L) 01/31/2022    TRIG 146 01/31/2022    CHOLHDL 24.8 01/31/2022     Lab Results   Component Value Date     01/14/2025    K 3.5 01/14/2025     01/14/2025    CO2 24 01/14/2025     01/14/2025    BUN 14 01/14/2025    CREATININE 0.8 01/14/2025    CALCIUM 8.2 (L) 01/14/2025    PHOS 2.7 01/14/2025    PROT 5.9 (L) 01/14/2025    ALBUMIN 3.2 (L) 01/14/2025    BILITOT 0.5 01/14/2025    ALKPHOS 59 01/14/2025    AST 38 " 01/14/2025    ALT 30 01/14/2025    ANIONGAP 8 01/14/2025    EGFRNORACEVR >60 01/14/2025    TSH 2.819 01/31/2022    WJIUXTXP39PI 26 (L) 09/04/2024      Assessment     1. Type 2 diabetes mellitus with hyperglycemia, without long-term current use of insulin  Microalbumin/Creatinine Ratio, Urine    Basic Metabolic Panel    Hemoglobin A1C    glipiZIDE (GLUCOTROL) 2.5 MG TR24      2. Vitamin D deficiency  Vitamin D      3. Obesity (BMI 30.0-34.9)        4. History of colon cancer        5. Diabetes mellitus due to underlying condition with both eyes affected by mild nonproliferative retinopathy without macular edema, without long-term current use of insulin          Plan     Type 2 diabetes mellitus with hyperglycemia, without long-term current use of insulin  - Diabetes is AT GOAL, as indicated by the most recent A1C reviewed on 1/30/2025.  - Therapy Goals: Discussed the aim to achieve optimal control without causing hypoglycemia, Targeting an A1C of <7%.  - Diabetic Supplies and Medications: Reviewed to ensure continued refills and compliance.  - Preventive Care: Advised the patient to schedule periodic eye exams and maintain regular foot care monitoring.  - Annual Monitoring: Reviewed the importance of yearly lipid profile (with statin use) and urine protein/creatinine tests.  - intolerance of MOUNJARO: Diarrhea, also had diarrhea with metformin    Plan  - Changes:  Currently only on glipizide 5 mg daily, denies hypoglycemia  - Given recent hospitalization and active weight loss, will hold off on any additional medication, hold off on restarting any GLP1 given diarrhea- can consider SGLT2  - Dietary Modifications: Encouraged portion size control and reduction of carbohydrate intake to better manage diabetes.  - Hypoglycemia Management: Discussed symptoms and treatment of hypoglycemia. The patient is informed  - Clear written instructions provided on AVS. Follow-up scheduled within the next 3 months with lab work prior.    - Appointment date and time were provided to the patient today.    Obesity (BMI 30.0-34.9)  - Body mass index is 30.37 kg/m².  - Encourage pt to work on healthy diet, increase exercise as tolerated.  - Continue to monitor weight  - ongoing weight loss,  will continue monitoring    History of colon cancer  - with resection, colectomy causing diarrhea with metformin.    - diarrhea with Mounjaro?    Diabetes mellitus due to underlying condition with both eyes affected by mild nonproliferative retinopathy without macular edema, without long-term current use of insulin  - optimize diabetes control, continue follow-up with Ophthalmology    Vitamin D deficiency  - Vitamin-D goal >30  - continue ergocalciferol 06772 IU once a week  - vitamin D3 OTC 2000 IU daily  - repeat lab work yearly    Advised patient to follow up with PCP for routine health maintenance care.   RTC in 3 months    Visit today included increased complexity associated with the care of the episodic problem addressed and managing the longitudinal care of the patient due to the serious and/or complex managed problem(s).   Including: Type 2 diabetes, obesity, hypertension, hyperlipidemia.    Yordan Varela M.D.  Endocrinology  [facility]  1/30/2025      Disclaimer: This note has been generated in part with the use of voice-recognition software. There may be typographical errors that have been missed during proof-reading.

## 2025-01-30 NOTE — ASSESSMENT & PLAN NOTE
- Diabetes is AT GOAL, as indicated by the most recent A1C reviewed on 1/30/2025.  - Therapy Goals: Discussed the aim to achieve optimal control without causing hypoglycemia, Targeting an A1C of <7%.  - Diabetic Supplies and Medications: Reviewed to ensure continued refills and compliance.  - Preventive Care: Advised the patient to schedule periodic eye exams and maintain regular foot care monitoring.  - Annual Monitoring: Reviewed the importance of yearly lipid profile (with statin use) and urine protein/creatinine tests.  - intolerance of MOUNJARO: Diarrhea, also had diarrhea with metformin    Plan  - Changes:  Currently only on glipizide 5 mg daily, denies hypoglycemia  - Given recent hospitalization and active weight loss, will hold off on any additional medication, hold off on restarting any GLP1 given diarrhea- can consider SGLT2  - Dietary Modifications: Encouraged portion size control and reduction of carbohydrate intake to better manage diabetes.  - Hypoglycemia Management: Discussed symptoms and treatment of hypoglycemia. The patient is informed  - Clear written instructions provided on AVS. Follow-up scheduled within the next 3 months with lab work prior.   - Appointment date and time were provided to the patient today.

## 2025-01-30 NOTE — ASSESSMENT & PLAN NOTE
- Vitamin-D goal >30  - continue ergocalciferol 11353 IU once a week  - vitamin D3 OTC 2000 IU daily  - repeat lab work yearly

## 2025-01-30 NOTE — ASSESSMENT & PLAN NOTE
- Body mass index is 30.37 kg/m².  - Encourage pt to work on healthy diet, increase exercise as tolerated.  - Continue to monitor weight  - ongoing weight loss,  will continue monitoring

## 2025-02-07 ENCOUNTER — OFFICE VISIT (OUTPATIENT)
Dept: FAMILY MEDICINE | Facility: CLINIC | Age: 80
End: 2025-02-07
Payer: MEDICARE

## 2025-02-07 ENCOUNTER — LAB VISIT (OUTPATIENT)
Dept: LAB | Facility: HOSPITAL | Age: 80
End: 2025-02-07
Payer: MEDICARE

## 2025-02-07 ENCOUNTER — TELEPHONE (OUTPATIENT)
Dept: FAMILY MEDICINE | Facility: CLINIC | Age: 80
End: 2025-02-07

## 2025-02-07 ENCOUNTER — PATIENT OUTREACH (OUTPATIENT)
Dept: ADMINISTRATIVE | Facility: HOSPITAL | Age: 80
End: 2025-02-07
Payer: MEDICARE

## 2025-02-07 VITALS
RESPIRATION RATE: 18 BRPM | BODY MASS INDEX: 30.62 KG/M2 | SYSTOLIC BLOOD PRESSURE: 122 MMHG | HEIGHT: 75 IN | TEMPERATURE: 98 F | WEIGHT: 246.25 LBS | HEART RATE: 61 BPM | OXYGEN SATURATION: 97 % | DIASTOLIC BLOOD PRESSURE: 84 MMHG

## 2025-02-07 DIAGNOSIS — E66.811 OBESITY (BMI 30.0-34.9): ICD-10-CM

## 2025-02-07 DIAGNOSIS — R79.9 ABNORMAL FINDING OF BLOOD CHEMISTRY, UNSPECIFIED: ICD-10-CM

## 2025-02-07 DIAGNOSIS — E11.22 TYPE 2 DIABETES MELLITUS WITH STAGE 2 CHRONIC KIDNEY DISEASE, WITHOUT LONG-TERM CURRENT USE OF INSULIN: ICD-10-CM

## 2025-02-07 DIAGNOSIS — Z13.220 LIPID SCREENING: ICD-10-CM

## 2025-02-07 DIAGNOSIS — E78.5 DYSLIPIDEMIA: ICD-10-CM

## 2025-02-07 DIAGNOSIS — N18.2 TYPE 2 DIABETES MELLITUS WITH STAGE 2 CHRONIC KIDNEY DISEASE, WITHOUT LONG-TERM CURRENT USE OF INSULIN: ICD-10-CM

## 2025-02-07 DIAGNOSIS — N18.2 TYPE 2 DIABETES MELLITUS WITH STAGE 2 CHRONIC KIDNEY DISEASE, WITHOUT LONG-TERM CURRENT USE OF INSULIN: Primary | ICD-10-CM

## 2025-02-07 DIAGNOSIS — E11.22 TYPE 2 DIABETES MELLITUS WITH STAGE 2 CHRONIC KIDNEY DISEASE, WITHOUT LONG-TERM CURRENT USE OF INSULIN: Primary | ICD-10-CM

## 2025-02-07 DIAGNOSIS — I48.0 PAROXYSMAL ATRIAL FIBRILLATION: ICD-10-CM

## 2025-02-07 DIAGNOSIS — I10 ESSENTIAL HYPERTENSION, BENIGN: ICD-10-CM

## 2025-02-07 DIAGNOSIS — Z85.038 HISTORY OF COLON CANCER: ICD-10-CM

## 2025-02-07 DIAGNOSIS — L40.9 PSORIASIS: ICD-10-CM

## 2025-02-07 DIAGNOSIS — Z79.01 ANTICOAGULANT LONG-TERM USE: ICD-10-CM

## 2025-02-07 PROBLEM — E08.3293: Status: RESOLVED | Noted: 2022-02-25 | Resolved: 2025-02-07

## 2025-02-07 PROBLEM — R11.0 NAUSEA: Status: RESOLVED | Noted: 2024-02-01 | Resolved: 2025-02-07

## 2025-02-07 PROBLEM — Z09 HOSPITAL DISCHARGE FOLLOW-UP: Status: RESOLVED | Noted: 2025-01-17 | Resolved: 2025-02-07

## 2025-02-07 PROBLEM — J10.1 INFLUENZA A: Status: RESOLVED | Noted: 2025-01-14 | Resolved: 2025-02-07

## 2025-02-07 PROBLEM — E11.65 TYPE 2 DIABETES MELLITUS WITH HYPERGLYCEMIA, WITHOUT LONG-TERM CURRENT USE OF INSULIN: Status: RESOLVED | Noted: 2023-09-27 | Resolved: 2025-02-07

## 2025-02-07 PROBLEM — J06.9 URTI (ACUTE UPPER RESPIRATORY INFECTION): Status: RESOLVED | Noted: 2025-01-14 | Resolved: 2025-02-07

## 2025-02-07 PROBLEM — Z71.89 ADVANCED DIRECTIVES, COUNSELING/DISCUSSION: Status: RESOLVED | Noted: 2024-12-17 | Resolved: 2025-02-07

## 2025-02-07 PROBLEM — Z00.00 ENCOUNTER FOR MEDICARE ANNUAL WELLNESS EXAM: Status: RESOLVED | Noted: 2024-12-17 | Resolved: 2025-02-07

## 2025-02-07 LAB
CHOLEST SERPL-MCNC: 99 MG/DL (ref 120–199)
CHOLEST/HDLC SERPL: 2.9 {RATIO} (ref 2–5)
ESTIMATED AVG GLUCOSE: 140 MG/DL (ref 68–131)
HBA1C MFR BLD: 6.5 % (ref 4–5.6)
HDLC SERPL-MCNC: 34 MG/DL (ref 40–75)
HDLC SERPL: 34.3 % (ref 20–50)
LDLC SERPL CALC-MCNC: 53 MG/DL (ref 63–159)
NONHDLC SERPL-MCNC: 65 MG/DL
TRIGL SERPL-MCNC: 60 MG/DL (ref 30–150)

## 2025-02-07 PROCEDURE — 99215 OFFICE O/P EST HI 40 MIN: CPT | Mod: S$PBB,,, | Performed by: INTERNAL MEDICINE

## 2025-02-07 PROCEDURE — 99214 OFFICE O/P EST MOD 30 MIN: CPT | Mod: PBBFAC,PO | Performed by: INTERNAL MEDICINE

## 2025-02-07 PROCEDURE — 80061 LIPID PANEL: CPT

## 2025-02-07 PROCEDURE — 99999 PR PBB SHADOW E&M-EST. PATIENT-LVL IV: CPT | Mod: PBBFAC,,, | Performed by: INTERNAL MEDICINE

## 2025-02-07 PROCEDURE — 83036 HEMOGLOBIN GLYCOSYLATED A1C: CPT | Performed by: INTERNAL MEDICINE

## 2025-02-07 NOTE — PROGRESS NOTES
Health Maintenance Due   Topic     TETANUS VACCINE  Patient advised to go to pharmacy    Shingles Vaccine (1 of 2) Hx of Chicken Pox. Patient advised to go to pharmacy    RSV Vaccine (Age 60+ and Pregnant patients) (1 - 1-dose 75+ series) Not offered at this facility    Colonoscopy  Consult PCP    Lipid Panel  Consult PCP    Diabetes Urine Screening  Consult PCP    Influenza Vaccine (1) Consult PCP    COVID-19 Vaccine (5 - 2024-25 season) Consult PCP    Hemoglobin A1c  Consult PCP    Diabetic Eye Exam  Consult PCP

## 2025-02-07 NOTE — PROGRESS NOTES
Population Health Chart Review & Patient Outreach Details      Additional [facility] Health Notes:    DUE FOR COLONOSCOPY, URINE SCREENING AND LIPID PANEL. IF ALREADY DONE, NEED TO GET RECORDS INFORMATION.  Urine linked to next lab appointment  Lipid panel scheduled for today after visit to be advise.   Endo cscope order placed to schedule PAT, place in visit note to advise. Patient can also call endo department at [phone] to schedule.            Updates Requested / Reviewed:      Updated Care Coordination Note, Care Everywhere, and Care Team Updated         Health Maintenance Topics Overdue:      VB Score: 0     Patient is not due for any topics at this time.    Influenza Vaccine  Tetanus Vaccine  Shingles/Zoster Vaccine  RSV Vaccine                  Health Maintenance Topic(s) Outreach Outcomes & Actions Taken:    Colorectal Cancer Screening - Outreach Outcomes & Actions Taken  : place in note to advise    Lab(s) - Outreach Outcomes & Actions Taken  : Overdue Lab(s) Scheduled

## 2025-02-07 NOTE — ASSESSMENT & PLAN NOTE
- Continued Atorvastatin for cholesterol management.  - Ordered cholesterol test for today's lab work.

## 2025-02-07 NOTE — ASSESSMENT & PLAN NOTE
- Continued Eliquis (apixaban) for long-term anticoagulation.  - Noted the patient's report of increased bleeding tendency while on anticoagulants.  - Noted the patient's report of increased bleeding tendency while on anticoagulants (Eliquis).  - Continued Eliquis (apixaban) for anticoagulation.

## 2025-02-07 NOTE — ASSESSMENT & PLAN NOTE
- Considered continuing colon cancer screening but respected the patient's decision to decline further colonoscopies at age 80.  - Noted the patient's history of colon cancer, diagnosed around 2015, with surgical removal of a foot of colon.  - Rosendo reports no recent problems related to colon cancer.  - Noted the patient's history of having a foot of colon removed during surgery for colon cancer.

## 2025-02-07 NOTE — TELEPHONE ENCOUNTER
----- Message from Barbara sent at 2/7/2025 11:27 AM CST -----  Regarding: Self  Type: Patient Call Back     What is the request in detail: Pt stated he had to give names of medication he's on, 2 for pressure Glipizide and atrovastatin .  Other 2 for his heart  Eloquis, and Metoprolosucain     Can the clinic reply by [facility]? No     Would the patient rather a call back or a response via [facility]? Call back    Best call back number: .[phone]      Additional Information:    Thank you.

## 2025-02-07 NOTE — PROGRESS NOTES
Chief Complaint: Follow-up and Establish Care      Rosendo Thomas  is a 79 y.o. year old patient who presents today for     History of Present Illness    CHIEF COMPLAINT:  Rosendo presents today for follow-up of diarrhea issues    GASTROINTESTINAL:  He experienced diarrhea while taking Mounjaro which resolved after discontinuing the medication around [place] time. History of colon cancer diagnosed in 2015 requiring surgical removal of one foot of colon.    MEDICATIONS:  Current medications include Glipizide, Atorvastatin for cholesterol, and Eliquis (apixaban). He is unable to recall his fourth medication. He reports excessive bleeding while on Eliquis, describing a recent incident where a minor finger cut resulted in prolonged, difficult to control bleeding requiring bandaging.    MEDICAL HISTORY:  Recently diagnosed with atrial fibrillation. Has psoriasis and arthritis which have improved with Taltz injections administered every 2 weeks.    DIET:  Current diet includes daily consumption of a banana and orange. Reports eating smaller portions while previously on Mounjaro therapy.      ROS:  General: -fever, -chills, -fatigue, -weight gain, -weight loss  Eyes: -vision changes, -redness, -discharge  ENT: -ear pain, -nasal congestion, -sore throat  Cardiovascular: -chest pain, -palpitations, -lower extremity edema  Respiratory: -cough, -shortness of breath  Gastrointestinal: -abdominal pain, -nausea, -vomiting, +diarrhea, -constipation, -blood in stool  Genitourinary: -dysuria, -hematuria, -frequency  Musculoskeletal: -joint pain, -muscle pain  Skin: -rash, -lesion  Neurological: -headache, -dizziness, -numbness, -tingling  Psychiatric: -anxiety, -depression, -sleep difficulty         Past Medical History:   Diagnosis Date    Colon cancer     Colon polyps     Diabetes mellitus type II 7/2010    diet controlled    Obesity     Psoriasis     Skin cancer     Squamous cell carcinoma     Type 2 diabetes mellitus         Past Surgical History:   Procedure Laterality Date    APPENDECTOMY      COLONOSCOPY N/A 7/9/2018    Procedure: COLONOSCOPY;  Surgeon: Kameron Ruff MD;  Location: [facility] ENDO;  Service: Endoscopy;  Laterality: N/A;  confirmed    COLONOSCOPY N/A 11/11/2019    Procedure: COLONOSCOPY;  Surgeon: Victor Hugo Nunez MD;  Location: [facility] ENDO (4TH FLR);  Service: Endoscopy;  Laterality: N/A;  Pt stated this is the only day he has somebody to drive him and  to stay during procedure  PM Prep    COLONOSCOPY N/A 7/8/2020    Procedure: COLONOSCOPY;  Surgeon: Victor Hugo Nunez MD;  Location: [facility] (2ND FLR);  Service: Endoscopy;  Laterality: N/A;  Covid-19 test 7/5/20 at [facility]    LAPAROSCOPIC LEFT COLECTOMY N/A 9/25/2018    Procedure: COLECTOMY-LAPAROSCOPIC LEFT;  Surgeon: Chito Banks MD;  Location: [facility] OR 2ND FLR;  Service: Colon and Rectal;  Laterality: N/A;    SKIN CANCER EXCISION      SKIN GRAFT      VASECTOMY      VASECTOMY          Family History   Problem Relation Name Age of Onset    Cancer Father          prostate    Diabetes Brother      Liver cancer Brother      Anesthesia problems Neg Hx          Social History     Socioeconomic History    Marital status: Other   Occupational History     Employer: [email or web address]   Tobacco Use    Smoking status: Former     Types: Cigars    Smokeless tobacco: Never    Tobacco comments:     cigars-x1 yr.   Substance and Sexual Activity    Alcohol use: Yes     Comment: occassional    Drug use: No    Sexual activity: Yes     Partners: Female     Social Drivers of Health     Financial Resource Strain: Low Risk  (12/17/2024)    Overall Financial Resource Strain (CARDIA)     Difficulty of Paying Living Expenses: Not hard at all   Food Insecurity: No Food Insecurity (12/17/2024)    Hunger Vital Sign     Worried About Running Out of Food in the Last Year: Never true     Ran Out of Food in the Last Year: Never true    Transportation Needs: No Transportation Needs (12/17/2024)    PRAPARE - Transportation     Lack of Transportation (Medical): No     Lack of Transportation (Non-Medical): No   Physical Activity: Sufficiently Active (12/17/2024)    Exercise Vital Sign     Days of Exercise per Week: 4 days     Minutes of Exercise per Session: 40 min   Stress: No Stress Concern Present (12/17/2024)    [employer] [place] [employer] - Occupational Stress Questionnaire     Feeling of Stress : Not at all   Housing Stability: Low Risk  (12/17/2024)    Housing Stability Vital Sign     Unable to Pay for Housing in the Last Year: No     Homeless in the Last Year: No         Current Outpatient Medications:     apixaban (ELIQUIS) 5 mg Tab, Take 1 tablet (5 mg total) by mouth 2 (two) times daily., Disp: 60 tablet, Rfl: 11    atorvastatin (LIPITOR) 20 MG tablet, take 1 tablet by mouth once daily, Disp: 90 tablet, Rfl: 0    blood sugar diagnostic (CONTOUR TEST STRIPS) Strp, 1 each by Misc.(Non-Drug; Combo Route) route 2 (two) times daily., Disp: 50 each, Rfl: 11    ergocalciferol (ERGOCALCIFEROL) 50,000 unit Cap, Take 1 capsule (50,000 Units total) by mouth every 7 days., Disp: 13 capsule, Rfl: 3    glipiZIDE (GLUCOTROL) 2.5 MG TR24, Take 2 tablets (5 mg total) by mouth daily with breakfast., Disp: 180 tablet, Rfl: 3    ixekizumab (TALTZ AUTOINJECTOR) 80 mg/mL AtIn, Inject 80 mg into the skin every 28 days. Starting on week 12, Disp: 1 mL, Rfl: 5    lancets Misc, USE ONE LANCET TWO TIMES DAILY, Disp: 100 each, Rfl: 11    losartan-hydrochlorothiazide 100-25 mg (HYZAAR) 100-25 mg per tablet, take 1 tablet by mouth once daily FOR BLOOD PRESSURE (Patient not taking: Reported on 2/7/2025), Disp: 90 tablet, Rfl: 0    meloxicam (MOBIC) 15 MG tablet, Take 15 mg by mouth daily as needed. (Patient not taking: Reported on 2/7/2025), Disp: , Rfl:     metoprolol succinate (TOPROL-XL) 25 MG 24 hr tablet, Take 1 tablet (25 mg total) by mouth once  daily. (Patient not taking: Reported on 2/7/2025), Disp: 90 tablet, Rfl: 3  No current facility-administered medications for this visit.    Facility-Administered Medications Ordered in Other Visits:     gabapentin capsule 300 mg, 300 mg, Oral, On Call Procedure, Florence Trevino NP, 300 mg at 09/25/18 1347           Objective:      Vitals:    02/07/25 1005   BP: 122/84   Pulse: 61   Resp: 18   Temp: 97.8 °F (36.6 °C)       Physical Exam  Constitutional:       Appearance: Normal appearance.   HENT:      Head: Normocephalic and atraumatic.   Cardiovascular:      Rate and Rhythm: Normal rate.   Musculoskeletal:         General: Normal range of motion.   Skin:     General: Skin is warm and dry.   Neurological:      General: No focal deficit present.      Mental Status: He is alert and oriented to person, place, and time.          Assessment:       1. Type 2 diabetes mellitus with stage 2 chronic kidney disease, without long-term current use of insulin    2. Psoriasis    3. Dyslipidemia    4. Essential hypertension, benign    5. Paroxysmal atrial fibrillation    6. History of colon cancer    7. Anticoagulant long-term use    8. Obesity (BMI 30.0-34.9)          Plan:   1. Type 2 diabetes mellitus with stage 2 chronic kidney disease, without long-term current use of insulin  Assessment & Plan:  - Reviewed the patient's recent diarrhea issues, which resolved after discontinuing Mounjaro (tirzepatide).  - Assessed diabetes management without Mounjaro; decided to check A1C today.  - Explained the significance of monitoring A1C level after discontinuing Mounjaro.  - Continued Glipizide for diabetes management.  - Added A1C test to today's scheduled lab work.  - Noted the patient's recent glucose reading of 90 during a hospital visit.  - Plan to review A1C results and consult with endocrinologist if necessary.  - Scheduled follow-up with endocrinologist (Dr. Varela) for diabetes management.    Orders:  -     Hemoglobin A1C;  Future; Expected date: 02/07/2025    2. Psoriasis  Overview:  With arthropathy  Failed Otelza  Doing well with Taltz 100% clear    Assessment & Plan:  - Continued Taltz (ixekizumab) injections every 2 weeks for psoriasis management.      3. Dyslipidemia  Assessment & Plan:  - Continued Atorvastatin for cholesterol management.  - Ordered cholesterol test for today's lab work.      4. Essential hypertension, benign  Assessment & Plan:  - Noted good blood pressure control reported by the patient.  - Will clarify medication list regarding possible use of amlodipine for blood pressure management.      5. Paroxysmal atrial fibrillation  Assessment & Plan:  CHADSVASC 4  - Noted the patient's history of atrial fibrillation (AFib), discovered during a recent hospital visit.  - Continued Eliquis (apixaban) for anticoagulation related to AFib.  - Scheduled an upcoming appointment with cardiologist (Dr. Ku) to manage AFib.      6. History of colon cancer  Overview:  Sigmoid, s/p colectomy    Assessment & Plan:  - Considered continuing colon cancer screening but respected the patient's decision to decline further colonoscopies at age 80.  - Noted the patient's history of colon cancer, diagnosed around 2015, with surgical removal of a foot of colon.  - Rosendo reports no recent problems related to colon cancer.  - Noted the patient's history of having a foot of colon removed during surgery for colon cancer.      7. Anticoagulant long-term use  Assessment & Plan:  - Continued Eliquis (apixaban) for long-term anticoagulation.  - Noted the patient's report of increased bleeding tendency while on anticoagulants.  - Noted the patient's report of increased bleeding tendency while on anticoagulants (Eliquis).  - Continued Eliquis (apixaban) for anticoagulation.      8. Obesity (BMI 30.0-34.9)  Assessment & Plan:  - Discussed the importance of maintaining smaller portion sizes for weight management, even without Mounjaro.  -  Provided information on the benefits of frozen vegetables for maintaining a healthy diet.  - Rosendo to continue consuming lean meats, moderate carbohydrates, vegetables, and current fruit intake (banana and orange daily).  - Noted stable weight.          FALL PREVENTION:  - Recommend exercising caution to prevent falls, especially during [place] parade participation.  - Noted the patient's report of feeling unsteady when turning quickly but has not fallen.  - Advised the patient to be careful to avoid falls, especially head injuries.      FOLLOW UP:  - Follow up in 6 months for annual check-up.  - Contact the office to confirm current medications being taken.  - Follow up with Dr. Rodriguez for eye exam around end of April.       Total 40 mins spent on patient with more than 50% spent counseling    Follow up in about 6 months (around 8/7/2025) for Annual (40mins).    This note was generated with the assistance of ambient listening technology. Verbal consent was obtained by the patient and accompanying visitor(s) for the recording of patient appointment to facilitate this note. I attest to having reviewed and edited the generated note for accuracy, though some syntax or spelling errors may persist. Please contact the author of this note for any clarification.

## 2025-02-07 NOTE — ASSESSMENT & PLAN NOTE
- Discussed the importance of maintaining smaller portion sizes for weight management, even without Mounjaro.  - Provided information on the benefits of frozen vegetables for maintaining a healthy diet.  - Rosendo to continue consuming lean meats, moderate carbohydrates, vegetables, and current fruit intake (banana and orange daily).  - Noted stable weight.

## 2025-02-07 NOTE — ASSESSMENT & PLAN NOTE
- Noted good blood pressure control reported by the patient.  - Will clarify medication list regarding possible use of amlodipine for blood pressure management.

## 2025-02-07 NOTE — ASSESSMENT & PLAN NOTE
CHADSVASC 4  - Noted the patient's history of atrial fibrillation (AFib), discovered during a recent hospital visit.  - Continued Eliquis (apixaban) for anticoagulation related to AFib.  - Scheduled an upcoming appointment with cardiologist (Dr. Ku) to manage AFib.

## 2025-02-07 NOTE — ASSESSMENT & PLAN NOTE
- Reviewed the patient's recent diarrhea issues, which resolved after discontinuing Mounjaro (tirzepatide).  - Assessed diabetes management without Mounjaro; decided to check A1C today.  - Explained the significance of monitoring A1C level after discontinuing Mounjaro.  - Continued Glipizide for diabetes management.  - Added A1C test to today's scheduled lab work.  - Noted the patient's recent glucose reading of 90 during a hospital visit.  - Plan to review A1C results and consult with endocrinologist if necessary.  - Scheduled follow-up with endocrinologist (Dr. Varela) for diabetes management.

## 2025-02-10 ENCOUNTER — OFFICE VISIT (OUTPATIENT)
Dept: CARDIOLOGY | Facility: CLINIC | Age: 80
End: 2025-02-10
Payer: MEDICARE

## 2025-02-10 VITALS
WEIGHT: 244.69 LBS | DIASTOLIC BLOOD PRESSURE: 72 MMHG | HEART RATE: 97 BPM | OXYGEN SATURATION: 96 % | BODY MASS INDEX: 30.42 KG/M2 | SYSTOLIC BLOOD PRESSURE: 112 MMHG | HEIGHT: 75 IN | RESPIRATION RATE: 18 BRPM

## 2025-02-10 DIAGNOSIS — I10 ESSENTIAL HYPERTENSION, BENIGN: ICD-10-CM

## 2025-02-10 DIAGNOSIS — E78.5 DYSLIPIDEMIA: ICD-10-CM

## 2025-02-10 DIAGNOSIS — Z79.01 CHRONIC ANTICOAGULATION: ICD-10-CM

## 2025-02-10 DIAGNOSIS — I48.19 PERSISTENT ATRIAL FIBRILLATION: Primary | ICD-10-CM

## 2025-02-10 DIAGNOSIS — I33.0: ICD-10-CM

## 2025-02-10 DIAGNOSIS — E66.9 NON MORBID OBESITY, UNSPECIFIED OBESITY TYPE: ICD-10-CM

## 2025-02-10 PROCEDURE — 99215 OFFICE O/P EST HI 40 MIN: CPT | Mod: S$PBB,,, | Performed by: INTERNAL MEDICINE

## 2025-02-10 PROCEDURE — 99999 PR PBB SHADOW E&M-EST. PATIENT-LVL III: CPT | Mod: PBBFAC,,, | Performed by: INTERNAL MEDICINE

## 2025-02-10 PROCEDURE — 99213 OFFICE O/P EST LOW 20 MIN: CPT | Mod: PBBFAC,PO | Performed by: INTERNAL MEDICINE

## 2025-02-10 PROCEDURE — G2211 COMPLEX E/M VISIT ADD ON: HCPCS | Mod: S$PBB,,, | Performed by: INTERNAL MEDICINE

## 2025-02-10 NOTE — H&P (VIEW-ONLY)
CARDIOVASCULAR PROGRESS NOTE    REASON FOR CONSULT:   Rosendo Thomas is a 79 y.o. male who presents for follow up of Persistent AF.    PCP: [facility]  HISTORY OF PRESENT ILLNESS:   The patient returns for follow up of his recent hospitalization with influenza.  He is also noted to be in atrial fibrillation with a controlled ventricular response.  He decided to continue rate control and anticoagulation.  He continues take his Xarelto.  His heart rate is controlled, but he is describing progressive fatigue.  He denies any angina or dyspnea.  There has been no palpitations or syncope.  Denies any PND, orthopnea, melena, hematuria, or claudication symptoms.    CARDIOVASCULAR HISTORY:   Persistent AF on eliquis 5mg bid    PAST MEDICAL HISTORY:     Past Medical History:   Diagnosis Date    Colon cancer     Colon polyps     Diabetes mellitus type II 7/2010    diet controlled    Obesity     Psoriasis     Skin cancer     Squamous cell carcinoma     Type 2 diabetes mellitus        PAST SURGICAL HISTORY:     Past Surgical History:   Procedure Laterality Date    APPENDECTOMY      COLONOSCOPY N/A 7/9/2018    Procedure: COLONOSCOPY;  Surgeon: Kameron Ruff MD;  Location: [facility];  Service: Endoscopy;  Laterality: N/A;  confirmed    COLONOSCOPY N/A 11/11/2019    Procedure: COLONOSCOPY;  Surgeon: Victor Hugo Nunez MD;  Location: [facility] (4TH FLR);  Service: Endoscopy;  Laterality: N/A;  Pt stated this is the only day he has somebody to drive him and  to stay during procedure  PM Prep    COLONOSCOPY N/A 7/8/2020    Procedure: COLONOSCOPY;  Surgeon: Victor Hugo Nunez MD;  Location: [facility] (2ND FLR);  Service: Endoscopy;  Laterality: N/A;  Covid-19 test 7/5/20 at [facility]    LAPAROSCOPIC LEFT COLECTOMY N/A 9/25/2018    Procedure: COLECTOMY-LAPAROSCOPIC LEFT;  Surgeon: Chito Banks MD;  Location: [facility] OR 2ND FLR;  Service: Colon and Rectal;  Laterality: N/A;    SKIN CANCER EXCISION      SKIN  GRAFT      VASECTOMY      VASECTOMY         ALLERGIES AND MEDICATION:     Review of patient's allergies indicates:   Allergen Reactions    Influenza virus vaccines     Metformin Diarrhea        Medication List            Accurate as of February 10, 2025 10:28 AM. If you have any questions, ask your nurse or doctor.                CONTINUE taking these medications      atorvastatin 20 MG tablet  Commonly known as: LIPITOR  take 1 tablet by mouth once daily     blood sugar diagnostic Strp  Commonly known as: CONTOUR TEST STRIPS  1 each by Misc.(Non-Drug; Combo Route) route 2 (two) times daily.     ELIQUIS 5 mg Tab  Generic drug: apixaban  Take 1 tablet (5 mg total) by mouth 2 (two) times daily.     glipiZIDE 2.5 MG Tr24  Commonly known as: GLUCOTROL  Take 2 tablets (5 mg total) by mouth daily with breakfast.     lancets Misc  USE ONE LANCET TWO TIMES DAILY     metoprolol succinate 25 MG 24 hr tablet  Commonly known as: TOPROL-XL  Take 1 tablet (25 mg total) by mouth once daily.     TALTZ AUTOINJECTOR 80 mg/mL Atin  Generic drug: ixekizumab  Inject 80 mg into the skin every 28 days. Starting on week 12            STOP taking these medications      oseltamivir 75 MG capsule  Commonly known as: TAMIFLU  Stopped by: Abbe Whitehead MD              SOCIAL HISTORY:     Social History     Socioeconomic History    Marital status: Other   Occupational History     Employer: [employer]   Tobacco Use    Smoking status: Former     Types: Cigars    Smokeless tobacco: Never    Tobacco comments:     cigars-x1 yr.   Substance and Sexual Activity    Alcohol use: Yes     Comment: occassional    Drug use: No    Sexual activity: Yes     Partners: Female     Social Drivers of Health     Financial Resource Strain: Low Risk  (12/17/2024)    Overall Financial Resource Strain (CARDIA)     Difficulty of Paying Living Expenses: Not hard at all   Food Insecurity: No Food Insecurity (12/17/2024)    Hunger Vital Sign      Worried About Running Out of Food in the Last Year: Never true     Ran Out of Food in the Last Year: Never true   Transportation Needs: No Transportation Needs (12/17/2024)    PRAPARE - Transportation     Lack of Transportation (Medical): No     Lack of Transportation (Non-Medical): No   Physical Activity: Sufficiently Active (12/17/2024)    Exercise Vital Sign     Days of Exercise per Week: 4 days     Minutes of Exercise per Session: 40 min   Stress: No Stress Concern Present (12/17/2024)    [employer] - Occupational Stress Questionnaire     Feeling of Stress : Not at all   Housing Stability: Low Risk  (12/17/2024)    Housing Stability Vital Sign     Unable to Pay for Housing in the Last Year: No     Homeless in the Last Year: No       FAMILY HISTORY:     Family History   Problem Relation Name Age of Onset    Cancer Father          prostate    Diabetes Brother      Liver cancer Brother      Anesthesia problems Neg Hx         REVIEW OF SYSTEMS:   Review of Systems   Constitutional:  Positive for malaise/fatigue. Negative for chills, diaphoresis and fever.   HENT:  Negative for nosebleeds.    Eyes:  Negative for blurred vision, double vision and photophobia.   Respiratory:  Negative for hemoptysis, shortness of breath and wheezing.    Cardiovascular:  Negative for chest pain, palpitations, orthopnea, claudication, leg swelling and PND.   Gastrointestinal:  Negative for abdominal pain, blood in stool, heartburn, melena, nausea and vomiting.   Genitourinary:  Negative for flank pain and hematuria.   Musculoskeletal:  Negative for falls, myalgias and neck pain.   Skin:  Negative for rash.   Neurological:  Negative for dizziness, seizures, loss of consciousness, weakness and headaches.   Endo/Heme/Allergies:  Negative for polydipsia. Does not bruise/bleed easily.   Psychiatric/Behavioral:  Negative for depression and memory loss. The patient is not nervous/anxious.        PHYSICAL EXAM:  "    Vitals:    02/10/25 1013   BP: 112/72   Pulse: 97   Resp: 18    Body mass index is 30.59 kg/m².  Weight: 111 kg (244 lb 11.4 oz)   Height: 6' 3" (190.5 cm)     Physical Exam  Vitals reviewed.   Constitutional:       General: He is not in acute distress.     Appearance: He is well-developed. He is obese. He is not ill-appearing, toxic-appearing or diaphoretic.   HENT:      Head: Normocephalic and atraumatic.   Eyes:      General: No scleral icterus.     Extraocular Movements: Extraocular movements intact.      Conjunctiva/sclera: Conjunctivae normal.      Pupils: Pupils are equal, round, and reactive to light.   Neck:      Thyroid: No thyromegaly.      Vascular: Normal carotid pulses. No carotid bruit or JVD.      Trachea: Trachea normal.   Cardiovascular:      Rate and Rhythm: Normal rate. Rhythm irregularly irregular.      Pulses:           Carotid pulses are 2+ on the right side and 2+ on the left side.     Heart sounds: S1 normal and S2 normal. No murmur heard.     No friction rub. No gallop.   Pulmonary:      Effort: Pulmonary effort is normal. No respiratory distress.      Breath sounds: Normal breath sounds. No stridor. No wheezing, rhonchi or rales.   Chest:      Chest wall: No tenderness.   Abdominal:      General: There is no distension.      Palpations: Abdomen is soft.   Musculoskeletal:         General: No tenderness. Normal range of motion.      Cervical back: Normal range of motion and neck supple. No edema or rigidity.      Right lower le+ Edema present.      Left lower le+ Edema present.   Feet:      Right foot:      Skin integrity: No ulcer.      Left foot:      Skin integrity: No ulcer.   Skin:     General: Skin is warm and dry.      Coloration: Skin is not jaundiced.   Neurological:      General: No focal deficit present.      Mental Status: He is alert and oriented to person, place, and time.      Cranial Nerves: No cranial nerve deficit.   Psychiatric:         Mood and Affect: Mood " normal.         Speech: Speech normal.         Behavior: Behavior normal. Behavior is cooperative.         DATA:   EKG: (personally reviewed tracing(s))  1/13/25 AF 80, low volt, NSSTTW changes    Laboratory:  CBC:  Recent Labs   Lab 10/07/24  1357 01/13/25  2118 01/14/25  0555   WBC 7.59 5.02 3.72 L   Hemoglobin 14.1 13.9 L 12.3 L   Hematocrit 44.1 44.0 40.1   Platelets 177 108 L 112 L       CHEMISTRIES:  Recent Labs   Lab 04/19/22  0748 08/09/22  1013 04/24/24  0816 09/04/24  0929 10/07/24  1357 01/13/25  2118 01/14/25  0555   Glucose 158 H   < > 260 H 165 H 96 96 100   Sodium 143   < > 139 136 138 136 137   Potassium 4.4   < > 4.0 4.8 4.0 3.8 3.5   BUN 18   < > 17 19 16 16 14   Creatinine 1.2   < > 1.1 1.2 1.0 1.0 0.8   eGFR if non  58.4 A  --   --   --   --   --   --    eGFR  --    < > >60.0 >60.0 >60.0 >60 >60   Calcium 9.6   < > 9.3 9.9 10.1 9.2 8.2 L   Magnesium  --   --   --   --   --  2.0 1.9    < > = values in this interval not displayed.       CARDIAC BIOMARKERS:  Recent Labs   Lab 01/13/25 2118   Troponin I 0.009       COAGS:  Recent Labs   Lab 01/13/25 2118   INR 1.0       LIPIDS/LFTS:  Recent Labs   Lab 10/07/24  1357 01/13/25  2118 01/14/25  0555 02/07/25  1104   Cholesterol  --   --   --  99 L   Triglycerides  --   --   --  60   HDL  --   --   --  34 L   LDL Cholesterol  --   --   --  53.0 L   Non-HDL Cholesterol  --   --   --  65   AST 19 45 H 38  --    ALT 18 31 30  --        Cardiovascular Testing:  Echo 1/14/25     Left Ventricle: The left ventricle is normal in size. Mildly increased wall thickness. There is mild concentric hypertrophy. There is normal systolic function with a visually estimated ejection fraction of 55 - 60%. Unable to assess diastolic function due to atrial fibrillation.    Right Ventricle: Normal right ventricular cavity size. Systolic function is normal.    Pulmonic Valve: There is a 2.0x1.6 large fixed echogenic spherical mass present.    Pulmonary Artery:  The estimated pulmonary artery systolic pressure is 33 mmHg.    Pt appears to be in AF/FL throughout exam.    Consider LUDIN for further investigation of PV findings above.     Stress Test: ETT 2/27/18  The patient exercised for 4.53 minutes on a Richard protocol, corresponding to a functional capacity of 6 estimated METS, achieving a peak heart rate of 126 bpm, which is 85% of the age predicted maximum heart rate.   There were no significant electrocardiographic changes throughout the protocol suggesting ischemia.   EKG Conclusions:  1. The EKG portion of this study is negative for ischemia at a moderate workload, and peak heart rate of 126 bpm (85% of predicted).   2. Blood pressure response to exercise was normal (Presenting BP: 143/71 Peak BP: 245/79).   3. No significant arrhythmias were present.   4. There were no symptoms of chest discomfort or significant dyspnea throughout the protocol.   5. The Jacobsen treadmill score was 5 suggesting a low probability for future cardiovascular events.     ASSESSMENT:   # Persistent AF, HR controlled, on eliquis 5mg bid.  Complaining of fatigue.  # HTN, controlled  # HLP on atorva, LDL acceptable  # BMI 31   # ?PV vegetation (echo 1/2025)    PLAN:   Cont med rx  Cont eliquis 5mg bid (pt seem interested in Watchman in the future)  LUDIN/DCCV 2/18/25  RTC 1 month (Mar 2025)  ?AKIN eval in future    The above documents medical care services that are part of ongoing care related to this patient's serious/complex condition (Code ). (PAF/HTN)    Risks, benefits and alternatives of the LUDIN/Cardioversion procedure were discussed with the patient including throat irritation, aspiration, anesthetic complications, esophageal irritation/perforation, skin irritation, arrhythmia, etc.  Patient understands and agrees to proceed.  Permits signed.          Abbe Whitehead MD, FACC

## 2025-02-10 NOTE — PROGRESS NOTES
CARDIOVASCULAR PROGRESS NOTE    REASON FOR CONSULT:   Rosendo Thomas is a 79 y.o. male who presents for follow up of Persistent AF.    PCP: Hernesto  HISTORY OF PRESENT ILLNESS:   The patient returns for follow up of his recent hospitalization with influenza.  He is also noted to be in atrial fibrillation with a controlled ventricular response.  He decided to continue rate control and anticoagulation.  He continues take his Xarelto.  His heart rate is controlled, but he is describing progressive fatigue.  He denies any angina or dyspnea.  There has been no palpitations or syncope.  Denies any PND, orthopnea, melena, hematuria, or claudication symptoms.    CARDIOVASCULAR HISTORY:   Persistent AF on eliquis 5mg bid    PAST MEDICAL HISTORY:     Past Medical History:   Diagnosis Date    Colon cancer     Colon polyps     Diabetes mellitus type II 7/2010    diet controlled    Obesity     Psoriasis     Skin cancer     Squamous cell carcinoma     Type 2 diabetes mellitus        PAST SURGICAL HISTORY:     Past Surgical History:   Procedure Laterality Date    APPENDECTOMY      COLONOSCOPY N/A 7/9/2018    Procedure: COLONOSCOPY;  Surgeon: Kameron Ruff MD;  Location: [facility];  Service: Endoscopy;  Laterality: N/A;  confirmed    COLONOSCOPY N/A 11/11/2019    Procedure: COLONOSCOPY;  Surgeon: Victor Hugo Nunez MD;  Location: [facility] (4TH FLR);  Service: Endoscopy;  Laterality: N/A;  Pt stated this is the only day he has somebody to drive him and  to stay during procedure  PM Prep    COLONOSCOPY N/A 7/8/2020    Procedure: COLONOSCOPY;  Surgeon: Victor Hugo Nunez MD;  Location: [facility] (2ND FLR);  Service: Endoscopy;  Laterality: N/A;  Covid-19 test 7/5/20 at [facility]    LAPAROSCOPIC LEFT COLECTOMY N/A 9/25/2018    Procedure: COLECTOMY-LAPAROSCOPIC LEFT;  Surgeon: Chito Banks MD;  Location: [facility] OR 2ND FLR;  Service: Colon and Rectal;  Laterality: N/A;    SKIN CANCER EXCISION      SKIN  GRAFT      VASECTOMY      VASECTOMY         ALLERGIES AND MEDICATION:     Review of patient's allergies indicates:   Allergen Reactions    Influenza virus vaccines     Metformin Diarrhea        Medication List            Accurate as of February 10, 2025 10:28 AM. If you have any questions, ask your nurse or doctor.                CONTINUE taking these medications      atorvastatin 20 MG tablet  Commonly known as: LIPITOR  take 1 tablet by mouth once daily     blood sugar diagnostic Strp  Commonly known as: CONTOUR TEST STRIPS  1 each by Misc.(Non-Drug; Combo Route) route 2 (two) times daily.     ELIQUIS 5 mg Tab  Generic drug: apixaban  Take 1 tablet (5 mg total) by mouth 2 (two) times daily.     glipiZIDE 2.5 MG Tr24  Commonly known as: GLUCOTROL  Take 2 tablets (5 mg total) by mouth daily with breakfast.     lancets Misc  USE ONE LANCET TWO TIMES DAILY     metoprolol succinate 25 MG 24 hr tablet  Commonly known as: TOPROL-XL  Take 1 tablet (25 mg total) by mouth once daily.     TALTZ AUTOINJECTOR 80 mg/mL Atin  Generic drug: ixekizumab  Inject 80 mg into the skin every 28 days. Starting on week 12            STOP taking these medications      oseltamivir 75 MG capsule  Commonly known as: TAMIFLU  Stopped by: Abbe Whitehead MD              SOCIAL HISTORY:     Social History     Socioeconomic History    Marital status: Other   Occupational History     Employer: [employer]   Tobacco Use    Smoking status: Former     Types: Cigars    Smokeless tobacco: Never    Tobacco comments:     cigars-x1 yr.   Substance and Sexual Activity    Alcohol use: Yes     Comment: occassional    Drug use: No    Sexual activity: Yes     Partners: Female     Social Drivers of Health     Financial Resource Strain: Low Risk  (12/17/2024)    Overall Financial Resource Strain (CARDIA)     Difficulty of Paying Living Expenses: Not hard at all   Food Insecurity: No Food Insecurity (12/17/2024)    Hunger Vital Sign      Worried About Running Out of Food in the Last Year: Never true     Ran Out of Food in the Last Year: Never true   Transportation Needs: No Transportation Needs (12/17/2024)    PRAPARE - Transportation     Lack of Transportation (Medical): No     Lack of Transportation (Non-Medical): No   Physical Activity: Sufficiently Active (12/17/2024)    Exercise Vital Sign     Days of Exercise per Week: 4 days     Minutes of Exercise per Session: 40 min   Stress: No Stress Concern Present (12/17/2024)    [employer] - Occupational Stress Questionnaire     Feeling of Stress : Not at all   Housing Stability: Low Risk  (12/17/2024)    Housing Stability Vital Sign     Unable to Pay for Housing in the Last Year: No     Homeless in the Last Year: No       FAMILY HISTORY:     Family History   Problem Relation Name Age of Onset    Cancer Father          prostate    Diabetes Brother      Liver cancer Brother      Anesthesia problems Neg Hx         REVIEW OF SYSTEMS:   Review of Systems   Constitutional:  Positive for malaise/fatigue. Negative for chills, diaphoresis and fever.   HENT:  Negative for nosebleeds.    Eyes:  Negative for blurred vision, double vision and photophobia.   Respiratory:  Negative for hemoptysis, shortness of breath and wheezing.    Cardiovascular:  Negative for chest pain, palpitations, orthopnea, claudication, leg swelling and PND.   Gastrointestinal:  Negative for abdominal pain, blood in stool, heartburn, melena, nausea and vomiting.   Genitourinary:  Negative for flank pain and hematuria.   Musculoskeletal:  Negative for falls, myalgias and neck pain.   Skin:  Negative for rash.   Neurological:  Negative for dizziness, seizures, loss of consciousness, weakness and headaches.   Endo/Heme/Allergies:  Negative for polydipsia. Does not bruise/bleed easily.   Psychiatric/Behavioral:  Negative for depression and memory loss. The patient is not nervous/anxious.        PHYSICAL EXAM:  "    Vitals:    02/10/25 1013   BP: 112/72   Pulse: 97   Resp: 18    Body mass index is 30.59 kg/m².  Weight: 111 kg (244 lb 11.4 oz)   Height: 6' 3" (190.5 cm)     Physical Exam  Vitals reviewed.   Constitutional:       General: He is not in acute distress.     Appearance: He is well-developed. He is obese. He is not ill-appearing, toxic-appearing or diaphoretic.   HENT:      Head: Normocephalic and atraumatic.   Eyes:      General: No scleral icterus.     Extraocular Movements: Extraocular movements intact.      Conjunctiva/sclera: Conjunctivae normal.      Pupils: Pupils are equal, round, and reactive to light.   Neck:      Thyroid: No thyromegaly.      Vascular: Normal carotid pulses. No carotid bruit or JVD.      Trachea: Trachea normal.   Cardiovascular:      Rate and Rhythm: Normal rate. Rhythm irregularly irregular.      Pulses:           Carotid pulses are 2+ on the right side and 2+ on the left side.     Heart sounds: S1 normal and S2 normal. No murmur heard.     No friction rub. No gallop.   Pulmonary:      Effort: Pulmonary effort is normal. No respiratory distress.      Breath sounds: Normal breath sounds. No stridor. No wheezing, rhonchi or rales.   Chest:      Chest wall: No tenderness.   Abdominal:      General: There is no distension.      Palpations: Abdomen is soft.   Musculoskeletal:         General: No tenderness. Normal range of motion.      Cervical back: Normal range of motion and neck supple. No edema or rigidity.      Right lower le+ Edema present.      Left lower le+ Edema present.   Feet:      Right foot:      Skin integrity: No ulcer.      Left foot:      Skin integrity: No ulcer.   Skin:     General: Skin is warm and dry.      Coloration: Skin is not jaundiced.   Neurological:      General: No focal deficit present.      Mental Status: He is alert and oriented to person, place, and time.      Cranial Nerves: No cranial nerve deficit.   Psychiatric:         Mood and Affect: Mood " normal.         Speech: Speech normal.         Behavior: Behavior normal. Behavior is cooperative.         DATA:   EKG: (personally reviewed tracing(s))  1/13/25 AF 80, low volt, NSSTTW changes    Laboratory:  CBC:  Recent Labs   Lab 10/07/24  1357 01/13/25  2118 01/14/25  0555   WBC 7.59 5.02 3.72 L   Hemoglobin 14.1 13.9 L 12.3 L   Hematocrit 44.1 44.0 40.1   Platelets 177 108 L 112 L       CHEMISTRIES:  Recent Labs   Lab 04/19/22  0748 08/09/22  1013 04/24/24  0816 09/04/24  0929 10/07/24  1357 01/13/25  2118 01/14/25  0555   Glucose 158 H   < > 260 H 165 H 96 96 100   Sodium 143   < > 139 136 138 136 137   Potassium 4.4   < > 4.0 4.8 4.0 3.8 3.5   BUN 18   < > 17 19 16 16 14   Creatinine 1.2   < > 1.1 1.2 1.0 1.0 0.8   eGFR if non  58.4 A  --   --   --   --   --   --    eGFR  --    < > >60.0 >60.0 >60.0 >60 >60   Calcium 9.6   < > 9.3 9.9 10.1 9.2 8.2 L   Magnesium  --   --   --   --   --  2.0 1.9    < > = values in this interval not displayed.       CARDIAC BIOMARKERS:  Recent Labs   Lab 01/13/25 2118   Troponin I 0.009       COAGS:  Recent Labs   Lab 01/13/25 2118   INR 1.0       LIPIDS/LFTS:  Recent Labs   Lab 10/07/24  1357 01/13/25  2118 01/14/25  0555 02/07/25  1104   Cholesterol  --   --   --  99 L   Triglycerides  --   --   --  60   HDL  --   --   --  34 L   LDL Cholesterol  --   --   --  53.0 L   Non-HDL Cholesterol  --   --   --  65   AST 19 45 H 38  --    ALT 18 31 30  --        Cardiovascular Testing:  Echo 1/14/25     Left Ventricle: The left ventricle is normal in size. Mildly increased wall thickness. There is mild concentric hypertrophy. There is normal systolic function with a visually estimated ejection fraction of 55 - 60%. Unable to assess diastolic function due to atrial fibrillation.    Right Ventricle: Normal right ventricular cavity size. Systolic function is normal.    Pulmonic Valve: There is a 2.0x1.6 large fixed echogenic spherical mass present.    Pulmonary Artery:  The estimated pulmonary artery systolic pressure is 33 mmHg.    Pt appears to be in AF/FL throughout exam.    Consider LUDIN for further investigation of PV findings above.     Stress Test: ETT 2/27/18  The patient exercised for 4.53 minutes on a Richard protocol, corresponding to a functional capacity of 6 estimated METS, achieving a peak heart rate of 126 bpm, which is 85% of the age predicted maximum heart rate.   There were no significant electrocardiographic changes throughout the protocol suggesting ischemia.   EKG Conclusions:  1. The EKG portion of this study is negative for ischemia at a moderate workload, and peak heart rate of 126 bpm (85% of predicted).   2. Blood pressure response to exercise was normal (Presenting BP: 143/71 Peak BP: 245/79).   3. No significant arrhythmias were present.   4. There were no symptoms of chest discomfort or significant dyspnea throughout the protocol.   5. The Jacobsen treadmill score was 5 suggesting a low probability for future cardiovascular events.     ASSESSMENT:   # Persistent AF, HR controlled, on eliquis 5mg bid.  Complaining of fatigue.  # HTN, controlled  # HLP on atorva, LDL acceptable  # BMI 31   # ?PV vegetation (echo 1/2025)    PLAN:   Cont med rx  Cont eliquis 5mg bid (pt seem interested in Watchman in the future)  LUDIN/DCCV 2/18/25  RTC 1 month (Mar 2025)  ?AKIN eval in future    The above documents medical care services that are part of ongoing care related to this patient's serious/complex condition (Code ). (PAF/HTN)    Risks, benefits and alternatives of the LUDIN/Cardioversion procedure were discussed with the patient including throat irritation, aspiration, anesthetic complications, esophageal irritation/perforation, skin irritation, arrhythmia, etc.  Patient understands and agrees to proceed.  Permits signed.          Abbe Whitehead MD, FACC

## 2025-02-14 ENCOUNTER — HOSPITAL ENCOUNTER (OUTPATIENT)
Dept: PREADMISSION TESTING | Facility: HOSPITAL | Age: 80
Discharge: HOME | End: 2025-02-14
Attending: INTERNAL MEDICINE
Payer: MEDICARE

## 2025-02-14 VITALS
OXYGEN SATURATION: 99 % | HEART RATE: 64 BPM | RESPIRATION RATE: 18 BRPM | BODY MASS INDEX: 31.23 KG/M2 | HEIGHT: 75 IN | SYSTOLIC BLOOD PRESSURE: 145 MMHG | DIASTOLIC BLOOD PRESSURE: 80 MMHG | WEIGHT: 251.19 LBS | TEMPERATURE: 98 F

## 2025-02-14 DIAGNOSIS — Z01.818 PRE-OP TESTING: Primary | ICD-10-CM

## 2025-02-14 LAB
ALBUMIN SERPL BCP-MCNC: 3.6 G/DL (ref 3.5–5.2)
ALP SERPL-CCNC: 80 U/L (ref 40–150)
ALT SERPL W/O P-5'-P-CCNC: 19 U/L (ref 10–44)
ANION GAP SERPL CALC-SCNC: 7 MMOL/L (ref 8–16)
AST SERPL-CCNC: 19 U/L (ref 10–40)
BASOPHILS # BLD AUTO: 0.02 K/UL (ref 0–0.2)
BASOPHILS NFR BLD: 0.3 % (ref 0–1.9)
BILIRUB SERPL-MCNC: 0.6 MG/DL (ref 0.1–1)
BUN SERPL-MCNC: 16 MG/DL (ref 8–23)
CALCIUM SERPL-MCNC: 9.1 MG/DL (ref 8.7–10.5)
CHLORIDE SERPL-SCNC: 105 MMOL/L (ref 95–110)
CO2 SERPL-SCNC: 25 MMOL/L (ref 23–29)
CREAT SERPL-MCNC: 1 MG/DL (ref 0.5–1.4)
DIFFERENTIAL METHOD BLD: ABNORMAL
EOSINOPHIL # BLD AUTO: 0.1 K/UL (ref 0–0.5)
EOSINOPHIL NFR BLD: 2.1 % (ref 0–8)
ERYTHROCYTE [DISTWIDTH] IN BLOOD BY AUTOMATED COUNT: 15.3 % (ref 11.5–14.5)
EST. GFR  (NO RACE VARIABLE): >60 ML/MIN/1.73 M^2
GLUCOSE SERPL-MCNC: 240 MG/DL (ref 70–110)
HCT VFR BLD AUTO: 43.5 % (ref 40–54)
HGB BLD-MCNC: 13.6 G/DL (ref 14–18)
IMM GRANULOCYTES # BLD AUTO: 0.01 K/UL (ref 0–0.04)
IMM GRANULOCYTES NFR BLD AUTO: 0.2 % (ref 0–0.5)
LYMPHOCYTES # BLD AUTO: 1.3 K/UL (ref 1–4.8)
LYMPHOCYTES NFR BLD: 23.2 % (ref 18–48)
MCH RBC QN AUTO: 26.9 PG (ref 27–31)
MCHC RBC AUTO-ENTMCNC: 31.3 G/DL (ref 32–36)
MCV RBC AUTO: 86 FL (ref 82–98)
MONOCYTES # BLD AUTO: 0.3 K/UL (ref 0.3–1)
MONOCYTES NFR BLD: 5.9 % (ref 4–15)
NEUTROPHILS # BLD AUTO: 3.9 K/UL (ref 1.8–7.7)
NEUTROPHILS NFR BLD: 68.3 % (ref 38–73)
NRBC BLD-RTO: 0 /100 WBC
PLATELET # BLD AUTO: 127 K/UL (ref 150–450)
PMV BLD AUTO: 11.6 FL (ref 9.2–12.9)
POTASSIUM SERPL-SCNC: 4.3 MMOL/L (ref 3.5–5.1)
PROT SERPL-MCNC: 6.5 G/DL (ref 6–8.4)
RBC # BLD AUTO: 5.05 M/UL (ref 4.6–6.2)
SODIUM SERPL-SCNC: 137 MMOL/L (ref 136–145)
WBC # BLD AUTO: 5.73 K/UL (ref 3.9–12.7)

## 2025-02-14 PROCEDURE — 85025 COMPLETE CBC W/AUTO DIFF WBC: CPT | Performed by: INTERNAL MEDICINE

## 2025-02-14 PROCEDURE — 80053 COMPREHEN METABOLIC PANEL: CPT | Performed by: INTERNAL MEDICINE

## 2025-02-14 RX ORDER — FLUTICASONE PROPIONATE 50 MCG
1 SPRAY, SUSPENSION (ML) NASAL DAILY
COMMUNITY

## 2025-02-14 NOTE — DISCHARGE INSTRUCTIONS
YOUR PROCEDURE WILL BE AT [facility] at [address] [place] [address]                                   Enter through the Main Entrance facing K[address].               Report to the Same Day Surgery Registration Desk in the hallway.(Just beside the Same Day Surgery Unit)        Your procedure  is scheduled for ___2/18/2025_______.    Call [phone] between 2pm and 5pm on ___2/17/2025____to find out your arrival time for the day of surgery.    You may have two visitors.  No children under 12 years old.     You will be going to the Same Day Surgery Unit on the 2nd floor of the hospital.    Important instructions:  Do not eat anything after midnight.  You may have plain water, non carbonated.  You may also have Gatorade or Powerade after midnight.    Stop all fluids 2 hours before your surgery.    It is okay to brush your teeth.  Do not have gum, candy or mints.    SEE MEDICATION SHEET.   TAKE MEDICATIONS AS DIRECTED WITH SIPS OF WATER.      Do not take any diabetic medication on the morning of surgery unless instructed to do so by your doctor or pre op nurse.      All GLP-1 weekly diabetic/weight loss medications must not be taken for one week before your surgery, or your surgery could be canceled.    Contact lenses and removable denture work may not be worn during your procedure.    You may wear deodorant only. If you are having breast surgery, do not wear deodorant on the operative side.    Do not wear powder, body lotion, perfume/cologne or make-up.    Do not wear any jewelry or have any metal on your body.    You will be asked to remove any dentures or partials for the procedure.    If you are going home on the same day of surgery, you must arrange for a family member or a friend to drive you home.  Public transportation is prohibited.  You will not be able to drive home if you were given anesthesia or sedation.    Patients who want to have their Post-op  prescriptions filled from our in-house [facility], bring a Credit/Debit Card or cash with you. A co-pay may be required.  The pharmacy closes at 5:30 pm.    Wear loose fitting clothes allowing for bandages.    Please leave money and valuables home.      You may bring your cell phone.    Call the doctor if fever or illness should occur before your surgery.    Call [phone] to contact us here if needed.

## 2025-02-17 ENCOUNTER — TELEPHONE (OUTPATIENT)
Dept: SURGERY | Facility: HOSPITAL | Age: 80
End: 2025-02-17
Payer: MEDICARE

## 2025-02-18 ENCOUNTER — ANESTHESIA EVENT (OUTPATIENT)
Dept: CARDIOLOGY | Facility: HOSPITAL | Age: 80
End: 2025-02-18
Payer: MEDICARE

## 2025-02-18 ENCOUNTER — HOSPITAL ENCOUNTER (OUTPATIENT)
Dept: CARDIOLOGY | Facility: HOSPITAL | Age: 80
Discharge: HOME | End: 2025-02-18
Attending: INTERNAL MEDICINE | Admitting: INTERNAL MEDICINE
Payer: MEDICARE

## 2025-02-18 ENCOUNTER — ANESTHESIA (OUTPATIENT)
Dept: CARDIOLOGY | Facility: HOSPITAL | Age: 80
End: 2025-02-18
Payer: MEDICARE

## 2025-02-18 ENCOUNTER — HOSPITAL ENCOUNTER (OUTPATIENT)
Facility: HOSPITAL | Age: 80
Discharge: HOME | End: 2025-02-18
Attending: INTERNAL MEDICINE | Admitting: INTERNAL MEDICINE
Payer: MEDICARE

## 2025-02-18 VITALS
DIASTOLIC BLOOD PRESSURE: 60 MMHG | HEART RATE: 63 BPM | SYSTOLIC BLOOD PRESSURE: 133 MMHG | BODY MASS INDEX: 31.41 KG/M2 | WEIGHT: 251.31 LBS | OXYGEN SATURATION: 98 % | TEMPERATURE: 98 F | RESPIRATION RATE: 18 BRPM

## 2025-02-18 VITALS — BODY MASS INDEX: 31.21 KG/M2 | HEIGHT: 75 IN | WEIGHT: 251 LBS

## 2025-02-18 DIAGNOSIS — E11.22 TYPE 2 DIABETES MELLITUS WITH STAGE 2 CHRONIC KIDNEY DISEASE, WITHOUT LONG-TERM CURRENT USE OF INSULIN: Primary | ICD-10-CM

## 2025-02-18 DIAGNOSIS — I48.19 PERSISTENT ATRIAL FIBRILLATION: ICD-10-CM

## 2025-02-18 DIAGNOSIS — I48.91 A-FIB: ICD-10-CM

## 2025-02-18 DIAGNOSIS — N18.2 TYPE 2 DIABETES MELLITUS WITH STAGE 2 CHRONIC KIDNEY DISEASE, WITHOUT LONG-TERM CURRENT USE OF INSULIN: Primary | ICD-10-CM

## 2025-02-18 LAB
BSA FOR ECHO PROCEDURE: 2.45 M2
POCT GLUCOSE: 171 MG/DL (ref 70–110)
SINUS: 3.74 CM

## 2025-02-18 PROCEDURE — 63600175 PHARM REV CODE 636 W HCPCS: Performed by: NURSE ANESTHETIST, CERTIFIED REGISTERED

## 2025-02-18 PROCEDURE — 37000009 HC ANESTHESIA EA ADD 15 MINS: Performed by: INTERNAL MEDICINE

## 2025-02-18 PROCEDURE — 37000008 HC ANESTHESIA 1ST 15 MINUTES: Performed by: INTERNAL MEDICINE

## 2025-02-18 PROCEDURE — 92960 CARDIOVERSION ELECTRIC EXT: CPT | Performed by: INTERNAL MEDICINE

## 2025-02-18 PROCEDURE — 25000003 PHARM REV CODE 250: Performed by: INTERNAL MEDICINE

## 2025-02-18 PROCEDURE — 25000003 PHARM REV CODE 250: Performed by: NURSE ANESTHETIST, CERTIFIED REGISTERED

## 2025-02-18 PROCEDURE — 93005 ELECTROCARDIOGRAM TRACING: CPT

## 2025-02-18 PROCEDURE — 93312 ECHO TRANSESOPHAGEAL: CPT

## 2025-02-18 RX ORDER — ONDANSETRON HYDROCHLORIDE 2 MG/ML
INJECTION, SOLUTION INTRAVENOUS
Status: DISCONTINUED | OUTPATIENT
Start: 2025-02-18 | End: 2025-02-18

## 2025-02-18 RX ORDER — PROPOFOL 10 MG/ML
VIAL (ML) INTRAVENOUS
Status: DISCONTINUED | OUTPATIENT
Start: 2025-02-18 | End: 2025-02-18

## 2025-02-18 RX ORDER — FENTANYL CITRATE 50 UG/ML
INJECTION, SOLUTION INTRAMUSCULAR; INTRAVENOUS
Status: DISCONTINUED | OUTPATIENT
Start: 2025-02-18 | End: 2025-02-18

## 2025-02-18 RX ORDER — LIDOCAINE HYDROCHLORIDE 20 MG/ML
INJECTION INTRAVENOUS
Status: DISCONTINUED | OUTPATIENT
Start: 2025-02-18 | End: 2025-02-18

## 2025-02-18 RX ADMIN — PROPOFOL 80 MG: 10 INJECTION, EMULSION INTRAVENOUS at 11:02

## 2025-02-18 RX ADMIN — LIDOCAINE HYDROCHLORIDE 50 MG: 20 INJECTION, SOLUTION INTRAVENOUS at 11:02

## 2025-02-18 RX ADMIN — FENTANYL CITRATE 50 MCG: 50 INJECTION, SOLUTION INTRAMUSCULAR; INTRAVENOUS at 10:02

## 2025-02-18 RX ADMIN — GLYCOPYRROLATE 0.1 MG: 0.2 INJECTION, SOLUTION INTRAMUSCULAR; INTRAVITREAL at 10:02

## 2025-02-18 RX ADMIN — FENTANYL CITRATE 50 MCG: 50 INJECTION, SOLUTION INTRAMUSCULAR; INTRAVENOUS at 11:02

## 2025-02-18 RX ADMIN — APIXABAN 5 MG: 5 TABLET, FILM COATED ORAL at 12:02

## 2025-02-18 RX ADMIN — ONDANSETRON 4 MG: 2 INJECTION, SOLUTION INTRAMUSCULAR; INTRAVENOUS at 10:02

## 2025-02-18 RX ADMIN — SODIUM CHLORIDE: 0.9 INJECTION, SOLUTION INTRAVENOUS at 10:02

## 2025-02-18 NOTE — DISCHARGE SUMMARY
[facility]  Discharge Note    SUMMARY     Admit Date: 2/18/2025    Discharge Date and Time:  02/18/2025 1PM    Hospital Course (synopsis of major diagnoses, care, treatment, and services provided during the course of the hospital stay): uneventful LUDIN/DCCV with anesthesia.      Final Diagnosis: Post-Op Diagnosis Codes:     * Persistent atrial fibrillation [I48.19]    Disposition: Home or Self Care    Follow Up/Patient Instructions:     Medications:  Reconciled Home Medications:      Medication List        CONTINUE taking these medications      apixaban 5 mg Tab  Commonly known as: ELIQUIS  Take 1 tablet (5 mg total) by mouth 2 (two) times daily.     atorvastatin 20 MG tablet  Commonly known as: LIPITOR  take 1 tablet by mouth once daily     blood sugar diagnostic Strp  Commonly known as: CONTOUR TEST STRIPS  1 each by Misc.(Non-Drug; Combo Route) route 2 (two) times daily.     fluticasone propionate 50 mcg/actuation nasal spray  Commonly known as: FLONASE  1 spray by Each Nostril route once daily.     glipiZIDE 2.5 MG Tr24  Commonly known as: GLUCOTROL  Take 2 tablets (5 mg total) by mouth daily with breakfast.     lancets Misc  USE ONE LANCET TWO TIMES DAILY     metoprolol succinate 25 MG 24 hr tablet  Commonly known as: TOPROL-XL  Take 1 tablet (25 mg total) by mouth once daily.     TALTZ AUTOINJECTOR 80 mg/mL Atin  Generic drug: ixekizumab  Inject 80 mg into the skin every 28 days. Starting on week 12            No discharge procedures on file.   Follow-up Information       Abbe Whitehead MD Follow up on 3/18/2025.    Specialties: Cardiology, Interventional Cardiology  Why: 2pm, for planned follow up appointment  Contact information:  [address] [facility] [address]  [address]  [facility] [address]  [phone]                               Diet: cardiac    Activity: ad manoj.

## 2025-02-18 NOTE — TRANSFER OF CARE
Anesthesia Transfer of Care Note    Patient: Rosendo Thomas    Procedure(s) Performed: Procedure(s) (LRB):  TRANSESOPHAGEAL ECHOCARDIOGRAM WITH POSSIBLE CARDIOVERSION (LUDIN W/ POSS CARDIOVERSION) (N/A)  Cardioversion (N/A)    Patient location: Cath Lab    Anesthesia Type: general    Transport from OR: Transported from OR on room air with adequate spontaneous ventilation    Post pain: adequate analgesia    Post assessment: no apparent anesthetic complications and tolerated procedure well    Post vital signs: stable    Level of consciousness: awake and responds to stimulation    Nausea/Vomiting: no nausea/vomiting    Complications: none    Transfer of care protocol was followed    Last vitals: Visit Vitals  BP (!) 143/73 (BP Location: Left arm, Patient Position: Lying)   Pulse 66   Temp 36 °C (96.8 °F) (Skin)   Resp 10   Wt 114 kg (251 lb 4.8 oz)   SpO2 100%   BMI 31.41 kg/m²

## 2025-02-18 NOTE — BRIEF OP NOTE
[facility] [place] - Cath Lab  Brief Operative Note     SUMMARY     Surgery Date: 2/18/2025     Surgeons and Role:     * Abbe Whitehead MD - Primary    Assisting Surgeon: None    Pre-op Diagnosis:  Persistent atrial fibrillation [I48.19]    Post-op Diagnosis:  Post-Op Diagnosis Codes:     * Persistent atrial fibrillation [I48.19]    Procedure(s) (LRB):  TRANSESOPHAGEAL ECHOCARDIOGRAM WITH POSSIBLE CARDIOVERSION (LUDIN W/ POSS CARDIOVERSION) (N/A)  Cardioversion (N/A)    Anesthesia: Monitor Anesthesia Care    Description of the findings of the procedure: uneventful LUDIN/DCCV with anesthesia.    Findings/Key Components:   Normal biventricular size/fxn  LVEF 60%, normal wall motion.  Biatrial enlargement.  No LA/LUCITA thrombus.  Normal appearing valves  Previously noted PV vegetation (TTE 1/14/25) not visualized on this exam.  Mild MR, trace AI/TR.  Mild aortic root dil, 3.7cm    Successful DCCV AF->NSR 61 BPM 200J x1.    Plan:  Cont med rx  Cont eliquis 5mg bid  Home today  Follow up with Dr. Whitehaed as planned.    Estimated Blood Loss: none         Specimens: None    Diet: cardiac    Activity: ad manoj.

## 2025-02-18 NOTE — DISCHARGE INSTRUCTIONS
Fall Prevention  Millions of people fall every year and injure themselves. You may have had anesthesia or sedation which may increase your risk of falling. You may have health issues that put you at an increased risk of falling.     Here are ways to reduce your risk of falling.    Make your home safe by keeping walkways clear of objects you may trip over.  Use non-slip pads under rugs. Do not use area rugs or small throw rugs.  Use non-slip mats in bathtubs and showers.  Install handrails and lights on staircases.  Do not walk in poorly lit areas.  Do not stand on chairs or wobbly ladders.  Use caution when reaching overhead or looking upward. This position can cause a loss of balance.  Be sure your shoes fit properly, have non-slip bottoms and are in good condition.   Wear shoes both inside and out. Avoid going barefoot or wearing slippers.  Be cautious when going up and down stairs, curbs, and when walking on uneven sidewalks.  If your balance is poor, consider using a cane or walker.  If your fall was related to alcohol use, stop or limit alcohol intake.   If your fall was related to use of sleeping medicines, talk to your doctor about this. You may need to reduce your dosage at bedtime if you awaken during the night to go to the bathroom.    To reduce the need for nighttime bathroom trips:  Avoid drinking fluids for several hours before going to bed  Empty your bladder before going to bed  Men can keep a urinal at the bedside  Stay as active as you can. Balance, flexibility, strength, and endurance all come from exercise. They all play a role in preventing falls. Ask your healthcare provider which types of activity are right for you.  Get your vision checked on a regular basis.  If you have pets, know where they are before you stand up or walk so you don't trip over them.  Use night lights.

## 2025-02-18 NOTE — ANESTHESIA POSTPROCEDURE EVALUATION
Anesthesia Post Evaluation    Patient: Rosendo Thomas    Procedure(s) Performed: Procedure(s) (LRB):  TRANSESOPHAGEAL ECHOCARDIOGRAM WITH POSSIBLE CARDIOVERSION (LUDIN W/ POSS CARDIOVERSION) (N/A)  Cardioversion (N/A)    Final Anesthesia Type: general      Patient location during evaluation: PACU  Patient participation: Yes- Able to Participate  Level of consciousness: awake and alert and oriented  Post-procedure vital signs: reviewed and stable  Pain management: adequate  Airway patency: patent    PONV status at discharge: No PONV  Anesthetic complications: no      Cardiovascular status: blood pressure returned to baseline, hemodynamically stable and stable  Respiratory status: unassisted, spontaneous ventilation and room air  Hydration status: euvolemic  Follow-up not needed.              Vitals Value Taken Time   /73 02/18/25 11:53   Temp 36 C (96.8 F) 02/18/25 11:53   Pulse 66 02/18/25 11:53   Resp 10 02/18/25 11:53   SpO2 100 02/18/25 11:53         No case tracking events are documented in the log.      Pain/Ivory Score: No data recorded

## 2025-02-18 NOTE — ANESTHESIA PREPROCEDURE EVALUATION
02/18/2025    Pre-operative evaluation for Procedure(s) (LRB):  TRANSESOPHAGEAL ECHOCARDIOGRAM WITH POSSIBLE CARDIOVERSION (LUDIN W/ POSS CARDIOVERSION) (N/A)  Cardioversion (N/A)    Rosendo Thomas is a 79 y.o. male     Problem List[1]    Review of patient's allergies indicates:   Allergen Reactions    Influenza virus vaccines     Metformin Diarrhea       Medications Ordered Prior to Encounter[2]    Past Surgical History:   Procedure Laterality Date    APPENDECTOMY      COLONOSCOPY N/A 07/09/2018    Procedure: COLONOSCOPY;  Surgeon: Kameron Ruff MD;  Location: [facility];  Service: Endoscopy;  Laterality: N/A;  confirmed    COLONOSCOPY N/A 11/11/2019    Procedure: COLONOSCOPY;  Surgeon: Victor Hugo Nunez MD;  Location: [facility] (4TH FLR);  Service: Endoscopy;  Laterality: N/A;  Pt stated this is the only day he has somebody to drive him and  to stay during procedure  PM Prep    COLONOSCOPY N/A 07/08/2020    Procedure: COLONOSCOPY;  Surgeon: Victor Hugo Nunez MD;  Location: [facility] (2ND FLR);  Service: Endoscopy;  Laterality: N/A;  Covid-19 test 7/5/20 at [facility]    EXTREMITY CYST EXCISION      LAPAROSCOPIC LEFT COLECTOMY N/A 09/25/2018    Procedure: COLECTOMY-LAPAROSCOPIC LEFT;  Surgeon: Chito Banks MD;  Location: [facility] OR 2ND FLR;  Service: Colon and Rectal;  Laterality: N/A;    SKIN CANCER EXCISION      SKIN GRAFT      VASECTOMY      VASECTOMY         Social History[3]      Pre-op Assessment    I have reviewed the Patient Summary Reports.    I have reviewed the NPO Status.      Review of Systems  Anesthesia Hx:  No problems with previous Anesthesia             Denies Family Hx of Anesthesia complications.    Denies Personal Hx of Anesthesia complications.                    Hematology/Oncology:  Hematology Normal   Oncology Normal                                   EENT/Dental:  EENT/Dental Normal           Cardiovascular:     Hypertension    Dysrhythmias atrial  fibrillation                                     Pulmonary:        Sleep Apnea                Renal/:  Chronic Renal Disease                Musculoskeletal:  Musculoskeletal Normal                Endocrine:  Diabetes, type 2           Dermatological:  Skin Normal    Psych:  Psychiatric Normal                    Physical Exam  General: Well nourished, Cooperative, Alert and Oriented    Airway:  Mallampati: II   Mouth Opening: Normal  TM Distance: Normal  Tongue: Normal  Neck ROM: Normal ROM    Dental:  Intact    Chest/Lungs:  Normal Respiratory Rate    Heart:  Rate: Normal  Rhythm: Regular Rhythm  Sounds: Normal        Anesthesia Plan  Type of Anesthesia, risks & benefits discussed:    Anesthesia Type: Gen Natural Airway  Intra-op Monitoring Plan: Standard ASA Monitors  Induction:  IV  Informed Consent: Informed consent signed with the Patient and all parties understand the risks and agree with anesthesia plan.  All questions answered.   ASA Score: 2    Ready For Surgery From Anesthesia Perspective.     .           [1]   Patient Active Problem List  Diagnosis    Type 2 diabetes mellitus with stage 2 chronic kidney disease, without long-term current use of insulin    Psoriasis    Essential hypertension, benign    Sinus bradycardia    Psoriatic arthritis    History of colon cancer    Aortic atherosclerosis    Personal history of colonic polyps    Immunosuppression    Obesity (BMI 30.0-34.9)    Vitamin D deficiency    Paroxysmal atrial fibrillation    Dyslipidemia    Thrombocytopenia, unspecified    Anticoagulant long-term use   [2]   No current facility-administered medications on file prior to encounter.     Current Outpatient Medications on File Prior to Encounter   Medication Sig Dispense Refill    apixaban (ELIQUIS) 5 mg Tab Take 1 tablet (5 mg total) by mouth 2 (two) times daily. 180 tablet 3    atorvastatin (LIPITOR) 20 MG tablet take 1 tablet by mouth once daily 90 tablet 0    glipiZIDE (GLUCOTROL) 2.5 MG TR24  Take 2 tablets (5 mg total) by mouth daily with breakfast. 180 tablet 3    metoprolol succinate (TOPROL-XL) 25 MG 24 hr tablet Take 1 tablet (25 mg total) by mouth once daily. 90 tablet 3    blood sugar diagnostic (CONTOUR TEST STRIPS) Strp 1 each by Misc.(Non-Drug; Combo Route) route 2 (two) times daily. 50 each 11    ixekizumab (TALTZ AUTOINJECTOR) 80 mg/mL AtIn Inject 80 mg into the skin every 28 days. Starting on week 12 1 mL 5    lancets Misc USE ONE LANCET TWO TIMES DAILY 100 each 11   [3]   Social History  Socioeconomic History    Marital status: Other   Occupational History     Employer: [employer]   Tobacco Use    Smoking status: Former     Types: Cigars    Smokeless tobacco: Never    Tobacco comments:     cigars-x1 yr.   Substance and Sexual Activity    Alcohol use: Yes     Comment: occassional    Drug use: No    Sexual activity: Yes     Partners: Female     Social Drivers of Health     Financial Resource Strain: Low Risk  (12/17/2024)    Overall Financial Resource Strain (CARDIA)     Difficulty of Paying Living Expenses: Not hard at all   Food Insecurity: No Food Insecurity (12/17/2024)    Hunger Vital Sign     Worried About Running Out of Food in the Last Year: Never true     Ran Out of Food in the Last Year: Never true   Transportation Needs: No Transportation Needs (12/17/2024)    PRAPARE - Transportation     Lack of Transportation (Medical): No     Lack of Transportation (Non-Medical): No   Physical Activity: Sufficiently Active (12/17/2024)    Exercise Vital Sign     Days of Exercise per Week: 4 days     Minutes of Exercise per Session: 40 min   Stress: No Stress Concern Present (12/17/2024)    [employer] - Occupational Stress Questionnaire     Feeling of Stress : Not at all   Housing Stability: Unknown (12/17/2024)    Housing Stability Vital Sign     Unable to Pay for Housing in the Last Year: No     Homeless in the Last Year: No

## 2025-02-19 LAB
OHS QRS DURATION: 88 MS
OHS QTC CALCULATION: 397 MS

## 2025-02-22 LAB
OHS QRS DURATION: 92 MS
OHS QTC CALCULATION: 405 MS

## 2025-03-18 ENCOUNTER — OFFICE VISIT (OUTPATIENT)
Dept: CARDIOLOGY | Facility: CLINIC | Age: 80
End: 2025-03-18
Payer: MEDICARE

## 2025-03-18 VITALS
HEIGHT: 75 IN | OXYGEN SATURATION: 95 % | BODY MASS INDEX: 31.21 KG/M2 | SYSTOLIC BLOOD PRESSURE: 128 MMHG | WEIGHT: 251 LBS | DIASTOLIC BLOOD PRESSURE: 70 MMHG | HEART RATE: 94 BPM | RESPIRATION RATE: 18 BRPM

## 2025-03-18 DIAGNOSIS — I10 ESSENTIAL HYPERTENSION, BENIGN: ICD-10-CM

## 2025-03-18 DIAGNOSIS — E66.9 NON MORBID OBESITY, UNSPECIFIED OBESITY TYPE: ICD-10-CM

## 2025-03-18 DIAGNOSIS — I77.810 AORTIC ROOT DILATATION: ICD-10-CM

## 2025-03-18 DIAGNOSIS — Z79.01 CHRONIC ANTICOAGULATION: ICD-10-CM

## 2025-03-18 DIAGNOSIS — E78.5 DYSLIPIDEMIA: ICD-10-CM

## 2025-03-18 DIAGNOSIS — G47.33 OSA (OBSTRUCTIVE SLEEP APNEA): ICD-10-CM

## 2025-03-18 DIAGNOSIS — I48.19 PERSISTENT ATRIAL FIBRILLATION: Primary | ICD-10-CM

## 2025-03-18 LAB
OHS QRS DURATION: 90 MS
OHS QTC CALCULATION: 422 MS

## 2025-03-18 PROCEDURE — 93005 ELECTROCARDIOGRAM TRACING: CPT | Mod: PBBFAC | Performed by: INTERNAL MEDICINE

## 2025-03-18 PROCEDURE — 99215 OFFICE O/P EST HI 40 MIN: CPT | Mod: PBBFAC | Performed by: INTERNAL MEDICINE

## 2025-03-18 PROCEDURE — 93010 ELECTROCARDIOGRAM REPORT: CPT | Mod: S$PBB,,, | Performed by: INTERNAL MEDICINE

## 2025-03-18 PROCEDURE — G2211 COMPLEX E/M VISIT ADD ON: HCPCS | Mod: S$PBB,,, | Performed by: INTERNAL MEDICINE

## 2025-03-18 PROCEDURE — 99214 OFFICE O/P EST MOD 30 MIN: CPT | Mod: S$PBB,,, | Performed by: INTERNAL MEDICINE

## 2025-03-18 PROCEDURE — 99999 PR PBB SHADOW E&M-EST. PATIENT-LVL V: CPT | Mod: PBBFAC,,, | Performed by: INTERNAL MEDICINE

## 2025-03-18 NOTE — PROGRESS NOTES
CARDIOVASCULAR PROGRESS NOTE    REASON FOR CONSULT:   Rosendo Thomas is a 79 y.o. male who presents for follow up of Persistent AF.    PCP: [place]  HISTORY OF PRESENT ILLNESS:   The patient returns for follow up.  In the interim since his last office visit, he underwent successful LUDIN guided cardioversion.  He appears to be back in atrial fibrillation on both examination and EKG today.  He continues describe fatigue.  He has no angina, dyspnea, palpitations, or syncope.  There has been no PND, orthopnea, melena, hematuria, or claudication symptoms.  He continues take his Eliquis without any difficulty.    CARDIOVASCULAR HISTORY:   Persistent AF on eliquis 5mg bid, s/p LUDIN/DCCV 2/18/25.    Ao root dil, 3.7cm (LUDIN 2/2025)    PAST MEDICAL HISTORY:     Past Medical History:   Diagnosis Date    Colon cancer     Colon polyps     Diabetes mellitus type II 07/2010    diet controlled    Obesity     Paroxysmal atrial fibrillation     Psoriasis     Sleep apnea, unspecified     Squamous cell carcinoma     Type 2 diabetes mellitus        PAST SURGICAL HISTORY:     Past Surgical History:   Procedure Laterality Date    APPENDECTOMY      CARDIOVERSION N/A 2/18/2025    Procedure: Cardioversion;  Surgeon: Abbe Whitehead MD;  Location: [facility] CATH LAB;  Service: Cardiology;  Laterality: N/A;  before 1230pm    COLONOSCOPY N/A 07/09/2018    Procedure: COLONOSCOPY;  Surgeon: Kameron Ruff MD;  Location: [facility] ENDO;  Service: Endoscopy;  Laterality: N/A;  confirmed    COLONOSCOPY N/A 11/11/2019    Procedure: COLONOSCOPY;  Surgeon: Victor Hugo Nunez MD;  Location: [facility] ENDO (4TH FLR);  Service: Endoscopy;  Laterality: N/A;  Pt stated this is the only day he has somebody to drive him and  to stay during procedure  PM Prep    COLONOSCOPY N/A 07/08/2020    Procedure: COLONOSCOPY;  Surgeon: Victor Hugo Nunez MD;  Location: [facility] ENDO (2ND FLR);  Service: Endoscopy;  Laterality: N/A;  Covid-19 test 7/5/20 at [facility]  John - pg    EXTREMITY CYST EXCISION      LAPAROSCOPIC LEFT COLECTOMY N/A 09/25/2018    Procedure: COLECTOMY-LAPAROSCOPIC LEFT;  Surgeon: Chito Banks MD;  Location: [facility] OR [address] [place] [address];  Service: Colon and Rectal;  Laterality: N/A;    SKIN CANCER EXCISION      SKIN GRAFT      TRANSESOPHAGEAL ECHOCARDIOGRAM WITH POSSIBLE CARDIOVERSION (LUDIN W/ POSS CARDIOVERSION) N/A 2/18/2025    Procedure: TRANSESOPHAGEAL ECHOCARDIOGRAM WITH POSSIBLE CARDIOVERSION (LUDIN W/ POSS CARDIOVERSION);  Surgeon: Abbe Whitehead MD;  Location: [facility] CATH LAB;  Service: Cardiology;  Laterality: N/A;  before 1230pm  RN PRE OP 2/14/2025    VASECTOMY      VASECTOMY         ALLERGIES AND MEDICATION:     Review of patient's allergies indicates:   Allergen Reactions    Influenza virus vaccines     Metformin Diarrhea        Medication List            Accurate as of March 18, 2025  1:56 PM. If you have any questions, ask your nurse or doctor.                CONTINUE taking these medications      apixaban 5 mg Tab  Commonly known as: ELIQUIS  Take 1 tablet (5 mg total) by mouth 2 (two) times daily.     atorvastatin 20 MG tablet  Commonly known as: LIPITOR  take 1 tablet by mouth once daily     blood sugar diagnostic Strp  Commonly known as: CONTOUR TEST STRIPS  1 each by Misc.(Non-Drug; Combo Route) route 2 (two) times daily.     fluticasone propionate 50 mcg/actuation nasal spray  Commonly known as: FLONASE     glipiZIDE 2.5 MG Tr24  Commonly known as: GLUCOTROL  Take 2 tablets (5 mg total) by mouth daily with breakfast.     lancets Misc  USE ONE LANCET TWO TIMES DAILY     metoprolol succinate 25 MG 24 hr tablet  Commonly known as: TOPROL-XL  Take 1 tablet (25 mg total) by mouth once daily.     TALTZ AUTOINJECTOR 80 mg/mL Atin  Generic drug: ixekizumab  Inject 80 mg into the skin every 28 days. Starting on week 12              SOCIAL HISTORY:     Social History     Socioeconomic History    Marital status: Other   Occupational History     Employer:  [employer]   Tobacco Use    Smoking status: Former     Types: Cigars    Smokeless tobacco: Never    Tobacco comments:     cigars-x1 yr.   Substance and Sexual Activity    Alcohol use: Yes     Comment: occassional    Drug use: No    Sexual activity: Yes     Partners: Female     Social Drivers of Health     Financial Resource Strain: Low Risk  (12/17/2024)    Overall Financial Resource Strain (CARDIA)     Difficulty of Paying Living Expenses: Not hard at all   Food Insecurity: No Food Insecurity (12/17/2024)    Hunger Vital Sign     Worried About Running Out of Food in the Last Year: Never true     Ran Out of Food in the Last Year: Never true   Transportation Needs: No Transportation Needs (12/17/2024)    PRAPARE - Transportation     Lack of Transportation (Medical): No     Lack of Transportation (Non-Medical): No   Physical Activity: Sufficiently Active (12/17/2024)    Exercise Vital Sign     Days of Exercise per Week: 4 days     Minutes of Exercise per Session: 40 min   Stress: No Stress Concern Present (12/17/2024)    [employer] - Occupational Stress Questionnaire     Feeling of Stress : Not at all   Housing Stability: Unknown (12/17/2024)    Housing Stability Vital Sign     Unable to Pay for Housing in the Last Year: No     Homeless in the Last Year: No       FAMILY HISTORY:     Family History   Problem Relation Name Age of Onset    Cancer Father          prostate    Diabetes Brother      Liver cancer Brother      Anesthesia problems Neg Hx         REVIEW OF SYSTEMS:   Review of Systems   Constitutional:  Positive for malaise/fatigue. Negative for chills, diaphoresis and fever.   HENT:  Negative for nosebleeds.    Eyes:  Negative for blurred vision, double vision and photophobia.   Respiratory:  Negative for hemoptysis, shortness of breath and wheezing.    Cardiovascular:  Negative for chest pain, palpitations, orthopnea, claudication, leg swelling and PND.  "  Gastrointestinal:  Negative for abdominal pain, blood in stool, heartburn, melena, nausea and vomiting.   Genitourinary:  Negative for flank pain and hematuria.   Musculoskeletal:  Negative for falls, myalgias and neck pain.   Skin:  Negative for rash.   Neurological:  Negative for dizziness, seizures, loss of consciousness, weakness and headaches.   Endo/Heme/Allergies:  Negative for polydipsia. Does not bruise/bleed easily.   Psychiatric/Behavioral:  Negative for depression and memory loss. The patient is not nervous/anxious.        PHYSICAL EXAM:     Vitals:    03/18/25 1351   BP: 128/70   Pulse: 94   Resp: 18    Body mass index is 31.37 kg/m².  Weight: 113.9 kg (250 lb 15.9 oz)   Height: 6' 3" (190.5 cm)     Physical Exam  Vitals reviewed.   Constitutional:       General: He is not in acute distress.     Appearance: He is well-developed. He is obese. He is not ill-appearing, toxic-appearing or diaphoretic.   HENT:      Head: Normocephalic and atraumatic.   Eyes:      General: No scleral icterus.     Extraocular Movements: Extraocular movements intact.      Conjunctiva/sclera: Conjunctivae normal.      Pupils: Pupils are equal, round, and reactive to light.   Neck:      Thyroid: No thyromegaly.      Vascular: Normal carotid pulses. No carotid bruit or JVD.      Trachea: Trachea normal.   Cardiovascular:      Rate and Rhythm: Normal rate. Rhythm irregularly irregular.      Pulses:           Carotid pulses are 2+ on the right side and 2+ on the left side.     Heart sounds: S1 normal and S2 normal. No murmur heard.     No friction rub. No gallop.   Pulmonary:      Effort: Pulmonary effort is normal. No respiratory distress.      Breath sounds: Normal breath sounds. No stridor. No wheezing, rhonchi or rales.   Chest:      Chest wall: No tenderness.   Abdominal:      General: There is no distension.      Palpations: Abdomen is soft.   Musculoskeletal:         General: No tenderness. Normal range of motion.      " Cervical back: Normal range of motion and neck supple. No edema or rigidity.      Right lower le+ Edema present.      Left lower le+ Edema present.   Feet:      Right foot:      Skin integrity: No ulcer.      Left foot:      Skin integrity: No ulcer.   Skin:     General: Skin is warm and dry.      Coloration: Skin is not jaundiced.   Neurological:      General: No focal deficit present.      Mental Status: He is alert and oriented to person, place, and time.      Cranial Nerves: No cranial nerve deficit.   Psychiatric:         Mood and Affect: Mood normal.         Speech: Speech normal.         Behavior: Behavior normal. Behavior is cooperative.         DATA:   EKG: (personally reviewed tracing(s))  3/18/25 AF 75, low volt    Laboratory:  CBC:  Recent Labs   Lab 25  0555 25  1410   WBC 5.02 3.72 L 5.73   Hemoglobin 13.9 L 12.3 L 13.6 L   Hematocrit 44.0 40.1 43.5   Platelets 108 L 112 L 127 L       CHEMISTRIES:  Recent Labs   Lab 22  0748 22  1013 24  0929 10/07/24  1357 25  0555 25  1410   Glucose 158 H   < > 165 H 96 96 100 240 H   Sodium 143   < > 136 138 136 137 137   Potassium 4.4   < > 4.8 4.0 3.8 3.5 4.3   BUN 18   < > 19 16 16 14 16   Creatinine 1.2   < > 1.2 1.0 1.0 0.8 1.0   eGFR if non  58.4 A  --   --   --   --   --   --    eGFR  --    < > >60.0 >60.0 >60 >60 >60   Calcium 9.6   < > 9.9 10.1 9.2 8.2 L 9.1   Magnesium  --   --   --   --  2.0 1.9  --     < > = values in this interval not displayed.       CARDIAC BIOMARKERS:  Recent Labs   Lab 25   Troponin I 0.009       COAGS:  Recent Labs   Lab 25   INR 1.0       LIPIDS/LFTS:  Recent Labs   Lab 25  0555 25  1104 25  1410   Cholesterol  --   --  99 L  --    Triglycerides  --   --  60  --    HDL  --   --  34 L  --    LDL Cholesterol  --   --  53.0 L  --    Non-HDL Cholesterol  --   --  65  --    AST 45 H 38   --  19   ALT 31 30  --  19       Cardiovascular Testing:  LUDIN/DCCV 2/18/25  Normal biventricular size/fxn  LVEF 60%, normal wall motion.  Biatrial enlargement.  No LA/LUCITA thrombus.  Normal appearing valves  Previously noted PV vegetation (TTE 1/14/25) not visualized on this exam.  Mild MR, trace AI/TR.  Mild aortic root dil, 3.7cm  Successful DCCV AF->NSR 61 BPM 200J x1.  Plan:  Cont med rx  Cont eliquis 5mg bid  Home today  Follow up with Dr. Whitehead as planned.    Echo 1/14/25     Left Ventricle: The left ventricle is normal in size. Mildly increased wall thickness. There is mild concentric hypertrophy. There is normal systolic function with a visually estimated ejection fraction of 55 - 60%. Unable to assess diastolic function due to atrial fibrillation.    Right Ventricle: Normal right ventricular cavity size. Systolic function is normal.    Pulmonic Valve: There is a 2.0x1.6 large fixed echogenic spherical mass present.    Pulmonary Artery: The estimated pulmonary artery systolic pressure is 33 mmHg.    Pt appears to be in AF/FL throughout exam.    Consider LUDIN for further investigation of PV findings above.     Stress Test: ETT 2/27/18  The patient exercised for 4.53 minutes on a Richard protocol, corresponding to a functional capacity of 6 estimated METS, achieving a peak heart rate of 126 bpm, which is 85% of the age predicted maximum heart rate.   There were no significant electrocardiographic changes throughout the protocol suggesting ischemia.   EKG Conclusions:  1. The EKG portion of this study is negative for ischemia at a moderate workload, and peak heart rate of 126 bpm (85% of predicted).   2. Blood pressure response to exercise was normal (Presenting BP: 143/71 Peak BP: 245/79).   3. No significant arrhythmias were present.   4. There were no symptoms of chest discomfort or significant dyspnea throughout the protocol.   5. The Jacobsen treadmill score was 5 suggesting a low probability for future  cardiovascular events.     ASSESSMENT:   # Persistent AF, HR controlled, on eliquis 5mg bid.  Successful LUDIN/DCCV 3/18/25, btu back in AF with controlled VR today.  Still with fatigue.  # HTN, controlled  # HLP on atorva, LDL acceptable  # BMI 31, stable vs last OV  # ?PV vegetation (echo 1/2025), not noted on LUDIN 2/2025.  # Ao root dil, 3.7cm (LUDIN 3/2025)    PLAN:   Cont med rx  Cont eliquis 5mg bid (pt seems interested in Watchman in the future)  Refer to Dr. Tam ([facility]) fro possible PVI  Refer to Sleep med, ?needs CPAP  RTC 6 months with echo (Sept 2025)    The above documents medical care services that are part of ongoing care related to this patient's serious/complex condition (Code ). (PAF/HTN)    [id number] score: 3  HAS-BLED score: 1    If you answer NO to any of the four criteria below, the patient does not meet the WATCHMAN implant eligibility requirements.     Patient has non-valvular atrial fibrillation: Yes  Patient has an increased risk for stroke and is recommended for oral anticoagulation (OAC): Yes  Patient is suitable for short-term anticoagulation therapy but deemed unable to take long-term OAC: Yes  Patient has an appropriate rationale to seek a non-pharmacological alternative to OACs. Specific factors include:  pt request and Occupation/lifestyle that puts patient at an increased bleeding risk    Will refer to Dr. Tam for further evaluation and consideration of LAAO device.          Abbe Whitehead MD, [facility]

## 2025-03-19 DIAGNOSIS — R00.1 SINUS BRADYCARDIA: Primary | ICD-10-CM

## 2025-03-25 ENCOUNTER — HOSPITAL ENCOUNTER (OUTPATIENT)
Dept: CARDIOLOGY | Facility: CLINIC | Age: 80
Discharge: HOME | End: 2025-03-25
Payer: MEDICARE

## 2025-03-25 ENCOUNTER — OFFICE VISIT (OUTPATIENT)
Dept: ELECTROPHYSIOLOGY | Facility: CLINIC | Age: 80
End: 2025-03-25
Payer: MEDICARE

## 2025-03-25 VITALS
HEART RATE: 66 BPM | WEIGHT: 250.44 LBS | BODY MASS INDEX: 31.14 KG/M2 | DIASTOLIC BLOOD PRESSURE: 76 MMHG | HEIGHT: 75 IN | SYSTOLIC BLOOD PRESSURE: 120 MMHG

## 2025-03-25 DIAGNOSIS — R00.1 SINUS BRADYCARDIA: ICD-10-CM

## 2025-03-25 DIAGNOSIS — I48.0 PAROXYSMAL ATRIAL FIBRILLATION: Primary | ICD-10-CM

## 2025-03-25 DIAGNOSIS — I10 ESSENTIAL HYPERTENSION, BENIGN: ICD-10-CM

## 2025-03-25 LAB
OHS QRS DURATION: 88 MS
OHS QTC CALCULATION: 383 MS

## 2025-03-25 PROCEDURE — 99204 OFFICE O/P NEW MOD 45 MIN: CPT | Mod: S$PBB,,, | Performed by: INTERNAL MEDICINE

## 2025-03-25 PROCEDURE — 99213 OFFICE O/P EST LOW 20 MIN: CPT | Mod: PBBFAC,25 | Performed by: INTERNAL MEDICINE

## 2025-03-25 PROCEDURE — 99999 PR PBB SHADOW E&M-EST. PATIENT-LVL III: CPT | Mod: PBBFAC,,, | Performed by: INTERNAL MEDICINE

## 2025-03-25 PROCEDURE — 93010 ELECTROCARDIOGRAM REPORT: CPT | Mod: S$PBB,,, | Performed by: INTERNAL MEDICINE

## 2025-03-25 PROCEDURE — 93005 ELECTROCARDIOGRAM TRACING: CPT | Mod: PBBFAC | Performed by: INTERNAL MEDICINE

## 2025-03-25 NOTE — PROGRESS NOTES
PCP - Dede Eric MD  Subjective:     Rosendo Thomas is a 79 y.o. male with PMH of HTN, atrial fibrillation, DM2 who presents to arrhythmia clinic to establish care. He was recently diagnosed with atrial fibrillation in the setting of influenza A infection. He was treated for flu and ultimately underwent LUDIN/DCCV 2/2025. At follow up 3/2025 he was found to be back in AF. He was subsequently referred to electrophysiology. He has been maintained on apixaban and metoprolol. Today he states he is uncertain if he has symptoms in AF. He has not noticed any significant functional decline over the last month or so. He does note occasional periods of feeling tired. He says Dr. Whitehead's office is sending referral to sleep medicine. He didn't experience any symptomatic improvement following cardioversion but it is uncertain how long he maintained sinus rhythm. He is tolerating apixaban well. He is amenable to another trial of sinus rhythm with DCCV and anti arrhythmic therapy.         ---------------  Cardiac Studies ---------------  LUDIN 2/2025:   Findings/Key Components:   Normal biventricular size/fxn  LVEF 60%, normal wall motion.  Biatrial enlargement.  No LA/LUCITA thrombus.  Normal appearing valves  Previously noted PV vegetation (TTE 1/14/25) not visualized on this exam.  Mild MR, trace AI/TR.  Mild aortic root dil, 3.7cm    TTE 1/2025:    Left Ventricle: The left ventricle is normal in size. Mildly increased wall thickness. There is mild concentric hypertrophy. There is normal systolic function with a visually estimated ejection fraction of 55 - 60%. Unable to assess diastolic function due to atrial fibrillation.    Right Ventricle: Normal right ventricular cavity size. Systolic function is normal.    Pulmonic Valve: There is a 2.0x1.6 large fixed echogenic spherical mass present.    Pulmonary Artery: The estimated pulmonary artery systolic pressure is 33 mmHg.    Pt appears to be in AF/FL throughout exam.     "Consider LUDIN for further investigation of PV findings above.        History:     Social History     Tobacco Use    Smoking status: Former     Types: Cigars    Smokeless tobacco: Never    Tobacco comments:     cigars-x1 yr.   Substance Use Topics    Alcohol use: Yes     Comment: occassional     Family History   Problem Relation Name Age of Onset    Cancer Father          prostate    Diabetes Brother      Liver cancer Brother      Anesthesia problems Neg Hx         Meds:     Review of patient's allergies indicates:   Allergen Reactions    Influenza virus vaccines     Metformin Diarrhea     Current Medications[1]    10 point ROS performed and negative except as stated in HPI     Objective:   /76   Pulse 66   Ht 6' 3" (1.905 m)   Wt 113.6 kg (250 lb 7.1 oz)   BMI 31.30 kg/m²     Physical Exam:   Constitutional: no acute distress  HEENT: NCAT, EOMI, no scleral icterus  Cardiovascular: Irregularly irregular rhythm   Pulmonary: Normal respiratory effort    Abdomen: nontender, non-distended   Neuro: alert and oriented, no focal deficits  Extremities: warm, no edema   MSK: no deformities  Integument: intact, no rashes       Labs:     Lab Results   Component Value Date     02/14/2025    K 4.3 02/14/2025     02/14/2025    CO2 25 02/14/2025    BUN 16 02/14/2025    CREATININE 1.0 02/14/2025    ANIONGAP 7 (L) 02/14/2025     Lab Results   Component Value Date    HGBA1C 6.5 (H) 02/07/2025     Lab Results   Component Value Date    BNP 63 01/13/2025       Lab Results   Component Value Date    WBC 5.73 02/14/2025    HGB 13.6 (L) 02/14/2025    HCT 43.5 02/14/2025     (L) 02/14/2025    GRAN 3.9 02/14/2025    GRAN 68.3 02/14/2025     Lab Results   Component Value Date    CHOL 99 (L) 02/07/2025    HDL 34 (L) 02/07/2025    LDLCALC 53.0 (L) 02/07/2025    TRIG 60 02/07/2025       Lab Results   Component Value Date     02/14/2025    K 4.3 02/14/2025     02/14/2025    CO2 25 02/14/2025    BUN 16 " 02/14/2025    CREATININE 1.0 02/14/2025    ANIONGAP 7 (L) 02/14/2025     Lab Results   Component Value Date    HGBA1C 6.5 (H) 02/07/2025     Lab Results   Component Value Date    BNP 63 01/13/2025    Lab Results   Component Value Date    WBC 5.73 02/14/2025    HGB 13.6 (L) 02/14/2025    HCT 43.5 02/14/2025     (L) 02/14/2025    GRAN 3.9 02/14/2025    GRAN 68.3 02/14/2025     Lab Results   Component Value Date    CHOL 99 (L) 02/07/2025    HDL 34 (L) 02/07/2025    LDLCALC 53.0 (L) 02/07/2025    TRIG 60 02/07/2025            Assessment     1. Paroxysmal atrial fibrillation    2. Sinus bradycardia    3. Essential hypertension, benign        Plan:   79 y.o. male with PMH of HTN, atrial fibrillation, DM2 who presents to arrhythmia clinic to establish care.    Atrial fibrillation: CHADS-VASc is 4 and he should continue anticoagulation with apixaban. Unclear symptomatic benefit after cardioversion but also uncertain how long he was able to maintain sinus rhythm. Would favor another attempt at DCCV with initiation of anti arrhythmic therapy.   Continue apixaban 5 mg BID and toprol 25 mg daily   Schedule LUDIN/DCCV with initiation of AAD. No history of ischemia or structural heart disease, would initiate flecainide 100 mg BID if heart rate will allow       Follow up 1 month after cardioversion        Oscar Pennington MD  [facility]  Electrophysiology, PGY-VII             [1]   Current Outpatient Medications:     apixaban (ELIQUIS) 5 mg Tab, Take 1 tablet (5 mg total) by mouth 2 (two) times daily., Disp: 180 tablet, Rfl: 3    atorvastatin (LIPITOR) 20 MG tablet, take 1 tablet by mouth once daily, Disp: 90 tablet, Rfl: 0    fluticasone propionate (FLONASE) 50 mcg/actuation nasal spray, 1 spray by Each Nostril route once daily., Disp: , Rfl:     glipiZIDE (GLUCOTROL) 2.5 MG TR24, Take 2 tablets (5 mg total) by mouth daily with breakfast., Disp: 180 tablet, Rfl: 3    ixekizumab (TALTZ AUTOINJECTOR) 80 mg/mL AtIn,  Inject 80 mg into the skin every 28 days. Starting on week 12, Disp: 1 mL, Rfl: 5    metoprolol succinate (TOPROL-XL) 25 MG 24 hr tablet, Take 1 tablet (25 mg total) by mouth once daily., Disp: 90 tablet, Rfl: 3    blood sugar diagnostic (CONTOUR TEST STRIPS) Strp, 1 each by Misc.(Non-Drug; Combo Route) route 2 (two) times daily. (Patient not taking: Reported on 3/25/2025), Disp: 50 each, Rfl: 11    lancets Misc, USE ONE LANCET TWO TIMES DAILY (Patient not taking: Reported on 3/25/2025), Disp: 100 each, Rfl: 11

## 2025-03-26 ENCOUNTER — TELEPHONE (OUTPATIENT)
Dept: CARDIOLOGY | Facility: CLINIC | Age: 80
End: 2025-03-26
Payer: MEDICARE

## 2025-03-26 NOTE — TELEPHONE ENCOUNTER
Spoke with patient and informed him per Dr. Whitehead that he wants patient to come in to discuss LUDIN/DCCV. Patient verbalized understanding and is scheduled.

## 2025-03-26 NOTE — TELEPHONE ENCOUNTER
----- Message from Johana sent at 3/26/2025 10:22 AM CDT -----  .Type: Patient Call BackWho called: Self What is the request in detail: Stated there is a conflict in his schedule with the upcoming appointment, needs to reschedule. Ask that the nurse give him a call Can the clinic reply by [facility]? No Would the patient rather a call back or a response via [facility]? Call Back Best call back number:.[phone] (home) Additional Information:

## 2025-03-27 ENCOUNTER — HOSPITAL ENCOUNTER (OUTPATIENT)
Facility: HOSPITAL | Age: 80
Discharge: HOME | End: 2025-03-28
Attending: EMERGENCY MEDICINE | Admitting: STUDENT IN AN ORGANIZED HEALTH CARE EDUCATION/TRAINING PROGRAM
Payer: MEDICARE

## 2025-03-27 DIAGNOSIS — R07.9 CHEST PAIN: ICD-10-CM

## 2025-03-27 DIAGNOSIS — N28.89 LEFT RENAL MASS: Primary | ICD-10-CM

## 2025-03-27 DIAGNOSIS — I21.4 NON-ST ELEVATION MYOCARDIAL INFARCTION (NSTEMI): ICD-10-CM

## 2025-03-27 PROBLEM — I70.0 AORTIC ATHEROSCLEROSIS: Status: RESOLVED | Noted: 2019-02-26 | Resolved: 2025-03-27

## 2025-03-27 LAB
ABSOLUTE EOSINOPHIL (OHS): 0.13 K/UL
ABSOLUTE MONOCYTE (OHS): 0.47 K/UL (ref 0.3–1)
ABSOLUTE NEUTROPHIL COUNT (OHS): 3.57 K/UL (ref 1.8–7.7)
ALBUMIN SERPL BCP-MCNC: 3.9 G/DL (ref 3.5–5.2)
ALP SERPL-CCNC: 78 UNIT/L (ref 40–150)
ALT SERPL W/O P-5'-P-CCNC: 28 UNIT/L (ref 10–44)
ANION GAP (OHS): 11 MMOL/L (ref 8–16)
APTT PPP: 27.7 SECONDS (ref 21–32)
AST SERPL-CCNC: 29 UNIT/L (ref 11–45)
BASOPHILS # BLD AUTO: 0.04 K/UL
BASOPHILS NFR BLD AUTO: 0.7 %
BILIRUB SERPL-MCNC: 0.5 MG/DL (ref 0.1–1)
BNP SERPL-MCNC: 82 PG/ML (ref 0–99)
BUN SERPL-MCNC: 14 MG/DL (ref 8–23)
CALCIUM SERPL-MCNC: 9 MG/DL (ref 8.7–10.5)
CHLORIDE SERPL-SCNC: 106 MMOL/L (ref 95–110)
CHOLEST SERPL-MCNC: 91 MG/DL (ref 120–199)
CHOLEST/HDLC SERPL: 2.8 {RATIO} (ref 2–5)
CO2 SERPL-SCNC: 21 MMOL/L (ref 23–29)
CREAT SERPL-MCNC: 0.9 MG/DL (ref 0.5–1.4)
EAG (OHS): 157 MG/DL (ref 68–131)
ERYTHROCYTE [DISTWIDTH] IN BLOOD BY AUTOMATED COUNT: 15.2 % (ref 11.5–14.5)
GFR SERPLBLD CREATININE-BSD FMLA CKD-EPI: >60 ML/MIN/1.73/M2
GLUCOSE SERPL-MCNC: 208 MG/DL (ref 70–110)
HBA1C MFR BLD: 7.1 % (ref 4–5.6)
HCT VFR BLD AUTO: 46.4 % (ref 40–54)
HDLC SERPL-MCNC: 32 MG/DL (ref 40–75)
HDLC SERPL: 35.2 % (ref 20–50)
HGB BLD-MCNC: 14.6 GM/DL (ref 14–18)
IMM GRANULOCYTES # BLD AUTO: 0.02 K/UL (ref 0–0.04)
IMM GRANULOCYTES NFR BLD AUTO: 0.4 % (ref 0–0.5)
INDIRECT COOMBS: NORMAL
INR PPP: 1.1 (ref 0.8–1.2)
LDLC SERPL CALC-MCNC: 46.4 MG/DL (ref 63–159)
LYMPHOCYTES # BLD AUTO: 1.35 K/UL (ref 1–4.8)
MCH RBC QN AUTO: 26.7 PG (ref 27–50)
MCHC RBC AUTO-ENTMCNC: 31.5 G/DL (ref 32–36)
MCV RBC AUTO: 85 FL (ref 82–98)
NONHDLC SERPL-MCNC: 59 MG/DL
NUCLEATED RBC (/100WBC) (OHS): 0 /100 WBC
PLATELET # BLD AUTO: 122 K/UL (ref 150–450)
PMV BLD AUTO: 11.3 FL (ref 9.2–12.9)
POCT GLUCOSE: 144 MG/DL (ref 70–110)
POTASSIUM SERPL-SCNC: 4.8 MMOL/L (ref 3.5–5.1)
PROT SERPL-MCNC: 7.4 GM/DL (ref 6–8.4)
PROTHROMBIN TIME: 11.9 SECONDS (ref 9–12.5)
RBC # BLD AUTO: 5.46 M/UL (ref 4.6–6.2)
RELATIVE EOSINOPHIL (OHS): 2.3 %
RELATIVE LYMPHOCYTE (OHS): 24.2 % (ref 18–48)
RELATIVE MONOCYTE (OHS): 8.4 % (ref 4–15)
RELATIVE NEUTROPHIL (OHS): 64 % (ref 38–73)
RH BLD: NORMAL
SODIUM SERPL-SCNC: 138 MMOL/L (ref 136–145)
SPECIMEN OUTDATE: NORMAL
TRIGL SERPL-MCNC: 63 MG/DL (ref 30–150)
TROPONIN I SERPL DL<=0.01 NG/ML-MCNC: <0.006 NG/ML
TROPONIN I SERPL DL<=0.01 NG/ML-MCNC: <0.006 NG/ML
WBC # BLD AUTO: 5.58 K/UL (ref 3.9–12.7)

## 2025-03-27 PROCEDURE — 85025 COMPLETE CBC W/AUTO DIFF WBC: CPT | Performed by: EMERGENCY MEDICINE

## 2025-03-27 PROCEDURE — G0378 HOSPITAL OBSERVATION PER HR: HCPCS

## 2025-03-27 PROCEDURE — 83718 ASSAY OF LIPOPROTEIN: CPT

## 2025-03-27 PROCEDURE — 63600175 PHARM REV CODE 636 W HCPCS: Performed by: EMERGENCY MEDICINE

## 2025-03-27 PROCEDURE — 25000003 PHARM REV CODE 250

## 2025-03-27 PROCEDURE — 96375 TX/PRO/DX INJ NEW DRUG ADDON: CPT

## 2025-03-27 PROCEDURE — 25000003 PHARM REV CODE 250: Performed by: EMERGENCY MEDICINE

## 2025-03-27 PROCEDURE — 86901 BLOOD TYPING SEROLOGIC RH(D): CPT

## 2025-03-27 PROCEDURE — 25000003 PHARM REV CODE 250: Performed by: INTERNAL MEDICINE

## 2025-03-27 PROCEDURE — 25000242 PHARM REV CODE 250 ALT 637 W/ HCPCS: Performed by: EMERGENCY MEDICINE

## 2025-03-27 PROCEDURE — 85730 THROMBOPLASTIN TIME PARTIAL: CPT

## 2025-03-27 PROCEDURE — 25500020 PHARM REV CODE 255: Performed by: STUDENT IN AN ORGANIZED HEALTH CARE EDUCATION/TRAINING PROGRAM

## 2025-03-27 PROCEDURE — 93010 ELECTROCARDIOGRAM REPORT: CPT | Mod: ,,, | Performed by: INTERNAL MEDICINE

## 2025-03-27 PROCEDURE — 84484 ASSAY OF TROPONIN QUANT: CPT | Performed by: EMERGENCY MEDICINE

## 2025-03-27 PROCEDURE — 83880 ASSAY OF NATRIURETIC PEPTIDE: CPT | Performed by: EMERGENCY MEDICINE

## 2025-03-27 PROCEDURE — 83036 HEMOGLOBIN GLYCOSYLATED A1C: CPT

## 2025-03-27 PROCEDURE — 85610 PROTHROMBIN TIME: CPT

## 2025-03-27 PROCEDURE — 99204 OFFICE O/P NEW MOD 45 MIN: CPT | Mod: ,,, | Performed by: UROLOGY

## 2025-03-27 PROCEDURE — 99215 OFFICE O/P EST HI 40 MIN: CPT | Mod: ,,, | Performed by: INTERNAL MEDICINE

## 2025-03-27 PROCEDURE — 93005 ELECTROCARDIOGRAM TRACING: CPT

## 2025-03-27 PROCEDURE — 96374 THER/PROPH/DIAG INJ IV PUSH: CPT

## 2025-03-27 PROCEDURE — 82565 ASSAY OF CREATININE: CPT | Performed by: EMERGENCY MEDICINE

## 2025-03-27 PROCEDURE — 99285 EMERGENCY DEPT VISIT HI MDM: CPT | Mod: 25

## 2025-03-27 PROCEDURE — 84484 ASSAY OF TROPONIN QUANT: CPT | Mod: 91

## 2025-03-27 RX ORDER — MORPHINE SULFATE 4 MG/ML
2 INJECTION, SOLUTION INTRAMUSCULAR; INTRAVENOUS EVERY 4 HOURS PRN
Status: DISCONTINUED | OUTPATIENT
Start: 2025-03-27 | End: 2025-03-27

## 2025-03-27 RX ORDER — GLUCAGON 1 MG
1 KIT INJECTION
Status: DISCONTINUED | OUTPATIENT
Start: 2025-03-27 | End: 2025-03-28 | Stop reason: HOSPADM

## 2025-03-27 RX ORDER — GUAIFENESIN 100 MG/5ML
200 LIQUID ORAL EVERY 4 HOURS PRN
Status: DISCONTINUED | OUTPATIENT
Start: 2025-03-27 | End: 2025-03-28 | Stop reason: HOSPADM

## 2025-03-27 RX ORDER — POLYETHYLENE GLYCOL 3350 17 G/17G
17 POWDER, FOR SOLUTION ORAL DAILY PRN
Status: DISCONTINUED | OUTPATIENT
Start: 2025-03-27 | End: 2025-03-28 | Stop reason: HOSPADM

## 2025-03-27 RX ORDER — CALCIUM CARBONATE 200(500)MG
500 TABLET,CHEWABLE ORAL 3 TIMES DAILY PRN
Status: DISCONTINUED | OUTPATIENT
Start: 2025-03-27 | End: 2025-03-28 | Stop reason: HOSPADM

## 2025-03-27 RX ORDER — MORPHINE SULFATE 4 MG/ML
1 INJECTION, SOLUTION INTRAMUSCULAR; INTRAVENOUS EVERY 4 HOURS PRN
Status: DISCONTINUED | OUTPATIENT
Start: 2025-03-27 | End: 2025-03-27

## 2025-03-27 RX ORDER — ENOXAPARIN SODIUM 100 MG/ML
40 INJECTION SUBCUTANEOUS EVERY 24 HOURS
Status: DISCONTINUED | OUTPATIENT
Start: 2025-03-27 | End: 2025-03-27

## 2025-03-27 RX ORDER — METOPROLOL SUCCINATE 25 MG/1
25 TABLET, EXTENDED RELEASE ORAL DAILY
Status: DISCONTINUED | OUTPATIENT
Start: 2025-03-28 | End: 2025-03-27

## 2025-03-27 RX ORDER — SIMETHICONE 80 MG
1 TABLET,CHEWABLE ORAL 3 TIMES DAILY PRN
Status: DISCONTINUED | OUTPATIENT
Start: 2025-03-27 | End: 2025-03-28 | Stop reason: HOSPADM

## 2025-03-27 RX ORDER — DIPHENHYDRAMINE HCL 25 MG
25 CAPSULE ORAL EVERY 6 HOURS PRN
Status: DISCONTINUED | OUTPATIENT
Start: 2025-03-27 | End: 2025-03-28 | Stop reason: HOSPADM

## 2025-03-27 RX ORDER — ATORVASTATIN CALCIUM 10 MG/1
20 TABLET, FILM COATED ORAL DAILY
Status: DISCONTINUED | OUTPATIENT
Start: 2025-03-28 | End: 2025-03-28 | Stop reason: HOSPADM

## 2025-03-27 RX ORDER — IBUPROFEN 200 MG
16 TABLET ORAL
Status: DISCONTINUED | OUTPATIENT
Start: 2025-03-27 | End: 2025-03-28 | Stop reason: HOSPADM

## 2025-03-27 RX ORDER — ONDANSETRON 4 MG/1
4 TABLET, ORALLY DISINTEGRATING ORAL EVERY 6 HOURS PRN
Status: DISCONTINUED | OUTPATIENT
Start: 2025-03-27 | End: 2025-03-28 | Stop reason: HOSPADM

## 2025-03-27 RX ORDER — METOPROLOL SUCCINATE 50 MG/1
50 TABLET, EXTENDED RELEASE ORAL DAILY
Status: DISCONTINUED | OUTPATIENT
Start: 2025-03-27 | End: 2025-03-28 | Stop reason: HOSPADM

## 2025-03-27 RX ORDER — LABETALOL HYDROCHLORIDE 5 MG/ML
5 INJECTION, SOLUTION INTRAVENOUS
Status: COMPLETED | OUTPATIENT
Start: 2025-03-27 | End: 2025-03-27

## 2025-03-27 RX ORDER — INSULIN ASPART 100 [IU]/ML
0-10 INJECTION, SOLUTION INTRAVENOUS; SUBCUTANEOUS
Status: DISCONTINUED | OUTPATIENT
Start: 2025-03-27 | End: 2025-03-28 | Stop reason: HOSPADM

## 2025-03-27 RX ORDER — BENZONATATE 100 MG/1
100 CAPSULE ORAL 3 TIMES DAILY PRN
Status: DISCONTINUED | OUTPATIENT
Start: 2025-03-27 | End: 2025-03-28 | Stop reason: HOSPADM

## 2025-03-27 RX ORDER — ASPIRIN 325 MG
325 TABLET, DELAYED RELEASE (ENTERIC COATED) ORAL
Status: COMPLETED | OUTPATIENT
Start: 2025-03-27 | End: 2025-03-27

## 2025-03-27 RX ORDER — NAPROXEN SODIUM 220 MG/1
81 TABLET, FILM COATED ORAL DAILY
Status: DISCONTINUED | OUTPATIENT
Start: 2025-03-28 | End: 2025-03-28 | Stop reason: HOSPADM

## 2025-03-27 RX ORDER — LOSARTAN POTASSIUM 25 MG/1
25 TABLET ORAL DAILY
Status: DISCONTINUED | OUTPATIENT
Start: 2025-03-27 | End: 2025-03-28 | Stop reason: HOSPADM

## 2025-03-27 RX ORDER — NITROGLYCERIN 0.4 MG/1
0.4 TABLET SUBLINGUAL EVERY 5 MIN PRN
Status: DISCONTINUED | OUTPATIENT
Start: 2025-03-27 | End: 2025-03-28 | Stop reason: HOSPADM

## 2025-03-27 RX ORDER — IBUPROFEN 200 MG
24 TABLET ORAL
Status: DISCONTINUED | OUTPATIENT
Start: 2025-03-27 | End: 2025-03-28 | Stop reason: HOSPADM

## 2025-03-27 RX ORDER — FUROSEMIDE 10 MG/ML
20 INJECTION INTRAMUSCULAR; INTRAVENOUS
Status: COMPLETED | OUTPATIENT
Start: 2025-03-27 | End: 2025-03-27

## 2025-03-27 RX ORDER — TALC
6 POWDER (GRAM) TOPICAL NIGHTLY PRN
Status: DISCONTINUED | OUTPATIENT
Start: 2025-03-27 | End: 2025-03-28 | Stop reason: HOSPADM

## 2025-03-27 RX ORDER — ACETAMINOPHEN 325 MG/1
325 TABLET ORAL EVERY 6 HOURS PRN
Status: DISCONTINUED | OUTPATIENT
Start: 2025-03-27 | End: 2025-03-28 | Stop reason: HOSPADM

## 2025-03-27 RX ADMIN — APIXABAN 5 MG: 5 TABLET, FILM COATED ORAL at 09:03

## 2025-03-27 RX ADMIN — NITROGLYCERIN 0.4 MG: 0.4 TABLET, ORALLY DISINTEGRATING SUBLINGUAL at 09:03

## 2025-03-27 RX ADMIN — LOSARTAN POTASSIUM 25 MG: 25 TABLET, FILM COATED ORAL at 02:03

## 2025-03-27 RX ADMIN — FUROSEMIDE 20 MG: 10 INJECTION, SOLUTION INTRAVENOUS at 10:03

## 2025-03-27 RX ADMIN — ACETAMINOPHEN 325 MG: 325 TABLET ORAL at 12:03

## 2025-03-27 RX ADMIN — METOPROLOL SUCCINATE 50 MG: 50 TABLET, EXTENDED RELEASE ORAL at 02:03

## 2025-03-27 RX ADMIN — ACETAMINOPHEN 325 MG: 325 TABLET ORAL at 07:03

## 2025-03-27 RX ADMIN — IOHEXOL 85 ML: 350 INJECTION, SOLUTION INTRAVENOUS at 02:03

## 2025-03-27 RX ADMIN — ASPIRIN 325 MG: 325 TABLET, COATED ORAL at 09:03

## 2025-03-27 RX ADMIN — LABETALOL HYDROCHLORIDE 5 MG: 5 INJECTION INTRAVENOUS at 09:03

## 2025-03-27 NOTE — HPI
78yo M with significant PMH including AFib, anticoagulation, DM2 who came to ER with CP and severe HTN today. He continues to report discomfort in L shoulder. CT of chest performed that showed suspicious 3.1cm L renal mass. Kidneys not completely imaged on chest imaging. New finding since CT in 2018. Denies hematuria, flank pain, LUTS. No prior urologic history. Denies h/o nephrolithiasis. +family history stones (daughter). Prior personal history of colon cancer in 2018 s/p resection without colostomy, currently GABO. Father with prostate cancer. Denies family history of kidney/bladder cancer. Cr 0.9. No recent urine testing.

## 2025-03-27 NOTE — ASSESSMENT & PLAN NOTE
Patient's blood pressure range in the last 24 hours was: BP  Min: 149/96  Max: 203/96.The patient's inpatient anti-hypertensive regimen is listed below:  Current Antihypertensives  nitroGLYCERIN SL tablet 0.4 mg, Every 5 min PRN, Sublingual  metoprolol succinate (TOPROL-XL) 24 hr tablet 25 mg, Daily, Oral    Plan  - BP is controlled, no changes needed to their regimen

## 2025-03-27 NOTE — NURSING
Patient arrived to floor via stretcher via transporter from ED. Patient transferred to bed independently.  AAOX4.  Patient was oriented to room, information on whiteboard, and medication regimen.  Bed low, adequate lighting provided, side rails x2 up, call bell within reach.  Admission assessment completed. VSS.  Patient denied having any acute distress at this time.  None observed.  Will continue to monitor and follow treatment plan.  Family at bedside.      [facility]  Nurses Note -- 4 Eyes      3/27/2025       Skin assessed on: Admit      [x] No Pressure Injuries Present    []Prevention Measures Documented    [] Yes LDA  for Pressure Injury Previously documented     [] Yes New Pressure Injury Discovered   [] LDA for New Pressure Injury Added      Attending RN:  Jolynn Duarte RN     Second RN:  MARTHA Reed

## 2025-03-27 NOTE — CONSULTS
[facility]  Cardiology  Consult Note    Patient Name: Rosendo Thomas  MRN: [id number]  Admission Date: 3/27/2025  Hospital Length of Stay: 0 days  Code Status: Full Code   Attending Provider: Edson Luna MD   Consulting Provider: Abbe Whitehead MD  Primary Care Physician: Dede Eric MD  Principal Problem:Chest pain    Patient information was obtained from patient and ER records.     Inpatient consult to Cardiology  Consult performed by: Abbe Whitehead MD  Consult ordered by: Mary Denney PA-C  Reason for consult: CP/AF        Subjective:     Chief Complaint:  CP     HPI:   79 y.o. M with a history of T2DM, Persistent Atrial Fibrillation, hyperlipidemia, and AKIN who presented to the Emergency Department c/o left anterior chest pain with radiation to the left shoulder. He reports initially noticing the pain last Friday, however, he believed he may have pulled a muscle. The pain continued to worsen in severity until it reached a peak this morning. He reports improvement of the pain with Nitroglycerin. Of note, the patient was recently hospitalized for influenza and noted to be in A. Fib. He reports to have been asymptomatic at this time and denies any symptoms with previous episodes of A. Fib. He underwent LUDIN/DCCV on 02/18 with return to NSR, however, was again noted to be in A. Fib during his appointment with Dr. Whitehead on 03/18. He reports strict compliance with his Eliquis and Metoprolol. Denies SOB, n/v, palpitations, fever or chills.   In ED: Severely hypertensive on arrival ( - 200's). Vitals otherwise stable and wnl. CBC w/o anemia or leukocytosis. CMP notable for mildly decreased CO2 (21) and hyperglycemia (208). BNP and initial troponin wnl.  EKG showing A. Flutter with a variable AV block.  CXR showed coarse interstitial lung markings with a lower lobe predominance favored to represent pulmonary edema. Infectious process is considered less likely. Admitted to  Hospital Medicine for further evaluation.     Pt follows with me, last seen 3/18/25    Cardiology consulted for AF.    The patient is well known to me with a history of paroxysmal atrial fibrillation.  He is back in AFib with controlled ventricular response.  He was recently seen by electrophysiology with plans for outpatient LUDIN guided cardioversion and initiation of flecainide.  He presents to the emergency room with complaints of central chest pain radiating to his left shoulder and through to his back.  He tells me the chest discomfort has abated, but he is still having some back pain between his shoulder blades.  He otherwise appears comfortable.  EKG notes atrial fibrillation with a controlled ventricular response.  Blood pressure was elevated on arrival, but is better controlled at present.  He has been taking his anticoagulant.  At this point, I plan to perform a CTA of the chest in order to rule out aortopathy.  Assuming this is negative, we will get an exercise nuclear stress test in the morning in order to exclude significant ischemia.  If this is negative, I feel the patient can be safely discharged home and follow up with me as plan to schedule outpatient LUDIN guided cardioversion and initiation of flecainide.        [place] OV 3/18/25:  CARDIOVASCULAR HISTORY:   Persistent AF on eliquis 5mg bid, s/p LUDIN/DCCV 2/18/25.     Ao root dil, 3.7cm (LUDIN 2/2025)    Cardiovascular Testing:  LUDIN/DCCV 2/18/25  Normal biventricular size/fxn  LVEF 60%, normal wall motion.  Biatrial enlargement.  No LA/LUCITA thrombus.  Normal appearing valves  Previously noted PV vegetation (TTE 1/14/25) not visualized on this exam.  Mild MR, trace AI/TR.  Mild aortic root dil, 3.7cm  Successful DCCV AF->NSR 61 BPM 200J x1.  Plan:  Cont med rx  Cont eliquis 5mg bid  Home today  Follow up with Dr. Whitehead as planned.     Echo 1/14/25     Left Ventricle: The left ventricle is normal in size. Mildly increased wall thickness. There is mild  concentric hypertrophy. There is normal systolic function with a visually estimated ejection fraction of 55 - 60%. Unable to assess diastolic function due to atrial fibrillation.    Right Ventricle: Normal right ventricular cavity size. Systolic function is normal.    Pulmonic Valve: There is a 2.0x1.6 large fixed echogenic spherical mass present.    Pulmonary Artery: The estimated pulmonary artery systolic pressure is 33 mmHg.    Pt appears to be in AF/FL throughout exam.    Consider LUDIN for further investigation of PV findings above.     Stress Test: ETT 2/27/18  The patient exercised for 4.53 minutes on a Richard protocol, corresponding to a functional capacity of 6 estimated METS, achieving a peak heart rate of 126 bpm, which is 85% of the age predicted maximum heart rate.   There were no significant electrocardiographic changes throughout the protocol suggesting ischemia.   EKG Conclusions:  1. The EKG portion of this study is negative for ischemia at a moderate workload, and peak heart rate of 126 bpm (85% of predicted).   2. Blood pressure response to exercise was normal (Presenting BP: 143/71 Peak BP: 245/79).   3. No significant arrhythmias were present.   4. There were no symptoms of chest discomfort or significant dyspnea throughout the protocol.   5. The Jacobsen treadmill score was 5 suggesting a low probability for future cardiovascular events.      ASSESSMENT:   # Persistent AF, HR controlled, on eliquis 5mg bid.  Successful LUDIN/DCCV 3/18/25, btu back in AF with controlled VR today.  Still with fatigue.  # HTN, controlled  # HLP on atorva, LDL acceptable  # BMI 31, stable vs last OV  # ?PV vegetation (echo 1/2025), not noted on LUDIN 2/2025.  # Ao root dil, 3.7cm (LUDIN 3/2025)     PLAN:   Cont med rx  Cont eliquis 5mg bid (pt seems interested in [facility] in the future)  Refer to Dr. Tam ([facility]) fro possible PVI  Refer to Sleep med, ?needs CPAP  RTC 6 months with echo (Sept 2025)        Past Medical  History:   Diagnosis Date    Colon cancer     Colon polyps     Diabetes mellitus type II 07/2010    diet controlled    Obesity     Paroxysmal atrial fibrillation     Psoriasis     Sleep apnea, unspecified     Squamous cell carcinoma     Type 2 diabetes mellitus        Past Surgical History:   Procedure Laterality Date    APPENDECTOMY      CARDIOVERSION N/A 2/18/2025    Procedure: Cardioversion;  Surgeon: Abbe Whitehead MD;  Location: [facility] CATH LAB;  Service: Cardiology;  Laterality: N/A;  before 1230pm    COLONOSCOPY N/A 07/09/2018    Procedure: COLONOSCOPY;  Surgeon: Kameron Ruff MD;  Location: [facility] ENDO;  Service: Endoscopy;  Laterality: N/A;  confirmed    COLONOSCOPY N/A 11/11/2019    Procedure: COLONOSCOPY;  Surgeon: Victor Hugo Nunez MD;  Location: [facility] (4TH FLR);  Service: Endoscopy;  Laterality: N/A;  Pt stated this is the only day he has somebody to drive him and  to stay during procedure  PM Prep    COLONOSCOPY N/A 07/08/2020    Procedure: COLONOSCOPY;  Surgeon: Victor Hugo Nunez MD;  Location: [facility] (2ND FLR);  Service: Endoscopy;  Laterality: N/A;  Covid-19 test 7/5/20 at [facility]    EXTREMITY CYST EXCISION      LAPAROSCOPIC LEFT COLECTOMY N/A 09/25/2018    Procedure: COLECTOMY-LAPAROSCOPIC LEFT;  Surgeon: Chito Banks MD;  Location: [facility] OR 2ND FLR;  Service: Colon and Rectal;  Laterality: N/A;    SKIN CANCER EXCISION      SKIN GRAFT      TRANSESOPHAGEAL ECHOCARDIOGRAM WITH POSSIBLE CARDIOVERSION (LUDIN W/ POSS CARDIOVERSION) N/A 2/18/2025    Procedure: TRANSESOPHAGEAL ECHOCARDIOGRAM WITH POSSIBLE CARDIOVERSION (LUDIN W/ POSS CARDIOVERSION);  Surgeon: Abbe Whitehead MD;  Location: [facility] CATH LAB;  Service: Cardiology;  Laterality: N/A;  before 1230pm  RN PRE OP 2/14/2025    VASECTOMY      VASECTOMY         Review of patient's allergies indicates:   Allergen Reactions    Influenza virus vaccines     Metformin Diarrhea       No current  facility-administered medications on file prior to encounter.     Current Outpatient Medications on File Prior to Encounter   Medication Sig    apixaban (ELIQUIS) 5 mg Tab Take 1 tablet (5 mg total) by mouth 2 (two) times daily.    ascorbic acid (JENNI-C ORAL) Take by mouth.    atorvastatin (LIPITOR) 20 MG tablet take 1 tablet by mouth once daily    blood sugar diagnostic (CONTOUR TEST STRIPS) Strp 1 each by Misc.(Non-Drug; Combo Route) route 2 (two) times daily.    cyanocobalamin, vitamin B-12, (VITAMIN B-12 ORAL) Take by mouth.    fluticasone propionate (FLONASE) 50 mcg/actuation nasal spray 1 spray by Each Nostril route once daily.    glipiZIDE (GLUCOTROL) 2.5 MG TR24 Take 2 tablets (5 mg total) by mouth daily with breakfast.    ixekizumab (TALTZ AUTOINJECTOR) 80 mg/mL AtIn Inject 80 mg into the skin every 28 days. Starting on week 12    lancets Misc USE ONE LANCET TWO TIMES DAILY    MAGNESIUM ORAL Take by mouth.    metoprolol succinate (TOPROL-XL) 25 MG 24 hr tablet Take 1 tablet (25 mg total) by mouth once daily.    vitamin B complex (B COMPLEX ORAL) Take by mouth.    ZINC ORAL Take by mouth.     Family History       Problem Relation (Age of Onset)    Cancer Father    Diabetes Brother    Liver cancer Brother          Tobacco Use    Smoking status: Former     Types: Cigars    Smokeless tobacco: Never    Tobacco comments:     cigars-x1 yr.   Substance and Sexual Activity    Alcohol use: Yes     Comment: occassional    Drug use: No    Sexual activity: Yes     Partners: Female     Review of Systems   Constitutional: Negative for chills, diaphoresis, fever and malaise/fatigue.   HENT:  Negative for nosebleeds.    Eyes:  Negative for blurred vision and double vision.   Cardiovascular:  Positive for chest pain. Negative for claudication, cyanosis, dyspnea on exertion, leg swelling, orthopnea, palpitations, paroxysmal nocturnal dyspnea and syncope.   Respiratory:  Negative for cough, shortness of breath and wheezing.     Skin:  Negative for dry skin and poor wound healing.   Musculoskeletal:  Negative for back pain, joint swelling and myalgias.   Gastrointestinal:  Negative for abdominal pain, nausea and vomiting.   Genitourinary:  Negative for hematuria.   Neurological:  Negative for dizziness, headaches, numbness, seizures and weakness.   Psychiatric/Behavioral:  Negative for altered mental status and depression.      Objective:     Vital Signs (Most Recent):  Temp: 98.3 °F (36.8 °C) (03/27/25 0810)  Pulse: 85 (03/27/25 1300)  Resp: 17 (03/27/25 1130)  BP: (!) 171/72 (03/27/25 1300)  SpO2: (!) 94 % (03/27/25 1300) Vital Signs (24h Range):  Temp:  [98.3 °F (36.8 °C)] 98.3 °F (36.8 °C)  Pulse:  [67-85] 85  Resp:  [17-22] 17  SpO2:  [94 %-97 %] 94 %  BP: (149-203)/() 171/72     Weight: 113.4 kg (250 lb)  Body mass index is 31.25 kg/m².    SpO2: (!) 94 %         Intake/Output Summary (Last 24 hours) at 3/27/2025 1338  Last data filed at 3/27/2025 1213  Gross per 24 hour   Intake --   Output 1200 ml   Net -1200 ml       Lines/Drains/Airways       Peripheral Intravenous Line  Duration                  Peripheral IV - Single Lumen 03/27/25 0841 20 G Anterior;Distal;Left Upper Arm <1 day                     Physical Exam  Constitutional:       General: He is not in acute distress.     Appearance: He is well-developed. He is obese. He is not ill-appearing, toxic-appearing or diaphoretic.   HENT:      Head: Normocephalic and atraumatic.   Eyes:      General: No scleral icterus.     Extraocular Movements: Extraocular movements intact.      Conjunctiva/sclera: Conjunctivae normal.      Pupils: Pupils are equal, round, and reactive to light.   Neck:      Thyroid: No thyromegaly.      Vascular: No JVD.      Trachea: No tracheal deviation.   Cardiovascular:      Rate and Rhythm: Normal rate. Rhythm irregularly irregular.      Heart sounds: S1 normal and S2 normal. No murmur heard.     No friction rub. No gallop.   Pulmonary:      Effort:  Pulmonary effort is normal. No respiratory distress.      Breath sounds: Normal breath sounds. No stridor. No wheezing, rhonchi or rales.   Chest:      Chest wall: No tenderness.   Abdominal:      General: There is no distension.      Palpations: Abdomen is soft.   Musculoskeletal:         General: No swelling or tenderness. Normal range of motion.      Cervical back: Normal range of motion and neck supple. No rigidity.      Right lower leg: No edema.      Left lower leg: No edema.   Skin:     General: Skin is warm and dry.      Coloration: Skin is not jaundiced.      Findings: No rash.   Neurological:      General: No focal deficit present.      Mental Status: He is alert and oriented to person, place, and time.      Cranial Nerves: No cranial nerve deficit.   Psychiatric:         Mood and Affect: Mood normal.         Behavior: Behavior normal.          Current Medications:   apixaban  5 mg Oral BID    [START ON 3/28/2025] aspirin  81 mg Oral Daily    [START ON 3/28/2025] atorvastatin  20 mg Oral Daily    [START ON 3/28/2025] metoprolol succinate  25 mg Oral Daily         Current Facility-Administered Medications:     acetaminophen, 325 mg, Oral, Q6H PRN    benzonatate, 100 mg, Oral, TID PRN    calcium carbonate, 500 mg, Oral, TID PRN    dextrose 50%, 12.5 g, Intravenous, PRN    dextrose 50%, 25 g, Intravenous, PRN    diphenhydrAMINE, 25 mg, Oral, Q6H PRN    glucagon (human recombinant), 1 mg, Intramuscular, PRN    glucose, 16 g, Oral, PRN    glucose, 24 g, Oral, PRN    guaiFENesin 100 mg/5 ml, 200 mg, Oral, Q4H PRN    insulin aspart U-100, 0-10 Units, Subcutaneous, QID (AC + HS) PRN    melatonin, 6 mg, Oral, Nightly PRN    nitroGLYCERIN, 0.4 mg, Sublingual, Q5 Min PRN    ondansetron, 4 mg, Oral, Q6H PRN    polyethylene glycol, 17 g, Oral, Daily PRN    promethazine (PHENERGAN) 25 mg in 0.9% NaCl 50 mL IVPB, 25 mg, Intravenous, Q6H PRN    simethicone, 1 tablet, Oral, TID PRN    Laboratory (all labs  reviewed):  CBC:  Recent Labs   Lab 10/07/24  1357 01/13/25  2118 01/14/25  0555 02/14/25 1410 03/27/25  0903   WBC 7.59 5.02 3.72 L 5.73 5.58   Hemoglobin 14.1 13.9 L 12.3 L 13.6 L  --    HGB  --   --   --   --  14.6   Hematocrit 44.1 44.0 40.1 43.5  --    HCT  --   --   --   --  46.4   Platelet Count  --   --   --   --  122 L   Platelets 177 108 L 112 L 127 L  --        CHEMISTRIES:  Recent Labs   Lab 04/19/22  0748 08/09/22  1013 09/04/24  0929 10/07/24  1357 01/13/25  2118 01/14/25  0555 02/14/25  1410 03/27/25  0903   Glucose 158 H   < > 165 H 96 96 100 240 H  --    Sodium 143   < > 136 138 136 137 137 138   Potassium 4.4   < > 4.8 4.0 3.8 3.5 4.3 4.8   BUN 18   < > 19 16 16 14 16 14   Creatinine 1.2   < > 1.2 1.0 1.0 0.8 1.0 0.9   eGFR if non  58.4 A  --   --   --   --   --   --   --    eGFR  --    < > >60.0 >60.0 >60 >60 >60 >60   Calcium 9.6   < > 9.9 10.1 9.2 8.2 L 9.1 9.0   Magnesium  --   --   --   --  2.0 1.9  --   --     < > = values in this interval not displayed.       CARDIAC BIOMARKERS:  Recent Labs   Lab 01/13/25 2118 03/27/25  0903 03/27/25  1121   Troponin I 0.009  --   --    Troponin-I  --  <0.006 <0.006       COAGS:  Recent Labs   Lab 01/13/25 2118 03/27/25  1121   INR 1.0 1.1       LIPIDS/LFTS:  Recent Labs   Lab 10/07/24  1357 01/13/25 2118 01/14/25 0555 02/07/25  1104 02/14/25  1410 03/27/25  0903 03/27/25  1121   Cholesterol Total  --   --   --   --   --   --  91 L   Cholesterol  --   --   --  99 L  --   --   --    Triglycerides  --   --   --  60  --   --   --    Triglyceride  --   --   --   --   --   --  63   HDL  --   --   --  34 L  --   --   --    HDL Cholesterol  --   --   --   --   --   --  32 L   LDL Cholesterol  --   --   --  53.0 L  --   --   --    Non-HDL Cholesterol  --   --   --  65  --   --   --    Non HDL Cholesterol  --   --   --   --   --   --  59   AST 19 45 H 38  --  19 29  --    ALT 18 31 30  --  19 28  --        BNP:  Recent Labs   Lab 01/13/25  6967  03/27/25  0903   BNP 63 82       TSH:        Free T4:        Diagnostic Results:  ECG (personally reviewed and interpreted tracing(s)):  3/27/25 0809 AF 68, low volt, similar to 3/25/25    Chest X-Ray (personally reviewed and interpreted image(s)): 3/27/25 NAD    CTA aorta ordered 3/27/25    Ex MPI planned 3/28/25    LUDIN/DCCV 2/18/25  Normal biventricular size/fxn  LVEF 60%, normal wall motion.  Biatrial enlargement.  No LA/LUCITA thrombus.  Normal appearing valves  Previously noted PV vegetation (TTE 1/14/25) not visualized on this exam.  Mild MR, trace AI/TR.  Mild aortic root dil, 3.7cm  Successful DCCV AF->NSR 61 BPM 200J x1.  Plan:  Cont med rx  Cont eliquis 5mg bid  Home today  Follow up with Dr. Whitehead as planned.     Echo 1/14/25     Left Ventricle: The left ventricle is normal in size. Mildly increased wall thickness. There is mild concentric hypertrophy. There is normal systolic function with a visually estimated ejection fraction of 55 - 60%. Unable to assess diastolic function due to atrial fibrillation.    Right Ventricle: Normal right ventricular cavity size. Systolic function is normal.    Pulmonic Valve: There is a 2.0x1.6 large fixed echogenic spherical mass present.    Pulmonary Artery: The estimated pulmonary artery systolic pressure is 33 mmHg.    Pt appears to be in AF/FL throughout exam.    Consider LUDIN for further investigation of PV findings above.     Stress Test: ETT 2/27/18  The patient exercised for 4.53 minutes on a Richard protocol, corresponding to a functional capacity of 6 estimated METS, achieving a peak heart rate of 126 bpm, which is 85% of the age predicted maximum heart rate.   There were no significant electrocardiographic changes throughout the protocol suggesting ischemia.   EKG Conclusions:  1. The EKG portion of this study is negative for ischemia at a moderate workload, and peak heart rate of 126 bpm (85% of predicted).   2. Blood pressure response to exercise was normal  (Presenting BP: 143/71 Peak BP: 245/79).   3. No significant arrhythmias were present.   4. There were no symptoms of chest discomfort or significant dyspnea throughout the protocol.   5. The Jacobsen treadmill score was 5 suggesting a low probability for future cardiovascular events.        Assessment and Plan:     * Chest pain  Somewhat atyp  EKG nonischemic trop neg x2  Check CTA chest r/o aortopathy  Ex MPI in am  Assuming above neg, OK for discharge and follow up with me as planned 4/1/25 to schedule outpatient LUDIN/DCCV and initiation of flecainide.    Persistent atrial fibrillation  HR controlled  Cont eliquis  Pt to follow up with me after discharge to schedule outpat LUDIN/DCCV and initiation of flecainide    Essential hypertension, benign  Cont med rx  Inc toprol 50mg qd  Add losartan 25mg qd    Type 2 diabetes mellitus  Mgmt per IM    Dyslipidemia  Cont statin    Anticoagulant long-term use  Cont eliquis 5mg bid        VTE Risk Mitigation (From admission, onward)           Ordered     apixaban tablet 5 mg  2 times daily         03/27/25 1207     IP VTE HIGH RISK PATIENT  Once         03/27/25 1111     Place sequential compression device  Until discontinued         03/27/25 1111                    Thank you for your consult. I will follow-up with patient. Please contact us if you have any additional questions.    Abbe Whitehead MD  Cardiology   [facility] - [place] - Emergency Dept    Addendum 4pm:    CTA Chest 3/27/25  1. No findings to suggest aortic aneurysm or dissection as clinically question.  2. Heterogeneous mass arising from the interpolar region of the left kidney with suspected enhancement.  Malignancy is of primary concern as this was not present on examination 10/01/2018.  Further evaluation on a nonemergent basis recommended.  3. No acute cardiopulmonary process.    Will ask hosp med to consult urology for assessment of ?L renal mass

## 2025-03-27 NOTE — ASSESSMENT & PLAN NOTE
"Patient's FSGs are uncontrolled due to hyperglycemia on current medication regimen.  Last A1c reviewed-   Lab Results   Component Value Date    HGBA1C 6.5 (H) 02/07/2025     Most recent fingerstick glucose reviewed- No results for input(s): "POCTGLUCOSE" in the last 24 hours.  Current correctional scale  Medium  Maintain anti-hyperglycemic dose as follows-   Antihyperglycemics (From admission, onward)      Start     Stop Route Frequency Ordered    03/27/25 1216  insulin aspart U-100 pen 0-10 Units         -- SubQ Before meals & nightly PRN 03/27/25 1116          Hold Oral hypoglycemics while patient is in the hospital.  "

## 2025-03-27 NOTE — HPI
79 y.o. M with a history of T2DM, Persistent Atrial Fibrillation, hyperlipidemia, and AKIN who presented to the Emergency Department c/o left anterior chest pain with radiation to the left shoulder. He reports initially noticing the pain last Friday, however, he believed he may have pulled a muscle. The pain continued to worsen in severity until it reached a peak this morning. He reports improvement of the pain with Nitroglycerin. Of note, the patient was recently hospitalized for influenza and noted to be in A. Fib. He reports to have been asymptomatic at this time and denies any symptoms with previous episodes of A. Fib. He underwent LUDIN/DCCV on 02/18 with return to NSR, however, was again noted to be in A. Fib during his appointment with Dr. Whitehead on 03/18. He reports strict compliance with his Eliquis and Metoprolol. Denies SOB, n/v, palpitations, fever or chills.   In ED: Severely hypertensive on arrival ( - 200's). Vitals otherwise stable and wnl. CBC w/o anemia or leukocytosis. CMP notable for mildly decreased CO2 (21) and hyperglycemia (208). BNP and initial troponin wnl.  EKG showing A. Flutter with a variable AV block.  CXR showed coarse interstitial lung markings with a lower lobe predominance favored to represent pulmonary edema. Infectious process is considered less likely. Admitted to Hospital Medicine for further evaluation.     Pt follows with me, last seen 3/18/25    Cardiology consulted for AF.    The patient is well known to me with a history of paroxysmal atrial fibrillation.  He is back in AFib with controlled ventricular response.  He was recently seen by electrophysiology with plans for outpatient LUDIN guided cardioversion and initiation of flecainide.  He presents to the emergency room with complaints of central chest pain radiating to his left shoulder and through to his back.  He tells me the chest discomfort has abated, but he is still having some back pain between his shoulder  blades.  He otherwise appears comfortable.  EKG notes atrial fibrillation with a controlled ventricular response.  Blood pressure was elevated on arrival, but is better controlled at present.  He has been taking his anticoagulant.  At this point, I plan to perform a CTA of the chest in order to rule out aortopathy.  Assuming this is negative, we will get an exercise nuclear stress test in the morning in order to exclude significant ischemia.  If this is negative, I feel the patient can be safely discharged home and follow up with me as plan to schedule outpatient LUDIN guided cardioversion and initiation of flecainide.        [place] OV 3/18/25:  CARDIOVASCULAR HISTORY:   Persistent AF on eliquis 5mg bid, s/p LUDIN/DCCV 2/18/25.     Ao root dil, 3.7cm (LUDIN 2/2025)    Cardiovascular Testing:  LUDIN/DCCV 2/18/25  Normal biventricular size/fxn  LVEF 60%, normal wall motion.  Biatrial enlargement.  No LA/LUCITA thrombus.  Normal appearing valves  Previously noted PV vegetation (TTE 1/14/25) not visualized on this exam.  Mild MR, trace AI/TR.  Mild aortic root dil, 3.7cm  Successful DCCV AF->NSR 61 BPM 200J x1.  Plan:  Cont med rx  Cont eliquis 5mg bid  Home today  Follow up with Dr. Whitehead as planned.     Echo 1/14/25     Left Ventricle: The left ventricle is normal in size. Mildly increased wall thickness. There is mild concentric hypertrophy. There is normal systolic function with a visually estimated ejection fraction of 55 - 60%. Unable to assess diastolic function due to atrial fibrillation.    Right Ventricle: Normal right ventricular cavity size. Systolic function is normal.    Pulmonic Valve: There is a 2.0x1.6 large fixed echogenic spherical mass present.    Pulmonary Artery: The estimated pulmonary artery systolic pressure is 33 mmHg.    Pt appears to be in AF/FL throughout exam.    Consider LUDIN for further investigation of PV findings above.     Stress Test: ETT 2/27/18  The patient exercised for 4.53 minutes on a  Richard protocol, corresponding to a functional capacity of 6 estimated METS, achieving a peak heart rate of 126 bpm, which is 85% of the age predicted maximum heart rate.   There were no significant electrocardiographic changes throughout the protocol suggesting ischemia.   EKG Conclusions:  1. The EKG portion of this study is negative for ischemia at a moderate workload, and peak heart rate of 126 bpm (85% of predicted).   2. Blood pressure response to exercise was normal (Presenting BP: 143/71 Peak BP: 245/79).   3. No significant arrhythmias were present.   4. There were no symptoms of chest discomfort or significant dyspnea throughout the protocol.   5. The Jacobsen treadmill score was 5 suggesting a low probability for future cardiovascular events.      ASSESSMENT:   # Persistent AF, HR controlled, on eliquis 5mg bid.  Successful LUDIN/DCCV 3/18/25, btu back in AF with controlled VR today.  Still with fatigue.  # HTN, controlled  # HLP on atorva, LDL acceptable  # BMI 31, stable vs last OV  # ?PV vegetation (echo 1/2025), not noted on LUDIN 2/2025.  # Ao root dil, 3.7cm (LUDIN 3/2025)     PLAN:   Cont med rx  Cont eliquis 5mg bid (pt seems interested in [facility] in the future)  Refer to Dr. Tam ([facility]) fro possible PVI  Refer to Sleep med, ?needs CPAP  RTC 6 months with echo (Sept 2025)

## 2025-03-27 NOTE — ASSESSMENT & PLAN NOTE
HR controlled  Cont eliquis  Pt to follow up with me after discharge to schedule outpat LUDIN/DCCV and initiation of flecainide

## 2025-03-27 NOTE — ASSESSMENT & PLAN NOTE
Somewhat atyp  EKG nonischemic trop neg x2  Check CTA chest r/o aortopathy  Ex MPI in am  Assuming above neg, OK for discharge and follow up with me as planned 4/1/25 to schedule outpatient LUDIN/DCCV and initiation of flecainide.

## 2025-03-27 NOTE — HPI
Rosendo Thomas is a pleasant 79 y.o. M with a history of T2DM, Persistent Atrial Fibrillation, hyperlipidemia, and AKIN who presented to the Emergency Department c/o left anterior chest pain with radiation to the left shoulder. He reports initially noticing the pain last Friday, however, he believed he may have pulled a muscle. The pain continued to worsen in severity until it reached a peak this morning. He reports improvement of the pain with Nitroglycerin. Of note, the patient was recently hospitalized for influenza and noted to be in A. Fib. He reports to have been asymptomatic at this time and denies any symptoms with previous episodes of A. Fib. He underwent LUDIN/DCCV on 02/18 with return to NSR, however, was again noted to be in A. Fib during his appointment with Dr. Whitehead on 03/18. He reports strict compliance with his Eliquis and Metoprolol. Denies SOB, n/v, palpitations, fever or chills.     In ED: Severely hypertensive on arrival ( - 200's). Vitals otherwise stable and wnl. CBC w/o anemia or leukocytosis. CMP notable for mildly decreased CO2 (21) and hyperglycemia (208). BNP and initial troponin wnl.  EKG showing A. Flutter with a variable AV block.  CXR showed coarse interstitial lung markings with a lower lobe predominance favored to represent pulmonary edema. Infectious process is considered less likely. Admitted to Hospital Medicine for further evaluation.

## 2025-03-27 NOTE — ED PROVIDER NOTES
"Encounter Date: 3/27/2025    SCRIBE #1 NOTE: I, Leigh Guillen, am scribing for, and in the presence of,  Geovanni Thompson MD. I have scribed the following portions of the note - Other sections scribed: HPI, ROS, PE, EKG, MDM.       History     Chief Complaint   Patient presents with    Chest Pain     Pt reports left shoulder pain that started 3 days ago, this am he reports chest pain started to left chest.  Pt denies N/V and SOB.  Pt has a hx of a-fib and is on blood thinners.      This 79 y.o male with a medical history of Colon cancer, Diabetes mellitus type II, Obesity, Paroxysmal atrial fibrillation, and Sleep apnea presents to the ED c/o acute, constant left sided chest pain that began at 11:00 pm last night. Pt describes that pain as "sharp," noting that it feels "like I got hit in the chest." He reports associated left shoulder and arm pain. He states that the pain is presently improved in his chest, but is now present to his left upper back. He rates the pain 7/10. Of note, pt reports that he was seen in the ED recently (1/13/25) for flu like symptoms and was found to be in atrial fibrillation at that time. He is currently on Eliquis. No history of known cardiac stent placement or blockages. He has not had an angiogram in the last 3 years.     The history is provided by the patient.     Review of patient's allergies indicates:   Allergen Reactions    Influenza virus vaccines     Metformin Diarrhea     Past Medical History:   Diagnosis Date    Colon cancer     Colon polyps     Diabetes mellitus type II 07/2010    diet controlled    Obesity     Paroxysmal atrial fibrillation     Psoriasis     Sleep apnea, unspecified     Squamous cell carcinoma     Type 2 diabetes mellitus      Past Surgical History:   Procedure Laterality Date    APPENDECTOMY      CARDIOVERSION N/A 2/18/2025    Procedure: Cardioversion;  Surgeon: Abbe Whitehead MD;  Location: [facility] CATH LAB;  Service: Cardiology;  Laterality: N/A;  " before 1230pm    COLONOSCOPY N/A 07/09/2018    Procedure: COLONOSCOPY;  Surgeon: Kameron Ruff MD;  Location: [facility] ENDO;  Service: Endoscopy;  Laterality: N/A;  confirmed    COLONOSCOPY N/A 11/11/2019    Procedure: COLONOSCOPY;  Surgeon: Victor Hugo Nunez MD;  Location: [facility] ENDO (4TH FLR);  Service: Endoscopy;  Laterality: N/A;  Pt stated this is the only day he has somebody to drive him and  to stay during procedure  PM Prep    COLONOSCOPY N/A 07/08/2020    Procedure: COLONOSCOPY;  Surgeon: Victor Hugo Nunez MD;  Location: [facility] ENDO (2ND FLR);  Service: Endoscopy;  Laterality: N/A;  Covid-19 test 7/5/20 at [facility]    EXTREMITY CYST EXCISION      LAPAROSCOPIC LEFT COLECTOMY N/A 09/25/2018    Procedure: COLECTOMY-LAPAROSCOPIC LEFT;  Surgeon: Chito Banks MD;  Location: [facility] OR 2ND FLR;  Service: Colon and Rectal;  Laterality: N/A;    SKIN CANCER EXCISION      SKIN GRAFT      TRANSESOPHAGEAL ECHOCARDIOGRAM WITH POSSIBLE CARDIOVERSION (LUDIN W/ POSS CARDIOVERSION) N/A 2/18/2025    Procedure: TRANSESOPHAGEAL ECHOCARDIOGRAM WITH POSSIBLE CARDIOVERSION (LUDIN W/ POSS CARDIOVERSION);  Surgeon: Abbe Whitehead MD;  Location: [facility] CATH LAB;  Service: Cardiology;  Laterality: N/A;  before 1230pm  RN PRE OP 2/14/2025    VASECTOMY      VASECTOMY       Family History   Problem Relation Name Age of Onset    Cancer Father          prostate    Diabetes Brother      Liver cancer Brother      Anesthesia problems Neg Hx       Social History[1]  Review of Systems   Constitutional:  Negative for fever.   HENT:  Negative for sore throat.    Eyes:  Negative for visual disturbance.   Respiratory:  Negative for shortness of breath.    Cardiovascular:  Positive for chest pain (left sided).   Gastrointestinal:  Negative for nausea.   Genitourinary:  Negative for dysuria.   Musculoskeletal:  Positive for arthralgias (left shoulder pain) and back pain (left upper).        (+) left arm pain   Skin:   Negative for rash.   Neurological:  Negative for weakness.       Physical Exam     Initial Vitals [03/27/25 0810]   BP Pulse Resp Temp SpO2   (!) 203/96 72 18 98.3 °F (36.8 °C) 97 %      MAP       --         Physical Exam    Nursing note and vitals reviewed.  Constitutional: He appears well-developed and well-nourished. He is not diaphoretic. No distress.   HENT:   Head: Normocephalic and atraumatic.   Eyes: Conjunctivae are normal.   Neck:   Normal range of motion.  Cardiovascular:  Normal rate and regular rhythm.           Pulmonary/Chest: Breath sounds normal. No respiratory distress.   Abdominal: Abdomen is soft. There is no abdominal tenderness.   Musculoskeletal:         General: No edema.      Cervical back: Normal range of motion.     Neurological: He is alert and oriented to person, place, and time.   Skin: Skin is warm and dry.   Psychiatric: He has a normal mood and affect.         ED Course   Procedures  Labs Reviewed   COMPREHENSIVE METABOLIC PANEL - Abnormal       Result Value    Sodium 138      Potassium 4.8      Chloride 106      CO2 21 (*)     Glucose 208 (*)     BUN 14      Creatinine 0.9      Calcium 9.0      Protein Total 7.4      Albumin 3.9      Bilirubin Total 0.5      ALP 78      AST 29      ALT 28      Anion Gap 11      eGFR >60     CBC WITH DIFFERENTIAL - Abnormal    WBC 5.58      RBC 5.46      HGB 14.6      HCT 46.4      MCV 85      MCH 26.7 (*)     MCHC 31.5 (*)     RDW 15.2 (*)     Platelet Count 122 (*)     MPV 11.3      Nucleated RBC 0      Neut % 64.0      Lymph % 24.2      Mono % 8.4      Eos % 2.3      Basophil % 0.7      Imm Grans % 0.4      Neut # 3.57      Lymph # 1.35      Mono # 0.47      Eos # 0.13      Baso # 0.04      Imm Grans # 0.02     LIPID PANEL - Abnormal    Cholesterol Total 91 (*)     Triglyceride 63      HDL Cholesterol 32 (*)     LDL Cholesterol 46.4 (*)     HDL/Cholesterol Ratio 35.2      Cholesterol/HDL Ratio 2.8      Non HDL Cholesterol 59     TROPONIN I -  Normal    Troponin-I <0.006     B-TYPE NATRIURETIC PEPTIDE - Normal    BNP 82     PROTIME-INR - Normal    PT 11.9      INR 1.1     APTT - Normal    PTT 27.7     TROPONIN I - Normal    Troponin-I <0.006     CBC W/ AUTO DIFFERENTIAL    Narrative:     The following orders were created for panel order CBC auto differential.  Procedure                               Abnormality         Status                     ---------                               -----------         ------                     CBC with Differential[[id number]]       Abnormal            Final result                 Please view results for these tests on the individual orders.   TROPONIN I   HEMOGLOBIN A1C   TROPONIN I   TYPE & SCREEN    Specimen Outdate 03/30/2025 23:59      Group & Rh O POS      Indirect [place] NEG     POCT GLUCOSE MONITORING CONTINUOUS     EKG Readings: (Independently Interpreted)   Rhythm: Atrial Flutter. Heart Rate: 68.   Variable block.       Imaging Results              X-Ray Chest AP Portable (Final result)  Result time 03/27/25 09:42:58      Final result by Leon Villeda MD (03/27/25 09:42:58)                   Impression:      Coarse interstitial lung markings with a lower lobe predominance favored to represent pulmonary edema.  Infectious process is considered less likely.      Electronically signed by: Leon Villeda MD  Date:    03/27/2025  Time:    09:42               Narrative:    EXAMINATION:  XR CHEST AP PORTABLE    CLINICAL HISTORY:  Chest Pain;    TECHNIQUE:  Single frontal view of the chest was performed.    COMPARISON:  Chest radiograph from 01/13/2025    FINDINGS:  Cardiac silhouette is enlarged similar to prior exam.  Prominence of the pulmonary vasculature with coarse interstitial lung markings with a lower lobe predominance no large pleural effusion.  No pneumothorax., Slightly increased compared to prior exam.                                       Medications   nitroGLYCERIN SL tablet 0.4 mg (0.4 mg  Sublingual Given 3/27/25 0940)   aspirin chewable tablet 81 mg (has no administration in time range)   glucose chewable tablet 16 g (has no administration in time range)   glucose chewable tablet 24 g (has no administration in time range)   dextrose 50% injection 12.5 g (has no administration in time range)   dextrose 50% injection 25 g (has no administration in time range)   glucagon (human recombinant) injection 1 mg (has no administration in time range)   insulin aspart U-100 pen 0-10 Units (has no administration in time range)   atorvastatin tablet 20 mg (has no administration in time range)   metoprolol succinate (TOPROL-XL) 24 hr tablet 25 mg (has no administration in time range)   apixaban tablet 5 mg (has no administration in time range)   guaiFENesin 100 mg/5 ml syrup 200 mg (has no administration in time range)   benzonatate capsule 100 mg (has no administration in time range)   simethicone chewable tablet 80 mg (has no administration in time range)   calcium carbonate 200 mg calcium (500 mg) chewable tablet 500 mg (has no administration in time range)   acetaminophen tablet 325 mg (325 mg Oral Given 3/27/25 1230)   melatonin tablet 6 mg (has no administration in time range)   polyethylene glycol packet 17 g (has no administration in time range)   promethazine (PHENERGAN) 25 mg in 0.9% NaCl 50 mL IVPB (has no administration in time range)   diphenhydrAMINE capsule 25 mg (has no administration in time range)   ondansetron disintegrating tablet 4 mg (has no administration in time range)   aspirin EC tablet 325 mg (325 mg Oral Given 3/27/25 0936)   labetaloL injection 5 mg (5 mg Intravenous Given 3/27/25 0936)   furosemide injection 20 mg (20 mg Intravenous Given 3/27/25 1014)     Medical Decision Making  This is an emergent evaluation of a 79 y.o. male who presents with left sided chest pain. The patient was seen and examined. The history and physical exam was obtained. The nursing notes and vital signs were  reviewed. Secondary to symptoms and examination findings, I ordered imaging, labs, and EKG. Will treat, await results and reassess.      Amount and/or Complexity of Data Reviewed  Labs: ordered.  Radiology: ordered.  ECG/medicine tests: ordered and independent interpretation performed.    Risk  OTC drugs.  Prescription drug management.    Patient does not appear to have stress test or angiogram in the last 1-3 years respectively he isn't AFib.  He has tolerated being in AFib in the past.  Was recently cardioverted out will over a month ago.  There is some evidence of pulmonary edema on chest x-ray although no hypoxia or increased work of breathing.  Given small dose of Lasix without urine output.  However patient did arrives malignant hypertensive.  This may be cause of his symptoms.  Lower now with therapy provided.  Feels improved.  Nitroglycerin did relieve his chest pain.  However also lowered his blood pressure.  ACS versus hypertensive urgency.  Will place in observation overnight for further ACS rule out.  Additional MDM:   Heart Score:    History:          Moderately suspicious.  ECG:             Normal  Age:               >65 years  Risk factors: 1-2 risk factors  Troponin:       Less than or equal to normal limit  Heart Score = 4             Scribe Attestation:   Scribe #1: I performed the above scribed service and the documentation accurately describes the services I performed. I attest to the accuracy of the note.                         I, Geovanni Thompson, personally performed the services described in this documentation. All medical record entries made by the scribe were at my direction and in my presence. I have reviewed the chart and agree that the record reflects my personal performance and is accurate and complete.      DISCLAIMER: This note was prepared with [employer] voice recognition transcription software. Garbled syntax, mangled pronouns, and other bizarre constructions may be attributed to that  software system.       Clinical Impression:  Final diagnoses:  [R07.9] Chest pain          ED Disposition Condition    Observation                     [1]   Social History  Tobacco Use    Smoking status: Former     Types: Cigars    Smokeless tobacco: Never    Tobacco comments:     cigars-x1 yr.   Substance Use Topics    Alcohol use: Yes     Comment: occassional    Drug use: No        Geovanni Thompson MD  03/27/25 [id number]

## 2025-03-27 NOTE — SUBJECTIVE & OBJECTIVE
Past Medical History:   Diagnosis Date    Colon cancer     Colon polyps     Diabetes mellitus type II 07/2010    diet controlled    Obesity     Paroxysmal atrial fibrillation     Psoriasis     Sleep apnea, unspecified     Squamous cell carcinoma     Type 2 diabetes mellitus        Past Surgical History:   Procedure Laterality Date    APPENDECTOMY      CARDIOVERSION N/A 2/18/2025    Procedure: Cardioversion;  Surgeon: Abbe Whitehead MD;  Location: [facility] CATH LAB;  Service: Cardiology;  Laterality: N/A;  before 1230pm    COLONOSCOPY N/A 07/09/2018    Procedure: COLONOSCOPY;  Surgeon: Kameron Ruff MD;  Location: [facility] ENDO;  Service: Endoscopy;  Laterality: N/A;  confirmed    COLONOSCOPY N/A 11/11/2019    Procedure: COLONOSCOPY;  Surgeon: Victor Hugo Nunez MD;  Location: [facility] (4TH FLR);  Service: Endoscopy;  Laterality: N/A;  Pt stated this is the only day he has somebody to drive him and  to stay during procedure  PM Prep    COLONOSCOPY N/A 07/08/2020    Procedure: COLONOSCOPY;  Surgeon: Victor Hugo Nunez MD;  Location: [facility] (2ND FLR);  Service: Endoscopy;  Laterality: N/A;  Covid-19 test 7/5/20 at [facility]    EXTREMITY CYST EXCISION      LAPAROSCOPIC LEFT COLECTOMY N/A 09/25/2018    Procedure: COLECTOMY-LAPAROSCOPIC LEFT;  Surgeon: Chito Banks MD;  Location: [facility] OR 2ND FLR;  Service: Colon and Rectal;  Laterality: N/A;    SKIN CANCER EXCISION      SKIN GRAFT      TRANSESOPHAGEAL ECHOCARDIOGRAM WITH POSSIBLE CARDIOVERSION (LUDIN W/ POSS CARDIOVERSION) N/A 2/18/2025    Procedure: TRANSESOPHAGEAL ECHOCARDIOGRAM WITH POSSIBLE CARDIOVERSION (LUDIN W/ POSS CARDIOVERSION);  Surgeon: Abbe Whitehead MD;  Location: [facility] CATH LAB;  Service: Cardiology;  Laterality: N/A;  before 1230pm  RN PRE OP 2/14/2025    VASECTOMY      VASECTOMY         Review of patient's allergies indicates:   Allergen Reactions    Influenza virus vaccines     Metformin Diarrhea       No current  facility-administered medications on file prior to encounter.     Current Outpatient Medications on File Prior to Encounter   Medication Sig    apixaban (ELIQUIS) 5 mg Tab Take 1 tablet (5 mg total) by mouth 2 (two) times daily.    atorvastatin (LIPITOR) 20 MG tablet take 1 tablet by mouth once daily    blood sugar diagnostic (CONTOUR TEST STRIPS) Strp 1 each by Misc.(Non-Drug; Combo Route) route 2 (two) times daily.    fluticasone propionate (FLONASE) 50 mcg/actuation nasal spray 1 spray by Each Nostril route once daily.    glipiZIDE (GLUCOTROL) 2.5 MG TR24 Take 2 tablets (5 mg total) by mouth daily with breakfast.    ixekizumab (TALTZ AUTOINJECTOR) 80 mg/mL AtIn Inject 80 mg into the skin every 28 days. Starting on week 12    lancets Misc USE ONE LANCET TWO TIMES DAILY    metoprolol succinate (TOPROL-XL) 25 MG 24 hr tablet Take 1 tablet (25 mg total) by mouth once daily.    ascorbic acid (JENNI-C ORAL) Take by mouth.    cyanocobalamin, vitamin B-12, (VITAMIN B-12 ORAL) Take by mouth.    MAGNESIUM ORAL Take by mouth.    vitamin B complex (B COMPLEX ORAL) Take by mouth.    ZINC ORAL Take by mouth.     Family History       Problem Relation (Age of Onset)    Cancer Father    Diabetes Brother    Liver cancer Brother          Tobacco Use    Smoking status: Former     Types: Cigars    Smokeless tobacco: Never    Tobacco comments:     cigars-x1 yr.   Substance and Sexual Activity    Alcohol use: Yes     Comment: occassional    Drug use: No    Sexual activity: Yes     Partners: Female     Review of Systems   Constitutional:  Negative for activity change, appetite change, chills, fatigue and fever.   Eyes:  Negative for photophobia and visual disturbance.   Respiratory:  Negative for cough and shortness of breath.    Cardiovascular:  Positive for chest pain. Negative for palpitations and leg swelling.   Gastrointestinal:  Negative for abdominal pain, blood in stool, constipation, diarrhea, nausea and vomiting.    Neurological:  Negative for dizziness, syncope, weakness and light-headedness.     Objective:     Vital Signs (Most Recent):  Temp: 98.3 °F (36.8 °C) (03/27/25 0810)  Pulse: 67 (03/27/25 1130)  Resp: 17 (03/27/25 1130)  BP: (!) 149/96 (03/27/25 1130)  SpO2: 95 % (03/27/25 1130) Vital Signs (24h Range):  Temp:  [98.3 °F (36.8 °C)] 98.3 °F (36.8 °C)  Pulse:  [67-75] 67  Resp:  [17-22] 17  SpO2:  [94 %-97 %] 95 %  BP: (149-203)/() 149/96     Weight: 113.4 kg (250 lb)  Body mass index is 31.25 kg/m².     Physical Exam  Vitals and nursing note reviewed.   Constitutional:       Appearance: He is obese.   HENT:      Head: Normocephalic.      Mouth/Throat:      Pharynx: Oropharynx is clear.   Cardiovascular:      Rate and Rhythm: Normal rate. Rhythm irregular.   Pulmonary:      Effort: Pulmonary effort is normal. No respiratory distress.      Breath sounds: No wheezing or rales.   Abdominal:      Palpations: Abdomen is soft.   Musculoskeletal:      Right lower leg: No edema.      Left lower leg: No edema.   Skin:     General: Skin is warm and dry.   Neurological:      General: No focal deficit present.      Mental Status: He is alert. Mental status is at baseline.   Psychiatric:         Mood and Affect: Mood normal.         Behavior: Behavior normal.                Significant Labs: All pertinent labs within the past 24 hours have been reviewed.    Significant Imaging: I have reviewed all pertinent imaging results/findings within the past 24 hours.

## 2025-03-27 NOTE — SUBJECTIVE & OBJECTIVE
Past Medical History:   Diagnosis Date    Colon cancer     Colon polyps     Diabetes mellitus type II 07/2010    diet controlled    Obesity     Paroxysmal atrial fibrillation     Psoriasis     Sleep apnea, unspecified     Squamous cell carcinoma     Type 2 diabetes mellitus        Past Surgical History:   Procedure Laterality Date    APPENDECTOMY      CARDIOVERSION N/A 2/18/2025    Procedure: Cardioversion;  Surgeon: Abbe Whitehead MD;  Location: [facility] CATH LAB;  Service: Cardiology;  Laterality: N/A;  before 1230pm    COLONOSCOPY N/A 07/09/2018    Procedure: COLONOSCOPY;  Surgeon: Kameron Ruff MD;  Location: [facility] ENDO;  Service: Endoscopy;  Laterality: N/A;  confirmed    COLONOSCOPY N/A 11/11/2019    Procedure: COLONOSCOPY;  Surgeon: Victor Hugo Nunez MD;  [facility]  Service: Endoscopy;  Laterality: N/A;  Pt stated this is the only day he has somebody to drive him and  to stay during procedure  PM Prep    COLONOSCOPY N/A 07/08/2020    Procedure: COLONOSCOPY;  Surgeon: Victor Hugo Nunez MD;  [facility]  Service: Endoscopy;  Laterality: N/A;  Covid-19 test 7/5/20 at [facility][note 3]    EXTREMITY CYST EXCISION      LAPAROSCOPIC LEFT COLECTOMY N/A 09/25/2018    Procedure: COLECTOMY-LAPAROSCOPIC LEFT;  Surgeon: Chito Banks MD;  Location: [facility] OR 2ND FLR;  Service: Colon and Rectal;  Laterality: N/A;    SKIN CANCER EXCISION      SKIN GRAFT      TRANSESOPHAGEAL ECHOCARDIOGRAM WITH POSSIBLE CARDIOVERSION (LUDIN W/ POSS CARDIOVERSION) N/A 2/18/2025    Procedure: TRANSESOPHAGEAL ECHOCARDIOGRAM WITH POSSIBLE CARDIOVERSION (LUDIN W/ POSS CARDIOVERSION);  Surgeon: Abbe Whitehead MD;  Location: [facility] CATH LAB;  Service: Cardiology;  Laterality: N/A;  before 1230pm  RN PRE OP 2/14/2025    VASECTOMY      VASECTOMY         Review of patient's allergies indicates:   Allergen Reactions    Influenza virus vaccines     Metformin Diarrhea       Family History        Problem Relation (Age of Onset)    Cancer Father    Diabetes Brother    Liver cancer Brother            Tobacco Use    Smoking status: Former     Types: Cigars    Smokeless tobacco: Never    Tobacco comments:     cigars-x1 yr.   Substance and Sexual Activity    Alcohol use: Yes     Comment: occassional    Drug use: No    Sexual activity: Yes     Partners: Female       Review of Systems   Constitutional:  Negative for chills and fever.   HENT:  Negative for hearing loss, sore throat and trouble swallowing.    Eyes: Negative.    Respiratory:  Negative for cough and shortness of breath.    Cardiovascular:  Positive for chest pain.   Gastrointestinal:  Negative for abdominal distention, abdominal pain, constipation, diarrhea, nausea and vomiting.   Genitourinary:  Negative for difficulty urinating, frequency and hematuria.   Musculoskeletal:  Negative for arthralgias and neck pain.   Skin:  Negative for color change, pallor and rash.   Neurological:  Negative for dizziness and seizures.   Hematological:  Negative for adenopathy.   Psychiatric/Behavioral:  Negative for confusion.    All other systems reviewed and are negative.      Objective:     Temp:  [98.3 °F (36.8 °C)] 98.3 °F (36.8 °C)  Pulse:  [67-85] 81  Resp:  [17-22] 17  SpO2:  [93 %-97 %] 95 %  BP: (149-203)/() 161/83  Weight: 113.4 kg (250 lb)  Body mass index is 31.25 kg/m².    Date 03/27/25 0700 - 03/28/25 0659   Shift 9322-4141 1919-4305 7592-2271 24 Hour Total   INTAKE   Shift Total(mL/kg)       OUTPUT   Urine(mL/kg/hr) 1200(1.3)   1200   Shift Total(mL/kg) 1200(10.6)   1200(10.6)   Weight (kg) 113.4 113.4 113.4 113.4          Drains       None                    Physical Exam  Vitals reviewed.   Constitutional:       General: He is not in acute distress.     Appearance: He is well-developed. He is not diaphoretic.   HENT:      Head: Normocephalic and atraumatic.   Eyes:      General: No scleral icterus.        Right eye: No discharge.          "Left eye: No discharge.   Pulmonary:      Effort: Pulmonary effort is normal. No respiratory distress.   Abdominal:      General: There is no distension.      Palpations: Abdomen is soft.      Tenderness: There is no abdominal tenderness. There is no right CVA tenderness, left CVA tenderness, guarding or rebound.      Comments: Rotund abdomen, well healed prior lap incisions.    Musculoskeletal:         General: Normal range of motion.      Cervical back: Normal range of motion and neck supple.   Skin:     General: Skin is warm and dry.      Findings: No erythema.   Neurological:      Mental Status: He is alert and oriented to person, place, and time.   Psychiatric:         Behavior: Behavior normal.          Significant Labs:    BMP:  Recent Labs   Lab 03/27/25  0903      K 4.8      CO2 21*   BUN 14   CREATININE 0.9   GLUCOSE 208*   CALCIUM 9.0       CBC:  Recent Labs   Lab 03/27/25  0903   WBC 5.58   HGB 14.6   HCT 46.4   *       Blood Culture: No results for input(s): "LABBLOO" in the last 168 hours.  Urine Culture: No results for input(s): "LABURIN" in the last 168 hours.  Urine Studies: No results for input(s): "COLORU", "APPEARANCEUA", "PHUR", "SPECGRAV", "PROTEINUA", "GLUCUA", "KETONESU", "BILIRUBINUA", "OCCULTUA", "NITRITE", "UROBILINOGEN", "LEUKOCYTESUR", "RBCUA", "WBCUA", "BACTERIA", "SQUAMEPITHEL", "HYALINECASTS" in the last 168 hours.    Invalid input(s): "WRIGHTSUR"    Significant Imaging:  All pertinent imaging results/findings from the past 24 hours have been reviewed.      "

## 2025-03-27 NOTE — H&P
[facility] Medicine  History & Physical    Patient Name: Rosendo Thomas  MRN: [id number]  Patient Class: OP- Observation  Admission Date: 3/27/2025  Attending Physician: Edson Luna MD   Primary Care Provider: Dede Eric MD         Patient information was obtained from patient and ER records.     Subjective:     Principal Problem:Chest pain    Chief Complaint:   Chief Complaint   Patient presents with    Chest Pain     Pt reports left shoulder pain that started 3 days ago, this am he reports chest pain started to left chest.  Pt denies N/V and SOB.  Pt has a hx of a-fib and is on blood thinners.         HPI: Rosendo Thomas is a pleasant 79 y.o. M with a history of T2DM, Persistent Atrial Fibrillation, hyperlipidemia, and AKIN who presented to the Emergency Department c/o left anterior chest pain with radiation to the left shoulder. He reports initially noticing the pain last Friday, however, he believed he may have pulled a muscle. The pain continued to worsen in severity until it reached a peak this morning. He reports improvement of the pain with Nitroglycerin. Of note, the patient was recently hospitalized for influenza and noted to be in A. Fib. He reports to have been asymptomatic at this time and denies any symptoms with previous episodes of A. Fib. He underwent LUDIN/DCCV on 02/18 with return to NSR, however, was again noted to be in A. Fib during his appointment with Dr. Whitehead on 03/18. He reports strict compliance with his Eliquis and Metoprolol. Denies SOB, n/v, palpitations, fever or chills.     In ED: Severely hypertensive on arrival ( - 200's). Vitals otherwise stable and wnl. CBC w/o anemia or leukocytosis. CMP notable for mildly decreased CO2 (21) and hyperglycemia (208). BNP and initial troponin wnl.  EKG showing A. Flutter with a variable AV block.  CXR showed coarse interstitial lung markings with a lower lobe predominance favored to represent pulmonary  edema. Infectious process is considered less likely. Admitted to Hospital Medicine for further evaluation.        Past Medical History:   Diagnosis Date    Colon cancer     Colon polyps     Diabetes mellitus type II 07/2010    diet controlled    Obesity     Paroxysmal atrial fibrillation     Psoriasis     Sleep apnea, unspecified     Squamous cell carcinoma     Type 2 diabetes mellitus        Past Surgical History:   Procedure Laterality Date    APPENDECTOMY      CARDIOVERSION N/A 2/18/2025    Procedure: Cardioversion;  Surgeon: Abbe Whitehead MD;  Location: [facility] CATH LAB;  Service: Cardiology;  Laterality: N/A;  before 1230pm    COLONOSCOPY N/A 07/09/2018    Procedure: COLONOSCOPY;  Surgeon: Kameron Ruff MD;  Location: [facility] ENDO;  Service: Endoscopy;  Laterality: N/A;  confirmed    COLONOSCOPY N/A 11/11/2019    Procedure: COLONOSCOPY;  Surgeon: Victor Hugo Nunez MD;  Location: [facility] (4TH FLR);  Service: Endoscopy;  Laterality: N/A;  Pt stated this is the only day he has somebody to drive him and  to stay during procedure  PM Prep    COLONOSCOPY N/A 07/08/2020    Procedure: COLONOSCOPY;  Surgeon: Victor Hugo Nunez MD;  Location: [facility] (2ND FLR);  Service: Endoscopy;  Laterality: N/A;  Covid-19 test 7/5/20 at [facility]    EXTREMITY CYST EXCISION      LAPAROSCOPIC LEFT COLECTOMY N/A 09/25/2018    Procedure: COLECTOMY-LAPAROSCOPIC LEFT;  Surgeon: Chito Banks MD;  Location: [facility] OR 2ND FLR;  Service: Colon and Rectal;  Laterality: N/A;    SKIN CANCER EXCISION      SKIN GRAFT      TRANSESOPHAGEAL ECHOCARDIOGRAM WITH POSSIBLE CARDIOVERSION (LUDIN W/ POSS CARDIOVERSION) N/A 2/18/2025    Procedure: TRANSESOPHAGEAL ECHOCARDIOGRAM WITH POSSIBLE CARDIOVERSION (LUDIN W/ POSS CARDIOVERSION);  Surgeon: Abbe Whitehead MD;  Location: [facility] CATH LAB;  Service: Cardiology;  Laterality: N/A;  before 1230pm  RN PRE OP 2/14/2025    VASECTOMY      VASECTOMY         Review of patient's  allergies indicates:   Allergen Reactions    Influenza virus vaccines     Metformin Diarrhea       No current facility-administered medications on file prior to encounter.     Current Outpatient Medications on File Prior to Encounter   Medication Sig    apixaban (ELIQUIS) 5 mg Tab Take 1 tablet (5 mg total) by mouth 2 (two) times daily.    atorvastatin (LIPITOR) 20 MG tablet take 1 tablet by mouth once daily    blood sugar diagnostic (CONTOUR TEST STRIPS) Strp 1 each by Misc.(Non-Drug; Combo Route) route 2 (two) times daily.    fluticasone propionate (FLONASE) 50 mcg/actuation nasal spray 1 spray by Each Nostril route once daily.    glipiZIDE (GLUCOTROL) 2.5 MG TR24 Take 2 tablets (5 mg total) by mouth daily with breakfast.    ixekizumab (TALTZ AUTOINJECTOR) 80 mg/mL AtIn Inject 80 mg into the skin every 28 days. Starting on week 12    lancets Misc USE ONE LANCET TWO TIMES DAILY    metoprolol succinate (TOPROL-XL) 25 MG 24 hr tablet Take 1 tablet (25 mg total) by mouth once daily.    ascorbic acid (JENNI-C ORAL) Take by mouth.    cyanocobalamin, vitamin B-12, (VITAMIN B-12 ORAL) Take by mouth.    MAGNESIUM ORAL Take by mouth.    vitamin B complex (B COMPLEX ORAL) Take by mouth.    ZINC ORAL Take by mouth.     Family History       Problem Relation (Age of Onset)    Cancer Father    Diabetes Brother    Liver cancer Brother          Tobacco Use    Smoking status: Former     Types: Cigars    Smokeless tobacco: Never    Tobacco comments:     cigars-x1 yr.   Substance and Sexual Activity    Alcohol use: Yes     Comment: occassional    Drug use: No    Sexual activity: Yes     Partners: Female     Review of Systems   Constitutional:  Negative for activity change, appetite change, chills, fatigue and fever.   Eyes:  Negative for photophobia and visual disturbance.   Respiratory:  Negative for cough and shortness of breath.    Cardiovascular:  Positive for chest pain. Negative for palpitations and leg swelling.    Gastrointestinal:  Negative for abdominal pain, blood in stool, constipation, diarrhea, nausea and vomiting.   Neurological:  Negative for dizziness, syncope, weakness and light-headedness.     Objective:     Vital Signs (Most Recent):  Temp: 98.3 °F (36.8 °C) (03/27/25 0810)  Pulse: 67 (03/27/25 1130)  Resp: 17 (03/27/25 1130)  BP: (!) 149/96 (03/27/25 1130)  SpO2: 95 % (03/27/25 1130) Vital Signs (24h Range):  Temp:  [98.3 °F (36.8 °C)] 98.3 °F (36.8 °C)  Pulse:  [67-75] 67  Resp:  [17-22] 17  SpO2:  [94 %-97 %] 95 %  BP: (149-203)/() 149/96     Weight: 113.4 kg (250 lb)  Body mass index is 31.25 kg/m².     Physical Exam  Vitals and nursing note reviewed.   Constitutional:       Appearance: He is obese.   HENT:      Head: Normocephalic.      Mouth/Throat:      Pharynx: Oropharynx is clear.   Cardiovascular:      Rate and Rhythm: Normal rate. Rhythm irregular.   Pulmonary:      Effort: Pulmonary effort is normal. No respiratory distress.      Breath sounds: No wheezing or rales.   Abdominal:      Palpations: Abdomen is soft.   Musculoskeletal:      Right lower leg: No edema.      Left lower leg: No edema.   Skin:     General: Skin is warm and dry.   Neurological:      General: No focal deficit present.      Mental Status: He is alert. Mental status is at baseline.   Psychiatric:         Mood and Affect: Mood normal.         Behavior: Behavior normal.                Significant Labs: All pertinent labs within the past 24 hours have been reviewed.    Significant Imaging: I have reviewed all pertinent imaging results/findings within the past 24 hours.  Assessment/Plan:     * Chest pain  79 y.o. M with T2DM, HLD, AKIN, Persistent A. Fib presented to the ED c/o L sided CP with radiation to the left arm. Improved with Nitroglycerin. Denies previous episodes of chest pain. EKG A. Flutter with a variable AV block.   S/p LUDIN/DCCV on 02/18 with return to NSR, however, noted to be back in A. Fib on 03/18. Compliant  with Eliquis and Metoprolol.     Differentials to include hypertensive urgency vs. ACS     - Trend troponin  - Continuous telemetry  - SL Nitro prn pain if BP permits  - See most recent echo results below  - Cardiology has been consulted, appreciate recommendations.     Results for orders placed during the hospital encounter of 01/13/25    Echo    Interpretation Summary    Left Ventricle: The left ventricle is normal in size. Mildly increased wall thickness. There is mild concentric hypertrophy. There is normal systolic function with a visually estimated ejection fraction of 55 - 60%. Unable to assess diastolic function due to atrial fibrillation.    Right Ventricle: Normal right ventricular cavity size. Systolic function is normal.    Pulmonic Valve: There is a 2.0x1.6 large fixed echogenic spherical mass present.    Pulmonary Artery: The estimated pulmonary artery systolic pressure is 33 mmHg.    Pt appears to be in AF/FL throughout exam.    Consider LUDIN for further investigation of PV findings above.       Persistent atrial fibrillation  Patient has persistent (7 days or more) atrial fibrillation. Patient is currently in atrial fibrillation. [id number] Score: 3. The patients heart rate in the last 24 hours is as follows:  Pulse  Min: 67  Max: 75     Antiarrhythmics  metoprolol succinate (TOPROL-XL) 24 hr tablet 25 mg, Daily, Oral    Anticoagulants  apixaban tablet 5 mg, 2 times daily, Oral    Plan  - Replete lytes with a goal of K>4, Mg >2  - Patient is anticoagulated, see medications listed above.  - Patient's afib is currently controlled        Anticoagulant long-term use  This patient has long term use on an anticoagulant with Select Anticoagulant(s): Direct oral anticoagulant: Apixaban (Eliquis). Their long term anticoagulation will be Held or Continued: continued. They are on long term anticoagulation due to Reason for Anticoagulation: Atrial fibrillation.     Dyslipidemia  - Continue Lipitor      Essential  "hypertension, benign  Patient's blood pressure range in the last 24 hours was: BP  Min: 149/96  Max: 203/96.The patient's inpatient anti-hypertensive regimen is listed below:  Current Antihypertensives  nitroGLYCERIN SL tablet 0.4 mg, Every 5 min PRN, Sublingual  metoprolol succinate (TOPROL-XL) 24 hr tablet 25 mg, Daily, Oral    Plan  - BP is controlled, no changes needed to their regimen    Type 2 diabetes mellitus  Patient's FSGs are uncontrolled due to hyperglycemia on current medication regimen.  Last A1c reviewed-   Lab Results   Component Value Date    HGBA1C 6.5 (H) 02/07/2025     Most recent fingerstick glucose reviewed- No results for input(s): "POCTGLUCOSE" in the last 24 hours.  Current correctional scale  Medium  Maintain anti-hyperglycemic dose as follows-   Antihyperglycemics (From admission, onward)      Start     Stop Route Frequency Ordered    03/27/25 1216  insulin aspart U-100 pen 0-10 Units         -- SubQ Before meals & nightly PRN 03/27/25 1116          Hold Oral hypoglycemics while patient is in the hospital.      VTE Risk Mitigation (From admission, onward)           Ordered     apixaban tablet 5 mg  2 times daily         03/27/25 1207     IP VTE HIGH RISK PATIENT  Once         03/27/25 1111     Place sequential compression device  Until discontinued         03/27/25 1111                         On 03/27/2025, patient should be placed in hospital observation services under my care in collaboration with Dr. Edson Luna.           VALENTINO WillsC  Department of Hospital Medicine  [facility] - Emergency Dept          "

## 2025-03-27 NOTE — ASSESSMENT & PLAN NOTE
Patient has persistent (7 days or more) atrial fibrillation. Patient is currently in atrial fibrillation. [id number] Score: 3. The patients heart rate in the last 24 hours is as follows:  Pulse  Min: 67  Max: 75     Antiarrhythmics  metoprolol succinate (TOPROL-XL) 24 hr tablet 25 mg, Daily, Oral    Anticoagulants  apixaban tablet 5 mg, 2 times daily, Oral    Plan  - Replete lytes with a goal of K>4, Mg >2  - Patient is anticoagulated, see medications listed above.  - Patient's afib is currently controlled

## 2025-03-27 NOTE — SUBJECTIVE & OBJECTIVE
Past Medical History:   Diagnosis Date    Colon cancer     Colon polyps     Diabetes mellitus type II 07/2010    diet controlled    Obesity     Paroxysmal atrial fibrillation     Psoriasis     Sleep apnea, unspecified     Squamous cell carcinoma     Type 2 diabetes mellitus        Past Surgical History:   Procedure Laterality Date    APPENDECTOMY      CARDIOVERSION N/A 2/18/2025    Procedure: Cardioversion;  Surgeon: Abbe Whitehead MD;  Location: [facility] CATH LAB;  Service: Cardiology;  Laterality: N/A;  before 1230pm    COLONOSCOPY N/A 07/09/2018    Procedure: COLONOSCOPY;  Surgeon: Kameron Ruff MD;  Location: [facility] ENDO;  Service: Endoscopy;  Laterality: N/A;  confirmed    COLONOSCOPY N/A 11/11/2019    Procedure: COLONOSCOPY;  Surgeon: Victor Hugo Nunez MD;  Location: [facility] (4TH FLR);  Service: Endoscopy;  Laterality: N/A;  Pt stated this is the only day he has somebody to drive him and  to stay during procedure  PM Prep    COLONOSCOPY N/A 07/08/2020    Procedure: COLONOSCOPY;  Surgeon: Victor Hugo Nunez MD;  Location: [facility] (2ND FLR);  Service: Endoscopy;  Laterality: N/A;  Covid-19 test 7/5/20 [facility]    EXTREMITY CYST EXCISION      LAPAROSCOPIC LEFT COLECTOMY N/A 09/25/2018    Procedure: COLECTOMY-LAPAROSCOPIC LEFT;  Surgeon: Chito Banks MD;  Location: [facility] OR 2ND FLR;  Service: Colon and Rectal;  Laterality: N/A;    SKIN CANCER EXCISION      SKIN GRAFT      TRANSESOPHAGEAL ECHOCARDIOGRAM WITH POSSIBLE CARDIOVERSION (LUDIN W/ POSS CARDIOVERSION) N/A 2/18/2025    Procedure: TRANSESOPHAGEAL ECHOCARDIOGRAM WITH POSSIBLE CARDIOVERSION (LUDIN W/ POSS CARDIOVERSION);  Surgeon: Abbe Whitehead MD;  Location: [facility] CATH LAB;  Service: Cardiology;  Laterality: N/A;  before 1230pm  RN PRE OP 2/14/2025    VASECTOMY      VASECTOMY         Review of patient's allergies indicates:   Allergen Reactions    Influenza virus vaccines     Metformin Diarrhea       No current  facility-administered medications on file prior to encounter.     Current Outpatient Medications on File Prior to Encounter   Medication Sig    apixaban (ELIQUIS) 5 mg Tab Take 1 tablet (5 mg total) by mouth 2 (two) times daily.    ascorbic acid (JENNI-C ORAL) Take by mouth.    atorvastatin (LIPITOR) 20 MG tablet take 1 tablet by mouth once daily    blood sugar diagnostic (CONTOUR TEST STRIPS) Strp 1 each by Misc.(Non-Drug; Combo Route) route 2 (two) times daily.    cyanocobalamin, vitamin B-12, (VITAMIN B-12 ORAL) Take by mouth.    fluticasone propionate (FLONASE) 50 mcg/actuation nasal spray 1 spray by Each Nostril route once daily.    glipiZIDE (GLUCOTROL) 2.5 MG TR24 Take 2 tablets (5 mg total) by mouth daily with breakfast.    ixekizumab (TALTZ AUTOINJECTOR) 80 mg/mL AtIn Inject 80 mg into the skin every 28 days. Starting on week 12    lancets Misc USE ONE LANCET TWO TIMES DAILY    MAGNESIUM ORAL Take by mouth.    metoprolol succinate (TOPROL-XL) 25 MG 24 hr tablet Take 1 tablet (25 mg total) by mouth once daily.    vitamin B complex (B COMPLEX ORAL) Take by mouth.    ZINC ORAL Take by mouth.     Family History       Problem Relation (Age of Onset)    Cancer Father    Diabetes Brother    Liver cancer Brother          Tobacco Use    Smoking status: Former     Types: Cigars    Smokeless tobacco: Never    Tobacco comments:     cigars-x1 yr.   Substance and Sexual Activity    Alcohol use: Yes     Comment: occassional    Drug use: No    Sexual activity: Yes     Partners: Female     Review of Systems   Constitutional: Negative for chills, diaphoresis, fever and malaise/fatigue.   HENT:  Negative for nosebleeds.    Eyes:  Negative for blurred vision and double vision.   Cardiovascular:  Positive for chest pain. Negative for claudication, cyanosis, dyspnea on exertion, leg swelling, orthopnea, palpitations, paroxysmal nocturnal dyspnea and syncope.   Respiratory:  Negative for cough, shortness of breath and wheezing.     Skin:  Negative for dry skin and poor wound healing.   Musculoskeletal:  Negative for back pain, joint swelling and myalgias.   Gastrointestinal:  Negative for abdominal pain, nausea and vomiting.   Genitourinary:  Negative for hematuria.   Neurological:  Negative for dizziness, headaches, numbness, seizures and weakness.   Psychiatric/Behavioral:  Negative for altered mental status and depression.      Objective:     Vital Signs (Most Recent):  Temp: 98.3 °F (36.8 °C) (03/27/25 0810)  Pulse: 85 (03/27/25 1300)  Resp: 17 (03/27/25 1130)  BP: (!) 171/72 (03/27/25 1300)  SpO2: (!) 94 % (03/27/25 1300) Vital Signs (24h Range):  Temp:  [98.3 °F (36.8 °C)] 98.3 °F (36.8 °C)  Pulse:  [67-85] 85  Resp:  [17-22] 17  SpO2:  [94 %-97 %] 94 %  BP: (149-203)/() 171/72     Weight: 113.4 kg (250 lb)  Body mass index is 31.25 kg/m².    SpO2: (!) 94 %         Intake/Output Summary (Last 24 hours) at 3/27/2025 1338  Last data filed at 3/27/2025 1213  Gross per 24 hour   Intake --   Output 1200 ml   Net -1200 ml       Lines/Drains/Airways       Peripheral Intravenous Line  Duration                  Peripheral IV - Single Lumen 03/27/25 0841 20 G Anterior;Distal;Left Upper Arm <1 day                     Physical Exam  Constitutional:       General: He is not in acute distress.     Appearance: He is well-developed. He is obese. He is not ill-appearing, toxic-appearing or diaphoretic.   HENT:      Head: Normocephalic and atraumatic.   Eyes:      General: No scleral icterus.     Extraocular Movements: Extraocular movements intact.      Conjunctiva/sclera: Conjunctivae normal.      Pupils: Pupils are equal, round, and reactive to light.   Neck:      Thyroid: No thyromegaly.      Vascular: No JVD.      Trachea: No tracheal deviation.   Cardiovascular:      Rate and Rhythm: Normal rate. Rhythm irregularly irregular.      Heart sounds: S1 normal and S2 normal. No murmur heard.     No friction rub. No gallop.   Pulmonary:      Effort:  Pulmonary effort is normal. No respiratory distress.      Breath sounds: Normal breath sounds. No stridor. No wheezing, rhonchi or rales.   Chest:      Chest wall: No tenderness.   Abdominal:      General: There is no distension.      Palpations: Abdomen is soft.   Musculoskeletal:         General: No swelling or tenderness. Normal range of motion.      Cervical back: Normal range of motion and neck supple. No rigidity.      Right lower leg: No edema.      Left lower leg: No edema.   Skin:     General: Skin is warm and dry.      Coloration: Skin is not jaundiced.      Findings: No rash.   Neurological:      General: No focal deficit present.      Mental Status: He is alert and oriented to person, place, and time.      Cranial Nerves: No cranial nerve deficit.   Psychiatric:         Mood and Affect: Mood normal.         Behavior: Behavior normal.          Current Medications:   apixaban  5 mg Oral BID    [START ON 3/28/2025] aspirin  81 mg Oral Daily    [START ON 3/28/2025] atorvastatin  20 mg Oral Daily    [START ON 3/28/2025] metoprolol succinate  25 mg Oral Daily         Current Facility-Administered Medications:     acetaminophen, 325 mg, Oral, Q6H PRN    benzonatate, 100 mg, Oral, TID PRN    calcium carbonate, 500 mg, Oral, TID PRN    dextrose 50%, 12.5 g, Intravenous, PRN    dextrose 50%, 25 g, Intravenous, PRN    diphenhydrAMINE, 25 mg, Oral, Q6H PRN    glucagon (human recombinant), 1 mg, Intramuscular, PRN    glucose, 16 g, Oral, PRN    glucose, 24 g, Oral, PRN    guaiFENesin 100 mg/5 ml, 200 mg, Oral, Q4H PRN    insulin aspart U-100, 0-10 Units, Subcutaneous, QID (AC + HS) PRN    melatonin, 6 mg, Oral, Nightly PRN    nitroGLYCERIN, 0.4 mg, Sublingual, Q5 Min PRN    ondansetron, 4 mg, Oral, Q6H PRN    polyethylene glycol, 17 g, Oral, Daily PRN    promethazine (PHENERGAN) 25 mg in 0.9% NaCl 50 mL IVPB, 25 mg, Intravenous, Q6H PRN    simethicone, 1 tablet, Oral, TID PRN    Laboratory (all labs  reviewed):  CBC:  Recent Labs   Lab 10/07/24  1357 01/13/25  2118 01/14/25  0555 02/14/25 1410 03/27/25  0903   WBC 7.59 5.02 3.72 L 5.73 5.58   Hemoglobin 14.1 13.9 L 12.3 L 13.6 L  --    HGB  --   --   --   --  14.6   Hematocrit 44.1 44.0 40.1 43.5  --    HCT  --   --   --   --  46.4   Platelet Count  --   --   --   --  122 L   Platelets 177 108 L 112 L 127 L  --        CHEMISTRIES:  Recent Labs   Lab 04/19/22  0748 08/09/22  1013 09/04/24  0929 10/07/24  1357 01/13/25  2118 01/14/25  0555 02/14/25  1410 03/27/25  0903   Glucose 158 H   < > 165 H 96 96 100 240 H  --    Sodium 143   < > 136 138 136 137 137 138   Potassium 4.4   < > 4.8 4.0 3.8 3.5 4.3 4.8   BUN 18   < > 19 16 16 14 16 14   Creatinine 1.2   < > 1.2 1.0 1.0 0.8 1.0 0.9   eGFR if non  58.4 A  --   --   --   --   --   --   --    eGFR  --    < > >60.0 >60.0 >60 >60 >60 >60   Calcium 9.6   < > 9.9 10.1 9.2 8.2 L 9.1 9.0   Magnesium  --   --   --   --  2.0 1.9  --   --     < > = values in this interval not displayed.       CARDIAC BIOMARKERS:  Recent Labs   Lab 01/13/25 2118 03/27/25  0903 03/27/25  1121   Troponin I 0.009  --   --    Troponin-I  --  <0.006 <0.006       COAGS:  Recent Labs   Lab 01/13/25 2118 03/27/25  1121   INR 1.0 1.1       LIPIDS/LFTS:  Recent Labs   Lab 10/07/24  1357 01/13/25 2118 01/14/25 0555 02/07/25  1104 02/14/25  1410 03/27/25  0903 03/27/25  1121   Cholesterol Total  --   --   --   --   --   --  91 L   Cholesterol  --   --   --  99 L  --   --   --    Triglycerides  --   --   --  60  --   --   --    Triglyceride  --   --   --   --   --   --  63   HDL  --   --   --  34 L  --   --   --    HDL Cholesterol  --   --   --   --   --   --  32 L   LDL Cholesterol  --   --   --  53.0 L  --   --   --    Non-HDL Cholesterol  --   --   --  65  --   --   --    Non HDL Cholesterol  --   --   --   --   --   --  59   AST 19 45 H 38  --  19 29  --    ALT 18 31 30  --  19 28  --        BNP:  Recent Labs   Lab 01/13/25  9081  03/27/25  0903   BNP 63 82       TSH:        Free T4:        Diagnostic Results:  ECG (personally reviewed and interpreted tracing(s)):  3/27/25 0809 AF 68, low volt, similar to 3/25/25    Chest X-Ray (personally reviewed and interpreted image(s)): 3/27/25 NAD    CTA aorta ordered 3/27/25    Ex MPI planned 3/28/25    LUDIN/DCCV 2/18/25  Normal biventricular size/fxn  LVEF 60%, normal wall motion.  Biatrial enlargement.  No LA/LUCITA thrombus.  Normal appearing valves  Previously noted PV vegetation (TTE 1/14/25) not visualized on this exam.  Mild MR, trace AI/TR.  Mild aortic root dil, 3.7cm  Successful DCCV AF->NSR 61 BPM 200J x1.  Plan:  Cont med rx  Cont eliquis 5mg bid  Home today  Follow up with Dr. Whitehead as planned.     Echo 1/14/25     Left Ventricle: The left ventricle is normal in size. Mildly increased wall thickness. There is mild concentric hypertrophy. There is normal systolic function with a visually estimated ejection fraction of 55 - 60%. Unable to assess diastolic function due to atrial fibrillation.    Right Ventricle: Normal right ventricular cavity size. Systolic function is normal.    Pulmonic Valve: There is a 2.0x1.6 large fixed echogenic spherical mass present.    Pulmonary Artery: The estimated pulmonary artery systolic pressure is 33 mmHg.    Pt appears to be in AF/FL throughout exam.    Consider LUDIN for further investigation of PV findings above.     Stress Test: ETT 2/27/18  The patient exercised for 4.53 minutes on a Richard protocol, corresponding to a functional capacity of 6 estimated METS, achieving a peak heart rate of 126 bpm, which is 85% of the age predicted maximum heart rate.   There were no significant electrocardiographic changes throughout the protocol suggesting ischemia.   EKG Conclusions:  1. The EKG portion of this study is negative for ischemia at a moderate workload, and peak heart rate of 126 bpm (85% of predicted).   2. Blood pressure response to exercise was normal  (Presenting BP: 143/71 Peak BP: 245/79).   3. No significant arrhythmias were present.   4. There were no symptoms of chest discomfort or significant dyspnea throughout the protocol.   5. The Jacobsen treadmill score was 5 suggesting a low probability for future cardiovascular events.

## 2025-03-27 NOTE — CONSULTS
[facility]  Urology  Consult Note    Patient Name: Rosendo Thomas  MRN: [id number]  Admission Date: 3/27/2025  Hospital Length of Stay: 0   Code Status: Full Code   Attending Provider: Edson Luna MD   Consulting Provider: Magdalene Beach MD  Primary Care Physician: Dede Eric MD  Principal Problem:Chest pain    Inpatient consult to Urology  Consult performed by: Magdalene Beach MD  Consult ordered by: Mary Denney PA-C          Subjective:     HPI:  80yo M with significant PMH including AFib, anticoagulation, DM2 who came to ER with CP and severe HTN today. He continues to report discomfort in L shoulder. CT of chest performed that showed suspicious 3.1cm L renal mass. Kidneys not completely imaged on chest imaging. New finding since CT in 2018. Denies hematuria, flank pain, LUTS. No prior urologic history. Denies h/o nephrolithiasis. +family history stones (daughter). Prior personal history of colon cancer in 2018 s/p resection without colostomy, currently GABO. Father with prostate cancer. Denies family history of kidney/bladder cancer. Cr 0.9. No recent urine testing.         Past Medical History:   Diagnosis Date    Colon cancer     Colon polyps     Diabetes mellitus type II 07/2010    diet controlled    Obesity     Paroxysmal atrial fibrillation     Psoriasis     Sleep apnea, unspecified     Squamous cell carcinoma     Type 2 diabetes mellitus        Past Surgical History:   Procedure Laterality Date    APPENDECTOMY      CARDIOVERSION N/A 2/18/2025    Procedure: Cardioversion;  Surgeon: Abbe Whitehead MD;  Location: [facility] CATH LAB;  Service: Cardiology;  Laterality: N/A;  before 1230pm    COLONOSCOPY N/A 07/09/2018    Procedure: COLONOSCOPY;  Surgeon: Kameron Ruff MD;  Location: [facility] ENDO;  Service: Endoscopy;  Laterality: N/A;  confirmed    COLONOSCOPY N/A 11/11/2019    Procedure: COLONOSCOPY;  Surgeon: Victor Hugo Nunez MD;  Location: [facility] ENDO ([address] [place] [address]);  Service:  Endoscopy;  Laterality: N/A;  Pt stated this is the only day he has somebody to drive him and  to stay during procedure  PM Prep    COLONOSCOPY N/A 07/08/2020    Procedure: COLONOSCOPY;  Surgeon: Victor Hugo Nunez MD;  Location: [facility] (2ND FLR);  Service: Endoscopy;  Laterality: N/A;  Covid-19 test 7/5/20 at [facility]    EXTREMITY CYST EXCISION      LAPAROSCOPIC LEFT COLECTOMY N/A 09/25/2018    Procedure: COLECTOMY-LAPAROSCOPIC LEFT;  Surgeon: Chito Banks MD;  Location: [facility] OR 2ND FLR;  Service: Colon and Rectal;  Laterality: N/A;    SKIN CANCER EXCISION      SKIN GRAFT      TRANSESOPHAGEAL ECHOCARDIOGRAM WITH POSSIBLE CARDIOVERSION (LUDIN W/ POSS CARDIOVERSION) N/A 2/18/2025    Procedure: TRANSESOPHAGEAL ECHOCARDIOGRAM WITH POSSIBLE CARDIOVERSION (LUDIN W/ POSS CARDIOVERSION);  Surgeon: Abbe Whitehead MD;  Location: [facility] CATH LAB;  Service: Cardiology;  Laterality: N/A;  before 1230pm  RN PRE OP 2/14/2025    VASECTOMY      VASECTOMY         Review of patient's allergies indicates:   Allergen Reactions    Influenza virus vaccines     Metformin Diarrhea       Family History       Problem Relation (Age of Onset)    Cancer Father    Diabetes Brother    Liver cancer Brother            Tobacco Use    Smoking status: Former     Types: Cigars    Smokeless tobacco: Never    Tobacco comments:     cigars-x1 yr.   Substance and Sexual Activity    Alcohol use: Yes     Comment: occassional    Drug use: No    Sexual activity: Yes     Partners: Female       Review of Systems   Constitutional:  Negative for chills and fever.   HENT:  Negative for hearing loss, sore throat and trouble swallowing.    Eyes: Negative.    Respiratory:  Negative for cough and shortness of breath.    Cardiovascular:  Positive for chest pain.   Gastrointestinal:  Negative for abdominal distention, abdominal pain, constipation, diarrhea, nausea and vomiting.   Genitourinary:  Negative for difficulty urinating,  frequency and hematuria.   Musculoskeletal:  Negative for arthralgias and neck pain.   Skin:  Negative for color change, pallor and rash.   Neurological:  Negative for dizziness and seizures.   Hematological:  Negative for adenopathy.   Psychiatric/Behavioral:  Negative for confusion.    All other systems reviewed and are negative.      Objective:     Temp:  [98.3 °F (36.8 °C)] 98.3 °F (36.8 °C)  Pulse:  [67-85] 81  Resp:  [17-22] 17  SpO2:  [93 %-97 %] 95 %  BP: (149-203)/() 161/83  Weight: 113.4 kg (250 lb)  Body mass index is 31.25 kg/m².    Date 03/27/25 0700 - 03/28/25 0659   Shift 6701-1147 8266-4317 1386-6880 24 Hour Total   INTAKE   Shift Total(mL/kg)       OUTPUT   Urine(mL/kg/hr) 1200(1.3)   1200   Shift Total(mL/kg) 1200(10.6)   1200(10.6)   Weight (kg) 113.4 113.4 113.4 113.4          Drains       None                    Physical Exam  Vitals reviewed.   Constitutional:       General: He is not in acute distress.     Appearance: He is well-developed. He is not diaphoretic.   HENT:      Head: Normocephalic and atraumatic.   Eyes:      General: No scleral icterus.        Right eye: No discharge.         Left eye: No discharge.   Pulmonary:      Effort: Pulmonary effort is normal. No respiratory distress.   Abdominal:      General: There is no distension.      Palpations: Abdomen is soft.      Tenderness: There is no abdominal tenderness. There is no right CVA tenderness, left CVA tenderness, guarding or rebound.      Comments: Rotund abdomen, well healed prior lap incisions.    Musculoskeletal:         General: Normal range of motion.      Cervical back: Normal range of motion and neck supple.   Skin:     General: Skin is warm and dry.      Findings: No erythema.   Neurological:      Mental Status: He is alert and oriented to person, place, and time.   Psychiatric:         Behavior: Behavior normal.          Significant Labs:    BMP:  Recent Labs   Lab 03/27/25  0903      K 4.8      CO2  "21*   BUN 14   CREATININE 0.9   GLUCOSE 208*   CALCIUM 9.0       CBC:  Recent Labs   Lab 03/27/25  0903   WBC 5.58   HGB 14.6   HCT 46.4   *       Blood Culture: No results for input(s): "LABBLOO" in the last 168 hours.  Urine Culture: No results for input(s): "LABURIN" in the last 168 hours.  Urine Studies: No results for input(s): "COLORU", "APPEARANCEUA", "PHUR", "SPECGRAV", "PROTEINUA", "GLUCUA", "KETONESU", "BILIRUBINUA", "OCCULTUA", "NITRITE", "UROBILINOGEN", "LEUKOCYTESUR", "RBCUA", "WBCUA", "BACTERIA", "SQUAMEPITHEL", "HYALINECASTS" in the last 168 hours.    Invalid input(s): "WRIGHTSUR"    Significant Imaging:  All pertinent imaging results/findings from the past 24 hours have been reviewed.        Assessment and Plan:     Left renal mass   - Suspicious 3.1cm L renal mass on CT. Kidney not completed imaging on chest CT.    - Will need further imaging - ideally with CT renal protocol (w/wo IV contrast). This can be obtained on non-urgent basis either inpatient or outpatient. Would not recommend to do immediately due to IV contrast load already given today.   - Discussed concern for possible RCC. May need surgical intervention. Cardiac history noted. Will need further imaging first.          VTE Risk Mitigation (From admission, onward)           Ordered     apixaban tablet 5 mg  2 times daily         03/27/25 1207     IP VTE HIGH RISK PATIENT  Once         03/27/25 1111     Place sequential compression device  Until discontinued         03/27/25 1111                    Thank you for your consult. I will follow-up with patient. Please contact us if you have any additional questions.    Magdalene Beach MD  Urology  [facility] - Med Surg  "

## 2025-03-27 NOTE — ASSESSMENT & PLAN NOTE
- Suspicious 3.1cm L renal mass on CT. Kidney not completed imaging on chest CT.    - Will need further imaging - ideally with CT renal protocol (w/wo IV contrast). This can be obtained on non-urgent basis either inpatient or outpatient. Would not recommend to do immediately due to IV contrast load already given today.   - Discussed concern for possible RCC. May need surgical intervention. Cardiac history noted. Will need further imaging first.

## 2025-03-27 NOTE — ASSESSMENT & PLAN NOTE
79 y.o. M with T2DM, HLD, AKIN, Persistent A. Fib presented to the ED c/o L sided CP with radiation to the left arm. Improved with Nitroglycerin. Denies previous episodes of chest pain. EKG A. Flutter with a variable AV block.   S/p LUDIN/DCCV on 02/18 with return to NSR, however, noted to be back in A. Fib on 03/18. Compliant with Eliquis and Metoprolol.     Differentials to include hypertensive urgency vs. ACS     - Trend troponin  - Continuous telemetry  - SL Nitro prn pain if BP permits  - See most recent echo results below  - Cardiology has been consulted, appreciate recommendations.     Results for orders placed during the hospital encounter of 01/13/25    Echo    Interpretation Summary    Left Ventricle: The left ventricle is normal in size. Mildly increased wall thickness. There is mild concentric hypertrophy. There is normal systolic function with a visually estimated ejection fraction of 55 - 60%. Unable to assess diastolic function due to atrial fibrillation.    Right Ventricle: Normal right ventricular cavity size. Systolic function is normal.    Pulmonic Valve: There is a 2.0x1.6 large fixed echogenic spherical mass present.    Pulmonary Artery: The estimated pulmonary artery systolic pressure is 33 mmHg.    Pt appears to be in AF/FL throughout exam.    Consider LUDIN for further investigation of PV findings above.

## 2025-03-27 NOTE — ED NOTES
Patient presents to ED due to CP which he states started yesterday, rates pain a 9 on 0-10 scale, describes pain as stabbing, denies SOB.

## 2025-03-28 VITALS
RESPIRATION RATE: 18 BRPM | WEIGHT: 249.13 LBS | TEMPERATURE: 98 F | HEIGHT: 75 IN | HEART RATE: 81 BPM | BODY MASS INDEX: 30.98 KG/M2 | SYSTOLIC BLOOD PRESSURE: 168 MMHG | OXYGEN SATURATION: 95 % | DIASTOLIC BLOOD PRESSURE: 81 MMHG

## 2025-03-28 LAB
ABSOLUTE EOSINOPHIL (OHS): 0.12 K/UL
ABSOLUTE MONOCYTE (OHS): 0.61 K/UL (ref 0.3–1)
ABSOLUTE NEUTROPHIL COUNT (OHS): 5.01 K/UL (ref 1.8–7.7)
ALBUMIN SERPL BCP-MCNC: 3.7 G/DL (ref 3.5–5.2)
ALP SERPL-CCNC: 82 UNIT/L (ref 40–150)
ALT SERPL W/O P-5'-P-CCNC: 25 UNIT/L (ref 10–44)
ANION GAP (OHS): 9 MMOL/L (ref 8–16)
AST SERPL-CCNC: 24 UNIT/L (ref 11–45)
BASOPHILS # BLD AUTO: 0.03 K/UL
BASOPHILS NFR BLD AUTO: 0.4 %
BILIRUB SERPL-MCNC: 0.7 MG/DL (ref 0.1–1)
BUN SERPL-MCNC: 15 MG/DL (ref 8–23)
CALCIUM SERPL-MCNC: 9.3 MG/DL (ref 8.7–10.5)
CHLORIDE SERPL-SCNC: 106 MMOL/L (ref 95–110)
CO2 SERPL-SCNC: 22 MMOL/L (ref 23–29)
CREAT SERPL-MCNC: 0.8 MG/DL (ref 0.5–1.4)
CV STRESS BASE HR: 67 BPM
DIASTOLIC BLOOD PRESSURE: 83 MMHG
ERYTHROCYTE [DISTWIDTH] IN BLOOD BY AUTOMATED COUNT: 15.2 % (ref 11.5–14.5)
GFR SERPLBLD CREATININE-BSD FMLA CKD-EPI: >60 ML/MIN/1.73/M2
GLUCOSE SERPL-MCNC: 150 MG/DL (ref 70–110)
HCT VFR BLD AUTO: 49.6 % (ref 40–54)
HGB BLD-MCNC: 16.1 GM/DL (ref 14–18)
HOLD SPECIMEN: NORMAL
IMM GRANULOCYTES # BLD AUTO: 0.02 K/UL (ref 0–0.04)
IMM GRANULOCYTES NFR BLD AUTO: 0.3 % (ref 0–0.5)
LYMPHOCYTES # BLD AUTO: 1.32 K/UL (ref 1–4.8)
MCH RBC QN AUTO: 27.7 PG (ref 27–50)
MCHC RBC AUTO-ENTMCNC: 32.5 G/DL (ref 32–36)
MCV RBC AUTO: 85 FL (ref 82–98)
NUC REST EJECTION FRACTION: 68
NUCLEATED RBC (/100WBC) (OHS): 0 /100 WBC
OHS CV CPX 1 MINUTE RECOVERY HEART RATE: 90 BPM
OHS CV CPX 85 PERCENT MAX PREDICTED HEART RATE MALE: 120
OHS CV CPX ESTIMATED METS: 6
OHS CV CPX MAX PREDICTED HEART RATE: 141
OHS CV CPX PATIENT IS FEMALE: 0
OHS CV CPX PATIENT IS MALE: 1
OHS CV CPX PEAK DIASTOLIC BLOOD PRESSURE: 83 MMHG
OHS CV CPX PEAK HEAR RATE: 126 BPM
OHS CV CPX PEAK RATE PRESSURE PRODUCT: NORMAL
OHS CV CPX PEAK SYSTOLIC BLOOD PRESSURE: 166 MMHG
OHS CV CPX PERCENT MAX PREDICTED HEART RATE ACHIEVED: 89
OHS CV CPX RATE PRESSURE PRODUCT PRESENTING: NORMAL
OHS CV INITIAL DOSE: 10.2 MCG/KG/MIN
OHS CV PEAK DOSE: 30.4 MCG/KG/MIN
PLATELET # BLD AUTO: 123 K/UL (ref 150–450)
PMV BLD AUTO: 12.1 FL (ref 9.2–12.9)
POCT GLUCOSE: 151 MG/DL (ref 70–110)
POCT GLUCOSE: 155 MG/DL (ref 70–110)
POTASSIUM SERPL-SCNC: 4.5 MMOL/L (ref 3.5–5.1)
PROT SERPL-MCNC: 7 GM/DL (ref 6–8.4)
RBC # BLD AUTO: 5.82 M/UL (ref 4.6–6.2)
RELATIVE EOSINOPHIL (OHS): 1.7 %
RELATIVE LYMPHOCYTE (OHS): 18.6 % (ref 18–48)
RELATIVE MONOCYTE (OHS): 8.6 % (ref 4–15)
RELATIVE NEUTROPHIL (OHS): 70.4 % (ref 38–73)
SODIUM SERPL-SCNC: 137 MMOL/L (ref 136–145)
STRESS ECHO POST EXERCISE DUR MIN: 3 MINUTES
STRESS ECHO POST EXERCISE DUR SEC: 54 SECONDS
SYSTOLIC BLOOD PRESSURE: 166 MMHG
WBC # BLD AUTO: 7.11 K/UL (ref 3.9–12.7)

## 2025-03-28 PROCEDURE — 25000003 PHARM REV CODE 250

## 2025-03-28 PROCEDURE — G0378 HOSPITAL OBSERVATION PER HR: HCPCS

## 2025-03-28 PROCEDURE — 36415 COLL VENOUS BLD VENIPUNCTURE: CPT

## 2025-03-28 PROCEDURE — 85025 COMPLETE CBC W/AUTO DIFF WBC: CPT

## 2025-03-28 PROCEDURE — 80053 COMPREHEN METABOLIC PANEL: CPT

## 2025-03-28 PROCEDURE — 25500020 PHARM REV CODE 255: Performed by: STUDENT IN AN ORGANIZED HEALTH CARE EDUCATION/TRAINING PROGRAM

## 2025-03-28 PROCEDURE — 99214 OFFICE O/P EST MOD 30 MIN: CPT | Mod: ,,, | Performed by: STUDENT IN AN ORGANIZED HEALTH CARE EDUCATION/TRAINING PROGRAM

## 2025-03-28 PROCEDURE — 25000003 PHARM REV CODE 250: Performed by: INTERNAL MEDICINE

## 2025-03-28 PROCEDURE — 99499 UNLISTED E&M SERVICE: CPT | Mod: ,,, | Performed by: INTERNAL MEDICINE

## 2025-03-28 PROCEDURE — A9502 TC99M TETROFOSMIN: HCPCS | Performed by: STUDENT IN AN ORGANIZED HEALTH CARE EDUCATION/TRAINING PROGRAM

## 2025-03-28 RX ORDER — LIDOCAINE 50 MG/G
1 PATCH TOPICAL
Status: DISCONTINUED | OUTPATIENT
Start: 2025-03-28 | End: 2025-03-28 | Stop reason: HOSPADM

## 2025-03-28 RX ADMIN — IOHEXOL 100 ML: 350 INJECTION, SOLUTION INTRAVENOUS at 01:03

## 2025-03-28 RX ADMIN — TETROFOSMIN 10.2 MILLICURIE: 1.38 INJECTION, POWDER, LYOPHILIZED, FOR SOLUTION INTRAVENOUS at 07:03

## 2025-03-28 RX ADMIN — TETROFOSMIN 30.4 MILLICURIE: 1.38 INJECTION, POWDER, LYOPHILIZED, FOR SOLUTION INTRAVENOUS at 09:03

## 2025-03-28 RX ADMIN — ACETAMINOPHEN 325 MG: 325 TABLET ORAL at 07:03

## 2025-03-28 RX ADMIN — ATORVASTATIN CALCIUM 20 MG: 10 TABLET, FILM COATED ORAL at 07:03

## 2025-03-28 RX ADMIN — APIXABAN 5 MG: 5 TABLET, FILM COATED ORAL at 07:03

## 2025-03-28 RX ADMIN — LOSARTAN POTASSIUM 25 MG: 25 TABLET, FILM COATED ORAL at 07:03

## 2025-03-28 RX ADMIN — ASPIRIN 81 MG CHEWABLE TABLET 81 MG: 81 TABLET CHEWABLE at 07:03

## 2025-03-28 RX ADMIN — METOPROLOL SUCCINATE 50 MG: 50 TABLET, EXTENDED RELEASE ORAL at 07:03

## 2025-03-28 RX ADMIN — LIDOCAINE 1 PATCH: 50 PATCH TOPICAL at 12:03

## 2025-03-28 NOTE — ASSESSMENT & PLAN NOTE
Discussed CT findings, 3 cm left renal mass with contrast enhancement concerning for neoplasm such as renal carcinoma    Discussed options for management typically include :    - Observation  -Renal mass biopsy with cryoablation  Discussed risk of retreatment  Performed by interventional radiology  Would need clearance to hold blood thinners to minimize risk of hemorrhage  -Robot assisted laparoscopic partial nephrectomy  Discussed a small renal mass of this size would not typically be managed with nephrectomy  A partial nephrectomy was discussed  Discussed would need cardiac clearance, has outpatient appt with Dr. Whitehead to complete his atypical chest pain work up.   Would need clearance to hold  blood thinners  Surgery risks apply: risk of bleeding, infection, damage to surrounding structures, heart attack, stroke, DVT, etc  Outpatient follow up for further detailed discussion advised after cardiac clearance acheived

## 2025-03-28 NOTE — NURSING
Pt leaving unit to CT via transporter tech. Pt in no acute distress. Vitals per chart. IV patent.

## 2025-03-28 NOTE — ASSESSMENT & PLAN NOTE
Patient's blood pressure range in the last 24 hours was: BP  Min: 125/66  Max: 171/72.The patient's inpatient anti-hypertensive regimen is listed below:  Current Antihypertensives  nitroGLYCERIN SL tablet 0.4 mg, Every 5 min PRN, Sublingual  losartan tablet 25 mg, Daily, Oral  metoprolol succinate (TOPROL-XL) 24 hr tablet 50 mg, Daily, Oral    Plan  - BP is controlled, no changes needed to their regimen

## 2025-03-28 NOTE — DISCHARGE SUMMARY
[facility] Medicine  Discharge Summary      Patient Name: Rosendo Thomas  MRN: [id number]  JULI: [id number]  Patient Class: OP- Observation  Admission Date: 3/27/2025  Hospital Length of Stay: 0 days  Discharge Date and Time:  03/28/2025 3:36 PM  Attending Physician: Edson Luna MD   Discharging Provider: Mary Denney PA-C  Primary Care Provider: Dede Eric MD    Primary Care Team:  [facility]osp [facility] 3    HPI:   Rosendo Thomas is a pleasant 79 y.o. M with a history of T2DM, Persistent Atrial Fibrillation, hyperlipidemia, and AKIN who presented to the Emergency Department c/o left anterior chest pain with radiation to the left shoulder. He reports initially noticing the pain last Friday, however, he believed he may have pulled a muscle. The pain continued to worsen in severity until it reached a peak this morning. He reports improvement of the pain with Nitroglycerin. Of note, the patient was recently hospitalized for influenza and noted to be in A. Fib. He reports to have been asymptomatic at this time and denies any symptoms with previous episodes of A. Fib. He underwent LUDIN/DCCV on 02/18 with return to NSR, however, was again noted to be in A. Fib during his appointment with Dr. Whitehead on 03/18. He reports strict compliance with his Eliquis and Metoprolol. Denies SOB, n/v, palpitations, fever or chills.     In ED: Severely hypertensive on arrival ( - 200's). Vitals otherwise stable and wnl. CBC w/o anemia or leukocytosis. CMP notable for mildly decreased CO2 (21) and hyperglycemia (208). BNP and initial troponin wnl.  EKG showing A. Flutter with a variable AV block.  CXR showed coarse interstitial lung markings with a lower lobe predominance favored to represent pulmonary edema. Infectious process is considered less likely. Admitted to Hospital Medicine for further evaluation.        * No surgery found *      Hospital Course:   Admitted for further evaluation of L sided chest  pain with radiation to the arm. Cardiology was consulted with recommendations to obtain stress testing to evaluate for ischemia or infarction. Stress test showed no evidence of myocardial ischemia or infarction. A Left sided renal mass was incidentally found on imaging during hospitalization. Urology was consulted with recommendations to obtain additional imaging for further evaluation of the mass. CT abd/pel showed a 3 cm maximum dimension left upper pole renal mass compatible with renal cell carcinoma until proven otherwise. No evidence of intra-abdominal metastatic disease. A referral was placed to outpatient Urology for further surveillance and treatment. Patient is hemodynamically stable for discharge.       Goals of Care Treatment Preferences:  Code Status: Full Code         Consults:   Consults (From admission, onward)          Status Ordering Provider     Inpatient consult to Urology  Once        Provider:  Magdalene Beach MD    Completed FIDENCIO OBRIEN     Inpatient consult to Cardiology  Once        Provider:  Abbe Whitehead MD    Completed FIDENCIO OBRIEN     Inpatient consult to Registered Dietitian/Nutritionist  Once        Provider:  (Not yet assigned)    Acknowledged FIDENCIO OBRIEN     Inpatient consult to Social Work/Case Management  Once        Provider:  (Not yet assigned)    Completed FIDENCIO OBRIEN            Assessment & Plan  Chest pain  79 y.o. M with T2DM, HLD, AKIN, Persistent A. Fib presented to the ED c/o L sided CP with radiation to the left arm. Improved with Nitroglycerin. Denies previous episodes of chest pain. EKG A. Flutter with a variable AV block.   S/p LUDIN/DCCV on 02/18 with return to NSR, however, noted to be back in A. Fib on 03/18. Compliant with Eliquis and Metoprolol.     Differentials to include hypertensive urgency vs. ACS     - Trend troponin  - Continuous telemetry  - SL Nitro prn pain if BP permits  - See most recent echo results below  - Cardiology has  been consulted. Plan for stress test today. Ok to d/c if negative.     Results for orders placed during the hospital encounter of 01/13/25    Echo    Interpretation Summary    Left Ventricle: The left ventricle is normal in size. Mildly increased wall thickness. There is mild concentric hypertrophy. There is normal systolic function with a visually estimated ejection fraction of 55 - 60%. Unable to assess diastolic function due to atrial fibrillation.    Right Ventricle: Normal right ventricular cavity size. Systolic function is normal.    Pulmonic Valve: There is a 2.0x1.6 large fixed echogenic spherical mass present.    Pulmonary Artery: The estimated pulmonary artery systolic pressure is 33 mmHg.    Pt appears to be in AF/FL throughout exam.    Consider LUDIN for further investigation of PV findings above.     Type 2 diabetes mellitus  Patient's FSGs are uncontrolled due to hyperglycemia on current medication regimen.  Last A1c reviewed-   Lab Results   Component Value Date    HGBA1C 7.1 (H) 03/27/2025     Most recent fingerstick glucose reviewed-   Recent Labs   Lab 03/27/25  1924 03/28/25  0720 03/28/25  1104   POCTGLUCOSE 144* 151* 155*     Current correctional scale  Medium  Maintain anti-hyperglycemic dose as follows-   Antihyperglycemics (From admission, onward)      Start     Stop Route Frequency Ordered    03/27/25 1216  insulin aspart U-100 pen 0-10 Units         -- SubQ Before meals & nightly PRN 03/27/25 1116          Hold Oral hypoglycemics while patient is in the hospital.  Essential hypertension, benign  Patient's blood pressure range in the last 24 hours was: BP  Min: 125/66  Max: 168/81.The patient's inpatient anti-hypertensive regimen is listed below:  Current Antihypertensives  nitroGLYCERIN SL tablet 0.4 mg, Every 5 min PRN, Sublingual  losartan tablet 25 mg, Daily, Oral  metoprolol succinate (TOPROL-XL) 24 hr tablet 50 mg, Daily, Oral    Plan  - BP is controlled, no changes needed to their  regimen  Dyslipidemia  - Continue Lipitor    Anticoagulant long-term use  This patient has long term use on an anticoagulant with Select Anticoagulant(s): Direct oral anticoagulant: Apixaban (Eliquis). Their long term anticoagulation will be Held or Continued: continued. They are on long term anticoagulation due to Reason for Anticoagulation: Atrial fibrillation.   Persistent atrial fibrillation  Patient has persistent (7 days or more) atrial fibrillation. Patient is currently in atrial fibrillation. [id number] Score: 3. The patients heart rate in the last 24 hours is as follows:  Pulse  Min: 62  Max: 85     Antiarrhythmics  metoprolol succinate (TOPROL-XL) 24 hr tablet 50 mg, Daily, Oral    Anticoagulants  apixaban tablet 5 mg, 2 times daily, Oral    Plan  - Replete lytes with a goal of K>4, Mg >2  - Patient is anticoagulated, see medications listed above.  - Patient's afib is currently controlled      Left renal mass  Urology consulted. Recommends additional imaging to further evaluate mass.   Will obtain CT abd/pel while inpatient.  Outpatient follow-up with Urology on discharge.   Final Active Diagnoses:    Diagnosis Date Noted POA    PRINCIPAL PROBLEM:  Chest pain [R07.9] 03/27/2025 Yes    Left renal mass [N28.89] 03/27/2025 Yes    Persistent atrial fibrillation [I48.19] 02/18/2025 Yes    Anticoagulant long-term use [Z79.01] 02/07/2025 Not Applicable    Dyslipidemia [E78.5] 01/14/2025 Yes    Essential hypertension, benign [I10] 05/30/2018 Yes    Type 2 diabetes mellitus [E11.9] 07/01/2010 Yes      Problems Resolved During this Admission:    Diagnosis Date Noted Date Resolved POA    Aortic atherosclerosis [I70.0] 02/26/2019 03/27/2025 Yes       Discharged Condition: good    Disposition: Home or Self Care    Follow Up:   Follow-up Information       Dede Eric MD. Go on 4/7/2025.    Specialty: Internal Medicine  Why: Hospital Followup:  See After Visit Summary  Contact information:  [address] [place]  [facility] [address]  [phone]               Abbe Whitehead MD Follow up.    Specialties: Cardiology, Interventional Cardiology  Why: Keep appointment as previously scheduled.  Contact information:  [address]  [address]  [place] [address] [phone]                           Patient Instructions:      Notify your health care provider if you experience any of the following:  temperature >100.4     Notify your health care provider if you experience any of the following:  persistent nausea and vomiting or diarrhea     Notify your health care provider if you experience any of the following:  severe persistent headache     Notify your health care provider if you experience any of the following:  persistent dizziness, light-headedness, or visual disturbances     Notify your health care provider if you experience any of the following:  increased confusion or weakness     Notify your health care provider if you experience any of the following:  severe uncontrolled pain     Notify your health care provider if you experience any of the following:  difficulty breathing or increased cough     Activity as tolerated       Significant Diagnostic Studies: N/A    Pending Diagnostic Studies:       Procedure Component Value Units Date/Time    EKG 12-lead [[id number]]     Order Status: Sent Lab Status: No result            Medications:  Reconciled Home Medications:      Medication List        CONTINUE taking these medications      apixaban 5 mg Tab  Commonly known as: ELIQUIS  Take 1 tablet (5 mg total) by mouth 2 (two) times daily.     atorvastatin 20 MG tablet  Commonly known as: LIPITOR  take 1 tablet by mouth once daily     B COMPLEX ORAL  Take by mouth.     blood sugar diagnostic Strp  Commonly known as: CONTOUR TEST STRIPS  1 each by Misc.(Non-Drug; Combo Route) route 2 (two) times daily.     fluticasone propionate 50 mcg/actuation nasal spray  Commonly known as: FLONASE  1 spray by Each Nostril route once daily.      glipiZIDE 2.5 MG Tr24  Commonly known as: GLUCOTROL  Take 2 tablets (5 mg total) by mouth daily with breakfast.     lancets Misc  USE ONE LANCET TWO TIMES DAILY     MAGNESIUM ORAL  Take by mouth.     metoprolol succinate 25 MG 24 hr tablet  Commonly known as: TOPROL-XL  Take 1 tablet (25 mg total) by mouth once daily.     TALTZ AUTOINJECTOR 80 mg/mL Atin  Generic drug: ixekizumab  Inject 80 mg into the skin every 28 days. Starting on week 12     JENNI-C ORAL  Take by mouth.     VITAMIN B-12 ORAL  Take by mouth.     ZINC ORAL  Take by mouth.              Indwelling Lines/Drains at time of discharge:   Lines/Drains/Airways       None                   Time spent on the discharge of patient: 37 minutes         Mary Denney PA-C  Department of Hospital Medicine  [facility] [place] [facility]

## 2025-03-28 NOTE — PROGRESS NOTES
AVS virtually reviewed with patient and family in its entirety with emphasis on diet, medications, follow-up appointments and reasons to return to the ED or contact the [facility] On Call Nurse Care Line. Patient also encouraged to utilize their patient portal. Ease and convenience of use reiterated. Education complete and patient voiced understanding. All questions answered. Discharge teaching complete.

## 2025-03-28 NOTE — PROGRESS NOTES
[facility]  Urology  Progress Note    Patient Name: Rosendo Thomas  MRN: [id number]  Admission Date: 3/27/2025  Hospital Length of Stay: 0 days  Code Status: Full Code   Attending Provider: Edson Luna MD   Primary Care Physician: Dede Eric MD    Subjective:     HPI:  80yo M with significant PMH including AFib, anticoagulation, DM2 who came to ER with CP and severe HTN today. He continues to report discomfort in L shoulder. CT of chest performed that showed suspicious 3.1cm L renal mass. Kidneys not completely imaged on chest imaging. New finding since CT in 2018. Denies hematuria, flank pain, LUTS. No prior urologic history. Denies h/o nephrolithiasis. +family history stones (daughter). Prior personal history of colon cancer in 2018 s/p resection without colostomy, currently GABO. Father with prostate cancer. Denies family history of kidney/bladder cancer. Cr 0.9. No recent urine testing.         Interval History: questions about  CT, accompanied by son    Review of Systems   Constitutional:  Negative for activity change, appetite change, chills, diaphoresis and fever.   HENT:  Negative for congestion and rhinorrhea.    Eyes:  Negative for visual disturbance.   Respiratory:  Negative for choking and shortness of breath.    Gastrointestinal:  Negative for abdominal distention, abdominal pain, constipation, diarrhea, nausea and vomiting.   Genitourinary:  Negative for difficulty urinating, dysuria, flank pain, scrotal swelling, testicular pain and urgency.   Skin:  Negative for color change, pallor and wound.   Neurological:  Negative for dizziness and syncope.   Psychiatric/Behavioral:  Negative for confusion and hallucinations.      Objective:     Temp:  [97.6 °F (36.4 °C)-98.6 °F (37 °C)] 98 °F (36.7 °C)  Pulse:  [62-85] 81  Resp:  [18] 18  SpO2:  [92 %-95 %] 95 %  BP: (125-168)/(66-87) 168/81     Body mass index is 31.14 kg/m².           Drains       None                    Physical  Exam  Constitutional:       General: He is not in acute distress.     Appearance: He is well-developed. He is obese. He is not ill-appearing, toxic-appearing or diaphoretic.   HENT:      Head: Normocephalic and atraumatic.      Mouth/Throat:      Mouth: Mucous membranes are moist.   Eyes:      Conjunctiva/sclera: Conjunctivae normal.   Pulmonary:      Effort: Pulmonary effort is normal. No respiratory distress.   Abdominal:      General: Abdomen is flat. There is no distension.   Musculoskeletal:         General: No swelling or deformity.      Cervical back: Neck supple.   Skin:     Findings: No bruising or rash.   Neurological:      Mental Status: He is alert and oriented to person, place, and time.   Psychiatric:         Mood and Affect: Mood normal.         Thought Content: Thought content normal.         Judgment: Judgment normal.           Significant Labs:    BMP:  Recent Labs   Lab 03/27/25  0903 03/28/25  1151    137   K 4.8 4.5    106   CO2 21* 22*   BUN 14 15   CREATININE 0.9 0.8   GLUCOSE 208* 150*   CALCIUM 9.0 9.3       CBC:   Recent Labs   Lab 03/27/25  0903 03/28/25  1151   WBC 5.58 7.11   HGB 14.6 16.1   HCT 46.4 49.6   * 123*           Significant Imaging:  CT: I have reviewed all results within the past 24 hours and my personal findings are:  CT scan with contrast enhancing 3cm left upper pole renal mass, partially exophytic                  Assessment/Plan:     Left renal mass   Discussed CT findings, 3 cm left renal mass with contrast enhancement concerning for neoplasm such as renal carcinoma    Discussed options for management typically include :    - Observation  -Renal mass biopsy with cryoablation  Discussed risk of retreatment  Performed by interventional radiology  Would need clearance to hold blood thinners to minimize risk of hemorrhage  -Robot assisted laparoscopic partial nephrectomy  Discussed a small renal mass of this size would not typically be managed with  nephrectomy  A partial nephrectomy was discussed  Discussed would need cardiac clearance, has outpatient appt with Dr. Whitehead to complete his atypical chest pain work up.   Would need clearance to hold  blood thinners  Surgery risks apply: risk of bleeding, infection, damage to surrounding structures, heart attack, stroke, DVT, etc  Outpatient follow up for further detailed discussion advised after cardiac clearance acheived            VTE Risk Mitigation (From admission, onward)           Ordered     apixaban tablet 5 mg  2 times daily         03/27/25 1207     IP VTE HIGH RISK PATIENT  Once         03/27/25 1111     Place sequential compression device  Until discontinued         03/27/25 1111                    Bettie Bolivar MD  Urology  [facility] [place] [facility]

## 2025-03-28 NOTE — PROGRESS NOTES
[facility] Surg  Cardiology  Progress Note    Patient Name: Rosendo Thomas  MRN: [id number]  Admission Date: 3/27/2025  Hospital Length of Stay: 0 days  Code Status: Full Code   Attending Physician: Edson Luna MD   Primary Care Physician: Dede Eric MD  Expected Discharge Date:   Principal Problem:Chest pain    Subjective:     Interval History:  Stress test did not show any significant ischemia    Review of Systems   Constitutional: Negative for chills, diaphoresis, fever and malaise/fatigue.   HENT:  Negative for nosebleeds.    Eyes:  Negative for blurred vision and double vision.   Cardiovascular:  Negative for claudication, cyanosis, dyspnea on exertion, leg swelling, orthopnea, palpitations, paroxysmal nocturnal dyspnea and syncope.   Respiratory:  Negative for cough, shortness of breath and wheezing.    Skin:  Negative for dry skin and poor wound healing.   Musculoskeletal:  Negative for back pain, joint swelling and myalgias.   Gastrointestinal:  Negative for abdominal pain, nausea and vomiting.   Genitourinary:  Negative for hematuria.   Neurological:  Negative for dizziness, headaches, numbness, seizures and weakness.   Psychiatric/Behavioral:  Negative for altered mental status and depression.      Objective:     Vital Signs (Most Recent):  Temp: 98 °F (36.7 °C) (03/28/25 1037)  Pulse: 85 (03/28/25 1046)  Resp: 18 (03/28/25 1037)  BP: (!) 168/81 (03/28/25 1037)  SpO2: 95 % (03/28/25 1037) Vital Signs (24h Range):  Temp:  [97.6 °F (36.4 °C)-98.6 °F (37 °C)] 98 °F (36.7 °C)  Pulse:  [62-85] 85  Resp:  [18] 18  SpO2:  [92 %-95 %] 95 %  BP: (125-168)/(66-87) 168/81     Weight: 113 kg (249 lb 1.9 oz)  Body mass index is 31.14 kg/m².     SpO2: 95 %         Intake/Output Summary (Last 24 hours) at 3/28/2025 1322  Last data filed at 3/28/2025 0000  Gross per 24 hour   Intake 240 ml   Output 300 ml   Net -60 ml       Lines/Drains/Airways       Peripheral Intravenous Line  Duration                   Peripheral IV - Single Lumen 03/27/25 0841 20 G Anterior;Distal;Left Upper Arm 1 day                       Physical Exam  Constitutional:       General: He is not in acute distress.     Appearance: He is well-developed. He is obese. He is not ill-appearing, toxic-appearing or diaphoretic.   HENT:      Head: Normocephalic and atraumatic.   Eyes:      General: No scleral icterus.     Extraocular Movements: Extraocular movements intact.      Conjunctiva/sclera: Conjunctivae normal.      Pupils: Pupils are equal, round, and reactive to light.   Neck:      Thyroid: No thyromegaly.      Vascular: No JVD.      Trachea: No tracheal deviation.   Cardiovascular:      Rate and Rhythm: Normal rate.      Heart sounds: S1 normal and S2 normal. No murmur heard.     No friction rub. No gallop.   Pulmonary:      Effort: Pulmonary effort is normal. No respiratory distress.      Breath sounds: Normal breath sounds. No stridor. No wheezing, rhonchi or rales.   Chest:      Chest wall: No tenderness.   Abdominal:      General: There is no distension.      Palpations: Abdomen is soft.   Musculoskeletal:         General: No swelling or tenderness. Normal range of motion.      Cervical back: Normal range of motion and neck supple. No rigidity.      Right lower leg: No edema.      Left lower leg: No edema.   Skin:     General: Skin is warm and dry.      Coloration: Skin is not jaundiced.      Findings: No rash.   Neurological:      General: No focal deficit present.      Mental Status: He is alert and oriented to person, place, and time.      Cranial Nerves: No cranial nerve deficit.   Psychiatric:         Mood and Affect: Mood normal.         Behavior: Behavior normal.            Significant Labs:     DATA:     Laboratory:  CBC:  Recent Labs   Lab 02/14/25  1410 03/27/25  0903 03/28/25  1151   WBC 5.73 5.58 7.11   Hemoglobin 13.6 L  --   --    HGB  --  14.6 16.1   Hematocrit 43.5  --   --    HCT  --  46.4 49.6   Platelet Count  --  122 L  123 L   Platelets 127 L  --   --        CHEMISTRIES:  Recent Labs   Lab 04/19/22  0748 08/09/22  1013 01/13/25 2118 01/14/25  0555 02/14/25  1410 03/27/25  0903 03/28/25  1151   Glucose 158 H   < > 96 100 240 H  --   --    Sodium 143   < > 136 137 137 138 137   Potassium 4.4   < > 3.8 3.5 4.3 4.8 4.5   BUN 18   < > 16 14 16 14 15   Creatinine 1.2   < > 1.0 0.8 1.0 0.9 0.8   eGFR if  >60.0  --   --   --   --   --   --    eGFR if non  58.4 A  --   --   --   --   --   --    Calcium 9.6   < > 9.2 8.2 L 9.1 9.0 9.3   Magnesium  --   --  2.0 1.9  --   --   --     < > = values in this interval not displayed.       CARDIAC BIOMARKERS:  Recent Labs   Lab 01/13/25 2118 03/27/25  0903 03/27/25  1121   Troponin I 0.009  --   --    Troponin-I  --  <0.006 <0.006       COAGS:  Recent Labs   Lab 01/13/25 2118 03/27/25  1121   INR 1.0 1.1       LIPIDS/LFTS:  Recent Labs   Lab 02/07/25  1104 02/14/25  1410 03/27/25  0903 03/27/25  1121 03/28/25  1151   Cholesterol Total  --   --   --  91 L  --    Cholesterol 99 L  --   --   --   --    Triglycerides 60  --   --   --   --    Triglyceride  --   --   --  63  --    HDL 34 L  --   --   --   --    HDL Cholesterol  --   --   --  32 L  --    LDL Cholesterol 53.0 L  --   --   --   --    Non-HDL Cholesterol 65  --   --   --   --    Non HDL Cholesterol  --   --   --  59  --    AST  --  19 29  --  24   ALT  --  19 28  --  25       Hemoglobin A1C   Date Value Ref Range Status   02/07/2025 6.5 (H) 4.0 - 5.6 % Final     Comment:     ADA Screening Guidelines:  5.7-6.4%  Consistent with prediabetes  >or=6.5%  Consistent with diabetes    High levels of fetal hemoglobin interfere with the HbA1C  assay. Heterozygous hemoglobin variants (HbS, HgC, etc)do  not significantly interfere with this assay.   However, presence of multiple variants may affect accuracy.     09/04/2024 6.2 (H) 4.0 - 5.6 % Final     Comment:     ADA Screening Guidelines:  5.7-6.4%  Consistent with  prediabetes  >or=6.5%  Consistent with diabetes    High levels of fetal hemoglobin interfere with the HbA1C  assay. Heterozygous hemoglobin variants (HbS, HgC, etc)do  not significantly interfere with this assay.   However, presence of multiple variants may affect accuracy.     04/24/2024 6.8 (H) 4.0 - 5.6 % Final     Comment:     ADA Screening Guidelines:  5.7-6.4%  Consistent with prediabetes  >or=6.5%  Consistent with diabetes    High levels of fetal hemoglobin interfere with the HbA1C  assay. Heterozygous hemoglobin variants (HbS, HgC, etc)do  not significantly interfere with this assay.   However, presence of multiple variants may affect accuracy.       Hemoglobin A1c   Date Value Ref Range Status   03/27/2025 7.1 (H) 4.0 - 5.6 % Final     Comment:     ADA Screening Guidelines:  5.7-6.4%  Consistent with prediabetes  >=6.5%  Consistent with diabetes    High levels of fetal hemoglobin interfere with the HbA1C  assay. Heterozygous hemoglobin variants (HbS, HgC, etc)do  not significantly interfere with this assay.   However, presence of multiple variants may affect accuracy.       TSH        The ASCVD Risk score (Katharina BUSTILLOS, et al., 2019) failed to calculate for the following reasons:    Risk score cannot be calculated because patient has a medical history suggesting prior/existing ASCVD       BNP    Lab Results   Component Value Date/Time    BNP 82 03/27/2025 09:03 AM    BNP 63 01/13/2025 09:18 PM            ECHO    Results for orders placed during the hospital encounter of 01/13/25    Echo    Interpretation Summary    Left Ventricle: The left ventricle is normal in size. Mildly increased wall thickness. There is mild concentric hypertrophy. There is normal systolic function with a visually estimated ejection fraction of 55 - 60%. Unable to assess diastolic function due to atrial fibrillation.    Right Ventricle: Normal right ventricular cavity size. Systolic function is normal.    Pulmonic Valve: There is a  2.0x1.6 large fixed echogenic spherical mass present.    Pulmonary Artery: The estimated pulmonary artery systolic pressure is 33 mmHg.    Pt appears to be in AF/FL throughout exam.    Consider LUDIN for further investigation of PV findings above.      STRESS TEST    Results for orders placed during the hospital encounter of 03/27/25    Nuclear Stress - Cardiology Interpreted    Interpretation Summary    Normal myocardial perfusion scan. There is no evidence of myocardial ischemia or infarction.    There is a mild intensity fixed perfusion abnormality in the inferior wall of the left ventricle secondary to diaphragm attenuation.    The gated perfusion images showed an ejection fraction of 68% at rest.    There is normal wall motion at rest and post-stress.    The ECG portion of the study is negative for ischemia.    The patient reported no chest pain during the stress test.    During stress, atrial fibrillation is noted.        CATH    Results for orders placed during the hospital encounter of 02/18/25    Intra-Procedure Documentation    Conclusion  Description of the findings of the procedure: uneventful LUDIN/DCCV with anesthesia.    Findings/Key Components:  Normal biventricular size/fxn  LVEF 60%, normal wall motion.  Biatrial enlargement.  No LA/LUCITA thrombus.  Normal appearing valves  Previously noted PV vegetation (TTE 1/14/25) not visualized on this exam.  Mild MR, trace AI/TR.  Mild aortic root dil, 3.7cm    Successful DCCV AF->NSR 61 BPM 200J x1.    Plan:  Cont med rx  Cont eliquis 5mg bid  Home today  Follow up with Dr. Whitehead as planned.    I certify that I was present for the critical steps of the procedure including the diagnostic, surgical and/or interventional portions.    Procedure Log documented by No documenter listed and verified by Abbe Whitehead MD.    Date: 2/18/2025  Time: 11:41 AM      Assessment and Plan:     Brief HPI:     * Chest pain  Somewhat atyp  EKG nonischemic trop neg x2  Check CTA  chest r/o aortopathy  Ex MPI in am  Assuming above neg, OK for discharge and follow up with me as planned 4/1/25 to schedule outpatient LUDIN/DCCV and initiation of flecainide.    3/28:    Stress test did not show any significant ischemia.  No further cardiac workup needed during this hospital stay.  Patient chest pain-free.  Will sign off.  Follow-up in Cardiology Clinic with Dr. Whitehead    Results for orders placed or performed in visit on 03/18/25   IN OFFICE EKG 12-LEAD (to [employer])    Collection Time: 03/18/25  2:16 PM   Result Value Ref Range    QRS Duration 90 ms    [facility] QTC Calculation 422 ms    Narrative    Test Reason : I48.19,    Vent. Rate :  75 BPM     Atrial Rate :    BPM     P-R Int :    ms          QRS Dur :  90 ms      QT Int : 378 ms       P-R-T Axes :     43  26 degrees    QTcB Int : 422 ms    Atrial fibrillation  Abnormal ECG  When compared with ECG of 18-Feb-2025 11:33,  Atrial fibrillation has replaced Sinus rhythm  Confirmed by Robinson Omer ([id number]) on 3/18/2025 6:19:48 PM    Referred By:            Confirmed By: Robinson Omer       Results for orders placed during the hospital encounter of 01/13/25    Echo    Interpretation Summary    Left Ventricle: The left ventricle is normal in size. Mildly increased wall thickness. There is mild concentric hypertrophy. There is normal systolic function with a visually estimated ejection fraction of 55 - 60%. Unable to assess diastolic function due to atrial fibrillation.    Right Ventricle: Normal right ventricular cavity size. Systolic function is normal.    Pulmonic Valve: There is a 2.0x1.6 large fixed echogenic spherical mass present.    Pulmonary Artery: The estimated pulmonary artery systolic pressure is 33 mmHg.    Pt appears to be in AF/FL throughout exam.    Consider LUDIN for further investigation of PV findings above.      Results for orders placed during the hospital encounter of 03/27/25    Nuclear Stress - Cardiology Interpreted    Interpretation  Summary    Normal myocardial perfusion scan. There is no evidence of myocardial ischemia or infarction.    There is a mild intensity fixed perfusion abnormality in the inferior wall of the left ventricle secondary to diaphragm attenuation.    The gated perfusion images showed an ejection fraction of 68% at rest.    There is normal wall motion at rest and post-stress.    The ECG portion of the study is negative for ischemia.    The patient reported no chest pain during the stress test.    During stress, atrial fibrillation is noted.      Results for orders placed during the hospital encounter of 02/18/25    Intra-Procedure Documentation    Conclusion  Description of the findings of the procedure: uneventful LUDIN/DCCV with anesthesia.    Findings/Key Components:  Normal biventricular size/fxn  LVEF 60%, normal wall motion.  Biatrial enlargement.  No LA/LUCITA thrombus.  Normal appearing valves  Previously noted PV vegetation (TTE 1/14/25) not visualized on this exam.  Mild MR, trace AI/TR.  Mild aortic root dil, 3.7cm    Successful DCCV AF->NSR 61 BPM 200J x1.    Plan:  Cont med rx  Cont eliquis 5mg bid  Home today  Follow up with Dr. Whitehead as planned.    I certify that I was present for the critical steps of the procedure including the diagnostic, surgical and/or interventional portions.    Procedure Log documented by No documenter listed and verified by Abbe Whitehead MD.    Date: 2/18/2025  Time: 11:41 AM          Persistent atrial fibrillation  HR controlled  Cont eliquis  Pt to follow up with me after discharge to schedule outpat LUDIN/DCCV and initiation of flecainide    Anticoagulant long-term use  Cont eliquis 5mg bid    Dyslipidemia  Cont statin    Essential hypertension, benign  Cont med rx  Inc toprol 50mg qd  Add losartan 25mg qd    Type 2 diabetes mellitus  Mgmt per IM        VTE Risk Mitigation (From admission, onward)           Ordered     apixaban tablet 5 mg  2 times daily         03/27/25 1207     IP  VTE HIGH RISK PATIENT  Once         03/27/25 1111     Place sequential compression device  Until discontinued         03/27/25 1111                    Ada Salazar MD  Cardiology  [facility]

## 2025-03-28 NOTE — SUBJECTIVE & OBJECTIVE
Interval History: questions about  CT, accompanied by son    Review of Systems   Constitutional:  Negative for activity change, appetite change, chills, diaphoresis and fever.   HENT:  Negative for congestion and rhinorrhea.    Eyes:  Negative for visual disturbance.   Respiratory:  Negative for choking and shortness of breath.    Gastrointestinal:  Negative for abdominal distention, abdominal pain, constipation, diarrhea, nausea and vomiting.   Genitourinary:  Negative for difficulty urinating, dysuria, flank pain, scrotal swelling, testicular pain and urgency.   Skin:  Negative for color change, pallor and wound.   Neurological:  Negative for dizziness and syncope.   Psychiatric/Behavioral:  Negative for confusion and hallucinations.      Objective:     Temp:  [97.6 °F (36.4 °C)-98.6 °F (37 °C)] 98 °F (36.7 °C)  Pulse:  [62-85] 81  Resp:  [18] 18  SpO2:  [92 %-95 %] 95 %  BP: (125-168)/(66-87) 168/81     Body mass index is 31.14 kg/m².           Drains       None                    Physical Exam  Constitutional:       General: He is not in acute distress.     Appearance: He is well-developed. He is obese. He is not ill-appearing, toxic-appearing or diaphoretic.   HENT:      Head: Normocephalic and atraumatic.      Mouth/Throat:      Mouth: Mucous membranes are moist.   Eyes:      Conjunctiva/sclera: Conjunctivae normal.   Pulmonary:      Effort: Pulmonary effort is normal. No respiratory distress.   Abdominal:      General: Abdomen is flat. There is no distension.   Musculoskeletal:         General: No swelling or deformity.      Cervical back: Neck supple.   Skin:     Findings: No bruising or rash.   Neurological:      Mental Status: He is alert and oriented to person, place, and time.   Psychiatric:         Mood and Affect: Mood normal.         Thought Content: Thought content normal.         Judgment: Judgment normal.           Significant Labs:    BMP:  Recent Labs   Lab 03/27/25  0903 03/28/25  1151     137   K 4.8 4.5    106   CO2 21* 22*   BUN 14 15   CREATININE 0.9 0.8   GLUCOSE 208* 150*   CALCIUM 9.0 9.3       CBC:   Recent Labs   Lab 03/27/25  0903 03/28/25  1151   WBC 5.58 7.11   HGB 14.6 16.1   HCT 46.4 49.6   * 123*           Significant Imaging:  CT: I have reviewed all results within the past 24 hours and my personal findings are:  CT scan with contrast enhancing 3cm left upper pole renal mass, partially exophytic

## 2025-03-28 NOTE — SUBJECTIVE & OBJECTIVE
Interval History:  Stress test did not show any significant ischemia    Review of Systems   Constitutional: Negative for chills, diaphoresis, fever and malaise/fatigue.   HENT:  Negative for nosebleeds.    Eyes:  Negative for blurred vision and double vision.   Cardiovascular:  Negative for claudication, cyanosis, dyspnea on exertion, leg swelling, orthopnea, palpitations, paroxysmal nocturnal dyspnea and syncope.   Respiratory:  Negative for cough, shortness of breath and wheezing.    Skin:  Negative for dry skin and poor wound healing.   Musculoskeletal:  Negative for back pain, joint swelling and myalgias.   Gastrointestinal:  Negative for abdominal pain, nausea and vomiting.   Genitourinary:  Negative for hematuria.   Neurological:  Negative for dizziness, headaches, numbness, seizures and weakness.   Psychiatric/Behavioral:  Negative for altered mental status and depression.      Objective:     Vital Signs (Most Recent):  Temp: 98 °F (36.7 °C) (03/28/25 1037)  Pulse: 85 (03/28/25 1046)  Resp: 18 (03/28/25 1037)  BP: (!) 168/81 (03/28/25 1037)  SpO2: 95 % (03/28/25 1037) Vital Signs (24h Range):  Temp:  [97.6 °F (36.4 °C)-98.6 °F (37 °C)] 98 °F (36.7 °C)  Pulse:  [62-85] 85  Resp:  [18] 18  SpO2:  [92 %-95 %] 95 %  BP: (125-168)/(66-87) 168/81     Weight: 113 kg (249 lb 1.9 oz)  Body mass index is 31.14 kg/m².     SpO2: 95 %         Intake/Output Summary (Last 24 hours) at 3/28/2025 1322  Last data filed at 3/28/2025 0000  Gross per 24 hour   Intake 240 ml   Output 300 ml   Net -60 ml       Lines/Drains/Airways       Peripheral Intravenous Line  Duration                  Peripheral IV - Single Lumen 03/27/25 0841 20 G Anterior;Distal;Left Upper Arm 1 day                       Physical Exam  Constitutional:       General: He is not in acute distress.     Appearance: He is well-developed. He is obese. He is not ill-appearing, toxic-appearing or diaphoretic.   HENT:      Head: Normocephalic and atraumatic.   Eyes:       General: No scleral icterus.     Extraocular Movements: Extraocular movements intact.      Conjunctiva/sclera: Conjunctivae normal.      Pupils: Pupils are equal, round, and reactive to light.   Neck:      Thyroid: No thyromegaly.      Vascular: No JVD.      Trachea: No tracheal deviation.   Cardiovascular:      Rate and Rhythm: Normal rate.      Heart sounds: S1 normal and S2 normal. No murmur heard.     No friction rub. No gallop.   Pulmonary:      Effort: Pulmonary effort is normal. No respiratory distress.      Breath sounds: Normal breath sounds. No stridor. No wheezing, rhonchi or rales.   Chest:      Chest wall: No tenderness.   Abdominal:      General: There is no distension.      Palpations: Abdomen is soft.   Musculoskeletal:         General: No swelling or tenderness. Normal range of motion.      Cervical back: Normal range of motion and neck supple. No rigidity.      Right lower leg: No edema.      Left lower leg: No edema.   Skin:     General: Skin is warm and dry.      Coloration: Skin is not jaundiced.      Findings: No rash.   Neurological:      General: No focal deficit present.      Mental Status: He is alert and oriented to person, place, and time.      Cranial Nerves: No cranial nerve deficit.   Psychiatric:         Mood and Affect: Mood normal.         Behavior: Behavior normal.            Significant Labs:     DATA:     Laboratory:  CBC:  Recent Labs   Lab 02/14/25  1410 03/27/25  0903 03/28/25  1151   WBC 5.73 5.58 7.11   Hemoglobin 13.6 L  --   --    HGB  --  14.6 16.1   Hematocrit 43.5  --   --    HCT  --  46.4 49.6   Platelet Count  --  122 L 123 L   Platelets 127 L  --   --        CHEMISTRIES:  Recent Labs   Lab 04/19/22  0748 08/09/22  1013 01/13/25  2118 01/14/25  0555 02/14/25  1410 03/27/25  0903 03/28/25  1151   Glucose 158 H   < > 96 100 240 H  --   --    Sodium 143   < > 136 137 137 138 137   Potassium 4.4   < > 3.8 3.5 4.3 4.8 4.5   BUN 18   < > 16 14 16 14 15   Creatinine 1.2    < > 1.0 0.8 1.0 0.9 0.8   eGFR if  >60.0  --   --   --   --   --   --    eGFR if non  58.4 A  --   --   --   --   --   --    Calcium 9.6   < > 9.2 8.2 L 9.1 9.0 9.3   Magnesium  --   --  2.0 1.9  --   --   --     < > = values in this interval not displayed.       CARDIAC BIOMARKERS:  Recent Labs   Lab 01/13/25 2118 03/27/25 0903 03/27/25  1121   Troponin I 0.009  --   --    Troponin-I  --  <0.006 <0.006       COAGS:  Recent Labs   Lab 01/13/25 2118 03/27/25  1121   INR 1.0 1.1       LIPIDS/LFTS:  Recent Labs   Lab 02/07/25  1104 02/14/25  1410 03/27/25  0903 03/27/25  1121 03/28/25  1151   Cholesterol Total  --   --   --  91 L  --    Cholesterol 99 L  --   --   --   --    Triglycerides 60  --   --   --   --    Triglyceride  --   --   --  63  --    HDL 34 L  --   --   --   --    HDL Cholesterol  --   --   --  32 L  --    LDL Cholesterol 53.0 L  --   --   --   --    Non-HDL Cholesterol 65  --   --   --   --    Non HDL Cholesterol  --   --   --  59  --    AST  --  19 29  --  24   ALT  --  19 28  --  25       Hemoglobin A1C   Date Value Ref Range Status   02/07/2025 6.5 (H) 4.0 - 5.6 % Final     Comment:     ADA Screening Guidelines:  5.7-6.4%  Consistent with prediabetes  >or=6.5%  Consistent with diabetes    High levels of fetal hemoglobin interfere with the HbA1C  assay. Heterozygous hemoglobin variants (HbS, HgC, etc)do  not significantly interfere with this assay.   However, presence of multiple variants may affect accuracy.     09/04/2024 6.2 (H) 4.0 - 5.6 % Final     Comment:     ADA Screening Guidelines:  5.7-6.4%  Consistent with prediabetes  >or=6.5%  Consistent with diabetes    High levels of fetal hemoglobin interfere with the HbA1C  assay. Heterozygous hemoglobin variants (HbS, HgC, etc)do  not significantly interfere with this assay.   However, presence of multiple variants may affect accuracy.     04/24/2024 6.8 (H) 4.0 - 5.6 % Final     Comment:     ADA Screening  Guidelines:  5.7-6.4%  Consistent with prediabetes  >or=6.5%  Consistent with diabetes    High levels of fetal hemoglobin interfere with the HbA1C  assay. Heterozygous hemoglobin variants (HbS, HgC, etc)do  not significantly interfere with this assay.   However, presence of multiple variants may affect accuracy.       Hemoglobin A1c   Date Value Ref Range Status   03/27/2025 7.1 (H) 4.0 - 5.6 % Final     Comment:     ADA Screening Guidelines:  5.7-6.4%  Consistent with prediabetes  >=6.5%  Consistent with diabetes    High levels of fetal hemoglobin interfere with the HbA1C  assay. Heterozygous hemoglobin variants (HbS, HgC, etc)do  not significantly interfere with this assay.   However, presence of multiple variants may affect accuracy.       TSH        The ASCVD Risk score (Katharina BUSTILLOS, et al., 2019) failed to calculate for the following reasons:    Risk score cannot be calculated because patient has a medical history suggesting prior/existing ASCVD       BNP    Lab Results   Component Value Date/Time    BNP 82 03/27/2025 09:03 AM    BNP 63 01/13/2025 09:18 PM            ECHO    Results for orders placed during the hospital encounter of 01/13/25    Echo    Interpretation Summary    Left Ventricle: The left ventricle is normal in size. Mildly increased wall thickness. There is mild concentric hypertrophy. There is normal systolic function with a visually estimated ejection fraction of 55 - 60%. Unable to assess diastolic function due to atrial fibrillation.    Right Ventricle: Normal right ventricular cavity size. Systolic function is normal.    Pulmonic Valve: There is a 2.0x1.6 large fixed echogenic spherical mass present.    Pulmonary Artery: The estimated pulmonary artery systolic pressure is 33 mmHg.    Pt appears to be in AF/FL throughout exam.    Consider LUDIN for further investigation of PV findings above.      STRESS TEST    Results for orders placed during the hospital encounter of 03/27/25    Nuclear Stress  - Cardiology Interpreted    Interpretation Summary    Normal myocardial perfusion scan. There is no evidence of myocardial ischemia or infarction.    There is a mild intensity fixed perfusion abnormality in the inferior wall of the left ventricle secondary to diaphragm attenuation.    The gated perfusion images showed an ejection fraction of 68% at rest.    There is normal wall motion at rest and post-stress.    The ECG portion of the study is negative for ischemia.    The patient reported no chest pain during the stress test.    During stress, atrial fibrillation is noted.        CATH    Results for orders placed during the hospital encounter of 02/18/25    Intra-Procedure Documentation    Conclusion  Description of the findings of the procedure: uneventful LUDIN/DCCV with anesthesia.    Findings/Key Components:  Normal biventricular size/fxn  LVEF 60%, normal wall motion.  Biatrial enlargement.  No LA/LUCITA thrombus.  Normal appearing valves  Previously noted PV vegetation (TTE 1/14/25) not visualized on this exam.  Mild MR, trace AI/TR.  Mild aortic root dil, 3.7cm    Successful DCCV AF->NSR 61 BPM 200J x1.    Plan:  Cont med rx  Cont eliquis 5mg bid  Home today  Follow up with Dr. Whitehead as planned.    I certify that I was present for the critical steps of the procedure including the diagnostic, surgical and/or interventional portions.    Procedure Log documented by No documenter listed and verified by Abbe Whitehead MD.    Date: 2/18/2025  Time: 11:41 AM

## 2025-03-28 NOTE — PLAN OF CARE
Nurse notified that all CM needs are met       03/28/25 1621   Final Note   Assessment Type Final Discharge Note   Anticipated Discharge Disposition Home   Hospital Resources/Appts/Education Provided Appointments scheduled and added to AVS   Post-Acute Status   Post-Acute Authorization Other   Other Status No Post-Acute Service Needs   Discharge Delays None known at this time

## 2025-03-28 NOTE — ASSESSMENT & PLAN NOTE
Patient's FSGs are uncontrolled due to hyperglycemia on current medication regimen.  Last A1c reviewed-   Lab Results   Component Value Date    HGBA1C 7.1 (H) 03/27/2025     Most recent fingerstick glucose reviewed-   Recent Labs   Lab 03/27/25  1924 03/28/25  0720 03/28/25  1104   POCTGLUCOSE 144* 151* 155*     Current correctional scale  Medium  Maintain anti-hyperglycemic dose as follows-   Antihyperglycemics (From admission, onward)      Start     Stop Route Frequency Ordered    03/27/25 1216  insulin aspart U-100 pen 0-10 Units         -- SubQ Before meals & nightly PRN 03/27/25 1116          Hold Oral hypoglycemics while patient is in the hospital.

## 2025-03-28 NOTE — HOSPITAL COURSE
Admitted for further evaluation of L sided chest pain with radiation to the arm. Cardiology was consulted with recommendations to obtain stress testing to evaluate for ischemia or infarction. Stress test showed no evidence of myocardial ischemia or infarction. A Left sided renal mass was incidentally found on imaging during hospitalization. Urology was consulted with recommendations to obtain additional imaging for further evaluation of the mass. CT abd/pel showed a 3 cm maximum dimension left upper pole renal mass compatible with renal cell carcinoma until proven otherwise. No evidence of intra-abdominal metastatic disease. A referral was placed to outpatient Urology for further surveillance and treatment. Patient is hemodynamically stable for discharge.

## 2025-03-28 NOTE — ASSESSMENT & PLAN NOTE
79 y.o. M with T2DM, HLD, AKIN, Persistent A. Fib presented to the ED c/o L sided CP with radiation to the left arm. Improved with Nitroglycerin. Denies previous episodes of chest pain. EKG A. Flutter with a variable AV block.   S/p LUDIN/DCCV on 02/18 with return to NSR, however, noted to be back in A. Fib on 03/18. Compliant with Eliquis and Metoprolol.     Differentials to include hypertensive urgency vs. ACS     - Trend troponin  - Continuous telemetry  - SL Nitro prn pain if BP permits  - See most recent echo results below  - Cardiology has been consulted. Plan for stress test today. Ok to d/c if negative.     Results for orders placed during the hospital encounter of 01/13/25    Echo    Interpretation Summary    Left Ventricle: The left ventricle is normal in size. Mildly increased wall thickness. There is mild concentric hypertrophy. There is normal systolic function with a visually estimated ejection fraction of 55 - 60%. Unable to assess diastolic function due to atrial fibrillation.    Right Ventricle: Normal right ventricular cavity size. Systolic function is normal.    Pulmonic Valve: There is a 2.0x1.6 large fixed echogenic spherical mass present.    Pulmonary Artery: The estimated pulmonary artery systolic pressure is 33 mmHg.    Pt appears to be in AF/FL throughout exam.    Consider LUDIN for further investigation of PV findings above.

## 2025-03-28 NOTE — PLAN OF CARE
Patient remained safe/free from falls during shift. AAOx4 w/ no acute distress noted. Tolerated med well. No complaints of chest pain. Bed is locked in lowest position, side rails up x's 2 w/ call light in reach. Son is at the bedside. Plan of care is on-going.       Problem: Adult Inpatient Plan of Care  Goal: Plan of Care Review  Outcome: Progressing     Problem: Diabetes Comorbidity  Goal: Blood Glucose Level Within Targeted Range  Outcome: Progressing     Problem: Acute Coronary Syndrome  Goal: Optimal Adaptation to Illness  Outcome: Progressing  Goal: Absence of Cardiac-Related Pain  Outcome: Progressing  Goal: Normalized Cardiac Rhythm  Outcome: Progressing  Goal: Effective Cardiac Pump Function  Outcome: Progressing  Goal: Adequate Tissue Perfusion  Outcome: Progressing

## 2025-03-28 NOTE — PLAN OF CARE
03/28/25 0757   Discharge Planning   Assessment Type Discharge Planning Brief Assessment   Resource/Environmental Concerns none   Support Systems Children   Equipment Currently Used at Home none   Current Living Arrangements home   Patient/Family Anticipates Transition to home   Patient/Family Anticipated Services at Transition none   DME Needed Upon Discharge  none   Discharge Plan A Home with family  (with instructions to follow up)       [facility] - [place], [place] - [address]  [address]  [facility] [place] [address]  Phone: [phone] Fax: [phone]    [facility] [address]  [address]  [place] [address]  Phone: [phone] Fax: [phone]    [facility] [address] [facility]  [address]   [place] [address]  Phone: [phone] Fax: [phone]

## 2025-03-28 NOTE — ASSESSMENT & PLAN NOTE
Somewhat atyp  EKG nonischemic trop neg x2  Check CTA chest r/o aortopathy  Ex MPI in am  Assuming above neg, OK for discharge and follow up with me as planned 4/1/25 to schedule outpatient LUDIN/DCCV and initiation of flecainide.    3/28:    Stress test did not show any significant ischemia.  No further cardiac workup needed during this hospital stay.  Patient chest pain-free.  Will sign off.  Follow-up in Cardiology Clinic with Dr. Whitehead    Results for orders placed or performed in visit on 03/18/25   IN OFFICE EKG 12-LEAD (to [place])    Collection Time: 03/18/25  2:16 PM   Result Value Ref Range    QRS Duration 90 ms    [facility] QTC Calculation 422 ms    Narrative    Test Reason : I48.19,    Vent. Rate :  75 BPM     Atrial Rate :    BPM     P-R Int :    ms          QRS Dur :  90 ms      QT Int : 378 ms       P-R-T Axes :     43  26 degrees    QTcB Int : 422 ms    Atrial fibrillation  Abnormal ECG  When compared with ECG of 18-Feb-2025 11:33,  Atrial fibrillation has replaced Sinus rhythm  Confirmed by Robinson Omer ([id number]) on 3/18/2025 6:19:48 PM    Referred By:            Confirmed By: Robinson Omer       Results for orders placed during the hospital encounter of 01/13/25    Echo    Interpretation Summary    Left Ventricle: The left ventricle is normal in size. Mildly increased wall thickness. There is mild concentric hypertrophy. There is normal systolic function with a visually estimated ejection fraction of 55 - 60%. Unable to assess diastolic function due to atrial fibrillation.    Right Ventricle: Normal right ventricular cavity size. Systolic function is normal.    Pulmonic Valve: There is a 2.0x1.6 large fixed echogenic spherical mass present.    Pulmonary Artery: The estimated pulmonary artery systolic pressure is 33 mmHg.    Pt appears to be in AF/FL throughout exam.    Consider LUDIN for further investigation of PV findings above.      Results for orders placed during the hospital encounter of 03/27/25    Nuclear  Stress - Cardiology Interpreted    Interpretation Summary    Normal myocardial perfusion scan. There is no evidence of myocardial ischemia or infarction.    There is a mild intensity fixed perfusion abnormality in the inferior wall of the left ventricle secondary to diaphragm attenuation.    The gated perfusion images showed an ejection fraction of 68% at rest.    There is normal wall motion at rest and post-stress.    The ECG portion of the study is negative for ischemia.    The patient reported no chest pain during the stress test.    During stress, atrial fibrillation is noted.      Results for orders placed during the hospital encounter of 02/18/25    Intra-Procedure Documentation    Conclusion  Description of the findings of the procedure: uneventful LUDIN/DCCV with anesthesia.    Findings/Key Components:  Normal biventricular size/fxn  LVEF 60%, normal wall motion.  Biatrial enlargement.  No LA/LUCITA thrombus.  Normal appearing valves  Previously noted PV vegetation (TTE 1/14/25) not visualized on this exam.  Mild MR, trace AI/TR.  Mild aortic root dil, 3.7cm    Successful DCCV AF->NSR 61 BPM 200J x1.    Plan:  Cont med rx  Cont eliquis 5mg bid  Home today  Follow up with Dr. Whitehead as planned.    I certify that I was present for the critical steps of the procedure including the diagnostic, surgical and/or interventional portions.    Procedure Log documented by No documenter listed and verified by Abbe Whitehead MD.    Date: 2/18/2025  Time: 11:41 AM

## 2025-03-28 NOTE — ASSESSMENT & PLAN NOTE
Urology consulted. Recommends additional imaging to further evaluate mass.   Will obtain CT abd/pel while inpatient.  Outpatient follow-up with Urology on discharge.

## 2025-03-28 NOTE — PLAN OF CARE
Problem: Adult Inpatient Plan of Care  Goal: Plan of Care Review  Outcome: Adequate for Care Transition  Goal: Patient-Specific Goal (Individualized)  Outcome: Adequate for Care Transition  Goal: Absence of Hospital-Acquired Illness or Injury  Outcome: Adequate for Care Transition  Goal: Optimal Comfort and Wellbeing  Outcome: Adequate for Care Transition  Goal: Readiness for Transition of Care  Outcome: Adequate for Care Transition     Problem: Acute Coronary Syndrome  Goal: Optimal Adaptation to Illness  Outcome: Adequate for Care Transition  Goal: Absence of Cardiac-Related Pain  Outcome: Adequate for Care Transition  Goal: Normalized Cardiac Rhythm  Outcome: Adequate for Care Transition  Goal: Effective Cardiac Pump Function  Outcome: Adequate for Care Transition  Goal: Adequate Tissue Perfusion  Outcome: Adequate for Care Transition     Problem: Diabetes Comorbidity  Goal: Blood Glucose Level Within Targeted Range  Outcome: Adequate for Care Transition

## 2025-03-28 NOTE — ASSESSMENT & PLAN NOTE
Patient has persistent (7 days or more) atrial fibrillation. Patient is currently in atrial fibrillation. [id number] Score: 3. The patients heart rate in the last 24 hours is as follows:  Pulse  Min: 62  Max: 85     Antiarrhythmics  metoprolol succinate (TOPROL-XL) 24 hr tablet 50 mg, Daily, Oral    Anticoagulants  apixaban tablet 5 mg, 2 times daily, Oral    Plan  - Replete lytes with a goal of K>4, Mg >2  - Patient is anticoagulated, see medications listed above.  - Patient's afib is currently controlled

## 2025-03-28 NOTE — NURSING
Pt leaving unit to Nuclear Medicine via transporter tech. Pt in no acute distress. Vitals per chart. IV patent.

## 2025-03-28 NOTE — NURSING
Pt discharged per MD order. IV removed. Catheter tip intact. No distress noted. Discharge instructions reviewed with pt and family. Given the opportunity for questions. All questions answered. AVS given to pt. Patient verbalized understanding of all instructions. Vitals per chart. afebrile. No complaints of pain, N/V, diarrhea, or SOB. Transport requested.

## 2025-03-28 NOTE — NURSING
[facility] [place]urses Note -- 4 Eyes      3/27/2025       Skin assessed on: Q Shift      [x] No Pressure Injuries Present    []Prevention Measures Documented    [] Yes LDA  for Pressure Injury Previously documented     [] Yes New Pressure Injury Discovered   [] LDA for New Pressure Injury Added      Attending RN:  Isaac Luna LPN     Second RN:  MARTHA Neil

## 2025-03-28 NOTE — DISCHARGE INSTRUCTIONS
Our goal at [facility] is to always give you outstanding care and exceptional service. You may receive a survey from [employer] by mail, text or e-mail in the next 24-48 hours asking about the care you received with us. The survey should only take 5-10 minutes to complete and is very important to us.     Your feedback provides us with a way to recognize our staff who work tirelessly to provide the best care! Also, your responses help us learn how to improve when your experience was below our aspiration of excellence. We are always looking for ways to improve your stay. We WILL use your feedback to continue making improvements to help us provide the highest quality care. We keep your personal information and feedback confidential. We appreciate your time completing this survey and can't wait to hear from you!!!    We look forward to your continued care with us! Thanks so much for choosing [facility] for your healthcare needs!

## 2025-03-28 NOTE — NURSING
[facility] [place]  Nurses Note -- 4 Eyes      3/28/2025       Skin assessed on: Q Shift      [x] No Pressure Injuries Present    [x]Prevention Measures Documented    [] Yes LDA  for Pressure Injury Previously documented     [] Yes New Pressure Injury Discovered   [] LDA for New Pressure Injury Added      Attending RN:  Vu Nash RN     Second RN:  RESHMA Gray

## 2025-03-28 NOTE — PROGRESS NOTES
[facility] Medicine  Progress Note    Patient Name: Rosendo Thomas  MRN: [id number]  Patient Class: OP- Observation   Admission Date: 3/27/2025  Length of Stay: 0 days  Attending Physician: Edson Luna MD  Primary Care Provider: Dede Eric MD        Subjective     Principal Problem:Chest pain        HPI:  Rosendo Thomas is a pleasant 79 y.o. M with a history of T2DM, Persistent Atrial Fibrillation, hyperlipidemia, and AKIN who presented to the Emergency Department c/o left anterior chest pain with radiation to the left shoulder. He reports initially noticing the pain last Friday, however, he believed he may have pulled a muscle. The pain continued to worsen in severity until it reached a peak this morning. He reports improvement of the pain with Nitroglycerin. Of note, the patient was recently hospitalized for influenza and noted to be in A. Fib. He reports to have been asymptomatic at this time and denies any symptoms with previous episodes of A. Fib. He underwent LUDIN/DCCV on 02/18 with return to NSR, however, was again noted to be in A. Fib during his appointment with Dr. Whitehead on 03/18. He reports strict compliance with his Eliquis and Metoprolol. Denies SOB, n/v, palpitations, fever or chills.     In ED: Severely hypertensive on arrival ( - 200's). Vitals otherwise stable and wnl. CBC w/o anemia or leukocytosis. CMP notable for mildly decreased CO2 (21) and hyperglycemia (208). BNP and initial troponin wnl.  EKG showing A. Flutter with a variable AV block.  CXR showed coarse interstitial lung markings with a lower lobe predominance favored to represent pulmonary edema. Infectious process is considered less likely. Admitted to Hospital Medicine for further evaluation.        Overview/Hospital Course:  No notes on file    Interval History: VSS, NAD. Complaining of pain to L shoulder/scapula. D/c pending stress test results and CT abd/pel.      Review of Systems   Constitutional:   Negative for activity change, appetite change, chills, fatigue and fever.   Respiratory:  Negative for cough and shortness of breath.    Cardiovascular:  Negative for chest pain, palpitations and leg swelling.   Gastrointestinal:  Negative for abdominal pain, constipation, diarrhea, nausea and vomiting.   Musculoskeletal:  Positive for arthralgias (L shoulder) and back pain.   Neurological:  Negative for dizziness, syncope, weakness and light-headedness.     Objective:     Vital Signs (Most Recent):  Temp: 98 °F (36.7 °C) (03/28/25 1037)  Pulse: 85 (03/28/25 1046)  Resp: 18 (03/28/25 1037)  BP: (!) 168/81 (03/28/25 1037)  SpO2: 95 % (03/28/25 1037) Vital Signs (24h Range):  Temp:  [97.6 °F (36.4 °C)-98.6 °F (37 °C)] 98 °F (36.7 °C)  Pulse:  [62-85] 85  Resp:  [18] 18  SpO2:  [92 %-95 %] 95 %  BP: (125-171)/(66-87) 168/81     Weight: 113 kg (249 lb 1.9 oz)  Body mass index is 31.14 kg/m².    Intake/Output Summary (Last 24 hours) at 3/28/2025 1209  Last data filed at 3/28/2025 0000  Gross per 24 hour   Intake 240 ml   Output 800 ml   Net -560 ml         Physical Exam  Vitals and nursing note reviewed.   Constitutional:       Appearance: He is obese.   HENT:      Head: Normocephalic.      Mouth/Throat:      Pharynx: Oropharynx is clear.   Cardiovascular:      Rate and Rhythm: Normal rate. Rhythm irregular.   Pulmonary:      Effort: Pulmonary effort is normal. No respiratory distress.      Breath sounds: No wheezing or rales.   Musculoskeletal:      Right lower leg: No edema.      Left lower leg: No edema.   Skin:     General: Skin is warm and dry.   Neurological:      General: No focal deficit present.      Mental Status: He is alert. Mental status is at baseline.   Psychiatric:         Mood and Affect: Mood normal.         Behavior: Behavior normal.               Significant Labs: All pertinent labs within the past 24 hours have been reviewed.    Significant Imaging: I have reviewed all pertinent imaging  results/findings within the past 24 hours.      Assessment & Plan  Chest pain  79 y.o. M with T2DM, HLD, AKIN, Persistent A. Fib presented to the ED c/o L sided CP with radiation to the left arm. Improved with Nitroglycerin. Denies previous episodes of chest pain. EKG A. Flutter with a variable AV block.   S/p LUDIN/DCCV on 02/18 with return to NSR, however, noted to be back in A. Fib on 03/18. Compliant with Eliquis and Metoprolol.     Differentials to include hypertensive urgency vs. ACS     - Trend troponin  - Continuous telemetry  - SL Nitro prn pain if BP permits  - See most recent echo results below  - Cardiology has been consulted. Plan for stress test today. Ok to d/c if negative.     Results for orders placed during the hospital encounter of 01/13/25    Echo    Interpretation Summary    Left Ventricle: The left ventricle is normal in size. Mildly increased wall thickness. There is mild concentric hypertrophy. There is normal systolic function with a visually estimated ejection fraction of 55 - 60%. Unable to assess diastolic function due to atrial fibrillation.    Right Ventricle: Normal right ventricular cavity size. Systolic function is normal.    Pulmonic Valve: There is a 2.0x1.6 large fixed echogenic spherical mass present.    Pulmonary Artery: The estimated pulmonary artery systolic pressure is 33 mmHg.    Pt appears to be in AF/FL throughout exam.    Consider LUDIN for further investigation of PV findings above.     Type 2 diabetes mellitus  Patient's FSGs are uncontrolled due to hyperglycemia on current medication regimen.  Last A1c reviewed-   Lab Results   Component Value Date    HGBA1C 7.1 (H) 03/27/2025     Most recent fingerstick glucose reviewed-   Recent Labs   Lab 03/27/25  1924 03/28/25  0720 03/28/25  1104   POCTGLUCOSE 144* 151* 155*     Current correctional scale  Medium  Maintain anti-hyperglycemic dose as follows-   Antihyperglycemics (From admission, onward)      Start     Stop Route  Frequency Ordered    03/27/25 1216  insulin aspart U-100 pen 0-10 Units         -- SubQ Before meals & nightly PRN 03/27/25 1116          Hold Oral hypoglycemics while patient is in the hospital.  Essential hypertension, benign  Patient's blood pressure range in the last 24 hours was: BP  Min: 125/66  Max: 171/72.The patient's inpatient anti-hypertensive regimen is listed below:  Current Antihypertensives  nitroGLYCERIN SL tablet 0.4 mg, Every 5 min PRN, Sublingual  losartan tablet 25 mg, Daily, Oral  metoprolol succinate (TOPROL-XL) 24 hr tablet 50 mg, Daily, Oral    Plan  - BP is controlled, no changes needed to their regimen  Dyslipidemia  - Continue Lipitor    Anticoagulant long-term use  This patient has long term use on an anticoagulant with Select Anticoagulant(s): Direct oral anticoagulant: Apixaban (Eliquis). Their long term anticoagulation will be Held or Continued: continued. They are on long term anticoagulation due to Reason for Anticoagulation: Atrial fibrillation.   Persistent atrial fibrillation  Patient has persistent (7 days or more) atrial fibrillation. Patient is currently in atrial fibrillation. [id number] Score: 3. The patients heart rate in the last 24 hours is as follows:  Pulse  Min: 62  Max: 85     Antiarrhythmics  metoprolol succinate (TOPROL-XL) 24 hr tablet 50 mg, Daily, Oral    Anticoagulants  apixaban tablet 5 mg, 2 times daily, Oral    Plan  - Replete lytes with a goal of K>4, Mg >2  - Patient is anticoagulated, see medications listed above.  - Patient's afib is currently controlled      Left renal mass  Urology consulted. Recommends additional imaging to further evaluate mass.   Will obtain CT abd/pel while inpatient.  Outpatient follow-up with Urology on discharge.   VTE Risk Mitigation (From admission, onward)           Ordered     apixaban tablet 5 mg  2 times daily         03/27/25 1207     IP VTE HIGH RISK PATIENT  Once         03/27/25 1111     Place sequential compression  device  Until discontinued         03/27/25 1111                    Discharge Planning   JESSICA:      Code Status: Full Code   Medical Readiness for Discharge Date:   Discharge Plan A: Home with family (with instructions to follow up)                        Mary Denney PA-C  Department of Hospital Medicine   [facility]

## 2025-03-28 NOTE — ASSESSMENT & PLAN NOTE
Patient's blood pressure range in the last 24 hours was: BP  Min: 125/66  Max: 168/81.The patient's inpatient anti-hypertensive regimen is listed below:  Current Antihypertensives  nitroGLYCERIN SL tablet 0.4 mg, Every 5 min PRN, Sublingual  losartan tablet 25 mg, Daily, Oral  metoprolol succinate (TOPROL-XL) 24 hr tablet 50 mg, Daily, Oral    Plan  - BP is controlled, no changes needed to their regimen

## 2025-03-28 NOTE — ASSESSMENT & PLAN NOTE
Patient has persistent (7 days or more) atrial fibrillation. Patient is currently in atrial fibrillation. [id number] Score: 3. The patients heart rate in the last 24 hours is as follows:  Pulse  Min: 62  Max: 85     Antiarrhythmics  metoprolol succinate (TOPROL-XL) 24 hr tablet 50 mg, Daily, Oral    Anticoagulants  apixaban tablet 5 mg, 2 times daily, Oral    Plan  - Replete lytes with a goal of K>4, Mg >2  - Patient is anticoagulated, see medications listed above.  - Patient's afib is currently controlled

## 2025-03-28 NOTE — SUBJECTIVE & OBJECTIVE
Interval History: VSS, NAD. Complaining of pain to L shoulder/scapula. D/c pending stress test results and CT abd/pel.      Review of Systems   Constitutional:  Negative for activity change, appetite change, chills, fatigue and fever.   Respiratory:  Negative for cough and shortness of breath.    Cardiovascular:  Negative for chest pain, palpitations and leg swelling.   Gastrointestinal:  Negative for abdominal pain, constipation, diarrhea, nausea and vomiting.   Musculoskeletal:  Positive for arthralgias (L shoulder) and back pain.   Neurological:  Negative for dizziness, syncope, weakness and light-headedness.     Objective:     Vital Signs (Most Recent):  Temp: 98 °F (36.7 °C) (03/28/25 1037)  Pulse: 85 (03/28/25 1046)  Resp: 18 (03/28/25 1037)  BP: (!) 168/81 (03/28/25 1037)  SpO2: 95 % (03/28/25 1037) Vital Signs (24h Range):  Temp:  [97.6 °F (36.4 °C)-98.6 °F (37 °C)] 98 °F (36.7 °C)  Pulse:  [62-85] 85  Resp:  [18] 18  SpO2:  [92 %-95 %] 95 %  BP: (125-171)/(66-87) 168/81     Weight: 113 kg (249 lb 1.9 oz)  Body mass index is 31.14 kg/m².    Intake/Output Summary (Last 24 hours) at 3/28/2025 1209  Last data filed at 3/28/2025 0000  Gross per 24 hour   Intake 240 ml   Output 800 ml   Net -560 ml         Physical Exam  Vitals and nursing note reviewed.   Constitutional:       Appearance: He is obese.   HENT:      Head: Normocephalic.      Mouth/Throat:      Pharynx: Oropharynx is clear.   Cardiovascular:      Rate and Rhythm: Normal rate. Rhythm irregular.   Pulmonary:      Effort: Pulmonary effort is normal. No respiratory distress.      Breath sounds: No wheezing or rales.   Musculoskeletal:      Right lower leg: No edema.      Left lower leg: No edema.   Skin:     General: Skin is warm and dry.   Neurological:      General: No focal deficit present.      Mental Status: He is alert. Mental status is at baseline.   Psychiatric:         Mood and Affect: Mood normal.         Behavior: Behavior normal.                Significant Labs: All pertinent labs within the past 24 hours have been reviewed.    Significant Imaging: I have reviewed all pertinent imaging results/findings within the past 24 hours.

## 2025-03-31 LAB
OHS QRS DURATION: 90 MS
OHS QTC CALCULATION: 380 MS

## 2025-04-01 ENCOUNTER — OFFICE VISIT (OUTPATIENT)
Dept: CARDIOLOGY | Facility: CLINIC | Age: 80
End: 2025-04-01
Payer: MEDICARE

## 2025-04-01 VITALS
OXYGEN SATURATION: 98 % | RESPIRATION RATE: 18 BRPM | WEIGHT: 245.81 LBS | SYSTOLIC BLOOD PRESSURE: 142 MMHG | BODY MASS INDEX: 30.56 KG/M2 | HEART RATE: 58 BPM | HEIGHT: 75 IN | DIASTOLIC BLOOD PRESSURE: 74 MMHG

## 2025-04-01 DIAGNOSIS — Z01.810 PREOP CARDIOVASCULAR EXAM: ICD-10-CM

## 2025-04-01 DIAGNOSIS — Z79.01 CHRONIC ANTICOAGULATION: ICD-10-CM

## 2025-04-01 DIAGNOSIS — N28.89 RENAL MASS: ICD-10-CM

## 2025-04-01 DIAGNOSIS — I10 ESSENTIAL HYPERTENSION, BENIGN: ICD-10-CM

## 2025-04-01 DIAGNOSIS — I48.19 PERSISTENT ATRIAL FIBRILLATION: Primary | ICD-10-CM

## 2025-04-01 DIAGNOSIS — G89.29 CHRONIC LEFT SHOULDER PAIN: ICD-10-CM

## 2025-04-01 DIAGNOSIS — I77.810 AORTIC ROOT DILATATION: ICD-10-CM

## 2025-04-01 DIAGNOSIS — E78.5 DYSLIPIDEMIA: ICD-10-CM

## 2025-04-01 DIAGNOSIS — E66.9 NON MORBID OBESITY, UNSPECIFIED OBESITY TYPE: ICD-10-CM

## 2025-04-01 DIAGNOSIS — G47.33 OSA (OBSTRUCTIVE SLEEP APNEA): ICD-10-CM

## 2025-04-01 DIAGNOSIS — M25.512 CHRONIC LEFT SHOULDER PAIN: ICD-10-CM

## 2025-04-01 PROCEDURE — 99215 OFFICE O/P EST HI 40 MIN: CPT | Mod: PBBFAC | Performed by: INTERNAL MEDICINE

## 2025-04-01 PROCEDURE — 99999 PR PBB SHADOW E&M-EST. PATIENT-LVL V: CPT | Mod: PBBFAC,,, | Performed by: INTERNAL MEDICINE

## 2025-04-01 PROCEDURE — 99215 OFFICE O/P EST HI 40 MIN: CPT | Mod: S$PBB,,, | Performed by: INTERNAL MEDICINE

## 2025-04-01 PROCEDURE — G2211 COMPLEX E/M VISIT ADD ON: HCPCS | Mod: S$PBB,,, | Performed by: INTERNAL MEDICINE

## 2025-04-01 NOTE — H&P (VIEW-ONLY)
CARDIOVASCULAR PROGRESS NOTE    REASON FOR CONSULT:   Rosendo Thomas is a 79 y.o. male who presents for follow up of Persistent AF.    PCP: Hernesto  EP: Yonatan  Uro:  Yong  HISTORY OF PRESENT ILLNESS:   The patient returns for follow up.  In the interim since his last office visit, he was seen in the emergency room for chest/left shoulder pain.  He underwent CTA of the chest which was negative for dissection but did note a renal mass.  Subsequent nuclear stress testing was normal.  He returns now for follow up.  He continues to describe left scapular pain radiating to the shoulder and around to the left side of his chest.  This is not get worse with exertion.  It did not get worse when he was on the treadmill for his stress test.  There was some positional in nature to this.  I plan to refer him to pain management for further assessment as this may be radicular.  Urology is planning eventual nephrectomy for his renal mass.  Given the patient's normal nuclear stress test, I do not feel his cardiac risk is excessive.  He does continue to complain of fatigue and remains in atrial fibrillation with a controlled ventricular response.  He was seen by EP with plans for rhythm control and eventual PVI.  The patient wishes to have his cardioversion here in the [facility].  We will initiate flecainide thereafter.    CARDIOVASCULAR HISTORY:   Persistent AF on eliquis 5mg bid, s/p LUDIN/DCCV 2/18/25.    Ao root dil, 3.7cm (LUDIN 2/2025)    PAST MEDICAL HISTORY:     Past Medical History:   Diagnosis Date    Colon cancer     Colon polyps     Diabetes mellitus type II 07/2010    diet controlled    Obesity     Paroxysmal atrial fibrillation     Psoriasis     Sleep apnea, unspecified     Squamous cell carcinoma     Type 2 diabetes mellitus        PAST SURGICAL HISTORY:     Past Surgical History:   Procedure Laterality Date    APPENDECTOMY      CARDIOVERSION N/A 2/18/2025    Procedure: Cardioversion;  Surgeon: Abbe Whitehead MD;   Location: [facility] CATH LAB;  Service: Cardiology;  Laterality: N/A;  before 1230pm    COLONOSCOPY N/A 07/09/2018    Procedure: COLONOSCOPY;  Surgeon: Kameron Ruff MD;  Location: [facility] ENDO;  Service: Endoscopy;  Laterality: N/A;  confirmed    COLONOSCOPY N/A 11/11/2019    Procedure: COLONOSCOPY;  Surgeon: Victor Hugo Nunez MD;  Location: [facility] ENDO (4TH FLR);  Service: Endoscopy;  Laterality: N/A;  Pt stated this is the only day he has somebody to drive him and  to stay during procedure  PM Prep    COLONOSCOPY N/A 07/08/2020    Procedure: COLONOSCOPY;  Surgeon: Victor Hugo Nunez MD;  Location: [facility] ENDO (2ND FLR);  Service: Endoscopy;  Laterality: N/A;  Covid-19 test 7/5/20 at [facility]    EXTREMITY CYST EXCISION      LAPAROSCOPIC LEFT COLECTOMY N/A 09/25/2018    Procedure: COLECTOMY-LAPAROSCOPIC LEFT;  Surgeon: Chito Banks MD;  Location: [facility] OR 2ND FLR;  Service: Colon and Rectal;  Laterality: N/A;    SKIN CANCER EXCISION      SKIN GRAFT      TRANSESOPHAGEAL ECHOCARDIOGRAM WITH POSSIBLE CARDIOVERSION (LUDIN W/ POSS CARDIOVERSION) N/A 2/18/2025    Procedure: TRANSESOPHAGEAL ECHOCARDIOGRAM WITH POSSIBLE CARDIOVERSION (LUDIN W/ POSS CARDIOVERSION);  Surgeon: Abbe Whitehead MD;  Location: [facility] CATH LAB;  Service: Cardiology;  Laterality: N/A;  before 1230pm  RN PRE OP 2/14/2025    VASECTOMY      VASECTOMY         ALLERGIES AND MEDICATION:     Review of patient's allergies indicates:   Allergen Reactions    Influenza virus vaccines     Metformin Diarrhea        Medication List            Accurate as of April 1, 2025  1:31 PM. If you have any questions, ask your nurse or doctor.                CONTINUE taking these medications      apixaban 5 mg Tab  Commonly known as: ELIQUIS  Take 1 tablet (5 mg total) by mouth 2 (two) times daily.     atorvastatin 20 MG tablet  Commonly known as: LIPITOR  take 1 tablet by mouth once daily     B COMPLEX ORAL     blood sugar diagnostic Strp  Commonly  known as: CONTOUR TEST STRIPS  1 each by Misc.(Non-Drug; Combo Route) route 2 (two) times daily.     fluticasone propionate 50 mcg/actuation nasal spray  Commonly known as: FLONASE     glipiZIDE 2.5 MG Tr24  Commonly known as: GLUCOTROL  Take 2 tablets (5 mg total) by mouth daily with breakfast.     lancets Misc  USE ONE LANCET TWO TIMES DAILY     MAGNESIUM ORAL     metoprolol succinate 25 MG 24 hr tablet  Commonly known as: TOPROL-XL  Take 1 tablet (25 mg total) by mouth once daily.     TALTZ AUTOINJECTOR 80 mg/mL Atin  Generic drug: ixekizumab  Inject 80 mg into the skin every 28 days. Starting on week 12     JENNI-C ORAL     VITAMIN B-12 ORAL     ZINC ORAL              SOCIAL HISTORY:     Social History     Socioeconomic History    Marital status: Other   Occupational History     Employer: [employer]   Tobacco Use    Smoking status: Former     Types: Cigars    Smokeless tobacco: Never    Tobacco comments:     cigars-x1 yr.   Substance and Sexual Activity    Alcohol use: Yes     Comment: occassional    Drug use: No    Sexual activity: Yes     Partners: Female     Social Drivers of Health     Financial Resource Strain: Low Risk  (12/17/2024)    Overall Financial Resource Strain (CARDIA)     Difficulty of Paying Living Expenses: Not hard at all   Food Insecurity: No Food Insecurity (12/17/2024)    Hunger Vital Sign     Worried About Running Out of Food in the Last Year: Never true     Ran Out of Food in the Last Year: Never true   Transportation Needs: No Transportation Needs (12/17/2024)    PRAPARE - Transportation     Lack of Transportation (Medical): No     Lack of Transportation (Non-Medical): No   Physical Activity: Sufficiently Active (12/17/2024)    Exercise Vital Sign     Days of Exercise per Week: 4 days     Minutes of Exercise per Session: 40 min   Stress: No Stress Concern Present (12/17/2024)    [employer] - Occupational Stress Questionnaire     Feeling of  "Stress : Not at all   Housing Stability: Unknown (12/17/2024)    Housing Stability Vital Sign     Unable to Pay for Housing in the Last Year: No     Homeless in the Last Year: No       FAMILY HISTORY:     Family History   Problem Relation Name Age of Onset    Cancer Father          prostate    Diabetes Brother      Liver cancer Brother      Anesthesia problems Neg Hx         REVIEW OF SYSTEMS:   Review of Systems   Constitutional:  Positive for malaise/fatigue. Negative for chills, diaphoresis and fever.   HENT:  Negative for nosebleeds.    Eyes:  Negative for blurred vision, double vision and photophobia.   Respiratory:  Negative for hemoptysis, shortness of breath and wheezing.    Cardiovascular:  Negative for chest pain, palpitations, orthopnea, claudication, leg swelling and PND.   Gastrointestinal:  Negative for abdominal pain, blood in stool, heartburn, melena, nausea and vomiting.   Genitourinary:  Negative for flank pain and hematuria.   Musculoskeletal:  Positive for joint pain (L shoulder). Negative for falls, myalgias and neck pain.   Skin:  Negative for rash.   Neurological:  Negative for dizziness, seizures, loss of consciousness, weakness and headaches.   Endo/Heme/Allergies:  Negative for polydipsia. Does not bruise/bleed easily.   Psychiatric/Behavioral:  Negative for depression and memory loss. The patient is not nervous/anxious.        PHYSICAL EXAM:     Vitals:    04/01/25 1318   BP: (!) 142/74   Pulse: (!) 58   Resp: 18    Body mass index is 30.72 kg/m².  Weight: 111.5 kg (245 lb 13 oz)   Height: 6' 3" (190.5 cm)     Physical Exam  Vitals reviewed.   Constitutional:       General: He is not in acute distress.     Appearance: He is well-developed. He is obese. He is not ill-appearing, toxic-appearing or diaphoretic.   HENT:      Head: Normocephalic and atraumatic.   Eyes:      General: No scleral icterus.     Extraocular Movements: Extraocular movements intact.      Conjunctiva/sclera: " Conjunctivae normal.      Pupils: Pupils are equal, round, and reactive to light.   Neck:      Thyroid: No thyromegaly.      Vascular: Normal carotid pulses. No carotid bruit or JVD.      Trachea: Trachea normal.   Cardiovascular:      Rate and Rhythm: Normal rate. Rhythm irregularly irregular.      Pulses:           Carotid pulses are 2+ on the right side and 2+ on the left side.     Heart sounds: S1 normal and S2 normal. No murmur heard.     No friction rub. No gallop.   Pulmonary:      Effort: Pulmonary effort is normal. No respiratory distress.      Breath sounds: Normal breath sounds. No stridor. No wheezing, rhonchi or rales.   Chest:      Chest wall: No tenderness.   Abdominal:      General: There is no distension.      Palpations: Abdomen is soft.   Musculoskeletal:         General: No tenderness. Normal range of motion.      Cervical back: Normal range of motion and neck supple. No edema or rigidity.      Right lower le+ Edema present.      Left lower le+ Edema present.   Feet:      Right foot:      Skin integrity: No ulcer.      Left foot:      Skin integrity: No ulcer.   Skin:     General: Skin is warm and dry.      Coloration: Skin is not jaundiced.   Neurological:      General: No focal deficit present.      Mental Status: He is alert and oriented to person, place, and time.      Cranial Nerves: No cranial nerve deficit.   Psychiatric:         Mood and Affect: Mood normal.         Speech: Speech normal.         Behavior: Behavior normal. Behavior is cooperative.         DATA:   EKG: (personally reviewed tracing(s))  3/27/25 AF 68, low volt    Laboratory:  CBC:  Recent Labs   Lab 25  1410 25  0903 25  1151   WBC 5.73 5.58 7.11   Hemoglobin 13.6 L  --   --    HGB  --  14.6 16.1   Hematocrit 43.5  --   --    HCT  --  46.4 49.6   Platelet Count  --  122 L 123 L   Platelets 127 L  --   --        CHEMISTRIES:  Recent Labs   Lab 22  0748 22  1013 24  0929  10/07/24  1357 01/13/25 2118 01/14/25  0555 02/14/25  1410 03/27/25  0903 03/28/25  1151   Glucose 158 H   < > 165 H 96 96 100 240 H  --   --    Sodium 143   < > 136 138 136 137 137 138 137   Potassium 4.4   < > 4.8 4.0 3.8 3.5 4.3 4.8 4.5   BUN 18   < > 19 16 16 14 16 14 15   Creatinine 1.2   < > 1.2 1.0 1.0 0.8 1.0 0.9 0.8   eGFR if non  58.4 A  --   --   --   --   --   --   --   --    eGFR  --    < > >60.0 >60.0 >60 >60 >60 >60 >60   Calcium 9.6   < > 9.9 10.1 9.2 8.2 L 9.1 9.0 9.3   Magnesium  --   --   --   --  2.0 1.9  --   --   --     < > = values in this interval not displayed.       CARDIAC BIOMARKERS:  Recent Labs   Lab 01/13/25 2118 03/27/25  0903 03/27/25  1121   Troponin I 0.009  --   --    Troponin-I  --  <0.006 <0.006       COAGS:  Recent Labs   Lab 01/13/25 2118 03/27/25  1121   INR 1.0 1.1       LIPIDS/LFTS:  Recent Labs   Lab 02/07/25  1104 02/14/25  1410 03/27/25  0903 03/27/25  1121 03/28/25  1151   Cholesterol Total  --   --   --  91 L  --    Cholesterol 99 L  --   --   --   --    Triglycerides 60  --   --   --   --    Triglyceride  --   --   --  63  --    HDL 34 L  --   --   --   --    HDL Cholesterol  --   --   --  32 L  --    LDL Cholesterol 53.0 L  --   --   --   --    Non-HDL Cholesterol 65  --   --   --   --    Non HDL Cholesterol  --   --   --  59  --    AST  --  19 29  --  24   ALT  --  19 28  --  25       Cardiovascular Testing:  Ex MPI 3/28/25    Normal myocardial perfusion scan. There is no evidence of myocardial ischemia or infarction.    There is a mild intensity fixed perfusion abnormality in the inferior wall of the left ventricle secondary to diaphragm attenuation.    The gated perfusion images showed an ejection fraction of 68% at rest.    There is normal wall motion at rest and post-stress.    The ECG portion of the study is negative for ischemia.  The patient exercised for 3 minutes 54 seconds on a modified Richard protocol, achieving a peak heart rate of 126  bpm, which is 89% of the age predicted maximum heart rate. The patient reported shortness of breath during the stress test. The test was stopped because the patient requested it and they experienced shortness of breath. Additionally, the end of the protocol was reached.     The patient reported no chest pain during the stress test.    During stress, atrial fibrillation is noted.    CTA Chest 3/27/25  1. No findings to suggest aortic aneurysm or dissection as clinically question.  2. Heterogeneous mass arising from the interpolar region of the left kidney with suspected enhancement.  Malignancy is of primary concern as this was not present on examination 10/01/2018.  Further evaluation on a nonemergent basis recommended.  3. No acute cardiopulmonary process.    LUDIN/DCCV 2/18/25  Normal biventricular size/fxn  LVEF 60%, normal wall motion.  Biatrial enlargement.  No LA/LUCITA thrombus.  Normal appearing valves  Previously noted PV vegetation (TTE 1/14/25) not visualized on this exam.  Mild MR, trace AI/TR.  Mild aortic root dil, 3.7cm  Successful DCCV AF->NSR 61 BPM 200J x1.  Plan:  Cont med rx  Cont eliquis 5mg bid  Home today  Follow up with Dr. Whitehead as planned.    Echo 1/14/25     Left Ventricle: The left ventricle is normal in size. Mildly increased wall thickness. There is mild concentric hypertrophy. There is normal systolic function with a visually estimated ejection fraction of 55 - 60%. Unable to assess diastolic function due to atrial fibrillation.    Right Ventricle: Normal right ventricular cavity size. Systolic function is normal.    Pulmonic Valve: There is a 2.0x1.6 large fixed echogenic spherical mass present.    Pulmonary Artery: The estimated pulmonary artery systolic pressure is 33 mmHg.    Pt appears to be in AF/FL throughout exam.    Consider LUDIN for further investigation of PV findings above.    ASSESSMENT:   # Persistent AF, HR controlled, on eliquis 5mg bid.  Successful LUDIN/DCCV 3/18/25, but  back in AF with controlled VR today.  Still with fatigue.  Seen by Dr. Tam with plans for eventual PVI +/- Watchman.    # HTN, uncontrolled  # HLP on atorva 20mg, LDL acceptable  # BMI 31, stable vs last OV  # ?PV vegetation (echo 1/2025), not noted on LUDIN 2/2025.  # renal mass with plans for possible nephrectomy.  MPI 3/2025 neg.  # Ao root dil, 3.7cm (LUDIN 3/2025), no dissection on CTA Chest 3/27/25    PLAN:   Cont med rx  Cont eliquis 5mg bid  DCCV 4/22/25 (LUDIN not needed), will initiate flecainide 100mg qd thereafter.  Sleep med eval pending, ?needs CPAP, pt has appt with Dr. Acosta 5/6/25)  Refer to pain mgmt for L shoulder pain (does not seem anginal in nature.  RTC 1 month for cardiac clearance prior to nephrectomy.  Will ask Dr. Beach if this is more urgent as his LUDIN/DCCV can be delayed for surgery if necessary (and the pt is at low risk for surgery/anesthesia given recent neg MPI).  Follow up with Dr. Tam as planned for possible PVI +/- Watchman, will plan LUDIN and addition of flecainide as above.  Surveillance echo 6 months (Sept 2025)    Risks, benefits and alternatives of the Cardioversion procedure were discussed with the patient including aspiration, anesthetic complications, skin irritation, arrhythmia, etc.  Patient understands and agrees to proceed.  Permit signed.      The above documents medical care services that are part of ongoing care related to this patient's serious/complex condition (Code ). (HTN/PAF)        Abbe Whitehead MD, [facility]    Addendum 4/1/25 315pm:  Magdalene Beach MD Castine, Michael R., MD  Caller: Unspecified (Today,  1:46 PM)  It is not overly urgent. I will have him come in to see one of our robotic surgeons (Jacquelyn or Osmel) to see him sooner than when he is currently scheduled. A delay of treatment for 2 months would be unlikely to have any oncology related detriment.          Previous Messages       ----- Message -----  From: Abbe Whitehead  MD AMADOU  Sent: 4/1/2025   1:48 PM CDT  To: Magdalene Beach MD    Is Mr. Christina's nephrectomy urgent?  If not, I plan cardioversion on 04/22/2025.  This will necessitate uninterrupted anticoagulation for 4 weeks, thereafter you would be able to hold his Eliquis and move forward with surgery.  If the nephrectomy is more urgent, his recent nuclear stress test was negative and I feel he is at acceptable risk for surgery and anesthesia, and I would suggest you proceed with the surgery prior to me getting him back into sinus rhythm.    Please let me know either way.    Thanks,  Dequan

## 2025-04-01 NOTE — PROGRESS NOTES
CARDIOVASCULAR PROGRESS NOTE    REASON FOR CONSULT:   Rosendo Thomas is a 79 y.o. male who presents for follow up of Persistent AF.    PCP: Hernesto  EP: Yonatan  Uro:  Yong  HISTORY OF PRESENT ILLNESS:   The patient returns for follow up.  In the interim since his last office visit, he was seen in the emergency room for chest/left shoulder pain.  He underwent CTA of the chest which was negative for dissection but did note a renal mass.  Subsequent nuclear stress testing was normal.  He returns now for follow up.  He continues to describe left scapular pain radiating to the shoulder and around to the left side of his chest.  This is not get worse with exertion.  It did not get worse when he was on the treadmill for his stress test.  There was some positional in nature to this.  I plan to refer him to pain management for further assessment as this may be radicular.  Urology is planning eventual nephrectomy for his renal mass.  Given the patient's normal nuclear stress test, I do not feel his cardiac risk is excessive.  He does continue to complain of fatigue and remains in atrial fibrillation with a controlled ventricular response.  He was seen by EP with plans for rhythm control and eventual PVI.  [place] [facility] [place] will initiate flecainide thereafter.    CARDIOVASCULAR HISTORY:   Persistent AF on eliquis 5mg bid, s/p LUDIN/DCCV 2/18/25.    Ao root dil, 3.7cm (LUDIN 2/2025)    PAST MEDICAL HISTORY:     Past Medical History:   Diagnosis Date    Colon cancer     Colon polyps     Diabetes mellitus type II 07/2010    diet controlled    Obesity     Paroxysmal atrial fibrillation     Psoriasis     Sleep apnea, unspecified     Squamous cell carcinoma     Type 2 diabetes mellitus        PAST SURGICAL HISTORY:     Past Surgical History:   Procedure Laterality Date    APPENDECTOMY      CARDIOVERSION N/A 2/18/2025    Procedure: Cardioversion;  Surgeon: Abbe Whitehead MD;   Location: [facility] CATH LAB;  Service: Cardiology;  Laterality: N/A;  before 1230pm    COLONOSCOPY N/A 07/09/2018    Procedure: COLONOSCOPY;  Surgeon: Kameron Ruff MD;  Location: [facility] ENDO;  Service: Endoscopy;  Laterality: N/A;  confirmed    COLONOSCOPY N/A 11/11/2019    Procedure: COLONOSCOPY;  Surgeon: Victor Hugo Nunez MD;  Location: [facility] ENDO (4TH FLR);  Service: Endoscopy;  Laterality: N/A;  Pt stated this is the only day he has somebody to drive him and  to stay during procedure  PM Prep    COLONOSCOPY N/A 07/08/2020    Procedure: COLONOSCOPY;  Surgeon: Victor Hugo Nunez MD;  Location: [facility] ENDO (2ND FLR);  Service: Endoscopy;  Laterality: N/A;  Covid-19 test 7/5/20 at [facility]    EXTREMITY CYST EXCISION      LAPAROSCOPIC LEFT COLECTOMY N/A 09/25/2018    Procedure: COLECTOMY-LAPAROSCOPIC LEFT;  Surgeon: Chito Banks MD;  Location: [facility] OR 2ND FLR;  Service: Colon and Rectal;  Laterality: N/A;    SKIN CANCER EXCISION      SKIN GRAFT      TRANSESOPHAGEAL ECHOCARDIOGRAM WITH POSSIBLE CARDIOVERSION (LUDIN W/ POSS CARDIOVERSION) N/A 2/18/2025    Procedure: TRANSESOPHAGEAL ECHOCARDIOGRAM WITH POSSIBLE CARDIOVERSION (LUDIN W/ POSS CARDIOVERSION);  Surgeon: Abbe Whitehead MD;  Location: [facility] CATH LAB;  Service: Cardiology;  Laterality: N/A;  before 1230pm  RN PRE OP 2/14/2025    VASECTOMY      VASECTOMY         ALLERGIES AND MEDICATION:     Review of patient's allergies indicates:   Allergen Reactions    Influenza virus vaccines     Metformin Diarrhea        Medication List            Accurate as of April 1, 2025  1:31 PM. If you have any questions, ask your nurse or doctor.                CONTINUE taking these medications      apixaban 5 mg Tab  Commonly known as: ELIQUIS  Take 1 tablet (5 mg total) by mouth 2 (two) times daily.     atorvastatin 20 MG tablet  Commonly known as: LIPITOR  take 1 tablet by mouth once daily     B COMPLEX ORAL     blood sugar diagnostic Strp  Commonly  known as: CONTOUR TEST STRIPS  1 each by Misc.(Non-Drug; Combo Route) route 2 (two) times daily.     fluticasone propionate 50 mcg/actuation nasal spray  Commonly known as: FLONASE     glipiZIDE 2.5 MG Tr24  Commonly known as: GLUCOTROL  Take 2 tablets (5 mg total) by mouth daily with breakfast.     lancets Misc  USE ONE LANCET TWO TIMES DAILY     MAGNESIUM ORAL     metoprolol succinate 25 MG 24 hr tablet  Commonly known as: TOPROL-XL  Take 1 tablet (25 mg total) by mouth once daily.     TALTZ AUTOINJECTOR 80 mg/mL Atin  Generic drug: ixekizumab  Inject 80 mg into the skin every 28 days. Starting on week 12     JENNI-C ORAL     VITAMIN B-12 ORAL     ZINC ORAL              SOCIAL HISTORY:     Social History     Socioeconomic History    Marital status: Other   Occupational History     Employer: [employer]   Tobacco Use    Smoking status: Former     Types: Cigars    Smokeless tobacco: Never    Tobacco comments:     cigars-x1 yr.   Substance and Sexual Activity    Alcohol use: Yes     Comment: occassional    Drug use: No    Sexual activity: Yes     Partners: Female     Social Drivers of Health     Financial Resource Strain: Low Risk  (12/17/2024)    Overall Financial Resource Strain (CARDIA)     Difficulty of Paying Living Expenses: Not hard at all   Food Insecurity: No Food Insecurity (12/17/2024)    Hunger Vital Sign     Worried About Running Out of Food in the Last Year: Never true     Ran Out of Food in the Last Year: Never true   Transportation Needs: No Transportation Needs (12/17/2024)    PRAPARE - Transportation     Lack of Transportation (Medical): No     Lack of Transportation (Non-Medical): No   Physical Activity: Sufficiently Active (12/17/2024)    Exercise Vital Sign     Days of Exercise per Week: 4 days     Minutes of Exercise per Session: 40 min   Stress: No Stress Concern Present (12/17/2024)    [employer] - Occupational Stress Questionnaire     Feeling of  "Stress : Not at all   Housing Stability: Unknown (12/17/2024)    Housing Stability Vital Sign     Unable to Pay for Housing in the Last Year: No     Homeless in the Last Year: No       FAMILY HISTORY:     Family History   Problem Relation Name Age of Onset    Cancer Father          prostate    Diabetes Brother      Liver cancer Brother      Anesthesia problems Neg Hx         REVIEW OF SYSTEMS:   Review of Systems   Constitutional:  Positive for malaise/fatigue. Negative for chills, diaphoresis and fever.   HENT:  Negative for nosebleeds.    Eyes:  Negative for blurred vision, double vision and photophobia.   Respiratory:  Negative for hemoptysis, shortness of breath and wheezing.    Cardiovascular:  Negative for chest pain, palpitations, orthopnea, claudication, leg swelling and PND.   Gastrointestinal:  Negative for abdominal pain, blood in stool, heartburn, melena, nausea and vomiting.   Genitourinary:  Negative for flank pain and hematuria.   Musculoskeletal:  Positive for joint pain (L shoulder). Negative for falls, myalgias and neck pain.   Skin:  Negative for rash.   Neurological:  Negative for dizziness, seizures, loss of consciousness, weakness and headaches.   Endo/Heme/Allergies:  Negative for polydipsia. Does not bruise/bleed easily.   Psychiatric/Behavioral:  Negative for depression and memory loss. The patient is not nervous/anxious.        PHYSICAL EXAM:     Vitals:    04/01/25 1318   BP: (!) 142/74   Pulse: (!) 58   Resp: 18    Body mass index is 30.72 kg/m².  Weight: 111.5 kg (245 lb 13 oz)   Height: 6' 3" (190.5 cm)     Physical Exam  Vitals reviewed.   Constitutional:       General: He is not in acute distress.     Appearance: He is well-developed. He is obese. He is not ill-appearing, toxic-appearing or diaphoretic.   HENT:      Head: Normocephalic and atraumatic.   Eyes:      General: No scleral icterus.     Extraocular Movements: Extraocular movements intact.      Conjunctiva/sclera: " Conjunctivae normal.      Pupils: Pupils are equal, round, and reactive to light.   Neck:      Thyroid: No thyromegaly.      Vascular: Normal carotid pulses. No carotid bruit or JVD.      Trachea: Trachea normal.   Cardiovascular:      Rate and Rhythm: Normal rate. Rhythm irregularly irregular.      Pulses:           Carotid pulses are 2+ on the right side and 2+ on the left side.     Heart sounds: S1 normal and S2 normal. No murmur heard.     No friction rub. No gallop.   Pulmonary:      Effort: Pulmonary effort is normal. No respiratory distress.      Breath sounds: Normal breath sounds. No stridor. No wheezing, rhonchi or rales.   Chest:      Chest wall: No tenderness.   Abdominal:      General: There is no distension.      Palpations: Abdomen is soft.   Musculoskeletal:         General: No tenderness. Normal range of motion.      Cervical back: Normal range of motion and neck supple. No edema or rigidity.      Right lower le+ Edema present.      Left lower le+ Edema present.   Feet:      Right foot:      Skin integrity: No ulcer.      Left foot:      Skin integrity: No ulcer.   Skin:     General: Skin is warm and dry.      Coloration: Skin is not jaundiced.   Neurological:      General: No focal deficit present.      Mental Status: He is alert and oriented to person, place, and time.      Cranial Nerves: No cranial nerve deficit.   Psychiatric:         Mood and Affect: Mood normal.         Speech: Speech normal.         Behavior: Behavior normal. Behavior is cooperative.         DATA:   EKG: (personally reviewed tracing(s))  3/27/25 AF 68, low volt    Laboratory:  CBC:  Recent Labs   Lab 25  1410 25  0903 25  1151   WBC 5.73 5.58 7.11   Hemoglobin 13.6 L  --   --    HGB  --  14.6 16.1   Hematocrit 43.5  --   --    HCT  --  46.4 49.6   Platelet Count  --  122 L 123 L   Platelets 127 L  --   --        CHEMISTRIES:  Recent Labs   Lab 22  0748 22  1013 24  0929  10/07/24  1357 01/13/25 2118 01/14/25  0555 02/14/25  1410 03/27/25  0903 03/28/25  1151   Glucose 158 H   < > 165 H 96 96 100 240 H  --   --    Sodium 143   < > 136 138 136 137 137 138 137   Potassium 4.4   < > 4.8 4.0 3.8 3.5 4.3 4.8 4.5   BUN 18   < > 19 16 16 14 16 14 15   Creatinine 1.2   < > 1.2 1.0 1.0 0.8 1.0 0.9 0.8   eGFR if non  58.4 A  --   --   --   --   --   --   --   --    eGFR  --    < > >60.0 >60.0 >60 >60 >60 >60 >60   Calcium 9.6   < > 9.9 10.1 9.2 8.2 L 9.1 9.0 9.3   Magnesium  --   --   --   --  2.0 1.9  --   --   --     < > = values in this interval not displayed.       CARDIAC BIOMARKERS:  Recent Labs   Lab 01/13/25 2118 03/27/25  0903 03/27/25  1121   Troponin I 0.009  --   --    Troponin-I  --  <0.006 <0.006       COAGS:  Recent Labs   Lab 01/13/25 2118 03/27/25  1121   INR 1.0 1.1       LIPIDS/LFTS:  Recent Labs   Lab 02/07/25  1104 02/14/25  1410 03/27/25  0903 03/27/25  1121 03/28/25  1151   Cholesterol Total  --   --   --  91 L  --    Cholesterol 99 L  --   --   --   --    Triglycerides 60  --   --   --   --    Triglyceride  --   --   --  63  --    HDL 34 L  --   --   --   --    HDL Cholesterol  --   --   --  32 L  --    LDL Cholesterol 53.0 L  --   --   --   --    Non-HDL Cholesterol 65  --   --   --   --    Non HDL Cholesterol  --   --   --  59  --    AST  --  19 29  --  24   ALT  --  19 28  --  25       Cardiovascular Testing:  Ex MPI 3/28/25    Normal myocardial perfusion scan. There is no evidence of myocardial ischemia or infarction.    There is a mild intensity fixed perfusion abnormality in the inferior wall of the left ventricle secondary to diaphragm attenuation.    The gated perfusion images showed an ejection fraction of 68% at rest.    There is normal wall motion at rest and post-stress.    The ECG portion of the study is negative for ischemia.  The patient exercised for 3 minutes 54 seconds on a modified Richard protocol, achieving a peak heart rate of 126  bpm, which is 89% of the age predicted maximum heart rate. The patient reported shortness of breath during the stress test. The test was stopped because the patient requested it and they experienced shortness of breath. Additionally, the end of the protocol was reached.     The patient reported no chest pain during the stress test.    During stress, atrial fibrillation is noted.    CTA Chest 3/27/25  1. No findings to suggest aortic aneurysm or dissection as clinically question.  2. Heterogeneous mass arising from the interpolar region of the left kidney with suspected enhancement.  Malignancy is of primary concern as this was not present on examination 10/01/2018.  Further evaluation on a nonemergent basis recommended.  3. No acute cardiopulmonary process.    LUDIN/DCCV 2/18/25  Normal biventricular size/fxn  LVEF 60%, normal wall motion.  Biatrial enlargement.  No LA/LUCITA thrombus.  Normal appearing valves  Previously noted PV vegetation (TTE 1/14/25) not visualized on this exam.  Mild MR, trace AI/TR.  Mild aortic root dil, 3.7cm  Successful DCCV AF->NSR 61 BPM 200J x1.  Plan:  Cont med rx  Cont eliquis 5mg bid  Home today  Follow up with Dr. Whitehead as planned.    Echo 1/14/25     Left Ventricle: The left ventricle is normal in size. Mildly increased wall thickness. There is mild concentric hypertrophy. There is normal systolic function with a visually estimated ejection fraction of 55 - 60%. Unable to assess diastolic function due to atrial fibrillation.    Right Ventricle: Normal right ventricular cavity size. Systolic function is normal.    Pulmonic Valve: There is a 2.0x1.6 large fixed echogenic spherical mass present.    Pulmonary Artery: The estimated pulmonary artery systolic pressure is 33 mmHg.    Pt appears to be in AF/FL throughout exam.    Consider LUDIN for further investigation of PV findings above.    ASSESSMENT:   # Persistent AF, HR controlled, on eliquis 5mg bid.  Successful LUDIN/DCCV 3/18/25, but  back in AF with controlled VR today.  Still with fatigue.  Seen by Dr. Tam with plans for eventual PVI +/- Watchman.    # HTN, uncontrolled  # HLP on atorva 20mg, LDL acceptable  # BMI 31, stable vs last OV  # ?PV vegetation (echo 1/2025), not noted on LUDIN 2/2025.  # renal mass with plans for possible nephrectomy.  MPI 3/2025 neg.  # Ao root dil, 3.7cm (LUDIN 3/2025), no dissection on CTA Chest 3/27/25    PLAN:   Cont med rx  Cont eliquis 5mg bid  DCCV 4/22/25 (LUDIN not needed), will initiate flecainide 100mg qd thereafter.  Sleep med eval pending, ?needs CPAP, pt has appt with Dr. Acosta 5/6/25)  Refer to pain mgmt for L shoulder pain (does not seem anginal in nature.  RTC 1 month for cardiac clearance prior to nephrectomy.  Will ask Dr. Beach if this is more urgent as his LUDIN/DCCV can be delayed for surgery if necessary (and the pt is at low risk for surgery/anesthesia given recent neg MPI).  Follow up with Dr. Tam as planned for possible PVI +/- Watchman, will plan LUDIN and addition of flecainide as above.  Surveillance echo 6 months (Sept 2025)    Risks, benefits and alternatives of the Cardioversion procedure were discussed with the patient including aspiration, anesthetic complications, skin irritation, arrhythmia, etc.  Patient understands and agrees to proceed.  Permit signed.      The above documents medical care services that are part of ongoing care related to this patient's serious/complex condition (Code ). (HTN/PAF)        Abbe Whitehead MD, [facility]    Addendum 4/1/25 315pm:  Magdalene Beach MD Castine, Michael R., MD  Caller: Unspecified (Today,  1:46 PM)  It is not overly urgent. I will have him come in to see one of our robotic surgeons (Jacquelyn or Osmel) to see him sooner than when he is currently scheduled. A delay of treatment for 2 months would be unlikely to have any oncology related detriment.          Previous Messages       ----- Message -----  From: Abbe Whitehead  MD AMADOU  Sent: 4/1/2025   1:48 PM CDT  To: Magdalene Beach MD    Is Mr. Christina's nephrectomy urgent?  If not, I plan cardioversion on 04/22/2025.  This will necessitate uninterrupted anticoagulation for 4 weeks, thereafter you would be able to hold his Eliquis and move forward with surgery.  If the nephrectomy is more urgent, his recent nuclear stress test was negative and I feel he is at acceptable risk for surgery and anesthesia, and I would suggest you proceed with the surgery prior to me getting him back into sinus rhythm.    Please let me know either way.    Thanks,  Dequan

## 2025-04-04 DIAGNOSIS — E11.22 CONTROLLED TYPE 2 DIABETES MELLITUS WITH STAGE 3 CHRONIC KIDNEY DISEASE, WITHOUT LONG-TERM CURRENT USE OF INSULIN: ICD-10-CM

## 2025-04-04 DIAGNOSIS — N18.30 CONTROLLED TYPE 2 DIABETES MELLITUS WITH STAGE 3 CHRONIC KIDNEY DISEASE, WITHOUT LONG-TERM CURRENT USE OF INSULIN: ICD-10-CM

## 2025-04-04 RX ORDER — METOPROLOL SUCCINATE 25 MG/1
25 TABLET, EXTENDED RELEASE ORAL DAILY
Qty: 90 TABLET | Refills: 3 | Status: SHIPPED | OUTPATIENT
Start: 2025-04-04 | End: 2026-04-04

## 2025-04-04 NOTE — TELEPHONE ENCOUNTER
----- Message from Johana sent at 4/4/2025  1:11 PM CDT -----  .Type: RX Refill RequestWho Called: SelfHave you contacted your pharmacy: No Refill or New Rx: Refill RX Name and Strength:apixaban (ELIQUIS) 5 mg TabPreferred Pharmacy with phone number:.[facility]/pharmacy #[id number] - [place], [place] - [address] [facility] [address]: [phone] Fax: [phone] or Mail Order: LocalOrdering Provider: Michael the patient rather a call back or a response via [facility]? Call [facility] Call Back Number: .[phone] (home) Additional Information:

## 2025-04-04 NOTE — TELEPHONE ENCOUNTER
----- Message from Johana sent at 4/4/2025  1:10 PM CDT -----  .Type: RX Refill RequestWho Called: SelfHave you contacted your pharmacy: No Refill or New Rx: Refill RX Name and Strength:metoprolol succinate (TOPROL-XL) 25 MG 24 hr tabletPreferred Pharmacy with phone number:.[facility] [place] - [address] [place] [place] [address]: [phone] Fax: [phone] or Mail Order: Local Ordering Provider: Michael the patient rather a call back or a response via [facility]? Call [facility] Call Back Number: .[phone] (home) Additional Information:

## 2025-04-07 ENCOUNTER — OFFICE VISIT (OUTPATIENT)
Dept: FAMILY MEDICINE | Facility: CLINIC | Age: 80
End: 2025-04-07
Payer: MEDICARE

## 2025-04-07 VITALS
DIASTOLIC BLOOD PRESSURE: 84 MMHG | HEART RATE: 53 BPM | RESPIRATION RATE: 18 BRPM | BODY MASS INDEX: 31.39 KG/M2 | WEIGHT: 252.44 LBS | HEIGHT: 75 IN | OXYGEN SATURATION: 97 % | TEMPERATURE: 98 F | SYSTOLIC BLOOD PRESSURE: 132 MMHG

## 2025-04-07 DIAGNOSIS — N28.89 LEFT RENAL MASS: ICD-10-CM

## 2025-04-07 DIAGNOSIS — M54.12 CERVICAL RADICULOPATHY: ICD-10-CM

## 2025-04-07 DIAGNOSIS — Z79.01 ANTICOAGULANT LONG-TERM USE: ICD-10-CM

## 2025-04-07 DIAGNOSIS — E78.5 DYSLIPIDEMIA: ICD-10-CM

## 2025-04-07 DIAGNOSIS — I10 ESSENTIAL HYPERTENSION, BENIGN: ICD-10-CM

## 2025-04-07 DIAGNOSIS — M54.12 CERVICAL RADICULOPATHY: Primary | ICD-10-CM

## 2025-04-07 DIAGNOSIS — I48.19 PERSISTENT ATRIAL FIBRILLATION: ICD-10-CM

## 2025-04-07 PROBLEM — R07.9 CHEST PAIN: Status: RESOLVED | Noted: 2025-03-27 | Resolved: 2025-04-07

## 2025-04-07 PROBLEM — I48.0 PAROXYSMAL ATRIAL FIBRILLATION: Status: RESOLVED | Noted: 2025-01-13 | Resolved: 2025-04-07

## 2025-04-07 PROCEDURE — 99214 OFFICE O/P EST MOD 30 MIN: CPT | Mod: PBBFAC,PO | Performed by: INTERNAL MEDICINE

## 2025-04-07 PROCEDURE — 99999 PR PBB SHADOW E&M-EST. PATIENT-LVL IV: CPT | Mod: PBBFAC,,, | Performed by: INTERNAL MEDICINE

## 2025-04-07 PROCEDURE — 99215 OFFICE O/P EST HI 40 MIN: CPT | Mod: S$PBB,,, | Performed by: INTERNAL MEDICINE

## 2025-04-07 RX ORDER — GABAPENTIN 300 MG/1
300 CAPSULE ORAL 3 TIMES DAILY PRN
Qty: 90 CAPSULE | Refills: 11 | Status: SHIPPED | OUTPATIENT
Start: 2025-04-07 | End: 2025-04-07 | Stop reason: SDUPTHER

## 2025-04-07 RX ORDER — GABAPENTIN 300 MG/1
300 CAPSULE ORAL 3 TIMES DAILY PRN
Qty: 90 CAPSULE | Refills: 11 | Status: SHIPPED | OUTPATIENT
Start: 2025-04-07 | End: 2026-04-07

## 2025-04-07 NOTE — ASSESSMENT & PLAN NOTE
- Evaluated patient's reported pain in shoulder, arm, side, and neck area, with numbness and tingling in fingers, particularly the index finger.  - Suspected nerve pain originating from neck, potentially affecting brachial nerves and radiating down arm.  - Explained potential nerve pain pathways from neck to arm and hand to the patient.  - Prescribed Gabapentin for nerve pain, to be taken up to 3 times daily as needed, starting with nighttime dosage.  - has appt with pain management for further evaluation and possible imaging.  - Instructed to start with nighttime dose to assess drowsiness and avoid daytime use if drowsiness occurs.  - Advised the patient to contact the office if Gabapentin causes intolerable side effects.

## 2025-04-07 NOTE — PROGRESS NOTES
Chief Complaint: Follow-up (Hospital follow up ) and Back Pain (Pt states that he has pain that starts in his shoulder blade and wraps around to the front chest area )      Rosendo Thomas  is a 79 y.o. year old patient who presents today for     History of Present Illness    CHIEF COMPLAINT:  Rosendo presents today for follow up of shoulder pain.    MUSCULOSKELETAL PAIN:  He reports shoulder pain that radiates down the arm and side, extending up to the head. He experiences numbness in the index finger. He has attempted pain management with marijuana cream and ice (recommended by his daughter's chiropractor), both providing only temporary relief. He expresses concern about potential addictive properties of pain medications.    CARDIOVASCULAR:  He has a history of atrial fibrillation, initially discovered when seeking medical attention for presumed flu or pneumonia symptoms. He reports a recent episode with symptoms he thought were indicative of a heart attack, during which his blood pressure was elevated to 209/160-170 mmHg.    RENAL:  An incidental kidney mass was discovered during chest imaging, initially detected on chest XR. He is awaiting urological consultation to determine if partial versus complete kidney removal is necessary.    MEDICATIONS:  He takes Glipizide and Atorvastatin in the morning, and Eliquis in the evening. The Eliquis will need to be discontinued 5 days prior to any kidney procedure.    OCULAR:  He had cataract removal in 2020. He uses glasses for reading but can manage without them with some difficulty.      ROS:  General: -fever, -chills, -fatigue, -weight gain, -weight loss  Eyes: -vision changes, -redness, -discharge  ENT: -ear pain, -nasal congestion, -sore throat  Cardiovascular: -chest pain, -palpitations, -lower extremity edema  Respiratory: -cough, -shortness of breath  Gastrointestinal: -abdominal pain, -nausea, -vomiting, -diarrhea, -constipation, -blood in stool  Genitourinary:  -dysuria, -hematuria, -frequency  Musculoskeletal: -joint pain, -muscle pain, +limb pain  Skin: -rash, -lesion  Neurological: -headache, -dizziness, +numbness, -tingling  Psychiatric: -anxiety, -depression, +sleep difficulty         Past Medical History:   Diagnosis Date    Colon cancer     Colon polyps     Diabetes mellitus type II 07/2010    diet controlled    Obesity     Paroxysmal atrial fibrillation     Psoriasis     Sleep apnea, unspecified     Squamous cell carcinoma     Type 2 diabetes mellitus        Past Surgical History:   Procedure Laterality Date    APPENDECTOMY      CARDIOVERSION N/A 2/18/2025    Procedure: Cardioversion;  Surgeon: Abbe Whitehead MD;  Location: [facility] CATH LAB;  Service: Cardiology;  Laterality: N/A;  before 1230pm    COLONOSCOPY N/A 07/09/2018    Procedure: COLONOSCOPY;  Surgeon: Kameron Ruff MD;  Location: [facility] ENDO;  Service: Endoscopy;  Laterality: N/A;  confirmed    COLONOSCOPY N/A 11/11/2019    Procedure: COLONOSCOPY;  Surgeon: Victor Hugo Nunez MD;  Location: [facility] (4TH FLR);  Service: Endoscopy;  Laterality: N/A;  Pt stated this is the only day he has somebody to drive him and  to stay during procedure  PM Prep    COLONOSCOPY N/A 07/08/2020    Procedure: COLONOSCOPY;  Surgeon: Victor Hugo Nunez MD;  Location: [facility] (2ND FLR);  Service: Endoscopy;  Laterality: N/A;  Covid-19 test 7/5/20 at [facility] - pg    EXTREMITY CYST EXCISION      LAPAROSCOPIC LEFT COLECTOMY N/A 09/25/2018    Procedure: COLECTOMY-LAPAROSCOPIC LEFT;  Surgeon: Chito Banks MD;  Location: [facility] OR 2ND FLR;  Service: Colon and Rectal;  Laterality: N/A;    SKIN CANCER EXCISION      SKIN GRAFT      TRANSESOPHAGEAL ECHOCARDIOGRAM WITH POSSIBLE CARDIOVERSION (LUDIN W/ POSS CARDIOVERSION) N/A 2/18/2025    Procedure: TRANSESOPHAGEAL ECHOCARDIOGRAM WITH POSSIBLE CARDIOVERSION (LUDIN W/ POSS CARDIOVERSION);  Surgeon: Abbe Whitehead MD;  Location: [facility] CATH LAB;  Service:  Cardiology;  Laterality: N/A;  before 1230pm  RN PRE OP 2/14/2025    VASECTOMY      VASECTOMY          Family History   Problem Relation Name Age of Onset    Cancer Father          prostate    Diabetes Brother      Liver cancer Brother      Anesthesia problems Neg Hx          Social History     Socioeconomic History    Marital status: Other   Occupational History     Employer: [employer]   Tobacco Use    Smoking status: Former     Types: Cigars    Smokeless tobacco: Never    Tobacco comments:     cigars-x1 yr.   Substance and Sexual Activity    Alcohol use: Yes     Comment: occassional    Drug use: No    Sexual activity: Yes     Partners: Female     Social Drivers of Health     Financial Resource Strain: Low Risk  (12/17/2024)    Overall Financial Resource Strain (CARDIA)     Difficulty of Paying Living Expenses: Not hard at all   Food Insecurity: No Food Insecurity (12/17/2024)    Hunger Vital Sign     Worried About Running Out of Food in the Last Year: Never true     Ran Out of Food in the Last Year: Never true   Transportation Needs: No Transportation Needs (12/17/2024)    PRAPARE - Transportation     Lack of Transportation (Medical): No     Lack of Transportation (Non-Medical): No   Physical Activity: Sufficiently Active (12/17/2024)    Exercise Vital Sign     Days of Exercise per Week: 4 days     Minutes of Exercise per Session: 40 min   Stress: No Stress Concern Present (12/17/2024)    [employer] - Occupational Stress Questionnaire     Feeling of Stress : Not at all   Housing Stability: Unknown (12/17/2024)    Housing Stability Vital Sign     Unable to Pay for Housing in the Last Year: No     Homeless in the Last Year: No       Current Medications[1]           Objective:      Vitals:    04/07/25 0919   BP: 132/84   Pulse: (!) 53   Resp: 18   Temp: 97.5 °F (36.4 °C)       Physical Exam  Constitutional:       Appearance: Normal appearance. He is obese.    Cardiovascular:      Rate and Rhythm: Normal rate. Rhythm irregular.      Heart sounds: No murmur heard.  Pulmonary:      Effort: Pulmonary effort is normal.      Breath sounds: Normal breath sounds. No wheezing or rales.   Musculoskeletal:      Cervical back: Tenderness present.        Back:    Neurological:      Mental Status: He is alert.          Assessment:       1. Cervical radiculopathy    2. Persistent atrial fibrillation    3. Anticoagulant long-term use    4. Essential hypertension, benign    5. Left renal mass    6. Dyslipidemia          Plan:   1. Cervical radiculopathy  Assessment & Plan:  - Evaluated patient's reported pain in shoulder, arm, side, and neck area, with numbness and tingling in fingers, particularly the index finger.  - Suspected nerve pain originating from neck, potentially affecting brachial nerves and radiating down arm.  - Explained potential nerve pain pathways from neck to arm and hand to the patient.  - Prescribed Gabapentin for nerve pain, to be taken up to 3 times daily as needed, starting with nighttime dosage.  - has appt with pain management for further evaluation and possible imaging.  - Instructed to start with nighttime dose to assess drowsiness and avoid daytime use if drowsiness occurs.  - Advised the patient to contact the office if Gabapentin causes intolerable side effects.    Orders:  -     gabapentin (NEURONTIN) 300 MG capsule; Take 1 capsule (300 mg total) by mouth 3 (three) times daily as needed (nerve pain).  Dispense: 90 capsule; Refill: 11    2. Persistent atrial fibrillation  Assessment & Plan:  - Reviewed cardiologist's note   - Advised to continue Eliquis for atrial fibrillation   - cont metoprolol   - patient may be scheduled for PVI/DCCV, followef by flecainide  - Instructed the patient to report any significant changes in symptoms or new episodes.  - Reminded the patient to discontinue Eliquis 5 days before any kidney procedure.      3. Anticoagulant  long-term use  Assessment & Plan:  On eliquis for a fib  Discussed about only holding for 5 days prior to surgery      4. Essential hypertension, benign  Assessment & Plan:  Controlled   - cont metoprolol      5. Left renal mass  Assessment & Plan:  Detected on CT Chest and Abd   Planned to see urology for possible partial nephrectomy      6. Dyslipidemia  Assessment & Plan:  - Continued Atorvastatin for cholesterol management.        KIDNEY MASS OF UNCERTAIN BEHAVIOR:  - Noted renal mass measuring 3 by 2.8 cm on recent imaging, initially detected during a chest XR and confirmed by CT scan.  - Discussed the possibility of renal cancer and the importance of early detection with the patient.  - Provided information on renal cancer prognosis when detected early.  - Advised the patient to follow up with the urologist for further management of the kidney mass.  - Instructed the patient to contact the office when kidney procedure date is set.    EYE HEALTH AND VISION:  - Noted that patient had cataracts removed   - Reminded patient about upcoming eye exam.         Total 40 mins spent on patient with more than 50% spent counseling;    Follow up for August.    This note was generated with the assistance of ambient listening technology. Verbal consent was obtained by the patient and accompanying visitor(s) for the recording of patient appointment to facilitate this note. I attest to having reviewed and edited the generated note for accuracy, though some syntax or spelling errors may persist. Please contact the author of this note for any clarification.                  [1]   Current Outpatient Medications:     apixaban (ELIQUIS) 5 mg Tab, Take 1 tablet (5 mg total) by mouth 2 (two) times daily., Disp: 180 tablet, Rfl: 3    ascorbic acid (JENNI-C ORAL), Take by mouth., Disp: , Rfl:     atorvastatin (LIPITOR) 20 MG tablet, take 1 tablet by mouth once daily, Disp: 90 tablet, Rfl: 0    blood sugar diagnostic (CONTOUR TEST STRIPS)  Strp, 1 each by Misc.(Non-Drug; Combo Route) route 2 (two) times daily., Disp: 50 each, Rfl: 11    cyanocobalamin, vitamin B-12, (VITAMIN B-12 ORAL), Take by mouth., Disp: , Rfl:     fluticasone propionate (FLONASE) 50 mcg/actuation nasal spray, 1 spray by Each Nostril route once daily., Disp: , Rfl:     glipiZIDE (GLUCOTROL) 2.5 MG TR24, Take 2 tablets (5 mg total) by mouth daily with breakfast., Disp: 180 tablet, Rfl: 3    lancets Misc, USE ONE LANCET TWO TIMES DAILY, Disp: 100 each, Rfl: 11    MAGNESIUM ORAL, Take by mouth., Disp: , Rfl:     metoprolol succinate (TOPROL-XL) 25 MG 24 hr tablet, Take 1 tablet (25 mg total) by mouth once daily., Disp: 90 tablet, Rfl: 3    vitamin B complex (B COMPLEX ORAL), Take by mouth., Disp: , Rfl:     ZINC ORAL, Take by mouth., Disp: , Rfl:     gabapentin (NEURONTIN) 300 MG capsule, Take 1 capsule (300 mg total) by mouth 3 (three) times daily as needed (nerve pain)., Disp: 90 capsule, Rfl: 11    ixekizumab (TALTZ AUTOINJECTOR) 80 mg/mL AtIn, Inject 80 mg into the skin every 28 days. Starting on week 12, Disp: 1 mL, Rfl: 5

## 2025-04-07 NOTE — TELEPHONE ENCOUNTER
No care due was identified.  [facility] Embedded Care Due Messages. Reference number: [id number].   4/07/2025 11:25:56 AM CDT

## 2025-04-07 NOTE — ASSESSMENT & PLAN NOTE
- Reviewed cardiologist's note   - Advised to continue Eliquis for atrial fibrillation   - cont metoprolol   - patient may be scheduled for PVI/DCCV, followef by flecainide  - Instructed the patient to report any significant changes in symptoms or new episodes.  - Reminded the patient to discontinue Eliquis 5 days before any kidney procedure.

## 2025-04-07 NOTE — PROGRESS NOTES
Health Maintenance Due   Topic     TETANUS VACCINE  Patient advised to go to pharmacy    Shingles Vaccine (1 of 2) Hx of Chicken Pox. Patient advised to go to pharmacy    RSV Vaccine (Age 60+ and Pregnant patients) (1 - 1-dose 75+ series) Not offered at this facility    Diabetes Urine Screening  Consult PCP    Influenza Vaccine (1) Patient advised to go to pharmacy    COVID-19 Vaccine (5 - 2024-25 season) Patient declined    Diabetic Eye Exam  Pt states he has an eye doctor Dr. Joe

## 2025-04-07 NOTE — TELEPHONE ENCOUNTER
----- Message from Embrane sent at 4/7/2025 11:01 AM CDT -----  Who Called: dania cazares pharmacyRefill or New Rx:refillRX Name and Strength: script sent in today Is this a 30 day or 90 day RX:Preferred Pharmacy with phone number: [facility] [place] - [address] & Would the patient rather a call back or a response via [facility]?call Best Call Back Number:[phone] Information: medication sent to the wrong pharmacy meds to be sent to the one above

## 2025-04-08 RX ORDER — ATORVASTATIN CALCIUM 20 MG/1
20 TABLET, FILM COATED ORAL
Qty: 90 TABLET | Refills: 0 | Status: SHIPPED | OUTPATIENT
Start: 2025-04-08

## 2025-04-16 ENCOUNTER — HOSPITAL ENCOUNTER (OUTPATIENT)
Dept: PREADMISSION TESTING | Facility: HOSPITAL | Age: 80
Discharge: HOME | End: 2025-04-16
Attending: INTERNAL MEDICINE
Payer: MEDICARE

## 2025-04-16 VITALS
HEIGHT: 75 IN | HEART RATE: 68 BPM | SYSTOLIC BLOOD PRESSURE: 156 MMHG | OXYGEN SATURATION: 97 % | BODY MASS INDEX: 31.18 KG/M2 | WEIGHT: 250.75 LBS | TEMPERATURE: 97 F | RESPIRATION RATE: 16 BRPM | DIASTOLIC BLOOD PRESSURE: 81 MMHG

## 2025-04-16 RX ORDER — METOPROLOL SUCCINATE 25 MG/1
25 TABLET, EXTENDED RELEASE ORAL DAILY
Qty: 90 TABLET | Refills: 3 | Status: SHIPPED | OUTPATIENT
Start: 2025-04-16 | End: 2026-04-16

## 2025-04-16 NOTE — DISCHARGE INSTRUCTIONS
YOUR PROCEDURE WILL BE AT [facility] at [address] [place] [address]                                   Enter through the Main Entrance facing [address].      Your procedure  is scheduled for _4/22/2025_________.    Call [phone] between 2pm and 5pm on _4/21/2025______to find out your arrival time for the day of surgery.    You may have up to three visitors.  No children under 18 years old.     You will be going to the Same Day Surgery Unit on the 2nd floor of the hospital.    Report to the Same Day Surgery Registration Desk in the hallway.(Just beside the Same Day Surgery Unit)                    DO NOT PARK IN THE GARAGE.  THERE IS NO OPEN ENTRANCE TO THE HOSPITAL BUILDING AND NO ELEVATOR.      Important instructions:  Do not eat anything after midnight.  You may have plain water, non carbonated.  You may also have Gatorade or Powerade after midnight.    Stop all fluids 2 hours before your surgery.    It is okay to brush your teeth.  Do not have gum, candy or mints.    SEE MEDICATION SHEET.   TAKE MEDICATIONS AS DIRECTED WITH SIPS OF WATER.    Do not take any diabetic medication on the morning of surgery unless instructed to do so by your doctor or pre op nurse.    All GLP-1 weekly diabetic/weight loss medications must not be taken for one week before your surgery, or your surgery could be canceled.    Contact lenses and removable denture work may not be worn during your procedure.    You may wear deodorant only. If you are having breast surgery, do not wear deodorant on the operative side.    Do not wear powder, body lotion, perfume/cologne or make-up.    Do not wear any jewelry or have any metal on your body.    You will be asked to remove any dentures or partials for the procedure.    If you are going home on the same day of surgery, you must arrange for a family member or a friend to drive you home.  Public transportation is prohibited.  You will not be  able to drive home if you were given anesthesia or sedation.    Patients who want to have their Post-op prescriptions filled from our in-house [facility], bring a Credit/Debit Card or cash with you. A co-pay may be required.  The pharmacy closes at 5:30 pm.    Wear loose fitting clothes allowing for bandages.    Please leave money and valuables home.      You may bring your cell phone.    Call the doctor if fever or illness should occur before your surgery.    Call [phone] to contact us here if needed.

## 2025-04-21 ENCOUNTER — ANESTHESIA EVENT (OUTPATIENT)
Dept: CARDIOLOGY | Facility: HOSPITAL | Age: 80
End: 2025-04-21
Payer: MEDICARE

## 2025-04-21 ENCOUNTER — TELEPHONE (OUTPATIENT)
Dept: SURGERY | Facility: HOSPITAL | Age: 80
End: 2025-04-21
Payer: MEDICARE

## 2025-04-21 RX ORDER — LIDOCAINE HYDROCHLORIDE 10 MG/ML
1 INJECTION, SOLUTION EPIDURAL; INFILTRATION; INTRACAUDAL; PERINEURAL ONCE
OUTPATIENT
Start: 2025-04-21 | End: 2025-04-21

## 2025-04-22 ENCOUNTER — ANESTHESIA (OUTPATIENT)
Dept: CARDIOLOGY | Facility: HOSPITAL | Age: 80
End: 2025-04-22
Payer: MEDICARE

## 2025-04-22 ENCOUNTER — TELEPHONE (OUTPATIENT)
Dept: CARDIOLOGY | Facility: CLINIC | Age: 80
End: 2025-04-22
Payer: MEDICARE

## 2025-04-22 ENCOUNTER — HOSPITAL ENCOUNTER (OUTPATIENT)
Facility: HOSPITAL | Age: 80
Discharge: HOME | End: 2025-04-22
Attending: INTERNAL MEDICINE | Admitting: INTERNAL MEDICINE
Payer: MEDICARE

## 2025-04-22 VITALS
BODY MASS INDEX: 31.26 KG/M2 | TEMPERATURE: 98 F | SYSTOLIC BLOOD PRESSURE: 160 MMHG | DIASTOLIC BLOOD PRESSURE: 81 MMHG | HEART RATE: 56 BPM | RESPIRATION RATE: 15 BRPM | OXYGEN SATURATION: 98 % | WEIGHT: 250.13 LBS

## 2025-04-22 DIAGNOSIS — I48.19 PERSISTENT ATRIAL FIBRILLATION: ICD-10-CM

## 2025-04-22 DIAGNOSIS — E11.65 TYPE 2 DIABETES MELLITUS WITH HYPERGLYCEMIA, UNSPECIFIED WHETHER LONG TERM INSULIN USE: Primary | ICD-10-CM

## 2025-04-22 PROCEDURE — 25000003 PHARM REV CODE 250: Performed by: NURSE ANESTHETIST, CERTIFIED REGISTERED

## 2025-04-22 PROCEDURE — 93010 ELECTROCARDIOGRAM REPORT: CPT | Mod: ,,, | Performed by: INTERNAL MEDICINE

## 2025-04-22 PROCEDURE — 37000009 HC ANESTHESIA EA ADD 15 MINS: Performed by: INTERNAL MEDICINE

## 2025-04-22 PROCEDURE — 92960 CARDIOVERSION ELECTRIC EXT: CPT | Mod: ,,, | Performed by: INTERNAL MEDICINE

## 2025-04-22 PROCEDURE — 92960 CARDIOVERSION ELECTRIC EXT: CPT | Performed by: INTERNAL MEDICINE

## 2025-04-22 PROCEDURE — 63600175 PHARM REV CODE 636 W HCPCS: Performed by: NURSE ANESTHETIST, CERTIFIED REGISTERED

## 2025-04-22 PROCEDURE — 37000008 HC ANESTHESIA 1ST 15 MINUTES: Performed by: INTERNAL MEDICINE

## 2025-04-22 PROCEDURE — 93005 ELECTROCARDIOGRAM TRACING: CPT

## 2025-04-22 RX ORDER — SODIUM CHLORIDE 9 MG/ML
INJECTION, SOLUTION INTRAVENOUS CONTINUOUS
Status: DISCONTINUED | OUTPATIENT
Start: 2025-04-22 | End: 2025-04-22 | Stop reason: HOSPADM

## 2025-04-22 RX ORDER — LIDOCAINE HYDROCHLORIDE 20 MG/ML
INJECTION INTRAVENOUS
Status: DISCONTINUED | OUTPATIENT
Start: 2025-04-22 | End: 2025-04-22

## 2025-04-22 RX ORDER — FLECAINIDE ACETATE 50 MG/1
50 TABLET ORAL EVERY 12 HOURS
Qty: 180 TABLET | Refills: 3 | Status: SHIPPED | OUTPATIENT
Start: 2025-04-22 | End: 2026-04-22

## 2025-04-22 RX ORDER — PROPOFOL 10 MG/ML
VIAL (ML) INTRAVENOUS
Status: DISCONTINUED | OUTPATIENT
Start: 2025-04-22 | End: 2025-04-22

## 2025-04-22 RX ORDER — METOPROLOL SUCCINATE 25 MG/1
12.5 TABLET, EXTENDED RELEASE ORAL DAILY
Start: 2025-04-22 | End: 2026-04-22

## 2025-04-22 RX ADMIN — LIDOCAINE HYDROCHLORIDE 100 MG: 20 INJECTION, SOLUTION INTRAVENOUS at 11:04

## 2025-04-22 RX ADMIN — PROPOFOL 50 MG: 10 INJECTION, EMULSION INTRAVENOUS at 11:04

## 2025-04-22 RX ADMIN — SODIUM CHLORIDE: 0.9 INJECTION, SOLUTION INTRAVENOUS at 10:04

## 2025-04-22 NOTE — ANESTHESIA PREPROCEDURE EVALUATION
04/22/2025    Pre-operative evaluation for Procedure(s) (LRB):  TRANSESOPHAGEAL ECHOCARDIOGRAM WITH POSSIBLE CARDIOVERSION (LUDIN W/ POSS CARDIOVERSION) (N/A)  Cardioversion (N/A)    Rosendo Thomas is a 79 y.o. male     Problem List[1]    Review of patient's allergies indicates:   Allergen Reactions    Influenza virus vaccines     Metformin Diarrhea       Medications Ordered Prior to Encounter[2]    Past Surgical History:   Procedure Laterality Date    APPENDECTOMY      CARDIOVERSION N/A 2/18/2025    Procedure: Cardioversion;  Surgeon: Abbe Whitehead MD;  Location: [facility] CATH LAB;  Service: Cardiology;  Laterality: N/A;  before 1230pm    COLONOSCOPY N/A 07/09/2018    Procedure: COLONOSCOPY;  Surgeon: Kameron Ruff MD;  Location: [facility] ENDO;  Service: Endoscopy;  Laterality: N/A;  confirmed    COLONOSCOPY N/A 11/11/2019    Procedure: COLONOSCOPY;  Surgeon: Victor Hugo Nunez MD;  Location: [facility] ENDO (4TH FLR);  Service: Endoscopy;  Laterality: N/A;  Pt stated this is the only day he has somebody to drive him and  to stay during procedure  PM Prep    COLONOSCOPY N/A 07/08/2020    Procedure: COLONOSCOPY;  Surgeon: Victor Hugo Nunez MD;  Location: [facility] (2ND FLR);  Service: Endoscopy;  Laterality: N/A;  Covid-19 test 7/5/20 at [facility] -     EXTREMITY CYST EXCISION      LAPAROSCOPIC LEFT COLECTOMY N/A 09/25/2018    Procedure: COLECTOMY-LAPAROSCOPIC LEFT;  Surgeon: Chito Banks MD;  Location: [facility] OR 2ND FLR;  Service: Colon and Rectal;  Laterality: N/A;    SKIN CANCER EXCISION      SKIN GRAFT      TRANSESOPHAGEAL ECHOCARDIOGRAM WITH POSSIBLE CARDIOVERSION (LUDIN W/ POSS CARDIOVERSION) N/A 2/18/2025    Procedure: TRANSESOPHAGEAL ECHOCARDIOGRAM WITH POSSIBLE CARDIOVERSION (LUDIN W/ POSS CARDIOVERSION);  Surgeon: Abbe Whitehead MD;  Location: [facility] CATH LAB;  Service: Cardiology;  Laterality: N/A;  before 1230pm  RN PRE OP 2/14/2025    VASECTOMY      VASECTOMY         Social  History[3]      Pre-op Assessment    I have reviewed the Patient Summary Reports.     I have reviewed the Nursing Notes. I have reviewed the NPO Status.   I have reviewed the Medications.     Review of Systems  Anesthesia Hx:  No problems with previous Anesthesia             Denies Family Hx of Anesthesia complications.    Denies Personal Hx of Anesthesia complications.                    Hematology/Oncology:  Hematology Normal   Oncology Normal                                   EENT/Dental:  EENT/Dental Normal           Cardiovascular:     Hypertension    Dysrhythmias atrial fibrillation                                     Pulmonary:        Sleep Apnea                Renal/:  Chronic Renal Disease   Left renal mass             Musculoskeletal:  Musculoskeletal Normal                Endocrine:  Diabetes, well controlled, type 2         Obesity / BMI > 30  Dermatological:  Skin Normal    Psych:  Psychiatric Normal                    Physical Exam  General: Well nourished, Cooperative, Alert and Oriented    Airway:  Mallampati: II   Mouth Opening: Normal  TM Distance: Normal  Tongue: Normal  Neck ROM: Normal ROM    Dental:  Intact    Chest/Lungs:  Normal Respiratory Rate    Heart:  Rate: Normal  Rhythm: Regular Rhythm  Sounds: Normal        Anesthesia Plan  Type of Anesthesia, risks & benefits discussed:    Anesthesia Type: Gen Natural Airway, MAC  Intra-op Monitoring Plan: Standard ASA Monitors  Induction:  IV  Informed Consent: Informed consent signed with the Patient and all parties understand the risks and agree with anesthesia plan.  All questions answered.   ASA Score: 3  Day of Surgery Review of History & Physical: H&P Update referred to the surgeon/provider.    Ready For Surgery From Anesthesia Perspective.     .           [1]   Patient Active Problem List  Diagnosis    Type 2 diabetes mellitus    Psoriasis    Essential hypertension, benign    Sinus bradycardia    Psoriatic arthritis    History of colon  cancer    Personal history of colonic polyps    Immunosuppression    Obesity (BMI 30.0-34.9)    Vitamin D deficiency    Dyslipidemia    Thrombocytopenia, unspecified    Anticoagulant long-term use    Persistent atrial fibrillation    Left renal mass    Cervical radiculopathy   [2]   No current facility-administered medications on file prior to encounter.     Current Outpatient Medications on File Prior to Encounter   Medication Sig Dispense Refill    ascorbic acid (JENNI-C ORAL) Take by mouth.      blood sugar diagnostic (CONTOUR TEST STRIPS) Strp 1 each by Misc.(Non-Drug; Combo Route) route 2 (two) times daily. 50 each 11    cyanocobalamin, vitamin B-12, (VITAMIN B-12 ORAL) Take by mouth.      fluticasone propionate (FLONASE) 50 mcg/actuation nasal spray 1 spray by Each Nostril route once daily.      glipiZIDE (GLUCOTROL) 2.5 MG TR24 Take 2 tablets (5 mg total) by mouth daily with breakfast. 180 tablet 3    ixekizumab (TALTZ AUTOINJECTOR) 80 mg/mL AtIn Inject 80 mg into the skin every 28 days. Starting on week 12 1 mL 5    lancets Misc USE ONE LANCET TWO TIMES DAILY 100 each 11    MAGNESIUM ORAL Take by mouth.      vitamin B complex (B COMPLEX ORAL) Take by mouth.      ZINC ORAL Take by mouth.     [3]   Social History  Socioeconomic History    Marital status: Other   Occupational History     Employer: [email or web address]   Tobacco Use    Smoking status: Former     Types: Cigars    Smokeless tobacco: Never    Tobacco comments:     cigars-x1 yr.   Substance and Sexual Activity    Alcohol use: Yes     Comment: occassional    Drug use: No    Sexual activity: Yes     Partners: Female     Social Drivers of Health     Financial Resource Strain: Low Risk  (12/17/2024)    Overall Financial Resource Strain (CARDIA)     Difficulty of Paying Living Expenses: Not hard at all   Food Insecurity: No Food Insecurity (12/17/2024)    Hunger Vital Sign     Worried About Running Out of Food in the Last Year: Never true      Ran Out of Food in the Last Year: Never true   Transportation Needs: No Transportation Needs (12/17/2024)    PRAPARE - Transportation     Lack of Transportation (Medical): No     Lack of Transportation (Non-Medical): No   Physical Activity: Sufficiently Active (12/17/2024)    Exercise Vital Sign     Days of Exercise per Week: 4 days     Minutes of Exercise per Session: 40 min   Stress: No Stress Concern Present (12/17/2024)    [employer] - Occupational Stress Questionnaire     Feeling of Stress : Not at all   Housing Stability: Unknown (12/17/2024)    Housing Stability Vital Sign     Unable to Pay for Housing in the Last Year: No     Homeless in the Last Year: No

## 2025-04-22 NOTE — DISCHARGE SUMMARY
[facility]  Discharge Note    SUMMARY     Admit Date: 4/22/2025    Discharge Date and Time: 04/22/2025 1 PM    Hospital Course (synopsis of major diagnoses, care, treatment, and services provided during the course of the hospital stay): uneventful DCCV with anesthesia.      Final Diagnosis: Post-Op Diagnosis Codes:     * Persistent atrial fibrillation [I48.19]    Disposition: Home or Self Care    Follow Up/Patient Instructions:     Medications:  Reconciled Home Medications:      Medication List        START taking these medications      flecainide 50 MG Tab  Commonly known as: TAMBOCOR  Take 1 tablet (50 mg total) by mouth every 12 (twelve) hours.            CHANGE how you take these medications      metoprolol succinate 25 MG 24 hr tablet  Commonly known as: TOPROL-XL  Take 0.5 tablets (12.5 mg total) by mouth once daily.  What changed: how much to take            CONTINUE taking these medications      apixaban 5 mg Tab  Commonly known as: ELIQUIS  Take 1 tablet (5 mg total) by mouth 2 (two) times daily.     atorvastatin 20 MG tablet  Commonly known as: LIPITOR  take 1 tablet by mouth once daily     B COMPLEX ORAL  Take by mouth.     blood sugar diagnostic Strp  Commonly known as: CONTOUR TEST STRIPS  1 each by Misc.(Non-Drug; Combo Route) route 2 (two) times daily.     fluticasone propionate 50 mcg/actuation nasal spray  Commonly known as: FLONASE  1 spray by Each Nostril route once daily.     gabapentin 300 MG capsule  Commonly known as: NEURONTIN  Take 1 capsule (300 mg total) by mouth 3 (three) times daily as needed (nerve pain).     glipiZIDE 2.5 MG Tr24  Commonly known as: GLUCOTROL  Take 2 tablets (5 mg total) by mouth daily with breakfast.     lancets Misc  USE ONE LANCET TWO TIMES DAILY     MAGNESIUM ORAL  Take by mouth.     TALTZ AUTOINJECTOR 80 mg/mL Atin  Generic drug: ixekizumab  Inject 80 mg into the skin every 28 days. Starting on week 12     JENNI-C ORAL  Take by mouth.     VITAMIN B-12  ORAL  Take by mouth.     ZINC ORAL  Take by mouth.            No discharge procedures on file.   Follow-up Information       Abbe Whitehead MD. Schedule an appointment as soon as possible for a visit in 1 week(s).    Specialties: Cardiology, Interventional Cardiology  Why: for planned follow up appointment  Contact information:  [address]  [address]  [place] [address]  [phone]                               Diet: cardiac    Activity: ad manoj.

## 2025-04-22 NOTE — TELEPHONE ENCOUNTER
.Refill Request     CONFIRM preferrred pharmacy with the patient. If Mail Order Rx - Pend for 90 day refill. Last Seen: Last Seen Department: 9/29/2022  Last Seen by PCP: 9/29/2022    Last Written: 6-29-22 90 with 1     If no future appointment scheduled, route STAFF MESSAGE with patient name to the [facility] for scheduling. Next Appointment:   Future Appointments   Date Time Provider Jimy Laird   3/20/2023  8:30 AM Carleen Peraza APRN - CNP [facility]  Don - [place]       Message sent to  to schedule appt with patient?   N/A      Requested Prescriptions     Pending Prescriptions Disp Refills    pantoprazole (PROTONIX) 40 MG tablet [Pharmacy Med Name: PANTOPRAZOLE 40MG TABLETS] 90 tablet 1     Sig: TAKE 1 TABLET BY MOUTH TWICE DAILY BEFORE MEALS Tried to call patient to inform him of his 1 week appointment with Dr. Whitehead. omarm

## 2025-04-22 NOTE — BRIEF OP NOTE
[facility] [place] - Cath Lab  Brief Operative Note     SUMMARY     Surgery Date: 4/22/2025     Surgeons and Role:     * Abbe Whitehead MD - Primary    Assisting Surgeon: None    Pre-op Diagnosis:  Persistent atrial fibrillation [I48.19]    Post-op Diagnosis:  Post-Op Diagnosis Codes:     * Persistent atrial fibrillation [I48.19]    Procedure(s) (LRB):  Cardioversion/Defibrillation (N/A)  Cardioversion (N/A)    Anesthesia: Monitor Anesthesia Care    Description of the findings of the procedure: uneventful DCCV with anesthesia.    Findings/Key Components:     Successful DCCV AF->SR 45 BPM 200J x1.    Plan:  Cont Eliquis 5mg bid  Dec Toprol XL 12.5mg qd  Add Flecainide 50mg bid  Check EKG 1 week  Follow up with Shawn Whitehead and Yonatan as planned    Estimated Blood Loss: none         Specimens: None    Diet: cardiac    Activity: ad manoj.

## 2025-04-22 NOTE — TELEPHONE ENCOUNTER
----- Message from Abbe Whitehead MD sent at 4/22/2025 11:16 AM CDT -----  Schedule OV in 1 week for follow up and pt needs EKG at that time.Thx

## 2025-04-22 NOTE — TRANSFER OF CARE
Anesthesia Transfer of Care Note    Patient: Rosendo Thomas    Procedure(s) Performed: Procedure(s) (LRB):  Cardioversion/Defibrillation (N/A)  Cardioversion (N/A)    Patient location: Other: OR 8 with cath lab staff    Anesthesia Type: general    Transport from OR: Transported from OR on room air with adequate spontaneous ventilation    Post pain: adequate analgesia    Post assessment: no apparent anesthetic complications and tolerated procedure well    Post vital signs: stable    Level of consciousness: awake, alert and oriented    Nausea/Vomiting: no nausea/vomiting    Complications: none    Transfer of care protocol was followed    Last vitals: Visit Vitals  BP (!) 150/88 (BP Location: Right arm, Patient Position: Lying)   Pulse 61   Temp 36.3 °C (97.3 °F) (Oral)   Resp 16   Wt 113.5 kg (250 lb 1.9 oz)   SpO2 98%   BMI 31.26 kg/m²

## 2025-04-22 NOTE — PLAN OF CARE
Pt alert and awake drinking well,denies discomfort at this time.pt and daughter vb understanding of all instructions and rx.pt desires to go home.

## 2025-04-22 NOTE — ANESTHESIA POSTPROCEDURE EVALUATION
Anesthesia Post Evaluation    Patient: Rosendo Thomas    Procedure(s) Performed: Procedure(s) (LRB):  Cardioversion/Defibrillation (N/A)  Cardioversion (N/A)    Final Anesthesia Type: general      Patient location during evaluation: [facility]  Patient participation: Yes- Able to Participate  Level of consciousness: awake and alert  Post-procedure vital signs: reviewed and stable  Pain management: adequate  Airway patency: patent    PONV status at discharge: No PONV  Anesthetic complications: no      Cardiovascular status: hemodynamically stable and blood pressure returned to baseline  Respiratory status: room air, spontaneous ventilation and unassisted  Hydration status: euvolemic  Follow-up not needed.              Vitals Value Taken Time   /81 04/22/25 12:17   Temp 36.6 °C (97.8 °F) 04/22/25 11:20   Pulse 53 04/22/25 12:27   Resp 43 04/22/25 12:27   SpO2 99 % 04/22/25 12:27   Vitals shown include unfiled device data.      No case tracking events are documented in the log.      Pain/Ivory Score: No data recorded

## 2025-04-22 NOTE — DISCHARGE INSTRUCTIONS
ACTIVITY LEVEL: If you have received sedation or an anesthetic, you may feel sleepy for several hours. Rest  until you are more awake. Slowly resume your normal activities. No heavy lifting, nothing more that 10-15 pounds until surgery site is healed.      NO driving, alcoholic beverages or signing legal documents for next 24 hours or while taking pain medication    BATHING: You may shower after your dressing is removed, but no tub baths, hot tubs, saunas or swimming until you see the doctor.    DIET: You may resume your home diet. If nausea is present, increase your diet gradually with fluids and bland foods. Drink extra water for the next 48 hours.     Medications: Pain medication should be taken only if needed and as directed. IF antibiotics are prescribed, the  medication should be taken until completed. You will be given an updated list of you medications.    CALL THE DOCTOR:  Fever over 101°F --any signs of infection including body aches, fever, chills, redness at surgery site   Severe pain that doesnt go away with medication.  Upset stomach and vomiting that is persistent  Problems urinating, unable to urinate or heavy bleeding (with or without clots)      Fall Prevention  Millions of people fall every year and injure themselves. You may have had anesthesia or sedation which may increase your risk of falling. You may have health issues that put you at an increased risk of falling.     Here are ways to reduce your risk of falling.    Make your home safe by keeping walkways clear of objects you may trip over.  Use non-slip pads under rugs. Do not use area rugs or small throw rugs.  Use non-slip mats in bathtubs and showers.  Install handrails and lights on staircases.  Do not walk in poorly lit areas.  Do not stand on chairs or wobbly ladders.  Use caution when reaching overhead or looking upward. This position can cause a loss of balance.  Be sure your shoes fit properly, have non-slip bottoms and are in good  condition.   Wear shoes both inside and out. Avoid going barefoot or wearing slippers.  Be cautious when going up and down stairs, curbs, and when walking on uneven sidewalks.  If your balance is poor, consider using a cane or walker.  If your fall was related to alcohol use, stop or limit alcohol intake.   If your fall was related to use of sleeping medicines, talk to your doctor about this. You may need to reduce your dosage at bedtime if you awaken during the night to go to the bathroom.    To reduce the need for nighttime bathroom trips:  Avoid drinking fluids for several hours before going to bed  Empty your bladder before going to bed  Men can keep a urinal at the bedside  Stay as active as you can. Balance, flexibility, strength, and endurance all come from exercise. They all play a role in preventing falls. Ask your healthcare provider which types of activity are right for you.  Get your vision checked on a regular basis.  If you have pets, know where they are before you stand up or walk so you don't trip over them.  Use night lights.

## 2025-05-02 LAB
OHS QRS DURATION: 98 MS
OHS QTC CALCULATION: 375 MS

## 2025-05-05 ENCOUNTER — LAB VISIT (OUTPATIENT)
Dept: LAB | Facility: HOSPITAL | Age: 80
End: 2025-05-05
Attending: HOSPITALIST
Payer: MEDICARE

## 2025-05-05 DIAGNOSIS — E11.65 TYPE 2 DIABETES MELLITUS WITH HYPERGLYCEMIA, WITHOUT LONG-TERM CURRENT USE OF INSULIN: ICD-10-CM

## 2025-05-05 DIAGNOSIS — E55.9 VITAMIN D DEFICIENCY: ICD-10-CM

## 2025-05-05 LAB
25(OH)D3+25(OH)D2 SERPL-MCNC: 18 NG/ML (ref 30–96)
ANION GAP (OHS): 6 MMOL/L (ref 8–16)
BUN SERPL-MCNC: 15 MG/DL (ref 8–23)
CALCIUM SERPL-MCNC: 8.9 MG/DL (ref 8.7–10.5)
CHLORIDE SERPL-SCNC: 103 MMOL/L (ref 95–110)
CO2 SERPL-SCNC: 26 MMOL/L (ref 23–29)
CREAT SERPL-MCNC: 0.9 MG/DL (ref 0.5–1.4)
EAG (OHS): 203 MG/DL (ref 68–131)
GFR SERPLBLD CREATININE-BSD FMLA CKD-EPI: >60 ML/MIN/1.73/M2
GLUCOSE SERPL-MCNC: 186 MG/DL (ref 70–110)
HBA1C MFR BLD: 8.7 % (ref 4–5.6)
POTASSIUM SERPL-SCNC: 4.4 MMOL/L (ref 3.5–5.1)
SODIUM SERPL-SCNC: 135 MMOL/L (ref 136–145)

## 2025-05-05 PROCEDURE — 83036 HEMOGLOBIN GLYCOSYLATED A1C: CPT

## 2025-05-05 PROCEDURE — 82310 ASSAY OF CALCIUM: CPT

## 2025-05-05 PROCEDURE — 36415 COLL VENOUS BLD VENIPUNCTURE: CPT | Mod: PO

## 2025-05-05 PROCEDURE — 82306 VITAMIN D 25 HYDROXY: CPT

## 2025-05-06 ENCOUNTER — OFFICE VISIT (OUTPATIENT)
Dept: PULMONOLOGY | Facility: CLINIC | Age: 80
End: 2025-05-06
Payer: MEDICARE

## 2025-05-06 VITALS
HEART RATE: 56 BPM | WEIGHT: 255.38 LBS | DIASTOLIC BLOOD PRESSURE: 74 MMHG | HEIGHT: 75 IN | BODY MASS INDEX: 31.75 KG/M2 | OXYGEN SATURATION: 99 % | SYSTOLIC BLOOD PRESSURE: 131 MMHG

## 2025-05-06 DIAGNOSIS — I48.19 PERSISTENT ATRIAL FIBRILLATION: ICD-10-CM

## 2025-05-06 DIAGNOSIS — E66.811 OBESITY (BMI 30.0-34.9): Primary | ICD-10-CM

## 2025-05-06 DIAGNOSIS — G47.33 OSA (OBSTRUCTIVE SLEEP APNEA): ICD-10-CM

## 2025-05-06 PROCEDURE — 99999 PR PBB SHADOW E&M-EST. PATIENT-LVL IV: CPT | Mod: PBBFAC,,, | Performed by: INTERNAL MEDICINE

## 2025-05-06 PROCEDURE — 99214 OFFICE O/P EST MOD 30 MIN: CPT | Mod: PBBFAC | Performed by: INTERNAL MEDICINE

## 2025-05-06 NOTE — PROGRESS NOTES
Rosendo Thomas  was seen as a new patient at the request of  Abbe Whitehead MD for the evaluation of  akin.    CHIEF COMPLAINT:    Chief Complaint   Patient presents with    Apnea       HISTORY OF PRESENT ILLNESS: Rosendo Thomas is a 79 y.o. male is here for sleep evaluation.   Patient is followed by Dr. Whitehead for persistent atrial fibrillation s/p DCCV.  Patient is here for akin evaluation.  Patient was diagnosed with akin ~2015 at [facility].  Did not get cpap set up due to poor sleep during titration study.      STOPBANG during initial visit:    Snore:  sleeps alone since wife passed  away in 2019  Tire:  tire  Observed apnea:  denied  Pressure (HTN):  yes    BMI:  Body mass index is 31.92 kg/m².   Age:  79 y.o.  Neck (inch):  19  Gender:  male    Total STOP BANG score = 5/8  Low risk AKIN: 0-2, Intermediate risk AKIN: 3-4, High risk AKIN: 5+    Other sleep ROS:  no parasomnia. No cataplexy.  No rls symptoms    Princ[place] Sleepiness Scale score during initial sleep evaluation was 1.    SLEEP ROUTINE:  Activity the hour prior to sleep: read news paper     Bed partner:  alone  Time to bed:  9:30 am   Lights off:  off  Sleep onset latency:  10 minutes        Disruptions or awakenings:    2 times (no difficulty going back to sleep)    Wakeup time:      5:30 am   Perceived sleep quality:  tire      Daytime naps:      none  Weekend sleep routine:      same  Caffeine use: 2-4 cups coffee in per day   exercise habit:   still active with yard work      PAST MEDICAL HISTORY:    Active Ambulatory Problems     Diagnosis Date Noted    Type 2 diabetes mellitus 07/01/2010    Psoriasis     Essential hypertension, benign 05/30/2018    Sinus bradycardia 09/30/2018    Psoriatic arthritis 02/26/2019    History of colon cancer 02/26/2019    Personal history of colonic polyps 07/08/2020    Immunosuppression 02/04/2022    Obesity (BMI 30.0-34.9) 02/25/2022    Vitamin D deficiency 03/01/2023    Dyslipidemia 01/14/2025     Thrombocytopenia, unspecified 01/17/2025    Anticoagulant long-term use 02/07/2025    Persistent atrial fibrillation 02/18/2025    Left renal mass 03/27/2025    Cervical radiculopathy 04/07/2025    AKIN (obstructive sleep apnea) 05/07/2025     Resolved Ambulatory Problems     Diagnosis Date Noted    Obesity     Screening for AAA (abdominal aortic aneurysm) 05/30/2018    Screen for colon cancer 07/09/2018    Malignant neoplasm of sigmoid colon 09/13/2018    Colon cancer 09/25/2018    On total parenteral nutrition (TPN) 10/03/2018    Sequelae of hyperalimentation 10/04/2018    Aortic atherosclerosis 02/26/2019    Uncontrolled type 2 diabetes mellitus with hyperglycemia, without long-term current use of insulin 02/25/2022    Stage 3a chronic kidney disease 02/25/2022    Diabetes mellitus due to underlying condition with both eyes affected by mild nonproliferative retinopathy without macular edema, without long-term current use of insulin 02/25/2022    Type 2 diabetes mellitus with hyperglycemia, without long-term current use of insulin 04/25/2022    COVID 05/16/2022    Type 2 diabetes mellitus with hyperglycemia, without long-term current use of insulin 09/27/2023    Nausea 02/01/2024    Encounter for Medicare annual wellness exam 12/17/2024    Advanced directives, counseling/discussion 12/17/2024    Paroxysmal atrial fibrillation 01/13/2025    URTI (acute upper respiratory infection) 01/14/2025    Influenza A 01/14/2025    Hospital discharge follow-up 01/17/2025    Chest pain 03/27/2025     Past Medical History:   Diagnosis Date    Colon polyps     Diabetes mellitus type II 07/2010    Sleep apnea, unspecified     Squamous cell carcinoma                 PAST SURGICAL HISTORY:    Past Surgical History:   Procedure Laterality Date    APPENDECTOMY      CARDIOVERSION N/A 2/18/2025    Procedure: Cardioversion;  Surgeon: Abbe Whitehead MD;  Location: [facility] CATH LAB;  Service: Cardiology;  Laterality: N/A;  before 1230pm     CARDIOVERSION N/A 4/22/2025    Procedure: Cardioversion;  Surgeon: Abbe Whitehead MD;  Location: [facility] CATH LAB;  Service: Cardiology;  Laterality: N/A;    COLONOSCOPY N/A 07/09/2018    Procedure: COLONOSCOPY;  Surgeon: Kameron Ruff MD;  Location: [facility] ENDO;  Service: Endoscopy;  Laterality: N/A;  confirmed    COLONOSCOPY N/A 11/11/2019    Procedure: COLONOSCOPY;  Surgeon: Victor Hugo Nunez MD;  Location: [facility] ENDO (4TH FLR);  Service: Endoscopy;  Laterality: N/A;  Pt stated this is the only day he has somebody to drive him and  to stay during procedure  PM Prep    COLONOSCOPY N/A 07/08/2020    Procedure: COLONOSCOPY;  Surgeon: Victor Hugo Nunez MD;  Location: [facility] ENDO (2ND FLR);  Service: Endoscopy;  Laterality: N/A;  Covid-19 test 7/5/20 at [facility]    EXTREMITY CYST EXCISION      LAPAROSCOPIC LEFT COLECTOMY N/A 09/25/2018    Procedure: COLECTOMY-LAPAROSCOPIC LEFT;  Surgeon: Chito Banks MD;  Location: [facility] OR 2ND FLR;  Service: Colon and Rectal;  Laterality: N/A;    SKIN CANCER EXCISION      SKIN GRAFT      TRANSESOPHAGEAL ECHOCARDIOGRAM WITH POSSIBLE CARDIOVERSION (LUDIN W/ POSS CARDIOVERSION) N/A 2/18/2025    Procedure: TRANSESOPHAGEAL ECHOCARDIOGRAM WITH POSSIBLE CARDIOVERSION (LUDIN W/ POSS CARDIOVERSION);  Surgeon: Abbe Whitehead MD;  Location: [facility] CATH LAB;  Service: Cardiology;  Laterality: N/A;  before 1230pm  RN PRE OP 2/14/2025    TREATMENT OF CARDIAC ARRHYTHMIA N/A 4/22/2025    Procedure: Cardioversion/Defibrillation;  Surgeon: Abbe Whitehead MD;  Location: [facility] CATH LAB;  Service: Cardiology;  Laterality: N/A;  RN PRE OP 4/16/2025    VASECTOMY      VASECTOMY           FAMILY HISTORY:                Family History   Problem Relation Name Age of Onset    Cancer Father          prostate    Diabetes Brother      Liver cancer Brother      Anesthesia problems Neg Hx         SOCIAL HISTORY:          Tobacco: Tobacco Use History[1]    alcohol use:   "  Social History     Substance and Sexual Activity   Alcohol Use Yes    Comment: occassional                 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  CONST:Denies weight gain; losing weight with mounjaro   HEENT: Denies sinus congestion  PULM: Denies dyspnea  CARD:  Denies palpitations even with afib  GI:  + acid reflux with red sauce  : Denies polyuria  NEURO: Denies headaches  PSYCH: anxious with renal mass.    HEME: Denies anemia   Otherwise, a balance of systems reviewed is negative.          PHYSICAL EXAM:  Vitals:    05/06/25 1503   BP: 131/74   Pulse: (!) 56   SpO2: 99%   Weight: 115.9 kg (255 lb 6.5 oz)   Height: 6' 3" (1.905 m)   PainSc: 0-No pain     Body mass index is 31.92 kg/m².     GENERAL: Normal development, well groomed  HEENT:  Conjunctivae are non-erythematous; Pupils equal, round, and reactive to light; Nose is symmetrical; Nasal mucosa is pink and moist; Septum is midline; Inferior turbinates are normal; Nasal airflow is normal; Posterior pharynx is pink; Modified Mallampati: 4; Posterior palate is normal; Tonsils +1; Uvula is normal and pink;Tongue is normal; Dentition is fair; No TMJ tenderness; Jaw opening and protrusion without click and without discomfort.  NECK: Supple. Neck circumference is 19 inches. No thyromegaly. No palpable nodes.     SKIN: On face and neck: No abrasions, no rashes, no lesions.  No subcutaneous nodules are palpable.  RESPIRATORY: Chest is clear to auscultation.  Normal chest expansion and non-labored breathing at rest.  CARDIOVASCULAR: Normal S1, S2.  No murmurs, gallops or rubs. No carotid bruits bilaterally.  EXTREMITIES: No edema. No clubbing. No cyanosis. Station normal. Gait normal.      "   NEURO/PSYCH: Oriented to time, place and person. Normal attention span and concentration. Affect is full. Mood is normal.                                              DATA no prior sleep study    2/10/25  Findings/Key Components:   Normal biventricular size/fxn  LVEF 60%, normal wall motion.  Biatrial enlargement.  No LA/LUCITA thrombus.  Normal appearing valves  Previously noted PV vegetation (TTE 1/14/25) not visualized on this exam.  Mild MR, trace AI/TR.  Mild aortic root dil, 3.7cm     Successful DCCV AF->NSR 61 BPM 200J x1.    Lab Results   Component Value Date    TSH 2.819 01/31/2022       ASSESSMENT/PLAN    Problem List Items Addressed This Visit       Obesity (BMI 30.0-34.9) - Primary    Overview   losing weight with mounjaro         AKIN (obstructive sleep apnea)    Current Assessment & Plan   The patient symptomatically has snoring with findings of elevated bmi, htn, enlarged neck, and persistent afib. This warrants further investigation for possible obstructive sleep apnea.  Patient deferred sleep study due to poor experience with prior titration along with restful sleep.  Advise patient to contact us if he changes his mind.         Persistent atrial fibrillation     Diagnostic: Polysomnogram. The nature of this procedure and its indication was discussed with the patient.     Education: During our discussion today, we talked about the etiology of obstructive sleep apnea as well as the potential ramifications of untreated sleep apnea, which could include daytime sleepiness, hypertension, heart disease and/or stroke.     Precautions: The patient was advised to abstain from driving should they feel sleepy or drowsy.       Thank you for allowing me the opportunity to participate in the care of your patient.    Patient will No follow-ups on file.    Please cc note to  Abbe Whitehead MD.    31 minutes of total time spent on the encounter, which includes face to face time and non-face to face time preparing  to see the patient (eg, review of tests), Obtaining and/or reviewing separately obtained history, documenting clinical information in the electronic or other health record, independently interpreting results (not separately reported) and communicating results to the patient/family/caregiver, or Care coordination (not separately reported).               [1]   Social History  Tobacco Use   Smoking Status Former    Types: Cigars   Smokeless Tobacco Never   Tobacco Comments    cigars-x1 yr.   [2]   Current Outpatient Medications   Medication Sig Dispense Refill    apixaban (ELIQUIS) 5 mg Tab Take 1 tablet (5 mg total) by mouth 2 (two) times daily. 180 tablet 3    ascorbic acid (JENNI-C ORAL) Take by mouth.      atorvastatin (LIPITOR) 20 MG tablet take 1 tablet by mouth once daily 90 tablet 0    blood sugar diagnostic (CONTOUR TEST STRIPS) Strp 1 each by Misc.(Non-Drug; Combo Route) route 2 (two) times daily. 50 each 11    cyanocobalamin, vitamin B-12, (VITAMIN B-12 ORAL) Take by mouth.      flecainide (TAMBOCOR) 50 MG Tab Take 1 tablet (50 mg total) by mouth every 12 (twelve) hours. 180 tablet 3    fluticasone propionate (FLONASE) 50 mcg/actuation nasal spray 1 spray by Each Nostril route once daily.      gabapentin (NEURONTIN) 300 MG capsule Take 1 capsule (300 mg total) by mouth 3 (three) times daily as needed (nerve pain). 90 capsule 11    glipiZIDE (GLUCOTROL) 2.5 MG TR24 Take 2 tablets (5 mg total) by mouth daily with breakfast. 180 tablet 3    ixekizumab (TALTZ AUTOINJECTOR) 80 mg/mL AtIn Inject 80 mg into the skin every 28 days. Starting on week 12 1 mL 5    lancets Misc USE ONE LANCET TWO TIMES DAILY 100 each 11    MAGNESIUM ORAL Take by mouth.      metoprolol succinate (TOPROL-XL) 25 MG 24 hr tablet Take 0.5 tablets (12.5 mg total) by mouth once daily.      vitamin B complex (B COMPLEX ORAL) Take by mouth.      ZINC ORAL Take by mouth.       No current facility-administered medications for this visit.

## 2025-05-07 PROBLEM — G47.33 OSA (OBSTRUCTIVE SLEEP APNEA): Status: ACTIVE | Noted: 2025-05-07

## 2025-05-07 NOTE — ASSESSMENT & PLAN NOTE
The patient symptomatically has snoring with findings of elevated bmi, htn, enlarged neck, and persistent afib. This warrants further investigation for possible obstructive sleep apnea.  Patient deferred sleep study due to poor experience with prior titration along with restful sleep.  Advise patient to contact us if he changes his mind.

## 2025-05-12 ENCOUNTER — OFFICE VISIT (OUTPATIENT)
Dept: ENDOCRINOLOGY | Facility: CLINIC | Age: 80
End: 2025-05-12
Payer: MEDICARE

## 2025-05-12 VITALS
WEIGHT: 255 LBS | BODY MASS INDEX: 31.87 KG/M2 | DIASTOLIC BLOOD PRESSURE: 71 MMHG | SYSTOLIC BLOOD PRESSURE: 131 MMHG | HEART RATE: 48 BPM

## 2025-05-12 DIAGNOSIS — N28.89 LEFT RENAL MASS: ICD-10-CM

## 2025-05-12 DIAGNOSIS — I48.91 ATRIAL FIBRILLATION, UNSPECIFIED TYPE: ICD-10-CM

## 2025-05-12 DIAGNOSIS — I10 ESSENTIAL HYPERTENSION, BENIGN: ICD-10-CM

## 2025-05-12 DIAGNOSIS — E11.65 TYPE 2 DIABETES MELLITUS WITH HYPERGLYCEMIA, WITHOUT LONG-TERM CURRENT USE OF INSULIN: Primary | ICD-10-CM

## 2025-05-12 DIAGNOSIS — E55.9 VITAMIN D DEFICIENCY: ICD-10-CM

## 2025-05-12 DIAGNOSIS — R00.1 SINUS BRADYCARDIA: ICD-10-CM

## 2025-05-12 DIAGNOSIS — E66.811 OBESITY (BMI 30.0-34.9): ICD-10-CM

## 2025-05-12 PROCEDURE — 99213 OFFICE O/P EST LOW 20 MIN: CPT | Mod: PBBFAC | Performed by: HOSPITALIST

## 2025-05-12 PROCEDURE — 99214 OFFICE O/P EST MOD 30 MIN: CPT | Mod: S$PBB,,, | Performed by: HOSPITALIST

## 2025-05-12 PROCEDURE — 99999 PR PBB SHADOW E&M-EST. PATIENT-LVL III: CPT | Mod: PBBFAC,,, | Performed by: HOSPITALIST

## 2025-05-12 PROCEDURE — G2211 COMPLEX E/M VISIT ADD ON: HCPCS | Mod: S$PBB,,, | Performed by: HOSPITALIST

## 2025-05-12 RX ORDER — GLIPIZIDE 5 MG/1
5 TABLET, FILM COATED, EXTENDED RELEASE ORAL
Qty: 90 TABLET | Refills: 3 | Status: SHIPPED | OUTPATIENT
Start: 2025-05-12

## 2025-05-12 RX ORDER — TIRZEPATIDE 2.5 MG/.5ML
2.5 INJECTION, SOLUTION SUBCUTANEOUS
Qty: 4 PEN | Refills: 6 | Status: SHIPPED | OUTPATIENT
Start: 2025-05-12

## 2025-05-12 NOTE — ASSESSMENT & PLAN NOTE
- Diabetes is NOT AT GOAL, given most current A1C, Goal A1C for patient is <7%  - Much better glycemic control noted, still with occasional postprandial hyperglycemia on glucose log  - Diabetic supplies/medications reviewed this visit to ensure continue refills and compliance  - Advised patient to get periodic eye and feet exam.   - Reviewed routine maintenance: lipid, U:P/C   - Recent diagnosis 2025 Atrial fibrillation, seems by Cardiology, flecainide  - Recent 3 cm left renal mass, possible RCC noted on CT done 3/27/2025.  Has follow up with Urology    Plan  - Encourage better dietary changes: Encourage patient to continue dietary modification, portion size control, decreasing carbohydrates intake  - RESTART MOUNJARO 2.5 mg once a week injection, advised patient to contact me within the next few months to increase the dose if tolerating  - Glipizide 5 mg once twice a day  - Advised patient to monitor for hypoglycemia and treatment of hypoglycemia discussed  - Advised pt to check glucoses regularly, asked to filled glucose log and bring back for review at next office visit  - Clear written instruction given on AVS, Follow up as scheduled  - Close follow up in 3 months

## 2025-05-12 NOTE — PROGRESS NOTES
Subjective:      Patient ID: Rosendo Thomas is a 79 y.o. male presented to [facility] on 5/12/2025.  Chief Complaint:  Diabetes    History of Present Illness: Rosendo Thomas is a 79 y.o. male here for  Type 2 diabetes  Other significant past medical history: CKD3a, psoriasis, AFib      Diabetes Mellitus Type 2  - Known diabetic complications: nephropathy and retinopathy  - Diagnosed w/ DM: in 2010  - intolerance of Mounjaro, started 05/2024 due to Trulicity shortages, patient reports having frequent diarrhea while on Mounjaro 7.5 mg once a week injection.  Since cessation of medication 12/2024, no further diarrhea   - newly diagnosed with AFib    Interval history: Here for diabetes, A1c increase to 8.7%, previously 7.1%, 6.5%  Atrial fibrillation, seems by Cardiology, flecainide  Recent 3 cm left renal mass, possible RCC noted on CT done 3/27/2025. Seeing Urology, need to get AFIB better  Eating out, has been out of  town   Current weight:  255 lb, increase  previously 243>262, 265, 266  Recent hospital admission for AFib.  Active weight loss due to portion control and dietary modification  Did not bring glucose log for review.  But denies hypoglycemia.   Currently reports on glipizide 5 mg daily.      Current meds:  Glipize 5 mg daily  Previous meds:    Metformin: stop due to frequent diarrhea   Mounjaro: 7.5 mg, stopped 12/2024 due to diarrhea  Home glucose checks: checks daily, Logs reviewed unavailable for review   DENIES HYPOGLYCEMIA  Diet/Exercise:               Eating 2 meals per day, salad              Drink: no soda  Weight trend: stable  Diabetes Education: No  Diabetes Related Hospitalization:  No  Hx of pancreatitis, hx of thyroid cancer: No  Family history of diabetes: Yes, retired  Occupation: retired, active social life     Diabetes lab work  Lab Results   Component Value Date    HGBA1C 8.7 (H) 05/05/2025    HGBA1C 7.1 (H) 03/27/2025    HGBA1C 6.5 (H) 02/07/2025     No results  "found for: "CPEPTIDE", "IEN52CF", "GLUTAMICACID", "ISLETCELLANT", "INSABSS", "IA2ABS"   Random serum glucose:   Lab Results   Component Value Date     (H) 05/05/2025     (H) 03/28/2025     No results found for: "FRUCTOSAMINE", "GLYCOMARKTM"  Lab Results   Component Value Date    LABMICR 29.0 05/05/2025    LABMICR 38.0 08/18/2022    MICALBCREAT 38.7 (H) 05/05/2025    MICALBCREAT 32.2 (H) 08/18/2022    UTPCR 0.1 09/23/2014    UTPCR 0.1 08/14/2014     Diabetes Management Status: Reviewed this office visit  Screening or Prevention Patient's value Goal Complete/Controlled?   Lipid profile : 03/27/2025 Annually Yes   Dilated retinal exam : 04/30/2024 Annually No   Foot exam   Most Recent Foot Exam Date: Not Found Annually Yes      Reviewed past surgical, medical, family, social history and updated as appropriate.  Review of Systems: see HPI above    Objective:   /71   Pulse (!) 48   Wt 115.7 kg (255 lb)   BMI 31.87 kg/m²     Body mass index is 31.87 kg/m².  Vital signs reviewed    Physical Exam  Vitals and nursing note reviewed.   Constitutional:       Appearance: Normal appearance. He is well-developed. He is obese. He is not ill-appearing.   Neck:      Thyroid: No thyromegaly.   Pulmonary:      Effort: Pulmonary effort is normal. No respiratory distress.   Musculoskeletal:         General: Normal range of motion.      Cervical back: Normal range of motion.   Neurological:      General: No focal deficit present.      Mental Status: He is alert. Mental status is at baseline.   Psychiatric:         Mood and Affect: Mood normal.         Behavior: Behavior normal.     Lab Reviewed:  See results in subjective  Lab Results   Component Value Date    HGBA1C 8.7 (H) 05/05/2025     Lab Results   Component Value Date    CHOL 91 (L) 03/27/2025    HDL 32 (L) 03/27/2025    LDLCALC 46.4 (L) 03/27/2025    TRIG 63 03/27/2025    CHOLHDL 35.2 03/27/2025     Lab Results   Component Value Date     (L) 05/05/2025 "    K 4.4 05/05/2025     05/05/2025    CO2 26 05/05/2025     (H) 05/05/2025    BUN 15 05/05/2025    CREATININE 0.9 05/05/2025    CALCIUM 8.9 05/05/2025    PHOS 2.7 01/14/2025    PROT 7.0 03/28/2025    ALBUMIN 3.7 03/28/2025    BILITOT 0.7 03/28/2025    ALKPHOS 82 03/28/2025    AST 24 03/28/2025    ALT 25 03/28/2025    ANIONGAP 6 (L) 05/05/2025    EGFRNORACEVR >60 05/05/2025    TSH 2.819 01/31/2022    [id number] 18 (L) 05/05/2025      Assessment     1. Type 2 diabetes mellitus with hyperglycemia, without long-term current use of insulin  glipiZIDE 5 MG TR24    MOUNJARO 2.5 mg/0.5 mL PnIj    Hemoglobin A1C    Basic Metabolic Panel      2. Essential hypertension, benign        3. Sinus bradycardia        4. Obesity (BMI 30.0-34.9)        5. Vitamin D deficiency        6. Atrial fibrillation, unspecified type        7. Left renal mass          Plan     Type 2 diabetes mellitus  - Diabetes is NOT AT GOAL, given most current A1C, Goal A1C for patient is <7%  - Much better glycemic control noted, still with occasional postprandial hyperglycemia on glucose log  - Diabetic supplies/medications reviewed this visit to ensure continue refills and compliance  - Advised patient to get periodic eye and feet exam.   - Reviewed routine maintenance: lipid, U:P/C   - Recent diagnosis 2025 Atrial fibrillation, seems by Cardiology, flecainide  - Recent 3 cm left renal mass, possible RCC noted on CT done 3/27/2025.  Has follow up with Urology    Plan  - Encourage better dietary changes: Encourage patient to continue dietary modification, portion size control, decreasing carbohydrates intake  - RESTART MOUNJARO 2.5 mg once a week injection, advised patient to contact me within the next few months to increase the dose if tolerating  - Glipizide 5 mg once twice a day  - Advised patient to monitor for hypoglycemia and treatment of hypoglycemia discussed  - Advised pt to check glucoses regularly, asked to filled glucose log and  bring back for review at next office visit  - Clear written instruction given on AVS, Follow up as scheduled  - Close follow up in 3 months    Essential hypertension, benign  - Controlled   - cont metoprolol    Sinus bradycardia  - chronic and stable    Obesity (BMI 30.0-34.9)  - Body mass index is 31.87 kg/m².  - Encourage pt to work on healthy diet, increase exercise as tolerated.  - Continue to monitor weight  - Mounjaro restart    Vitamin D deficiency  - Vitamin-D goal >30  - recommend increase vitamin D3 OTC 4000 IU daily  - repeat lab work yearly    Atrial fibrillation  - follow up with Cardiology    Left renal mass  - follow up with Nephrology, advised the need for better glycemic control    Advised patient to follow up with PCP for routine health maintenance care.   RTC in 3 months    Visit today included increased complexity associated with the care of the episodic problem addressed and managing the longitudinal care of the patient due to the serious and/or complex managed problem(s).   Including: Type 2 diabetes, obesity, hypertension, hyperlipidemia.    Yordan Varela M.D.  Endocrinology  [facility]  5/12/2025      Disclaimer: This note has been generated in part with the use of voice-recognition software. There may be typographical errors that have been missed during proof-reading.

## 2025-05-12 NOTE — ASSESSMENT & PLAN NOTE
- Body mass index is 31.87 kg/m².  - Encourage pt to work on healthy diet, increase exercise as tolerated.  - Continue to monitor weight  - Mounjaro restart

## 2025-05-12 NOTE — PATIENT INSTRUCTIONS
Glipizide 5 mg once a day     Mounjaro 2.5 mg once a week    Goal blood sugar in the morning, before breakfast: 9100-140  Glucose goal AFTER MEALS:    2 Hour after: less than 140  Before going to bed: 100-140  Do not go to bed with glucose less than 100  Have a small snack if glucose is lower than 100

## 2025-05-16 ENCOUNTER — OFFICE VISIT (OUTPATIENT)
Dept: UROLOGY | Facility: CLINIC | Age: 80
End: 2025-05-16
Payer: MEDICARE

## 2025-05-16 DIAGNOSIS — N40.0 BPH WITHOUT OBSTRUCTION/LOWER URINARY TRACT SYMPTOMS: ICD-10-CM

## 2025-05-16 DIAGNOSIS — N28.89 LEFT KIDNEY MASS: Primary | ICD-10-CM

## 2025-05-16 DIAGNOSIS — E11.9 DIABETES MELLITUS TYPE 2 WITHOUT RETINOPATHY: ICD-10-CM

## 2025-05-16 DIAGNOSIS — R35.1 NOCTURIA MORE THAN TWICE PER NIGHT: ICD-10-CM

## 2025-05-16 PROCEDURE — 99999 PR PBB SHADOW E&M-EST. PATIENT-LVL IV: CPT | Mod: PBBFAC,,, | Performed by: UROLOGY

## 2025-05-16 PROCEDURE — 99214 OFFICE O/P EST MOD 30 MIN: CPT | Mod: PBBFAC | Performed by: UROLOGY

## 2025-05-16 RX ORDER — METHYLPREDNISOLONE 4 MG/1
4 TABLET ORAL
COMMUNITY
Start: 2025-04-13

## 2025-05-16 RX ORDER — CEFAZOLIN SODIUM 2 G/50ML
2 SOLUTION INTRAVENOUS
OUTPATIENT
Start: 2025-05-16

## 2025-05-16 NOTE — H&P
Subjective:       Patient ID: Rosendo Thomas is a 79 y.o. male who was referred by No ref. provider found    Chief Complaint:   No chief complaint on file.    Left Renal Mass  Rosendo Thomas was in the hospital recently due to chest pain and HTN.  Imaging showed a left renal mass.  He is here for further evaluation.  He has atrial fibrillation, he has had 2 cardioversions recently.  He takes Eliquis.    He had a partial colectomy several years ago, lower midline incision.          ACTIVE MEDICAL ISSUES:  [Problem List]    [Problem List]  Patient Active Problem List  Diagnosis    Type 2 diabetes mellitus    Psoriasis    Essential hypertension, benign    Sinus bradycardia    Psoriatic arthritis    History of colon cancer    Personal history of colonic polyps    Immunosuppression    Obesity (BMI 30.0-34.9)    Vitamin D deficiency    Dyslipidemia    Thrombocytopenia, unspecified    Anticoagulant long-term use    Persistent atrial fibrillation    Left renal mass    Cervical radiculopathy    AKIN (obstructive sleep apnea)    Atrial fibrillation       PAST MEDICAL HISTORY  Past Medical History:   Diagnosis Date    Colon cancer     Colon polyps     Diabetes mellitus type II 07/2010    diet controlled    Obesity     Paroxysmal atrial fibrillation     Psoriasis     Sleep apnea, unspecified     Squamous cell carcinoma     Type 2 diabetes mellitus        PAST SURGICAL HISTORY:  Past Surgical History:   Procedure Laterality Date    APPENDECTOMY      CARDIOVERSION N/A 2/18/2025    Procedure: Cardioversion;  Surgeon: Abbe Whitehead MD;  Location: [facility] CATH LAB;  Service: Cardiology;  Laterality: N/A;  before 1230pm    CARDIOVERSION N/A 4/22/2025    Procedure: Cardioversion;  Surgeon: Abbe Whitehead MD;  Location: [facility] CATH LAB;  Service: Cardiology;  Laterality: N/A;    COLONOSCOPY N/A 07/09/2018    Procedure: COLONOSCOPY;  Surgeon: Kameron Ruff MD;  Location: [facility] ENDO;  Service: Endoscopy;  Laterality: N/A;   confirmed    COLONOSCOPY N/A 11/11/2019    Procedure: COLONOSCOPY;  Surgeon: Victor Hugo Nunez MD;  Location: [facility] ENDO (4TH FLR);  Service: Endoscopy;  Laterality: N/A;  Pt stated this is the only day he has somebody to drive him and  to stay during procedure  PM Prep    COLONOSCOPY N/A 07/08/2020    Procedure: COLONOSCOPY;  Surgeon: Victor Hugo Nunez MD;  Location: [facility] ENDO (2ND FLR);  Service: Endoscopy;  Laterality: N/A;  Covid-19 test 7/5/20 at [facility]    EXTREMITY CYST EXCISION      LAPAROSCOPIC LEFT COLECTOMY N/A 09/25/2018    Procedure: COLECTOMY-LAPAROSCOPIC LEFT;  Surgeon: Chito Banks MD;  Location: [facility] OR 2ND FLR;  Service: Colon and Rectal;  Laterality: N/A;    SKIN CANCER EXCISION      SKIN GRAFT      TRANSESOPHAGEAL ECHOCARDIOGRAM WITH POSSIBLE CARDIOVERSION (LUDIN W/ POSS CARDIOVERSION) N/A 2/18/2025    Procedure: TRANSESOPHAGEAL ECHOCARDIOGRAM WITH POSSIBLE CARDIOVERSION (LUDIN W/ POSS CARDIOVERSION);  Surgeon: Abbe Whitehead MD;  Location: [facility] CATH LAB;  Service: Cardiology;  Laterality: N/A;  before 1230pm  RN PRE OP 2/14/2025    TREATMENT OF CARDIAC ARRHYTHMIA N/A 4/22/2025    Procedure: Cardioversion/Defibrillation;  Surgeon: Abbe Whitehead MD;  Location: [facility] CATH LAB;  Service: Cardiology;  Laterality: N/A;  RN PRE OP 4/16/2025    VASECTOMY      VASECTOMY         SOCIAL HISTORY:  [Social History]    [Social History]  Tobacco Use    Smoking status: Former     Types: Cigars    Smokeless tobacco: Never    Tobacco comments:     cigars-x1 yr.   Substance Use Topics    Alcohol use: Yes     Comment: occassional    Drug use: No       FAMILY HISTORY:  Family History   Problem Relation Name Age of Onset    Cancer Father          prostate    Diabetes Brother      Liver cancer Brother      Anesthesia problems Neg Hx         ALLERGIES AND MEDICATIONS: updated and reviewed.  Review of patient's allergies indicates:   Allergen Reactions    Influenza virus  vaccines     Metformin Diarrhea     Current Outpatient Medications   Medication Sig    apixaban (ELIQUIS) 5 mg Tab Take 1 tablet (5 mg total) by mouth 2 (two) times daily.    ascorbic acid (JENNI-C ORAL) Take by mouth.    atorvastatin (LIPITOR) 20 MG tablet take 1 tablet by mouth once daily    blood sugar diagnostic (CONTOUR TEST STRIPS) Strp 1 each by Misc.(Non-Drug; Combo Route) route 2 (two) times daily.    cyanocobalamin, vitamin B-12, (VITAMIN B-12 ORAL) Take by mouth.    flecainide (TAMBOCOR) 50 MG Tab Take 1 tablet (50 mg total) by mouth every 12 (twelve) hours.    fluticasone propionate (FLONASE) 50 mcg/actuation nasal spray 1 spray by Each Nostril route once daily.    gabapentin (NEURONTIN) 300 MG capsule Take 1 capsule (300 mg total) by mouth 3 (three) times daily as needed (nerve pain).    glipiZIDE 5 MG TR24 Take 1 tablet (5 mg total) by mouth daily with breakfast.    ixekizumab (TALTZ AUTOINJECTOR) 80 mg/mL AtIn Inject 80 mg into the skin every 28 days. Starting on week 12    lancets Misc USE ONE LANCET TWO TIMES DAILY    MAGNESIUM ORAL Take by mouth.    MEDROL, HAZEL, 4 mg tablet Take 4 mg by mouth.    metoprolol succinate (TOPROL-XL) 25 MG 24 hr tablet Take 0.5 tablets (12.5 mg total) by mouth once daily.    MOUNJARO 2.5 mg/0.5 mL PnIj Inject 2.5 mg into the skin every 7 days.    vitamin B complex (B COMPLEX ORAL) Take by mouth.    ZINC ORAL Take by mouth.     No current facility-administered medications for this visit.       Review of Systems   Constitutional:  Negative for chills and fever.   HENT:  Negative for congestion.    Respiratory:  Negative for chest tightness and shortness of breath.    Cardiovascular:  Negative for chest pain and palpitations.   Gastrointestinal:  Negative for abdominal pain, constipation, diarrhea, nausea and vomiting.   Genitourinary:  Negative for difficulty urinating, dysuria, flank pain, hematuria and urgency.   Musculoskeletal:  Negative for arthralgias.    Neurological:  Negative for dizziness.   Psychiatric/Behavioral:  Negative for confusion.        Objective:      There were no vitals filed for this visit.  Physical Exam  Vitals and nursing note reviewed.   Constitutional:       Appearance: He is well-developed.   HENT:      Head: Normocephalic.   Eyes:      Conjunctiva/sclera: Conjunctivae normal.   Neck:      Thyroid: No thyromegaly.      Trachea: No tracheal deviation.   Cardiovascular:      Rate and Rhythm: Normal rate.      Heart sounds: Normal heart sounds.   Pulmonary:      Effort: Pulmonary effort is normal. No respiratory distress.      Breath sounds: Normal breath sounds. No wheezing.   Abdominal:      General: Bowel sounds are normal.      Palpations: Abdomen is soft.      Tenderness: There is no abdominal tenderness. There is no rebound.      Hernia: No hernia is present.   Musculoskeletal:         General: No tenderness. Normal range of motion.      Cervical back: Normal range of motion and neck supple.   Lymphadenopathy:      Cervical: No cervical adenopathy.   Skin:     General: Skin is warm and dry.      Findings: No erythema or rash.   Neurological:      Mental Status: He is alert and oriented to person, place, and time.   Psychiatric:         Behavior: Behavior normal.         Thought Content: Thought content normal.         Judgment: Judgment normal.         Urine dipstick shows negative for all components.  Micro exam: negative for WBC's or RBC's.    CMP  Sodium   Date Value Ref Range Status   05/05/2025 135 (L) 136 - 145 mmol/L Final   02/14/2025 137 136 - 145 mmol/L Final     Potassium   Date Value Ref Range Status   05/05/2025 4.4 3.5 - 5.1 mmol/L Final   02/14/2025 4.3 3.5 - 5.1 mmol/L Final     Chloride   Date Value Ref Range Status   05/05/2025 103 95 - 110 mmol/L Final   02/14/2025 105 95 - 110 mmol/L Final     CO2   Date Value Ref Range Status   05/05/2025 26 23 - 29 mmol/L Final   02/14/2025 25 23 - 29 mmol/L Final     Glucose   Date  Value Ref Range Status   05/05/2025 186 (H) 70 - 110 mg/dL Final   02/14/2025 240 (H) 70 - 110 mg/dL Final     BUN   Date Value Ref Range Status   05/05/2025 15 8 - 23 mg/dL Final     Creatinine   Date Value Ref Range Status   05/05/2025 0.9 0.5 - 1.4 mg/dL Final     Calcium   Date Value Ref Range Status   05/05/2025 8.9 8.7 - 10.5 mg/dL Final   02/14/2025 9.1 8.7 - 10.5 mg/dL Final     Protein Total   Date Value Ref Range Status   03/28/2025 7.0 6.0 - 8.4 gm/dL Final     Total Protein   Date Value Ref Range Status   02/14/2025 6.5 6.0 - 8.4 g/dL Final     Albumin   Date Value Ref Range Status   03/28/2025 3.7 3.5 - 5.2 g/dL Final   02/14/2025 3.6 3.5 - 5.2 g/dL Final     Total Bilirubin   Date Value Ref Range Status   02/14/2025 0.6 0.1 - 1.0 mg/dL Final     Comment:     For infants and newborns, interpretation of results should be based  on gestational age, weight and in agreement with clinical  observations.    Premature Infant recommended reference ranges:  Up to 24 hours.............<8.0 mg/dL  Up to 48 hours............<12.0 mg/dL  3-5 days..................<15.0 mg/dL  6-29 days.................<15.0 mg/dL       Bilirubin Total   Date Value Ref Range Status   03/28/2025 0.7 0.1 - 1.0 mg/dL Final     Comment:     For infants and newborns, interpretation of results should be based   on gestational age, weight and in agreement with clinical   observations.    Premature Infant recommended reference ranges:   0-24 hours:  <8.0 mg/dL   24-48 hours: <12.0 mg/dL   3-5 days:    <15.0 mg/dL   6-29 days:   <15.0 mg/dL     Alkaline Phosphatase   Date Value Ref Range Status   02/14/2025 80 40 - 150 U/L Final     ALP   Date Value Ref Range Status   03/28/2025 82 40 - 150 unit/L Final     AST   Date Value Ref Range Status   03/28/2025 24 11 - 45 unit/L Final   02/14/2025 19 10 - 40 U/L Final     ALT   Date Value Ref Range Status   03/28/2025 25 10 - 44 unit/L Final   02/14/2025 19 10 - 44 U/L Final     Anion Gap   Date  Value Ref Range Status   05/05/2025 6 (L) 8 - 16 mmol/L Final     eGFR   Date Value Ref Range Status   05/05/2025 >60 >60 mL/min/1.73/m2 Final     Comment:     Estimated GFR calculated using the CKD-EPI creatinine (2021) equation.   02/14/2025 >60 >60 mL/min/1.73 m^2 Final        CT Abdomen Pelvis W WO Contrast  Order: [id number]   Status: Final result       Next appt: Today at 02:45 PM in Urology (Milad Valladares MD)    Test Result Released: Yes (not seen)    0 Result Notes  Details    Reading Physician Reading Date Result Priority   Shawn Ballard Jr., MD  [phone]  3/28/2025 STAT     Narrative & Impression  EXAMINATION:  CT ABDOMEN PELVIS W WO CONTRAST     CLINICAL HISTORY:  Renal mass;     TECHNIQUE:  Pre and postcontrast CT of the abdomen and pelvis only with a 15 minute delayed sequence through the adrenal glands.  100 cc Omnipaque administered intravenously.     COMPARISON:  10/01/2018     FINDINGS:  Lung bases: Unremarkable.     Adrenals:Normal.     Upper abdominal viscera:There is an approximately 3.0 by 2.8 x 2.8 cm heterogeneously enhancing solid mass arising from the anterior superior aspect of the left kidney.  There is an exophytic growth pattern.  There is an adjacent simple 5 mm renal cyst.  Right kidney and proximal collecting system unremarkable.  The liver, gallbladder, spleen, and pancreas are unremarkable.     GI tract: Operative change of sigmoid colectomy and anastomosis without complicating features.  Some foci of fat necrosis adjacent to the is anastomosis.  Small bowel and stomach unremarkable.     Retroperitoneal vasculature: Aortic atherosclerosis is present.     Pelvic viscera:Prostate enlarged with heterogeneous enhancement pattern.  Urinary bladder unremarkable.     Bones: Unremarkable     Impression:     3 cm maximum dimension left upper pole renal mass compatible with renal cell carcinoma until proven otherwise.     No evidence of intra-abdominal metastatic disease      Postoperative changes to the large bowel and prostatomegaly.        Electronically signed by:Shawn Webb Jr  Date:                                            03/28/2025  Time:                                           14:29        Exam Ended: 03/28/25 13:39 CDT       Assessment:       1. Left kidney mass    2. BPH without obstruction/lower urinary tract symptoms    3. Nocturia more than twice per night    4. Diabetes mellitus type 2 without retinopathy          Plan:       1. Left kidney mass  Robotic Assisted Laparoscopic Left Partial Nephrectomy on Wednesday 6/18/2025  Cardiac Clearance with Dr. Ventura Ignacio 3 days prior     2. BPH without obstruction/lower urinary tract symptoms  Monitor  He does not want to check PSA    3. Nocturia more than twice per night  stable    4. Diabetes mellitus type 2 without retinopathy   Per PCP            Follow up in about 6 weeks (around 6/25/2025) for Follow up Post Op.

## 2025-05-16 NOTE — PROGRESS NOTES
Subjective:       Patient ID: Rosendo Thomas is a 79 y.o. male who was referred by No ref. provider found    Chief Complaint:   No chief complaint on file.    Left Renal Mass  Rosendo Thomas was in the hospital recently due to chest pain and HTN.  Imaging showed a left renal mass.  He is here for further evaluation.  He has atrial fibrillation, he has had 2 cardioversions recently.  He takes Eliquis.    He had a partial colectomy several years ago, lower midline incision.          ACTIVE MEDICAL ISSUES:  Problem List[1]    PAST MEDICAL HISTORY  Past Medical History:   Diagnosis Date    Colon cancer     Colon polyps     Diabetes mellitus type II 07/2010    diet controlled    Obesity     Paroxysmal atrial fibrillation     Psoriasis     Sleep apnea, unspecified     Squamous cell carcinoma     Type 2 diabetes mellitus        PAST SURGICAL HISTORY:  Past Surgical History:   Procedure Laterality Date    APPENDECTOMY      CARDIOVERSION N/A 2/18/2025    Procedure: Cardioversion;  Surgeon: Abbe Whitehead MD;  Location: [facility] CATH LAB;  Service: Cardiology;  Laterality: N/A;  before 1230pm    CARDIOVERSION N/A 4/22/2025    Procedure: Cardioversion;  Surgeon: Abbe Whitehead MD;  Location: [facility] CATH LAB;  Service: Cardiology;  Laterality: N/A;    COLONOSCOPY N/A 07/09/2018    Procedure: COLONOSCOPY;  Surgeon: Kameron Ruff MD;  Location: [facility];  Service: Endoscopy;  Laterality: N/A;  confirmed    COLONOSCOPY N/A 11/11/2019    Procedure: COLONOSCOPY;  Surgeon: Victor Hugo Nunez MD;  Location: [facility] (4TH FLR);  Service: Endoscopy;  Laterality: N/A;  Pt stated this is the only day he has somebody to drive him and  to stay during procedure  PM Prep    COLONOSCOPY N/A 07/08/2020    Procedure: COLONOSCOPY;  Surgeon: Victor Hugo Nunez MD;  Location: [facility] (2ND FLR);  Service: Endoscopy;  Laterality: N/A;  Covid-19 test 7/5/20 at [facility]    EXTREMITY CYST EXCISION       LAPAROSCOPIC LEFT COLECTOMY N/A 09/25/2018    Procedure: COLECTOMY-LAPAROSCOPIC LEFT;  Surgeon: Chito Banks MD;  Location: [facility] OR [address] [place] [address];  Service: Colon and Rectal;  Laterality: N/A;    SKIN CANCER EXCISION      SKIN GRAFT      TRANSESOPHAGEAL ECHOCARDIOGRAM WITH POSSIBLE CARDIOVERSION (LUDIN W/ POSS CARDIOVERSION) N/A 2/18/2025    Procedure: TRANSESOPHAGEAL ECHOCARDIOGRAM WITH POSSIBLE CARDIOVERSION (LUDIN W/ POSS CARDIOVERSION);  Surgeon: Abbe Whitehead MD;  Location: [facility] CATH LAB;  Service: Cardiology;  Laterality: N/A;  before 1230pm  RN PRE OP 2/14/2025    TREATMENT OF CARDIAC ARRHYTHMIA N/A 4/22/2025    Procedure: Cardioversion/Defibrillation;  Surgeon: Abbe Whitehead MD;  Location: [facility] CATH LAB;  Service: Cardiology;  Laterality: N/A;  RN PRE OP 4/16/2025    VASECTOMY      VASECTOMY         SOCIAL HISTORY:  Social History[2]    FAMILY HISTORY:  Family History   Problem Relation Name Age of Onset    Cancer Father          prostate    Diabetes Brother      Liver cancer Brother      Anesthesia problems Neg Hx         ALLERGIES AND MEDICATIONS: updated and reviewed.  Review of patient's allergies indicates:   Allergen Reactions    Influenza virus vaccines     Metformin Diarrhea     Current Outpatient Medications   Medication Sig    apixaban (ELIQUIS) 5 mg Tab Take 1 tablet (5 mg total) by mouth 2 (two) times daily.    ascorbic acid (JENNI-C ORAL) Take by mouth.    atorvastatin (LIPITOR) 20 MG tablet take 1 tablet by mouth once daily    blood sugar diagnostic (CONTOUR TEST STRIPS) Strp 1 each by Misc.(Non-Drug; Combo Route) route 2 (two) times daily.    cyanocobalamin, vitamin B-12, (VITAMIN B-12 ORAL) Take by mouth.    flecainide (TAMBOCOR) 50 MG Tab Take 1 tablet (50 mg total) by mouth every 12 (twelve) hours.    fluticasone propionate (FLONASE) 50 mcg/actuation nasal spray 1 spray by Each Nostril route once daily.    gabapentin (NEURONTIN) 300 MG capsule Take 1 capsule (300 mg total) by mouth 3  (three) times daily as needed (nerve pain).    glipiZIDE 5 MG TR24 Take 1 tablet (5 mg total) by mouth daily with breakfast.    ixekizumab (TALTZ AUTOINJECTOR) 80 mg/mL AtIn Inject 80 mg into the skin every 28 days. Starting on week 12    lancets Misc USE ONE LANCET TWO TIMES DAILY    MAGNESIUM ORAL Take by mouth.    MEDROL, HAZEL, 4 mg tablet Take 4 mg by mouth.    metoprolol succinate (TOPROL-XL) 25 MG 24 hr tablet Take 0.5 tablets (12.5 mg total) by mouth once daily.    MOUNJARO 2.5 mg/0.5 mL PnIj Inject 2.5 mg into the skin every 7 days.    vitamin B complex (B COMPLEX ORAL) Take by mouth.    ZINC ORAL Take by mouth.     No current facility-administered medications for this visit.       Review of Systems   Constitutional:  Negative for chills and fever.   HENT:  Negative for congestion.    Respiratory:  Negative for chest tightness and shortness of breath.    Cardiovascular:  Negative for chest pain and palpitations.   Gastrointestinal:  Negative for abdominal pain, constipation, diarrhea, nausea and vomiting.   Genitourinary:  Negative for difficulty urinating, dysuria, flank pain, hematuria and urgency.   Musculoskeletal:  Negative for arthralgias.   Neurological:  Negative for dizziness.   Psychiatric/Behavioral:  Negative for confusion.        Objective:      There were no vitals filed for this visit.  Physical Exam  Vitals and nursing note reviewed.   Constitutional:       Appearance: He is well-developed.   HENT:      Head: Normocephalic.   Eyes:      Conjunctiva/sclera: Conjunctivae normal.   Neck:      Thyroid: No thyromegaly.      Trachea: No tracheal deviation.   Cardiovascular:      Rate and Rhythm: Normal rate.      Heart sounds: Normal heart sounds.   Pulmonary:      Effort: Pulmonary effort is normal. No respiratory distress.      Breath sounds: Normal breath sounds. No wheezing.   Abdominal:      General: Bowel sounds are normal.      Palpations: Abdomen is soft.      Tenderness: There is no  abdominal tenderness. There is no rebound.      Hernia: No hernia is present.   Musculoskeletal:         General: No tenderness. Normal range of motion.      Cervical back: Normal range of motion and neck supple.   Lymphadenopathy:      Cervical: No cervical adenopathy.   Skin:     General: Skin is warm and dry.      Findings: No erythema or rash.   Neurological:      Mental Status: He is alert and oriented to person, place, and time.   Psychiatric:         Behavior: Behavior normal.         Thought Content: Thought content normal.         Judgment: Judgment normal.         Urine dipstick shows negative for all components.  Micro exam: negative for WBC's or RBC's.    CMP  Sodium   Date Value Ref Range Status   05/05/2025 135 (L) 136 - 145 mmol/L Final   02/14/2025 137 136 - 145 mmol/L Final     Potassium   Date Value Ref Range Status   05/05/2025 4.4 3.5 - 5.1 mmol/L Final   02/14/2025 4.3 3.5 - 5.1 mmol/L Final     Chloride   Date Value Ref Range Status   05/05/2025 103 95 - 110 mmol/L Final   02/14/2025 105 95 - 110 mmol/L Final     CO2   Date Value Ref Range Status   05/05/2025 26 23 - 29 mmol/L Final   02/14/2025 25 23 - 29 mmol/L Final     Glucose   Date Value Ref Range Status   05/05/2025 186 (H) 70 - 110 mg/dL Final   02/14/2025 240 (H) 70 - 110 mg/dL Final     BUN   Date Value Ref Range Status   05/05/2025 15 8 - 23 mg/dL Final     Creatinine   Date Value Ref Range Status   05/05/2025 0.9 0.5 - 1.4 mg/dL Final     Calcium   Date Value Ref Range Status   05/05/2025 8.9 8.7 - 10.5 mg/dL Final   02/14/2025 9.1 8.7 - 10.5 mg/dL Final     Protein Total   Date Value Ref Range Status   03/28/2025 7.0 6.0 - 8.4 gm/dL Final     Total Protein   Date Value Ref Range Status   02/14/2025 6.5 6.0 - 8.4 g/dL Final     Albumin   Date Value Ref Range Status   03/28/2025 3.7 3.5 - 5.2 g/dL Final   02/14/2025 3.6 3.5 - 5.2 g/dL Final     Total Bilirubin   Date Value Ref Range Status   02/14/2025 0.6 0.1 - 1.0 mg/dL Final      Comment:     For infants and newborns, interpretation of results should be based  on gestational age, weight and in agreement with clinical  observations.    Premature Infant recommended reference ranges:  Up to 24 hours.............<8.0 mg/dL  Up to 48 hours............<12.0 mg/dL  3-5 days..................<15.0 mg/dL  6-29 days.................<15.0 mg/dL       Bilirubin Total   Date Value Ref Range Status   03/28/2025 0.7 0.1 - 1.0 mg/dL Final     Comment:     For infants and newborns, interpretation of results should be based   on gestational age, weight and in agreement with clinical   observations.    Premature Infant recommended reference ranges:   0-24 hours:  <8.0 mg/dL   24-48 hours: <12.0 mg/dL   3-5 days:    <15.0 mg/dL   6-29 days:   <15.0 mg/dL     Alkaline Phosphatase   Date Value Ref Range Status   02/14/2025 80 40 - 150 U/L Final     ALP   Date Value Ref Range Status   03/28/2025 82 40 - 150 unit/L Final     AST   Date Value Ref Range Status   03/28/2025 24 11 - 45 unit/L Final   02/14/2025 19 10 - 40 U/L Final     ALT   Date Value Ref Range Status   03/28/2025 25 10 - 44 unit/L Final   02/14/2025 19 10 - 44 U/L Final     Anion Gap   Date Value Ref Range Status   05/05/2025 6 (L) 8 - 16 mmol/L Final     eGFR   Date Value Ref Range Status   05/05/2025 >60 >60 mL/min/1.73/m2 Final     Comment:     Estimated GFR calculated using the CKD-EPI creatinine (2021) equation.   02/14/2025 >60 >60 mL/min/1.73 m^2 Final        CT Abdomen Pelvis W WO Contrast  Order: [id number]   Status: Final result       Next appt: Today at 02:45 PM in Urology (Milad Valladares MD)    Test Result Released: Yes (not seen)    0 Result Notes  Details    Reading Physician Reading Date Result Priority   Shawn Ballard Jr., MD  [phone]  3/28/2025 STAT     Narrative & Impression  EXAMINATION:  CT ABDOMEN PELVIS W WO CONTRAST     CLINICAL HISTORY:  Renal mass;     TECHNIQUE:  Pre and postcontrast CT of the abdomen and  pelvis only with a 15 minute delayed sequence through the adrenal glands.  100 cc Omnipaque administered intravenously.     COMPARISON:  10/01/2018     FINDINGS:  Lung bases: Unremarkable.     Adrenals:Normal.     Upper abdominal viscera:There is an approximately 3.0 by 2.8 x 2.8 cm heterogeneously enhancing solid mass arising from the anterior superior aspect of the left kidney.  There is an exophytic growth pattern.  There is an adjacent simple 5 mm renal cyst.  Right kidney and proximal collecting system unremarkable.  The liver, gallbladder, spleen, and pancreas are unremarkable.     GI tract: Operative change of sigmoid colectomy and anastomosis without complicating features.  Some foci of fat necrosis adjacent to the is anastomosis.  Small bowel and stomach unremarkable.     Retroperitoneal vasculature: Aortic atherosclerosis is present.     Pelvic viscera:Prostate enlarged with heterogeneous enhancement pattern.  Urinary bladder unremarkable.     Bones: Unremarkable     Impression:     3 cm maximum dimension left upper pole renal mass compatible with renal cell carcinoma until proven otherwise.     No evidence of intra-abdominal metastatic disease     Postoperative changes to the large bowel and prostatomegaly.        Electronically signed by:Shawn Webb Jr  Date:                                            03/28/2025  Time:                                           14:29        Exam Ended: 03/28/25 13:39 CDT       Assessment:       1. Left kidney mass    2. BPH without obstruction/lower urinary tract symptoms    3. Nocturia more than twice per night    4. Diabetes mellitus type 2 without retinopathy          Plan:       1. Left kidney mass  Robotic Assisted Laparoscopic Left Partial Nephrectomy on Wednesday 6/18/2025  Cardiac Clearance with Dr. Ventura Ignacio 3 days prior     2. BPH without obstruction/lower urinary tract symptoms  Monitor  He does not want to check PSA    3. Nocturia more than  twice per night  stable    4. Diabetes mellitus type 2 without retinopathy   Per PCP            Follow up in about 6 weeks (around 6/25/2025) for Follow up Post Op.             [1]   Patient Active Problem List  Diagnosis    Type 2 diabetes mellitus    Psoriasis    Essential hypertension, benign    Sinus bradycardia    Psoriatic arthritis    History of colon cancer    Personal history of colonic polyps    Immunosuppression    Obesity (BMI 30.0-34.9)    Vitamin D deficiency    Dyslipidemia    Thrombocytopenia, unspecified    Anticoagulant long-term use    Persistent atrial fibrillation    Left renal mass    Cervical radiculopathy    AKIN (obstructive sleep apnea)    Atrial fibrillation   [2]   Social History  Tobacco Use    Smoking status: Former     Types: Cigars    Smokeless tobacco: Never    Tobacco comments:     cigars-x1 yr.   Substance Use Topics    Alcohol use: Yes     Comment: occassional    Drug use: No

## 2025-05-19 ENCOUNTER — ANESTHESIA EVENT (OUTPATIENT)
Dept: SURGERY | Facility: HOSPITAL | Age: 80
End: 2025-05-19
Payer: MEDICARE

## 2025-05-19 ENCOUNTER — TELEPHONE (OUTPATIENT)
Dept: CARDIOLOGY | Facility: CLINIC | Age: 80
End: 2025-05-19
Payer: MEDICARE

## 2025-05-19 ENCOUNTER — OFFICE VISIT (OUTPATIENT)
Dept: PAIN MEDICINE | Facility: CLINIC | Age: 80
End: 2025-05-19
Payer: MEDICARE

## 2025-05-19 VITALS
BODY MASS INDEX: 31.36 KG/M2 | DIASTOLIC BLOOD PRESSURE: 72 MMHG | SYSTOLIC BLOOD PRESSURE: 161 MMHG | HEART RATE: 44 BPM | RESPIRATION RATE: 18 BRPM | OXYGEN SATURATION: 98 % | WEIGHT: 250.88 LBS

## 2025-05-19 DIAGNOSIS — G89.29 CHRONIC LEFT SHOULDER PAIN: ICD-10-CM

## 2025-05-19 DIAGNOSIS — M25.512 CHRONIC LEFT SHOULDER PAIN: ICD-10-CM

## 2025-05-19 DIAGNOSIS — M54.12 CERVICAL RADICULOPATHY: Primary | ICD-10-CM

## 2025-05-19 PROCEDURE — 99213 OFFICE O/P EST LOW 20 MIN: CPT | Mod: PBBFAC,PN | Performed by: PAIN MEDICINE

## 2025-05-19 PROCEDURE — 99999 PR PBB SHADOW E&M-EST. PATIENT-LVL III: CPT | Mod: PBBFAC,,, | Performed by: PAIN MEDICINE

## 2025-05-19 PROCEDURE — 99203 OFFICE O/P NEW LOW 30 MIN: CPT | Mod: S$PBB,,, | Performed by: PAIN MEDICINE

## 2025-05-19 NOTE — TELEPHONE ENCOUNTER
----- Message from Abbe Whitehead MD sent at 5/16/2025  4:05 PM CDT -----  Regarding: preop clearance  Pls schedule OV with me next week (sometime between 1-2pm on Mon-Thurs).He'll need an EKG.[facility]  ----- Message -----  From: Milad Valladares MD  Sent: 5/16/2025   1:29 PM CDT  To: Abbe Whitehead MD; Dede Eric MD

## 2025-05-19 NOTE — PROGRESS NOTES
Subjective:     Patient ID: Rosendo Thomas is a 79 y.o. male    Chief Complaint: Shoulder Pain (Left sided achy pain and numbness in fingers )      [facility]      HPI:    Initial Encounter (5/19/25):  Rosendo presents with left shoulder pain radiating down the arm, fingers, and back. Initially mistaken for a heart attack, this prompted a hospital visit. Pain persisted after discharge and was severe; he experienced significant discomfort while driving to his daughter's house. He sought treatment from an orthopedist and received a cortisone injection, which provided relief for a few days before pain returned. Subsequently, he visited a chiropractor who diagnosed a pinched nerve and performed a manipulation, which improved his condition.    He denies current symptoms.     Physical Therapy:  No.    Non-pharmacologic Treatment:  Rest helps         TENS?  No    Pain Medications:         Currently taking:  Gabapentin    Has tried in the past:      Has not tried:  Opioids, NSAIDs, Tylenol, Muscle relaxants, TCAs, SNRIs, topical creams    Blood thinners:  Eliquis    Interventional Therapies:  None    Relevant Surgeries:  None    Affecting sleep?  No    Affecting daily activities? no    Depressive symptoms? No          SI/HI? No    Work status: Retired    Pain Scores:    Best:       0/10  Worst:     10/10  Usually:   0/10  Today:    0/10    Pain Disability Index  Family/Home Responsibilities:: 1  Recreation:: 1  Social Activity:: 1  Occupation:: 0  Sexual Behavior:: 0  Self Care:: 1  Life-Support Activities:: 0  Pain Disability Index (PDI): 4    Review of Systems   Constitutional:  Negative for activity change, appetite change, chills, fatigue, fever and unexpected weight change.   HENT:  Negative for hearing loss.    Eyes:  Negative for visual disturbance.   Respiratory:  Negative for chest tightness and shortness of breath.    Cardiovascular:  Negative for chest pain.   Gastrointestinal:   Negative for abdominal pain, constipation, diarrhea, nausea and vomiting.   Genitourinary:  Negative for difficulty urinating.   Musculoskeletal:  Negative for back pain, gait problem and neck pain.   Skin:  Negative for rash.   Neurological:  Negative for dizziness, weakness, light-headedness, numbness and headaches.   Psychiatric/Behavioral:  Negative for hallucinations, sleep disturbance and suicidal ideas. The patient is not nervous/anxious.        Past Medical History:   Diagnosis Date    Colon cancer     Colon polyps     Diabetes mellitus type II 07/2010    diet controlled    Obesity     Paroxysmal atrial fibrillation     Psoriasis     Sleep apnea, unspecified     Squamous cell carcinoma     Type 2 diabetes mellitus        Past Surgical History:   Procedure Laterality Date    APPENDECTOMY      CARDIOVERSION N/A 2/18/2025    Procedure: Cardioversion;  Surgeon: Abbe Whitehead MD;  Location: [facility] CATH LAB;  Service: Cardiology;  Laterality: N/A;  before 1230pm    CARDIOVERSION N/A 4/22/2025    Procedure: Cardioversion;  Surgeon: Abbe Whitehead MD;  Location: [facility] CATH LAB;  Service: Cardiology;  Laterality: N/A;    COLONOSCOPY N/A 07/09/2018    Procedure: COLONOSCOPY;  Surgeon: Kameron Ruff MD;  Location: [facility];  Service: Endoscopy;  Laterality: N/A;  confirmed    COLONOSCOPY N/A 11/11/2019    Procedure: COLONOSCOPY;  Surgeon: Victor Hugo Nunez MD;  Location: [facility] (4TH FLR);  Service: Endoscopy;  Laterality: N/A;  Pt stated this is the only day he has somebody to drive him and  to stay during procedure  PM Prep    COLONOSCOPY N/A 07/08/2020    Procedure: COLONOSCOPY;  Surgeon: Victor Hugo Nunez MD;  Location: [facility] (2ND FLR);  Service: Endoscopy;  Laterality: N/A;  Covid-19 test 7/5/20 at [facility]    EXTREMITY CYST EXCISION      LAPAROSCOPIC LEFT COLECTOMY N/A 09/25/2018    Procedure: COLECTOMY-LAPAROSCOPIC LEFT;  Surgeon: Chito Banks MD;  Location:  [facility] OR 2ND FLR;  Service: Colon and Rectal;  Laterality: N/A;    SKIN CANCER EXCISION      SKIN GRAFT      TRANSESOPHAGEAL ECHOCARDIOGRAM WITH POSSIBLE CARDIOVERSION (LUDIN W/ POSS CARDIOVERSION) N/A 2/18/2025    Procedure: TRANSESOPHAGEAL ECHOCARDIOGRAM WITH POSSIBLE CARDIOVERSION (LUDIN W/ POSS CARDIOVERSION);  Surgeon: Abbe Whitehead MD;  Location: [facility] CATH LAB;  Service: Cardiology;  Laterality: N/A;  before 1230pm  RN PRE OP 2/14/2025    TREATMENT OF CARDIAC ARRHYTHMIA N/A 4/22/2025    Procedure: Cardioversion/Defibrillation;  Surgeon: Abbe Whitehead MD;  Location: [facility] CATH LAB;  Service: Cardiology;  Laterality: N/A;  RN PRE OP 4/16/2025    VASECTOMY      VASECTOMY         Social History[1]    Review of patient's allergies indicates:   Allergen Reactions    Influenza virus vaccines     Metformin Diarrhea       Medications Ordered Prior to Encounter[2]    Objective:      BP (!) 161/72 (BP Location: Right arm, Patient Position: Sitting)   Pulse (!) 44   Resp 18   Wt 113.8 kg (250 lb 14.1 oz)   SpO2 98%   BMI 31.36 kg/m²     Exam:  GEN:  Well developed, well nourished.  No acute distress.   HEENT:  No trauma.  Mucous membranes moist.  Nares patent bilaterally.  PSYCH: Normal affect. Thought content appropriate.  CHEST:  Breathing symmetric.  No audible wheezing.  ABD: Soft, non-distended.  SKIN:  Warm, pink, dry.  No rash on exposed areas.    EXT:  No cyanosis, clubbing, or edema.  No color change or changes in nail or hair growth.  NEURO/MUSCULOSKELETAL:  Fully alert, oriented, and appropriate. Speech normal brant. No cranial nerve deficits.   Gait:  Normal.  No focal motor deficits.       Imaging:  No pertinent imaging    Assessment:       Encounter Diagnoses   Name Primary?    Chronic left shoulder pain     Cervical radiculopathy Yes     Plan:       Rosendo was seen today for shoulder pain.    Diagnoses and all orders for this visit:    Cervical radiculopathy    Chronic left shoulder pain  -      Ambulatory referral/consult to Pain Clinic        Rosendo Thomas is a 79 y.o. male with resolved left-sided neck and upper extremity pain.  Subjectively consistent with cervical radiculopathy.  Resolved following chiropractic manipulation..    No further treatment needed at this time.    Advised patient to return to clinic if symptoms recur.            This note was created by combination of typed  and M-Modal dictation. Transcription and phonetic errors may be present.  If there are any questions, please contact me.           [1]   Social History  Socioeconomic History    Marital status: Other   Occupational History     Employer: [employer]   Tobacco Use    Smoking status: Former     Types: Cigars    Smokeless tobacco: Never    Tobacco comments:     cigars-x1 yr.   Substance and Sexual Activity    Alcohol use: Yes     Comment: occassional    Drug use: No    Sexual activity: Yes     Partners: Female     Social Drivers of Health     Financial Resource Strain: Low Risk  (12/17/2024)    Overall Financial Resource Strain (CARDIA)     Difficulty of Paying Living Expenses: Not hard at all   Food Insecurity: No Food Insecurity (12/17/2024)    Hunger Vital Sign     Worried About Running Out of Food in the Last Year: Never true     Ran Out of Food in the Last Year: Never true   Transportation Needs: No Transportation Needs (12/17/2024)    PRAPARE - Transportation     Lack of Transportation (Medical): No     Lack of Transportation (Non-Medical): No   Physical Activity: Sufficiently Active (12/17/2024)    Exercise Vital Sign     Days of Exercise per Week: 4 days     Minutes of Exercise per Session: 40 min   Stress: No Stress Concern Present (12/17/2024)    [employer] - Occupational Stress Questionnaire     Feeling of Stress : Not at all   Housing Stability: Unknown (12/17/2024)    Housing Stability Vital Sign     Unable to Pay for Housing in the Last Year: No      Homeless in the Last Year: No   [2]   Current Outpatient Medications on File Prior to Visit   Medication Sig Dispense Refill    apixaban (ELIQUIS) 5 mg Tab Take 1 tablet (5 mg total) by mouth 2 (two) times daily. 180 tablet 3    ascorbic acid (JENNI-C ORAL) Take by mouth.      atorvastatin (LIPITOR) 20 MG tablet take 1 tablet by mouth once daily 90 tablet 0    blood sugar diagnostic (CONTOUR TEST STRIPS) Strp 1 each by Misc.(Non-Drug; Combo Route) route 2 (two) times daily. 50 each 11    cyanocobalamin, vitamin B-12, (VITAMIN B-12 ORAL) Take by mouth.      flecainide (TAMBOCOR) 50 MG Tab Take 1 tablet (50 mg total) by mouth every 12 (twelve) hours. 180 tablet 3    fluticasone propionate (FLONASE) 50 mcg/actuation nasal spray 1 spray by Each Nostril route once daily.      gabapentin (NEURONTIN) 300 MG capsule Take 1 capsule (300 mg total) by mouth 3 (three) times daily as needed (nerve pain). 90 capsule 11    glipiZIDE 5 MG TR24 Take 1 tablet (5 mg total) by mouth daily with breakfast. 90 tablet 3    ixekizumab (TALTZ AUTOINJECTOR) 80 mg/mL AtIn Inject 80 mg into the skin every 28 days. Starting on week 12 1 mL 5    lancets Misc USE ONE LANCET TWO TIMES DAILY 100 each 11    MAGNESIUM ORAL Take by mouth.      MEDROL, HAZEL, 4 mg tablet Take 4 mg by mouth.      metoprolol succinate (TOPROL-XL) 25 MG 24 hr tablet Take 0.5 tablets (12.5 mg total) by mouth once daily.      MOUNJARO 2.5 mg/0.5 mL PnIj Inject 2.5 mg into the skin every 7 days. 4 Pen 6    vitamin B complex (B COMPLEX ORAL) Take by mouth.      ZINC ORAL Take by mouth.       No current facility-administered medications on file prior to visit.

## 2025-05-20 ENCOUNTER — TELEPHONE (OUTPATIENT)
Dept: UROLOGY | Facility: CLINIC | Age: 80
End: 2025-05-20
Payer: MEDICARE

## 2025-05-20 NOTE — TELEPHONE ENCOUNTER
Attempted to contact pt lvm explaining appt with Dr. Beach is not needed and will be canceled.   ----- Message from Magdalene Beach MD sent at 5/20/2025 12:30 PM CDT -----  Pt scheduled to see me Thursday. He is planned for kidney surgery already with Dr Valladares. This appt with me can likely be cancelled.

## 2025-05-27 ENCOUNTER — OFFICE VISIT (OUTPATIENT)
Dept: CARDIOLOGY | Facility: CLINIC | Age: 80
End: 2025-05-27
Payer: MEDICARE

## 2025-05-27 VITALS
DIASTOLIC BLOOD PRESSURE: 68 MMHG | OXYGEN SATURATION: 96 % | HEIGHT: 75 IN | HEART RATE: 55 BPM | RESPIRATION RATE: 18 BRPM | WEIGHT: 245.25 LBS | BODY MASS INDEX: 30.49 KG/M2 | SYSTOLIC BLOOD PRESSURE: 126 MMHG

## 2025-05-27 DIAGNOSIS — Z01.810 PREOP CARDIOVASCULAR EXAM: ICD-10-CM

## 2025-05-27 DIAGNOSIS — Z51.81 ENCOUNTER FOR MONITORING FLECAINIDE THERAPY: ICD-10-CM

## 2025-05-27 DIAGNOSIS — Z79.01 CHRONIC ANTICOAGULATION: ICD-10-CM

## 2025-05-27 DIAGNOSIS — G47.33 OSA (OBSTRUCTIVE SLEEP APNEA): ICD-10-CM

## 2025-05-27 DIAGNOSIS — I10 ESSENTIAL HYPERTENSION, BENIGN: ICD-10-CM

## 2025-05-27 DIAGNOSIS — Z79.899 ENCOUNTER FOR MONITORING FLECAINIDE THERAPY: ICD-10-CM

## 2025-05-27 DIAGNOSIS — E66.9 NON MORBID OBESITY, UNSPECIFIED OBESITY TYPE: ICD-10-CM

## 2025-05-27 DIAGNOSIS — I48.19 PERSISTENT ATRIAL FIBRILLATION: Primary | ICD-10-CM

## 2025-05-27 DIAGNOSIS — I77.810 AORTIC ROOT DILATATION: ICD-10-CM

## 2025-05-27 DIAGNOSIS — E78.5 DYSLIPIDEMIA: ICD-10-CM

## 2025-05-27 LAB
OHS QRS DURATION: 104 MS
OHS QTC CALCULATION: 392 MS

## 2025-05-27 PROCEDURE — 93010 ELECTROCARDIOGRAM REPORT: CPT | Mod: S$PBB,,, | Performed by: INTERNAL MEDICINE

## 2025-05-27 PROCEDURE — 99999 PR PBB SHADOW E&M-EST. PATIENT-LVL V: CPT | Mod: PBBFAC,,, | Performed by: INTERNAL MEDICINE

## 2025-05-27 PROCEDURE — 99215 OFFICE O/P EST HI 40 MIN: CPT | Mod: PBBFAC | Performed by: INTERNAL MEDICINE

## 2025-05-27 PROCEDURE — 93005 ELECTROCARDIOGRAM TRACING: CPT | Mod: PBBFAC | Performed by: INTERNAL MEDICINE

## 2025-05-27 NOTE — PROGRESS NOTES
CARDIOVASCULAR PROGRESS NOTE    REASON FOR CONSULT:   Rosendo Thomas is a 79 y.o. male who presents for follow up of Persistent AF.    PCP: Hernesto  EP: Yonatan Shaw:  Yong  HISTORY OF PRESENT ILLNESS:   The patient returns for follow up.  In the interim since his last office visit, he underwent successful cardioversion.  He is now on flecainide and remains in sinus rhythm.  He continues to take his Eliquis without any difficulty.  He denies intercurrent angina, dyspnea, palpitations, or syncope.  There has been no PND, orthopnea, melena, hematuria, or claudication symptoms.    CARDIOVASCULAR HISTORY:   Persistent AF on eliquis 5mg bid, s/p LUDIN/DCCV 2/18/25, DCCV 4/22/25 now on flecainide 50mg bid.    Ao root dil, 3.7cm (LUDIN 2/2025)    PAST MEDICAL HISTORY:     Past Medical History:   Diagnosis Date    Colon cancer     Colon polyps     Diabetes mellitus type II 07/2010    diet controlled    Obesity     Paroxysmal atrial fibrillation     Psoriasis     Sleep apnea, unspecified     Squamous cell carcinoma     Type 2 diabetes mellitus        PAST SURGICAL HISTORY:     Past Surgical History:   Procedure Laterality Date    APPENDECTOMY      CARDIOVERSION N/A 2/18/2025    Procedure: Cardioversion;  Surgeon: Abbe Whitehead MD;  Location: [facility] CATH LAB;  Service: Cardiology;  Laterality: N/A;  before 1230pm    CARDIOVERSION N/A 4/22/2025    Procedure: Cardioversion;  Surgeon: Abbe Whitehead MD;  Location: [facility] CATH LAB;  Service: Cardiology;  Laterality: N/A;    COLONOSCOPY N/A 07/09/2018    Procedure: COLONOSCOPY;  Surgeon: Kameron Ruff MD;  Location: [facility] ENDO;  Service: Endoscopy;  Laterality: N/A;  confirmed    COLONOSCOPY N/A 11/11/2019    Procedure: COLONOSCOPY;  Surgeon: Victor Hugo Nunez MD;  Location: [facility] ENDO ([address] [place] [address]);  Service: Endoscopy;  Laterality: N/A;  Pt stated this is the only day he has somebody to drive him and  to stay during procedure  PM Prep    COLONOSCOPY N/A 07/08/2020     Procedure: COLONOSCOPY;  Surgeon: Victor Hugo Nunez MD;  Location: [facility] ENDO (2ND FLR);  Service: Endoscopy;  Laterality: N/A;  Covid-19 test 7/5/20 at [facility]    EXTREMITY CYST EXCISION      LAPAROSCOPIC LEFT COLECTOMY N/A 09/25/2018    Procedure: COLECTOMY-LAPAROSCOPIC LEFT;  Surgeon: Chito Banks MD;  Location: [facility] OR 2ND FLR;  Service: Colon and Rectal;  Laterality: N/A;    SKIN CANCER EXCISION      SKIN GRAFT      TRANSESOPHAGEAL ECHOCARDIOGRAM WITH POSSIBLE CARDIOVERSION (LUDIN W/ POSS CARDIOVERSION) N/A 2/18/2025    Procedure: TRANSESOPHAGEAL ECHOCARDIOGRAM WITH POSSIBLE CARDIOVERSION (LUDIN W/ POSS CARDIOVERSION);  Surgeon: Abbe Whitehead MD;  Location: [facility] CATH LAB;  Service: Cardiology;  Laterality: N/A;  before 1230pm  RN PRE OP 2/14/2025    TREATMENT OF CARDIAC ARRHYTHMIA N/A 4/22/2025    Procedure: Cardioversion/Defibrillation;  Surgeon: Abbe Whitehead MD;  Location: [facility] CATH LAB;  Service: Cardiology;  Laterality: N/A;  RN PRE OP 4/16/2025    VASECTOMY      VASECTOMY         ALLERGIES AND MEDICATION:     Review of patient's allergies indicates:   Allergen Reactions    Influenza virus vaccines     Metformin Diarrhea        Medication List            Accurate as of May 27, 2025  2:23 PM. If you have any questions, ask your nurse or doctor.                CONTINUE taking these medications      apixaban 5 mg Tab  Commonly known as: ELIQUIS  Take 1 tablet (5 mg total) by mouth 2 (two) times daily.     atorvastatin 20 MG tablet  Commonly known as: LIPITOR  take 1 tablet by mouth once daily     B COMPLEX ORAL     blood sugar diagnostic Strp  Commonly known as: CONTOUR TEST STRIPS  1 each by Misc.(Non-Drug; Combo Route) route 2 (two) times daily.     flecainide 50 MG Tab  Commonly known as: TAMBOCOR  Take 1 tablet (50 mg total) by mouth every 12 (twelve) hours.     fluticasone propionate 50 mcg/actuation nasal spray  Commonly known as: FLONASE     gabapentin 300 MG  capsule  Commonly known as: NEURONTIN  Take 1 capsule (300 mg total) by mouth 3 (three) times daily as needed (nerve pain).     glipiZIDE 5 MG Tr24  Take 1 tablet (5 mg total) by mouth daily with breakfast.     lancets Misc  USE ONE LANCET TWO TIMES DAILY     MAGNESIUM ORAL     MEDROL (HAZEL) 4 mg tablet  Generic drug: methylPREDNISolone     metoprolol succinate 25 MG 24 hr tablet  Commonly known as: TOPROL-XL  Take 0.5 tablets (12.5 mg total) by mouth once daily.     MOUNJARO 2.5 mg/0.5 mL Pnij  Generic drug: tirzepatide  Inject 2.5 mg into the skin every 7 days.     TALTZ AUTOINJECTOR 80 mg/mL Atin  Generic drug: ixekizumab  Inject 80 mg into the skin every 28 days. Starting on week 12     JENNI-C ORAL     VITAMIN B-12 ORAL     ZINC ORAL              SOCIAL HISTORY:     Social History     Socioeconomic History    Marital status: Other   Occupational History     Employer: [employer]   Tobacco Use    Smoking status: Former     Types: Cigars    Smokeless tobacco: Never    Tobacco comments:     cigars-x1 yr.   Substance and Sexual Activity    Alcohol use: Yes     Comment: occassional    Drug use: No    Sexual activity: Yes     Partners: Female     Social Drivers of Health     Financial Resource Strain: Low Risk  (12/17/2024)    Overall Financial Resource Strain (CARDIA)     Difficulty of Paying Living Expenses: Not hard at all   Food Insecurity: No Food Insecurity (12/17/2024)    Hunger Vital Sign     Worried About Running Out of Food in the Last Year: Never true     Ran Out of Food in the Last Year: Never true   Transportation Needs: No Transportation Needs (12/17/2024)    PRAPARE - Transportation     Lack of Transportation (Medical): No     Lack of Transportation (Non-Medical): No   Physical Activity: Sufficiently Active (12/17/2024)    Exercise Vital Sign     Days of Exercise per Week: 4 days     Minutes of Exercise per Session: 40 min   Stress: No Stress Concern Present (12/17/2024)    [place]  "[place] [employer] - Occupational Stress Questionnaire     Feeling of Stress : Not at all   Housing Stability: Unknown (12/17/2024)    Housing Stability Vital Sign     Unable to Pay for Housing in the Last Year: No     Homeless in the Last Year: No       FAMILY HISTORY:     Family History   Problem Relation Name Age of Onset    Cancer Father          prostate    Diabetes Brother      Liver cancer Brother      Anesthesia problems Neg Hx         REVIEW OF SYSTEMS:   Review of Systems   Constitutional:  Negative for chills, diaphoresis, fever and malaise/fatigue.   HENT:  Negative for nosebleeds.    Eyes:  Negative for blurred vision, double vision and photophobia.   Respiratory:  Negative for hemoptysis, shortness of breath and wheezing.    Cardiovascular:  Negative for chest pain, palpitations, orthopnea, claudication, leg swelling and PND.   Gastrointestinal:  Negative for abdominal pain, blood in stool, heartburn, melena, nausea and vomiting.   Genitourinary:  Negative for flank pain and hematuria.   Musculoskeletal:  Negative for falls, joint pain, myalgias and neck pain.   Skin:  Negative for rash.   Neurological:  Negative for dizziness, seizures, loss of consciousness, weakness and headaches.   Endo/Heme/Allergies:  Negative for polydipsia. Does not bruise/bleed easily.   Psychiatric/Behavioral:  Negative for depression and memory loss. The patient is not nervous/anxious.        PHYSICAL EXAM:     Vitals:    05/27/25 1420   BP: 126/68   Pulse: (!) 55   Resp: 18    Body mass index is 30.66 kg/m².  Weight: 111.3 kg (245 lb 4.2 oz)   Height: 6' 3" (190.5 cm)     Physical Exam  Vitals reviewed.   Constitutional:       General: He is not in acute distress.     Appearance: He is well-developed. He is obese. He is not ill-appearing, toxic-appearing or diaphoretic.   HENT:      Head: Normocephalic and atraumatic.   Eyes:      General: No scleral icterus.     Extraocular Movements: Extraocular movements " intact.      Conjunctiva/sclera: Conjunctivae normal.      Pupils: Pupils are equal, round, and reactive to light.   Neck:      Thyroid: No thyromegaly.      Vascular: Normal carotid pulses. No carotid bruit or JVD.      Trachea: Trachea normal.   Cardiovascular:      Rate and Rhythm: Regular rhythm. Bradycardia present.      Pulses:           Carotid pulses are 2+ on the right side and 2+ on the left side.     Heart sounds: S1 normal and S2 normal. No murmur heard.     No friction rub. No gallop.   Pulmonary:      Effort: Pulmonary effort is normal. No respiratory distress.      Breath sounds: Normal breath sounds. No stridor. No wheezing, rhonchi or rales.   Chest:      Chest wall: No tenderness.   Abdominal:      General: There is no distension.      Palpations: Abdomen is soft.   Musculoskeletal:         General: No tenderness. Normal range of motion.      Cervical back: Normal range of motion and neck supple. No edema or rigidity.      Right lower le+ Edema present.      Left lower le+ Edema present.   Feet:      Right foot:      Skin integrity: No ulcer.      Left foot:      Skin integrity: No ulcer.   Skin:     General: Skin is warm and dry.      Coloration: Skin is not jaundiced.   Neurological:      General: No focal deficit present.      Mental Status: He is alert and oriented to person, place, and time.      Cranial Nerves: No cranial nerve deficit.   Psychiatric:         Mood and Affect: Mood normal.         Speech: Speech normal.         Behavior: Behavior normal. Behavior is cooperative.         DATA:   EKG: (personally reviewed tracing(s))  25 SR 55, 1st deg AVB, , QTc 392    Laboratory:  CBC:  Recent Labs   Lab 25  1410 25  0903 25  1151   WBC 5.73 5.58 7.11   Hemoglobin 13.6 L  --   --    HGB  --  14.6 16.1   Hematocrit 43.5  --   --    HCT  --  46.4 49.6   Platelet Count  --  122 L 123 L   Platelets 127 L  --   --        CHEMISTRIES:  Recent Labs   Lab  01/13/25 2118 01/14/25  0555 02/14/25  1410 03/27/25  0903 03/28/25  1151 05/05/25  1033   Glucose 96 100 240 H 208 H 150 H 186 H   Sodium 136 137 137 138 137 135 L   Potassium 3.8 3.5 4.3 4.8 4.5 4.4   BUN 16 14 16 14 15 15   Creatinine 1.0 0.8 1.0 0.9 0.8 0.9   eGFR >60 >60 >60 >60 >60 >60   Calcium 9.2 8.2 L 9.1 9.0 9.3 8.9   Magnesium 2.0 1.9  --   --   --   --        CARDIAC BIOMARKERS:  Recent Labs   Lab 01/13/25 2118 03/27/25  0903 03/27/25  1121   Troponin I 0.009  --   --    Troponin-I  --  <0.006 <0.006       COAGS:  Recent Labs   Lab 01/13/25 2118 03/27/25  1121   INR 1.0 1.1       LIPIDS/LFTS:  Recent Labs   Lab 02/07/25  1104 02/14/25  1410 03/27/25  0903 03/27/25  1121 03/28/25  1151   Cholesterol Total  --   --   --  91 L  --    Cholesterol 99 L  --   --   --   --    Triglycerides 60  --   --   --   --    Triglyceride  --   --   --  63  --    HDL 34 L  --   --   --   --    HDL Cholesterol  --   --   --  32 L  --    LDL Cholesterol 53.0 L  --   --  46.4 L  --    Non-HDL Cholesterol 65  --   --   --   --    Non HDL Cholesterol  --   --   --  59  --    AST  --  19 29  --  24   ALT  --  19 28  --  25       Cardiovascular Testing:  DCCV 4/22/25  Successful DCCV AF->SR 45 BPM 200J x1.  Plan:  Cont Eliquis 5mg bid  Dec Toprol XL 12.5mg qd  Add Flecainide 50mg bid  Check EKG 1 week  Follow up with Shawn Whitehead and Yonatan as planned    Ex MPI 3/28/25    Normal myocardial perfusion scan. There is no evidence of myocardial ischemia or infarction.    There is a mild intensity fixed perfusion abnormality in the inferior wall of the left ventricle secondary to diaphragm attenuation.    The gated perfusion images showed an ejection fraction of 68% at rest.    There is normal wall motion at rest and post-stress.    The ECG portion of the study is negative for ischemia.  The patient exercised for 3 minutes 54 seconds on a modified Richard protocol, achieving a peak heart rate of 126 bpm, which is 89% of the age  predicted maximum heart rate. The patient reported shortness of breath during the stress test. The test was stopped because the patient requested it and they experienced shortness of breath. Additionally, the end of the protocol was reached.     The patient reported no chest pain during the stress test.    During stress, atrial fibrillation is noted.    CTA Chest 3/27/25  1. No findings to suggest aortic aneurysm or dissection as clinically question.  2. Heterogeneous mass arising from the interpolar region of the left kidney with suspected enhancement.  Malignancy is of primary concern as this was not present on examination 10/01/2018.  Further evaluation on a nonemergent basis recommended.  3. No acute cardiopulmonary process.    LUDIN/DCCV 2/18/25  Normal biventricular size/fxn  LVEF 60%, normal wall motion.  Biatrial enlargement.  No LA/LUCITA thrombus.  Normal appearing valves  Previously noted PV vegetation (TTE 1/14/25) not visualized on this exam.  Mild MR, trace AI/TR.  Mild aortic root dil, 3.7cm  Successful DCCV AF->NSR 61 BPM 200J x1.  Plan:  Cont med rx  Cont eliquis 5mg bid  Home today  Follow up with Dr. Whitehead as planned.    Echo 1/14/25     Left Ventricle: The left ventricle is normal in size. Mildly increased wall thickness. There is mild concentric hypertrophy. There is normal systolic function with a visually estimated ejection fraction of 55 - 60%. Unable to assess diastolic function due to atrial fibrillation.    Right Ventricle: Normal right ventricular cavity size. Systolic function is normal.    Pulmonic Valve: There is a 2.0x1.6 large fixed echogenic spherical mass present.    Pulmonary Artery: The estimated pulmonary artery systolic pressure is 33 mmHg.    Pt appears to be in AF/FL throughout exam.    Consider LUDIN for further investigation of PV findings above.    ASSESSMENT:   # PAF, in SR on eliquis 5mg bid.  Successful LUDIN/DCCV 3/18/25, DCCV 4/22/25 now on flecainide 50mg bid.  Seen by   Yonatan with plans for eventual PVI +/- Watchman.    # HTN, controlled  # HLP on atorva 20mg, LDL acceptable  # BMI 31, stable vs last OV  # ?PV vegetation (echo 1/2025), not noted on LUDIN 2/2025.  # renal mass with plans for possible nephrectomy.  MPI 3/2025 neg.  # Ao root dil, 3.7cm (LUDIN 3/2025), no dissection on CTA Chest 3/27/25  # AKIN, declined sleep study (OV with Dr. Acosta 5/6/25)    PLAN:   Cont med rx  Cont eliquis 5mg bid  Cont flecainide 50mg bid  The patient is at low cardiac risk for the planned nephrectomy and associated anesthesia.  No further preoperative cardiac testing is required.  Echocardiogram as ordered below does not need to hold up his surgery.  It is acceptable hold his Eliquis 3 days prior to surgery and resume it as soon as possible thereafter.  Follow up with Dr. Tam as prev planned for possible PVI +/- Watchman.  RTC with surveillance echo 6 months (Nov 2025)    The above documents medical care services that are part of ongoing care related to this patient's serious/complex condition (Code ). (HTN/PAF).    [id number] score: 3  HAS-BLED score: 2    If you answer NO to any of the four criteria below, the patient does not meet the WATCHMAN implant eligibility requirements.     Patient has non-valvular atrial fibrillation: Yes  Patient has an increased risk for stroke and is recommended for oral anticoagulation (OAC): Yes  Patient is suitable for short-term anticoagulation therapy but deemed unable to take long-term OAC: Yes  Patient has an appropriate rationale to seek a non-pharmacological alternative to OACs. Specific factors include: History of bleeding or increased bleeding risk, Increased bleeding risk not reflected by HAS-BLED score, and Other situations for which OAC is inappropriate    Will refer to Dr. Tam for further evaluation and consideration of LAAO device.          Abbe Whitehead MD, [facility]

## 2025-06-05 ENCOUNTER — TELEPHONE (OUTPATIENT)
Dept: ELECTROPHYSIOLOGY | Facility: CLINIC | Age: 80
End: 2025-06-05
Payer: MEDICARE

## 2025-06-12 ENCOUNTER — HOSPITAL ENCOUNTER (OUTPATIENT)
Dept: PREADMISSION TESTING | Facility: HOSPITAL | Age: 80
Discharge: HOME | End: 2025-06-12
Attending: UROLOGY
Payer: MEDICARE

## 2025-06-12 VITALS
HEART RATE: 50 BPM | SYSTOLIC BLOOD PRESSURE: 124 MMHG | BODY MASS INDEX: 30.78 KG/M2 | WEIGHT: 247.56 LBS | OXYGEN SATURATION: 97 % | HEIGHT: 75 IN | RESPIRATION RATE: 16 BRPM | DIASTOLIC BLOOD PRESSURE: 70 MMHG | TEMPERATURE: 97 F

## 2025-06-12 DIAGNOSIS — N28.89 LEFT KIDNEY MASS: ICD-10-CM

## 2025-06-12 LAB
ABSOLUTE EOSINOPHIL (OHS): 0.09 K/UL
ABSOLUTE MONOCYTE (OHS): 0.45 K/UL (ref 0.3–1)
ABSOLUTE NEUTROPHIL COUNT (OHS): 3.21 K/UL (ref 1.8–7.7)
ANION GAP (OHS): 7 MMOL/L (ref 8–16)
BASOPHILS # BLD AUTO: 0.03 K/UL
BASOPHILS NFR BLD AUTO: 0.6 %
BUN SERPL-MCNC: 15 MG/DL (ref 8–23)
CALCIUM SERPL-MCNC: 9 MG/DL (ref 8.7–10.5)
CHLORIDE SERPL-SCNC: 109 MMOL/L (ref 95–110)
CO2 SERPL-SCNC: 26 MMOL/L (ref 23–29)
CREAT SERPL-MCNC: 1 MG/DL (ref 0.5–1.4)
ERYTHROCYTE [DISTWIDTH] IN BLOOD BY AUTOMATED COUNT: 16.2 % (ref 11.5–14.5)
GFR SERPLBLD CREATININE-BSD FMLA CKD-EPI: >60 ML/MIN/1.73/M2
GLUCOSE SERPL-MCNC: 100 MG/DL (ref 70–110)
HCT VFR BLD AUTO: 46.6 % (ref 40–54)
HGB BLD-MCNC: 14.8 GM/DL (ref 14–18)
IMM GRANULOCYTES # BLD AUTO: 0.02 K/UL (ref 0–0.04)
IMM GRANULOCYTES NFR BLD AUTO: 0.4 % (ref 0–0.5)
INDIRECT COOMBS: NORMAL
LYMPHOCYTES # BLD AUTO: 1.58 K/UL (ref 1–4.8)
MCH RBC QN AUTO: 28 PG (ref 27–31)
MCHC RBC AUTO-ENTMCNC: 31.8 G/DL (ref 32–36)
MCV RBC AUTO: 88 FL (ref 82–98)
NUCLEATED RBC (/100WBC) (OHS): 0 /100 WBC
PLATELET # BLD AUTO: 100 K/UL (ref 150–450)
PMV BLD AUTO: 11.7 FL (ref 9.2–12.9)
POTASSIUM SERPL-SCNC: 4.2 MMOL/L (ref 3.5–5.1)
RBC # BLD AUTO: 5.29 M/UL (ref 4.6–6.2)
RELATIVE EOSINOPHIL (OHS): 1.7 %
RELATIVE LYMPHOCYTE (OHS): 29.4 % (ref 18–48)
RELATIVE MONOCYTE (OHS): 8.4 % (ref 4–15)
RELATIVE NEUTROPHIL (OHS): 59.5 % (ref 38–73)
RH BLD: NORMAL
SODIUM SERPL-SCNC: 142 MMOL/L (ref 136–145)
SPECIMEN OUTDATE: NORMAL
WBC # BLD AUTO: 5.38 K/UL (ref 3.9–12.7)

## 2025-06-12 PROCEDURE — 86901 BLOOD TYPING SEROLOGIC RH(D): CPT | Performed by: UROLOGY

## 2025-06-12 PROCEDURE — 85025 COMPLETE CBC W/AUTO DIFF WBC: CPT | Performed by: UROLOGY

## 2025-06-12 PROCEDURE — 82310 ASSAY OF CALCIUM: CPT | Performed by: UROLOGY

## 2025-06-12 PROCEDURE — 87086 URINE CULTURE/COLONY COUNT: CPT | Performed by: UROLOGY

## 2025-06-12 NOTE — DISCHARGE INSTRUCTIONS
YOUR PROCEDURE WILL BE AT [facility] at [address] [place] [address]                 Enter through the Main Entrance facing [address].      Your procedure  is scheduled for ____6/18/2025______.    Call [phone] between 2pm and 5pm on __6/17/2025_____to find out your arrival time for the day of surgery.    You may have up to three visitors.  No children under 18 years old.     You will be going to the Same Day Surgery Unit on the 2nd floor of the hospital.    Report to the Same Day Surgery Registration Desk in the hallway.(Just beside the Same Day Surgery Unit)                    DO NOT PARK IN THE GARAGE.  THERE IS NO OPEN ENTRANCE TO THE HOSPITAL BUILDING AND NO ELEVATOR.      Important instructions:  Do not eat anything after midnight.  You may have plain water, non carbonated.  You may also have Gatorade or Powerade after midnight.    Stop all fluids 2 hours before your surgery.    It is okay to brush your teeth.  Do not have gum, candy or mints.    SEE MEDICATION SHEET.   TAKE MEDICATIONS AS DIRECTED.    Do not take any diabetic medication on the morning of surgery unless instructed to do so by your doctor or pre op nurse.    All GLP-1 weekly diabetic/weight loss medications must not be taken for one week before your surgery, or your surgery could be canceled.      STOP taking for 7 days before surgery:    Aspirin           Voltaren (Diclofenac)  Ibuprofen  (Advil, Motrin)                Indomethocin  Mobic (meloxicam, celebrex)      Etodolac   Aleve (naproxen)          Toradol (ketoralac)  Fish oil, Krill oil and Vitamin E  Headache Powders (BC Powder, Goody's Powder, Stanback)                           You may take Tylenol if needed which is not a blood thinner.    Please shower the night before and the morning of your surgery.                 Use Chlorhexidine soap as instructed by your pre op nurse.   Please place clean linens on your bed the night before  surgery. Please wear fresh clean clothing after each shower.    No shaving of procedural area at least 4-5 days before surgery due to increased risk of skin irritation and/or possible infection.    Contact lenses and removable denture work may not be worn during your procedure.    You may wear deodorant only. If you are having breast surgery, do not wear deodorant on the operative side.    Do not wear powder, body lotion, perfume/cologne or make-up.    Do not wear any jewelry or have any metal on your body.    You will be asked to remove any dentures or partials for the procedure.    If you are going home on the same day of surgery, you must arrange for a family member or a friend to drive you home.  Public transportation is prohibited.  You will not be able to drive home if you were given anesthesia or sedation.    Patients who want to have their Post-op prescriptions filled from our in-house [facility], bring a Credit/Debit Card or cash with you. A co-pay may be required.  The pharmacy closes at 5:30 pm.    Wear loose fitting clothes allowing for bandages.    Please leave money and valuables home.      You may bring your cell phone.    Call the doctor if fever or illness should occur before your surgery.    Call [phone] to contact us here if needed.                            CLOTHES ON DAY OF SURGERY    SHOULDER surgery:  you must have a very oversized shirt.  Very, Very large.  You will probably have a large sling on with your arm strapped to your chest.  You will not be able to put the arm of the operated shoulder into a sleeve.  You can put the arm of the un-operated shoulder into the sleeve, but the shirt will need to be draped over the operated shoulder.       ARM or HAND surgery:  make sure that your sleeves are large and loose enough to pass over large dressings or cast.      BREAST or UNDERARM surgery:  wear a loose, button down shirt so that you can dress without raising your arms over your  head.    ABDOMINAL surgery:  wear loose, comfortable clothing.  Nothing tight around the abdomen.  NO JEANS    PENIS or SCROTAL surgery:  loose comfortable clothing.  Large sweat pants, pajama pants or a robe.  ABSOLUTELY NO JEANS      LEG or FOOT surgery:  wear large loose pants that are able to pass over any large dressings or casts.  You could also wear loose shorts or a skirt.

## 2025-06-12 NOTE — ANESTHESIA PREPROCEDURE EVALUATION
06/12/2025  Rosendo Thomas is a 79 y.o., male scheduled for  ROBOTIC NEPHRECTOMY, PARTIAL (Left) - on 6/18/2025.          Past Medical History:   Diagnosis Date    Colon cancer     Colon polyps     Diabetes mellitus type II 07/2010    diet controlled    Obesity     Paroxysmal atrial fibrillation     Psoriasis     Sleep apnea, unspecified     Squamous cell carcinoma     Type 2 diabetes mellitus        Past Surgical History:   Procedure Laterality Date    APPENDECTOMY      CARDIOVERSION N/A 2/18/2025    Procedure: Cardioversion;  Surgeon: Abbe Whitehead MD;  Location: [facility] CATH LAB;  Service: Cardiology;  Laterality: N/A;  before 1230pm    CARDIOVERSION N/A 4/22/2025    Procedure: Cardioversion;  Surgeon: Abbe Whitehead MD;  Location: [facility] CATH LAB;  Service: Cardiology;  Laterality: N/A;    COLONOSCOPY N/A 07/09/2018    Procedure: COLONOSCOPY;  Surgeon: Kameron Ruff MD;  Location: [facility] ENDO;  Service: Endoscopy;  Laterality: N/A;  confirmed    COLONOSCOPY N/A 11/11/2019    Procedure: COLONOSCOPY;  Surgeon: Victor Hugo Nunez MD;  Location: [facility] (4TH FLR);  Service: Endoscopy;  Laterality: N/A;  Pt stated this is the only day he has somebody to drive him and  to stay during procedure  PM Prep    COLONOSCOPY N/A 07/08/2020    Procedure: COLONOSCOPY;  Surgeon: Victor Hugo Nunez MD;  Location: [facility] (2ND FLR);  Service: Endoscopy;  Laterality: N/A;  [facility]    EXTREMITY CYST EXCISION      LAPAROSCOPIC LEFT COLECTOMY N/A 09/25/2018    Procedure: COLECTOMY-LAPAROSCOPIC LEFT;  Surgeon: Chito Banks MD;  Location: [facility] OR 2ND FLR;  Service: Colon and Rectal;  Laterality: N/A;    SKIN CANCER EXCISION      SKIN GRAFT      TRANSESOPHAGEAL ECHOCARDIOGRAM WITH POSSIBLE CARDIOVERSION (LUDIN W/ POSS CARDIOVERSION) N/A 2/18/2025    Procedure:  TRANSESOPHAGEAL ECHOCARDIOGRAM WITH POSSIBLE CARDIOVERSION (LUDIN W/ POSS CARDIOVERSION);  Surgeon: Abbe Whitehead MD;  Location: [facility] CATH LAB;  Service: Cardiology;  Laterality: N/A;  before 1230pm  RN PRE OP 2/14/2025    TREATMENT OF CARDIAC ARRHYTHMIA N/A 4/22/2025    Procedure: Cardioversion/Defibrillation;  Surgeon: Abbe Whitehead MD;  Location: [facility] CATH LAB;  Service: Cardiology;  Laterality: N/A;  RN PRE OP 4/16/2025    VASECTOMY      VASECTOMY             Pre-op Assessment    I have reviewed the Patient Summary Reports.     I have reviewed the Nursing Notes.       Review of Systems  Anesthesia Hx:  No problems with previous Anesthesia             Denies Family Hx of Anesthesia complications.    Denies Personal Hx of Anesthesia complications.                    Social:  Non-Smoker, No Alcohol Use       Hematology/Oncology:    Oncology Normal                Hematology Comments: Last took Eliquis 1 week ago                    EENT/Dental:  EENT/Dental Normal           Cardiovascular:  Exercise tolerance: good   Hypertension    Dysrhythmias       hyperlipidemia    03/25 stress: negative for ischemia; EF 68%                           Pulmonary:        Sleep Apnea           Education provided regarding risk of obstructive sleep apnea            Renal/:     Left kidney mass             Hepatic/GI:         Taking GLP-1 Agonists Instructed to Hold for 7 Days No Reported GI Symptoms          Musculoskeletal:  Musculoskeletal Normal                Neurological:    Neuromuscular Disease,                                   Endocrine:  Diabetes         Obesity / BMI > 30  Dermatological:  Skin Normal    Psych:  Psychiatric Normal                    Physical Exam  General: Well nourished, Cooperative, Alert and Oriented    Airway:  Mallampati: III   Mouth Opening: Normal  TM Distance: 4 - 6 cm  Tongue: Normal  Neck ROM: Normal ROM    Dental:  Intact    Chest/Lungs:  Normal Respiratory Rate, Clear to  auscultation    Heart:  Rate: Normal  Rhythm: Regular Rhythm      Wt Readings from Last 3 Encounters:   06/13/25 112.3 kg (247 lb 9.2 oz)   06/12/25 112.3 kg (247 lb 9.2 oz)   05/27/25 111.3 kg (245 lb 4.2 oz)     Temp Readings from Last 3 Encounters:   06/18/25 36.6 °C (97.8 °F) (Oral)   06/12/25 36.2 °C (97.2 °F) (Oral)   04/22/25 36.6 °C (97.8 °F)     BP Readings from Last 3 Encounters:   06/18/25 (!) 166/76   06/12/25 124/70   05/27/25 126/68     Pulse Readings from Last 3 Encounters:   06/18/25 (!) 57   06/12/25 (!) 50   05/27/25 (!) 55     Lab Results   Component Value Date    WBC 5.38 06/12/2025    HGB 14.8 06/12/2025    HCT 46.6 06/12/2025    MCV 88 06/12/2025     (L) 06/12/2025       CMP  Sodium   Date Value Ref Range Status   06/12/2025 142 136 - 145 mmol/L Final   02/14/2025 137 136 - 145 mmol/L Final     Potassium   Date Value Ref Range Status   06/12/2025 4.2 3.5 - 5.1 mmol/L Final   02/14/2025 4.3 3.5 - 5.1 mmol/L Final     Chloride   Date Value Ref Range Status   06/12/2025 109 95 - 110 mmol/L Final   02/14/2025 105 95 - 110 mmol/L Final     CO2   Date Value Ref Range Status   06/12/2025 26 23 - 29 mmol/L Final   02/14/2025 25 23 - 29 mmol/L Final     Glucose   Date Value Ref Range Status   06/12/2025 100 70 - 110 mg/dL Final   02/14/2025 240 (H) 70 - 110 mg/dL Final     BUN   Date Value Ref Range Status   06/12/2025 15 8 - 23 mg/dL Final     Creatinine   Date Value Ref Range Status   06/12/2025 1.0 0.5 - 1.4 mg/dL Final     Calcium   Date Value Ref Range Status   06/12/2025 9.0 8.7 - 10.5 mg/dL Final   02/14/2025 9.1 8.7 - 10.5 mg/dL Final     Protein Total   Date Value Ref Range Status   03/28/2025 7.0 6.0 - 8.4 gm/dL Final     Total Protein   Date Value Ref Range Status   02/14/2025 6.5 6.0 - 8.4 g/dL Final     Albumin   Date Value Ref Range Status   03/28/2025 3.7 3.5 - 5.2 g/dL Final   02/14/2025 3.6 3.5 - 5.2 g/dL Final     Total Bilirubin   Date Value Ref Range Status   02/14/2025 0.6  0.1 - 1.0 mg/dL Final     Comment:     For infants and newborns, interpretation of results should be based  on gestational age, weight and in agreement with clinical  observations.    Premature Infant recommended reference ranges:  Up to 24 hours.............<8.0 mg/dL  Up to 48 hours............<12.0 mg/dL  3-5 days..................<15.0 mg/dL  6-29 days.................<15.0 mg/dL       Bilirubin Total   Date Value Ref Range Status   03/28/2025 0.7 0.1 - 1.0 mg/dL Final     Comment:     For infants and newborns, interpretation of results should be based   on gestational age, weight and in agreement with clinical   observations.    Premature Infant recommended reference ranges:   0-24 hours:  <8.0 mg/dL   24-48 hours: <12.0 mg/dL   3-5 days:    <15.0 mg/dL   6-29 days:   <15.0 mg/dL     Alkaline Phosphatase   Date Value Ref Range Status   02/14/2025 80 40 - 150 U/L Final     ALP   Date Value Ref Range Status   03/28/2025 82 40 - 150 unit/L Final     AST   Date Value Ref Range Status   03/28/2025 24 11 - 45 unit/L Final   02/14/2025 19 10 - 40 U/L Final     ALT   Date Value Ref Range Status   03/28/2025 25 10 - 44 unit/L Final   02/14/2025 19 10 - 44 U/L Final     Anion Gap   Date Value Ref Range Status   06/12/2025 7 (L) 8 - 16 mmol/L Final     eGFR   Date Value Ref Range Status   06/12/2025 >60 >60 mL/min/1.73/m2 Final     Comment:     Estimated GFR calculated using the CKD-EPI creatinine (2021) equation.   02/14/2025 >60 >60 mL/min/1.73 m^2 Final         Anesthesia Plan  Type of Anesthesia, risks & benefits discussed:    Anesthesia Type: Gen ETT  Intra-op Monitoring Plan: Standard ASA Monitors  Post Op Pain Control Plan: multimodal analgesia and IV/PO Opioids PRN  Induction:  IV  Informed Consent: Informed consent signed with the Patient and all parties understand the risks and agree with anesthesia plan.  All questions answered. Patient consented to blood products? Yes  ASA Score: 3  Day of Surgery Review of  "History & Physical: H&P Update referred to the surgeon/provider.  Anesthesia Plan Notes: PMHx significant for PAF in SR on Eliquis, HTN, HLP, Ao root dil, AKIN  Patient is cleared by Dr. Whitehead- "at low cardiac risk for the planned nephrectomy and associated anesthesia. No further preoperative cardiac testing is required. Echocardiogram as ordered below does not need to hold up his surgery. It is acceptable hold his Eliquis 3 days prior to surgery and resume it as soon as possible thereafter."      Ready For Surgery From Anesthesia Perspective.     .      "

## 2025-06-14 LAB — BACTERIA UR CULT: NORMAL

## 2025-06-16 NOTE — H&P
Subjective:       Patient ID: Rosendo Thomas is a 79 y.o. male who was referred by No ref. provider found    Chief Complaint:   No chief complaint on file.    Left Renal Mass  Rosendo Thomas was in the hospital recently due to chest pain and HTN.  Imaging showed a left renal mass.  He is here for further evaluation.  He has atrial fibrillation, he has had 2 cardioversions recently.  He takes Eliquis.    He had a partial colectomy several years ago, lower midline incision.          ACTIVE MEDICAL ISSUES:  [Problem List]    [Problem List]  Patient Active Problem List  Diagnosis    Type 2 diabetes mellitus    Psoriasis    Essential hypertension, benign    Sinus bradycardia    Psoriatic arthritis    History of colon cancer    Personal history of colonic polyps    Immunosuppression    Obesity (BMI 30.0-34.9)    Vitamin D deficiency    Dyslipidemia    Thrombocytopenia, unspecified    Anticoagulant long-term use    Persistent atrial fibrillation    Left renal mass    Cervical radiculopathy    AKIN (obstructive sleep apnea)    Atrial fibrillation       PAST MEDICAL HISTORY  Past Medical History:   Diagnosis Date    Colon cancer     Colon polyps     Diabetes mellitus type II 07/2010    diet controlled    Obesity     Paroxysmal atrial fibrillation     Psoriasis     Sleep apnea, unspecified     Squamous cell carcinoma     Type 2 diabetes mellitus        PAST SURGICAL HISTORY:  Past Surgical History:   Procedure Laterality Date    APPENDECTOMY      CARDIOVERSION N/A 2/18/2025    Procedure: Cardioversion;  Surgeon: Abbe Whitehead MD;  Location: [facility] CATH LAB;  Service: Cardiology;  Laterality: N/A;  before 1230pm    CARDIOVERSION N/A 4/22/2025    Procedure: Cardioversion;  Surgeon: Abbe Whitehead MD;  Location: [facility] CATH LAB;  Service: Cardiology;  Laterality: N/A;    COLONOSCOPY N/A 07/09/2018    Procedure: COLONOSCOPY;  Surgeon: Kameron Ruff MD;  Location: [facility] ENDO;  Service: Endoscopy;  Laterality: N/A;   confirmed    COLONOSCOPY N/A 11/11/2019    Procedure: COLONOSCOPY;  Surgeon: Victor Hugo Nunez MD;  Location: [facility] ENDO (4TH FLR);  Service: Endoscopy;  Laterality: N/A;  Pt stated this is the only day he has somebody to drive him and  to stay during procedure  PM Prep    COLONOSCOPY N/A 07/08/2020    Procedure: COLONOSCOPY;  Surgeon: Victor Hugo Nunez MD;  Location: [facility] ENDO (2ND FLR);  Service: Endoscopy;  Laterality: N/A;  Covid-19 test 7/5/20 at [facility]    EXTREMITY CYST EXCISION      LAPAROSCOPIC LEFT COLECTOMY N/A 09/25/2018    Procedure: COLECTOMY-LAPAROSCOPIC LEFT;  Surgeon: Chito Banks MD;  Location: [facility] OR 2ND FLR;  Service: Colon and Rectal;  Laterality: N/A;    SKIN CANCER EXCISION      SKIN GRAFT      TRANSESOPHAGEAL ECHOCARDIOGRAM WITH POSSIBLE CARDIOVERSION (LUDIN W/ POSS CARDIOVERSION) N/A 2/18/2025    Procedure: TRANSESOPHAGEAL ECHOCARDIOGRAM WITH POSSIBLE CARDIOVERSION (LUDIN W/ POSS CARDIOVERSION);  Surgeon: Abbe Whitehead MD;  Location: [facility] CATH LAB;  Service: Cardiology;  Laterality: N/A;  before 1230pm  RN PRE OP 2/14/2025    TREATMENT OF CARDIAC ARRHYTHMIA N/A 4/22/2025    Procedure: Cardioversion/Defibrillation;  Surgeon: Abbe Whitehead MD;  Location: [facility] CATH LAB;  Service: Cardiology;  Laterality: N/A;  RN PRE OP 4/16/2025    VASECTOMY      VASECTOMY         SOCIAL HISTORY:  [Social History]    [Social History]  Tobacco Use    Smoking status: Former     Types: Cigars    Smokeless tobacco: Never    Tobacco comments:     cigars-x1 yr.   Substance Use Topics    Alcohol use: Yes     Comment: occassional    Drug use: No       FAMILY HISTORY:  Family History   Problem Relation Name Age of Onset    Cancer Father          prostate    Diabetes Brother      Liver cancer Brother      Anesthesia problems Neg Hx         ALLERGIES AND MEDICATIONS: updated and reviewed.  Review of patient's allergies indicates:   Allergen Reactions    Influenza virus  vaccines     Metformin Diarrhea     Current Outpatient Medications   Medication Sig    apixaban (ELIQUIS) 5 mg Tab Take 1 tablet (5 mg total) by mouth 2 (two) times daily.    ascorbic acid (JENNI-C ORAL) Take by mouth.    atorvastatin (LIPITOR) 20 MG tablet take 1 tablet by mouth once daily    blood sugar diagnostic (CONTOUR TEST STRIPS) Strp 1 each by Misc.(Non-Drug; Combo Route) route 2 (two) times daily.    cyanocobalamin, vitamin B-12, (VITAMIN B-12 ORAL) Take by mouth.    flecainide (TAMBOCOR) 50 MG Tab Take 1 tablet (50 mg total) by mouth every 12 (twelve) hours.    fluticasone propionate (FLONASE) 50 mcg/actuation nasal spray 1 spray by Each Nostril route once daily.    gabapentin (NEURONTIN) 300 MG capsule Take 1 capsule (300 mg total) by mouth 3 (three) times daily as needed (nerve pain).    glipiZIDE 5 MG TR24 Take 1 tablet (5 mg total) by mouth daily with breakfast.    ixekizumab (TALTZ AUTOINJECTOR) 80 mg/mL AtIn Inject 80 mg into the skin every 28 days. Starting on week 12    lancets Misc USE ONE LANCET TWO TIMES DAILY    MAGNESIUM ORAL Take by mouth.    MEDROL, HAZEL, 4 mg tablet Take 4 mg by mouth.    metoprolol succinate (TOPROL-XL) 25 MG 24 hr tablet Take 0.5 tablets (12.5 mg total) by mouth once daily.    MOUNJARO 2.5 mg/0.5 mL PnIj Inject 2.5 mg into the skin every 7 days.    vitamin B complex (B COMPLEX ORAL) Take by mouth.    ZINC ORAL Take by mouth.     No current facility-administered medications for this visit.       Review of Systems   Constitutional:  Negative for chills and fever.   HENT:  Negative for congestion.    Respiratory:  Negative for chest tightness and shortness of breath.    Cardiovascular:  Negative for chest pain and palpitations.   Gastrointestinal:  Negative for abdominal pain, constipation, diarrhea, nausea and vomiting.   Genitourinary:  Negative for difficulty urinating, dysuria, flank pain, hematuria and urgency.   Musculoskeletal:  Negative for arthralgias.    Neurological:  Negative for dizziness.   Psychiatric/Behavioral:  Negative for confusion.        Objective:      There were no vitals filed for this visit.  Physical Exam  Vitals and nursing note reviewed.   Constitutional:       Appearance: He is well-developed.   HENT:      Head: Normocephalic.   Eyes:      Conjunctiva/sclera: Conjunctivae normal.   Neck:      Thyroid: No thyromegaly.      Trachea: No tracheal deviation.   Cardiovascular:      Rate and Rhythm: Normal rate.      Heart sounds: Normal heart sounds.   Pulmonary:      Effort: Pulmonary effort is normal. No respiratory distress.      Breath sounds: Normal breath sounds. No wheezing.   Abdominal:      General: Bowel sounds are normal.      Palpations: Abdomen is soft.      Tenderness: There is no abdominal tenderness. There is no rebound.      Hernia: No hernia is present.   Musculoskeletal:         General: No tenderness. Normal range of motion.      Cervical back: Normal range of motion and neck supple.   Lymphadenopathy:      Cervical: No cervical adenopathy.   Skin:     General: Skin is warm and dry.      Findings: No erythema or rash.   Neurological:      Mental Status: He is alert and oriented to person, place, and time.   Psychiatric:         Behavior: Behavior normal.         Thought Content: Thought content normal.         Judgment: Judgment normal.         Urine dipstick shows negative for all components.  Micro exam: negative for WBC's or RBC's.    CMP  Sodium   Date Value Ref Range Status   05/05/2025 135 (L) 136 - 145 mmol/L Final   02/14/2025 137 136 - 145 mmol/L Final     Potassium   Date Value Ref Range Status   05/05/2025 4.4 3.5 - 5.1 mmol/L Final   02/14/2025 4.3 3.5 - 5.1 mmol/L Final     Chloride   Date Value Ref Range Status   05/05/2025 103 95 - 110 mmol/L Final   02/14/2025 105 95 - 110 mmol/L Final     CO2   Date Value Ref Range Status   05/05/2025 26 23 - 29 mmol/L Final   02/14/2025 25 23 - 29 mmol/L Final     Glucose   Date  Value Ref Range Status   05/05/2025 186 (H) 70 - 110 mg/dL Final   02/14/2025 240 (H) 70 - 110 mg/dL Final     BUN   Date Value Ref Range Status   05/05/2025 15 8 - 23 mg/dL Final     Creatinine   Date Value Ref Range Status   05/05/2025 0.9 0.5 - 1.4 mg/dL Final     Calcium   Date Value Ref Range Status   05/05/2025 8.9 8.7 - 10.5 mg/dL Final   02/14/2025 9.1 8.7 - 10.5 mg/dL Final     Protein Total   Date Value Ref Range Status   03/28/2025 7.0 6.0 - 8.4 gm/dL Final     Total Protein   Date Value Ref Range Status   02/14/2025 6.5 6.0 - 8.4 g/dL Final     Albumin   Date Value Ref Range Status   03/28/2025 3.7 3.5 - 5.2 g/dL Final   02/14/2025 3.6 3.5 - 5.2 g/dL Final     Total Bilirubin   Date Value Ref Range Status   02/14/2025 0.6 0.1 - 1.0 mg/dL Final     Comment:     For infants and newborns, interpretation of results should be based  on gestational age, weight and in agreement with clinical  observations.    Premature Infant recommended reference ranges:  Up to 24 hours.............<8.0 mg/dL  Up to 48 hours............<12.0 mg/dL  3-5 days..................<15.0 mg/dL  6-29 days.................<15.0 mg/dL       Bilirubin Total   Date Value Ref Range Status   03/28/2025 0.7 0.1 - 1.0 mg/dL Final     Comment:     For infants and newborns, interpretation of results should be based   on gestational age, weight and in agreement with clinical   observations.    Premature Infant recommended reference ranges:   0-24 hours:  <8.0 mg/dL   24-48 hours: <12.0 mg/dL   3-5 days:    <15.0 mg/dL   6-29 days:   <15.0 mg/dL     Alkaline Phosphatase   Date Value Ref Range Status   02/14/2025 80 40 - 150 U/L Final     ALP   Date Value Ref Range Status   03/28/2025 82 40 - 150 unit/L Final     AST   Date Value Ref Range Status   03/28/2025 24 11 - 45 unit/L Final   02/14/2025 19 10 - 40 U/L Final     ALT   Date Value Ref Range Status   03/28/2025 25 10 - 44 unit/L Final   02/14/2025 19 10 - 44 U/L Final     Anion Gap   Date  Value Ref Range Status   05/05/2025 6 (L) 8 - 16 mmol/L Final     eGFR   Date Value Ref Range Status   05/05/2025 >60 >60 mL/min/1.73/m2 Final     Comment:     Estimated GFR calculated using the CKD-EPI creatinine (2021) equation.   02/14/2025 >60 >60 mL/min/1.73 m^2 Final        CT Abdomen Pelvis W WO Contrast  Order: [id number]   Status: Final result       Next appt: Today at 02:45 PM in Urology (Milad Valladares MD)    Test Result Released: Yes (not seen)    0 Result Notes  Details    Reading Physician Reading Date Result Priority   Shawn Ballard Jr., MD  [phone]  3/28/2025 STAT     Narrative & Impression  EXAMINATION:  CT ABDOMEN PELVIS W WO CONTRAST     CLINICAL HISTORY:  Renal mass;     TECHNIQUE:  Pre and postcontrast CT of the abdomen and pelvis only with a 15 minute delayed sequence through the adrenal glands.  100 cc Omnipaque administered intravenously.     COMPARISON:  10/01/2018     FINDINGS:  Lung bases: Unremarkable.     Adrenals:Normal.     Upper abdominal viscera:There is an approximately 3.0 by 2.8 x 2.8 cm heterogeneously enhancing solid mass arising from the anterior superior aspect of the left kidney.  There is an exophytic growth pattern.  There is an adjacent simple 5 mm renal cyst.  Right kidney and proximal collecting system unremarkable.  The liver, gallbladder, spleen, and pancreas are unremarkable.     GI tract: Operative change of sigmoid colectomy and anastomosis without complicating features.  Some foci of fat necrosis adjacent to the is anastomosis.  Small bowel and stomach unremarkable.     Retroperitoneal vasculature: Aortic atherosclerosis is present.     Pelvic viscera:Prostate enlarged with heterogeneous enhancement pattern.  Urinary bladder unremarkable.     Bones: Unremarkable     Impression:     3 cm maximum dimension left upper pole renal mass compatible with renal cell carcinoma until proven otherwise.     No evidence of intra-abdominal metastatic disease      Postoperative changes to the large bowel and prostatomegaly.        Electronically signed by:Shawn Webb Jr  Date:                                            03/28/2025  Time:                                           14:29        Exam Ended: 03/28/25 13:39 CDT       Assessment:       1. Left kidney mass    2. BPH without obstruction/lower urinary tract symptoms    3. Nocturia more than twice per night    4. Diabetes mellitus type 2 without retinopathy          Plan:       1. Left kidney mass  Robotic Assisted Laparoscopic Left Partial Nephrectomy on Wednesday 6/18/2025  Cardiac Clearance with Dr. Ventura Ignacio 3 days prior     2. BPH without obstruction/lower urinary tract symptoms  Monitor  He does not want to check PSA    3. Nocturia more than twice per night  stable    4. Diabetes mellitus type 2 without retinopathy   Per PCP            Follow up in about 6 weeks (around 6/25/2025) for Follow up Post Op.

## 2025-06-17 ENCOUNTER — TELEPHONE (OUTPATIENT)
Dept: SURGERY | Facility: HOSPITAL | Age: 80
End: 2025-06-17
Payer: MEDICARE

## 2025-06-18 ENCOUNTER — ANESTHESIA (OUTPATIENT)
Dept: SURGERY | Facility: HOSPITAL | Age: 80
End: 2025-06-18
Payer: MEDICARE

## 2025-06-18 ENCOUNTER — HOSPITAL ENCOUNTER (INPATIENT)
Facility: HOSPITAL | Age: 80
LOS: 2 days | Discharge: HOME | DRG: 657 | End: 2025-06-20
Attending: UROLOGY | Admitting: UROLOGY
Payer: MEDICARE

## 2025-06-18 DIAGNOSIS — Z01.810 PREOP CARDIOVASCULAR EXAM: ICD-10-CM

## 2025-06-18 DIAGNOSIS — N28.89 LEFT KIDNEY MASS: Primary | ICD-10-CM

## 2025-06-18 LAB
ABSOLUTE EOSINOPHIL (OHS): 0 K/UL
ABSOLUTE MONOCYTE (OHS): 0.46 K/UL (ref 0.3–1)
ABSOLUTE NEUTROPHIL COUNT (OHS): 8.8 K/UL (ref 1.8–7.7)
ANION GAP (OHS): 17 MMOL/L (ref 8–16)
BASOPHILS # BLD AUTO: 0.01 K/UL
BASOPHILS NFR BLD AUTO: 0.1 %
BUN SERPL-MCNC: 13 MG/DL (ref 8–23)
CALCIUM SERPL-MCNC: 9 MG/DL (ref 8.7–10.5)
CHLORIDE SERPL-SCNC: 104 MMOL/L (ref 95–110)
CO2 SERPL-SCNC: 18 MMOL/L (ref 23–29)
CREAT SERPL-MCNC: 1.2 MG/DL (ref 0.5–1.4)
ERYTHROCYTE [DISTWIDTH] IN BLOOD BY AUTOMATED COUNT: 16.1 % (ref 11.5–14.5)
GFR SERPLBLD CREATININE-BSD FMLA CKD-EPI: >60 ML/MIN/1.73/M2
GLUCOSE SERPL-MCNC: 256 MG/DL (ref 70–110)
HCT VFR BLD AUTO: 48.2 % (ref 40–54)
HGB BLD-MCNC: 15 GM/DL (ref 14–18)
HOLD SPECIMEN: NORMAL
IMM GRANULOCYTES # BLD AUTO: 0.05 K/UL (ref 0–0.04)
IMM GRANULOCYTES NFR BLD AUTO: 0.5 % (ref 0–0.5)
LYMPHOCYTES # BLD AUTO: 1.16 K/UL (ref 1–4.8)
MCH RBC QN AUTO: 28 PG (ref 27–31)
MCHC RBC AUTO-ENTMCNC: 31.1 G/DL (ref 32–36)
MCV RBC AUTO: 90 FL (ref 82–98)
NUCLEATED RBC (/100WBC) (OHS): 0 /100 WBC
PLATELET # BLD AUTO: 116 K/UL (ref 150–450)
PMV BLD AUTO: 11.3 FL (ref 9.2–12.9)
POCT GLUCOSE: 164 MG/DL (ref 70–110)
POCT GLUCOSE: 212 MG/DL (ref 70–110)
POTASSIUM SERPL-SCNC: 3.8 MMOL/L (ref 3.5–5.1)
RBC # BLD AUTO: 5.35 M/UL (ref 4.6–6.2)
RELATIVE EOSINOPHIL (OHS): 0 %
RELATIVE LYMPHOCYTE (OHS): 11.1 % (ref 18–48)
RELATIVE MONOCYTE (OHS): 4.4 % (ref 4–15)
RELATIVE NEUTROPHIL (OHS): 83.9 % (ref 38–73)
SODIUM SERPL-SCNC: 139 MMOL/L (ref 136–145)
WBC # BLD AUTO: 10.48 K/UL (ref 3.9–12.7)

## 2025-06-18 PROCEDURE — 25000003 PHARM REV CODE 250

## 2025-06-18 PROCEDURE — 37000008 HC ANESTHESIA 1ST 15 MINUTES: Performed by: UROLOGY

## 2025-06-18 PROCEDURE — 25000003 PHARM REV CODE 250: Performed by: ANESTHESIOLOGY

## 2025-06-18 PROCEDURE — 71000033 HC RECOVERY, INTIAL HOUR: Performed by: UROLOGY

## 2025-06-18 PROCEDURE — 63600175 PHARM REV CODE 636 W HCPCS: Performed by: UROLOGY

## 2025-06-18 PROCEDURE — 37000009 HC ANESTHESIA EA ADD 15 MINS: Performed by: UROLOGY

## 2025-06-18 PROCEDURE — C1729 CATH, DRAINAGE: HCPCS | Performed by: UROLOGY

## 2025-06-18 PROCEDURE — 85025 COMPLETE CBC W/AUTO DIFF WBC: CPT | Performed by: UROLOGY

## 2025-06-18 PROCEDURE — 63600175 PHARM REV CODE 636 W HCPCS

## 2025-06-18 PROCEDURE — 63600175 PHARM REV CODE 636 W HCPCS: Mod: JZ,TB

## 2025-06-18 PROCEDURE — 80048 BASIC METABOLIC PNL TOTAL CA: CPT | Performed by: UROLOGY

## 2025-06-18 PROCEDURE — 8E0W4CZ ROBOTIC ASSISTED PROCEDURE OF TRUNK REGION, PERCUTANEOUS ENDOSCOPIC APPROACH: ICD-10-PCS | Performed by: UROLOGY

## 2025-06-18 PROCEDURE — 71000039 HC RECOVERY, EACH ADD'L HOUR: Performed by: UROLOGY

## 2025-06-18 PROCEDURE — 99900035 HC TECH TIME PER 15 MIN (STAT)

## 2025-06-18 PROCEDURE — 36415 COLL VENOUS BLD VENIPUNCTURE: CPT | Performed by: UROLOGY

## 2025-06-18 PROCEDURE — 36000712 HC OR TIME LEV V 1ST 15 MIN: Performed by: UROLOGY

## 2025-06-18 PROCEDURE — 20000000 HC ICU ROOM

## 2025-06-18 PROCEDURE — 0TB10ZZ EXCISION OF LEFT KIDNEY, OPEN APPROACH: ICD-10-PCS | Performed by: UROLOGY

## 2025-06-18 PROCEDURE — 36000713 HC OR TIME LEV V EA ADD 15 MIN: Performed by: UROLOGY

## 2025-06-18 PROCEDURE — 0TJ54ZZ INSPECTION OF KIDNEY, PERCUTANEOUS ENDOSCOPIC APPROACH: ICD-10-PCS | Performed by: UROLOGY

## 2025-06-18 PROCEDURE — 25000003 PHARM REV CODE 250: Performed by: UROLOGY

## 2025-06-18 PROCEDURE — 25000003 PHARM REV CODE 250: Performed by: STUDENT IN AN ORGANIZED HEALTH CARE EDUCATION/TRAINING PROGRAM

## 2025-06-18 PROCEDURE — 27201423 OPTIME MED/SURG SUP & DEVICES STERILE SUPPLY: Performed by: UROLOGY

## 2025-06-18 PROCEDURE — 86920 COMPATIBILITY TEST SPIN: CPT | Performed by: UROLOGY

## 2025-06-18 PROCEDURE — 63600175 PHARM REV CODE 636 W HCPCS: Performed by: ANESTHESIOLOGY

## 2025-06-18 PROCEDURE — 99024 POSTOP FOLLOW-UP VISIT: CPT | Mod: POP,,, | Performed by: UROLOGY

## 2025-06-18 PROCEDURE — 88307 TISSUE EXAM BY PATHOLOGIST: CPT | Mod: TC | Performed by: UROLOGY

## 2025-06-18 PROCEDURE — 50240 NEPHRECTOMY PARTIAL: CPT | Mod: 22,LT,, | Performed by: UROLOGY

## 2025-06-18 PROCEDURE — 94761 N-INVAS EAR/PLS OXIMETRY MLT: CPT

## 2025-06-18 PROCEDURE — 25000242 PHARM REV CODE 250 ALT 637 W/ HCPCS: Performed by: UROLOGY

## 2025-06-18 RX ORDER — OXYCODONE HYDROCHLORIDE 5 MG/1
10 TABLET ORAL EVERY 4 HOURS PRN
Status: DISCONTINUED | OUTPATIENT
Start: 2025-06-18 | End: 2025-06-20 | Stop reason: HOSPADM

## 2025-06-18 RX ORDER — ACETAMINOPHEN 500 MG
1000 TABLET ORAL EVERY 8 HOURS
Status: DISCONTINUED | OUTPATIENT
Start: 2025-06-18 | End: 2025-06-18

## 2025-06-18 RX ORDER — FAMOTIDINE 20 MG/1
20 TABLET, FILM COATED ORAL 2 TIMES DAILY
Status: DISCONTINUED | OUTPATIENT
Start: 2025-06-18 | End: 2025-06-20 | Stop reason: HOSPADM

## 2025-06-18 RX ORDER — INSULIN ASPART 100 [IU]/ML
0-5 INJECTION, SOLUTION INTRAVENOUS; SUBCUTANEOUS EVERY 4 HOURS PRN
Status: DISCONTINUED | OUTPATIENT
Start: 2025-06-18 | End: 2025-06-20 | Stop reason: HOSPADM

## 2025-06-18 RX ORDER — LOPERAMIDE HYDROCHLORIDE 2 MG/1
4 CAPSULE ORAL ONCE AS NEEDED
Status: DISCONTINUED | OUTPATIENT
Start: 2025-06-18 | End: 2025-06-20 | Stop reason: HOSPADM

## 2025-06-18 RX ORDER — TALC
6 POWDER (GRAM) TOPICAL NIGHTLY PRN
Status: DISCONTINUED | OUTPATIENT
Start: 2025-06-18 | End: 2025-06-20 | Stop reason: HOSPADM

## 2025-06-18 RX ORDER — FENTANYL CITRATE 50 UG/ML
INJECTION, SOLUTION INTRAMUSCULAR; INTRAVENOUS
Status: DISCONTINUED | OUTPATIENT
Start: 2025-06-18 | End: 2025-06-18

## 2025-06-18 RX ORDER — AMLODIPINE BESYLATE 5 MG/1
5 TABLET ORAL DAILY
Status: DISCONTINUED | OUTPATIENT
Start: 2025-06-18 | End: 2025-06-18

## 2025-06-18 RX ORDER — KETAMINE HYDROCHLORIDE 50 MG/ML
INJECTION, SOLUTION INTRAMUSCULAR; INTRAVENOUS
Status: DISCONTINUED | OUTPATIENT
Start: 2025-06-18 | End: 2025-06-18

## 2025-06-18 RX ORDER — DOCUSATE SODIUM 100 MG/1
100 CAPSULE, LIQUID FILLED ORAL 2 TIMES DAILY
Status: DISCONTINUED | OUTPATIENT
Start: 2025-06-18 | End: 2025-06-20 | Stop reason: HOSPADM

## 2025-06-18 RX ORDER — ACETAMINOPHEN 500 MG
1000 TABLET ORAL EVERY 6 HOURS
Status: DISCONTINUED | OUTPATIENT
Start: 2025-06-18 | End: 2025-06-20 | Stop reason: HOSPADM

## 2025-06-18 RX ORDER — HYDROMORPHONE HYDROCHLORIDE 1 MG/ML
0.5 INJECTION, SOLUTION INTRAMUSCULAR; INTRAVENOUS; SUBCUTANEOUS ONCE
Status: COMPLETED | OUTPATIENT
Start: 2025-06-18 | End: 2025-06-18

## 2025-06-18 RX ORDER — DIPHENHYDRAMINE HYDROCHLORIDE 50 MG/ML
12.5 INJECTION, SOLUTION INTRAMUSCULAR; INTRAVENOUS EVERY 4 HOURS PRN
Status: DISCONTINUED | OUTPATIENT
Start: 2025-06-18 | End: 2025-06-20 | Stop reason: HOSPADM

## 2025-06-18 RX ORDER — ACETAMINOPHEN 10 MG/ML
1000 INJECTION, SOLUTION INTRAVENOUS ONCE
Status: COMPLETED | OUTPATIENT
Start: 2025-06-18 | End: 2025-06-18

## 2025-06-18 RX ORDER — FLUTICASONE PROPIONATE 50 MCG
1 SPRAY, SUSPENSION (ML) NASAL DAILY
Status: DISCONTINUED | OUTPATIENT
Start: 2025-06-18 | End: 2025-06-20 | Stop reason: HOSPADM

## 2025-06-18 RX ORDER — INSULIN GLARGINE 100 [IU]/ML
15 INJECTION, SOLUTION SUBCUTANEOUS NIGHTLY
Status: DISCONTINUED | OUTPATIENT
Start: 2025-06-18 | End: 2025-06-20 | Stop reason: HOSPADM

## 2025-06-18 RX ORDER — AMLODIPINE BESYLATE 5 MG/1
5 TABLET ORAL DAILY
Status: DISCONTINUED | OUTPATIENT
Start: 2025-06-18 | End: 2025-06-19

## 2025-06-18 RX ORDER — CEFAZOLIN 2 G/1
2 INJECTION, POWDER, FOR SOLUTION INTRAMUSCULAR; INTRAVENOUS
Status: COMPLETED | OUTPATIENT
Start: 2025-06-18 | End: 2025-06-19

## 2025-06-18 RX ORDER — EPHEDRINE SULFATE 50 MG/ML
INJECTION, SOLUTION INTRAVENOUS
Status: DISCONTINUED | OUTPATIENT
Start: 2025-06-18 | End: 2025-06-18

## 2025-06-18 RX ORDER — LIDOCAINE HYDROCHLORIDE 20 MG/ML
INJECTION INTRAVENOUS
Status: DISCONTINUED | OUTPATIENT
Start: 2025-06-18 | End: 2025-06-18

## 2025-06-18 RX ORDER — GLIPIZIDE 5 MG/1
5 TABLET, FILM COATED, EXTENDED RELEASE ORAL
Status: DISCONTINUED | OUTPATIENT
Start: 2025-06-19 | End: 2025-06-18

## 2025-06-18 RX ORDER — ATORVASTATIN CALCIUM 10 MG/1
20 TABLET, FILM COATED ORAL NIGHTLY
Status: DISCONTINUED | OUTPATIENT
Start: 2025-06-18 | End: 2025-06-20 | Stop reason: HOSPADM

## 2025-06-18 RX ORDER — ROCURONIUM BROMIDE 10 MG/ML
INJECTION, SOLUTION INTRAVENOUS
Status: DISCONTINUED | OUTPATIENT
Start: 2025-06-18 | End: 2025-06-18

## 2025-06-18 RX ORDER — BENZONATATE 100 MG/1
100 CAPSULE ORAL 3 TIMES DAILY PRN
Status: DISCONTINUED | OUTPATIENT
Start: 2025-06-18 | End: 2025-06-20 | Stop reason: HOSPADM

## 2025-06-18 RX ORDER — OXYCODONE HYDROCHLORIDE 5 MG/1
5 TABLET ORAL EVERY 4 HOURS PRN
Status: DISCONTINUED | OUTPATIENT
Start: 2025-06-18 | End: 2025-06-20 | Stop reason: HOSPADM

## 2025-06-18 RX ORDER — GLUCAGON 1 MG
1 KIT INJECTION
Status: DISCONTINUED | OUTPATIENT
Start: 2025-06-18 | End: 2025-06-18 | Stop reason: HOSPADM

## 2025-06-18 RX ORDER — SODIUM CHLORIDE, SODIUM LACTATE, POTASSIUM CHLORIDE, CALCIUM CHLORIDE 600; 310; 30; 20 MG/100ML; MG/100ML; MG/100ML; MG/100ML
INJECTION, SOLUTION INTRAVENOUS CONTINUOUS
Status: DISCONTINUED | OUTPATIENT
Start: 2025-06-18 | End: 2025-06-18

## 2025-06-18 RX ORDER — SODIUM CHLORIDE 9 MG/ML
INJECTION, SOLUTION INTRAVENOUS CONTINUOUS
Status: DISCONTINUED | OUTPATIENT
Start: 2025-06-18 | End: 2025-06-19

## 2025-06-18 RX ORDER — HYDRALAZINE HYDROCHLORIDE 20 MG/ML
10 INJECTION INTRAMUSCULAR; INTRAVENOUS EVERY 6 HOURS PRN
Status: DISCONTINUED | OUTPATIENT
Start: 2025-06-18 | End: 2025-06-20 | Stop reason: HOSPADM

## 2025-06-18 RX ORDER — PHENYLEPHRINE HYDROCHLORIDE 10 MG/ML
INJECTION INTRAVENOUS
Status: DISCONTINUED | OUTPATIENT
Start: 2025-06-18 | End: 2025-06-18

## 2025-06-18 RX ORDER — GUAIFENESIN 100 MG/5ML
200 LIQUID ORAL EVERY 4 HOURS PRN
Status: DISCONTINUED | OUTPATIENT
Start: 2025-06-18 | End: 2025-06-20 | Stop reason: HOSPADM

## 2025-06-18 RX ORDER — GABAPENTIN 300 MG/1
300 CAPSULE ORAL 3 TIMES DAILY PRN
Status: DISCONTINUED | OUTPATIENT
Start: 2025-06-18 | End: 2025-06-20 | Stop reason: HOSPADM

## 2025-06-18 RX ORDER — ACETAMINOPHEN 500 MG
1000 TABLET ORAL
Status: COMPLETED | OUTPATIENT
Start: 2025-06-18 | End: 2025-06-18

## 2025-06-18 RX ORDER — ONDANSETRON HYDROCHLORIDE 2 MG/ML
4 INJECTION, SOLUTION INTRAVENOUS DAILY PRN
Status: DISCONTINUED | OUTPATIENT
Start: 2025-06-18 | End: 2025-06-18 | Stop reason: HOSPADM

## 2025-06-18 RX ORDER — HYDROMORPHONE HYDROCHLORIDE 2 MG/ML
0.2 INJECTION, SOLUTION INTRAMUSCULAR; INTRAVENOUS; SUBCUTANEOUS EVERY 5 MIN PRN
Status: DISCONTINUED | OUTPATIENT
Start: 2025-06-18 | End: 2025-06-18 | Stop reason: HOSPADM

## 2025-06-18 RX ORDER — IBUPROFEN 200 MG
24 TABLET ORAL
Status: DISCONTINUED | OUTPATIENT
Start: 2025-06-18 | End: 2025-06-20 | Stop reason: HOSPADM

## 2025-06-18 RX ORDER — INSULIN ASPART 100 [IU]/ML
5 INJECTION, SOLUTION INTRAVENOUS; SUBCUTANEOUS
Status: DISCONTINUED | OUTPATIENT
Start: 2025-06-19 | End: 2025-06-20 | Stop reason: HOSPADM

## 2025-06-18 RX ORDER — IBUPROFEN 200 MG
16 TABLET ORAL
Status: DISCONTINUED | OUTPATIENT
Start: 2025-06-18 | End: 2025-06-20 | Stop reason: HOSPADM

## 2025-06-18 RX ORDER — DEXMEDETOMIDINE HYDROCHLORIDE 100 UG/ML
INJECTION, SOLUTION INTRAVENOUS
Status: DISCONTINUED | OUTPATIENT
Start: 2025-06-18 | End: 2025-06-18

## 2025-06-18 RX ORDER — CEFAZOLIN 2 G/1
2 INJECTION, POWDER, FOR SOLUTION INTRAMUSCULAR; INTRAVENOUS
Status: COMPLETED | OUTPATIENT
Start: 2025-06-18 | End: 2025-06-18

## 2025-06-18 RX ORDER — PROPOFOL 10 MG/ML
VIAL (ML) INTRAVENOUS
Status: DISCONTINUED | OUTPATIENT
Start: 2025-06-18 | End: 2025-06-18

## 2025-06-18 RX ORDER — HYDROMORPHONE HYDROCHLORIDE 2 MG/ML
1 INJECTION, SOLUTION INTRAMUSCULAR; INTRAVENOUS; SUBCUTANEOUS
Status: DISCONTINUED | OUTPATIENT
Start: 2025-06-18 | End: 2025-06-20 | Stop reason: HOSPADM

## 2025-06-18 RX ORDER — SODIUM CHLORIDE 0.9 % (FLUSH) 0.9 %
10 SYRINGE (ML) INJECTION
Status: DISCONTINUED | OUTPATIENT
Start: 2025-06-18 | End: 2025-06-18 | Stop reason: HOSPADM

## 2025-06-18 RX ORDER — DEXAMETHASONE SODIUM PHOSPHATE 4 MG/ML
INJECTION, SOLUTION INTRA-ARTICULAR; INTRALESIONAL; INTRAMUSCULAR; INTRAVENOUS; SOFT TISSUE
Status: DISCONTINUED | OUTPATIENT
Start: 2025-06-18 | End: 2025-06-18

## 2025-06-18 RX ORDER — ONDANSETRON HYDROCHLORIDE 2 MG/ML
INJECTION, SOLUTION INTRAVENOUS
Status: DISCONTINUED | OUTPATIENT
Start: 2025-06-18 | End: 2025-06-18

## 2025-06-18 RX ORDER — ONDANSETRON HYDROCHLORIDE 2 MG/ML
4 INJECTION, SOLUTION INTRAVENOUS EVERY 8 HOURS PRN
Status: DISCONTINUED | OUTPATIENT
Start: 2025-06-18 | End: 2025-06-20 | Stop reason: HOSPADM

## 2025-06-18 RX ORDER — MANNITOL 250 MG/ML
INJECTION, SOLUTION INTRAVENOUS
Status: DISCONTINUED | OUTPATIENT
Start: 2025-06-18 | End: 2025-06-18

## 2025-06-18 RX ORDER — GLUCAGON 1 MG
1 KIT INJECTION
Status: DISCONTINUED | OUTPATIENT
Start: 2025-06-18 | End: 2025-06-20 | Stop reason: HOSPADM

## 2025-06-18 RX ORDER — FLECAINIDE ACETATE 50 MG/1
50 TABLET ORAL EVERY 12 HOURS
Status: DISCONTINUED | OUTPATIENT
Start: 2025-06-18 | End: 2025-06-20 | Stop reason: HOSPADM

## 2025-06-18 RX ADMIN — KETAMINE HYDROCHLORIDE 10 MG: 50 INJECTION, SOLUTION, CONCENTRATE INTRAMUSCULAR; INTRAVENOUS at 10:06

## 2025-06-18 RX ADMIN — KETAMINE HYDROCHLORIDE 30 MG: 50 INJECTION, SOLUTION, CONCENTRATE INTRAMUSCULAR; INTRAVENOUS at 07:06

## 2025-06-18 RX ADMIN — AMLODIPINE BESYLATE 5 MG: 5 TABLET ORAL at 06:06

## 2025-06-18 RX ADMIN — DEXMEDETOMIDINE HYDROCHLORIDE 4 MCG: 100 INJECTION, SOLUTION, CONCENTRATE INTRAVENOUS at 12:06

## 2025-06-18 RX ADMIN — FLUTICASONE PROPIONATE 50 MCG: 50 SPRAY, METERED NASAL at 03:06

## 2025-06-18 RX ADMIN — OXYCODONE 10 MG: 5 TABLET ORAL at 03:06

## 2025-06-18 RX ADMIN — SODIUM CHLORIDE, SODIUM LACTATE, POTASSIUM CHLORIDE, AND CALCIUM CHLORIDE: .6; .31; .03; .02 INJECTION, SOLUTION INTRAVENOUS at 08:06

## 2025-06-18 RX ADMIN — FAMOTIDINE 20 MG: 20 TABLET, FILM COATED ORAL at 08:06

## 2025-06-18 RX ADMIN — EPHEDRINE SULFATE 10 MG: 50 INJECTION INTRAVENOUS at 12:06

## 2025-06-18 RX ADMIN — INSULIN ASPART 3 UNITS: 100 INJECTION, SOLUTION INTRAVENOUS; SUBCUTANEOUS at 05:06

## 2025-06-18 RX ADMIN — PHENYLEPHRINE HYDROCHLORIDE 50 MCG: 10 INJECTION INTRAVENOUS at 10:06

## 2025-06-18 RX ADMIN — DIPHENHYDRAMINE HYDROCHLORIDE 12.5 MG: 50 INJECTION INTRAMUSCULAR; INTRAVENOUS at 08:06

## 2025-06-18 RX ADMIN — PHENYLEPHRINE HYDROCHLORIDE 50 MCG: 10 INJECTION INTRAVENOUS at 09:06

## 2025-06-18 RX ADMIN — HYDRALAZINE HYDROCHLORIDE 10 MG: 20 INJECTION INTRAMUSCULAR; INTRAVENOUS at 05:06

## 2025-06-18 RX ADMIN — EPHEDRINE SULFATE 5 MG: 50 INJECTION INTRAVENOUS at 11:06

## 2025-06-18 RX ADMIN — PHENYLEPHRINE HYDROCHLORIDE 100 MCG: 10 INJECTION INTRAVENOUS at 12:06

## 2025-06-18 RX ADMIN — HYDROMORPHONE HYDROCHLORIDE 0.5 MG: 1 INJECTION, SOLUTION INTRAMUSCULAR; INTRAVENOUS; SUBCUTANEOUS at 06:06

## 2025-06-18 RX ADMIN — MANNITOL 12.5 G: 12.5 INJECTION, SOLUTION INTRAVENOUS at 12:06

## 2025-06-18 RX ADMIN — KETAMINE HYDROCHLORIDE 10 MG: 50 INJECTION, SOLUTION, CONCENTRATE INTRAMUSCULAR; INTRAVENOUS at 08:06

## 2025-06-18 RX ADMIN — PHENYLEPHRINE HYDROCHLORIDE 100 MCG: 10 INJECTION INTRAVENOUS at 11:06

## 2025-06-18 RX ADMIN — MANNITOL 12.5 G: 12.5 INJECTION, SOLUTION INTRAVENOUS at 10:06

## 2025-06-18 RX ADMIN — ATORVASTATIN CALCIUM 20 MG: 10 TABLET, FILM COATED ORAL at 08:06

## 2025-06-18 RX ADMIN — GABAPENTIN 300 MG: 300 CAPSULE ORAL at 04:06

## 2025-06-18 RX ADMIN — ACETAMINOPHEN 1000 MG: 500 TABLET ORAL at 07:06

## 2025-06-18 RX ADMIN — PROPOFOL 40 MG: 10 INJECTION, EMULSION INTRAVENOUS at 07:06

## 2025-06-18 RX ADMIN — ROCURONIUM BROMIDE 20 MG: 10 INJECTION INTRAVENOUS at 10:06

## 2025-06-18 RX ADMIN — ROCURONIUM BROMIDE 20 MG: 10 INJECTION INTRAVENOUS at 12:06

## 2025-06-18 RX ADMIN — FENTANYL CITRATE 25 MCG: 50 INJECTION, SOLUTION INTRAMUSCULAR; INTRAVENOUS at 07:06

## 2025-06-18 RX ADMIN — DEXAMETHASONE SODIUM PHOSPHATE 4 MG: 4 INJECTION, SOLUTION INTRAMUSCULAR; INTRAVENOUS at 07:06

## 2025-06-18 RX ADMIN — ROCURONIUM BROMIDE 20 MG: 10 INJECTION INTRAVENOUS at 09:06

## 2025-06-18 RX ADMIN — PROPOFOL 50 MG: 10 INJECTION, EMULSION INTRAVENOUS at 07:06

## 2025-06-18 RX ADMIN — HYDROMORPHONE HYDROCHLORIDE 0.2 MG: 2 INJECTION, SOLUTION INTRAMUSCULAR; INTRAVENOUS; SUBCUTANEOUS at 01:06

## 2025-06-18 RX ADMIN — DEXMEDETOMIDINE HYDROCHLORIDE 8 MCG: 100 INJECTION, SOLUTION, CONCENTRATE INTRAVENOUS at 08:06

## 2025-06-18 RX ADMIN — CEFAZOLIN 2 G: 2 INJECTION, POWDER, FOR SOLUTION INTRAMUSCULAR; INTRAVENOUS at 05:06

## 2025-06-18 RX ADMIN — LIDOCAINE HYDROCHLORIDE 100 MG: 20 INJECTION, SOLUTION INTRAVENOUS at 07:06

## 2025-06-18 RX ADMIN — FENTANYL CITRATE 50 MCG: 50 INJECTION, SOLUTION INTRAMUSCULAR; INTRAVENOUS at 08:06

## 2025-06-18 RX ADMIN — ROCURONIUM BROMIDE 20 MG: 10 INJECTION INTRAVENOUS at 11:06

## 2025-06-18 RX ADMIN — DOCUSATE SODIUM 100 MG: 100 CAPSULE, LIQUID FILLED ORAL at 08:06

## 2025-06-18 RX ADMIN — ONDANSETRON 4 MG: 2 INJECTION, SOLUTION INTRAMUSCULAR; INTRAVENOUS at 12:06

## 2025-06-18 RX ADMIN — DEXMEDETOMIDINE HYDROCHLORIDE 8 MCG: 100 INJECTION, SOLUTION, CONCENTRATE INTRAVENOUS at 12:06

## 2025-06-18 RX ADMIN — FLECAINIDE ACETATE 50 MG: 50 TABLET ORAL at 08:06

## 2025-06-18 RX ADMIN — HYDROMORPHONE HYDROCHLORIDE 0.2 MG: 2 INJECTION, SOLUTION INTRAMUSCULAR; INTRAVENOUS; SUBCUTANEOUS at 02:06

## 2025-06-18 RX ADMIN — PROPOFOL 120 MG: 10 INJECTION, EMULSION INTRAVENOUS at 07:06

## 2025-06-18 RX ADMIN — SODIUM CHLORIDE, SODIUM LACTATE, POTASSIUM CHLORIDE, AND CALCIUM CHLORIDE: .6; .31; .03; .02 INJECTION, SOLUTION INTRAVENOUS at 07:06

## 2025-06-18 RX ADMIN — FENTANYL CITRATE 50 MCG: 50 INJECTION, SOLUTION INTRAMUSCULAR; INTRAVENOUS at 07:06

## 2025-06-18 RX ADMIN — SUGAMMADEX 300 MG: 100 INJECTION, SOLUTION INTRAVENOUS at 12:06

## 2025-06-18 RX ADMIN — SODIUM CHLORIDE: 0.9 INJECTION, SOLUTION INTRAVENOUS at 07:06

## 2025-06-18 RX ADMIN — ROCURONIUM BROMIDE 60 MG: 10 INJECTION INTRAVENOUS at 07:06

## 2025-06-18 RX ADMIN — ROCURONIUM BROMIDE 20 MG: 10 INJECTION INTRAVENOUS at 08:06

## 2025-06-18 RX ADMIN — ACETAMINOPHEN 1000 MG: 10 INJECTION, SOLUTION INTRAVENOUS at 02:06

## 2025-06-18 RX ADMIN — INSULIN GLARGINE 15 UNITS: 100 INJECTION, SOLUTION SUBCUTANEOUS at 08:06

## 2025-06-18 RX ADMIN — PHENYLEPHRINE HYDROCHLORIDE 50 MCG: 10 INJECTION INTRAVENOUS at 11:06

## 2025-06-18 RX ADMIN — CEFAZOLIN 2 G: 2 INJECTION, POWDER, FOR SOLUTION INTRAMUSCULAR; INTRAVENOUS at 11:06

## 2025-06-18 RX ADMIN — CEFAZOLIN 2 G: 2 INJECTION, POWDER, FOR SOLUTION INTRAMUSCULAR; INTRAVENOUS at 07:06

## 2025-06-18 RX ADMIN — ROCURONIUM BROMIDE 20 MG: 10 INJECTION INTRAVENOUS at 07:06

## 2025-06-18 RX ADMIN — METOPROLOL SUCCINATE 12.5 MG: 25 TABLET, EXTENDED RELEASE ORAL at 03:06

## 2025-06-18 RX ADMIN — ACETAMINOPHEN 1000 MG: 500 TABLET ORAL at 06:06

## 2025-06-18 RX ADMIN — SODIUM CHLORIDE: 9 INJECTION, SOLUTION INTRAVENOUS at 03:06

## 2025-06-18 RX ADMIN — KETAMINE HYDROCHLORIDE 10 MG: 50 INJECTION, SOLUTION, CONCENTRATE INTRAMUSCULAR; INTRAVENOUS at 09:06

## 2025-06-18 NOTE — ASSESSMENT & PLAN NOTE
Patient's blood pressure range in the last 24 hours was: BP  Min: 166/76  Max: 176/79.The patient's inpatient anti-hypertensive regimen is listed below:  Current Antihypertensives  metoprolol succinate (TOPROL-XL) 24 hr split tablet 12.5 mg, Daily, Oral  hydrALAZINE injection 10 mg, Every 6 hours PRN, Intravenous  amLODIPine tablet 5 mg, Daily, Oral    BP is controlled, no changes needed to their regimen  Adequate pain & anxiety control as well as good sleep will be just as if not more important than starting new antihypertensives. Will not try to aggressively lower his BP while admitted: he has multiple valid reasons for being acutely hypertensive, and his recent outpatient readings have all been WNL.  Would not want to limit his body's ability to respond to hypovolemia should he develop an acute hemorrhage, for instance.  Starting amlodipine 5 since his BP is so high at the moment, and will titrate up throughout the night as needed.  But do not anticipate discharging him on any new meds given his recent normotension as an outpatient.  Delirium precautions

## 2025-06-18 NOTE — ASSESSMENT & PLAN NOTE
S/p Left partial nephrectomy Robotic to Open 6/18  Clears  Advance diet tomorrow  Labs in AM  Incentive Spirometry

## 2025-06-18 NOTE — ASSESSMENT & PLAN NOTE
Per primary.  Giving an extra dose of Dilaudid 0.5 since /100 and he reports 6/10 pain after Oxy 10.

## 2025-06-18 NOTE — CONSULTS
[facility]  [facility] Medicine  Consult Note    Patient Name: Rosendo Thomas  MRN: [id number]  Admission Date: 6/18/2025  Hospital Length of Stay: 0 days  Attending Physician: Milad Valladares, *   Primary Care Provider: Dede Eric MD           Patient information was obtained from patient, relative(s), past medical records, and ER records.     Consults  Subjective:     Principal Problem: Left renal mass    Chief Complaint: No chief complaint on file.       HPI: Rosendo Thomas is a 79 y.o. male with BMI 31, NIDDM2, HTN, recently-diagnosed AFib on Flecainide, and Hx of Colon CA who is being treated by [facility] for a L renal mass.  It was found incidentally on CT A/P during a late March hospitalization for CP and HTN: 3 cm, heterogenous, c/f RCC.  Dr. Valladares surgically resected the tumor via open partial L nephrectomy earlier today 6/18  (was planned for laparoscopic but tumor was too obscured by fat and previous colon surgery so had to convert to open).  PACU stay was unremarkable, and he was brought to the ICU.  Hospital Medicine has been consulted for medical co-management.    He reports feeling some pain and burning in the area of the incision.  Also reports feeling a little anxious about being in the hospital.  Says he follows regularly with a PCP and keeps his BP generally under control.  Clinic visit notes within the past few months all indicate normotension of late.  He is compliant with his home meds, including Toprol 12.5, flecainide 50, Eliquis 5 BID, Lipitor 20, glipizide 5, Mounjaro (has lost about 50 lbs recently) and monthly Taltz for RA-induced psoriasis.  Follows w Dr. Whitehead for his AFib (diagnosed in January when he presented to [facility] with influenza), and denies recent sxs such as fatigue, light-headedness, palpitations.  Not yet hungry since arriving back from surgery.  Denies current or recent fever, chills, dyspnea, CP, abdo pain, N/V/D, known illness or  sick contacts.  On eval, HR 60s with sinus rhythm noted on Tele.  /110.  Good sats on 3L LFNC.  Exam with no JVD, clear lungs, HS nl, no MONICA.      Past Medical History:   Diagnosis Date    Colon cancer     Colon polyps     Diabetes mellitus type II 07/2010    diet controlled    Obesity     Paroxysmal atrial fibrillation     Psoriasis     Sleep apnea, unspecified     Squamous cell carcinoma     Type 2 diabetes mellitus        Past Surgical History:   Procedure Laterality Date    APPENDECTOMY      CARDIOVERSION N/A 2/18/2025    Procedure: Cardioversion;  Surgeon: Abbe Whitehead MD;  Location: [facility] CATH LAB;  Service: Cardiology;  Laterality: N/A;  before 1230pm    CARDIOVERSION N/A 4/22/2025    Procedure: Cardioversion;  Surgeon: Abbe Whitehead MD;  Location: [facility] CATH LAB;  Service: Cardiology;  Laterality: N/A;    COLONOSCOPY N/A 07/09/2018    Procedure: COLONOSCOPY;  Surgeon: Kameron Ruff MD;  Location: [facility] ENDO;  Service: Endoscopy;  Laterality: N/A;  confirmed    COLONOSCOPY N/A 11/11/2019    Procedure: COLONOSCOPY;  Surgeon: Victor Hugo Nunez MD;  Location: [facility] ENDO (4TH FLR);  Service: Endoscopy;  Laterality: N/A;  Pt stated this is the only day he has somebody to drive him and  to stay during procedure  PM Prep    COLONOSCOPY N/A 07/08/2020    Procedure: COLONOSCOPY;  Surgeon: Victor Hugo Nunez MD;  Location: [facility] (2ND FLR);  Service: Endoscopy;  Laterality: N/A;  Covid-19 test 7/5/20 at [facility]gent [facility]    EXTREMITY CYST EXCISION      LAPAROSCOPIC LEFT COLECTOMY N/A 09/25/2018    Procedure: COLECTOMY-LAPAROSCOPIC LEFT;  Surgeon: Chito Banks MD;  Location: [facility] OR 2ND FLR;  Service: Colon and Rectal;  Laterality: N/A;    SKIN CANCER EXCISION      SKIN GRAFT      TRANSESOPHAGEAL ECHOCARDIOGRAM WITH POSSIBLE CARDIOVERSION (LUDIN W/ POSS CARDIOVERSION) N/A 2/18/2025    Procedure: TRANSESOPHAGEAL ECHOCARDIOGRAM WITH POSSIBLE CARDIOVERSION (LUDIN W/ POSS  CARDIOVERSION);  Surgeon: Abbe Whitehead MD;  Location: [facility] CATH LAB;  Service: Cardiology;  Laterality: N/A;  before 1230pm  RN PRE OP 2/14/2025    TREATMENT OF CARDIAC ARRHYTHMIA N/A 4/22/2025    Procedure: Cardioversion/Defibrillation;  Surgeon: Abbe Whitehead MD;  Location: [facility] CATH LAB;  Service: Cardiology;  Laterality: N/A;  RN PRE OP 4/16/2025    VASECTOMY      VASECTOMY         Review of patient's allergies indicates:   Allergen Reactions    Influenza virus vaccines     Metformin Diarrhea       No current facility-administered medications on file prior to encounter.     Current Outpatient Medications on File Prior to Encounter   Medication Sig    apixaban (ELIQUIS) 5 mg Tab Take 1 tablet (5 mg total) by mouth 2 (two) times daily.    ascorbic acid (JENNI-C ORAL) Take by mouth.    atorvastatin (LIPITOR) 20 MG tablet take 1 tablet by mouth once daily    blood sugar diagnostic (CONTOUR TEST STRIPS) Strp 1 each by Misc.(Non-Drug; Combo Route) route 2 (two) times daily.    cyanocobalamin, vitamin B-12, (VITAMIN B-12 ORAL) Take by mouth.    flecainide (TAMBOCOR) 50 MG Tab Take 1 tablet (50 mg total) by mouth every 12 (twelve) hours.    fluticasone propionate (FLONASE) 50 mcg/actuation nasal spray 1 spray by Each Nostril route once daily.    gabapentin (NEURONTIN) 300 MG capsule Take 1 capsule (300 mg total) by mouth 3 (three) times daily as needed (nerve pain).    glipiZIDE 5 MG TR24 Take 1 tablet (5 mg total) by mouth daily with breakfast.    ixekizumab (TALTZ AUTOINJECTOR) 80 mg/mL AtIn Inject 80 mg into the skin every 28 days. Starting on week 12    lancets Misc USE ONE LANCET TWO TIMES DAILY    MAGNESIUM ORAL Take 500 mg by mouth Daily.    metoprolol succinate (TOPROL-XL) 25 MG 24 hr tablet Take 0.5 tablets (12.5 mg total) by mouth once daily.    MOUNJARO 2.5 mg/0.5 mL PnIj Inject 2.5 mg into the skin every 7 days.    vitamin B complex (B COMPLEX ORAL) Take 1 capsule by mouth Daily.    ZINC ORAL  Take 1 capsule by mouth Daily.     Family History       Problem Relation (Age of Onset)    Cancer Father    Diabetes Brother    Liver cancer Brother          Tobacco Use    Smoking status: Former     Types: Cigars    Smokeless tobacco: Never    Tobacco comments:     cigars-x1 yr.   Substance and Sexual Activity    Alcohol use: Yes     Comment: occassional    Drug use: No    Sexual activity: Yes     Partners: Female     Review of Systems   Reason unable to perform ROS: ROS was performed and pertinent +s and -s are listed in HPI.     Objective:     Vital Signs (Most Recent):  Temp: 97.7 °F (36.5 °C) (06/18/25 1316)  Pulse: 63 (06/18/25 1443)  Resp: 20 (06/18/25 1544)  BP: (!) 168/76 (06/18/25 1430)  SpO2: 96 % (06/18/25 1430) Vital Signs (24h Range):  Temp:  [97.7 °F (36.5 °C)-97.8 °F (36.6 °C)] 97.7 °F (36.5 °C)  Pulse:  [52-72] 63  Resp:  [13-22] 20  SpO2:  [91 %-100 %] 96 %  BP: (166-176)/(76-89) 168/76     Weight: 112.3 kg (247 lb 9.2 oz)  Body mass index is 30.94 kg/m².     Physical Exam  Constitutional:       General: He is not in acute distress.     Appearance: Normal appearance. He is obese. He is not ill-appearing.   HENT:      Head: Normocephalic.   Neck:      Comments: JVP not elevated  Cardiovascular:      Rate and Rhythm: Normal rate and regular rhythm.      Pulses: Normal pulses.      Heart sounds: Normal heart sounds.   Pulmonary:      Effort: Pulmonary effort is normal.      Breath sounds: Normal breath sounds.   Abdominal:      General: Abdomen is flat. Bowel sounds are normal.      Tenderness: There is no abdominal tenderness. There is no guarding.   Musculoskeletal:      Right lower leg: No edema.      Left lower leg: No edema.   Skin:     General: Skin is warm and dry.      Capillary Refill: Capillary refill takes less than 2 seconds.      Coloration: Skin is not jaundiced.   Neurological:      General: No focal deficit present.      Mental Status: He is alert and oriented to person, place, and  time.   Psychiatric:         Mood and Affect: Mood normal.         Behavior: Behavior normal.                Significant Labs: All pertinent labs within the past 24 hours have been reviewed.  CBC:   Recent Labs   Lab 06/18/25  1354   WBC 10.48   HGB 15.0   HCT 48.2   *     CMP:   Recent Labs   Lab 06/18/25  1355      K 3.8      CO2 18*   *   BUN 13   CREATININE 1.2   CALCIUM 9.0   ANIONGAP 17*     POCT Glucose:   Recent Labs   Lab 06/18/25  0605   POCTGLUCOSE 164*       Significant Imaging: I have reviewed all pertinent imaging results/findings within the past 24 hours.    Assessment/Plan:     * Left renal mass  Per primary.  Giving an extra dose of Dilaudid 0.5 since /100 and he reports 6/10 pain after Oxy 10.      Essential hypertension, benign  Patient's blood pressure range in the last 24 hours was: BP  Min: 166/76  Max: 176/79.The patient's inpatient anti-hypertensive regimen is listed below:  Current Antihypertensives  metoprolol succinate (TOPROL-XL) 24 hr split tablet 12.5 mg, Daily, Oral  hydrALAZINE injection 10 mg, Every 6 hours PRN, Intravenous  amLODIPine tablet 5 mg, Daily, Oral    BP is controlled, no changes needed to their regimen  Adequate pain & anxiety control as well as good sleep will be just as if not more important than starting new antihypertensives. Will not try to aggressively lower his BP while admitted: he has multiple valid reasons for being acutely hypertensive, and his recent outpatient readings have all been WNL.  Would not want to limit his body's ability to respond to hypovolemia should he develop an acute hemorrhage, for instance.  Starting amlodipine 5 since his BP is so high at the moment, and will titrate up throughout the night as needed.  But do not anticipate discharging him on any new meds given his recent normotension as an outpatient.  Delirium precautions    Atrial fibrillation  Patient has persistent (7 days or more) atrial fibrillation.  Patient is currently in sinus rhythm. [id number] Score: 3. The patients heart rate in the last 24 hours is as follows:  Pulse  Min: 52  Max: 72     Antiarrhythmics  flecainide tablet 50 mg, Every 12 hours, Oral  metoprolol succinate (TOPROL-XL) 24 hr split tablet 12.5 mg, Daily, Oral    Anticoagulants       Plan  - Replete lytes with a goal of K>4, Mg >2  - Patient is not anticoagulated due to being post-partial nephrectomy; will defer to Primary on exact timing of resumption but his HR and rhythm are well-controlled at the moment.          Sinus bradycardia  Will avoid any further AV brayden blockade beyond his home Toprol.      Type 2 diabetes mellitus  Last A1c reviewed-   Lab Results   Component Value Date    HGBA1C 8.7 (H) 05/05/2025     Home antihyperglycemic regimen: Mounjaro 2.5, glipizide 5    Recent Labs     06/18/25  0605   POCTGLUCOSE 164*     Most recent inpatient antihyperglycemic regimen:   Antihyperglycemics (From admission, onward)      Start     Stop Route Frequency Ordered    06/19/25 0715  insulin aspart U-100 pen 5 Units         -- SubQ 3 times daily with meals 06/18/25 1647    06/18/25 2100  insulin glargine U-100 (Lantus) pen 15 Units         -- SubQ Nightly 06/18/25 1647    06/18/25 1747  insulin aspart U-100 pen 0-5 Units         -- SubQ Every 4 hours PRN 06/18/25 1647          Goal blood glucose range while admitted: 140-180 per the NICE-SUGAR trial and American Diabetes Association guidelines  Diabetic diet 2000 antwan after cleared to advance from [facility]  Diabetic counseling and education (eg nutrition, insulin) to be given prior to discharge since A1c is too high        VTE Risk Mitigation (From admission, onward)           Ordered     IP VTE HIGH RISK PATIENT  Once         06/18/25 1518     Place sequential compression device  Until discontinued         06/18/25 1518                    Critical care time spent on the evaluation and treatment of severe organ dysfunction, review of pertinent labs  and imaging studies, discussions with consulting providers and discussions with patient/family: 45 minutes.    Thank you for your consult. I will follow-up with patient. Please contact us if you have any additional questions.    Chandler Chaudhary MD  Department of Hospital Medicine   [facility] [place] - Intensive Care

## 2025-06-18 NOTE — ANESTHESIA PROCEDURE NOTES
Intubation    Date/Time: 6/18/2025 7:21 AM    Performed by: Marcos Trujillo  Authorized by: Bryan Tomlinson Chi, MD    Intubation:     Induction:  Intravenous    Intubated:  Postinduction    Mask Ventilation:  2 handed mask ventilation with 2 providers    Attempts:  1    Attempted By:  Student    Method of Intubation:  Video laryngoscopy    Blade:  Kilpatrick 3    Laryngeal View Grade: Grade I - full view of cords      Difficult Airway Encountered?: No      Complications:  None    Airway Device:  Oral endotracheal tube    Airway Device Size:  7.5    Style/Cuff Inflation:  Cuffed (inflated to minimal occlusive pressure)    Tube secured:  23    Secured at:  The lips    Placement Verified By:  Capnometry    Complicating Factors:  None    Findings Post-Intubation:  BS equal bilateral

## 2025-06-18 NOTE — BRIEF OP NOTE
[facility] - Surgery  Brief Operative Note    Surgery Date: 6/18/2025     Surgeons and Role:     * Milad Valladares MD - Primary    Assisting Surgeon: None    Pre-op Diagnosis:  Left kidney mass [N28.89]    Post-op Diagnosis:  Post-Op Diagnosis Codes:     * Left kidney mass [N28.89]    Procedure(s) (LRB):  ROBOTIC NEPHRECTOMY, PARTIAL ATTEMPTED (Left)  NEPHRECTOMY, PARTIAL (Left)    Anesthesia: General    Operative Findings: previous colon surgery with some adhesions, large amount of perinephric fat making tumor identification difficult.  Converted to open for better mobilization of the tumor.  20 min clamp time.    Estimated Blood Loss: 300 mL         Specimens:   Specimen (24h ago, onward)       Start     Ordered    06/18/25 1219  Specimen to Pathology  RELEASE UPON ORDERING        References:    Click here for ordering Quick Tip   Question:  Release to patient  Answer:  Immediate    06/18/25 1219    06/18/25 1145  Specimen to Pathology Urology  Once        Comments: LEFT KIDNEY MASS     References:    Click here for ordering Quick Tip   Question Answer Comment   Service Line: Urology    Specimen Source Kidney, Left    Specimen Class: Known or suspected malignancy    Procedure Type: Excision    Release to patient Immediate        06/18/25 1238                    ID Type Source Tests Collected by Time Destination   1 : left kidney mass Tissue Kidney, Left SPECIMEN TO PATHOLOGY Gissell Pan RN 6/18/2025 0809

## 2025-06-18 NOTE — SUBJECTIVE & OBJECTIVE
Interval History: Pain controlled.  No ambulation.  No nausea.    Review of Systems   Constitutional:  Negative for fever.   Respiratory:  Negative for chest tightness and wheezing.    Cardiovascular:  Negative for chest pain and palpitations.   Gastrointestinal:  Negative for abdominal pain, diarrhea, nausea and vomiting.   Genitourinary:  Positive for flank pain. Negative for difficulty urinating, dysuria and hematuria.   Musculoskeletal:  Negative for arthralgias.   Neurological:  Negative for dizziness.   Psychiatric/Behavioral:  Negative for confusion.      Objective:     Temp:  [97.7 °F (36.5 °C)-97.8 °F (36.6 °C)] 97.7 °F (36.5 °C)  Pulse:  [52-72] 63  Resp:  [13-22] 20  SpO2:  [91 %-100 %] 96 %  BP: (166-176)/(76-89) 168/76     Body mass index is 30.94 kg/m².    Date 06/18/25 0700 - 06/19/25 0659   Shift 6609-8819 6152-5499 3928-6405 24 Hour Total   INTAKE   I.V.(mL/kg) 1800(16)   1800(16)   IV Piggyback 1000   1000   Shift Total(mL/kg) 2800(24.9)   2800(24.9)   OUTPUT   Urine(mL/kg/hr) 940(1)   940   Drains 80   80   Blood 300   300   Shift Total(mL/kg) 1320(11.8)   1320(11.8)   Weight (kg) 112.3 112.3 112.3 112.3          Drains       Drain  Duration                  Closed/Suction Drain 06/18/25 1209 Lateral LUQ Bulb 19 Fr. <1 day         Urethral Catheter 06/18/25 0730 Double-lumen;Silicone 16 Fr. <1 day                     Physical Exam  Vitals and nursing note reviewed.   Constitutional:       Appearance: He is well-developed.   HENT:      Head: Normocephalic.   Eyes:      Conjunctiva/sclera: Conjunctivae normal.   Neck:      Thyroid: No thyromegaly.      Trachea: No tracheal deviation.   Cardiovascular:      Rate and Rhythm: Normal rate.      Heart sounds: Normal heart sounds.   Pulmonary:      Effort: Pulmonary effort is normal. No respiratory distress.      Breath sounds: Normal breath sounds. No wheezing.   Abdominal:      General: Bowel sounds are normal.      Palpations: Abdomen is soft.       "Tenderness: There is no abdominal tenderness. There is no rebound.      Hernia: No hernia is present.      Comments: Soft, non-distended  PORTER sanguinous   Dressings dry    Montgomery with yellow urine   Musculoskeletal:         General: No tenderness. Normal range of motion.      Cervical back: Normal range of motion and neck supple.   Lymphadenopathy:      Cervical: No cervical adenopathy.   Skin:     General: Skin is warm and dry.      Findings: No erythema or rash.   Neurological:      Mental Status: He is alert and oriented to person, place, and time.   Psychiatric:         Behavior: Behavior normal.         Thought Content: Thought content normal.         Judgment: Judgment normal.           Significant Labs:    BMP:  Recent Labs   Lab 06/12/25  1029 06/18/25  1355    139   K 4.2 3.8    104   CO2 26 18*   BUN 15 13   CREATININE 1.0 1.2   CALCIUM 9.0 9.0       CBC:   Recent Labs   Lab 06/12/25  1029 06/18/25  1354   WBC 5.38 10.48   HGB 14.8 15.0   HCT 46.6 48.2   * 116*       Blood Culture: No results for input(s): "LABBLOO" in the last 168 hours.  Urine Culture:   Recent Labs   Lab 06/12/25  1038   LABURIN No Significant Growth       Significant Imaging:                  "

## 2025-06-18 NOTE — PROGRESS NOTES
[facility] Intensive Care  Urology  Progress Note    Patient Name: Rosendo Thomas  MRN: [id number]  Admission Date: 6/18/2025  Hospital Length of Stay: 0 days  Code Status: Full Code   Attending Provider: Milad Valladares, *   Primary Care Physician: Dede Eric MD    Subjective:     HPI:  No notes on file    Interval History: Pain controlled.  No ambulation.  No nausea.    Review of Systems   Constitutional:  Negative for fever.   Respiratory:  Negative for chest tightness and wheezing.    Cardiovascular:  Negative for chest pain and palpitations.   Gastrointestinal:  Negative for abdominal pain, diarrhea, nausea and vomiting.   Genitourinary:  Positive for flank pain. Negative for difficulty urinating, dysuria and hematuria.   Musculoskeletal:  Negative for arthralgias.   Neurological:  Negative for dizziness.   Psychiatric/Behavioral:  Negative for confusion.      Objective:     Temp:  [97.7 °F (36.5 °C)-97.8 °F (36.6 °C)] 97.7 °F (36.5 °C)  Pulse:  [52-72] 63  Resp:  [13-22] 20  SpO2:  [91 %-100 %] 96 %  BP: (166-176)/(76-89) 168/76     Body mass index is 30.94 kg/m².    Date 06/18/25 0700 - 06/19/25 0659   Shift 3496-0857 0843-3424 9462-5638 24 Hour Total   INTAKE   I.V.(mL/kg) 1800(16)   1800(16)   IV Piggyback 1000   1000   Shift Total(mL/kg) 2800(24.9)   2800(24.9)   OUTPUT   Urine(mL/kg/hr) 940(1)   940   Drains 80   80   Blood 300   300   Shift Total(mL/kg) 1320(11.8)   1320(11.8)   Weight (kg) 112.3 112.3 112.3 112.3          Drains       Drain  Duration                  Closed/Suction Drain 06/18/25 1209 Lateral LUQ Bulb 19 Fr. <1 day         Urethral Catheter 06/18/25 0730 Double-lumen;Silicone 16 Fr. <1 day                     Physical Exam  Vitals and nursing note reviewed.   Constitutional:       Appearance: He is well-developed.   HENT:      Head: Normocephalic.   Eyes:      Conjunctiva/sclera: Conjunctivae normal.   Neck:      Thyroid: No thyromegaly.      Trachea: No tracheal deviation.  "  Cardiovascular:      Rate and Rhythm: Normal rate.      Heart sounds: Normal heart sounds.   Pulmonary:      Effort: Pulmonary effort is normal. No respiratory distress.      Breath sounds: Normal breath sounds. No wheezing.   Abdominal:      General: Bowel sounds are normal.      Palpations: Abdomen is soft.      Tenderness: There is no abdominal tenderness. There is no rebound.      Hernia: No hernia is present.      Comments: Soft, non-distended  PORTER sanguinous   Dressings dry    Montgomery with yellow urine   Musculoskeletal:         General: No tenderness. Normal range of motion.      Cervical back: Normal range of motion and neck supple.   Lymphadenopathy:      Cervical: No cervical adenopathy.   Skin:     General: Skin is warm and dry.      Findings: No erythema or rash.   Neurological:      Mental Status: He is alert and oriented to person, place, and time.   Psychiatric:         Behavior: Behavior normal.         Thought Content: Thought content normal.         Judgment: Judgment normal.           Significant Labs:    BMP:  Recent Labs   Lab 06/12/25  1029 06/18/25  1355    139   K 4.2 3.8    104   CO2 26 18*   BUN 15 13   CREATININE 1.0 1.2   CALCIUM 9.0 9.0       CBC:   Recent Labs   Lab 06/12/25  1029 06/18/25  1354   WBC 5.38 10.48   HGB 14.8 15.0   HCT 46.6 48.2   * 116*       Blood Culture: No results for input(s): "LABBLOO" in the last 168 hours.  Urine Culture:   Recent Labs   Lab 06/12/25  1038   LABURIN No Significant Growth       Significant Imaging:                    Assessment/Plan:     * Left renal mass  S/p Left partial nephrectomy Robotic to Open 6/18  Clears  Advance diet tomorrow  Labs in AM  Incentive Spirometry       Essential hypertension, benign  Hospital medicine consulted  Please Keep SBP <140    Type 2 diabetes mellitus  Sliding scale        VTE Risk Mitigation (From admission, onward)           Ordered     IP VTE HIGH RISK PATIENT  Once         06/18/25 1518     " Place sequential compression device  Until discontinued         06/18/25 1518                    Milad Valladares MD  Urology  [facility] [place] - Intensive Care

## 2025-06-18 NOTE — ASSESSMENT & PLAN NOTE
Last A1c reviewed-   Lab Results   Component Value Date    HGBA1C 8.7 (H) 05/05/2025     Home antihyperglycemic regimen: Mounjaro 2.5, glipizide 5    Recent Labs     06/18/25  0605   POCTGLUCOSE 164*     Most recent inpatient antihyperglycemic regimen:   Antihyperglycemics (From admission, onward)      Start     Stop Route Frequency Ordered    06/19/25 0715  insulin aspart U-100 pen 5 Units         -- SubQ 3 times daily with meals 06/18/25 1647    06/18/25 2100  insulin glargine U-100 (Lantus) pen 15 Units         -- SubQ Nightly 06/18/25 1647    06/18/25 1747  insulin aspart U-100 pen 0-5 Units         -- SubQ Every 4 hours PRN 06/18/25 1647          Goal blood glucose range while admitted: 140-180 per the NICE-SUGAR trial and American Diabetes Association guidelines  Diabetic diet 2000 antwan after cleared to advance from [facility]  Diabetic counseling and education (eg nutrition, insulin) to be given prior to discharge since A1c is too high

## 2025-06-18 NOTE — HPI
Rosendo Thomas is a 79 y.o. male with BMI 31, NIDDM2, HTN, recently-diagnosed AFib on Flecainide, and Hx of Colon CA who is being treated by [facility] for a L renal mass.  It was found incidentally on CT A/P during a late March hospitalization for CP and HTN: 3 cm, heterogenous, c/f RCC.  Dr. Valladares surgically resected the tumor via open partial L nephrectomy earlier today 6/18  (was planned for laparoscopic but tumor was too obscured by fat and previous colon surgery so had to convert to open).  PACU stay was unremarkable, and he was brought to the ICU.  Hospital Medicine has been consulted for medical co-management.    He reports feeling some pain and burning in the area of the incision.  Also reports feeling a little anxious about being in the hospital.  Says he follows regularly with a PCP and keeps his BP generally under control.  Clinic visit notes within the past few months all indicate normotension of late.  He is compliant with his home meds, including Toprol 12.5, flecainide 50, Eliquis 5 BID, Lipitor 20, glipizide 5, Mounjaro (has lost about 50 lbs recently) and monthly Taltz for RA-induced psoriasis.  Follows w Dr. Whitehead for his AFib (diagnosed in January when he presented to [facility] with influenza), and denies recent sxs such as fatigue, light-headedness, palpitations.  Not yet hungry since arriving back from surgery.  Denies current or recent fever, chills, dyspnea, CP, abdo pain, N/V/D, known illness or sick contacts.  On eval, HR 60s with sinus rhythm noted on Tele.  /110.  Good sats on 3L LFNC.  Exam with no JVD, clear lungs, HS nl, no MONICA.

## 2025-06-18 NOTE — ASSESSMENT & PLAN NOTE
Patient has persistent (7 days or more) atrial fibrillation. Patient is currently in sinus rhythm. [id number] Score: 3. The patients heart rate in the last 24 hours is as follows:  Pulse  Min: 52  Max: 72     Antiarrhythmics  flecainide tablet 50 mg, Every 12 hours, Oral  metoprolol succinate (TOPROL-XL) 24 hr split tablet 12.5 mg, Daily, Oral    Anticoagulants       Plan  - Replete lytes with a goal of K>4, Mg >2  - Patient is not anticoagulated due to being post-partial nephrectomy; will defer to Primary on exact timing of resumption but his HR and rhythm are well-controlled at the moment.

## 2025-06-18 NOTE — PLAN OF CARE
Pre-op plan of care reviewed with patient and daughters. Admit assessment complete. Questions encouraged and answered. Post-op education begun with pt. Pt ready to proceed. Personal belongings placed on back of stretcher to follow pt to holding area.

## 2025-06-18 NOTE — ANESTHESIA PROCEDURE NOTES
Arterial    Diagnosis: Afib    Patient location during procedure: done in OR  Timeout: 6/18/2025 7:45 AM  Procedure end time: 6/18/2025 7:55 AM    Staffing  Authorizing Provider: Bryan Tomlinson Chi, MD  Performing Provider: Marcos Trujillo    Staffing  Performed by: Marcos Trujillo  Authorized by: Bryan Tomlinson Chi, MD    Anesthesiologist was present at the time of the procedure.    Preanesthetic Checklist  Completed: patient identified, IV checked, site marked, risks and benefits discussed, surgical consent, monitors and equipment checked, pre-op evaluation, timeout performed and anesthesia consent givenArterial  Skin Prep: chlorhexidine gluconate  Orientation: left  Location: radial    Catheter Size: 18 G  Catheter placement by Anatomical landmarks. Heme positive aspiration all ports. Insertion Attempts: 1  Assessment  Dressing: secured with tape  Patient: Tolerated well

## 2025-06-18 NOTE — EICU
Virtual ICU Admission    Admit Date: 2025  LOS: 0  Code Status: Full Code   : 1945  Bed: W273/W273 A:     Diagnosis: Left renal mass   Admit to ICU s/p Robotic assisted laparoscopic, converted to open left partial nephrectomy --Montgomery placed by urology in OR    Patient  has a past medical history of Colon cancer, Colon polyps, Diabetes mellitus type II, Obesity, Paroxysmal atrial fibrillation, Psoriasis, Sleep apnea, unspecified, Squamous cell carcinoma, and Type 2 diabetes mellitus.    Last VS: BP (!) 198/88   Pulse 60   Temp 98 °F (36.7 °C) (Oral)   Resp (!) 30   Wt 112.3 kg (247 lb 9.2 oz)   SpO2 100%   BMI 30.94 kg/m²       VICU Review    VICU nurse assessment :  [place] completed, LDA documentation reconciliation completed, Sign and held orders reviewed/released, and VTE prophylaxis review    Pressure injury prevention panel (order)    Nursing orders placed : IP MAGDALENE Peripheral IV Access

## 2025-06-18 NOTE — TRANSFER OF CARE
Anesthesia Transfer of Care Note    Patient: Rosenod Thomas    Procedure(s) Performed: Procedure(s) (LRB):  ROBOTIC NEPHRECTOMY, PARTIAL ATTEMPTED (Left)  NEPHRECTOMY, PARTIAL (Left)    Patient location: PACU    Anesthesia Type: general    Transport from OR: Transported from OR on 6-10 L/min O2 by face mask with adequate spontaneous ventilation    Post pain: adequate analgesia    Post assessment: no apparent anesthetic complications and tolerated procedure well    Post vital signs: stable    Level of consciousness: sedated and responds to stimulation    Nausea/Vomiting: no nausea/vomiting    Complications: none    Transfer of care protocol was followed      Last vitals: Visit Vitals  BP (!) 167/78   Pulse 65   Temp 36.5 °C (97.7 °F)   Resp 20   Wt 112.3 kg (247 lb 9.2 oz)   SpO2 100%   BMI 30.94 kg/m²

## 2025-06-18 NOTE — SUBJECTIVE & OBJECTIVE
Past Medical History:   Diagnosis Date    Colon cancer     Colon polyps     Diabetes mellitus type II 07/2010    diet controlled    Obesity     Paroxysmal atrial fibrillation     Psoriasis     Sleep apnea, unspecified     Squamous cell carcinoma     Type 2 diabetes mellitus        Past Surgical History:   Procedure Laterality Date    APPENDECTOMY      CARDIOVERSION N/A 2/18/2025    Procedure: Cardioversion;  Surgeon: Abbe Whitehead MD;  Location: [facility] CATH LAB;  Service: Cardiology;  Laterality: N/A;  before 1230pm    CARDIOVERSION N/A 4/22/2025    Procedure: Cardioversion;  Surgeon: Abbe Whitehead MD;  Location: [facility] CATH LAB;  Service: Cardiology;  Laterality: N/A;    COLONOSCOPY N/A 07/09/2018    Procedure: COLONOSCOPY;  Surgeon: Kameron Ruff MD;  Location: [facility] ENDO;  Service: Endoscopy;  Laterality: N/A;  confirmed    COLONOSCOPY N/A 11/11/2019    Procedure: COLONOSCOPY;  Surgeon: Victor Hugo Nunez MD;  Location: [facility] (4TH FLR);  Service: Endoscopy;  Laterality: N/A;  Pt stated this is the only day he has somebody to drive him and  to stay during procedure  PM Prep    COLONOSCOPY N/A 07/08/2020    Procedure: COLONOSCOPY;  Surgeon: Victor Hugo Nunez MD;  Location: [facility] (2ND FLR);  Service: Endoscopy;  Laterality: N/A;  Covid-19 test 7/5/20 at [facility]    EXTREMITY CYST EXCISION      LAPAROSCOPIC LEFT COLECTOMY N/A 09/25/2018    Procedure: COLECTOMY-LAPAROSCOPIC LEFT;  Surgeon: Chito Banks MD;  Location: [facility] OR 2ND FLR;  Service: Colon and Rectal;  Laterality: N/A;    SKIN CANCER EXCISION      SKIN GRAFT      TRANSESOPHAGEAL ECHOCARDIOGRAM WITH POSSIBLE CARDIOVERSION (LUDIN W/ POSS CARDIOVERSION) N/A 2/18/2025    Procedure: TRANSESOPHAGEAL ECHOCARDIOGRAM WITH POSSIBLE CARDIOVERSION (LUDIN W/ POSS CARDIOVERSION);  Surgeon: Abbe Whitehead MD;  Location: [facility] CATH LAB;  Service: Cardiology;  Laterality: N/A;  before 1230pm  RN PRE OP 2/14/2025     TREATMENT OF CARDIAC ARRHYTHMIA N/A 4/22/2025    Procedure: Cardioversion/Defibrillation;  Surgeon: Abbe Whitehead MD;  Location: [facility] CATH LAB;  Service: Cardiology;  Laterality: N/A;  RN PRE OP 4/16/2025    VASECTOMY      VASECTOMY         Review of patient's allergies indicates:   Allergen Reactions    Influenza virus vaccines     Metformin Diarrhea       No current facility-administered medications on file prior to encounter.     Current Outpatient Medications on File Prior to Encounter   Medication Sig    apixaban (ELIQUIS) 5 mg Tab Take 1 tablet (5 mg total) by mouth 2 (two) times daily.    ascorbic acid (JENNI-C ORAL) Take by mouth.    atorvastatin (LIPITOR) 20 MG tablet take 1 tablet by mouth once daily    blood sugar diagnostic (CONTOUR TEST STRIPS) Strp 1 each by Misc.(Non-Drug; Combo Route) route 2 (two) times daily.    cyanocobalamin, vitamin B-12, (VITAMIN B-12 ORAL) Take by mouth.    flecainide (TAMBOCOR) 50 MG Tab Take 1 tablet (50 mg total) by mouth every 12 (twelve) hours.    fluticasone propionate (FLONASE) 50 mcg/actuation nasal spray 1 spray by Each Nostril route once daily.    gabapentin (NEURONTIN) 300 MG capsule Take 1 capsule (300 mg total) by mouth 3 (three) times daily as needed (nerve pain).    glipiZIDE 5 MG TR24 Take 1 tablet (5 mg total) by mouth daily with breakfast.    ixekizumab (TALTZ AUTOINJECTOR) 80 mg/mL AtIn Inject 80 mg into the skin every 28 days. Starting on week 12    lancets Misc USE ONE LANCET TWO TIMES DAILY    MAGNESIUM ORAL Take 500 mg by mouth Daily.    metoprolol succinate (TOPROL-XL) 25 MG 24 hr tablet Take 0.5 tablets (12.5 mg total) by mouth once daily.    MOUNJARO 2.5 mg/0.5 mL PnIj Inject 2.5 mg into the skin every 7 days.    vitamin B complex (B COMPLEX ORAL) Take 1 capsule by mouth Daily.    ZINC ORAL Take 1 capsule by mouth Daily.     Family History       Problem Relation (Age of Onset)    Cancer Father    Diabetes Brother    Liver cancer Brother           Tobacco Use    Smoking status: Former     Types: Cigars    Smokeless tobacco: Never    Tobacco comments:     cigars-x1 yr.   Substance and Sexual Activity    Alcohol use: Yes     Comment: occassional    Drug use: No    Sexual activity: Yes     Partners: Female     Review of Systems   Reason unable to perform ROS: ROS was performed and pertinent +s and -s are listed in HPI.     Objective:     Vital Signs (Most Recent):  Temp: 97.7 °F (36.5 °C) (06/18/25 1316)  Pulse: 63 (06/18/25 1443)  Resp: 20 (06/18/25 1544)  BP: (!) 168/76 (06/18/25 1430)  SpO2: 96 % (06/18/25 1430) Vital Signs (24h Range):  Temp:  [97.7 °F (36.5 °C)-97.8 °F (36.6 °C)] 97.7 °F (36.5 °C)  Pulse:  [52-72] 63  Resp:  [13-22] 20  SpO2:  [91 %-100 %] 96 %  BP: (166-176)/(76-89) 168/76     Weight: 112.3 kg (247 lb 9.2 oz)  Body mass index is 30.94 kg/m².     Physical Exam  Constitutional:       General: He is not in acute distress.     Appearance: Normal appearance. He is obese. He is not ill-appearing.   HENT:      Head: Normocephalic.   Neck:      Comments: JVP not elevated  Cardiovascular:      Rate and Rhythm: Normal rate and regular rhythm.      Pulses: Normal pulses.      Heart sounds: Normal heart sounds.   Pulmonary:      Effort: Pulmonary effort is normal.      Breath sounds: Normal breath sounds.   Abdominal:      General: Abdomen is flat. Bowel sounds are normal.      Tenderness: There is no abdominal tenderness. There is no guarding.   Musculoskeletal:      Right lower leg: No edema.      Left lower leg: No edema.   Skin:     General: Skin is warm and dry.      Capillary Refill: Capillary refill takes less than 2 seconds.      Coloration: Skin is not jaundiced.   Neurological:      General: No focal deficit present.      Mental Status: He is alert and oriented to person, place, and time.   Psychiatric:         Mood and Affect: Mood normal.         Behavior: Behavior normal.                Significant Labs: All pertinent labs within the  past 24 hours have been reviewed.  CBC:   Recent Labs   Lab 06/18/25  1354   WBC 10.48   HGB 15.0   HCT 48.2   *     CMP:   Recent Labs   Lab 06/18/25  1355      K 3.8      CO2 18*   *   BUN 13   CREATININE 1.2   CALCIUM 9.0   ANIONGAP 17*     POCT Glucose:   Recent Labs   Lab 06/18/25  0605   POCTGLUCOSE 164*       Significant Imaging: I have reviewed all pertinent imaging results/findings within the past 24 hours.

## 2025-06-18 NOTE — OP NOTE
Date of Procedure: 06/18/2025     Preoperative Diagnosis:  Left renal mass    Postoperative Diagnosis:  Left renal mass    Primary Surgeon: YULIA Valladares MD    Anesthesia:  General    Procedure Performed:  Robotic assisted laparoscopic converted to open left partial nephrectomy, intraoperative ultrasound    Estimated Blood Loss:  300 mL    Drains:  16 [place]ian Montgomery catheter, 19 Belarusian round Leno drain    Specimens Removed:  Left renal mass    Complications:  Unable to progress further due to previous colon surgery as well as large amount of adipose tissue surrounding kidney.  Converted to open for better kidney mobilization and renal tumor identification.    Indications: Rosendo Thomas is a 79-year-old male with a history of a left-sided renal mass.  He elected to undergo left partial nephrectomy.    Procedure in Detail:    Rosendo Thomas was taken to the operating room where he was positively identified by armband.  He has placed supine the operating room table.  Following induction of adequate general anesthesia he was placed in the lateral position with the left side up.  His pressure points were padded and he was secured to the bed.      His abdomen and flank were then prepped and draped in usual sterile fashion.      A preoperative time-out was performed as well as confirmation of preoperative antibiotics and preoperative marking left side.      I then passed a Veress needle into the abdomen in the mid costal line just inferior to the costal margin.  Water drop test and aspiration test were performed to confirm proper placement within the abdomen.  The abdomen was then insufflated with warmed carbon dioxide gas to 15 atmospheres.      I then made incisions in the mid costal line 1 handbreadth apart.  A total of 4 incisions were performed.  The 8 mm robotic ports were then passed using the visual obturator into the abdomen.      He had 2 additional ports placed triangulated between the 1. And 2 ports  and the 2. And for ports.    The robot was then docked.      I then placed fenestrated bipolar forceps in arm 1 monopolar scissors in arm 3 and Cadiere forceps in arm 4.    I then incised the lateral attachments of the colon just medial to the white line of Toldt.  I dissected the plane between the colonic attachments and Gerota's fascia.  I carried this dissection up to the level of the splenic flexure.      The colon was then reflected medially.  Hemostasis was deemed adequate.      I then was able to identify the ureter as well as the gonadal vein.  I was able to identify the psoas muscle.  I reflected the kidney anteriorly and dissected along the gonadal vein up to the renal hilum.      I then was able to expose the left renal vein as well as both renal arteries.      I then turned my attention to the tumor.      12.5 g of mannitol was administered.      He had a large amount of perinephric fat making it difficult to visualize the tumor.      I used the intraoperative ultrasound to finally identify the location of the tumor.      I dissected down to the parenchyma.  I was then able to adequately mobilize the Gerota's fat away from the tumor to adequately get good visualization mobilization of the tumor.  I very proximally 1 hour of working on getting better access to the tumor I decided to convert to open.      The open instruments were opened encountered.      I then made a subcostal incision using electrocautery.  I dissected through the external and internal oblique fascia as well as the transversalis fascia.      Once into the abdomen a Bookwalter retractor was installed.      I was then able to visualize the tumor and better dissect the Gerota's fat away from the tumor and once I was circumferentially around the tumor I was satisfied with the step.      I then turned my attention back to the hilum.  Visualization was difficult in an open fashion but I was able to identify the vein and 2 arteries.      I  then passed a bulldog clamp across the artery.      After marking the time I then circumferentially dissected around the tumor and dissected between the tumor and the parenchyma.  The tumor bed appeared free of tumor.      I then placed a 3-0 V lock suture through the deep portions of the tumor bed.  I then reapproximated the renorrhaphy with a 2nd 3-0 Vicryl V lock suture.    I then removed the clamp clamp time was 20 minutes.      I then inspected the renorrhaphy.  Hemostasis was deemed adequate.      I then reapproximated Gerota's fat over the extraction site.      Hemostasis continued to be remain adequate.  I decided to leave a drain since I did have to convert to open.      A 19 round Leno drain was then passed through the number 3 port without difficulty into the abdomen.      The drain was secured to the skin with 3-0 nylon suture.      The fascia was then reapproximated with 1. PDS suture in a running fashion in 2 layers.    Exparel solution was injected into all incisions.      The skin was closed with surgical staples.  The drain was placed to gravity drainage.      Sponge counts needle counts instrument counts were reported correct.      His anesthesia was reversed he was taken to the recovery room in stable condition.

## 2025-06-19 LAB
ABSOLUTE EOSINOPHIL (OHS): 0 K/UL
ABSOLUTE MONOCYTE (OHS): 0.83 K/UL (ref 0.3–1)
ABSOLUTE NEUTROPHIL COUNT (OHS): 6.53 K/UL (ref 1.8–7.7)
ANION GAP (OHS): 7 MMOL/L (ref 8–16)
BASOPHILS # BLD AUTO: 0.01 K/UL
BASOPHILS NFR BLD AUTO: 0.1 %
BUN SERPL-MCNC: 12 MG/DL (ref 8–23)
CALCIUM SERPL-MCNC: 8.4 MG/DL (ref 8.7–10.5)
CHLORIDE SERPL-SCNC: 106 MMOL/L (ref 95–110)
CO2 SERPL-SCNC: 25 MMOL/L (ref 23–29)
CREAT SERPL-MCNC: 0.8 MG/DL (ref 0.5–1.4)
ERYTHROCYTE [DISTWIDTH] IN BLOOD BY AUTOMATED COUNT: 16.6 % (ref 11.5–14.5)
GFR SERPLBLD CREATININE-BSD FMLA CKD-EPI: >60 ML/MIN/1.73/M2
GLUCOSE SERPL-MCNC: 134 MG/DL (ref 70–110)
HCT VFR BLD AUTO: 41.9 % (ref 40–54)
HGB BLD-MCNC: 13.2 GM/DL (ref 14–18)
IMM GRANULOCYTES # BLD AUTO: 0.02 K/UL (ref 0–0.04)
IMM GRANULOCYTES NFR BLD AUTO: 0.2 % (ref 0–0.5)
LYMPHOCYTES # BLD AUTO: 0.63 K/UL (ref 1–4.8)
MCH RBC QN AUTO: 28.2 PG (ref 27–31)
MCHC RBC AUTO-ENTMCNC: 31.5 G/DL (ref 32–36)
MCV RBC AUTO: 90 FL (ref 82–98)
NUCLEATED RBC (/100WBC) (OHS): 0 /100 WBC
PLATELET # BLD AUTO: 103 K/UL (ref 150–450)
PMV BLD AUTO: 11.3 FL (ref 9.2–12.9)
POCT GLUCOSE: 134 MG/DL (ref 70–110)
POTASSIUM SERPL-SCNC: 4.3 MMOL/L (ref 3.5–5.1)
RBC # BLD AUTO: 4.68 M/UL (ref 4.6–6.2)
RELATIVE EOSINOPHIL (OHS): 0 %
RELATIVE LYMPHOCYTE (OHS): 7.9 % (ref 18–48)
RELATIVE MONOCYTE (OHS): 10.3 % (ref 4–15)
RELATIVE NEUTROPHIL (OHS): 81.5 % (ref 38–73)
SODIUM SERPL-SCNC: 138 MMOL/L (ref 136–145)
WBC # BLD AUTO: 8.02 K/UL (ref 3.9–12.7)

## 2025-06-19 PROCEDURE — 36415 COLL VENOUS BLD VENIPUNCTURE: CPT | Performed by: UROLOGY

## 2025-06-19 PROCEDURE — 97116 GAIT TRAINING THERAPY: CPT

## 2025-06-19 PROCEDURE — 11000001 HC ACUTE MED/SURG PRIVATE ROOM

## 2025-06-19 PROCEDURE — 25000003 PHARM REV CODE 250: Performed by: UROLOGY

## 2025-06-19 PROCEDURE — 63600175 PHARM REV CODE 636 W HCPCS

## 2025-06-19 PROCEDURE — 94799 UNLISTED PULMONARY SVC/PX: CPT

## 2025-06-19 PROCEDURE — 63600175 PHARM REV CODE 636 W HCPCS: Performed by: UROLOGY

## 2025-06-19 PROCEDURE — 97161 PT EVAL LOW COMPLEX 20 MIN: CPT

## 2025-06-19 PROCEDURE — 97165 OT EVAL LOW COMPLEX 30 MIN: CPT

## 2025-06-19 PROCEDURE — 97535 SELF CARE MNGMENT TRAINING: CPT

## 2025-06-19 PROCEDURE — 25000003 PHARM REV CODE 250

## 2025-06-19 PROCEDURE — 25000003 PHARM REV CODE 250: Performed by: HOSPITALIST

## 2025-06-19 PROCEDURE — 51798 US URINE CAPACITY MEASURE: CPT

## 2025-06-19 PROCEDURE — 94761 N-INVAS EAR/PLS OXIMETRY MLT: CPT

## 2025-06-19 PROCEDURE — 99900031 HC PATIENT EDUCATION (STAT)

## 2025-06-19 PROCEDURE — 99024 POSTOP FOLLOW-UP VISIT: CPT | Mod: POP,,, | Performed by: UROLOGY

## 2025-06-19 PROCEDURE — 80048 BASIC METABOLIC PNL TOTAL CA: CPT | Performed by: UROLOGY

## 2025-06-19 PROCEDURE — 99900035 HC TECH TIME PER 15 MIN (STAT)

## 2025-06-19 PROCEDURE — 85025 COMPLETE CBC W/AUTO DIFF WBC: CPT | Performed by: UROLOGY

## 2025-06-19 RX ORDER — MUPIROCIN 20 MG/G
OINTMENT TOPICAL 2 TIMES DAILY
Status: DISCONTINUED | OUTPATIENT
Start: 2025-06-19 | End: 2025-06-20 | Stop reason: HOSPADM

## 2025-06-19 RX ORDER — AMLODIPINE BESYLATE 5 MG/1
10 TABLET ORAL DAILY
Status: DISCONTINUED | OUTPATIENT
Start: 2025-06-19 | End: 2025-06-20

## 2025-06-19 RX ADMIN — ATORVASTATIN CALCIUM 20 MG: 10 TABLET, FILM COATED ORAL at 09:06

## 2025-06-19 RX ADMIN — DOCUSATE SODIUM 100 MG: 100 CAPSULE, LIQUID FILLED ORAL at 09:06

## 2025-06-19 RX ADMIN — FLECAINIDE ACETATE 50 MG: 50 TABLET ORAL at 09:06

## 2025-06-19 RX ADMIN — MUPIROCIN: 20 OINTMENT TOPICAL at 09:06

## 2025-06-19 RX ADMIN — AMLODIPINE BESYLATE 10 MG: 5 TABLET ORAL at 09:06

## 2025-06-19 RX ADMIN — CEFAZOLIN 2 G: 2 INJECTION, POWDER, FOR SOLUTION INTRAMUSCULAR; INTRAVENOUS at 05:06

## 2025-06-19 RX ADMIN — ACETAMINOPHEN 1000 MG: 500 TABLET ORAL at 05:06

## 2025-06-19 RX ADMIN — ACETAMINOPHEN 1000 MG: 500 TABLET ORAL at 12:06

## 2025-06-19 RX ADMIN — INSULIN GLARGINE 15 UNITS: 100 INJECTION, SOLUTION SUBCUTANEOUS at 09:06

## 2025-06-19 RX ADMIN — INSULIN ASPART 5 UNITS: 100 INJECTION, SOLUTION INTRAVENOUS; SUBCUTANEOUS at 07:06

## 2025-06-19 RX ADMIN — INSULIN ASPART 5 UNITS: 100 INJECTION, SOLUTION INTRAVENOUS; SUBCUTANEOUS at 12:06

## 2025-06-19 RX ADMIN — THERA TABS 1 TABLET: TAB at 09:06

## 2025-06-19 RX ADMIN — DIPHENHYDRAMINE HYDROCHLORIDE 12.5 MG: 50 INJECTION INTRAMUSCULAR; INTRAVENOUS at 05:06

## 2025-06-19 RX ADMIN — FLUTICASONE PROPIONATE 50 MCG: 50 SPRAY, METERED NASAL at 09:06

## 2025-06-19 RX ADMIN — METOPROLOL SUCCINATE 12.5 MG: 25 TABLET, EXTENDED RELEASE ORAL at 09:06

## 2025-06-19 RX ADMIN — CEFAZOLIN 2 G: 2 INJECTION, POWDER, FOR SOLUTION INTRAMUSCULAR; INTRAVENOUS at 12:06

## 2025-06-19 RX ADMIN — INSULIN ASPART 5 UNITS: 100 INJECTION, SOLUTION INTRAVENOUS; SUBCUTANEOUS at 05:06

## 2025-06-19 RX ADMIN — OXYCODONE HYDROCHLORIDE 5 MG: 5 TABLET ORAL at 09:06

## 2025-06-19 RX ADMIN — FAMOTIDINE 20 MG: 20 TABLET, FILM COATED ORAL at 09:06

## 2025-06-19 NOTE — PROGRESS NOTES
[facility]  [place]  Progress Note    Patient Name: Rosenod Thomas  MRN: [id number]  Admission Date: 6/18/2025  Hospital Length of Stay: 1 days  Code Status: Full Code   Attending Provider: Milad Valladares, *   Primary Care Physician: Dede Eric MD    Subjective:     HPI:  No notes on file    Interval History: no acute events overnight.  Tolerated clears.  Pain controlled.  Has not ambulated.    Review of Systems   Constitutional:  Negative for fever.   Respiratory:  Negative for chest tightness and wheezing.    Cardiovascular:  Negative for chest pain and palpitations.   Gastrointestinal:  Negative for abdominal pain, diarrhea, nausea and vomiting.   Genitourinary:  Positive for flank pain. Negative for difficulty urinating, dysuria and hematuria.   Musculoskeletal:  Negative for arthralgias.   Neurological:  Negative for dizziness.   Psychiatric/Behavioral:  Negative for confusion.      Objective:     Temp:  [97.7 °F (36.5 °C)-98.2 °F (36.8 °C)] 97.9 °F (36.6 °C)  Pulse:  [52-94] 56  Resp:  [10-35] 10  SpO2:  [91 %-100 %] 100 %  BP: (121-198)/(59-95) 140/64     Body mass index is 30.94 kg/m².           Drains       Drain  Duration                  Closed/Suction Drain 06/18/25 1209 Lateral LUQ Bulb 19 Fr. <1 day         Urethral Catheter 06/18/25 0730 Double-lumen;Silicone 16 Fr. <1 day                     Physical Exam  Vitals and nursing note reviewed.   Constitutional:       Appearance: He is well-developed.   HENT:      Head: Normocephalic.   Eyes:      Conjunctiva/sclera: Conjunctivae normal.   Neck:      Thyroid: No thyromegaly.      Trachea: No tracheal deviation.   Cardiovascular:      Rate and Rhythm: Normal rate.      Heart sounds: Normal heart sounds.   Pulmonary:      Effort: Pulmonary effort is normal. No respiratory distress.      Breath sounds: Normal breath sounds. No wheezing.   Abdominal:      General: Bowel sounds are normal.      Palpations: Abdomen is soft.       "Tenderness: There is no abdominal tenderness. There is no rebound.      Hernia: No hernia is present.      Comments: Dressings dry  Soft  PORTER SS   Musculoskeletal:         General: No tenderness. Normal range of motion.      Cervical back: Normal range of motion and neck supple.   Lymphadenopathy:      Cervical: No cervical adenopathy.   Skin:     General: Skin is warm and dry.      Findings: No erythema or rash.   Neurological:      Mental Status: He is alert and oriented to person, place, and time.   Psychiatric:         Behavior: Behavior normal.         Thought Content: Thought content normal.         Judgment: Judgment normal.           Significant Labs:    BMP:  Recent Labs   Lab 06/12/25  1029 06/18/25  1355 06/19/25  0355    139 138   K 4.2 3.8 4.3    104 106   CO2 26 18* 25   BUN 15 13 12   CREATININE 1.0 1.2 0.8   CALCIUM 9.0 9.0 8.4*       CBC:   Recent Labs   Lab 06/12/25  1029 06/18/25  1354 06/19/25  0355   WBC 5.38 10.48 8.02   HGB 14.8 15.0 13.2*   HCT 46.6 48.2 41.9   * 116* 103*       Blood Culture: No results for input(s): "LABBLOO" in the last 168 hours.  Urine Culture:   Recent Labs   Lab 06/12/25  1038   LABURIN No Significant Growth       Significant Imaging:                    Assessment/Plan:     * Left renal mass  S/p Left partial nephrectomy Robotic to Open 6/18  D/C Montgomery  Advance diet  Ambulate  Med/Surg  Likely home Friday      Essential hypertension, benign  Hospital medicine consulted  Please Keep SBP <140    Type 2 diabetes mellitus  Sliding scale        VTE Risk Mitigation (From admission, onward)           Ordered     IP VTE HIGH RISK PATIENT  Once         06/18/25 1518     Place sequential compression device  Until discontinued         06/18/25 1518                    Milad Valladares MD  Urology  [facility] [place] - Intensive Care  "

## 2025-06-19 NOTE — SUBJECTIVE & OBJECTIVE
Interval History: no acute events overnight.  Tolerated clears.  Pain controlled.  Has not ambulated.    Review of Systems   Constitutional:  Negative for fever.   Respiratory:  Negative for chest tightness and wheezing.    Cardiovascular:  Negative for chest pain and palpitations.   Gastrointestinal:  Negative for abdominal pain, diarrhea, nausea and vomiting.   Genitourinary:  Positive for flank pain. Negative for difficulty urinating, dysuria and hematuria.   Musculoskeletal:  Negative for arthralgias.   Neurological:  Negative for dizziness.   Psychiatric/Behavioral:  Negative for confusion.      Objective:     Temp:  [97.7 °F (36.5 °C)-98.2 °F (36.8 °C)] 97.9 °F (36.6 °C)  Pulse:  [52-94] 56  Resp:  [10-35] 10  SpO2:  [91 %-100 %] 100 %  BP: (121-198)/(59-95) 140/64     Body mass index is 30.94 kg/m².           Drains       Drain  Duration                  Closed/Suction Drain 06/18/25 1209 Lateral LUQ Bulb 19 Fr. <1 day         Urethral Catheter 06/18/25 0730 Double-lumen;Silicone 16 Fr. <1 day                     Physical Exam  Vitals and nursing note reviewed.   Constitutional:       Appearance: He is well-developed.   HENT:      Head: Normocephalic.   Eyes:      Conjunctiva/sclera: Conjunctivae normal.   Neck:      Thyroid: No thyromegaly.      Trachea: No tracheal deviation.   Cardiovascular:      Rate and Rhythm: Normal rate.      Heart sounds: Normal heart sounds.   Pulmonary:      Effort: Pulmonary effort is normal. No respiratory distress.      Breath sounds: Normal breath sounds. No wheezing.   Abdominal:      General: Bowel sounds are normal.      Palpations: Abdomen is soft.      Tenderness: There is no abdominal tenderness. There is no rebound.      Hernia: No hernia is present.      Comments: Dressings dry  Soft  PORTER SS   Musculoskeletal:         General: No tenderness. Normal range of motion.      Cervical back: Normal range of motion and neck supple.   Lymphadenopathy:      Cervical: No cervical  "adenopathy.   Skin:     General: Skin is warm and dry.      Findings: No erythema or rash.   Neurological:      Mental Status: He is alert and oriented to person, place, and time.   Psychiatric:         Behavior: Behavior normal.         Thought Content: Thought content normal.         Judgment: Judgment normal.           Significant Labs:    BMP:  Recent Labs   Lab 06/12/25  1029 06/18/25  1355 06/19/25  0355    139 138   K 4.2 3.8 4.3    104 106   CO2 26 18* 25   BUN 15 13 12   CREATININE 1.0 1.2 0.8   CALCIUM 9.0 9.0 8.4*       CBC:   Recent Labs   Lab 06/12/25  1029 06/18/25  1354 06/19/25  0355   WBC 5.38 10.48 8.02   HGB 14.8 15.0 13.2*   HCT 46.6 48.2 41.9   * 116* 103*       Blood Culture: No results for input(s): "LABBLOO" in the last 168 hours.  Urine Culture:   Recent Labs   Lab 06/12/25  1038   LABURIN No Significant Growth       Significant Imaging:                  "

## 2025-06-19 NOTE — PT/OT/SLP EVAL
Physical Therapy Evaluation    Patient Name:  Rosendo Thomas   MRN:  [id number]    Recommendations:     Discharge Recommendations: Low Intensity Therapy   Discharge Equipment Recommendations: none   Barriers to discharge: None    Assessment:     Rosendo Thomas is a 79 y.o. male admitted with a medical diagnosis of Left renal mass.  He presents with the following impairments/functional limitations: weakness, impaired endurance, pain. Pt very pleasant and cooperative, motivated to work with PT. Daughter present and supportive.     Rehab Prognosis: Good; patient would benefit from acute skilled PT services to address these deficits and reach maximum level of function.    Recent Surgery: Procedure(s) (LRB):  ROBOTIC NEPHRECTOMY, PARTIAL ATTEMPTED (Left)  NEPHRECTOMY, PARTIAL (Left) 1 Day Post-Op    Plan:     During this hospitalization, patient to be seen 3 x/week to address the identified rehab impairments via gait training, therapeutic activities, therapeutic exercises, neuromuscular re-education and progress toward the following goals:    Plan of Care Expires:  06/26/25    Subjective     Chief Complaint: L abdominal pain especially with bed mobility  Patient/Family Comments/goals: To get stronger  Pain/Comfort:  Pain Rating 1: 6/10  Location - Side 1: Left  Location 1: abdomen  Pain Addressed 1: Pre-medicate for activity, Reposition, Distraction  Pain Rating Post-Intervention 1: 6/10    Patients cultural, spiritual, Episcopal conflicts given the current situation: no    Living Environment:  Pt lives alone in a single story home with threshold to enter.   Prior to admission, patients level of function was independent.  Equipment used at home: shower chair.  DME owned (not currently used): none.  Upon discharge, patient will have assistance from family.    Objective:     Communicated with RN prior to session.  Patient found up in chair with PORTER drain, telemetry, peripheral IV, pulse ox (continuous), blood pressure  cuff  upon PT entry to room.    General Precautions: Standard, fall  Orthopedic Precautions:N/A   Braces: N/A  Respiratory Status: Room air    Exams:  Cognitive Exam:  Patient is oriented to Person, Place, Time, and Situation  RLE Strength: WFL  LLE Strength: WFL    Functional Mobility:  Bed Mobility:     Supine <> Sit: NT, pt in chair at beginning/end of session   Transfers:     Sit to Stand:  contact guard assistance with rolling walker  Gait: 100' x 1 with RW and CGA, no deviations noted   Balance: Good with RW, no LOBs or falls       AM-PAC 6 CLICK MOBILITY  Total Score:18       Treatment & Education:  Educated on purpose of PT and POC.     Patient left up in chair with all lines intact, call button in reach, RN notified, and daughter present.    GOALS:   Multidisciplinary Problems       Physical Therapy Goals          Problem: Physical Therapy    Goal Priority Disciplines Outcome Interventions   Physical Therapy Goal     PT, PT/OT Progressing    Description: Goals to be met by: 2025     Patient will increase functional independence with mobility by performin. Supine to sit with [place]  2. Sit to supine with [place]  3. Sit to stand transfer with [place]  4. Bed to chair transfer with [place] using No Assistive Device  5. Gait  x 150 feet with [place] using No Assistive Device.     Pt low intensity at this time, ambulatory with RW with no significant deficits. Will continue to follow while in house.                          DME Justifications:  No DME recommended requiring DME justifications    History:     Past Medical History:   Diagnosis Date    Colon cancer     Colon polyps     Diabetes mellitus type II 2010    diet controlled    Obesity     Paroxysmal atrial fibrillation     Psoriasis     Sleep apnea, unspecified     Squamous cell carcinoma     Type 2 diabetes mellitus        Past Surgical History:   Procedure Laterality Date    APPENDECTOMY      CARDIOVERSION N/A  2/18/2025    Procedure: Cardioversion;  Surgeon: Abbe Whitehead MD;  Location: [facility] CATH LAB;  Service: Cardiology;  Laterality: N/A;  before 1230pm    CARDIOVERSION N/A 4/22/2025    Procedure: Cardioversion;  Surgeon: Abbe Whitehead MD;  Location: [facility] CATH LAB;  Service: Cardiology;  Laterality: N/A;    COLONOSCOPY N/A 07/09/2018    Procedure: COLONOSCOPY;  Surgeon: Kameron Ruff MD;  Location: [facility] ENDO;  Service: Endoscopy;  Laterality: N/A;  confirmed    COLONOSCOPY N/A 11/11/2019    Procedure: COLONOSCOPY;  Surgeon: Victor Hugo Nunze MD;  Location: [facility] ENDO (4TH FLR);  Service: Endoscopy;  Laterality: N/A;  Pt stated this is the only day he has somebody to drive him and  to stay during procedure  PM Prep    COLONOSCOPY N/A 07/08/2020    Procedure: COLONOSCOPY;  Surgeon: Victor Hugo Nunez MD;  Location: [facility] (2ND FLR);  Service: Endoscopy;  Laterality: N/A;  Covid-19 test 7/5/20 at [facility]    EXTREMITY CYST EXCISION      LAPAROSCOPIC LEFT COLECTOMY N/A 09/25/2018    Procedure: COLECTOMY-LAPAROSCOPIC LEFT;  Surgeon: Chito Banks MD;  Location: [facility] OR 2ND FLR;  Service: Colon and Rectal;  Laterality: N/A;    SKIN CANCER EXCISION      SKIN GRAFT      TRANSESOPHAGEAL ECHOCARDIOGRAM WITH POSSIBLE CARDIOVERSION (LUDIN W/ POSS CARDIOVERSION) N/A 2/18/2025    Procedure: TRANSESOPHAGEAL ECHOCARDIOGRAM WITH POSSIBLE CARDIOVERSION (LUDIN W/ POSS CARDIOVERSION);  Surgeon: Abbe Whitehead MD;  Location: [facility] CATH LAB;  Service: Cardiology;  Laterality: N/A;  before 1230pm  RN PRE OP 2/14/2025    TREATMENT OF CARDIAC ARRHYTHMIA N/A 4/22/2025    Procedure: Cardioversion/Defibrillation;  Surgeon: Abbe Whitehead MD;  Location: [facility] CATH LAB;  Service: Cardiology;  Laterality: N/A;  RN PRE OP 4/16/2025    VASECTOMY      VASECTOMY         Time Tracking:     PT Received On: 06/19/25  PT Start Time: 0935     PT Stop Time: 0958  PT Total Time (min): 23 min     Billable  Minutes: Evaluation 13 min and Gait Training 10 min  Co-eval with OT    06/19/2025

## 2025-06-19 NOTE — ASSESSMENT & PLAN NOTE
Patient has persistent (7 days or more) atrial fibrillation. Patient is currently in sinus rhythm. [id number] Score: 3. The patients heart rate in the last 24 hours is as follows:  Pulse  Min: 52  Max: 94     Antiarrhythmics  flecainide tablet 50 mg, Every 12 hours, Oral  metoprolol succinate (TOPROL-XL) 24 hr split tablet 12.5 mg, Daily, Oral    Anticoagulants       Plan  - Replete lytes with a goal of K>4, Mg >2  - Patient is not anticoagulated due to being post-partial nephrectomy; will defer to Primary on exact timing of resumption but his HR and rhythm are well-controlled at the moment.

## 2025-06-19 NOTE — ASSESSMENT & PLAN NOTE
Last A1c reviewed-   Lab Results   Component Value Date    HGBA1C 8.7 (H) 05/05/2025     Home antihyperglycemic regimen: Mounjaro 2.5, glipizide 5    Recent Labs     06/18/25  0605 06/18/25 2053   POCTGLUCOSE 164* 212*     Most recent inpatient antihyperglycemic regimen:   Antihyperglycemics (From admission, onward)      Start     Stop Route Frequency Ordered    06/19/25 0715  insulin aspart U-100 pen 5 Units         -- SubQ 3 times daily with meals 06/18/25 1647    06/18/25 2100  insulin glargine U-100 (Lantus) pen 15 Units         -- SubQ Nightly 06/18/25 1647    06/18/25 1747  insulin aspart U-100 pen 0-5 Units         -- SubQ Every 4 hours PRN 06/18/25 1647          Goal blood glucose range while admitted: 140-180 per the NICE-SUGAR trial and American Diabetes Association guidelines  Diabetic diet 2000 antwan after cleared to advance from [facility]  Diabetic counseling and education (eg nutrition, insulin) to be given prior to discharge since A1c is too high     16
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
## 2025-06-19 NOTE — ASSESSMENT & PLAN NOTE
Per primary.  Giving an extra dose of Dilaudid 0.5 since /100 and he reports 6/10 pain after Oxy 10.    consulted PT,OT,patient say her pain is improved,had BM today.

## 2025-06-19 NOTE — ASSESSMENT & PLAN NOTE
Patient's blood pressure range in the last 24 hours was: BP  Min: 115/57  Max: 198/88.The patient's inpatient anti-hypertensive regimen is listed below:  Current Antihypertensives  metoprolol succinate (TOPROL-XL) 24 hr split tablet 12.5 mg, Daily, Oral  hydrALAZINE injection 10 mg, Every 6 hours PRN, Intravenous  amLODIPine tablet 10 mg, Daily, Oral    BP is controlled, no changes needed to their regimen  Adequate pain & anxiety control as well as good sleep will be just as if not more important than starting new antihypertensives. Will not try to aggressively lower his BP while admitted: he has multiple valid reasons for being acutely hypertensive, and his recent outpatient readings have all been WNL.  Would not want to limit his body's ability to respond to hypovolemia should he develop an acute hemorrhage, for instance.  Starting amlodipine 5 since his BP is so high at the moment, and will titrate up throughout the night as needed.  But do not anticipate discharging him on any new meds given his recent normotension as an outpatient.  Delirium precautions  Elevated blood pressure on home medications is much improved,DC IVF,.

## 2025-06-19 NOTE — NURSING
[facility]  Nurses Note -- 4 Eyes      6/18/2025       Skin assessed on: Q Shift      [x] No Pressure Injuries Present    []Prevention Measures Documented    [] Yes LDA  for Pressure Injury Previously documented     [] Yes New Pressure Injury Discovered   [] LDA for New Pressure Injury Added      Attending RN:  Natalia Vera RN     Second RN:  Lavell Villegas RN

## 2025-06-19 NOTE — PLAN OF CARE
Case Management Assessment - [facility] [place], [place]    PCP: Dede Eric MD    Pharmacy:   [id number] - [place], [place] - [address]  [address] [address]  Phone: [phone] Fax: [phone]        Patient Arrived From: Home  Existing Help at Home: Daughter    Barriers to Discharge: None    Discharge Plan:    A. Home with Family   B.     CM met with patient and patients danny Soria at bedside to discuss discharge planning. Patient is independent and live alone. Patient does not use any DME or have any HH/CM services. Patient daughter Estella will provide needed support and transportation hime upon discharge. CM will continue to follow patient discharge needs.      06/19/25 1211   Discharge Assessment   Assessment Type Discharge Planning Assessment   Confirmed/corrected address, phone number and insurance Yes   Confirmed Demographics Correct on Facesheet   Source of Information patient;family   People in Home alone   Do you expect to return to your current living situation? Yes   Do you have help at home or someone to help you manage your care at home? Yes   Who are your caregiver(s) and their phone number(s)? Danny Soria [phone]   Prior to hospitilization cognitive status: Alert/Oriented   Current cognitive status: Alert/Oriented   Walking or Climbing Stairs Difficulty no   Dressing/Bathing Difficulty no   Equipment Currently Used at Home none   Readmission within 30 days? No   Patient currently being followed by outpatient case management? No   Do you currently have service(s) that help you manage your care at home? No   Do you take prescription medications? Yes   Do you have prescription coverage? Yes   Do you have any problems affording any of your prescribed medications? No   Is the patient taking medications as prescribed? yes   Who is going to help you get home at discharge? Danny Soria [phone]   How do you get to doctors appointments?  family or friend will provide   Are you on dialysis? No   Do you take coumadin? No   Discharge Plan A Home with family   DME Needed Upon Discharge    (TBD)   Transition of Care Barriers None   Housing Stability   In the last 12 months, was there a time when you were not able to pay the mortgage or rent on time? N   At any time in the past 12 months, were you homeless or living in a shelter (including now)? N   Transportation Needs   In the past 12 months, has lack of transportation kept you from medical appointments or from getting medications? no   In the past 12 months, has lack of transportation kept you from meetings, work, or from getting things needed for daily living? No   Alcohol Use   Q2: How many drinks containing alcohol do you have on a typical day when you are drinking? None

## 2025-06-19 NOTE — SUBJECTIVE & OBJECTIVE
Interval History: Elevated blood pressure on home medications is much improved,DC IVF,consulted PT,OT,patient say her pain is improved,had BM today.    Review of Systems   Constitutional:  Positive for activity change and appetite change.   Respiratory:  Negative for apnea and chest tightness.    Musculoskeletal:  Positive for arthralgias.   Neurological:  Positive for weakness.     Objective:     Vital Signs (Most Recent):  Temp: 97.7 °F (36.5 °C) (06/19/25 0800)  Pulse: 62 (06/19/25 0836)  Resp: 19 (06/19/25 0927)  BP: (!) 115/57 (06/19/25 0800)  SpO2: (!) 94 % (06/19/25 0836) Vital Signs (24h Range):  Temp:  [97.7 °F (36.5 °C)-98.2 °F (36.8 °C)] 97.7 °F (36.5 °C)  Pulse:  [52-94] 62  Resp:  [10-35] 19  SpO2:  [91 %-100 %] 94 %  BP: (115-198)/(57-95) 115/57     Weight: 112.3 kg (247 lb 9.2 oz)  Body mass index is 30.94 kg/m².    Intake/Output Summary (Last 24 hours) at 6/19/2025 1055  Last data filed at 6/19/2025 0800  Gross per 24 hour   Intake 3942.07 ml   Output 3755 ml   Net 187.07 ml         Physical Exam  Cardiovascular:      Rate and Rhythm: Normal rate.      Heart sounds: No murmur heard.  Abdominal:      General: There is no distension.      Tenderness: There is no abdominal tenderness.   Musculoskeletal:         General: No swelling or deformity.   Skin:     Coloration: Skin is not jaundiced.   Neurological:      Mental Status: He is alert.               Significant Labs: All pertinent labs within the past 24 hours have been reviewed.  BMP:   Recent Labs   Lab 06/19/25  0355   *      K 4.3      CO2 25   BUN 12   CREATININE 0.8   CALCIUM 8.4*     CBC:   Recent Labs   Lab 06/18/25  1354 06/19/25  0355   WBC 10.48 8.02   HGB 15.0 13.2*   HCT 48.2 41.9   * 103*     CMP:   Recent Labs   Lab 06/18/25  1355 06/19/25  0355    138   K 3.8 4.3    106   CO2 18* 25   * 134*   BUN 13 12   CREATININE 1.2 0.8   CALCIUM 9.0 8.4*   ANIONGAP 17* 7*       Significant Imaging: I  have reviewed all pertinent imaging results/findings within the past 24 hours.

## 2025-06-19 NOTE — PLAN OF CARE
Problem: Physical Therapy  Goal: Physical Therapy Goal  Description: Goals to be met by: 2025     Patient will increase functional independence with mobility by performin. Supine to sit with [place]  2. Sit to supine with [place]  3. Sit to stand transfer with [place]  4. Bed to chair transfer with [place] using No Assistive Device  5. Gait  x 150 feet with [place] using No Assistive Device.     Pt low intensity at this time, ambulatory with RW with no significant deficits. Will continue to follow while in house.     Outcome: Progressing

## 2025-06-19 NOTE — HOSPITAL COURSE
79 y.o. male with BMI 31, NIDDM2, HTN, recently-diagnosed AFib on Flecainide, and Hx of Colon CA who is being treated by [facility] for a L renal mass.  It was found incidentally on CT A/P during a late March hospitalization for CP and HTN: 3 cm, heterogenous, c/f RCC.  Dr. Valladares surgically resected the tumor via open partial L nephrectomy earlier today 6/18  (was planned for laparoscopic but tumor was too obscured by fat and previous colon surgery so had to convert to open).  PACU stay was unremarkable, and he was brought to the ICU.  Hospital Medicine has been consulted for medical co-management.  Elevated blood pressure on home medications is much improved,DC IVF,consulted PT,OT,patient say her pain is improved,had BM ,  Send Norvasc to pharmacy.

## 2025-06-19 NOTE — EICU
Virtual ICU Quality Rounds    Admit Date: 6/18/2025  Hospital Day: 1    ICU Day: 16h    24H Vital Sign Range:  Temp:  [97.7 °F (36.5 °C)-98.2 °F (36.8 °C)]   Pulse:  [52-94]   Resp:  [10-35]   BP: (121-198)/(59-95)   SpO2:  [91 %-100 %]     VICU Surveillance Screening  Daily review of  line necessity(optional): Urinary catheter in place  LDA reconciliation : Yes  [place] best practices : Prompt alternatives(external catheters, in/ou cath, bladder scanning)

## 2025-06-19 NOTE — EICU
Intervention Initiated From:  COR / CHRISTALU    Abdon intervened regarding:  Rounding (Video assessment)  VICU Night Rounds Checklist  24H Vital Sign Range:  Temp:  [97.7 °F (36.5 °C)-98.2 °F (36.8 °C)]   Pulse:  [52-74]   Resp:  [13-30]   BP: (164-198)/(70-95)   SpO2:  [91 %-100 %]     Video rounds

## 2025-06-19 NOTE — PROGRESS NOTES
[facility] Medicine  Progress Note    Patient Name: Rosendo Thomas  MRN: [id number]  Patient Class: IP- Inpatient   Admission Date: 6/18/2025  Length of Stay: 1 days  Attending Physician: Milad Valladares, *  Primary Care Provider: Dede Eric MD        Subjective     Principal Problem:Left renal mass        HPI:  Rosendo Thomas is a 79 y.o. male with BMI 31, NIDDM2, HTN, recently-diagnosed AFib on Flecainide, and Hx of Colon CA who is being treated by [facility] for a L renal mass.  It was found incidentally on CT A/P during a late March hospitalization for CP and HTN: 3 cm, heterogenous, c/f RCC.  Dr. Valladares surgically resected the tumor via open partial L nephrectomy earlier today 6/18  (was planned for laparoscopic but tumor was too obscured by fat and previous colon surgery so had to convert to open).  PACU stay was unremarkable, and he was brought to the ICU.  Hospital Medicine has been consulted for medical co-management.    He reports feeling some pain and burning in the area of the incision.  Also reports feeling a little anxious about being in the hospital.  Says he follows regularly with a PCP and keeps his BP generally under control.  Clinic visit notes within the past few months all indicate normotension of late.  He is compliant with his home meds, including Toprol 12.5, flecainide 50, Eliquis 5 BID, Lipitor 20, glipizide 5, Mounjaro (has lost about 50 lbs recently) and monthly Taltz for RA-induced psoriasis.  Follows w Dr. Whitehead for his AFib (diagnosed in January when he presented to [facility] with influenza), and denies recent sxs such as fatigue, light-headedness, palpitations.  Not yet hungry since arriving back from surgery.  Denies current or recent fever, chills, dyspnea, CP, abdo pain, N/V/D, known illness or sick contacts.  On eval, HR 60s with sinus rhythm noted on Tele.  /110.  Good sats on 3L LFNC.  Exam with no JVD, clear lungs, HS nl, no MONICA.       Overview/Hospital Course:  79 y.o. male with BMI 31, NIDDM2, HTN, recently-diagnosed AFib on Flecainide, and Hx of Colon CA who is being treated by [facility] for a L renal mass.  It was found incidentally on CT A/P during a late March hospitalization for CP and HTN: 3 cm, heterogenous, c/f RCC.  Dr. Valladares surgically resected the tumor via open partial L nephrectomy earlier today 6/18  (was planned for laparoscopic but tumor was too obscured by fat and previous colon surgery so had to convert to open).  PACU stay was unremarkable, and he was brought to the ICU.  Hospital Medicine has been consulted for medical co-management.  Elevated blood pressure on home medications is much improved,DC IVF,consulted PT,OT,patient say her pain is improved,had BM today.       Interval History: Elevated blood pressure on home medications is much improved,DC IVF,consulted PT,OT,patient say her pain is improved,had BM today.    Review of Systems   Constitutional:  Positive for activity change and appetite change.   Respiratory:  Negative for apnea and chest tightness.    Musculoskeletal:  Positive for arthralgias.   Neurological:  Positive for weakness.     Objective:     Vital Signs (Most Recent):  Temp: 97.7 °F (36.5 °C) (06/19/25 0800)  Pulse: 62 (06/19/25 0836)  Resp: 19 (06/19/25 0927)  BP: (!) 115/57 (06/19/25 0800)  SpO2: (!) 94 % (06/19/25 0836) Vital Signs (24h Range):  Temp:  [97.7 °F (36.5 °C)-98.2 °F (36.8 °C)] 97.7 °F (36.5 °C)  Pulse:  [52-94] 62  Resp:  [10-35] 19  SpO2:  [91 %-100 %] 94 %  BP: (115-198)/(57-95) 115/57     Weight: 112.3 kg (247 lb 9.2 oz)  Body mass index is 30.94 kg/m².    Intake/Output Summary (Last 24 hours) at 6/19/2025 1055  Last data filed at 6/19/2025 0800  Gross per 24 hour   Intake 3942.07 ml   Output 3755 ml   Net 187.07 ml         Physical Exam  Cardiovascular:      Rate and Rhythm: Normal rate.      Heart sounds: No murmur heard.  Abdominal:      General: There is no distension.       Tenderness: There is no abdominal tenderness.   Musculoskeletal:         General: No swelling or deformity.   Skin:     Coloration: Skin is not jaundiced.   Neurological:      Mental Status: He is alert.               Significant Labs: All pertinent labs within the past 24 hours have been reviewed.  BMP:   Recent Labs   Lab 06/19/25  0355   *      K 4.3      CO2 25   BUN 12   CREATININE 0.8   CALCIUM 8.4*     CBC:   Recent Labs   Lab 06/18/25  1354 06/19/25  0355   WBC 10.48 8.02   HGB 15.0 13.2*   HCT 48.2 41.9   * 103*     CMP:   Recent Labs   Lab 06/18/25  1355 06/19/25  0355    138   K 3.8 4.3    106   CO2 18* 25   * 134*   BUN 13 12   CREATININE 1.2 0.8   CALCIUM 9.0 8.4*   ANIONGAP 17* 7*       Significant Imaging: I have reviewed all pertinent imaging results/findings within the past 24 hours.      Assessment & Plan  Left renal mass  Per primary.  Giving an extra dose of Dilaudid 0.5 since /100 and he reports 6/10 pain after Oxy 10.    consulted PT,OT,patient say her pain is improved,had BM today.  Type 2 diabetes mellitus  Last A1c reviewed-   Lab Results   Component Value Date    HGBA1C 8.7 (H) 05/05/2025     Home antihyperglycemic regimen: Mounjaro 2.5, glipizide 5    Recent Labs     06/18/25  0605 06/18/25 2053   POCTGLUCOSE 164* 212*     Most recent inpatient antihyperglycemic regimen:   Antihyperglycemics (From admission, onward)      Start     Stop Route Frequency Ordered    06/19/25 0715  insulin aspart U-100 pen 5 Units         -- SubQ 3 times daily with meals 06/18/25 1647    06/18/25 2100  insulin glargine U-100 (Lantus) pen 15 Units         -- SubQ Nightly 06/18/25 1647    06/18/25 1747  insulin aspart U-100 pen 0-5 Units         -- SubQ Every 4 hours PRN 06/18/25 1647          Goal blood glucose range while admitted: 140-180 per the NICE-SUGAR trial and American Diabetes Association guidelines  Diabetic diet 2000 antwan after cleared to advance  from [facility]  Diabetic counseling and education (eg nutrition, insulin) to be given prior to discharge since A1c is too high    Essential hypertension, benign  Patient's blood pressure range in the last 24 hours was: BP  Min: 115/57  Max: 198/88.The patient's inpatient anti-hypertensive regimen is listed below:  Current Antihypertensives  metoprolol succinate (TOPROL-XL) 24 hr split tablet 12.5 mg, Daily, Oral  hydrALAZINE injection 10 mg, Every 6 hours PRN, Intravenous  amLODIPine tablet 10 mg, Daily, Oral    BP is controlled, no changes needed to their regimen  Adequate pain & anxiety control as well as good sleep will be just as if not more important than starting new antihypertensives. Will not try to aggressively lower his BP while admitted: he has multiple valid reasons for being acutely hypertensive, and his recent outpatient readings have all been WNL.  Would not want to limit his body's ability to respond to hypovolemia should he develop an acute hemorrhage, for instance.  Starting amlodipine 5 since his BP is so high at the moment, and will titrate up throughout the night as needed.  But do not anticipate discharging him on any new meds given his recent normotension as an outpatient.  Delirium precautions  Elevated blood pressure on home medications is much improved,DC IVF,.  Sinus bradycardia  Will avoid any further AV brayden blockade beyond his home Toprol.    Obesity (BMI 30.0-34.9)  Weight lose  as out patient .    Atrial fibrillation  Patient has persistent (7 days or more) atrial fibrillation. Patient is currently in sinus rhythm. [id number] Score: 3. The patients heart rate in the last 24 hours is as follows:  Pulse  Min: 52  Max: 94     Antiarrhythmics  flecainide tablet 50 mg, Every 12 hours, Oral  metoprolol succinate (TOPROL-XL) 24 hr split tablet 12.5 mg, Daily, Oral    Anticoagulants       Plan  - Replete lytes with a goal of K>4, Mg >2  - Patient is not anticoagulated due to being post-partial  nephrectomy; will defer to Primary on exact timing of resumption but his HR and rhythm are well-controlled at the moment.        VTE Risk Mitigation (From admission, onward)           Ordered     IP VTE HIGH RISK PATIENT  Once         06/18/25 1518     Place sequential compression device  Until discontinued         06/18/25 1518                    Discharge Planning   JESSICA:      Code Status: Full Code   Medical Readiness for Discharge Date:                Critical care time spent on the evaluation and treatment of severe organ dysfunction, review of pertinent labs and imaging studies, discussions with consulting providers and discussions with patient/family:  over 45  minutes.    Please place Justification for DME        Claudia Lutz MD  Department of Hospital Medicine   [facility] - Intensive Care

## 2025-06-19 NOTE — PLAN OF CARE
Problem: Adult Inpatient Plan of Care  Goal: Plan of Care Review  Outcome: Progressing  Goal: Patient-Specific Goal (Individualized)  Outcome: Progressing  Goal: Absence of Hospital-Acquired Illness or Injury  Outcome: Progressing  Goal: Optimal Comfort and Wellbeing  Outcome: Progressing      titers do not indicate a need to immunize

## 2025-06-19 NOTE — ASSESSMENT & PLAN NOTE
S/p Left partial nephrectomy Robotic to Open 6/18  D/C [place]  Advance diet  Ambulate  Med/Surg  Likely home Friday

## 2025-06-19 NOTE — PLAN OF CARE
Pt in ICU awaiting transfer to floor, 1L NC. NSR to SB. BP WDL. [place] removed. Incision noted with dried drainage, and PORTER drain to bulb suction with sanguineous drainage. Worked with PT/OT today, able to sit up in chair and walk around unit with walker. POC discussed with daughter. Call light within reach.     Problem: Adult Inpatient Plan of Care  Goal: Plan of Care Review  Outcome: Progressing  Goal: Patient-Specific Goal (Individualized)  Outcome: Progressing  Goal: Absence of Hospital-Acquired Illness or Injury  Outcome: Progressing  Goal: Optimal Comfort and Wellbeing  Outcome: Progressing  Goal: Readiness for Transition of Care  Outcome: Progressing     Problem: Diabetes Comorbidity  Goal: Blood Glucose Level Within Targeted Range  Outcome: Progressing     Problem: Fall Injury Risk  Goal: Absence of Fall and Fall-Related Injury  Outcome: Progressing     Problem: Wound  Goal: Optimal Coping  Outcome: Progressing  Goal: Optimal Functional Ability  Outcome: Progressing  Goal: Absence of Infection Signs and Symptoms  Outcome: Progressing  Goal: Improved Oral Intake  Outcome: Progressing  Goal: Optimal Pain Control and Function  Outcome: Progressing  Goal: Skin Health and Integrity  Outcome: Progressing  Goal: Optimal Wound Healing  Outcome: Progressing     Problem: Infection  Goal: Absence of Infection Signs and Symptoms  Outcome: Progressing

## 2025-06-19 NOTE — ANESTHESIA POSTPROCEDURE EVALUATION
Anesthesia Post Evaluation    Patient: Rosendo Thomas    Procedure(s) Performed: Procedure(s) (LRB):  ROBOTIC NEPHRECTOMY, PARTIAL ATTEMPTED (Left)  NEPHRECTOMY, PARTIAL (Left)    Final Anesthesia Type: general      Patient location during evaluation: PACU  Patient participation: Yes- Able to Participate  Level of consciousness: awake and alert and oriented  Post-procedure vital signs: reviewed and stable  Pain management: adequate  Airway patency: patent    PONV status at discharge: No PONV  Anesthetic complications: no      Cardiovascular status: hemodynamically stable and blood pressure returned to baseline  Respiratory status: spontaneous ventilation, room air and unassisted  Hydration status: euvolemic  Follow-up not needed.              Vitals Value Taken Time   /68 06/19/25 15:03   Temp 36.7 °C (98 °F) 06/19/25 12:00   Pulse 63 06/19/25 17:03   Resp 40 06/19/25 17:03   SpO2 93 % 06/19/25 17:03   Vitals shown include unfiled device data.      Event Time   Out of Recovery 15:10:00         Pain/Ivory Score: Pain Rating Prior to Med Admin: 3 (6/19/2025 12:30 PM)  Pain Rating Post Med Admin: 0 (6/19/2025  1:30 PM)  Ivory Score: 9 (6/18/2025  4:30 PM)

## 2025-06-19 NOTE — NURSING
[facility]  Nurses Note -- 4 Eyes      6/19/2025       Skin assessed on: Q Shift      [x] No Pressure Injuries Present    [x]Prevention Measures Documented    [] Yes LDA  for Pressure Injury Previously documented     [] Yes New Pressure Injury Discovered   [] LDA for New Pressure Injury Added      Attending RN:  Nessa Jones RN     Second RN:  MARTHA Fisher

## 2025-06-19 NOTE — PLAN OF CARE
Problem: Occupational Therapy  Goal: Occupational Therapy Goal  Description: Goals to be met by: 6/26/25     Patient will increase functional independence with ADLs by performing:    UE Dressing with [place].  LE Dressing with Modified [place].  Grooming while standing at sink with [place].  Toileting from toilet with Modified [place] for hygiene and clothing management.   Sitting in chair x60  minutes with Modified [place].  Toilet transfer to toilet with Modified [place].  Upper extremity exercise program x10 reps per handout, with independence.    6/19/2025 1244 by Marianne Whitley, OT  Outcome: Progressing   Patient recommended for low intensity therapy and DME: none. Occupational therapy to see 2x/week.

## 2025-06-20 ENCOUNTER — RESULTS FOLLOW-UP (OUTPATIENT)
Dept: UROLOGY | Facility: CLINIC | Age: 80
End: 2025-06-20

## 2025-06-20 VITALS
SYSTOLIC BLOOD PRESSURE: 171 MMHG | BODY MASS INDEX: 30.78 KG/M2 | TEMPERATURE: 98 F | HEIGHT: 75 IN | DIASTOLIC BLOOD PRESSURE: 75 MMHG | RESPIRATION RATE: 18 BRPM | OXYGEN SATURATION: 94 % | HEART RATE: 68 BPM | WEIGHT: 247.56 LBS

## 2025-06-20 LAB
ABO + RH BLD: NORMAL
ABO + RH BLD: NORMAL
ABSOLUTE EOSINOPHIL (OHS): 0.1 K/UL
ABSOLUTE MONOCYTE (OHS): 0.68 K/UL (ref 0.3–1)
ABSOLUTE NEUTROPHIL COUNT (OHS): 6.14 K/UL (ref 1.8–7.7)
BASOPHILS # BLD AUTO: 0.03 K/UL
BASOPHILS NFR BLD AUTO: 0.4 %
BLD PROD TYP BPU: NORMAL
BLD PROD TYP BPU: NORMAL
BLOOD UNIT EXPIRATION DATE: NORMAL
BLOOD UNIT EXPIRATION DATE: NORMAL
BLOOD UNIT TYPE CODE: 5100
BLOOD UNIT TYPE CODE: 5100
CROSSMATCH INTERPRETATION: NORMAL
CROSSMATCH INTERPRETATION: NORMAL
DISPENSE STATUS: NORMAL
DISPENSE STATUS: NORMAL
ERYTHROCYTE [DISTWIDTH] IN BLOOD BY AUTOMATED COUNT: 16.8 % (ref 11.5–14.5)
ESTROGEN SERPL-MCNC: NORMAL PG/ML
HCT VFR BLD AUTO: 41.2 % (ref 40–54)
HGB BLD-MCNC: 12.9 GM/DL (ref 14–18)
HOLD SPECIMEN: NORMAL
IMM GRANULOCYTES # BLD AUTO: 0.03 K/UL (ref 0–0.04)
IMM GRANULOCYTES NFR BLD AUTO: 0.4 % (ref 0–0.5)
INSULIN SERPL-ACNC: NORMAL U[IU]/ML
LAB AP CLINICAL INFORMATION: NORMAL
LAB AP GROSS DESCRIPTION: NORMAL
LAB AP PERFORMING LOCATION(S): NORMAL
LAB AP REPORT FOOTNOTES: NORMAL
LAB AP SYNOPTIC CHECKLIST: NORMAL
LYMPHOCYTES # BLD AUTO: 1.26 K/UL (ref 1–4.8)
MCH RBC QN AUTO: 28.5 PG (ref 27–31)
MCHC RBC AUTO-ENTMCNC: 31.3 G/DL (ref 32–36)
MCV RBC AUTO: 91 FL (ref 82–98)
NUCLEATED RBC (/100WBC) (OHS): 0 /100 WBC
PLATELET # BLD AUTO: 94 K/UL (ref 150–450)
PMV BLD AUTO: 11.1 FL (ref 9.2–12.9)
RBC # BLD AUTO: 4.53 M/UL (ref 4.6–6.2)
RELATIVE EOSINOPHIL (OHS): 1.2 %
RELATIVE LYMPHOCYTE (OHS): 15.3 % (ref 18–48)
RELATIVE MONOCYTE (OHS): 8.3 % (ref 4–15)
RELATIVE NEUTROPHIL (OHS): 74.4 % (ref 38–73)
UNIT NUMBER: NORMAL
UNIT NUMBER: NORMAL
WBC # BLD AUTO: 8.24 K/UL (ref 3.9–12.7)

## 2025-06-20 PROCEDURE — 94761 N-INVAS EAR/PLS OXIMETRY MLT: CPT

## 2025-06-20 PROCEDURE — 25000242 PHARM REV CODE 250 ALT 637 W/ HCPCS: Performed by: UROLOGY

## 2025-06-20 PROCEDURE — 85025 COMPLETE CBC W/AUTO DIFF WBC: CPT | Performed by: HOSPITALIST

## 2025-06-20 PROCEDURE — 99024 POSTOP FOLLOW-UP VISIT: CPT | Mod: POP,,, | Performed by: UROLOGY

## 2025-06-20 PROCEDURE — 27000221 HC OXYGEN, UP TO 24 HOURS

## 2025-06-20 PROCEDURE — 25000003 PHARM REV CODE 250: Performed by: HOSPITALIST

## 2025-06-20 PROCEDURE — 25000003 PHARM REV CODE 250: Performed by: UROLOGY

## 2025-06-20 PROCEDURE — 94799 UNLISTED PULMONARY SVC/PX: CPT

## 2025-06-20 PROCEDURE — 36415 COLL VENOUS BLD VENIPUNCTURE: CPT | Performed by: HOSPITALIST

## 2025-06-20 RX ORDER — HYDROCODONE BITARTRATE AND ACETAMINOPHEN 5; 325 MG/1; MG/1
1 TABLET ORAL EVERY 6 HOURS PRN
Qty: 28 TABLET | Refills: 0 | Status: SHIPPED | OUTPATIENT
Start: 2025-06-20

## 2025-06-20 RX ORDER — DOCUSATE SODIUM 100 MG/1
100 CAPSULE, LIQUID FILLED ORAL 2 TIMES DAILY
Qty: 60 CAPSULE | Refills: 0 | Status: SHIPPED | OUTPATIENT
Start: 2025-06-20

## 2025-06-20 RX ORDER — CEPHALEXIN 500 MG/1
500 CAPSULE ORAL EVERY 8 HOURS
Qty: 15 CAPSULE | Refills: 0 | Status: SHIPPED | OUTPATIENT
Start: 2025-06-20 | End: 2025-06-25

## 2025-06-20 RX ORDER — AMLODIPINE BESYLATE 10 MG/1
10 TABLET ORAL DAILY
Qty: 90 TABLET | Refills: 0 | Status: SHIPPED | OUTPATIENT
Start: 2025-06-21 | End: 2025-09-19

## 2025-06-20 RX ORDER — AMLODIPINE BESYLATE 5 MG/1
10 TABLET ORAL DAILY
Status: DISCONTINUED | OUTPATIENT
Start: 2025-06-20 | End: 2025-06-20 | Stop reason: HOSPADM

## 2025-06-20 RX ADMIN — DOCUSATE SODIUM 100 MG: 100 CAPSULE, LIQUID FILLED ORAL at 08:06

## 2025-06-20 RX ADMIN — METOPROLOL SUCCINATE 12.5 MG: 25 TABLET, EXTENDED RELEASE ORAL at 08:06

## 2025-06-20 RX ADMIN — FLECAINIDE ACETATE 50 MG: 50 TABLET ORAL at 08:06

## 2025-06-20 RX ADMIN — OXYCODONE 10 MG: 5 TABLET ORAL at 08:06

## 2025-06-20 RX ADMIN — AMLODIPINE BESYLATE 10 MG: 5 TABLET ORAL at 08:06

## 2025-06-20 RX ADMIN — FAMOTIDINE 20 MG: 20 TABLET, FILM COATED ORAL at 08:06

## 2025-06-20 RX ADMIN — FLUTICASONE PROPIONATE 50 MCG: 50 SPRAY, METERED NASAL at 08:06

## 2025-06-20 RX ADMIN — THERA TABS 1 TABLET: TAB at 08:06

## 2025-06-20 RX ADMIN — MUPIROCIN: 20 OINTMENT TOPICAL at 08:06

## 2025-06-20 RX ADMIN — INSULIN ASPART 5 UNITS: 100 INJECTION, SOLUTION INTRAVENOUS; SUBCUTANEOUS at 08:06

## 2025-06-20 RX ADMIN — GABAPENTIN 300 MG: 300 CAPSULE ORAL at 08:06

## 2025-06-20 NOTE — NURSING
PT WITH DC TO HOME ORDERS, PIVs DCED, PRESSURE DRSG APPLIED, DC INSTRUCTIONS REVIEWED WITH PT, VERB + UNDERSTANDING, PHARMACY DELIVERED MEDS TO ROOM, PT AWAITING FAMILY FOR TRANSPORT HOME

## 2025-06-20 NOTE — ASSESSMENT & PLAN NOTE
S/p Left partial nephrectomy Robotic to Open 6/18  PORTER removed  Walker ordered at patient's request  Home with daughter today  Rx sent to [facility] [address]   Follow up next week for wound check.

## 2025-06-20 NOTE — ASSESSMENT & PLAN NOTE
S/p Left partial nephrectomy Robotic to Open 6/18  PORTER removed  Walker ordered at patient's request  Home with daughter today  Rx sent to [facility] on [address]   Follow up next week for wound check.

## 2025-06-20 NOTE — SUBJECTIVE & OBJECTIVE
Interval History: Elevated blood pressure on home medications is much improved,DC IVF,consulted PT,OT,patient say her pain is improved,had BM .  Send Norvasc to pharmacy.    Review of Systems   Constitutional:  Positive for activity change and appetite change.   Respiratory:  Negative for apnea and chest tightness.    Musculoskeletal:  Positive for arthralgias.   Neurological:  Positive for weakness.     Objective:     Vital Signs (Most Recent):  Temp: 98.2 °F (36.8 °C) (06/20/25 0800)  Pulse: 68 (06/20/25 0800)  Resp: 18 (06/20/25 0820)  BP: (!) 171/75 (06/20/25 0800)  SpO2: (!) 94 % (06/20/25 0800) Vital Signs (24h Range):  Temp:  [97.8 °F (36.6 °C)-98.2 °F (36.8 °C)] 98.2 °F (36.8 °C)  Pulse:  [57-72] 68  Resp:  [13-31] 18  SpO2:  [88 %-100 %] 94 %  BP: (129-177)/(62-82) 171/75     Weight: 112.3 kg (247 lb 9.2 oz)  Body mass index is 30.94 kg/m².    Intake/Output Summary (Last 24 hours) at 6/20/2025 1008  Last data filed at 6/20/2025 0828  Gross per 24 hour   Intake 539.21 ml   Output 2445 ml   Net -1905.79 ml         Physical Exam  Cardiovascular:      Rate and Rhythm: Normal rate.      Heart sounds: No murmur heard.  Abdominal:      General: There is no distension.      Tenderness: There is no abdominal tenderness.   Musculoskeletal:         General: No swelling or deformity.   Skin:     Coloration: Skin is not jaundiced.   Neurological:      Mental Status: He is alert.               Significant Labs: All pertinent labs within the past 24 hours have been reviewed.  BMP:   Recent Labs   Lab 06/19/25  0355   *      K 4.3      CO2 25   BUN 12   CREATININE 0.8   CALCIUM 8.4*     CBC:   Recent Labs   Lab 06/18/25  1354 06/19/25  0355 06/20/25  0248   WBC 10.48 8.02 8.24   HGB 15.0 13.2* 12.9*   HCT 48.2 41.9 41.2   * 103* 94*     CMP:   Recent Labs   Lab 06/18/25  1355 06/19/25  0355    138   K 3.8 4.3    106   CO2 18* 25   * 134*   BUN 13 12   CREATININE 1.2 0.8    CALCIUM 9.0 8.4*   ANIONGAP 17* 7*       Significant Imaging: I have reviewed all pertinent imaging results/findings within the past 24 hours.

## 2025-06-20 NOTE — ASSESSMENT & PLAN NOTE
Last A1c reviewed-   Lab Results   Component Value Date    HGBA1C 8.7 (H) 05/05/2025     Home antihyperglycemic regimen: Mounjaro 2.5, glipizide 5    Recent Labs     06/18/25  0605 06/18/25 2053 06/19/25 2120   POCTGLUCOSE 164* 212* 134*     Most recent inpatient antihyperglycemic regimen:   Antihyperglycemics (From admission, onward)      Start     Stop Route Frequency Ordered    06/19/25 0715  insulin aspart U-100 pen 5 Units         -- SubQ 3 times daily with meals 06/18/25 1647    06/18/25 2100  insulin glargine U-100 (Lantus) pen 15 Units         -- SubQ Nightly 06/18/25 1647    06/18/25 1747  insulin aspart U-100 pen 0-5 Units         -- SubQ Every 4 hours PRN 06/18/25 1647          Goal blood glucose range while admitted: 140-180 per the NICE-SUGAR trial and American Diabetes Association guidelines  Diabetic diet 2000 antwan after cleared to advance from [facility]  Diabetic counseling and education (eg nutrition, insulin) to be given prior to discharge since A1c is too high

## 2025-06-20 NOTE — EICU
Virtual ICU Quality Rounds    Admit Date: 6/18/2025  Hospital Day: 2    ICU Day: 1d 16h    24H Vital Sign Range:  Temp:  [97.8 °F (36.6 °C)-98.2 °F (36.8 °C)]   Pulse:  [57-72]   Resp:  [13-34]   BP: (129-177)/(62-82)   SpO2:  [88 %-100 %]     VICU Surveillance Screening  LDA reconciliation : Yes

## 2025-06-20 NOTE — PROGRESS NOTES
[facility] Medicine  Progress Note    Patient Name: Rosendo Thomas  MRN: [id number]  Patient Class: IP- Inpatient   Admission Date: 6/18/2025  Length of Stay: 2 days  Attending Physician: Milad Valladares, *  Primary Care Provider: Dede Eric MD        Subjective     Principal Problem:Left renal mass        HPI:  Rosendo Thomas is a 79 y.o. male with BMI 31, NIDDM2, HTN, recently-diagnosed AFib on Flecainide, and Hx of Colon CA who is being treated by [facility] for a L renal mass.  It was found incidentally on CT A/P during a late March hospitalization for CP and HTN: 3 cm, heterogenous, c/f RCC.  Dr. Valladares surgically resected the tumor via open partial L nephrectomy earlier today 6/18  (was planned for laparoscopic but tumor was too obscured by fat and previous colon surgery so had to convert to open).  PACU stay was unremarkable, and he was brought to the ICU.  Hospital Medicine has been consulted for medical co-management.    He reports feeling some pain and burning in the area of the incision.  Also reports feeling a little anxious about being in the hospital.  Says he follows regularly with a PCP and keeps his BP generally under control.  Clinic visit notes within the past few months all indicate normotension of late.  He is compliant with his home meds, including Toprol 12.5, flecainide 50, Eliquis 5 BID, Lipitor 20, glipizide 5, Mounjaro (has lost about 50 lbs recently) and monthly Taltz for RA-induced psoriasis.  Follows w Dr. Whitehead for his AFib (diagnosed in January when he presented to [facility] with influenza), and denies recent sxs such as fatigue, light-headedness, palpitations.  Not yet hungry since arriving back from surgery.  Denies current or recent fever, chills, dyspnea, CP, abdo pain, N/V/D, known illness or sick contacts.  On eval, HR 60s with sinus rhythm noted on Tele.  /110.  Good sats on 3L LFNC.  Exam with no JVD, clear lungs, HS nl, no MONICA.       Overview/Hospital Course:  79 y.o. male with BMI 31, NIDDM2, HTN, recently-diagnosed AFib on Flecainide, and Hx of Colon CA who is being treated by [facility] for a L renal mass.  It was found incidentally on CT A/P during a late March hospitalization for CP and HTN: 3 cm, heterogenous, c/f RCC.  Dr. Valladares surgically resected the tumor via open partial L nephrectomy earlier today 6/18  (was planned for laparoscopic but tumor was too obscured by fat and previous colon surgery so had to convert to open).  PACU stay was unremarkable, and he was brought to the ICU.  Hospital Medicine has been consulted for medical co-management.  Elevated blood pressure on home medications is much improved,DC IVF,consulted PT,OT,patient say her pain is improved,had BM ,  Send Norvasc to pharmacy.       Interval History: Elevated blood pressure on home medications is much improved,DC IVF,consulted PT,OT,patient say her pain is improved,had BM .  Send Norvasc to pharmacy.    Review of Systems   Constitutional:  Positive for activity change and appetite change.   Respiratory:  Negative for apnea and chest tightness.    Musculoskeletal:  Positive for arthralgias.   Neurological:  Positive for weakness.     Objective:     Vital Signs (Most Recent):  Temp: 98.2 °F (36.8 °C) (06/20/25 0800)  Pulse: 68 (06/20/25 0800)  Resp: 18 (06/20/25 0820)  BP: (!) 171/75 (06/20/25 0800)  SpO2: (!) 94 % (06/20/25 0800) Vital Signs (24h Range):  Temp:  [97.8 °F (36.6 °C)-98.2 °F (36.8 °C)] 98.2 °F (36.8 °C)  Pulse:  [57-72] 68  Resp:  [13-31] 18  SpO2:  [88 %-100 %] 94 %  BP: (129-177)/(62-82) 171/75     Weight: 112.3 kg (247 lb 9.2 oz)  Body mass index is 30.94 kg/m².    Intake/Output Summary (Last 24 hours) at 6/20/2025 1008  Last data filed at 6/20/2025 0828  Gross per 24 hour   Intake 539.21 ml   Output 2445 ml   Net -1905.79 ml         Physical Exam  Cardiovascular:      Rate and Rhythm: Normal rate.      Heart sounds: No murmur  heard.  Abdominal:      General: There is no distension.      Tenderness: There is no abdominal tenderness.   Musculoskeletal:         General: No swelling or deformity.   Skin:     Coloration: Skin is not jaundiced.   Neurological:      Mental Status: He is alert.               Significant Labs: All pertinent labs within the past 24 hours have been reviewed.  BMP:   Recent Labs   Lab 06/19/25  0355   *      K 4.3      CO2 25   BUN 12   CREATININE 0.8   CALCIUM 8.4*     CBC:   Recent Labs   Lab 06/18/25  1354 06/19/25  0355 06/20/25  0248   WBC 10.48 8.02 8.24   HGB 15.0 13.2* 12.9*   HCT 48.2 41.9 41.2   * 103* 94*     CMP:   Recent Labs   Lab 06/18/25  1355 06/19/25  0355    138   K 3.8 4.3    106   CO2 18* 25   * 134*   BUN 13 12   CREATININE 1.2 0.8   CALCIUM 9.0 8.4*   ANIONGAP 17* 7*       Significant Imaging: I have reviewed all pertinent imaging results/findings within the past 24 hours.      Assessment & Plan  Left renal mass  Per primary.  Giving an extra dose of Dilaudid 0.5 since /100 and he reports 6/10 pain after Oxy 10.    consulted PT,OT,patient say her pain is improved,had BM today.  Type 2 diabetes mellitus  Last A1c reviewed-   Lab Results   Component Value Date    HGBA1C 8.7 (H) 05/05/2025     Home antihyperglycemic regimen: Mounjaro 2.5, glipizide 5    Recent Labs     06/18/25  0605 06/18/25 2053 06/19/25  2120   POCTGLUCOSE 164* 212* 134*     Most recent inpatient antihyperglycemic regimen:   Antihyperglycemics (From admission, onward)      Start     Stop Route Frequency Ordered    06/19/25 0715  insulin aspart U-100 pen 5 Units         -- SubQ 3 times daily with meals 06/18/25 1647 06/18/25 2100  insulin glargine U-100 (Lantus) pen 15 Units         -- SubQ Nightly 06/18/25 1647    06/18/25 1747  insulin aspart U-100 pen 0-5 Units         -- SubQ Every 4 hours PRN 06/18/25 1647          Goal blood glucose range while admitted: 140-180 per  the NICE-SUGAR trial and American Diabetes Association guidelines  Diabetic diet 2000 antwan after cleared to advance from [facility]  Diabetic counseling and education (eg nutrition, insulin) to be given prior to discharge since A1c is too high    Essential hypertension, benign  Patient's blood pressure range in the last 24 hours was: BP  Min: 129/62  Max: 177/78.The patient's inpatient anti-hypertensive regimen is listed below:  Current Antihypertensives  metoprolol succinate (TOPROL-XL) 24 hr split tablet 12.5 mg, Daily, Oral  hydrALAZINE injection 10 mg, Every 6 hours PRN, Intravenous  amLODIPine tablet 10 mg, Daily, Oral  amlodipine (NORVASC) tablet, Daily, Oral    BP is controlled, no changes needed to their regimen  Adequate pain & anxiety control as well as good sleep will be just as if not more important than starting new antihypertensives. Will not try to aggressively lower his BP while admitted: he has multiple valid reasons for being acutely hypertensive, and his recent outpatient readings have all been WNL.  Would not want to limit his body's ability to respond to hypovolemia should he develop an acute hemorrhage, for instance.  Starting amlodipine 5 since his BP is so high at the moment, and will titrate up throughout the night as needed.  But do not anticipate discharging him on any new meds given his recent normotension as an outpatient.  Delirium precautions  Elevated blood pressure on home medications is much improved,DC IVF,.  Send Norvasc to pharmacy.  Sinus bradycardia  Will avoid any further AV brayden blockade beyond his home Toprol.    Obesity (BMI 30.0-34.9)  Weight lose  as out patient .    Atrial fibrillation  Patient has persistent (7 days or more) atrial fibrillation. Patient is currently in sinus rhythm. [id number] Score: 3. The patients heart rate in the last 24 hours is as follows:  Pulse  Min: 57  Max: 72     Antiarrhythmics  flecainide tablet 50 mg, Every 12 hours, Oral  metoprolol succinate  (TOPROL-XL) 24 hr split tablet 12.5 mg, Daily, Oral    Anticoagulants       Plan  - Replete lytes with a goal of K>4, Mg >2  - Patient is not anticoagulated due to being post-partial nephrectomy; will defer to Primary on exact timing of resumption but his HR and rhythm are well-controlled at the moment.        VTE Risk Mitigation (From admission, onward)           Ordered     IP VTE HIGH RISK PATIENT  Once         06/18/25 1518     Place sequential compression device  Until discontinued         06/18/25 1518                    Discharge Planning   JESSICA: 6/20/2025     Code Status: Full Code   Patient is Medically Ready Now for discharge.  Medical Readiness for Discharge Date: 6/20/2025  Discharge Plan A: Home with family   Discharge Delays: None known at this time      SDOH Screening:  The patient was screened for utility difficulties, food insecurity, transport difficulties, housing insecurity, and interpersonal safety and there were no concerns identified this admission.            Critical care time spent on the evaluation and treatment of severe organ dysfunction, review of pertinent labs and imaging studies, discussions with consulting providers and discussions with patient/family: over 45  minutes.    Please place Justification for DME      Claudia Lutz MD  Department of Hospital Medicine   [facility]

## 2025-06-20 NOTE — ASSESSMENT & PLAN NOTE
Patient has persistent (7 days or more) atrial fibrillation. Patient is currently in sinus rhythm. [id number] Score: 3. The patients heart rate in the last 24 hours is as follows:  Pulse  Min: 57  Max: 72     Antiarrhythmics  flecainide tablet 50 mg, Every 12 hours, Oral  metoprolol succinate (TOPROL-XL) 24 hr split tablet 12.5 mg, Daily, Oral    Anticoagulants       Plan  - Replete lytes with a goal of K>4, Mg >2  - Patient is not anticoagulated due to being post-partial nephrectomy; will defer to Primary on exact timing of resumption but his HR and rhythm are well-controlled at the moment.

## 2025-06-20 NOTE — SUBJECTIVE & OBJECTIVE
Interval History: Feeling well.  Ambulating. Tolerating a regular diet.  Pain controlled.  Voiding.    Review of Systems   Constitutional:  Negative for fever.   Respiratory:  Negative for chest tightness and wheezing.    Cardiovascular:  Negative for chest pain and palpitations.   Gastrointestinal:  Negative for abdominal pain, diarrhea, nausea and vomiting.   Genitourinary:  Positive for flank pain. Negative for difficulty urinating, dysuria and hematuria.   Musculoskeletal:  Negative for arthralgias.   Neurological:  Negative for dizziness.   Psychiatric/Behavioral:  Negative for confusion.      Objective:     Temp:  [97.8 °F (36.6 °C)-98.2 °F (36.8 °C)] 97.8 °F (36.6 °C)  Pulse:  [57-72] 64  Resp:  [13-34] 19  SpO2:  [88 %-100 %] 97 %  BP: (129-177)/(62-82) 163/71     Body mass index is 30.94 kg/m².      Bladder Scan Volume (mL): 193 mL (06/19/25 1430)    Drains       Drain  Duration                  Closed/Suction Drain 06/18/25 1209 Lateral LUQ Bulb 19 Fr. 1 day                     Physical Exam  Vitals and nursing note reviewed.   Constitutional:       Appearance: He is well-developed.   HENT:      Head: Normocephalic.   Eyes:      Conjunctiva/sclera: Conjunctivae normal.   Neck:      Thyroid: No thyromegaly.      Trachea: No tracheal deviation.   Cardiovascular:      Rate and Rhythm: Normal rate.      Heart sounds: Normal heart sounds.   Pulmonary:      Effort: Pulmonary effort is normal. No respiratory distress.      Breath sounds: Normal breath sounds. No wheezing.   Abdominal:      General: Bowel sounds are normal.      Palpations: Abdomen is soft.      Tenderness: There is no abdominal tenderness. There is no rebound.      Hernia: No hernia is present.      Comments: Incisions clean dry intact.  Some ecchymosis posteriorly on open incision.    PORTER SS, removed.  Site dressed.    Musculoskeletal:         General: No tenderness. Normal range of motion.      Cervical back: Normal range of motion and neck  "supple.   Lymphadenopathy:      Cervical: No cervical adenopathy.   Skin:     General: Skin is warm and dry.      Findings: No erythema or rash.   Neurological:      Mental Status: He is alert and oriented to person, place, and time.   Psychiatric:         Behavior: Behavior normal.         Thought Content: Thought content normal.         Judgment: Judgment normal.           Significant Labs:    BMP:  Recent Labs   Lab 06/18/25  1355 06/19/25  0355    138   K 3.8 4.3    106   CO2 18* 25   BUN 13 12   CREATININE 1.2 0.8   CALCIUM 9.0 8.4*       CBC:   Recent Labs   Lab 06/18/25  1354 06/19/25  0355 06/20/25  0248   WBC 10.48 8.02 8.24   HGB 15.0 13.2* 12.9*   HCT 48.2 41.9 41.2   * 103* 94*       Blood Culture: No results for input(s): "LABBLOO" in the last 168 hours.  Urine Culture: No results for input(s): "LABURIN" in the last 168 hours.    Significant Imaging:                  "

## 2025-06-20 NOTE — ASSESSMENT & PLAN NOTE
Patient's blood pressure range in the last 24 hours was: BP  Min: 129/62  Max: 177/78.The patient's inpatient anti-hypertensive regimen is listed below:  Current Antihypertensives  metoprolol succinate (TOPROL-XL) 24 hr split tablet 12.5 mg, Daily, Oral  hydrALAZINE injection 10 mg, Every 6 hours PRN, Intravenous  amLODIPine tablet 10 mg, Daily, Oral  amlodipine (NORVASC) tablet, Daily, Oral    BP is controlled, no changes needed to their regimen  Adequate pain & anxiety control as well as good sleep will be just as if not more important than starting new antihypertensives. Will not try to aggressively lower his BP while admitted: he has multiple valid reasons for being acutely hypertensive, and his recent outpatient readings have all been WNL.  Would not want to limit his body's ability to respond to hypovolemia should he develop an acute hemorrhage, for instance.  Starting amlodipine 5 since his BP is so high at the moment, and will titrate up throughout the night as needed.  But do not anticipate discharging him on any new meds given his recent normotension as an outpatient.  Delirium precautions  Elevated blood pressure on home medications is much improved,DC IVF,.  Send Norvasc to pharmacy.

## 2025-06-20 NOTE — PLAN OF CARE
Case Management Final Discharge Note    Discharge Disposition: Home Self Care    New DME ordered / company name: Walker approved per Clementina with DME. CM delivered walker to patient bedside.    Relevant SDOH / Transition of Care Barriers:  None    Person available to provide assistance at home when needed and their contact information: danny Soria [phone]    Scheduled followup appointment: PCP-06/27/2025 for 9:40am.    Referrals placed: Urology 06/25/25    Transportation: Daughter will provide transportation    CM met with patient at bedside to discuss PT/OT recommendation of low intensity therapy. Patient declined recommendations.    Patient and family educated on discharge services and updated on DC plan. Bedside RN Mary notified, patient clear to discharge from Case Management Perspective.      06/20/25 1000   Final Note   Assessment Type Final Discharge Note   Anticipated Discharge Disposition Home   What phone number can be called within the next 1-3 days to see how you are doing after discharge? [phone]   Hospital Resources/Appts/Education Provided Appointments scheduled and added to AVS   Post-Acute Status   Discharge Delays None known at this time

## 2025-06-20 NOTE — DISCHARGE SUMMARY
[facility]ensive [facility]  Urology  Discharge Summary      Patient Name: Rosendo Thomas  MRN: [id number]  Admission Date: 6/18/2025  Hospital Length of Stay: 2 days  Discharge Date and Time: 06/20/2025 8:18 AM  Attending Physician: Milad Valladares, *   Discharging Provider: Milad Valladares MD  Primary Care Physician: Dede Eric MD    HPI:   No notes on file    Procedure(s) (LRB):  ROBOTIC NEPHRECTOMY, PARTIAL ATTEMPTED (Left)  NEPHRECTOMY, PARTIAL (Left)     Indwelling Lines/Drains at time of discharge:   Lines/Drains/Airways       Drain  Duration                  Closed/Suction Drain 06/18/25 1209 Lateral LUQ Bulb 19 Fr. 1 day                    Hospital Course (synopsis of major diagnoses, care, treatment, and services provided during the course of the hospital stay): he was followed post op in the ICU.  He had no negative events.  He tolerated a regular diet, ambulated, voiding, and his pain was controlled.  He felt ready to go home.    Goals of Care Treatment Preferences:  Code Status: Full Code      Consults:   Consults (From admission, onward)          Status Ordering Provider     Inpatient consult to Social Work/Case Management  Once        Provider:  (Not yet assigned)    Completed ORDERS, INSTANT     Inpatient consult to Hospitalist  Once        Provider:  Claudia Lutz MD    Acknowledged MILAD VALLADARES            Significant Diagnostic Studies:     Pending Diagnostic Studies:       Procedure Component Value Units Date/Time    EKG 12-lead [[id number]]     Order Status: Sent Lab Status: No result     Specimen to Pathology Urology [[id number]]     Order Status: Sent Lab Status: No result     Specimen: Tissue             Final Active Diagnoses:    Diagnosis Date Noted POA    PRINCIPAL PROBLEM:  Left renal mass [N28.89] 03/27/2025 Yes    Atrial fibrillation [I48.91] 05/12/2025 Yes    Obesity (BMI 30.0-34.9) [E66.811] 02/25/2022 Yes    Sinus bradycardia [R00.1] 09/30/2018 Yes     "Essential hypertension, benign [I10] 05/30/2018 Yes    Type 2 diabetes mellitus [E11.9] 07/01/2010 Yes      Problems Resolved During this Admission:         Discharged Condition: stable    Disposition: Home or Self Care    Follow Up:   Follow-up Information       Milad Valladares MD Follow up on 6/25/2025.    Specialty: Urology  Why: For wound re-check, Post op Check  Contact information:  [address]  [address]  [place] [address] [phone]                           Patient Instructions:      WALKER FOR HOME USE     Order Specific Question Answer Comments   Type of Walker: Adult (5'4"-6'6")    With wheels? Yes    Height: 6' 3" (1.905 m)    Weight: 112.3 kg (247 lb 9.2 oz)    Length of need (1-99 months): 99    Does patient have medical equipment at home? shower chair    Please check all that apply: Patient needs help to get in and out of chair.    Please check all that apply: Patient is unable to safely ambulate without equipment.      Diet diabetic     Lifting restrictions   Order Comments: Nothing heavier than 10 pounds x 6 weeks     No driving until:   Order Comments: Until seen in clinic     Remove dressing in 24 hours     Medications:  Reconciled Home Medications:      Medication List        START taking these medications      cephALEXin 500 MG capsule  Commonly known as: KEFLEX  Take 1 capsule (500 mg total) by mouth every 8 (eight) hours. for 5 days     docusate sodium 100 MG capsule  Commonly known as: COLACE  Take 1 capsule (100 mg total) by mouth 2 (two) times daily.     HYDROcodone-acetaminophen 5-325 mg per tablet  Commonly known as: NORCO  Take 1 tablet by mouth every 6 (six) hours as needed for Pain.            CONTINUE taking these medications      apixaban 5 mg Tab  Commonly known as: ELIQUIS  Take 1 tablet (5 mg total) by mouth 2 (two) times daily.     atorvastatin 20 MG tablet  Commonly known as: LIPITOR  take 1 tablet by mouth once daily     B COMPLEX ORAL  Take 1 capsule by mouth " Daily.     blood sugar diagnostic Strp  Commonly known as: CONTOUR TEST STRIPS  1 each by Misc.(Non-Drug; Combo Route) route 2 (two) times daily.     flecainide 50 MG Tab  Commonly known as: TAMBOCOR  Take 1 tablet (50 mg total) by mouth every 12 (twelve) hours.     fluticasone propionate 50 mcg/actuation nasal spray  Commonly known as: FLONASE  1 spray by Each Nostril route once daily.     gabapentin 300 MG capsule  Commonly known as: NEURONTIN  Take 1 capsule (300 mg total) by mouth 3 (three) times daily as needed (nerve pain).     glipiZIDE 5 MG Tr24  Take 1 tablet (5 mg total) by mouth daily with breakfast.     lancets Misc  USE ONE LANCET TWO TIMES DAILY     MAGNESIUM ORAL  Take 500 mg by mouth Daily.     metoprolol succinate 25 MG 24 hr tablet  Commonly known as: TOPROL-XL  Take 0.5 tablets (12.5 mg total) by mouth once daily.     MOUNJARO 2.5 mg/0.5 mL Pnij  Generic drug: tirzepatide  Inject 2.5 mg into the skin every 7 days.     TALTZ AUTOINJECTOR 80 mg/mL Atin  Generic drug: ixekizumab  Inject 80 mg into the skin every 28 days. Starting on week 12     JENNI-C ORAL  Take by mouth.     VITAMIN B-12 ORAL  Take by mouth.     ZINC ORAL  Take 1 capsule by mouth Daily.              Time spent on the discharge of patient: 20 minutes    Milad Valladares MD  Urology  [facility] [place] - Intensive Care

## 2025-06-20 NOTE — PROGRESS NOTES
[facility]  Urology  Progress Note    Patient Name: Rosendo Thomas  MRN: [id number]  Admission Date: 6/18/2025  Hospital Length of Stay: 2 days  Code Status: Full Code   Attending Provider: Milad Valladares, *   Primary Care Physician: Dede Eric MD    Subjective:     HPI:  No notes on file    Interval History: Feeling well.  Ambulating. Tolerating a regular diet.  Pain controlled.  Voiding.    Review of Systems   Constitutional:  Negative for fever.   Respiratory:  Negative for chest tightness and wheezing.    Cardiovascular:  Negative for chest pain and palpitations.   Gastrointestinal:  Negative for abdominal pain, diarrhea, nausea and vomiting.   Genitourinary:  Positive for flank pain. Negative for difficulty urinating, dysuria and hematuria.   Musculoskeletal:  Negative for arthralgias.   Neurological:  Negative for dizziness.   Psychiatric/Behavioral:  Negative for confusion.      Objective:     Temp:  [97.8 °F (36.6 °C)-98.2 °F (36.8 °C)] 97.8 °F (36.6 °C)  Pulse:  [57-72] 64  Resp:  [13-34] 19  SpO2:  [88 %-100 %] 97 %  BP: (129-177)/(62-82) 163/71     Body mass index is 30.94 kg/m².      Bladder Scan Volume (mL): 193 mL (06/19/25 1430)    Drains       Drain  Duration                  Closed/Suction Drain 06/18/25 1209 Lateral LUQ Bulb 19 Fr. 1 day                     Physical Exam  Vitals and nursing note reviewed.   Constitutional:       Appearance: He is well-developed.   HENT:      Head: Normocephalic.   Eyes:      Conjunctiva/sclera: Conjunctivae normal.   Neck:      Thyroid: No thyromegaly.      Trachea: No tracheal deviation.   Cardiovascular:      Rate and Rhythm: Normal rate.      Heart sounds: Normal heart sounds.   Pulmonary:      Effort: Pulmonary effort is normal. No respiratory distress.      Breath sounds: Normal breath sounds. No wheezing.   Abdominal:      General: Bowel sounds are normal.      Palpations: Abdomen is soft.      Tenderness: There is no abdominal  "tenderness. There is no rebound.      Hernia: No hernia is present.      Comments: Incisions clean dry intact.  Some ecchymosis posteriorly on open incision.    PORTER SS, removed.  Site dressed.    Musculoskeletal:         General: No tenderness. Normal range of motion.      Cervical back: Normal range of motion and neck supple.   Lymphadenopathy:      Cervical: No cervical adenopathy.   Skin:     General: Skin is warm and dry.      Findings: No erythema or rash.   Neurological:      Mental Status: He is alert and oriented to person, place, and time.   Psychiatric:         Behavior: Behavior normal.         Thought Content: Thought content normal.         Judgment: Judgment normal.           Significant Labs:    BMP:  Recent Labs   Lab 06/18/25  1355 06/19/25  0355    138   K 3.8 4.3    106   CO2 18* 25   BUN 13 12   CREATININE 1.2 0.8   CALCIUM 9.0 8.4*       CBC:   Recent Labs   Lab 06/18/25  1354 06/19/25  0355 06/20/25  0248   WBC 10.48 8.02 8.24   HGB 15.0 13.2* 12.9*   HCT 48.2 41.9 41.2   * 103* 94*       Blood Culture: No results for input(s): "LABBLOO" in the last 168 hours.  Urine Culture: No results for input(s): "LABURIN" in the last 168 hours.    Significant Imaging:                    Assessment/Plan:     * Left renal mass  S/p Left partial nephrectomy Robotic to Open 6/18  PORTER removed    Home with daughter today  Rx sent to [facility] on [address]   Follow up next week for wound check.    Walker for Home Use:  The mobility limitation cannot be sufficiently resolved by the use of a cane.     Patient's functional mobility deficit can be sufficiently resolved with the use of a (Rollator, Rolling Walker or Walker). Patient's mobility limitation significantly impairs their ability to participate in one of more activities of daily living.     The use of a (Rollator, RW or Walker) will significantly improve the patient's ability to participate in MRADLS and the patient will use it on regular " basis in the home       Essential hypertension, benign  Hospital medicine consulted  Please Keep SBP <140    Type 2 diabetes mellitus  Sliding scale        VTE Risk Mitigation (From admission, onward)           Ordered     IP VTE HIGH RISK PATIENT  Once         06/18/25 1518     Place sequential compression device  Until discontinued         06/18/25 1518                    Milad Valladares MD  Urology  [facility] - Intensive Care

## 2025-06-20 NOTE — NURSING
[facility] [place]  Nurses Note -- 4 Eyes      6/20/2025       Skin assessed on: Q Shift      [x] No Pressure Injuries Present    [x]Prevention Measures Documented    [] Yes LDA  for Pressure Injury Previously documented     [] Yes New Pressure Injury Discovered   [] LDA for New Pressure Injury Added      Attending RN:  Melissa Frazier RN     Second RN:  donal lozada

## 2025-06-20 NOTE — EICU
MAGDYU Night Rounds Checklist  24H Vital Sign Range:  Temp:  [97.7 °F (36.5 °C)-98.2 °F (36.8 °C)]   Pulse:  [54-72]   Resp:  [10-34]   BP: (115-177)/(57-82)   SpO2:  [88 %-100 %]     Video rounds and LDA reconciliation

## 2025-06-25 ENCOUNTER — OFFICE VISIT (OUTPATIENT)
Dept: UROLOGY | Facility: CLINIC | Age: 80
End: 2025-06-25
Payer: MEDICARE

## 2025-06-25 DIAGNOSIS — C64.2 MALIGNANT NEOPLASM OF LEFT KIDNEY: Primary | ICD-10-CM

## 2025-06-25 LAB
POCT GLUCOSE: 132 MG/DL (ref 70–110)
POCT GLUCOSE: 141 MG/DL (ref 70–110)
POCT GLUCOSE: 167 MG/DL (ref 70–110)
POCT GLUCOSE: 285 MG/DL (ref 70–110)

## 2025-06-25 PROCEDURE — 99213 OFFICE O/P EST LOW 20 MIN: CPT | Mod: PBBFAC | Performed by: UROLOGY

## 2025-06-25 PROCEDURE — 99024 POSTOP FOLLOW-UP VISIT: CPT | Mod: POP,,, | Performed by: UROLOGY

## 2025-06-25 PROCEDURE — 99999 PR PBB SHADOW E&M-EST. PATIENT-LVL III: CPT | Mod: PBBFAC,,, | Performed by: UROLOGY

## 2025-06-25 NOTE — PROGRESS NOTES
Subjective:       Patient ID: Rosendo Thomas is a 79 y.o. male who was last seen in this office 5/16/2025    Chief Complaint:   No chief complaint on file.      Post-Operative Follow-up  Patient here for post-op follow-up. Patient is 1 week status post Left Robotic Converted to Open Partial Nephrectomy.   The patient reports no problems with eating, bowel movements, voiding, or their wound. The patient is not having any pain.    ACTIVE MEDICAL ISSUES:  Problem List[1]    ALLERGIES AND MEDICATIONS: updated and reviewed.  Review of patient's allergies indicates:   Allergen Reactions    Influenza virus vaccines     Metformin Diarrhea     Current Outpatient Medications   Medication Sig    amLODIPine (NORVASC) 10 MG tablet Take 1 tablet (10 mg total) by mouth once daily.    apixaban (ELIQUIS) 5 mg Tab Take 1 tablet (5 mg total) by mouth 2 (two) times daily.    ascorbic acid (JENNI-C ORAL) Take by mouth.    atorvastatin (LIPITOR) 20 MG tablet take 1 tablet by mouth once daily    blood sugar diagnostic (CONTOUR TEST STRIPS) Strp 1 each by Misc.(Non-Drug; Combo Route) route 2 (two) times daily.    cephALEXin (KEFLEX) 500 MG capsule Take 1 capsule (500 mg total) by mouth every 8 (eight) hours. for 5 days    cyanocobalamin, vitamin B-12, (VITAMIN B-12 ORAL) Take by mouth.    docusate sodium (COLACE) 100 MG capsule Take 1 capsule (100 mg total) by mouth 2 (two) times daily.    flecainide (TAMBOCOR) 50 MG Tab Take 1 tablet (50 mg total) by mouth every 12 (twelve) hours.    fluticasone propionate (FLONASE) 50 mcg/actuation nasal spray 1 spray by Each Nostril route once daily.    gabapentin (NEURONTIN) 300 MG capsule Take 1 capsule (300 mg total) by mouth 3 (three) times daily as needed (nerve pain).    glipiZIDE 5 MG TR24 Take 1 tablet (5 mg total) by mouth daily with breakfast.    HYDROcodone-acetaminophen (NORCO) 5-325 mg per tablet Take 1 tablet by mouth every 6 (six) hours as needed for Pain.    ixekizumab (TALTZ  AUTOINJECTOR) 80 mg/mL AtIn Inject 80 mg into the skin every 28 days. Starting on week 12    lancets Misc USE ONE LANCET TWO TIMES DAILY    MAGNESIUM ORAL Take 500 mg by mouth Daily.    metoprolol succinate (TOPROL-XL) 25 MG 24 hr tablet Take 0.5 tablets (12.5 mg total) by mouth once daily.    MOUNJARO 2.5 mg/0.5 mL PnIj Inject 2.5 mg into the skin every 7 days.    vitamin B complex (B COMPLEX ORAL) Take 1 capsule by mouth Daily.    ZINC ORAL Take 1 capsule by mouth Daily.     No current facility-administered medications for this visit.       Review of Systems   Constitutional:  Negative for chills and fever.   HENT:  Negative for congestion.    Respiratory:  Negative for chest tightness and shortness of breath.    Cardiovascular:  Negative for chest pain and palpitations.   Gastrointestinal:  Negative for abdominal pain, constipation, diarrhea, nausea and vomiting.   Genitourinary:  Negative for difficulty urinating, dysuria, flank pain, hematuria and urgency.   Musculoskeletal:  Negative for arthralgias.   Neurological:  Negative for dizziness.   Psychiatric/Behavioral:  Negative for confusion.        Objective:      There were no vitals filed for this visit.  Physical Exam  Vitals and nursing note reviewed.   Constitutional:       Appearance: He is well-developed.   HENT:      Head: Normocephalic.   Eyes:      Conjunctiva/sclera: Conjunctivae normal.   Neck:      Thyroid: No thyromegaly.      Trachea: No tracheal deviation.   Cardiovascular:      Rate and Rhythm: Normal rate.      Heart sounds: Normal heart sounds.   Pulmonary:      Effort: Pulmonary effort is normal. No respiratory distress.      Breath sounds: Normal breath sounds. No wheezing.   Abdominal:      General: Bowel sounds are normal.      Palpations: Abdomen is soft.      Tenderness: There is no abdominal tenderness. There is no rebound.      Hernia: No hernia is present.      Comments: Incisions intact, no erythema  Staples removed  Steri strips  applied   Musculoskeletal:         General: No tenderness. Normal range of motion.      Cervical back: Normal range of motion and neck supple.   Lymphadenopathy:      Cervical: No cervical adenopathy.   Skin:     General: Skin is warm and dry.      Findings: No erythema or rash.   Neurological:      Mental Status: He is alert and oriented to person, place, and time.   Psychiatric:         Behavior: Behavior normal.         Thought Content: Thought content normal.         Judgment: Judgment normal.         Urine dipstick shows negative for all components.  Micro exam: negative for WBC's or RBC's.        Component  Ref Range & Units (hover)    Case Report   [facility] - Surgical                              Case: [id number]                                 Authorizing Provider:  Milad Valladares MD Collected:           06/18/2025 08:55 AM           Ordering Location:     [facility]        Received:            06/18/2025 01:43 PM           Pathologist:           Yee Gaytan DO                                                     Specimen:    Kidney, Left, left kidney mass                                                            Clinical Information    Procedure:  ROBOTIC NEPHRECTOMY, PARTIAL ATTEMPTED - Left  NEPHRECTOMY, PARTIAL - Left  Pre-op Diagnosis:  Left kidney mass [N28.89]  Post-op Diagnosis:  N28.89 - Left kidney mass [ICD-10-CM]   Final Diagnosis   Left kidney, partial nephrectomy:   Clear cell renal cell carcinoma, G2, 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 Malignant neoplasm of left kidney          Plan:       1. Malignant neoplasm of left kidney  Okay to drive in 1 week  No heavy lifting x 5 more weeks             Follow up in about 2 weeks (around 7/9/2025) for Follow up Post Op.           [1]   Patient Active Problem List  Diagnosis    Type 2 diabetes mellitus    Psoriasis    Essential hypertension, benign    Sinus bradycardia    Psoriatic arthritis    History of colon cancer    Personal history of colonic polyps    Immunosuppression    Obesity (BMI 30.0-34.9)    Vitamin D deficiency    Dyslipidemia    Thrombocytopenia, unspecified    Anticoagulant long-term use    Persistent atrial fibrillation    Left renal mass    Cervical radiculopathy    AKIN (obstructive sleep apnea)    Atrial fibrillation

## 2025-06-26 LAB
POCT GLUCOSE: 150 MG/DL (ref 70–110)
POCT GLUCOSE: 242 MG/DL (ref 70–110)

## 2025-07-10 ENCOUNTER — OFFICE VISIT (OUTPATIENT)
Dept: UROLOGY | Facility: CLINIC | Age: 80
End: 2025-07-10
Payer: MEDICARE

## 2025-07-10 VITALS — WEIGHT: 244.81 LBS | BODY MASS INDEX: 30.6 KG/M2

## 2025-07-10 DIAGNOSIS — C64.2 MALIGNANT NEOPLASM OF LEFT KIDNEY: Primary | ICD-10-CM

## 2025-07-10 DIAGNOSIS — N40.0 BPH WITHOUT OBSTRUCTION/LOWER URINARY TRACT SYMPTOMS: ICD-10-CM

## 2025-07-10 PROCEDURE — 99999 PR PBB SHADOW E&M-EST. PATIENT-LVL II: CPT | Mod: PBBFAC,,, | Performed by: UROLOGY

## 2025-07-10 PROCEDURE — 99212 OFFICE O/P EST SF 10 MIN: CPT | Mod: PBBFAC | Performed by: UROLOGY

## 2025-07-10 PROCEDURE — 99024 POSTOP FOLLOW-UP VISIT: CPT | Mod: POP,,, | Performed by: UROLOGY

## 2025-07-10 NOTE — PROGRESS NOTES
Subjective:       Patient ID: Rosendo Thomas is a 79 y.o. male who was last seen in this office 6/25/2025    Chief Complaint:   Chief Complaint   Patient presents with    Post-op Evaluation       Post-Operative Follow-up  Patient here for post-op follow-up. Patient is status post Left Robotic Converted to Open Partial Nephrectomy on 6/18/2025.   The patient reports no problems with eating, bowel movements, voiding, or their wound. The patient is not having any pain.    07/10/2025  Pain is controlled.  He is ambulating okay.  Tolerating a regular diet.      ACTIVE MEDICAL ISSUES:  Problem List[1]    ALLERGIES AND MEDICATIONS: updated and reviewed.  Review of patient's allergies indicates:   Allergen Reactions    Influenza virus vaccines     Metformin Diarrhea     Current Outpatient Medications   Medication Sig    amLODIPine (NORVASC) 10 MG tablet Take 1 tablet (10 mg total) by mouth once daily.    apixaban (ELIQUIS) 5 mg Tab Take 1 tablet (5 mg total) by mouth 2 (two) times daily.    ascorbic acid (JENNI-C ORAL) Take by mouth.    atorvastatin (LIPITOR) 20 MG tablet take 1 tablet by mouth once daily    blood sugar diagnostic (CONTOUR TEST STRIPS) Strp 1 each by Misc.(Non-Drug; Combo Route) route 2 (two) times daily.    cyanocobalamin, vitamin B-12, (VITAMIN B-12 ORAL) Take by mouth.    docusate sodium (COLACE) 100 MG capsule Take 1 capsule (100 mg total) by mouth 2 (two) times daily.    flecainide (TAMBOCOR) 50 MG Tab Take 1 tablet (50 mg total) by mouth every 12 (twelve) hours.    fluticasone propionate (FLONASE) 50 mcg/actuation nasal spray 1 spray by Each Nostril route once daily.    gabapentin (NEURONTIN) 300 MG capsule Take 1 capsule (300 mg total) by mouth 3 (three) times daily as needed (nerve pain).    glipiZIDE 5 MG TR24 Take 1 tablet (5 mg total) by mouth daily with breakfast.    HYDROcodone-acetaminophen (NORCO) 5-325 mg per tablet Take 1 tablet by mouth every 6 (six) hours as needed for Pain.     ixekizumab (TALTZ AUTOINJECTOR) 80 mg/mL AtIn Inject 80 mg into the skin every 28 days. Starting on week 12    lancets Misc USE ONE LANCET TWO TIMES DAILY    MAGNESIUM ORAL Take 500 mg by mouth Daily.    metoprolol succinate (TOPROL-XL) 25 MG 24 hr tablet Take 0.5 tablets (12.5 mg total) by mouth once daily.    MOUNJARO 2.5 mg/0.5 mL PnIj Inject 2.5 mg into the skin every 7 days.    vitamin B complex (B COMPLEX ORAL) Take 1 capsule by mouth Daily.    ZINC ORAL Take 1 capsule by mouth Daily.     No current facility-administered medications for this visit.       Review of Systems   Constitutional:  Negative for chills and fever.   HENT:  Negative for congestion.    Respiratory:  Negative for chest tightness and shortness of breath.    Cardiovascular:  Negative for chest pain and palpitations.   Gastrointestinal:  Negative for abdominal pain, constipation, diarrhea, nausea and vomiting.   Genitourinary:  Negative for difficulty urinating, dysuria, flank pain, hematuria and urgency.   Musculoskeletal:  Negative for arthralgias.   Neurological:  Negative for dizziness.   Psychiatric/Behavioral:  Negative for confusion.        Objective:      Vitals:    07/10/25 1006   Weight: 111 kg (244 lb 13.1 oz)     Physical Exam  Vitals and nursing note reviewed.   Constitutional:       Appearance: He is well-developed.   HENT:      Head: Normocephalic.   Eyes:      Conjunctiva/sclera: Conjunctivae normal.   Neck:      Thyroid: No thyromegaly.      Trachea: No tracheal deviation.   Cardiovascular:      Rate and Rhythm: Normal rate.      Heart sounds: Normal heart sounds.   Pulmonary:      Effort: Pulmonary effort is normal. No respiratory distress.      Breath sounds: Normal breath sounds. No wheezing.   Abdominal:      General: Bowel sounds are normal.      Palpations: Abdomen is soft.      Tenderness: There is no abdominal tenderness. There is no rebound.      Hernia: No hernia is present.      Comments: Incisions intact, no  erythema  Staples removed  Steri strips applied   Musculoskeletal:         General: No tenderness. Normal range of motion.      Cervical back: Normal range of motion and neck supple.   Lymphadenopathy:      Cervical: No cervical adenopathy.   Skin:     General: Skin is warm and dry.      Findings: No erythema or rash.   Neurological:      Mental Status: He is alert and oriented to person, place, and time.   Psychiatric:         Behavior: Behavior normal.         Thought Content: Thought content normal.         Judgment: Judgment normal.         Urine dipstick shows negative for all components.  Micro exam: negative for WBC's or RBC's.        Component  Ref Range & Units (hover)    Case Report   [facility] - [place] - Surgical                              Case: [id number]                                 Authorizing Provider:  Milad Valladares MD Collected:           06/18/2025 08:55 AM           Ordering Location:     [facility]        Received:            06/18/2025 01:43 PM           Pathologist:           Yee Gaytan DO                                                     Specimen:    Kidney, Left, left kidney mass                                                            Clinical Information    Procedure:  ROBOTIC NEPHRECTOMY, PARTIAL ATTEMPTED - Left  NEPHRECTOMY, PARTIAL - Left  Pre-op Diagnosis:  Left kidney mass [N28.89]  Post-op Diagnosis:  N28.89 - Left kidney mass [ICD-10-CM]   Final Diagnosis   Left kidney, partial nephrectomy:   Clear cell renal cell carcinoma, G2, 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 Malignant neoplasm of left kidney    2. BPH without obstruction/lower urinary tract symptoms            Plan:        1. Malignant neoplasm of left kidney (Primary)    - Basic Metabolic Panel; Future    2. 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             [1]   Patient Active Problem List  Diagnosis    Type 2 diabetes mellitus    Psoriasis    Essential hypertension, benign    Sinus bradycardia    Psoriatic arthritis    History of colon cancer    Personal history of colonic polyps    Immunosuppression    Obesity (BMI 30.0-34.9)    Vitamin D deficiency    Dyslipidemia    Thrombocytopenia, unspecified    Anticoagulant long-term use    Persistent atrial fibrillation    Left renal mass    Cervical radiculopathy    AKIN (obstructive sleep apnea)    Atrial fibrillation

## 2025-07-14 ENCOUNTER — OFFICE VISIT (OUTPATIENT)
Dept: FAMILY MEDICINE | Facility: CLINIC | Age: 80
End: 2025-07-14
Payer: MEDICARE

## 2025-07-14 ENCOUNTER — PATIENT OUTREACH (OUTPATIENT)
Dept: ADMINISTRATIVE | Facility: HOSPITAL | Age: 80
End: 2025-07-14
Payer: MEDICARE

## 2025-07-14 VITALS
WEIGHT: 242.75 LBS | DIASTOLIC BLOOD PRESSURE: 68 MMHG | TEMPERATURE: 98 F | SYSTOLIC BLOOD PRESSURE: 110 MMHG | HEART RATE: 46 BPM | RESPIRATION RATE: 18 BRPM | HEIGHT: 75 IN | BODY MASS INDEX: 30.18 KG/M2 | OXYGEN SATURATION: 96 %

## 2025-07-14 DIAGNOSIS — E66.811 OBESITY (BMI 30.0-34.9): ICD-10-CM

## 2025-07-14 DIAGNOSIS — M54.12 CERVICAL RADICULOPATHY: ICD-10-CM

## 2025-07-14 DIAGNOSIS — I48.19 PERSISTENT ATRIAL FIBRILLATION: ICD-10-CM

## 2025-07-14 DIAGNOSIS — Z90.5 H/O LEFT NEPHRECTOMY: Primary | ICD-10-CM

## 2025-07-14 PROBLEM — N28.89 LEFT RENAL MASS: Status: RESOLVED | Noted: 2025-03-27 | Resolved: 2025-07-14

## 2025-07-14 PROCEDURE — 99214 OFFICE O/P EST MOD 30 MIN: CPT | Mod: S$PBB,,, | Performed by: INTERNAL MEDICINE

## 2025-07-14 PROCEDURE — 99999 PR PBB SHADOW E&M-EST. PATIENT-LVL V: CPT | Mod: PBBFAC,,, | Performed by: INTERNAL MEDICINE

## 2025-07-14 PROCEDURE — 99215 OFFICE O/P EST HI 40 MIN: CPT | Mod: PBBFAC,PO | Performed by: INTERNAL MEDICINE

## 2025-07-14 NOTE — PROGRESS NOTES
"Chief Complaint: Follow-up (Hospital follow up )      Rosendo Thomas  is a 79 y.o. year old patient who presents today for     History of Present Illness    CHIEF COMPLAINT:  Rosendo presents today for post-operative follow up from recent kidney surgery    POST-OPERATIVE RECOVERY:  He reports initial post-operative discomfort on the surgical side, describing a "mound-like feeling" that prevented sleeping on that side. He now has significant improvement and is able to lay on the surgical side without discomfort. The surgical site appears to be scabbing over well with no apparent distraction.    CURRENT MEDICATIONS:  He continues gabapentin and flecainide. He is no longer using pain medication.    WEIGHT MANAGEMENT:  He reports significant intentional weight loss from approximately 300 lbs to 240 lbs, representing a 60-pound reduction. He appears motivated and positive about the weight loss progress.      ROS:  General: -fever, -chills, -fatigue, -weight gain, +weight loss  Eyes: -vision changes, -redness, -discharge  ENT: -ear pain, -nasal congestion, -sore throat  Cardiovascular: -chest pain, -palpitations, -lower extremity edema  Respiratory: -cough, -shortness of breath  Gastrointestinal: -abdominal pain, -nausea, -vomiting, -diarrhea, -constipation, -blood in stool  Genitourinary: -dysuria, -hematuria, -frequency  Musculoskeletal: -joint pain, -muscle pain  Skin: -rash, -lesion  Neurological: -headache, -dizziness, -numbness, -tingling  Psychiatric: -anxiety, -depression, -sleep difficulty         Past Medical History:   Diagnosis Date    Colon cancer     Colon polyps     Diabetes mellitus type II 07/2010    diet controlled    Left renal mass 03/27/2025    Obesity     Paroxysmal atrial fibrillation     Psoriasis     Sleep apnea, unspecified     Squamous cell carcinoma     Type 2 diabetes mellitus        Past Surgical History:   Procedure Laterality Date    APPENDECTOMY      CARDIOVERSION N/A 2/18/2025    " Procedure: Cardioversion;  Surgeon: Abbe Whitehead MD;  Location: [facility] CATH LAB;  Service: Cardiology;  Laterality: N/A;  before 1230pm    CARDIOVERSION N/A 4/22/2025    Procedure: Cardioversion;  Surgeon: Abbe Whitehead MD;  Location: [facility] CATH LAB;  Service: Cardiology;  Laterality: N/A;    COLONOSCOPY N/A 07/09/2018    Procedure: COLONOSCOPY;  Surgeon: Kameron Ruff MD;  Location: [facility] ENDO;  Service: Endoscopy;  Laterality: N/A;  confirmed    COLONOSCOPY N/A 11/11/2019    Procedure: COLONOSCOPY;  Surgeon: Victor Hugo Nunez MD;  Location: [facility] ENDO (4TH FLR);  Service: Endoscopy;  Laterality: N/A;  Pt stated this is the only day he has somebody to drive him and  to stay during procedure  PM Prep    COLONOSCOPY N/A 07/08/2020    Procedure: COLONOSCOPY;  Surgeon: Victor Hugo Nunez MD;  Location: [facility] ENDO (2ND FLR);  Service: Endoscopy;  Laterality: N/A;  Covid-19 test 7/5/20 at [facility] [place]    EXTREMITY CYST EXCISION      LAPAROSCOPIC LEFT COLECTOMY N/A 09/25/2018    Procedure: COLECTOMY-LAPAROSCOPIC LEFT;  Surgeon: Chito Banks MD;  Location: [facility] OR 2ND FLR;  Service: Colon and Rectal;  Laterality: N/A;    PARTIAL NEPHRECTOMY Left 6/18/2025    Procedure: NEPHRECTOMY, PARTIAL;  Surgeon: Milad Valladares MD;  Location: [facility] OR;  Service: Urology;  Laterality: Left;    ROBOT-ASSISTED LAPAROSCOPIC PARTIAL NEPHRECTOMY Left 6/18/2025    Procedure: ROBOTIC NEPHRECTOMY, PARTIAL ATTEMPTED;  Surgeon: Milad Valladares MD;  Location: [facility] OR;  Service: Urology;  Laterality: Left;  ROBOTIC REPS NOTIFIED-GG---CLEARED BY CARDS  Count includes the [facility] Notifed- [place] Confirmation #[id number]  RN PREOP 6/12/2025---T/S DONE 6/12/2025---PREPARE 2U RBC---    SKIN CANCER EXCISION      SKIN GRAFT      TRANSESOPHAGEAL ECHOCARDIOGRAM WITH POSSIBLE CARDIOVERSION (LUDIN W/ POSS CARDIOVERSION) N/A 2/18/2025    Procedure: TRANSESOPHAGEAL ECHOCARDIOGRAM WITH POSSIBLE CARDIOVERSION (LUDIN W/ POSS  CARDIOVERSION);  Surgeon: Abbe Whitehead MD;  Location: [facility] CATH LAB;  Service: Cardiology;  Laterality: N/A;  before 1230pm  RN PRE OP 2/14/2025    TREATMENT OF CARDIAC ARRHYTHMIA N/A 4/22/2025    Procedure: Cardioversion/Defibrillation;  Surgeon: Abbe Whitehead MD;  Location: [facility] CATH LAB;  Service: Cardiology;  Laterality: N/A;  RN PRE OP 4/16/2025    VASECTOMY      VASECTOMY          Family History   Problem Relation Name Age of Onset    Cancer Father          prostate    Diabetes Brother      Liver cancer Brother      Anesthesia problems Neg Hx          Social History     Socioeconomic History    Marital status: Other   Occupational History     Employer: [employer]   Tobacco Use    Smoking status: Former     Types: Cigars    Smokeless tobacco: Never    Tobacco comments:     cigars-x1 yr.   Substance and Sexual Activity    Alcohol use: Yes     Comment: occassional    Drug use: No    Sexual activity: Yes     Partners: Female     Social Drivers of Health     Financial Resource Strain: Low Risk  (6/20/2025)    Overall Financial Resource Strain (CARDIA)     Difficulty of Paying Living Expenses: Not hard at all   Food Insecurity: No Food Insecurity (6/20/2025)    Hunger Vital Sign     Worried About Running Out of Food in the Last Year: Never true     Ran Out of Food in the Last Year: Never true   Transportation Needs: No Transportation Needs (6/20/2025)    PRAPARE - Transportation     Lack of Transportation (Medical): No     Lack of Transportation (Non-Medical): No   Physical Activity: Sufficiently Active (12/17/2024)    Exercise Vital Sign     Days of Exercise per Week: 4 days     Minutes of Exercise per Session: 40 min   Stress: No Stress Concern Present (6/20/2025)    [employer] - Occupational Stress Questionnaire     Feeling of Stress : Not at all   Housing Stability: Low Risk  (6/20/2025)    Housing Stability Vital Sign     Unable to Pay for Housing  in the Last Year: No     Number of Times Moved in the Last Year: 0     Homeless in the Last Year: No       Current Medications[1]           Objective:      Vitals:    07/14/25 0924   BP: 110/68   Pulse: (!) 46   Resp: 18   Temp: 98.2 °F (36.8 °C)       Physical Exam  Constitutional:       Appearance: Normal appearance. He is obese.   HENT:      Head: Normocephalic and atraumatic.   Cardiovascular:      Rate and Rhythm: Normal rate.   Abdominal:      General: A surgical scar is present.      Comments: Laparoscopic and left lateral abd scar healing well. No signs of bleeding    Musculoskeletal:         General: Normal range of motion.   Skin:     General: Skin is warm and dry.   Neurological:      General: No focal deficit present.      Mental Status: He is alert and oriented to person, place, and time.          Assessment:       1. H/O left partial nephrectomy    2. Obesity (BMI 30.0-34.9)    3. Persistent atrial fibrillation    4. Cervical radiculopathy          Plan:   1. H/O left partial nephrectomy  Overview:  2/2 renal ca    Assessment & Plan:  - Assessed post-op recovery from kidney tumor removal, noting good healing and improved mobility.      2. Obesity (BMI 30.0-34.9)  Assessment & Plan:  - Noted significant weight loss from almost 300 lbs to current weight of 240 lbs.  - Discussed this progress with patient.  - Will continue Mounjaro, which may contribute to weight management.      3. Persistent atrial fibrillation  Assessment & Plan:  Has appt with cardiology   - Will continue flecainide, metoprolol and eliquis       4. Cervical radiculopathy  Assessment & Plan:  - improving s/p chiropractor intervention   - d/c norco   - cont gabapentin PRN           CARDIOVASCULAR HEALTH:  - Evaluated cardiovascular status.    MEDICATION MANAGEMENT:  - Continue gabapentin.  - Considered cancellation of previously scheduled BMP.    FOLLOW-UP AND REFERRALS:  - Follow up in 6 months.  - Rosendo to schedule an eye exam with  Dr. Root T and inform our office of the appointment date.         Follow up in about 6 months (around 1/14/2026).    This note was generated with the assistance of ambient listening technology. Verbal consent was obtained by the patient and accompanying visitor(s) for the recording of patient appointment to facilitate this note. I attest to having reviewed and edited the generated note for accuracy, though some syntax or spelling errors may persist. Please contact the author of this note for any clarification.                [1]   Current Outpatient Medications:     amLODIPine (NORVASC) 10 MG tablet, Take 1 tablet (10 mg total) by mouth once daily., Disp: 90 tablet, Rfl: 0    apixaban (ELIQUIS) 5 mg Tab, Take 1 tablet (5 mg total) by mouth 2 (two) times daily., Disp: 180 tablet, Rfl: 3    ascorbic acid (JENNI-C ORAL), Take by mouth., Disp: , Rfl:     atorvastatin (LIPITOR) 20 MG tablet, take 1 tablet by mouth once daily, Disp: 90 tablet, Rfl: 0    blood sugar diagnostic (CONTOUR TEST STRIPS) Strp, 1 each by Misc.(Non-Drug; Combo Route) route 2 (two) times daily., Disp: 50 each, Rfl: 11    cyanocobalamin, vitamin B-12, (VITAMIN B-12 ORAL), Take by mouth., Disp: , Rfl:     docusate sodium (COLACE) 100 MG capsule, Take 1 capsule (100 mg total) by mouth 2 (two) times daily., Disp: 60 capsule, Rfl: 0    flecainide (TAMBOCOR) 50 MG Tab, Take 1 tablet (50 mg total) by mouth every 12 (twelve) hours., Disp: 180 tablet, Rfl: 3    fluticasone propionate (FLONASE) 50 mcg/actuation nasal spray, 1 spray by Each Nostril route once daily., Disp: , Rfl:     gabapentin (NEURONTIN) 300 MG capsule, Take 1 capsule (300 mg total) by mouth 3 (three) times daily as needed (nerve pain)., Disp: 90 capsule, Rfl: 11    glipiZIDE 5 MG TR24, Take 1 tablet (5 mg total) by mouth daily with breakfast., Disp: 90 tablet, Rfl: 3    ixekizumab (TALTZ AUTOINJECTOR) 80 mg/mL AtIn, Inject 80 mg into the skin every 28 days. Starting on week 12, Disp: 1  mL, Rfl: 5    lancets Misc, USE ONE LANCET TWO TIMES DAILY, Disp: 100 each, Rfl: 11    MAGNESIUM ORAL, Take 500 mg by mouth Daily., Disp: , Rfl:     metoprolol succinate (TOPROL-XL) 25 MG 24 hr tablet, Take 0.5 tablets (12.5 mg total) by mouth once daily., Disp: , Rfl:     MOUNJARO 2.5 mg/0.5 mL PnIj, Inject 2.5 mg into the skin every 7 days., Disp: 4 Pen, Rfl: 6    vitamin B complex (B COMPLEX ORAL), Take 1 capsule by mouth Daily., Disp: , Rfl:     ZINC ORAL, Take 1 capsule by mouth Daily., Disp: , Rfl:

## 2025-07-14 NOTE — ASSESSMENT & PLAN NOTE
- Noted significant weight loss from almost 300 lbs to current weight of 240 lbs.  - Discussed this progress with patient.  - Will continue Mounjaro, which may contribute to weight management.

## 2025-07-14 NOTE — PROGRESS NOTES
Health Maintenance Due   Topic     TETANUS VACCINE  Patient advised to go to pharmacy    Shingles Vaccine (1 of 2) Hx of Chicken Pox. Patient advised to go to pharmacy    RSV Vaccine (Age 60+ and Pregnant patients) (1 - 1-dose 75+ series) Not offered at this clinic    COVID-19 Vaccine (5 - 2024-25 season) Patient declined    Diabetic Eye Exam      Hemoglobin A1c  Consult PCP

## 2025-07-16 ENCOUNTER — TELEPHONE (OUTPATIENT)
Dept: UROLOGY | Facility: CLINIC | Age: 80
End: 2025-07-16
Payer: MEDICARE

## 2025-07-16 NOTE — TELEPHONE ENCOUNTER
Copied from CRM #[id number]. Topic: General Inquiry - Patient Advice  >> Jul 15, 2025  2:39 PM Bernadette wrote:  Type: Patient Call Back    Who called:self    What is the request in detail: the tape has fallen off the incision and he would like to know if he should put a bandage on the area or come in its not a large area it has not closed completely     Can the clinic reply by [facility]?no    Would the patient rather a call back or a response via [facility]? call    Best call back number:[phone]    Additional Information:  >> Jul 15, 2025  2:50 PM Med Assistant Rogelio wrote:   the tape has fallen off the incision and he would like to know if he should put a bandage on the area or come in its not a large area it has not closed completely  ----- Message -----  From: Bernadette Orozco  Sent: 7/15/2025   2:41 PM CDT  To: Jacquelyn Benz

## 2025-07-16 NOTE — TELEPHONE ENCOUNTER
Pt was contacted, pt informed if there is no drainage from the incision then there is no need to cover it.

## 2025-07-21 DIAGNOSIS — E11.22 CONTROLLED TYPE 2 DIABETES MELLITUS WITH STAGE 3 CHRONIC KIDNEY DISEASE, WITHOUT LONG-TERM CURRENT USE OF INSULIN: ICD-10-CM

## 2025-07-21 DIAGNOSIS — N18.30 CONTROLLED TYPE 2 DIABETES MELLITUS WITH STAGE 3 CHRONIC KIDNEY DISEASE, WITHOUT LONG-TERM CURRENT USE OF INSULIN: ICD-10-CM

## 2025-07-21 NOTE — TELEPHONE ENCOUNTER
Copied from CRM #[id number]. Topic: Medications - Medication Refill  >> Jul 21, 2025 10:31 AM Leah wrote:  Type: RX Refill Request    Who Called: self     Have you contacted your pharmacy: no     Refill or New Rx:refill    RX Name and Strength:atorvastatin (LIPITOR) 20 MG tablet      Preferred Pharmacy with phone number:[facility]/pharmacy #[id number] - [facility] [address] [address] [facility] [address]  Phone: [phone] Fax: [phone]        Local or Mail Order:local        Would the patient rather a call back or a response via [facility]? call    Best Call Back Number:.[phone] (home)     Additional Information:

## 2025-07-22 ENCOUNTER — TELEPHONE (OUTPATIENT)
Dept: ENDOCRINOLOGY | Facility: CLINIC | Age: 80
End: 2025-07-22
Payer: MEDICARE

## 2025-07-22 RX ORDER — ATORVASTATIN CALCIUM 20 MG/1
20 TABLET, FILM COATED ORAL DAILY
Qty: 90 TABLET | Refills: 1 | Status: SHIPPED | OUTPATIENT
Start: 2025-07-22

## 2025-07-22 NOTE — TELEPHONE ENCOUNTER
Copied from CRM #[id number]. Topic: Medications - Medication Refill  >> Jul 22, 2025 10:27 AM Jumana wrote:  Type: RX Refill Request    Who Called:  patient     Have you contacted your pharmacy:    Refill or New Rx: refill     RX Name and Strength:atorvastatin (LIPITOR) 20 MG tablet    How is the patient currently taking it? (ex. 1XDay):    Is this a 30 day or 90 day RX: 90 day     Preferred Pharmacy with phone number:  [facility]/pharmacy #[id number] - [facility] [address] [address]  Phone: [phone] Fax: [phone]        Local or Mail Order: local     Ordering Provider: Dr Farr      Would the patient rather a call back or a response via [facility]?  Call     Best Call Back Number: [phone]     Additional Information:

## 2025-07-26 RX ORDER — METOPROLOL SUCCINATE 25 MG/1
12.5 TABLET, EXTENDED RELEASE ORAL DAILY
Qty: 45 TABLET | Refills: 3 | Status: SHIPPED | OUTPATIENT
Start: 2025-07-26 | End: 2026-07-26

## 2025-07-26 NOTE — TELEPHONE ENCOUNTER
Pls let him know the refill was sent to [place] [address] [address].    If he wants it sent to a different pharmacy, pls mary up a new rx with the correct pharmacy information.    [facility]

## 2025-08-04 NOTE — PROGRESS NOTES
Arrhythmia Clinic   Reason for Visit: Follow up    HPI:   Rosendo Thomas is a 79 y.o. male with PMH of HTN, atrial fibrillation (on eliquis 5mg bid, flecainide 50mg bid, and metoprolol succinate 12.5mg daily) , DM2 who presents to arrhythmia clinic for follow up.     Patient states he has been feeling well since last clinic visit and DCCV in 4/2025. He is concerned though about the increased bruising he has experienced while on eliquis and risk of bleeding. States there are times he feels unsteady walking and making turns and is concerned he may fall. Would like to discuss having a watchman placed.     Today's EKG shows: Sinus bradycardia w/ HR of 49 and 1st degree AV block.     Denies chest pain, shortness of breath at rest or exertion, nausea, vomiting, orthopnea, paroxysmal nocturnal dyspnea, syncope, lightheadedness, palpitations, peripheral edema, abdominal distention, abdominal pain, bendopnea, early satiety, weight loss, weight gain.     HPI on 3/5/2025 (Ismael Pennington MD)  He was recently diagnosed with atrial fibrillation in the setting of influenza A infection. He was treated for flu and ultimately underwent LUDIN/DCCV 2/2025. At follow up 3/2025 he was found to be back in AF. He was subsequently referred to electrophysiology. He has been maintained on apixaban and metoprolol. Today he states he is uncertain if he has symptoms in AF. He has not noticed any significant functional decline over the last month or so. He does note occasional periods of feeling tired. He says Dr. Whitehead's office is sending referral to sleep medicine. He didn't experience any symptomatic improvement following cardioversion but it is uncertain how long he maintained sinus rhythm. He is tolerating apixaban well. He is amenable to another trial of sinus rhythm with DCCV and anti arrhythmic therapy.         ROS:    12 point ROS negative except for HPI  PMH:     Past Medical History:   Diagnosis Date    Colon cancer     Colon  polyps     Diabetes mellitus type II 07/2010    diet controlled    Left renal mass 03/27/2025    Obesity     Paroxysmal atrial fibrillation     Psoriasis     Sleep apnea, unspecified     Squamous cell carcinoma     Type 2 diabetes mellitus      Past Surgical History:   Procedure Laterality Date    APPENDECTOMY      CARDIOVERSION N/A 2/18/2025    Procedure: Cardioversion;  Surgeon: Abbe Whitehead MD;  Location: [facility] CATH LAB;  Service: Cardiology;  Laterality: N/A;  before 1230pm    CARDIOVERSION N/A 4/22/2025    Procedure: Cardioversion;  Surgeon: Abbe Whitehead MD;  Location: [facility] CATH LAB;  Service: Cardiology;  Laterality: N/A;    COLONOSCOPY N/A 07/09/2018    Procedure: COLONOSCOPY;  Surgeon: Kameron Ruff MD;  Location: [facility] ENDO;  Service: Endoscopy;  Laterality: N/A;  confirmed    COLONOSCOPY N/A 11/11/2019    Procedure: COLONOSCOPY;  Surgeon: Victor Hugo Nunez MD;  Location: [facility] ENDO (4TH FLR);  Service: Endoscopy;  Laterality: N/A;  Pt stated this is the only day he has somebody to drive him and  to stay during procedure  PM Prep    COLONOSCOPY N/A 07/08/2020    Procedure: COLONOSCOPY;  Surgeon: Victor Hugo Nunez MD;  Location: [facility] ENDO (2ND FLR);  Service: Endoscopy;  Laterality: N/A;  Covid-19 test 7/5/20 at [facility]    EXTREMITY CYST EXCISION      LAPAROSCOPIC LEFT COLECTOMY N/A 09/25/2018    Procedure: COLECTOMY-LAPAROSCOPIC LEFT;  Surgeon: Chito Banks MD;  Location: [facility] 2ND FLR;  Service: Colon and Rectal;  Laterality: N/A;    PARTIAL NEPHRECTOMY Left 6/18/2025    Procedure: NEPHRECTOMY, PARTIAL;  Surgeon: Milad Valladares MD;  Location: [facility] OR;  Service: Urology;  Laterality: Left;    ROBOT-ASSISTED LAPAROSCOPIC PARTIAL NEPHRECTOMY Left 6/18/2025    Procedure: ROBOTIC NEPHRECTOMY, PARTIAL ATTEMPTED;  Surgeon: Milad Valladares MD;  Location: [facility] OR;  Service: Urology;  Laterality: Left;  ROBOTIC REPS NOTIFIED-GG---CLEARED BY  "ROMY  Ryland Notifed- [place] Confirmation #[id number]  RN PREOP 6/12/2025---T/S DONE 6/12/2025---PREPARE 2U RBC---    SKIN CANCER EXCISION      SKIN GRAFT      TRANSESOPHAGEAL ECHOCARDIOGRAM WITH POSSIBLE CARDIOVERSION (LUDIN W/ POSS CARDIOVERSION) N/A 2/18/2025    Procedure: TRANSESOPHAGEAL ECHOCARDIOGRAM WITH POSSIBLE CARDIOVERSION (LUDIN W/ POSS CARDIOVERSION);  Surgeon: Abbe Whitehead MD;  Location: [facility] CATH LAB;  Service: Cardiology;  Laterality: N/A;  before 1230pm  RN PRE OP 2/14/2025    TREATMENT OF CARDIAC ARRHYTHMIA N/A 4/22/2025    Procedure: Cardioversion/Defibrillation;  Surgeon: Abbe Whitehead MD;  Location: [facility] CATH LAB;  Service: Cardiology;  Laterality: N/A;  RN PRE OP 4/16/2025    VASECTOMY      VASECTOMY       Allergies:     Review of patient's allergies indicates:   Allergen Reactions    Influenza virus vaccines     Metformin Diarrhea     Medications:   Medications Ordered Prior to Encounter[1]  Social History:     Social History     Tobacco Use    Smoking status: Former     Types: Cigars    Smokeless tobacco: Never    Tobacco comments:     cigars-x1 yr.   Substance Use Topics    Alcohol use: Yes     Comment: occassional     Family History:     Family History   Problem Relation Name Age of Onset    Cancer Father          prostate    Diabetes Brother      Liver cancer Brother      Anesthesia problems Neg Hx       Physical Exam:   BP (!) 144/80 (BP Location: Left arm, Patient Position: Sitting)   Pulse (!) 49   Ht 6' 3" (1.905 m)   Wt 114.4 kg (252 lb 3.3 oz)   BMI 31.52 kg/m²    Wt Readings from Last 4 Encounters:   08/05/25 114.4 kg (252 lb 3.3 oz)   07/14/25 110.1 kg (242 lb 11.6 oz)   07/10/25 111 kg (244 lb 13.1 oz)   06/13/25 112.3 kg (247 lb 9.2 oz)         Constitutional: No distress, conversant  HEENT:  Atraumatic, normocephalic  Neck: No JVD, good movement, no carotid bruit or palpable thrills  CV: Regular rhythm, bradycardia, S1 and S2 normal.  Pulm: Clear to auscultation " bilaterally with symmetrical expansion.   GI: Abdomen soft, non-tender  Extremities: no peripheral edema noted  Skin: Warm, intact  Psych:  Normal affect and mood  Neuro:  Normal speech, alert and oriented x3      Labs:     Lab Results   Component Value Date     06/19/2025     02/14/2025    K 4.3 06/19/2025    K 4.3 02/14/2025     06/19/2025     02/14/2025    CO2 25 06/19/2025    CO2 25 02/14/2025    BUN 12 06/19/2025    CREATININE 0.8 06/19/2025    ANIONGAP 7 (L) 06/19/2025     Lab Results   Component Value Date    HGBA1C 8.7 (H) 05/05/2025    HGBA1C 6.5 (H) 02/07/2025     Lab Results   Component Value Date    BNP 82 03/27/2025    BNP 63 01/13/2025    Lab Results   Component Value Date    WBC 8.24 06/20/2025    HGB 12.9 (L) 06/20/2025    HGB 13.6 (L) 02/14/2025    HCT 41.2 06/20/2025    HCT 43.5 02/14/2025    PLT 94 (L) 06/20/2025     (L) 02/14/2025    GRAN 3.9 02/14/2025    GRAN 68.3 02/14/2025     Lab Results   Component Value Date    CHOL 91 (L) 03/27/2025    CHOL 99 (L) 02/07/2025    HDL 32 (L) 03/27/2025    LDLCALC 46.4 (L) 03/27/2025    TRIG 63 03/27/2025    TRIG 60 02/07/2025          Imaging:    Echo  Nuc Rest EF   Date Value Ref Range Status   03/28/2025 68  Final         Assessment:    1. PAF    Plan:   PAF  - [id number] score of 4.   - HAS-BLED score of 3  - Continue eliquis, flecainide, and metoprolol for now.   - Risks and benefits presented to patient regarding PVI and LAAO. Patient agrees to proceed.     Signed:  Enzo Young M.D.  Cardiology Fellow  [facility]  8/5/2025 9:41 AM      I agree with the note above and have discussed the treatment plan with the fellow.    80 yoM with symptomatic pAF and side effects (bradycardia) on AAD agents. He also has easy bruising and unsteadiness. I offered concomitant PFA and LAAO. I had extensive discussion with patient regarding risks and benefits of PVI/WACA, and the patient would like to proceed. Risks of  procedure include (but are not limited to) bleeding, stroke, perforation requiring emergency draining or surgery, AV block, death, AV fistula, AE fistula, PV stenosis.    Last anti-coagulation dose night prior to procedure.  Discontinue antiarrhytmic drugs 4 days prior to the procedure.     PFA PVI, Watchman  Anesthesia  LUDIN prior, cancel if NSR  CAROL                  [1]   Current Outpatient Medications on File Prior to Visit   Medication Sig Dispense Refill    amLODIPine (NORVASC) 10 MG tablet Take 1 tablet (10 mg total) by mouth once daily. 90 tablet 0    apixaban (ELIQUIS) 5 mg Tab Take 1 tablet (5 mg total) by mouth 2 (two) times daily. 180 tablet 3    ascorbic acid (JENNI-C ORAL) Take by mouth.      atorvastatin (LIPITOR) 20 MG tablet Take 1 tablet (20 mg total) by mouth once daily. 90 tablet 1    blood sugar diagnostic (CONTOUR TEST STRIPS) Strp 1 each by Misc.(Non-Drug; Combo Route) route 2 (two) times daily. 50 each 11    cyanocobalamin, vitamin B-12, (VITAMIN B-12 ORAL) Take by mouth.      docusate sodium (COLACE) 100 MG capsule Take 1 capsule (100 mg total) by mouth 2 (two) times daily. 60 capsule 0    flecainide (TAMBOCOR) 50 MG Tab Take 1 tablet (50 mg total) by mouth every 12 (twelve) hours. 180 tablet 3    fluticasone propionate (FLONASE) 50 mcg/actuation nasal spray 1 spray by Each Nostril route once daily.      gabapentin (NEURONTIN) 300 MG capsule Take 1 capsule (300 mg total) by mouth 3 (three) times daily as needed (nerve pain). 90 capsule 11    glipiZIDE 5 MG TR24 Take 1 tablet (5 mg total) by mouth daily with breakfast. 90 tablet 3    ixekizumab (TALTZ AUTOINJECTOR) 80 mg/mL AtIn Inject 80 mg into the skin every 28 days. Starting on week 12 1 mL 5    lancets Misc USE ONE LANCET TWO TIMES DAILY 100 each 11    MAGNESIUM ORAL Take 500 mg by mouth Daily.      metoprolol succinate (TOPROL-XL) 25 MG 24 hr tablet Take 0.5 tablets (12.5 mg total) by mouth once daily. 45 tablet 3    MOUNJARO 2.5 mg/0.5 mL  PnIj Inject 2.5 mg into the skin every 7 days. 4 Pen 6    vitamin B complex (B COMPLEX ORAL) Take 1 capsule by mouth Daily.      ZINC ORAL Take 1 capsule by mouth Daily.       No current facility-administered medications on file prior to visit.

## 2025-08-05 ENCOUNTER — HOSPITAL ENCOUNTER (OUTPATIENT)
Dept: CARDIOLOGY | Facility: CLINIC | Age: 80
Discharge: HOME | End: 2025-08-05
Payer: MEDICARE

## 2025-08-05 ENCOUNTER — OFFICE VISIT (OUTPATIENT)
Dept: ELECTROPHYSIOLOGY | Facility: CLINIC | Age: 80
End: 2025-08-05
Payer: MEDICARE

## 2025-08-05 VITALS
HEIGHT: 75 IN | DIASTOLIC BLOOD PRESSURE: 80 MMHG | BODY MASS INDEX: 31.36 KG/M2 | SYSTOLIC BLOOD PRESSURE: 144 MMHG | HEART RATE: 49 BPM | WEIGHT: 252.19 LBS

## 2025-08-05 DIAGNOSIS — R00.1 SINUS BRADYCARDIA: ICD-10-CM

## 2025-08-05 DIAGNOSIS — I48.0 PAROXYSMAL ATRIAL FIBRILLATION: Primary | ICD-10-CM

## 2025-08-05 LAB
OHS QRS DURATION: 94 MS
OHS QTC CALCULATION: 364 MS

## 2025-08-05 PROCEDURE — 93010 ELECTROCARDIOGRAM REPORT: CPT | Mod: S$PBB,,, | Performed by: INTERNAL MEDICINE

## 2025-08-05 PROCEDURE — 99999 PR PBB SHADOW E&M-EST. PATIENT-LVL III: CPT | Mod: PBBFAC,,, | Performed by: INTERNAL MEDICINE

## 2025-08-05 PROCEDURE — 99213 OFFICE O/P EST LOW 20 MIN: CPT | Mod: PBBFAC | Performed by: INTERNAL MEDICINE

## 2025-08-05 PROCEDURE — 99214 OFFICE O/P EST MOD 30 MIN: CPT | Mod: S$PBB,,, | Performed by: INTERNAL MEDICINE

## 2025-08-05 PROCEDURE — 93005 ELECTROCARDIOGRAM TRACING: CPT | Mod: PBBFAC | Performed by: INTERNAL MEDICINE

## 2025-08-06 RX ORDER — FLECAINIDE ACETATE 50 MG/1
50 TABLET ORAL EVERY 12 HOURS
Qty: 180 TABLET | Refills: 0 | Status: SHIPPED | OUTPATIENT
Start: 2025-08-06 | End: 2026-08-06

## 2025-08-07 ENCOUNTER — LAB VISIT (OUTPATIENT)
Dept: LAB | Facility: HOSPITAL | Age: 80
End: 2025-08-07
Attending: UROLOGY
Payer: MEDICARE

## 2025-08-07 DIAGNOSIS — C64.2 MALIGNANT NEOPLASM OF LEFT KIDNEY: ICD-10-CM

## 2025-08-07 DIAGNOSIS — E11.65 TYPE 2 DIABETES MELLITUS WITH HYPERGLYCEMIA, WITHOUT LONG-TERM CURRENT USE OF INSULIN: ICD-10-CM

## 2025-08-07 LAB
ANION GAP (OHS): 7 MMOL/L (ref 8–16)
BUN SERPL-MCNC: 13 MG/DL (ref 8–23)
CALCIUM SERPL-MCNC: 9.4 MG/DL (ref 8.7–10.5)
CHLORIDE SERPL-SCNC: 105 MMOL/L (ref 95–110)
CO2 SERPL-SCNC: 27 MMOL/L (ref 23–29)
CREAT SERPL-MCNC: 0.8 MG/DL (ref 0.5–1.4)
EAG (OHS): 131 MG/DL (ref 68–131)
GFR SERPLBLD CREATININE-BSD FMLA CKD-EPI: >60 ML/MIN/1.73/M2
GLUCOSE SERPL-MCNC: 121 MG/DL (ref 70–110)
HBA1C MFR BLD: 6.2 % (ref 4–5.6)
POTASSIUM SERPL-SCNC: 4.4 MMOL/L (ref 3.5–5.1)
SODIUM SERPL-SCNC: 139 MMOL/L (ref 136–145)

## 2025-08-07 PROCEDURE — 36415 COLL VENOUS BLD VENIPUNCTURE: CPT

## 2025-08-07 PROCEDURE — 83036 HEMOGLOBIN GLYCOSYLATED A1C: CPT

## 2025-08-07 PROCEDURE — 82310 ASSAY OF CALCIUM: CPT

## 2025-08-13 ENCOUNTER — TELEPHONE (OUTPATIENT)
Dept: ELECTROPHYSIOLOGY | Facility: CLINIC | Age: 80
End: 2025-08-13
Payer: MEDICARE

## 2025-08-14 ENCOUNTER — OFFICE VISIT (OUTPATIENT)
Dept: UROLOGY | Facility: CLINIC | Age: 80
End: 2025-08-14
Payer: MEDICARE

## 2025-08-14 DIAGNOSIS — C64.2 MALIGNANT NEOPLASM OF LEFT KIDNEY: Primary | ICD-10-CM

## 2025-08-14 DIAGNOSIS — N40.0 BPH WITHOUT OBSTRUCTION/LOWER URINARY TRACT SYMPTOMS: ICD-10-CM

## 2025-08-14 DIAGNOSIS — R35.1 NOCTURIA MORE THAN TWICE PER NIGHT: ICD-10-CM

## 2025-08-14 PROCEDURE — 99024 POSTOP FOLLOW-UP VISIT: CPT | Mod: POP,,, | Performed by: UROLOGY

## 2025-08-14 PROCEDURE — 99999 PR PBB SHADOW E&M-EST. PATIENT-LVL III: CPT | Mod: PBBFAC,,, | Performed by: UROLOGY

## 2025-08-14 PROCEDURE — 99213 OFFICE O/P EST LOW 20 MIN: CPT | Mod: PBBFAC | Performed by: UROLOGY

## 2025-08-18 DIAGNOSIS — I48.19 PERSISTENT ATRIAL FIBRILLATION: Primary | ICD-10-CM

## 2025-08-20 ENCOUNTER — TELEPHONE (OUTPATIENT)
Dept: ENDOCRINOLOGY | Facility: CLINIC | Age: 80
End: 2025-08-20
Payer: MEDICARE

## 2025-08-21 ENCOUNTER — OFFICE VISIT (OUTPATIENT)
Dept: ENDOCRINOLOGY | Facility: CLINIC | Age: 80
End: 2025-08-21
Payer: MEDICARE

## 2025-08-21 VITALS — BODY MASS INDEX: 31.25 KG/M2 | WEIGHT: 251.31 LBS | HEIGHT: 75 IN

## 2025-08-21 DIAGNOSIS — E55.9 VITAMIN D DEFICIENCY: ICD-10-CM

## 2025-08-21 DIAGNOSIS — G47.33 OSA (OBSTRUCTIVE SLEEP APNEA): ICD-10-CM

## 2025-08-21 DIAGNOSIS — I48.0 PAROXYSMAL ATRIAL FIBRILLATION: ICD-10-CM

## 2025-08-21 DIAGNOSIS — E11.65 TYPE 2 DIABETES MELLITUS WITH HYPERGLYCEMIA, WITHOUT LONG-TERM CURRENT USE OF INSULIN: ICD-10-CM

## 2025-08-21 DIAGNOSIS — I10 ESSENTIAL HYPERTENSION, BENIGN: ICD-10-CM

## 2025-08-21 DIAGNOSIS — E66.811 OBESITY (BMI 30.0-34.9): Primary | ICD-10-CM

## 2025-08-21 DIAGNOSIS — Z90.5 H/O LEFT NEPHRECTOMY: ICD-10-CM

## 2025-08-21 PROCEDURE — G2211 COMPLEX E/M VISIT ADD ON: HCPCS | Mod: ,,, | Performed by: HOSPITALIST

## 2025-08-21 PROCEDURE — 99999 PR PBB SHADOW E&M-EST. PATIENT-LVL III: CPT | Mod: PBBFAC,,, | Performed by: HOSPITALIST

## 2025-08-21 PROCEDURE — 99214 OFFICE O/P EST MOD 30 MIN: CPT | Mod: S$PBB,,, | Performed by: HOSPITALIST

## 2025-08-21 PROCEDURE — 99213 OFFICE O/P EST LOW 20 MIN: CPT | Mod: PBBFAC | Performed by: HOSPITALIST

## 2025-08-21 RX ORDER — TIRZEPATIDE 2.5 MG/.5ML
2.5 INJECTION, SOLUTION SUBCUTANEOUS
Qty: 4 PEN | Refills: 8 | Status: SHIPPED | OUTPATIENT
Start: 2025-08-21

## 2025-08-21 RX ORDER — GLIPIZIDE 5 MG/1
5 TABLET, FILM COATED, EXTENDED RELEASE ORAL
Qty: 90 TABLET | Refills: 3 | Status: SHIPPED | OUTPATIENT
Start: 2025-08-21

## 2025-08-25 DIAGNOSIS — L40.50 PSORIASIS WITH ARTHROPATHY: ICD-10-CM

## 2025-08-25 DIAGNOSIS — L40.9 PSORIASIS: ICD-10-CM

## 2025-08-25 RX ORDER — IXEKIZUMAB 80 MG/ML
80 INJECTION, SOLUTION SUBCUTANEOUS
Qty: 1 ML | Refills: 5 | Status: CANCELLED | OUTPATIENT
Start: 2025-08-25

## 2025-08-26 RX ORDER — IXEKIZUMAB 80 MG/ML
80 INJECTION, SOLUTION SUBCUTANEOUS
Qty: 1 ML | Refills: 2 | Status: ACTIVE | OUTPATIENT
Start: 2025-08-26

## (undated) DEVICE — CANISTER SUCTION RIGID 2000CC

## (undated) DEVICE — DEVICE VLOC CLSR 3-0 GU-46 9IN

## (undated) DEVICE — ADHESIVE DERMABOND ADVANCED

## (undated) DEVICE — SUT MCRYL PLUS 4-0 PS2 27IN

## (undated) DEVICE — LUBRICANT SURGILUBE 2 OZ

## (undated) DEVICE — DEV-O-LOOPS MAXI RED

## (undated) DEVICE — COVER OVERHEAD SURG LT BLUE

## (undated) DEVICE — SOL IRRI STRL WATER 1000ML

## (undated) DEVICE — STAPLER SKIN PROXIMATE WIDE

## (undated) DEVICE — DRAIN CHANNEL ROUND 19FR

## (undated) DEVICE — PAD DEFIB CADENCE ADULT R2

## (undated) DEVICE — SEE MEDLINE ITEM 152487

## (undated) DEVICE — TOWEL OR DISP STRL BLUE 4/PK

## (undated) DEVICE — NDL 22GA X1 1/2 REG BEVEL

## (undated) DEVICE — STAPLER CIRCULAR XL SEAL 29MM

## (undated) DEVICE — SPONGE LAP 4X18 PREWASHED

## (undated) DEVICE — SUT VICRYL+ 27 UR-6 VIOL

## (undated) DEVICE — SEE MEDLINE ITEM 157144

## (undated) DEVICE — GLOVE SIGNATURE ESSNTL LTX 7.5

## (undated) DEVICE — SEE MEDLINE ITEM 156902

## (undated) DEVICE — SET TRI-LUMEN FILTERED TUBE

## (undated) DEVICE — PENCIL SMK EVAC CONNECTOR 10FT

## (undated) DEVICE — PACK ENDOSCOPY GENERAL

## (undated) DEVICE — GOWN NONREINF SET-IN SLV XL

## (undated) DEVICE — SUT ABS CLIP LAPRA-TY CTD

## (undated) DEVICE — SUT 0 18IN SILK BLK BRAIDE

## (undated) DEVICE — ELECTRODE REM PLYHSV RETURN 9

## (undated) DEVICE — SUT 0 VICRYL PLUS CT-1 27IN

## (undated) DEVICE — CLIP HEMO-LOK MLX LARGE LF

## (undated) DEVICE — CONNECTOR Y 3/8X3/8X3/8

## (undated) DEVICE — SEE MEDLINE ITEM 157181

## (undated) DEVICE — APPLICATOR ENDOSCOPIC

## (undated) DEVICE — LEGGINGS 48X31 INCH

## (undated) DEVICE — Device

## (undated) DEVICE — SUT VICRYL 3-0 27 SH

## (undated) DEVICE — SUT CTD VICRYL 0 UND BR SUT

## (undated) DEVICE — NDL INSUFFLATION VERRES 120MM

## (undated) DEVICE — SUT 1 36IN PDS II VIO MONO

## (undated) DEVICE — DRAPE COLUMN DAVINCI XI

## (undated) DEVICE — DRAPE ABDOMINAL TIBURON 14X11

## (undated) DEVICE — SEE L#95700

## (undated) DEVICE — TRAP FLUID SMOKE EVACUATOR

## (undated) DEVICE — COVER LIGHT HANDLE 80/CA

## (undated) DEVICE — TROCAR ENDOPATH XCEL 5X100MM

## (undated) DEVICE — EVACUATOR WOUND BULB 100CC

## (undated) DEVICE — KIT PROCEDURE STER INLET CLOSU

## (undated) DEVICE — SUT VICRYL 4-0 RB1 27IN UD

## (undated) DEVICE — SYR 30CC LUER LOCK

## (undated) DEVICE — TUBING HF INSUFFLATION W/ FLTR

## (undated) DEVICE — SUT 1 36IN PDS II

## (undated) DEVICE — STAPLER DST BLUE 45X3.5MM

## (undated) DEVICE — SEE MEDLINE ITEM 154981

## (undated) DEVICE — TAPE SURG DURAPORE 2 X10YD

## (undated) DEVICE — SOL ELECTROLUBE ANTI-STIC

## (undated) DEVICE — NDL BOX COUNTER

## (undated) DEVICE — SOL WATER STERILE IRR 500ML

## (undated) DEVICE — SUT VLOC 90 3-0 V-20 NDL 9

## (undated) DEVICE — SUT CTD VICRYL 0 UND BR

## (undated) DEVICE — CUTTER PROXIMATE BLUE 75MM

## (undated) DEVICE — APPLIER LAPRATY SUT CLIP 33CM

## (undated) DEVICE — SOL NACL 0.9% IV INJ 1000ML

## (undated) DEVICE — TRAY FOLEY 16FR INFECTION CONT

## (undated) DEVICE — SEE MEDLINE ITEM 146347

## (undated) DEVICE — SOL IRR NACL .9% 3000ML

## (undated) DEVICE — DRESSING ABSRBNT ISLAND 3.6X8

## (undated) DEVICE — MONOSOF SUTURES

## (undated) DEVICE — SEALER VESSEL EXTEND

## (undated) DEVICE — SEAL UNIVERSAL 5MM-8MM XI

## (undated) DEVICE — SUT ETHILON 3/0 18IN PS-1

## (undated) DEVICE — SEALER LIGASURE LAP 37CM 5MM

## (undated) DEVICE — KIT ANTIFOG

## (undated) DEVICE — SCISSOR CURVED ENDOPATH 5MM

## (undated) DEVICE — HEMOSTAT SURGICEL 4X8IN

## (undated) DEVICE — SYR ONLY LUER LOCK 20CC

## (undated) DEVICE — DRAPE ARM DAVINCI XI

## (undated) DEVICE — CONTAINER SPECIMEN OR STER 4OZ

## (undated) DEVICE — ELECTRODE PATIENT RETURN DISP

## (undated) DEVICE — OBTURATOR BLADELESS 8MM XI CLR

## (undated) DEVICE — SUT SILK 0 BLK BR CT-1 30IN

## (undated) DEVICE — SPONGE LAP 18X18 PREWASHED

## (undated) DEVICE — SOL CLEARIFY VISUALIZATION LAP

## (undated) DEVICE — SEE MEDLINE ITEM 146417

## (undated) DEVICE — COVER TIP CURVED SCISSORS XI

## (undated) DEVICE — CANNULA ENDOPATH XCEL 5X100MM